# Patient Record
Sex: MALE | Race: WHITE | NOT HISPANIC OR LATINO | Employment: OTHER | URBAN - NONMETROPOLITAN AREA
[De-identification: names, ages, dates, MRNs, and addresses within clinical notes are randomized per-mention and may not be internally consistent; named-entity substitution may affect disease eponyms.]

---

## 2017-02-21 ENCOUNTER — TELEPHONE (OUTPATIENT)
Dept: UROLOGY | Facility: CLINIC | Age: 72
End: 2017-02-21

## 2017-02-21 DIAGNOSIS — N40.1 BPH (BENIGN PROSTATIC HYPERTROPHY) WITH URINARY OBSTRUCTION: Primary | ICD-10-CM

## 2017-02-21 DIAGNOSIS — N13.8 BPH (BENIGN PROSTATIC HYPERTROPHY) WITH URINARY OBSTRUCTION: Primary | ICD-10-CM

## 2017-02-21 NOTE — TELEPHONE ENCOUNTER
----- Message from Alexa Carbajal sent at 2/15/2017 12:46 PM CST -----  PSA, BUN AND CREATININE ORDER  RENAL US ORDER

## 2017-02-26 ENCOUNTER — RESULTS ENCOUNTER (OUTPATIENT)
Dept: UROLOGY | Facility: CLINIC | Age: 72
End: 2017-02-26

## 2017-02-26 DIAGNOSIS — N40.1 BPH (BENIGN PROSTATIC HYPERTROPHY) WITH URINARY OBSTRUCTION: ICD-10-CM

## 2017-02-26 DIAGNOSIS — N13.8 BPH (BENIGN PROSTATIC HYPERTROPHY) WITH URINARY OBSTRUCTION: ICD-10-CM

## 2017-03-09 ENCOUNTER — HOSPITAL ENCOUNTER (OUTPATIENT)
Dept: ULTRASOUND IMAGING | Facility: HOSPITAL | Age: 72
Discharge: HOME | End: 2017-03-09
Attending: UROLOGY | Admitting: UROLOGY

## 2017-03-09 DIAGNOSIS — N13.8 BPH (BENIGN PROSTATIC HYPERTROPHY) WITH URINARY OBSTRUCTION: ICD-10-CM

## 2017-03-09 DIAGNOSIS — N40.1 BPH (BENIGN PROSTATIC HYPERTROPHY) WITH URINARY OBSTRUCTION: ICD-10-CM

## 2017-03-09 PROCEDURE — 76775 US EXAM ABDO BACK WALL LIM: CPT

## 2017-03-13 LAB
BUN SERPL-MCNC: 23 MG/DL (ref 5–21)
CREAT SERPL-MCNC: 0.96 MG/DL (ref 0.5–1.4)
PSA SERPL-MCNC: 2.22 NG/ML (ref 0–4)

## 2017-03-20 ENCOUNTER — OFFICE VISIT (OUTPATIENT)
Dept: UROLOGY | Facility: CLINIC | Age: 72
End: 2017-03-20

## 2017-03-20 VITALS
SYSTOLIC BLOOD PRESSURE: 142 MMHG | BODY MASS INDEX: 33.18 KG/M2 | HEIGHT: 72 IN | TEMPERATURE: 97.5 F | DIASTOLIC BLOOD PRESSURE: 78 MMHG | WEIGHT: 245 LBS

## 2017-03-20 DIAGNOSIS — C64.1 CANCER OF KIDNEY, RIGHT (HCC): ICD-10-CM

## 2017-03-20 DIAGNOSIS — N40.1 BPH (BENIGN PROSTATIC HYPERTROPHY) WITH URINARY OBSTRUCTION: Primary | ICD-10-CM

## 2017-03-20 DIAGNOSIS — N13.8 BPH (BENIGN PROSTATIC HYPERTROPHY) WITH URINARY OBSTRUCTION: Primary | ICD-10-CM

## 2017-03-20 DIAGNOSIS — N52.9 IMPOTENCE DUE TO ERECTILE DYSFUNCTION: ICD-10-CM

## 2017-03-20 PROBLEM — C64.9 CANCER OF KIDNEY (HCC): Status: ACTIVE | Noted: 2017-03-20

## 2017-03-20 LAB
BILIRUB BLD-MCNC: NEGATIVE MG/DL
CLARITY, POC: CLEAR
COLOR UR: YELLOW
GLUCOSE UR STRIP-MCNC: NEGATIVE MG/DL
KETONES UR QL: NEGATIVE
LEUKOCYTE EST, POC: NEGATIVE
NITRITE UR-MCNC: NEGATIVE MG/ML
PH UR: 6.5 [PH] (ref 5–8)
PROT UR STRIP-MCNC: NEGATIVE MG/DL
RBC # UR STRIP: NEGATIVE /UL
SP GR UR: 1.02 (ref 1–1.03)
UROBILINOGEN UR QL: NORMAL

## 2017-03-20 PROCEDURE — 81003 URINALYSIS AUTO W/O SCOPE: CPT | Performed by: UROLOGY

## 2017-03-20 PROCEDURE — 99214 OFFICE O/P EST MOD 30 MIN: CPT | Performed by: UROLOGY

## 2017-03-20 NOTE — PROGRESS NOTES
Subjective    Mr. Maria is 71 y.o. male    CHIEF COMPLAINT: The patient up in today with a history of BPH clinically he is doing well his AUA score today is moderate his PSA is 2.2 he denies any gross hematuria no flank pain does have some frequency and nocturia with the symptoms are stable he does take finasteride and tamsulosin    He also has a history of renal cell carcinoma status post right nephrectomy many years ago he has had no evidence of recurrence ultrasound today is benign and his creatinine is normal    Renal ultrasound independent review    The renal ultrasound is available for me to review.  Treatment recommendations require an independent review.  This film has been reviewed by the radiologist to determine any non urologic abnormalities that are presents.  However, I very closely inspected the kidneys for size, symmetry, contour, parenchymal thickness, perinephric reaction, presence of calcifications, and intrarenal dilation of the collecting system.       The right kidney appears absent    The left kidney appears normal on this ultrasound.  The renal parenchymal is norml in thickness.  There are no solid masses or cysts.  There is no hydronephrosis.  There are no stones.      The bladder appears normal on thisultrsaound.  The bladder appears normal in thickness.  There no masses or stones seen on this exam    He also wants a question to me regarding impotence apparently his wife  almost 2 years ago he is not someone he really says he had not had any successful erections are quite some time on a scale 1-10 about a 1-2.    He has tried Viagra in the past that did not do anything he did try Cialis and all of the 10 mg dose and he got a partial response to that.  History of Present Illness      The following portions of the patient's history were reviewed and updated as appropriate: allergies, current medications, past family history, past medical history, past social history, past surgical  history and problem list.    Review of Systems   Constitutional: Negative.  Negative for fever.   Gastrointestinal: Negative for abdominal distention, abdominal pain, blood in stool, nausea and vomiting.   Genitourinary: Positive for frequency (2-3 times a night) and urgency. Negative for difficulty urinating, dysuria, flank pain and hematuria.   Psychiatric/Behavioral: Negative.  Negative for agitation and confusion.         Current Outpatient Prescriptions:   •  atorvastatin (LIPITOR) 20 MG tablet, Take 20 mg by mouth daily., Disp: , Rfl:   •  bumetanide (BUMEX) 1 MG tablet, Take 1 mg by mouth daily., Disp: , Rfl:   •  carvedilol (COREG) 25 MG tablet, Take 25 mg by mouth 2 (two) times a day., Disp: , Rfl:   •  clopidogrel (PLAVIX) 75 MG tablet, Take 75 mg by mouth daily., Disp: , Rfl:   •  diltiazem CD (DILTIAZEM CD) 120 MG 24 hr capsule, Take 120 mg by mouth daily., Disp: , Rfl:   •  escitalopram (LEXAPRO) 5 MG tablet, TK 1 T PO D, Disp: , Rfl: 5  •  finasteride (PROSCAR) 5 MG tablet, Take 5 mg by mouth daily., Disp: , Rfl:   •  gabapentin (NEURONTIN) 300 MG capsule, Take 400 mg by mouth 2 (Two) Times a Day., Disp: , Rfl:   •  losartan (COZAAR) 50 MG tablet, Take 50 mg by mouth daily., Disp: , Rfl:   •  omeprazole (PriLOSEC) 40 MG capsule, Take 40 mg by mouth daily., Disp: , Rfl:   •  potassium chloride (K-DUR,KLOR-CON) 20 MEQ CR tablet, Take 20 mEq by mouth 2 (Two) Times a Day., Disp: , Rfl:   •  tamsulosin (FLOMAX) 0.4 MG capsule 24 hr capsule, Take 1 capsule by mouth daily., Disp: , Rfl:   •  traMADol-acetaminophen (ULTRACET) 37.5-325 MG per tablet, TK 1 T PO BID, Disp: , Rfl: 1  •  warfarin (COUMADIN) 7.5 MG tablet, Take 7.5 mg by mouth daily., Disp: , Rfl:     Past Medical History   Diagnosis Date   • Abdominal pain, epigastric    • Abnormal abdominal exam      ABFND, RADIOLOGICAL, ABDOMINAL AREA    • Anticoagulated on Coumadin    • Atherosclerosis of coronary artery bypass graft      ATHEROSCLEROSIS,  CORONARY, ARTERY BYPASS GRFT   • Atrial fibrillation    • Cancer of right kidney      right sided kidney cancer   • Cardiomyopathy    • Cardiomyopathy, primary    • CHF (congestive heart failure)    • Colon polyps    • Congestive heart failure    • Coronary artery disease      History of CAD/A-Fib/Pacemaker/Defibrillator placement: pt takes Coumadin and plavix therapy as well as ASA daily   • Gastric polyp    • Gastroesophageal reflux disease without esophagitis    • History of colon polyps    • Hypercholesterolemia    • Hypertension, essential    • Melena    • Myocardial infarction    • NSAID long-term use    • Obesity    • Pacemaker      Pacemaker Dependant   • Platelet inhibition due to Plavix    • Presence of stent in coronary artery      Multiple coronary stenting most recently 4/2008   • Single kidney      Only 1 Kidney   • Status post surgery      s/p Polypectomy Bleed       Past Surgical History   Procedure Laterality Date   • Coronary artery bypass graft  02/2011     2nd time   • Appendectomy     • Other surgical history       Defibrillator/Pacer maker exchange   • Hernia repair       with mesh   • Cardiac pacemaker placement       Pacemaker Placement   • Cardiac ablation     • Cholecystectomy     • Other surgical history       Pacemaker/Defibillation exchange 2012   • Coronary artery bypass graft  1990   • Endoscopy  04/14/2016     charley duffy   • Endoscopy  11/10/2014     clips at polypectomy bx no residual recall 1 yr   • Endoscopy  06/19/2014     snare clip body recall 6 months   • Endoscopy  05/19/2014     polyp stomach bx and clip   • Colonoscopy  08/27/2013     snare hep, trans tics recall 3 yr   • Colonoscopy  10/02/2006     few scattered diverticula   • Colonoscopy  07/22/2009     tubular adenoma in the ascending colon   • Colonoscopy  07/25/2005     several polyps in the ascending colon, one in the transverse colon   • Cardiac catheterization     • Peripheral arterial stent graft         Social  "History     Social History   • Marital status:      Spouse name: N/A   • Number of children: N/A   • Years of education: N/A     Social History Main Topics   • Smoking status: Never Smoker   • Smokeless tobacco: None   • Alcohol use No   • Drug use: No   • Sexual activity: Not Asked     Other Topics Concern   • None     Social History Narrative       Family History   Problem Relation Age of Onset   • Lung cancer Mother    • Other Neg Hx      GI Malignancies       Objective    Visit Vitals   • /78   • Temp 97.5 °F (36.4 °C)   • Ht 72\" (182.9 cm)   • Wt 245 lb (111 kg)   • BMI 33.23 kg/m2       Physical Exam      Results Encounter on 02/26/2017   Component Date Value Ref Range Status   • PSA 03/13/2017 2.220  0.000 - 4.000 ng/mL Final   • BUN 03/13/2017 23* 5 - 21 mg/dL Final   • Creatinine 03/13/2017 0.96  0.50 - 1.40 mg/dL Final   • eGFR Non African Am 03/13/2017 78  >60 mL/min/1.73 Final   • eGFR African Am 03/13/2017 94  >60 mL/min/1.73 Final       Results for orders placed or performed in visit on 03/20/17   POC Urinalysis Dipstick, Automated   Result Value Ref Range    Color Yellow Yellow, Straw, Dark Yellow, Indigo    Clarity, UA Clear Clear    Glucose, UA Negative Negative, 1000 mg/dL (3+) mg/dL    Bilirubin Negative Negative    Ketones, UA Negative Negative    Specific Gravity  1.020 1.005 - 1.030    Blood, UA Negative Negative    pH, Urine 6.5 5.0 - 8.0    Protein, POC Negative Negative mg/dL    Urobilinogen, UA Normal Normal    Leukocytes Negative Negative    Nitrite, UA Negative Negative       Assessment and Plan    Diagnoses and all orders for this visit:    BPH (benign prostatic hypertrophy) with urinary obstruction  -     POC Urinalysis Dipstick, Automated  -     US Renal Bilateral; Future  -     Creatinine, Serum; Future  -     PSA; Future    Cancer of kidney, right  -     US Renal Bilateral; Future    Impotence due to erectile dysfunction     plan--I discussed BPH with him at length again " his PSA is normal symptomatically he is stable we will can continue with finasteride and tamsulosin    Also discusses ultrasound which appears be benign these many many years now in the ED    Also discussed ED at length he would like to go ahead and try a 20 mg dose of Cialis I gave him a prescription for him to take to pharmacy or possibly distended to [place] where it will be a lot cheaper for him he thinks.  How to take the medication risks and complications etc. were all discussed and I think all questions were answered    If this does not work he will call me back and we will I briefly discussed other alternatives with    EMR Dragon/Transcription disclaimer:  Much of this encounter note is an electronic transcription/translation of spoken language to printed text. The electronic translation of spoken language may permit erroneous, or at times, nonsensical words or phrases to be inadvertently transcribed; although I have reviewed the note for such errors, some may still exist.

## 2017-03-25 ENCOUNTER — RESULTS ENCOUNTER (OUTPATIENT)
Dept: UROLOGY | Facility: CLINIC | Age: 72
End: 2017-03-25

## 2017-03-25 DIAGNOSIS — N13.8 BPH (BENIGN PROSTATIC HYPERTROPHY) WITH URINARY OBSTRUCTION: ICD-10-CM

## 2017-03-25 DIAGNOSIS — N40.1 BPH (BENIGN PROSTATIC HYPERTROPHY) WITH URINARY OBSTRUCTION: ICD-10-CM

## 2017-05-22 LAB
ALBUMIN SERPL-MCNC: 4 G/DL (ref 3.5–5.2)
ALP BLD-CCNC: 65 U/L (ref 40–130)
ALT SERPL-CCNC: 19 U/L (ref 5–41)
ANION GAP SERPL CALCULATED.3IONS-SCNC: 9 MMOL/L (ref 7–19)
AST SERPL-CCNC: 19 U/L (ref 5–40)
BASOPHILS ABSOLUTE: 0 K/UL (ref 0–0.2)
BASOPHILS RELATIVE PERCENT: 0.7 % (ref 0–1)
BILIRUB SERPL-MCNC: 0.5 MG/DL (ref 0.2–1.2)
BUN BLDV-MCNC: 16 MG/DL (ref 8–23)
CALCIUM SERPL-MCNC: 9.1 MG/DL (ref 8.8–10.2)
CHLORIDE BLD-SCNC: 107 MMOL/L (ref 98–111)
CO2: 27 MMOL/L (ref 22–29)
CREAT SERPL-MCNC: 0.9 MG/DL (ref 0.5–1.2)
EOSINOPHILS ABSOLUTE: 0.2 K/UL (ref 0–0.6)
EOSINOPHILS RELATIVE PERCENT: 3.8 % (ref 0–5)
GFR NON-AFRICAN AMERICAN: >60
GLUCOSE BLD-MCNC: 132 MG/DL (ref 74–109)
HCT VFR BLD CALC: 40.4 % (ref 42–52)
HEMOGLOBIN: 13.6 G/DL (ref 14–18)
INR BLD: 3.28 (ref 0.88–1.18)
LYMPHOCYTES ABSOLUTE: 0.9 K/UL (ref 1.1–4.5)
LYMPHOCYTES RELATIVE PERCENT: 14.3 % (ref 20–40)
MCH RBC QN AUTO: 33.1 PG (ref 27–31)
MCHC RBC AUTO-ENTMCNC: 33.7 G/DL (ref 33–37)
MCV RBC AUTO: 98.3 FL (ref 80–94)
MONOCYTES ABSOLUTE: 0.5 K/UL (ref 0–0.9)
MONOCYTES RELATIVE PERCENT: 9 % (ref 0–10)
NEUTROPHILS ABSOLUTE: 4.3 K/UL (ref 1.5–7.5)
NEUTROPHILS RELATIVE PERCENT: 71.9 % (ref 50–65)
PDW BLD-RTO: 13.6 % (ref 11.5–14.5)
PLATELET # BLD: 199 K/UL (ref 130–400)
PMV BLD AUTO: 9.3 FL (ref 7.4–10.4)
POTASSIUM SERPL-SCNC: 4.6 MMOL/L (ref 3.5–5)
PROTHROMBIN TIME: 33.9 SEC (ref 12–14.6)
RBC # BLD: 4.11 M/UL (ref 4.7–6.1)
SODIUM BLD-SCNC: 143 MMOL/L (ref 136–145)
TOTAL PROTEIN: 6.4 G/DL (ref 6.6–8.7)
WBC # BLD: 6 K/UL (ref 4.8–10.8)

## 2017-06-09 ENCOUNTER — TELEPHONE (OUTPATIENT)
Dept: INTERNAL MEDICINE | Age: 72
End: 2017-06-09

## 2017-06-12 RX ORDER — NYSTATIN 100000 [USP'U]/G
POWDER TOPICAL
Qty: 30 G | Refills: 2 | Status: SHIPPED | OUTPATIENT
Start: 2017-06-12 | End: 2017-07-05 | Stop reason: SDUPTHER

## 2017-06-15 RX ORDER — CARVEDILOL 25 MG/1
25 TABLET ORAL DAILY
Qty: 30 TABLET | Refills: 5 | Status: SHIPPED | OUTPATIENT
Start: 2017-06-15 | End: 2017-09-05 | Stop reason: SDUPTHER

## 2017-06-15 RX ORDER — CLOPIDOGREL BISULFATE 75 MG/1
75 TABLET ORAL DAILY
Qty: 30 TABLET | Refills: 5 | Status: SHIPPED | OUTPATIENT
Start: 2017-06-15 | End: 2017-12-28 | Stop reason: SDUPTHER

## 2017-06-15 RX ORDER — CARVEDILOL 25 MG/1
25 TABLET ORAL DAILY
COMMUNITY
Start: 2017-03-19 | End: 2017-06-15 | Stop reason: SDUPTHER

## 2017-06-15 RX ORDER — CLOPIDOGREL BISULFATE 75 MG/1
75 TABLET ORAL DAILY
COMMUNITY
Start: 2017-03-19 | End: 2017-06-15 | Stop reason: SDUPTHER

## 2017-07-05 RX ORDER — NYSTATIN 100000 [USP'U]/G
POWDER TOPICAL
Qty: 1 BOTTLE | Refills: 2 | Status: SHIPPED | OUTPATIENT
Start: 2017-07-05

## 2017-07-31 RX ORDER — ZOLPIDEM TARTRATE 5 MG/1
5 TABLET ORAL NIGHTLY PRN
Qty: 30 TABLET | Refills: 0 | Status: SHIPPED | OUTPATIENT
Start: 2017-07-31 | End: 2017-08-14

## 2017-07-31 RX ORDER — ZOLPIDEM TARTRATE 5 MG/1
5 TABLET ORAL NIGHTLY PRN
Qty: 30 TABLET | Refills: 0 | OUTPATIENT
Start: 2017-07-31 | End: 2017-08-30

## 2017-08-14 RX ORDER — OMEPRAZOLE 40 MG/1
40 CAPSULE, DELAYED RELEASE ORAL DAILY
Qty: 90 CAPSULE | Refills: 3 | Status: SHIPPED | OUTPATIENT
Start: 2017-08-14 | End: 2018-01-08 | Stop reason: SDUPTHER

## 2017-08-16 RX ORDER — GABAPENTIN 400 MG/1
400 CAPSULE ORAL 4 TIMES DAILY
Qty: 360 CAPSULE | Refills: 3 | Status: SHIPPED | OUTPATIENT
Start: 2017-08-16

## 2017-08-22 RX ORDER — DILTIAZEM HYDROCHLORIDE 120 MG/1
120 CAPSULE, COATED, EXTENDED RELEASE ORAL DAILY
Qty: 90 CAPSULE | Refills: 3 | OUTPATIENT
Start: 2017-08-22 | End: 2017-12-04 | Stop reason: SDUPTHER

## 2017-08-28 ENCOUNTER — HOSPITAL ENCOUNTER (OUTPATIENT)
Dept: LAB | Age: 72
Discharge: HOME | End: 2017-08-28
Payer: MEDICARE

## 2017-08-28 LAB
ANION GAP SERPL CALCULATED.3IONS-SCNC: 19 MMOL/L (ref 7–19)
BUN BLDV-MCNC: 18 MG/DL (ref 8–23)
CALCIUM SERPL-MCNC: 9.2 MG/DL (ref 8.8–10.2)
CHLORIDE BLD-SCNC: 99 MMOL/L (ref 98–111)
CO2: 26 MMOL/L (ref 22–29)
CREAT SERPL-MCNC: 1.2 MG/DL (ref 0.5–1.2)
GFR NON-AFRICAN AMERICAN: 60
GLUCOSE BLD-MCNC: 179 MG/DL (ref 74–109)
POTASSIUM SERPL-SCNC: 3.6 MMOL/L (ref 3.5–5)
PRO-BNP: 504 PG/ML (ref 0–900)
SODIUM BLD-SCNC: 144 MMOL/L (ref 136–145)

## 2017-09-05 DIAGNOSIS — I10 ESSENTIAL HYPERTENSION: Primary | ICD-10-CM

## 2017-09-05 DIAGNOSIS — E78.5 HYPERLIPIDEMIA, UNSPECIFIED HYPERLIPIDEMIA TYPE: ICD-10-CM

## 2017-09-05 DIAGNOSIS — R73.01 IFG (IMPAIRED FASTING GLUCOSE): ICD-10-CM

## 2017-09-05 RX ORDER — CARVEDILOL 25 MG/1
25 TABLET ORAL 2 TIMES DAILY
Qty: 180 TABLET | Refills: 3 | Status: SHIPPED | OUTPATIENT
Start: 2017-09-05 | End: 2018-01-08 | Stop reason: SDUPTHER

## 2017-09-06 DIAGNOSIS — E78.5 HYPERLIPIDEMIA, UNSPECIFIED HYPERLIPIDEMIA TYPE: ICD-10-CM

## 2017-09-06 DIAGNOSIS — R73.01 IFG (IMPAIRED FASTING GLUCOSE): ICD-10-CM

## 2017-09-06 DIAGNOSIS — I10 ESSENTIAL HYPERTENSION: ICD-10-CM

## 2017-09-06 LAB
ALBUMIN SERPL-MCNC: 3.8 G/DL (ref 3.5–5.2)
ALP BLD-CCNC: 65 U/L (ref 40–130)
ALT SERPL-CCNC: 13 U/L (ref 5–41)
ANION GAP SERPL CALCULATED.3IONS-SCNC: 14 MMOL/L (ref 7–19)
AST SERPL-CCNC: 18 U/L (ref 5–40)
BILIRUB SERPL-MCNC: 0.8 MG/DL (ref 0.2–1.2)
BUN BLDV-MCNC: 17 MG/DL (ref 8–23)
CALCIUM SERPL-MCNC: 8.8 MG/DL (ref 8.8–10.2)
CHLORIDE BLD-SCNC: 103 MMOL/L (ref 98–111)
CO2: 26 MMOL/L (ref 22–29)
CREAT SERPL-MCNC: 0.9 MG/DL (ref 0.5–1.2)
GFR NON-AFRICAN AMERICAN: >60
GLUCOSE BLD-MCNC: 148 MG/DL (ref 74–109)
HBA1C MFR BLD: 7 %
HCT VFR BLD CALC: 39.1 % (ref 42–52)
HEMOGLOBIN: 13.1 G/DL (ref 14–18)
LDL CHOLESTEROL DIRECT: 83 MG/DL
MCH RBC QN AUTO: 32.6 PG (ref 27–31)
MCHC RBC AUTO-ENTMCNC: 33.5 G/DL (ref 33–37)
MCV RBC AUTO: 97.3 FL (ref 80–94)
PDW BLD-RTO: 13.2 % (ref 11.5–14.5)
PLATELET # BLD: 212 K/UL (ref 130–400)
PMV BLD AUTO: 9.7 FL (ref 9.4–12.4)
POTASSIUM SERPL-SCNC: 3.9 MMOL/L (ref 3.5–5)
RBC # BLD: 4.02 M/UL (ref 4.7–6.1)
SODIUM BLD-SCNC: 143 MMOL/L (ref 136–145)
TOTAL PROTEIN: 6.5 G/DL (ref 6.6–8.7)
WBC # BLD: 7 K/UL (ref 4.8–10.8)

## 2017-09-11 RX ORDER — WARFARIN SODIUM 7.5 MG/1
7.5 TABLET ORAL
COMMUNITY
End: 2017-12-28 | Stop reason: SDUPTHER

## 2017-09-11 RX ORDER — NITROGLYCERIN 0.4 MG/1
0.4 TABLET SUBLINGUAL EVERY 5 MIN PRN
COMMUNITY
End: 2018-01-08 | Stop reason: SDUPTHER

## 2017-09-11 RX ORDER — BUMETANIDE 1 MG/1
1 TABLET ORAL DAILY PRN
COMMUNITY
End: 2017-09-13 | Stop reason: SDUPTHER

## 2017-09-11 RX ORDER — TADALAFIL 10 MG/1
10 TABLET ORAL PRN
COMMUNITY
End: 2017-12-14

## 2017-09-11 RX ORDER — POTASSIUM CHLORIDE 20 MEQ/1
20 TABLET, EXTENDED RELEASE ORAL 2 TIMES DAILY
COMMUNITY
End: 2017-11-20 | Stop reason: SDUPTHER

## 2017-09-11 RX ORDER — METOLAZONE 2.5 MG/1
2.5 TABLET ORAL
COMMUNITY

## 2017-09-11 RX ORDER — ERGOCALCIFEROL 1.25 MG/1
50000 CAPSULE ORAL
COMMUNITY
End: 2017-09-13

## 2017-09-11 RX ORDER — LOSARTAN POTASSIUM 50 MG/1
50 TABLET ORAL DAILY
COMMUNITY
End: 2018-01-08 | Stop reason: SDUPTHER

## 2017-09-11 RX ORDER — ESCITALOPRAM OXALATE 5 MG/1
5 TABLET ORAL DAILY
COMMUNITY
End: 2017-09-13

## 2017-09-11 RX ORDER — ATORVASTATIN CALCIUM 20 MG/1
20 TABLET, FILM COATED ORAL DAILY
COMMUNITY
End: 2018-01-08 | Stop reason: SDUPTHER

## 2017-09-11 RX ORDER — TAMSULOSIN HYDROCHLORIDE 0.4 MG/1
0.4 CAPSULE ORAL DAILY
COMMUNITY
End: 2018-01-08 | Stop reason: SDUPTHER

## 2017-09-11 RX ORDER — FINASTERIDE 5 MG/1
5 TABLET, FILM COATED ORAL
COMMUNITY
End: 2018-01-08 | Stop reason: SDUPTHER

## 2017-09-11 RX ORDER — NYSTATIN AND TRIAMCINOLONE ACETONIDE 100000; 1 [USP'U]/G; MG/G
OINTMENT TOPICAL 2 TIMES DAILY
COMMUNITY
End: 2017-09-13

## 2017-09-13 ENCOUNTER — OFFICE VISIT (OUTPATIENT)
Dept: INTERNAL MEDICINE | Age: 72
End: 2017-09-13
Payer: MEDICARE

## 2017-09-13 VITALS
OXYGEN SATURATION: 97 % | BODY MASS INDEX: 33.86 KG/M2 | WEIGHT: 250 LBS | DIASTOLIC BLOOD PRESSURE: 80 MMHG | HEIGHT: 72 IN | SYSTOLIC BLOOD PRESSURE: 134 MMHG | HEART RATE: 80 BPM

## 2017-09-13 DIAGNOSIS — E55.9 VITAMIN D DEFICIENCY: ICD-10-CM

## 2017-09-13 DIAGNOSIS — F32.9 REACTIVE DEPRESSION: ICD-10-CM

## 2017-09-13 DIAGNOSIS — R73.03 PREDIABETES: ICD-10-CM

## 2017-09-13 DIAGNOSIS — I51.9 SEVERE LEFT VENTRICULAR SYSTOLIC DYSFUNCTION: ICD-10-CM

## 2017-09-13 DIAGNOSIS — Z79.01 ANTICOAGULATED: ICD-10-CM

## 2017-09-13 DIAGNOSIS — E78.2 MIXED HYPERLIPIDEMIA: ICD-10-CM

## 2017-09-13 PROCEDURE — 99214 OFFICE O/P EST MOD 30 MIN: CPT | Performed by: INTERNAL MEDICINE

## 2017-09-13 PROCEDURE — G8427 DOCREV CUR MEDS BY ELIG CLIN: HCPCS | Performed by: INTERNAL MEDICINE

## 2017-09-13 PROCEDURE — 1123F ACP DISCUSS/DSCN MKR DOCD: CPT | Performed by: INTERNAL MEDICINE

## 2017-09-13 PROCEDURE — 4040F PNEUMOC VAC/ADMIN/RCVD: CPT | Performed by: INTERNAL MEDICINE

## 2017-09-13 PROCEDURE — G8419 CALC BMI OUT NRM PARAM NOF/U: HCPCS | Performed by: INTERNAL MEDICINE

## 2017-09-13 PROCEDURE — 1036F TOBACCO NON-USER: CPT | Performed by: INTERNAL MEDICINE

## 2017-09-13 PROCEDURE — 3017F COLORECTAL CA SCREEN DOC REV: CPT | Performed by: INTERNAL MEDICINE

## 2017-09-13 RX ORDER — BUMETANIDE 1 MG/1
1 TABLET ORAL 2 TIMES DAILY PRN
Qty: 60 TABLET | Refills: 2 | Status: SHIPPED | OUTPATIENT
Start: 2017-09-13 | End: 2017-11-28 | Stop reason: SDUPTHER

## 2017-09-13 RX ORDER — TRAZODONE HYDROCHLORIDE 50 MG/1
50 TABLET ORAL NIGHTLY
Qty: 30 TABLET | Refills: 5 | Status: SHIPPED | OUTPATIENT
Start: 2017-09-13 | End: 2017-12-14

## 2017-09-13 ASSESSMENT — PATIENT HEALTH QUESTIONNAIRE - PHQ9
SUM OF ALL RESPONSES TO PHQ QUESTIONS 1-9: 1
2. FEELING DOWN, DEPRESSED OR HOPELESS: 1
SUM OF ALL RESPONSES TO PHQ9 QUESTIONS 1 & 2: 1
1. LITTLE INTEREST OR PLEASURE IN DOING THINGS: 0

## 2017-09-13 ASSESSMENT — ENCOUNTER SYMPTOMS
NAUSEA: 0
ABDOMINAL PAIN: 0
RHINORRHEA: 0
TROUBLE SWALLOWING: 0
SORE THROAT: 0
SHORTNESS OF BREATH: 0
COUGH: 0

## 2017-09-21 ENCOUNTER — TELEPHONE (OUTPATIENT)
Dept: INTERNAL MEDICINE | Age: 72
End: 2017-09-21

## 2017-11-02 ENCOUNTER — NURSE ONLY (OUTPATIENT)
Dept: INTERNAL MEDICINE | Age: 72
End: 2017-11-02
Payer: MEDICARE

## 2017-11-02 DIAGNOSIS — Z23 NEED FOR INFLUENZA VACCINATION: Primary | ICD-10-CM

## 2017-11-02 PROCEDURE — 90662 IIV NO PRSV INCREASED AG IM: CPT | Performed by: INTERNAL MEDICINE

## 2017-11-02 PROCEDURE — G0008 ADMIN INFLUENZA VIRUS VAC: HCPCS | Performed by: INTERNAL MEDICINE

## 2017-11-08 ENCOUNTER — OFFICE VISIT (OUTPATIENT)
Dept: CARDIOLOGY | Facility: CLINIC | Age: 72
End: 2017-11-08

## 2017-11-08 DIAGNOSIS — N40.1 BENIGN PROSTATIC HYPERPLASIA WITH URINARY OBSTRUCTION: ICD-10-CM

## 2017-11-08 DIAGNOSIS — I25.5 ISCHEMIC CARDIOMYOPATHY: ICD-10-CM

## 2017-11-08 DIAGNOSIS — Z95.810 ICD (IMPLANTABLE CARDIOVERTER-DEFIBRILLATOR) IN PLACE: ICD-10-CM

## 2017-11-08 DIAGNOSIS — N52.9 IMPOTENCE DUE TO ERECTILE DYSFUNCTION: ICD-10-CM

## 2017-11-08 DIAGNOSIS — I25.10 CORONARY ARTERY DISEASE INVOLVING NATIVE CORONARY ARTERY OF NATIVE HEART WITHOUT ANGINA PECTORIS: Primary | ICD-10-CM

## 2017-11-08 DIAGNOSIS — N13.8 BENIGN PROSTATIC HYPERPLASIA WITH URINARY OBSTRUCTION: ICD-10-CM

## 2017-11-08 DIAGNOSIS — I10 ESSENTIAL HYPERTENSION: ICD-10-CM

## 2017-11-08 DIAGNOSIS — C64.1 MALIGNANT NEOPLASM OF RIGHT KIDNEY (HCC): ICD-10-CM

## 2017-11-08 PROCEDURE — 99214 OFFICE O/P EST MOD 30 MIN: CPT | Performed by: INTERNAL MEDICINE

## 2017-11-08 PROCEDURE — 93000 ELECTROCARDIOGRAM COMPLETE: CPT | Performed by: INTERNAL MEDICINE

## 2017-11-08 NOTE — PROGRESS NOTES
Darren Maria  [id number]  1945  72 y.o.  male    Referring Provider: Emanuel Phillips MD    Reason for Follow-up Visit:  Routine follow up.  coronary artery disease Coronary artery bypass grafting 2008 , stented coronary artery    Subjective    Overall feeling well   Mild chest pain   Chest pain with exertion, mild substernal, pressure like. Lasts less than 5 minutes  No diaphoresis  No nausea  No radiation  Precipitated with early exertion and then improves  Often worse in cold  Mild associated dyspnea    Mild exertional shortness of breath on exertion relieved with rest  No significant cough or wheezing  Going on for several months  No palpitations  No significant pedal edema  No fever or chills  No significant expectoration  No hemoptysis  No presyncope or syncope   No palpitations  No significant pedal edema  Compliant with medications and dietary advice  Good effort tolerance  No presyncope or syncope  Compliant with medications      History of present illness:  Darren Maria is a 72 y.o. yo male with history of coronary artery disease Coronary artery bypass grafting , stented coronary artery  who presents today for   Chief Complaint   Patient presents with   • Coronary Artery Disease     6 MON FU    • Shortness of Breath     WITH EXERTION    .    History  Past Medical History:   Diagnosis Date   • Abdominal pain, epigastric    • Abnormal abdominal exam     ABFND, RADIOLOGICAL, ABDOMINAL AREA    • Anticoagulated on Coumadin    • Atherosclerosis of coronary artery bypass graft     ATHEROSCLEROSIS, CORONARY, ARTERY BYPASS GRFT   • Atrial fibrillation    • Cancer of right kidney     right sided kidney cancer   • Cardiomyopathy    • Cardiomyopathy, primary    • CHF (congestive heart failure)    • Colon polyps    • Congestive heart failure    • Coronary artery disease     History of CAD/A-Fib/Pacemaker/Defibrillator placement: pt takes Coumadin and plavix therapy as well as ASA daily   • Gastric  polyp    • Gastroesophageal reflux disease without esophagitis    • History of colon polyps    • Hypercholesterolemia    • Hypertension, essential    • Melena    • Myocardial infarction    • NSAID long-term use    • Obesity    • Pacemaker     Pacemaker Dependant   • Platelet inhibition due to Plavix    • Presence of stent in coronary artery     Multiple coronary stenting most recently 4/2008   • Single kidney     Only 1 Kidney   • Status post surgery     s/p Polypectomy Bleed   ,   Past Surgical History:   Procedure Laterality Date   • APPENDECTOMY     • CARDIAC ABLATION     • CARDIAC CATHETERIZATION     • CARDIAC PACEMAKER PLACEMENT      Pacemaker Placement   • CHOLECYSTECTOMY     • COLONOSCOPY  08/27/2013    snare hep, trans tics recall 3 yr   • COLONOSCOPY  10/02/2006    few scattered diverticula   • COLONOSCOPY  07/22/2009    tubular adenoma in the ascending colon   • COLONOSCOPY  07/25/2005    several polyps in the ascending colon, one in the transverse colon   • CORONARY ARTERY BYPASS GRAFT  02/2011    2nd time   • CORONARY ARTERY BYPASS GRAFT  1990   • ENDOSCOPY  04/14/2016    nl slant   • ENDOSCOPY  11/10/2014    clips at polypectomy bx no residual recall 1 yr   • ENDOSCOPY  06/19/2014    snare clip body recall 6 months   • ENDOSCOPY  05/19/2014    polyp stomach bx and clip   • HERNIA REPAIR      with mesh   • OTHER SURGICAL HISTORY      Defibrillator/Pacer maker exchange   • OTHER SURGICAL HISTORY      Pacemaker/Defibillation exchange 2012   • PERIPHERAL ARTERIAL STENT GRAFT     ,   Family History   Problem Relation Age of Onset   • Lung cancer Mother    • No Known Problems Father    • Other Neg Hx      GI Malignancies   ,   Social History   Substance Use Topics   • Smoking status: Never Smoker   • Smokeless tobacco: Never Used   • Alcohol use No   ,     Medications  Current Outpatient Prescriptions   Medication Sig Dispense Refill   • atorvastatin (LIPITOR) 20 MG tablet Take 20 mg by mouth daily.     •  bumetanide (BUMEX) 1 MG tablet Take 1 mg by mouth daily.     • carvedilol (COREG) 25 MG tablet Take 25 mg by mouth 2 (two) times a day.     • clopidogrel (PLAVIX) 75 MG tablet Take 75 mg by mouth daily.     • diltiazem CD (DILTIAZEM CD) 120 MG 24 hr capsule Take 120 mg by mouth daily.     • escitalopram (LEXAPRO) 5 MG tablet TK 1 T PO D  5   • finasteride (PROSCAR) 5 MG tablet Take 5 mg by mouth daily.     • gabapentin (NEURONTIN) 300 MG capsule Take 400 mg by mouth 2 (Two) Times a Day.     • losartan (COZAAR) 50 MG tablet Take 50 mg by mouth daily.     • omeprazole (PriLOSEC) 40 MG capsule Take 40 mg by mouth daily.     • potassium chloride (K-DUR,KLOR-CON) 20 MEQ CR tablet Take 20 mEq by mouth 2 (Two) Times a Day.     • tamsulosin (FLOMAX) 0.4 MG capsule 24 hr capsule Take 1 capsule by mouth daily.     • traMADol-acetaminophen (ULTRACET) 37.5-325 MG per tablet TK 1 T PO BID  1   • warfarin (COUMADIN) 7.5 MG tablet Take 7.5 mg by mouth daily.       No current facility-administered medications for this visit.        Allergies:  Morphine and related    Review of Systems  Review of Systems   Constitution: Negative.   HENT: Negative.    Eyes: Negative.    Cardiovascular: Positive for chest pain and dyspnea on exertion. Negative for claudication, cyanosis, irregular heartbeat, leg swelling, near-syncope, orthopnea, palpitations, paroxysmal nocturnal dyspnea and syncope.   Respiratory: Negative.    Endocrine: Negative.    Hematologic/Lymphatic: Negative.    Skin: Negative.    Musculoskeletal: Positive for arthritis.   Gastrointestinal: Negative for anorexia.   Genitourinary: Negative.    Neurological: Positive for numbness.   Psychiatric/Behavioral: Negative.        Objective     Physical Exam:  There were no vitals taken for this visit.  Physical Exam   Constitutional: He appears well-developed.   HENT:   Head: Normocephalic.   Neck: Normal carotid pulses and no JVD present. No tracheal tenderness present. Carotid bruit  is not present. No tracheal deviation and no edema present.   Cardiovascular: Regular rhythm and normal pulses.    Murmur heard.   Systolic murmur is present with a grade of 2/6   Pulmonary/Chest: Effort normal. No stridor.   Abdominal: Soft.   Neurological: He is alert. He has normal strength. No cranial nerve deficit or sensory deficit.   Skin: Skin is warm.   Psychiatric: He has a normal mood and affect. His speech is normal and behavior is normal.       Results Review:       ECG 12 Lead  Date/Time: 11/8/2017 11:07 AM  Performed by: JACI FUCHS  Authorized by: JACI FUCHS   Comparison: compared with previous ECG from 10/24/2016  Similar to previous ECG  Rhythm: paced  Rate: normal  Clinical impression: abnormal ECG        · 10/16 Lexiscan  · Left ventricular ejection fraction is severely reduced (Calculated EF = 30%).  · Myocardial perfusion imaging indicates a large-sized infarct located in the anterior wall with no significant ischemia noted.                Impressions are consistent with a study indicating uncertain risk.    Echo  10/16    · Left ventricular function is mildly decreased. Estimated EF = 45%.  · Left ventricular diastolic dysfunction (grade I) consistent with impaired relaxation.  · The following segments are akinetic: apical anterior, apical septal, apical inferior, apical lateral and apex.  · No pulmonary hypertension is present.    Assessment/Plan   Patient Active Problem List   Diagnosis   • Coronary artery disease involving native heart   • Essential hypertension   • ICD (implantable cardioverter-defibrillator) in place   • Ischemic cardiomyopathy   • Cancer of kidney   • BPH (benign prostatic hypertrophy) with urinary obstruction   • Impotence due to erectile dysfunction       Stable doing well. No exertional chest pain or excessive dyspnea. No palpitations. No significant pedal edema. Compliant with medications and diet. Latest labs and medications reviewed.  Recent echo EF 45%. Nuclear  stress test shows large infarction no ischemia. Occasional atypical chest pain. Still mowing yard with no problems.    Plan:    As symptoms improved since last visit will defer any further cardiac testing  Monitor for any signs of bleeding including red or dark stools. Fall precautions.   Patient is asked to monitor BP at home or work, several times per month and return with written values at next office visit.   Low salt cardiac diet.    Relevant printed educational materials given pertinent to above diagnoses    Keep follow up with me in 6 months  Close follow up with you as scheduled.  Intensive factor modifications.  See order list.   Monitor cardiac rhythm device function regularly per established schedule (Dr Mccarty) or PRN    Keep LDL below 70 mg/dl. Monitor liver and renal functions.   Monitor CBC, CMP and Lipid Panel   Target INR 2-3     Return in about 6 months (around 5/8/2018).

## 2017-11-20 RX ORDER — POTASSIUM CHLORIDE 20 MEQ/1
20 TABLET, EXTENDED RELEASE ORAL 2 TIMES DAILY
Qty: 60 TABLET | Refills: 5 | Status: SHIPPED | OUTPATIENT
Start: 2017-11-20 | End: 2018-01-08 | Stop reason: SDUPTHER

## 2017-11-28 ENCOUNTER — TELEPHONE (OUTPATIENT)
Dept: INTERNAL MEDICINE | Age: 72
End: 2017-11-28

## 2017-11-28 DIAGNOSIS — Z79.01 ANTICOAGULATED: ICD-10-CM

## 2017-11-28 DIAGNOSIS — I51.9 SEVERE LEFT VENTRICULAR SYSTOLIC DYSFUNCTION: ICD-10-CM

## 2017-11-28 DIAGNOSIS — F32.9 REACTIVE DEPRESSION: ICD-10-CM

## 2017-11-28 DIAGNOSIS — E55.9 VITAMIN D DEFICIENCY: ICD-10-CM

## 2017-11-28 DIAGNOSIS — E78.2 MIXED HYPERLIPIDEMIA: ICD-10-CM

## 2017-11-28 DIAGNOSIS — R73.03 PREDIABETES: ICD-10-CM

## 2017-11-28 RX ORDER — BUMETANIDE 1 MG/1
TABLET ORAL
Qty: 60 TABLET | Refills: 5 | Status: SHIPPED | OUTPATIENT
Start: 2017-11-28 | End: 2018-01-08 | Stop reason: SDUPTHER

## 2017-12-04 RX ORDER — DILTIAZEM HYDROCHLORIDE 180 MG/1
180 CAPSULE, EXTENDED RELEASE ORAL DAILY
COMMUNITY
Start: 2017-11-15 | End: 2018-01-08 | Stop reason: SDUPTHER

## 2017-12-14 ENCOUNTER — OFFICE VISIT (OUTPATIENT)
Dept: INTERNAL MEDICINE | Age: 72
End: 2017-12-14
Payer: MEDICARE

## 2017-12-14 ENCOUNTER — HOSPITAL ENCOUNTER (OUTPATIENT)
Dept: GENERAL RADIOLOGY | Age: 72
Discharge: HOME | End: 2017-12-14
Payer: MEDICARE

## 2017-12-14 VITALS
HEART RATE: 81 BPM | BODY MASS INDEX: 34.67 KG/M2 | HEIGHT: 72 IN | DIASTOLIC BLOOD PRESSURE: 72 MMHG | SYSTOLIC BLOOD PRESSURE: 132 MMHG | WEIGHT: 256 LBS | OXYGEN SATURATION: 96 %

## 2017-12-14 DIAGNOSIS — I51.9 SEVERE LEFT VENTRICULAR SYSTOLIC DYSFUNCTION: ICD-10-CM

## 2017-12-14 DIAGNOSIS — I49.5 SICK SINUS SYNDROME (HCC): ICD-10-CM

## 2017-12-14 DIAGNOSIS — Z79.01 ANTICOAGULATED: ICD-10-CM

## 2017-12-14 DIAGNOSIS — R73.03 PREDIABETES: ICD-10-CM

## 2017-12-14 DIAGNOSIS — R73.03 PREDIABETES: Primary | ICD-10-CM

## 2017-12-14 PROCEDURE — 99214 OFFICE O/P EST MOD 30 MIN: CPT | Performed by: INTERNAL MEDICINE

## 2017-12-14 PROCEDURE — G8427 DOCREV CUR MEDS BY ELIG CLIN: HCPCS | Performed by: INTERNAL MEDICINE

## 2017-12-14 PROCEDURE — 1123F ACP DISCUSS/DSCN MKR DOCD: CPT | Performed by: INTERNAL MEDICINE

## 2017-12-14 PROCEDURE — 1036F TOBACCO NON-USER: CPT | Performed by: INTERNAL MEDICINE

## 2017-12-14 PROCEDURE — G8417 CALC BMI ABV UP PARAM F/U: HCPCS | Performed by: INTERNAL MEDICINE

## 2017-12-14 PROCEDURE — 3017F COLORECTAL CA SCREEN DOC REV: CPT | Performed by: INTERNAL MEDICINE

## 2017-12-14 PROCEDURE — 4040F PNEUMOC VAC/ADMIN/RCVD: CPT | Performed by: INTERNAL MEDICINE

## 2017-12-14 PROCEDURE — 71020 XR CHEST STANDARD TWO VW: CPT

## 2017-12-14 PROCEDURE — G8484 FLU IMMUNIZE NO ADMIN: HCPCS | Performed by: INTERNAL MEDICINE

## 2017-12-14 RX ORDER — AMOXICILLIN AND CLAVULANATE POTASSIUM 875; 125 MG/1; MG/1
1 TABLET, FILM COATED ORAL 2 TIMES DAILY
Qty: 14 TABLET | Refills: 0 | Status: SHIPPED | OUTPATIENT
Start: 2017-12-14 | End: 2018-01-08 | Stop reason: ALTCHOICE

## 2017-12-14 ASSESSMENT — ENCOUNTER SYMPTOMS
ABDOMINAL DISTENTION: 1
SHORTNESS OF BREATH: 1
NAUSEA: 0
TROUBLE SWALLOWING: 0
COUGH: 1
RHINORRHEA: 0
ABDOMINAL PAIN: 0
SORE THROAT: 0

## 2017-12-14 NOTE — PROGRESS NOTES
[facility] Internal Medicine    Chief Complaint   Patient presents with    3 Month Follow-Up     Pt is here for follow up of left ventricular systolic dysfuntion and prediabetes. Pt states that he has been sick since Monday with cough, productive with white phlegm. States that he is having difficulty breathing, lots of nasal congestion as well, soreness to his chest and back. He had blood in his urine last pm and this morning as well. HPI:Huey returns reassessment, relating a one-week history of intermittent low-grade fever, cough, productive primarily clear sputum. He notes some abdominal bloating, shortness of breath, particularly with recumbent posture in the evening. He thinks he is swollen. He does see Dr. Collette Graff at [facility], cardiology recently who performed what sounds like an echocardiogram, said to be stable. He will see his EP specialist, Dr. Jens Corral, in February. He is taking Coumadin 7.5 mg daily, recent urine was red, and he will recheck the INR soon. He is now living in the [facility], near the [facility].     Past Medical History:   Diagnosis Date    3-vessel CAD     1990, CABG    Aneurysm of ascending aorta (HCC)     Anticoagulant long-term use     Anticoagulated     Anxiety     Aortic dilatation ([address]     2010, 5.3 cm ascending aorta USEC scan; stable for 9 years    Asymptomatic hyperuricemia     Atrial fibrillation (HCC)     Prior AV alfred ablation, left atrial ligation Dr Tracie De La Garza BPH (benign prostatic hyperplasia)     ransler    CAD (coronary atherosclerotic disease)     3 vessel    CHF (congestive heart failure) (HCC)     Chronic kidney disease, stage 2, mildly decreased GFR     Chronic pain syndrome     Colon polyps     2009, atypia, 2013, adenoma, Dr. Nelli Prettyman    Depression     Diverticulosis     Erectile dysfunction     GERD (gastroesophageal reflux disease)     GERD (gastroesophageal reflux disease)     History of renal cell cancer     2000, right nephrectomy    Hypertension, essential     Insomnia     Long term current use of anticoagulant therapy     Mixed hyperlipidemia     Neuropathy (HCC)     Obesity     Prediabetes     Recurrent major depressive disorder (HCC)     moderate    Severe left ventricular systolic dysfunction     79/30 ejection fraction 30%, Dr. Lisa Spear    Severe left ventricular systolic dysfunction      ef 30% skyler lexiscan    Sick sinus syndrome ([facility]     1999, pacemaker, 2005 AICD, intermittent atrial fibrillation    SSS [facility]) ([facility])     Type 2 diabetes mellitus without complication ([facility]     Vitamin D deficiency     Vitamin D deficiency       Family History   Problem Relation Age of Onset    Cancer Mother     Cancer Father     Coronary Art Dis Father     Heart Disease Father       Social History     Social History    Marital status:      Spouse name: N/A    Number of children: N/A    Years of education: N/A     Occupational History    Not on file.      Social History Main Topics    Smoking status: Former Smoker    Smokeless tobacco: Never Used    Alcohol use Not on file    Drug use: Unknown    Sexual activity: Not on file     Other Topics Concern    Not on file     Social History Narrative    No narrative on file      Allergies   Allergen Reactions    Azithromycin     Morphine       Current Outpatient Prescriptions   Medication Sig Dispense Refill    CARTIA  MG extended release capsule Take 180 mg by mouth daily       bumetanide (BUMEX) 1 MG tablet TAKE 1 TABLET BY MOUTH TWICE DAILY AS NEEDED FOR EDEMA 60 tablet 5    potassium chloride (KLOR-CON M) 20 MEQ extended release tablet Take 1 tablet by mouth 2 times daily 60 tablet 5    traMADol-acetaminophen (ULTRACET) 37.5-325 MG per tablet Take 1 tablet by mouth 2 times daily 60 tablet 1    atorvastatin (LIPITOR) 20 MG tablet Take 20 mg by mouth daily      finasteride (PROSCAR) 5 MG tablet Take 5 mg by mouth      losartan (COZAAR) 50 MG tablet Take 50 mg by mouth daily      metolazone (ZAROXOLYN) 2.5 MG tablet Take 2.5 mg by mouth every 48 hours as needed      nitroGLYCERIN (NITROSTAT) 0.4 MG SL tablet Place 0.4 mg under the tongue every 5 minutes as needed for Chest pain up to max of 3 total doses. If no relief after 1 dose, call 911.  tamsulosin (FLOMAX) 0.4 MG capsule Take 0.4 mg by mouth daily      warfarin (COUMADIN) 7.5 MG tablet Take 7.5 mg by mouth Take one tablet daily every day except Sunday. On Sunday take 0.5 tablet.  carvedilol (COREG) 25 MG tablet Take 1 tablet by mouth 2 times daily 180 tablet 3    gabapentin (NEURONTIN) 400 MG capsule Take 1 capsule by mouth 4 times daily 360 capsule 3    omeprazole (PRILOSEC) 40 MG delayed release capsule Take 1 capsule by mouth daily 90 capsule 3    nystatin (MYCOSTATIN) 924235 UNIT/GM powder Apply BID times daily. 1 Bottle 2    clopidogrel (PLAVIX) 75 MG tablet Take 1 tablet by mouth daily 30 tablet 5     No current facility-administered medications for this visit. Review of Systems   Constitutional: Negative for fatigue and fever. HENT: Negative for rhinorrhea, sore throat and trouble swallowing. Respiratory: Positive for cough and shortness of breath. Cardiovascular: Negative for chest pain and palpitations. Gastrointestinal: Positive for abdominal distention. Negative for abdominal pain and nausea. Endocrine: Negative for cold intolerance and heat intolerance. Genitourinary: Negative for dysuria and frequency. Red urine   Skin: Negative for rash and wound. Neurological: Negative for seizures and syncope. Psychiatric/Behavioral: Negative for behavioral problems and hallucinations. /72   Pulse 81   Ht 6' (1.829 m)   Wt 256 lb (116.1 kg)   SpO2 96%   BMI 34.72 kg/m²   Physical Exam   Constitutional: He appears well-developed and well-nourished. HENT:   Head: Normocephalic and atraumatic.    Eyes: Pupils 09/06/2017 65  40 - 130 U/L Final    ALT 09/06/2017 13  5 - 41 U/L Final    AST 09/06/2017 18  5 - 40 U/L Final    LDL Direct 09/06/2017 83* <100 mg/dL Final    Hemoglobin A1C 09/06/2017 7.0* See comment % Final       1. Prediabetes    2. Anticoagulated    3. Severe left ventricular systolic dysfunction    4. Sick sinus syndrome (HCC)         ASSESSMENT/PLAN  1. Prediabetes/diabetes: Glucose is stable, INR is 7.0, indicating conversion to true diabetes. We will continue to monitor  2. Anticoagulation: We are going to start an antibiotic, Augmentin 875/125 twice daily. He will need to watch his INR, may need to cut back on his Coumadin which is currently at 7.5 mg 6 days, half tablet on Sundays. 3. Severe LVH: He likely does have some fluid retention, noted on physical examination. He will continue same dose of bumetanide, but take the \" booster\" metolazone 3 days in a row. He will come in to see Samantha Garcia possibly 3 weeks for recheck, then he can be seen in the spring with a CBC, CMP, A1c; continue follow-up with Dr. Paulina Watson at [facility], Dr. Ashly Moctezuma, electrophysiologist    No orders of the defined types were placed in this encounter.

## 2017-12-14 NOTE — PATIENT INSTRUCTIONS
Begin antibiotic, Augmentin, 875/125 twice daily, 7 days  Try to keep INR between 2 and 3, may need to take half dosage while on antibiotics  Return visit 3 weeks to see Rosario Torres, for recheck of breathing, bronchial status, then can be seen in the springtime with a CBC, CMP, A1c.   We'll need to watch starches, carbohydrates because of finding of early diabetes

## 2017-12-28 RX ORDER — CLOPIDOGREL BISULFATE 75 MG/1
75 TABLET ORAL DAILY
Qty: 30 TABLET | Refills: 0 | Status: SHIPPED | OUTPATIENT
Start: 2017-12-28 | End: 2018-01-08 | Stop reason: SDUPTHER

## 2017-12-28 RX ORDER — WARFARIN SODIUM 7.5 MG/1
TABLET ORAL
Qty: 90 TABLET | Refills: 0 | Status: SHIPPED | OUTPATIENT
Start: 2017-12-28 | End: 2018-01-08 | Stop reason: SDUPTHER

## 2017-12-28 NOTE — TELEPHONE ENCOUNTER
Spoke with patient,  He needs a refill on his Plavix and Warfarin sent to [facility] in [place]. He states he is leaving town in the morning and needs them tonight.   12/28 3:28

## 2018-01-08 ENCOUNTER — OFFICE VISIT (OUTPATIENT)
Dept: INTERNAL MEDICINE | Age: 73
End: 2018-01-08
Payer: MEDICARE

## 2018-01-08 VITALS
RESPIRATION RATE: 16 BRPM | BODY MASS INDEX: 36.4 KG/M2 | DIASTOLIC BLOOD PRESSURE: 74 MMHG | OXYGEN SATURATION: 94 % | SYSTOLIC BLOOD PRESSURE: 118 MMHG | HEART RATE: 78 BPM | WEIGHT: 260 LBS | HEIGHT: 71 IN

## 2018-01-08 DIAGNOSIS — J01.90 ACUTE SINUSITIS, RECURRENCE NOT SPECIFIED, UNSPECIFIED LOCATION: ICD-10-CM

## 2018-01-08 DIAGNOSIS — R73.03 PREDIABETES: ICD-10-CM

## 2018-01-08 DIAGNOSIS — E55.9 VITAMIN D DEFICIENCY: ICD-10-CM

## 2018-01-08 DIAGNOSIS — I51.9 SEVERE LEFT VENTRICULAR SYSTOLIC DYSFUNCTION: ICD-10-CM

## 2018-01-08 DIAGNOSIS — E78.2 MIXED HYPERLIPIDEMIA: ICD-10-CM

## 2018-01-08 DIAGNOSIS — F32.9 REACTIVE DEPRESSION: ICD-10-CM

## 2018-01-08 DIAGNOSIS — Z79.01 ANTICOAGULATED: ICD-10-CM

## 2018-01-08 DIAGNOSIS — F51.01 PRIMARY INSOMNIA: Primary | ICD-10-CM

## 2018-01-08 PROCEDURE — 3017F COLORECTAL CA SCREEN DOC REV: CPT | Performed by: NURSE PRACTITIONER

## 2018-01-08 PROCEDURE — 99213 OFFICE O/P EST LOW 20 MIN: CPT | Performed by: NURSE PRACTITIONER

## 2018-01-08 PROCEDURE — 1123F ACP DISCUSS/DSCN MKR DOCD: CPT | Performed by: NURSE PRACTITIONER

## 2018-01-08 PROCEDURE — G8417 CALC BMI ABV UP PARAM F/U: HCPCS | Performed by: NURSE PRACTITIONER

## 2018-01-08 PROCEDURE — G8484 FLU IMMUNIZE NO ADMIN: HCPCS | Performed by: NURSE PRACTITIONER

## 2018-01-08 PROCEDURE — G8427 DOCREV CUR MEDS BY ELIG CLIN: HCPCS | Performed by: NURSE PRACTITIONER

## 2018-01-08 PROCEDURE — 4040F PNEUMOC VAC/ADMIN/RCVD: CPT | Performed by: NURSE PRACTITIONER

## 2018-01-08 PROCEDURE — 1036F TOBACCO NON-USER: CPT | Performed by: NURSE PRACTITIONER

## 2018-01-08 RX ORDER — POTASSIUM CHLORIDE 20 MEQ/1
20 TABLET, EXTENDED RELEASE ORAL 2 TIMES DAILY
Qty: 60 TABLET | Refills: 5 | Status: SHIPPED | OUTPATIENT
Start: 2018-01-08

## 2018-01-08 RX ORDER — CLOPIDOGREL BISULFATE 75 MG/1
75 TABLET ORAL DAILY
Qty: 90 TABLET | Refills: 1 | Status: SHIPPED | OUTPATIENT
Start: 2018-01-08

## 2018-01-08 RX ORDER — NITROGLYCERIN 0.4 MG/1
0.4 TABLET SUBLINGUAL EVERY 5 MIN PRN
Qty: 25 TABLET | Refills: 1 | Status: SHIPPED | OUTPATIENT
Start: 2018-01-08

## 2018-01-08 RX ORDER — OMEPRAZOLE 40 MG/1
40 CAPSULE, DELAYED RELEASE ORAL DAILY
Qty: 90 CAPSULE | Refills: 3 | Status: SHIPPED | OUTPATIENT
Start: 2018-01-08

## 2018-01-08 RX ORDER — FINASTERIDE 5 MG/1
5 TABLET, FILM COATED ORAL DAILY
Qty: 90 TABLET | Refills: 1 | Status: SHIPPED | OUTPATIENT
Start: 2018-01-08 | End: 2018-03-26 | Stop reason: SDUPTHER

## 2018-01-08 RX ORDER — BUMETANIDE 1 MG/1
TABLET ORAL
Qty: 180 TABLET | Refills: 1 | Status: SHIPPED | OUTPATIENT
Start: 2018-01-08

## 2018-01-08 RX ORDER — WARFARIN SODIUM 7.5 MG/1
TABLET ORAL
Qty: 90 TABLET | Refills: 1 | Status: SHIPPED | OUTPATIENT
Start: 2018-01-08

## 2018-01-08 RX ORDER — CARVEDILOL 25 MG/1
25 TABLET ORAL 2 TIMES DAILY
Qty: 180 TABLET | Refills: 3 | Status: SHIPPED | OUTPATIENT
Start: 2018-01-08

## 2018-01-08 RX ORDER — ATORVASTATIN CALCIUM 20 MG/1
20 TABLET, FILM COATED ORAL DAILY
Qty: 90 TABLET | Refills: 1 | Status: SHIPPED | OUTPATIENT
Start: 2018-01-08

## 2018-01-08 RX ORDER — TAMSULOSIN HYDROCHLORIDE 0.4 MG/1
0.4 CAPSULE ORAL DAILY
Qty: 90 CAPSULE | Refills: 1 | Status: SHIPPED | OUTPATIENT
Start: 2018-01-08

## 2018-01-08 RX ORDER — DILTIAZEM HYDROCHLORIDE 180 MG/1
180 CAPSULE, EXTENDED RELEASE ORAL DAILY
Qty: 90 CAPSULE | Refills: 1 | Status: SHIPPED | OUTPATIENT
Start: 2018-01-08

## 2018-01-08 RX ORDER — LOSARTAN POTASSIUM 50 MG/1
50 TABLET ORAL DAILY
Qty: 90 TABLET | Refills: 1 | Status: SHIPPED | OUTPATIENT
Start: 2018-01-08

## 2018-01-08 RX ORDER — ZOLPIDEM TARTRATE 5 MG/1
5 TABLET ORAL NIGHTLY PRN
Qty: 30 TABLET | Refills: 0 | Status: SHIPPED | OUTPATIENT
Start: 2018-01-08 | End: 2018-01-22

## 2018-01-08 ASSESSMENT — ENCOUNTER SYMPTOMS
COUGH: 0
EYE ITCHING: 0
VOMITING: 0
CHOKING: 0
CONSTIPATION: 0
WHEEZING: 0
COLOR CHANGE: 0
EYE DISCHARGE: 0
STRIDOR: 0
SORE THROAT: 0
SHORTNESS OF BREATH: 0
BLOOD IN STOOL: 0
TROUBLE SWALLOWING: 0
NAUSEA: 0
DIARRHEA: 0
ABDOMINAL PAIN: 0
ABDOMINAL DISTENTION: 0

## 2018-01-08 NOTE — PROGRESS NOTES
left ventricular systolic dysfunction     52/81 ejection fraction 30%, Dr. Percy Mcqueenmarcelle Hams    Severe left ventricular systolic dysfunction      ef 30% skyler lexiscan    Sick sinus syndrome ([facility]     1999, pacemaker, 2005 AICD, intermittent atrial fibrillation    SSS (sick sinus syndrome) (HCC)     Type 2 diabetes mellitus without complication ([facility]     Vitamin D deficiency     Vitamin D deficiency       Past Surgical History:   Procedure Laterality Date    CHOLECYSTECTOMY      COLONOSCOPY  08/2013    peter, tubular, modt atypia, adenomas    CORONARY ARTERY BYPASS GRAFT  1990    HERNIA REPAIR      KNEE ARTHROPLASTY      TOTAL KNEE ARTHROPLASTY      TOTAL NEPHRECTOMY Right        Family History   Problem Relation Age of Onset    Cancer Mother     Cancer Father     Coronary Art Dis Father     Heart Disease Father        Social History   Substance Use Topics    Smoking status: Former Smoker    Smokeless tobacco: Never Used    Alcohol use Not on file      Current Outpatient Prescriptions   Medication Sig Dispense Refill    traMADol-acetaminophen (ULTRACET) 37.5-325 MG per tablet Take 1 tablet by mouth 2 times daily for 30 days. 60 tablet 1    zolpidem (AMBIEN) 5 MG tablet Take 1 tablet by mouth nightly as needed for Sleep for up to 14 days. 30 tablet 0    warfarin (COUMADIN) 7.5 MG tablet TAKE 1 TABLET BY MOUTH FOR 5 DAYS PER WEEK, THEN ON THURSDAY AND SUNDAY TAKE 1/2 TABLET DAILY.  GOAL INR 1.7, 1.8, 1.9 90 tablet 0    clopidogrel (PLAVIX) 75 MG tablet TAKE 1 TABLET BY MOUTH DAILY 30 tablet 0    CARTIA  MG extended release capsule Take 180 mg by mouth daily       bumetanide (BUMEX) 1 MG tablet TAKE 1 TABLET BY MOUTH TWICE DAILY AS NEEDED FOR EDEMA 60 tablet 5    potassium chloride (KLOR-CON M) 20 MEQ extended release tablet Take 1 tablet by mouth 2 times daily 60 tablet 5    atorvastatin (LIPITOR) 20 MG tablet Take 20 mg by mouth daily      finasteride (PROSCAR) 5 MG tablet Take 5 mg by mouth      losartan (COZAAR) 50 MG tablet Take 50 mg by mouth daily      metolazone (ZAROXOLYN) 2.5 MG tablet Take 2.5 mg by mouth every 48 hours as needed      nitroGLYCERIN (NITROSTAT) 0.4 MG SL tablet Place 0.4 mg under the tongue every 5 minutes as needed for Chest pain up to max of 3 total doses. If no relief after 1 dose, call 911.  tamsulosin (FLOMAX) 0.4 MG capsule Take 0.4 mg by mouth daily      carvedilol (COREG) 25 MG tablet Take 1 tablet by mouth 2 times daily 180 tablet 3    gabapentin (NEURONTIN) 400 MG capsule Take 1 capsule by mouth 4 times daily 360 capsule 3    omeprazole (PRILOSEC) 40 MG delayed release capsule Take 1 capsule by mouth daily 90 capsule 3    nystatin (MYCOSTATIN) 127623 UNIT/GM powder Apply BID times daily. 1 Bottle 2     No current facility-administered medications for this visit. Allergies   Allergen Reactions    Azithromycin     Morphine        Health Maintenance   Topic Date Due    AAA screen  1945    Hepatitis C screen  1945    DTaP/Tdap/Td vaccine (1 - Tdap) 04/20/1966    Pneumococcal low/med risk (2 of 2 - PPSV23) 11/11/2016    A1C test (Diabetic or Prediabetic)  09/06/2018    Potassium monitoring  09/06/2018    Creatinine monitoring  09/06/2018    Diabetes screen  09/06/2020    Lipid screen  09/06/2022    Colon cancer screen colonoscopy  09/19/2026    Zostavax vaccine  Completed    Flu vaccine  Completed       Subjective:      Review of Systems   Constitutional: Negative for fatigue, fever and unexpected weight change. HENT: Negative for ear discharge, ear pain, mouth sores, sore throat and trouble swallowing. Eyes: Negative for discharge, itching and visual disturbance. Respiratory: Negative for cough, choking, shortness of breath, wheezing and stridor. Cardiovascular: Negative for chest pain, palpitations and leg swelling.    Gastrointestinal: Negative for abdominal distention, abdominal pain, blood in stool,

## 2018-01-08 NOTE — PATIENT INSTRUCTIONS
1.. Sinusitis; Resolved  2/  Insomnia;   We will try the De[facility] Phalen again     We will go ahead and set up appt with Dr Eneida Estrada for the spring but he is likely changing to DR Ameya Peace by then  Sign release papers on the way out

## 2018-01-24 ENCOUNTER — TELEPHONE (OUTPATIENT)
Dept: INTERNAL MEDICINE | Age: 73
End: 2018-01-24

## 2018-02-01 ENCOUNTER — TELEPHONE (OUTPATIENT)
Dept: INTERNAL MEDICINE | Age: 73
End: 2018-02-01

## 2018-02-09 ENCOUNTER — TELEPHONE (OUTPATIENT)
Dept: INTERNAL MEDICINE | Age: 73
End: 2018-02-09

## 2018-02-15 ENCOUNTER — TELEPHONE (OUTPATIENT)
Dept: INTERNAL MEDICINE | Age: 73
End: 2018-02-15

## 2018-02-15 DIAGNOSIS — G62.9 POLYNEUROPATHY: Primary | ICD-10-CM

## 2018-02-19 ENCOUNTER — TELEPHONE (OUTPATIENT)
Dept: INTERNAL MEDICINE | Age: 73
End: 2018-02-19

## 2018-02-19 NOTE — TELEPHONE ENCOUNTER
Spoke with patient,  He states we faxed a paper to [facility] but the wrong code was put on it.   2/19 1822

## 2018-03-13 ENCOUNTER — OFFICE VISIT (OUTPATIENT)
Dept: INTERNAL MEDICINE | Age: 73
End: 2018-03-13
Payer: MEDICARE

## 2018-03-13 VITALS
DIASTOLIC BLOOD PRESSURE: 76 MMHG | OXYGEN SATURATION: 97 % | HEIGHT: 71 IN | SYSTOLIC BLOOD PRESSURE: 110 MMHG | RESPIRATION RATE: 16 BRPM | BODY MASS INDEX: 35.14 KG/M2 | WEIGHT: 251 LBS | HEART RATE: 86 BPM

## 2018-03-13 DIAGNOSIS — E78.2 MIXED HYPERLIPIDEMIA: ICD-10-CM

## 2018-03-13 DIAGNOSIS — F32.9 REACTIVE DEPRESSION: ICD-10-CM

## 2018-03-13 DIAGNOSIS — G62.9 NEUROPATHY: Primary | ICD-10-CM

## 2018-03-13 DIAGNOSIS — M89.9 DISORDER OF BONE: ICD-10-CM

## 2018-03-13 DIAGNOSIS — R73.03 PREDIABETES: ICD-10-CM

## 2018-03-13 DIAGNOSIS — E55.9 VITAMIN D DEFICIENCY: ICD-10-CM

## 2018-03-13 DIAGNOSIS — Z00.00 HEALTH CARE MAINTENANCE: ICD-10-CM

## 2018-03-13 DIAGNOSIS — Z79.01 ANTICOAGULATED: ICD-10-CM

## 2018-03-13 DIAGNOSIS — I51.9 SEVERE LEFT VENTRICULAR SYSTOLIC DYSFUNCTION: ICD-10-CM

## 2018-03-13 LAB
ALBUMIN SERPL-MCNC: 4.4 G/DL (ref 3.5–5.2)
ALP BLD-CCNC: 70 U/L (ref 40–130)
ALT SERPL-CCNC: 20 U/L (ref 5–41)
ANION GAP SERPL CALCULATED.3IONS-SCNC: 17 MMOL/L (ref 7–19)
AST SERPL-CCNC: 26 U/L (ref 5–40)
BILIRUB SERPL-MCNC: 1.2 MG/DL (ref 0.2–1.2)
BUN BLDV-MCNC: 23 MG/DL (ref 8–23)
CALCIUM SERPL-MCNC: 9.3 MG/DL (ref 8.8–10.2)
CHLORIDE BLD-SCNC: 102 MMOL/L (ref 98–111)
CHOLESTEROL, TOTAL: 136 MG/DL (ref 160–199)
CO2: 23 MMOL/L (ref 22–29)
CORTISOL - AM: 10.6 UG/DL (ref 6.2–19.4)
CREAT SERPL-MCNC: 0.9 MG/DL (ref 0.5–1.2)
ESTRADIOL LEVEL: 33.7 PG/ML
GFR NON-AFRICAN AMERICAN: >60
GLUCOSE BLD-MCNC: 148 MG/DL (ref 74–109)
HBA1C MFR BLD: 7.5 %
HCT VFR BLD CALC: 43.8 % (ref 42–52)
HDLC SERPL-MCNC: 38 MG/DL (ref 55–121)
HEMOGLOBIN: 15.1 G/DL (ref 14–18)
INR BLD: 1.7 (ref 0.88–1.18)
LDL CHOLESTEROL CALCULATED: 73 MG/DL
LUTEINIZING HORMONE: 7.6 MIU/ML
MCH RBC QN AUTO: 32.1 PG (ref 27–31)
MCHC RBC AUTO-ENTMCNC: 34.5 G/DL (ref 33–37)
MCV RBC AUTO: 93 FL (ref 80–94)
PDW BLD-RTO: 13.3 % (ref 11.5–14.5)
PLATELET # BLD: 235 K/UL (ref 130–400)
PMV BLD AUTO: 9.6 FL (ref 9.4–12.4)
POTASSIUM SERPL-SCNC: 3.6 MMOL/L (ref 3.5–5)
PROSTATE SPECIFIC ANTIGEN: 2.7 NG/ML (ref 0–4)
PROTHROMBIN TIME: 20 SEC (ref 12–14.6)
RBC # BLD: 4.71 M/UL (ref 4.7–6.1)
SODIUM BLD-SCNC: 142 MMOL/L (ref 136–145)
T3 FREE: 3 PG/ML (ref 2–4.4)
T4 FREE: 1.4 NG/DL (ref 0.9–1.7)
TESTOSTERONE TOTAL: 534.1 NG/DL (ref 193–740)
TOTAL PROTEIN: 7.2 G/DL (ref 6.6–8.7)
TRIGL SERPL-MCNC: 127 MG/DL (ref 0–149)
TSH SERPL DL<=0.05 MIU/L-ACNC: 2.41 UIU/ML (ref 0.27–4.2)
VITAMIN B-12: 349 PG/ML (ref 211–946)
VITAMIN D 25-HYDROXY: 20.2 NG/ML
WBC # BLD: 8 K/UL (ref 4.8–10.8)

## 2018-03-13 PROCEDURE — 1036F TOBACCO NON-USER: CPT | Performed by: NURSE PRACTITIONER

## 2018-03-13 PROCEDURE — G8482 FLU IMMUNIZE ORDER/ADMIN: HCPCS | Performed by: NURSE PRACTITIONER

## 2018-03-13 PROCEDURE — 4040F PNEUMOC VAC/ADMIN/RCVD: CPT | Performed by: NURSE PRACTITIONER

## 2018-03-13 PROCEDURE — G8427 DOCREV CUR MEDS BY ELIG CLIN: HCPCS | Performed by: NURSE PRACTITIONER

## 2018-03-13 PROCEDURE — 99213 OFFICE O/P EST LOW 20 MIN: CPT | Performed by: NURSE PRACTITIONER

## 2018-03-13 PROCEDURE — 1123F ACP DISCUSS/DSCN MKR DOCD: CPT | Performed by: NURSE PRACTITIONER

## 2018-03-13 PROCEDURE — 3017F COLORECTAL CA SCREEN DOC REV: CPT | Performed by: NURSE PRACTITIONER

## 2018-03-13 PROCEDURE — G8417 CALC BMI ABV UP PARAM F/U: HCPCS | Performed by: NURSE PRACTITIONER

## 2018-03-13 RX ORDER — SILDENAFIL 100 MG/1
100 TABLET, FILM COATED ORAL PRN
Qty: 30 TABLET | Refills: 3 | Status: SHIPPED | OUTPATIENT
Start: 2018-03-13

## 2018-03-13 ASSESSMENT — ENCOUNTER SYMPTOMS
SHORTNESS OF BREATH: 0
DIARRHEA: 0
VOMITING: 0
BLOOD IN STOOL: 0
TROUBLE SWALLOWING: 0
SORE THROAT: 0
STRIDOR: 0
CHOKING: 0
ABDOMINAL PAIN: 0
EYE DISCHARGE: 0
NAUSEA: 0
COUGH: 0
ABDOMINAL DISTENTION: 0
COLOR CHANGE: 0
EYE ITCHING: 0
CONSTIPATION: 0
WHEEZING: 0

## 2018-03-13 NOTE — PROGRESS NOTES
[facility] INTERNAL MEDICINE  [address] [address]  Dept: [phone]  Dept Fax: [phone]  Loc: [phone]    Tania Chandra is a 81 y.o. male who presents today for his medical conditions/complaints as noted below. Tania Chandra is c/o of Leg Pain (Patient states increased pain with Neuropathy bilateral legs from feet to knee.)        HPI:     HPI   1. Leg and arm pain and tingling that seems to be worsening; He had previously worked [employer] for 33 years \  His original assessment was with Katia Sabillon   He got a letter for [facility] physical after seeing  DR Carisa De La Rosa for need for reassessment; he has appt 3/21/18  He is currently going to physical therapy with [facility] in [address] with [facility], [facility];   Currently he is taking gabapentin 400 QID   He is changing physicians end of this month going to Marcel Lincoln MD     Chief Complaint   Patient presents with    Leg Pain     Patient states increased pain with Neuropathy bilateral legs from feet to knee.        Past Medical History:   Diagnosis Date    3-vessel CAD     1990, CABG    Aneurysm of ascending aorta (HCC)     Anticoagulant long-term use     Anticoagulated     Anxiety     Aortic dilatation ([address]     2010, 5.3 cm ascending aorta USEC scan; stable for 9 years    Asymptomatic hyperuricemia     Atrial fibrillation (HCC)     Prior AV alfred ablation, left atrial ligation Dr Tegan Myers BPH (benign prostatic hyperplasia)     jaunsrobb    CAD (coronary atherosclerotic disease)     3 vessel    CHF (congestive heart failure) ([facility]     Chronic kidney disease, stage 2, mildly decreased GFR     Chronic pain syndrome     Colon polyps     2009, atypia, 2013, adenoma, Dr. Roman Payne    Depression     Diverticulosis     Erectile dysfunction     GERD (gastroesophageal reflux disease)     GERD (gastroesophageal reflux disease)     History of renal cell cancer     2000, right no distension and no mass. There is no tenderness. There is no rebound and no guarding. Musculoskeletal: Normal range of motion. He exhibits no edema or tenderness. Neurological: He is alert and oriented to person, place, and time. He has normal reflexes. No cranial nerve deficit. Coordination normal.   Skin: Skin is warm and dry. No rash noted. No erythema. Psychiatric: He has a normal mood and affect. His behavior is normal. Judgment and thought content normal. He does not exhibit a depressed mood. /76   Pulse 86   Resp 16   Ht 5' 11\" (1.803 m)   Wt 251 lb (113.9 kg)   SpO2 97%   BMI 35.01 kg/m²     Assessment:      1. Neuropathy ([address]     2. Health care maintenance  Estradiol    Testosterone    PSA    Luteinizing Hormone    TSH without Reflex    T3, Free    T3, Reverse    Thyroid Stimulating Immunoglobulin    Vitamin D 25 Hydroxy    Cortisol AM, Total    DHEA-Sulfate    Vitamin B12    Comprehensive Metabolic Panel    Insulin Antibody    Protime-INR    Hemoglobin A1C    T4, Free   3. Disorder of bone   Vitamin D 25 Hydroxy       Plan:        Patient given educational materials - see patient instructions. Discussed use, benefit, and side effects of prescribed medications. All patient questions answered. Pt voiced understanding. Reviewed health maintenance. Instructed to continue current medications, diet and exercise. Patient agreed with treatment plan. Follow up as directed.    MEDICATIONS:  Orders Placed This Encounter   Medications    sildenafil (VIAGRA) 100 MG tablet     Sig: Take 1 tablet by mouth as needed for Erectile Dysfunction     Dispense:  30 tablet     Refill:  3     OK to compound         ORDERS:  Orders Placed This Encounter   Procedures    Estradiol    Testosterone    PSA    Luteinizing Hormone    TSH without Reflex    T3, Free    T3, Reverse    Thyroid Stimulating Immunoglobulin    Vitamin D 25 Hydroxy    Cortisol AM, Total    DHEA-Sulfate    Vitamin B12   

## 2018-03-13 NOTE — PATIENT INSTRUCTIONS
1.  Neuropathy; Will have DOL physical and their preferred treatment   2. Health maintenance;   He has alist of labs requested from [facility] that he would like drawn  ;  Orders entered

## 2018-03-15 LAB — DHEAS (DHEA SULFATE): 16 UG/DL (ref 28–175)

## 2018-03-17 LAB
T3 REVERSE: 30.2 NG/DL (ref 9–27)
THYROID STIMULATING IMMUNOGLOBULIN: 102 %

## 2018-03-18 LAB — INSULIN A: <0.4 U/ML (ref 0–0.4)

## 2018-03-20 ENCOUNTER — HOSPITAL ENCOUNTER (OUTPATIENT)
Dept: ULTRASOUND IMAGING | Facility: HOSPITAL | Age: 73
Discharge: HOME | End: 2018-03-20
Attending: UROLOGY | Admitting: UROLOGY

## 2018-03-20 ENCOUNTER — OFFICE VISIT (OUTPATIENT)
Dept: UROLOGY | Facility: CLINIC | Age: 73
End: 2018-03-20

## 2018-03-20 VITALS — BODY MASS INDEX: 34.13 KG/M2 | HEIGHT: 72 IN | TEMPERATURE: 96.5 F | WEIGHT: 252 LBS

## 2018-03-20 DIAGNOSIS — C64.1 CANCER OF KIDNEY, RIGHT (HCC): ICD-10-CM

## 2018-03-20 DIAGNOSIS — C64.1 CANCER OF KIDNEY, RIGHT (HCC): Primary | ICD-10-CM

## 2018-03-20 DIAGNOSIS — N52.9 IMPOTENCE DUE TO ERECTILE DYSFUNCTION: ICD-10-CM

## 2018-03-20 DIAGNOSIS — N40.1 BENIGN PROSTATIC HYPERPLASIA WITH LOWER URINARY TRACT SYMPTOMS, SYMPTOM DETAILS UNSPECIFIED: ICD-10-CM

## 2018-03-20 DIAGNOSIS — N40.1 BENIGN PROSTATIC HYPERPLASIA WITH URINARY OBSTRUCTION: ICD-10-CM

## 2018-03-20 DIAGNOSIS — N13.8 BENIGN PROSTATIC HYPERPLASIA WITH URINARY OBSTRUCTION: ICD-10-CM

## 2018-03-20 LAB
BILIRUB BLD-MCNC: NEGATIVE MG/DL
CLARITY, POC: CLEAR
COLOR UR: YELLOW
GLUCOSE UR STRIP-MCNC: NEGATIVE MG/DL
KETONES UR QL: NEGATIVE
LEUKOCYTE EST, POC: NEGATIVE
NITRITE UR-MCNC: NEGATIVE MG/ML
PH UR: 5.5 [PH] (ref 5–8)
PROT UR STRIP-MCNC: ABNORMAL MG/DL
PSA SERPL-MCNC: 2.44 NG/ML (ref 0–4)
RBC # UR STRIP: NEGATIVE /UL
SP GR UR: 1.02 (ref 1–1.03)
UROBILINOGEN UR QL: NORMAL

## 2018-03-20 PROCEDURE — 81001 URINALYSIS AUTO W/SCOPE: CPT | Performed by: UROLOGY

## 2018-03-20 PROCEDURE — 99213 OFFICE O/P EST LOW 20 MIN: CPT | Performed by: UROLOGY

## 2018-03-20 PROCEDURE — 76775 US EXAM ABDO BACK WALL LIM: CPT

## 2018-03-20 NOTE — PROGRESS NOTES
Subjective    Mr. Maria is 72 y.o. male    CHIEF COMPLAINT: BPH    The patient comes in today for follow-up of BPH clinically he is doing well as a way score today is 12 he does take Flomax and Proscar he unfortunately did not get a PSA today but we will go ahead and get that.    He denies any gross hematuria no worsening of symptoms since her last seen no recurrent urinary tract infections etc.    He also has a history of a right radical nephrectomy for renal cell carcinoma he has done very well with that without evidence of any recurrence a repeat ultrasound today shows a left kidney with a little parenchymal defect but otherwise is totally benign.    Also reviewing lab work from Dr. Phillips's office his kidney function is normal his liver functions are normal    Renal ultrasound independent review    The renal ultrasound is available for me to review.  Treatment recommendations require an independent review.  This film has been reviewed by the radiologist to determine any non urologic abnormalities that are presents.  However, I very closely inspected the kidneys for size, symmetry, contour, parenchymal thickness, perinephric reaction, presence of calcifications, and intrarenal dilation of the collecting system.       The right kidney appears absent    The left kidney appears normal on this ultrasound.  The renal parenchymal is norml in thickness.  There are no solid masses or cysts.  There is no hydronephrosis.  There are no stones.      The bladder appears normal on thisultrsaound.  The bladder appears normal in thickness.  There no masses or stones seen on this exam.      History of Present Illness      The following portions of the patient's history were reviewed and updated as appropriate: allergies, current medications, past family history, past medical history, past social history, past surgical history and problem list.    Review of Systems   Constitutional: Negative.  Negative for chills and fever.    Gastrointestinal: Negative for abdominal distention, abdominal pain, anal bleeding, blood in stool and nausea.   Genitourinary: Negative for difficulty urinating, dysuria, flank pain, frequency, hematuria and urgency.   Psychiatric/Behavioral: Negative.  Negative for agitation and confusion.         Current Outpatient Prescriptions:   •  atorvastatin (LIPITOR) 20 MG tablet, Take 20 mg by mouth daily., Disp: , Rfl:   •  bumetanide (BUMEX) 1 MG tablet, Take 1 mg by mouth daily., Disp: , Rfl:   •  carvedilol (COREG) 25 MG tablet, Take 25 mg by mouth 2 (two) times a day., Disp: , Rfl:   •  clopidogrel (PLAVIX) 75 MG tablet, Take 75 mg by mouth daily., Disp: , Rfl:   •  diltiazem CD (DILTIAZEM CD) 120 MG 24 hr capsule, Take 120 mg by mouth daily., Disp: , Rfl:   •  escitalopram (LEXAPRO) 5 MG tablet, TK 1 T PO D, Disp: , Rfl: 5  •  finasteride (PROSCAR) 5 MG tablet, Take 5 mg by mouth daily., Disp: , Rfl:   •  gabapentin (NEURONTIN) 300 MG capsule, Take 400 mg by mouth 2 (Two) Times a Day., Disp: , Rfl:   •  losartan (COZAAR) 50 MG tablet, Take 50 mg by mouth daily., Disp: , Rfl:   •  omeprazole (PriLOSEC) 40 MG capsule, Take 40 mg by mouth daily., Disp: , Rfl:   •  potassium chloride (K-DUR,KLOR-CON) 20 MEQ CR tablet, Take 20 mEq by mouth 2 (Two) Times a Day., Disp: , Rfl:   •  tamsulosin (FLOMAX) 0.4 MG capsule 24 hr capsule, Take 1 capsule by mouth daily., Disp: , Rfl:   •  traMADol-acetaminophen (ULTRACET) 37.5-325 MG per tablet, TK 1 T PO BID, Disp: , Rfl: 1  •  warfarin (COUMADIN) 7.5 MG tablet, Take 7.5 mg by mouth daily., Disp: , Rfl:     Past Medical History:   Diagnosis Date   • Abdominal pain, epigastric    • Abnormal abdominal exam     ABFND, RADIOLOGICAL, ABDOMINAL AREA    • Anticoagulated on Coumadin    • Atherosclerosis of coronary artery bypass graft     ATHEROSCLEROSIS, CORONARY, ARTERY BYPASS GRFT   • Atrial fibrillation    • Cancer of right kidney     right sided kidney cancer   • Cardiomyopathy    •  Cardiomyopathy, primary    • CHF (congestive heart failure)    • Colon polyps    • Congestive heart failure    • Coronary artery disease     History of CAD/A-Fib/Pacemaker/Defibrillator placement: pt takes Coumadin and plavix therapy as well as ASA daily   • Gastric polyp    • Gastroesophageal reflux disease without esophagitis    • History of colon polyps    • Hypercholesterolemia    • Hypertension, essential    • Melena    • Myocardial infarction    • NSAID long-term use    • Obesity    • Pacemaker     Pacemaker Dependant   • Platelet inhibition due to Plavix    • Presence of stent in coronary artery     Multiple coronary stenting most recently 4/2008   • Single kidney     Only 1 Kidney   • Status post surgery     s/p Polypectomy Bleed       Past Surgical History:   Procedure Laterality Date   • APPENDECTOMY     • CARDIAC ABLATION     • CARDIAC CATHETERIZATION     • CARDIAC PACEMAKER PLACEMENT      Pacemaker Placement   • CHOLECYSTECTOMY     • COLONOSCOPY  08/27/2013    snare hep, trans tics recall 3 yr   • COLONOSCOPY  10/02/2006    few scattered diverticula   • COLONOSCOPY  07/22/2009    tubular adenoma in the ascending colon   • COLONOSCOPY  07/25/2005    several polyps in the ascending colon, one in the transverse colon   • CORONARY ARTERY BYPASS GRAFT  02/2011    2nd time   • CORONARY ARTERY BYPASS GRAFT  1990   • ENDOSCOPY  04/14/2016    charley duffy   • ENDOSCOPY  11/10/2014    clips at polypectomy bx no residual recall 1 yr   • ENDOSCOPY  06/19/2014    snare clip body recall 6 months   • ENDOSCOPY  05/19/2014    polyp stomach bx and clip   • HERNIA REPAIR      with mesh   • OTHER SURGICAL HISTORY      Defibrillator/Pacer maker exchange   • OTHER SURGICAL HISTORY      Pacemaker/Defibillation exchange 2012   • PERIPHERAL ARTERIAL STENT GRAFT         Social History     Social History   • Marital status:      Social History Main Topics   • Smoking status: Never Smoker   • Smokeless tobacco: Never Used   •  "Alcohol use No   • Drug use: No   • Sexual activity: Defer     Other Topics Concern   • Not on file       Family History   Problem Relation Age of Onset   • Lung cancer Mother    • No Known Problems Father    • Other Neg Hx      GI Malignancies       Objective    Temp 96.5 °F (35.8 °C)   Ht 182.9 cm (72\")   Wt 114 kg (252 lb)   BMI 34.18 kg/m²     Physical Exam      Office Visit on 03/20/2017   Component Date Value Ref Range Status   • Color 03/20/2017 Yellow  Yellow, Straw, Dark Yellow, Indigo Final   • Clarity, UA 03/20/2017 Clear  Clear Final   • Glucose, UA 03/20/2017 Negative  Negative, 1000 mg/dL (3+) mg/dL Final   • Bilirubin 03/20/2017 Negative  Negative Final   • Ketones, UA 03/20/2017 Negative  Negative Final   • Specific Gravity  03/20/2017 1.020  1.005 - 1.030 Final   • Blood, UA 03/20/2017 Negative  Negative Final   • pH, Urine 03/20/2017 6.5  5.0 - 8.0 Final   • Protein, POC 03/20/2017 Negative  Negative mg/dL Final   • Urobilinogen, UA 03/20/2017 Normal  Normal Final   • Leukocytes 03/20/2017 Negative  Negative Final   • Nitrite, UA 03/20/2017 Negative  Negative Final       Results for orders placed or performed in visit on 03/20/18   POC Urinalysis Dipstick, Automated   Result Value Ref Range    Color Yellow Yellow, Straw, Dark Yellow, Indigo    Clarity, UA Clear Clear    Glucose, UA Negative Negative, 1000 mg/dL (3+) mg/dL    Bilirubin Negative Negative    Ketones, UA Negative Negative    Specific Gravity  1.025 1.005 - 1.030    Blood, UA Negative Negative    pH, Urine 5.5 5.0 - 8.0    Protein, POC 30 mg/dL (A) Negative mg/dL    Urobilinogen, UA Normal Normal    Leukocytes Negative Negative    Nitrite, UA Negative Negative       Assessment and Plan    Diagnoses and all orders for this visit:    Cancer of kidney, right  -     POC Urinalysis Dipstick, Automated  -     US Renal Bilateral; Future    Impotence due to erectile dysfunction    Benign prostatic hyperplasia with lower urinary tract symptoms, " symptom details unspecified  -     PSA DIAGNOSTIC  -     PSA DIAGNOSTIC; Future    plan--the patient is doing well from a BPH point review we will go ahead and get a PSA today I will call running those results we will seen back again in a year and repeat PSA then if the symptoms should worsen he will call.    From a renal cell carcinoma point of view there is no evidence of any recurrence his liver functions were normal his ultrasound was benign he did have a CT angiogramof the chest 2 years ago that was benign as well

## 2018-03-25 ENCOUNTER — RESULTS ENCOUNTER (OUTPATIENT)
Dept: UROLOGY | Facility: CLINIC | Age: 73
End: 2018-03-25

## 2018-03-25 DIAGNOSIS — N40.1 BENIGN PROSTATIC HYPERPLASIA WITH LOWER URINARY TRACT SYMPTOMS, SYMPTOM DETAILS UNSPECIFIED: ICD-10-CM

## 2018-03-26 RX ORDER — FINASTERIDE 5 MG/1
5 TABLET, FILM COATED ORAL DAILY
Qty: 90 TABLET | Refills: 1 | Status: SHIPPED | OUTPATIENT
Start: 2018-03-26

## 2018-06-25 ENCOUNTER — TELEPHONE (OUTPATIENT)
Dept: UROLOGY | Facility: CLINIC | Age: 73
End: 2018-06-25

## 2018-06-25 NOTE — TELEPHONE ENCOUNTER
THIS IS A RANSLER PATIENT....    Patient is wanting to discuss Penile Implants and since Dr. Gupta is the only one that does the procedures, what are the steps patient needs to take? Please advise

## 2018-07-06 ENCOUNTER — OFFICE VISIT (OUTPATIENT)
Dept: UROLOGY | Facility: CLINIC | Age: 73
End: 2018-07-06

## 2018-07-06 VITALS — HEIGHT: 71 IN | WEIGHT: 240 LBS | BODY MASS INDEX: 33.6 KG/M2

## 2018-07-06 DIAGNOSIS — N40.1 BENIGN PROSTATIC HYPERPLASIA WITH LOWER URINARY TRACT SYMPTOMS, SYMPTOM DETAILS UNSPECIFIED: Primary | ICD-10-CM

## 2018-07-06 DIAGNOSIS — N52.9 ERECTILE DYSFUNCTION, UNSPECIFIED ERECTILE DYSFUNCTION TYPE: ICD-10-CM

## 2018-07-06 LAB
BILIRUB BLD-MCNC: NEGATIVE MG/DL
CLARITY, POC: CLEAR
COLOR UR: YELLOW
GLUCOSE UR STRIP-MCNC: NEGATIVE MG/DL
KETONES UR QL: NEGATIVE
LEUKOCYTE EST, POC: NEGATIVE
NITRITE UR-MCNC: NEGATIVE MG/ML
PH UR: 7 [PH] (ref 5–8)
PROT UR STRIP-MCNC: NEGATIVE MG/DL
RBC # UR STRIP: NEGATIVE /UL
SP GR UR: 1.01 (ref 1–1.03)
UROBILINOGEN UR QL: NORMAL

## 2018-07-06 PROCEDURE — 99213 OFFICE O/P EST LOW 20 MIN: CPT | Performed by: UROLOGY

## 2018-07-06 PROCEDURE — 81001 URINALYSIS AUTO W/SCOPE: CPT | Performed by: UROLOGY

## 2018-07-06 RX ORDER — ZOLPIDEM TARTRATE 5 MG/1
TABLET ORAL
Refills: 5 | COMMUNITY
Start: 2018-06-05

## 2018-07-06 NOTE — PROGRESS NOTES
Mr. Maria is 73 y.o. male    Chief Complaint   Patient presents with   • Erectile Dysfunction       Erectile Dysfunction   This is a chronic problem. The current episode started more than 1 year ago. The problem has been gradually worsening since onset. The nature of his difficulty is achieving erection and maintaining erection. Irritative symptoms do not include frequency or urgency. Pertinent negatives include no chills or hematuria. Nothing aggravates the symptoms. Past treatments include sildenafil and tadalafil. The treatment provided mild relief. He has been using treatment for 6 to 12 months. He has had no adverse reactions caused by medications. Risk factors include chronic cardiac disease.       The following portions of the patient's history were reviewed and updated as appropriate: allergies, current medications, past family history, past medical history, past social history, past surgical history and problem list.    Review of Systems   Constitutional: Negative for chills and fever.   Gastrointestinal: Negative for abdominal pain, anal bleeding and blood in stool.   Genitourinary: Negative for difficulty urinating, flank pain, frequency, hematuria and urgency.         Current Outpatient Prescriptions:   •  atorvastatin (LIPITOR) 20 MG tablet, Take 20 mg by mouth daily., Disp: , Rfl:   •  bumetanide (BUMEX) 1 MG tablet, Take 1 mg by mouth daily., Disp: , Rfl:   •  carvedilol (COREG) 25 MG tablet, Take 25 mg by mouth 2 (two) times a day., Disp: , Rfl:   •  clopidogrel (PLAVIX) 75 MG tablet, Take 75 mg by mouth daily., Disp: , Rfl:   •  diltiazem CD (DILTIAZEM CD) 120 MG 24 hr capsule, Take 120 mg by mouth daily., Disp: , Rfl:   •  escitalopram (LEXAPRO) 5 MG tablet, TK 1 T PO D, Disp: , Rfl: 5  •  finasteride (PROSCAR) 5 MG tablet, Take 5 mg by mouth daily., Disp: , Rfl:   •  gabapentin (NEURONTIN) 300 MG capsule, Take 400 mg by mouth 2 (Two) Times a Day., Disp: , Rfl:   •  losartan (COZAAR) 50 MG  tablet, Take 50 mg by mouth daily., Disp: , Rfl:   •  omeprazole (PriLOSEC) 40 MG capsule, Take 40 mg by mouth daily., Disp: , Rfl:   •  potassium chloride (K-DUR,KLOR-CON) 20 MEQ CR tablet, Take 20 mEq by mouth 2 (Two) Times a Day., Disp: , Rfl:   •  tamsulosin (FLOMAX) 0.4 MG capsule 24 hr capsule, Take 1 capsule by mouth daily., Disp: , Rfl:   •  traMADol-acetaminophen (ULTRACET) 37.5-325 MG per tablet, TK 1 T PO BID PRN, Disp: , Rfl: 1  •  warfarin (COUMADIN) 7.5 MG tablet, Take 7.5 mg by mouth daily., Disp: , Rfl:   •  zolpidem (AMBIEN) 5 MG tablet, TAKE 1 TABLET BY MOUTH EVERY DAY AT BEDTIME AS NEEDED, Disp: , Rfl: 5    Past Medical History:   Diagnosis Date   • Abdominal pain, epigastric    • Abnormal abdominal exam     ABFND, RADIOLOGICAL, ABDOMINAL AREA    • Anticoagulated on Coumadin    • Atherosclerosis of coronary artery bypass graft     ATHEROSCLEROSIS, CORONARY, ARTERY BYPASS GRFT   • Atrial fibrillation (CMS/HCC)    • Cancer of right kidney (CMS/HCC)     right sided kidney cancer   • Cardiomyopathy (CMS/HCC)    • Cardiomyopathy, primary (CMS/HCC)    • CHF (congestive heart failure) (CMS/HCC)    • Colon polyps    • Congestive heart failure (CMS/HCC)    • Coronary artery disease     History of CAD/A-Fib/Pacemaker/Defibrillator placement: pt takes Coumadin and plavix therapy as well as ASA daily   • Gastric polyp    • Gastroesophageal reflux disease without esophagitis    • History of colon polyps    • Hypercholesterolemia    • Hypertension, essential    • Melena    • Myocardial infarction    • NSAID long-term use    • Obesity    • Pacemaker     Pacemaker Dependant   • Platelet inhibition due to Plavix    • Presence of stent in coronary artery     Multiple coronary stenting most recently 4/2008   • Single kidney     Only 1 Kidney   • Status post surgery     s/p Polypectomy Bleed       Past Surgical History:   Procedure Laterality Date   • APPENDECTOMY     • CARDIAC ABLATION     • CARDIAC CATHETERIZATION   "   • CARDIAC PACEMAKER PLACEMENT      Pacemaker Placement   • CHOLECYSTECTOMY     • COLONOSCOPY  08/27/2013    snare hep, trans tics recall 3 yr   • COLONOSCOPY  10/02/2006    few scattered diverticula   • COLONOSCOPY  07/22/2009    tubular adenoma in the ascending colon   • COLONOSCOPY  07/25/2005    several polyps in the ascending colon, one in the transverse colon   • CORONARY ARTERY BYPASS GRAFT  02/2011    2nd time   • CORONARY ARTERY BYPASS GRAFT  1990   • ENDOSCOPY  04/14/2016    nl slant   • ENDOSCOPY  11/10/2014    clips at polypectomy bx no residual recall 1 yr   • ENDOSCOPY  06/19/2014    snare clip body recall 6 months   • ENDOSCOPY  05/19/2014    polyp stomach bx and clip   • HERNIA REPAIR      with mesh   • OTHER SURGICAL HISTORY      Defibrillator/Pacer maker exchange   • OTHER SURGICAL HISTORY      Pacemaker/Defibillation exchange 2012   • PERIPHERAL ARTERIAL STENT GRAFT         Social History     Social History   • Marital status:      Social History Main Topics   • Smoking status: Never Smoker   • Smokeless tobacco: Never Used   • Alcohol use No   • Drug use: No   • Sexual activity: Defer     Other Topics Concern   • Not on file       Family History   Problem Relation Age of Onset   • Lung cancer Mother    • No Known Problems Father    • Other Neg Hx         GI Malignancies       Objective    Ht 180.3 cm (71\")   Wt 109 kg (240 lb)   BMI 33.47 kg/m²     Physical Exam  Constitutional: Well nourished, Well developed; No apparent distress  Psychiatric: Appropriate affect; Alert and oriented  Eyes: Unremarkable  Musculoskeletal: Normal gait and station  GI: Abdomen is soft, non-tender  Respiratory: No distress; Unlabored movement; No accessory musculature needed with symmetric movements  Skin: No pallor or diaphoresis  ; Penis and testicles are normal;         Office Visit on 03/20/2018   Component Date Value Ref Range Status   • Color 03/20/2018 Yellow  Yellow, Straw, Dark Yellow, Indigo " Final   • Clarity, UA 03/20/2018 Clear  Clear Final   • Glucose, UA 03/20/2018 Negative  Negative, 1000 mg/dL (3+) mg/dL Final   • Bilirubin 03/20/2018 Negative  Negative Final   • Ketones, UA 03/20/2018 Negative  Negative Final   • Specific Gravity  03/20/2018 1.025  1.005 - 1.030 Final   • Blood, UA 03/20/2018 Negative  Negative Final   • pH, Urine 03/20/2018 5.5  5.0 - 8.0 Final   • Protein, POC 03/20/2018 30 mg/dL* Negative mg/dL Final   • Urobilinogen, UA 03/20/2018 Normal  Normal Final   • Leukocytes 03/20/2018 Negative  Negative Final   • Nitrite, UA 03/20/2018 Negative  Negative Final   • PSA 03/20/2018 2.440  0.000 - 4.000 ng/mL Final       Results for orders placed or performed in visit on 07/06/18   POC Urinalysis Dipstick, Multipro   Result Value Ref Range    Color Yellow Yellow, Straw, Dark Yellow, Indigo    Clarity, UA Clear Clear    Glucose, UA Negative Negative, 1000 mg/dL (3+) mg/dL    Bilirubin Negative Negative    Ketones, UA Negative Negative    Specific Gravity  1.015 1.005 - 1.030    Blood, UA Negative Negative    pH, Urine 7.0 5.0 - 8.0    Protein, POC Negative Negative mg/dL    Urobilinogen, UA Normal Normal    Nitrite, UA Negative Negative    Leukocytes Negative Negative     Assessment and Plan    Darren was seen today for erectile dysfunction.    Diagnoses and all orders for this visit:    Benign prostatic hyperplasia with lower urinary tract symptoms, symptom details unspecified  -     POC Urinalysis Dipstick, Multipro    Erectile dysfunction, unspecified erectile dysfunction type  -     Case Request; Standing  -     CBC (No diff); Future  -     Basic metabolic panel; Future  -     C-reactive protein; Future  -     gentamicin (GARAMYCIN) 80 mg in sodium chloride 0.9 % 100 mL IVPB; Infuse 80 mg into a venous catheter 1 (One) Time.  -     vancomycin (VANCOCIN) 1,750 mg in sodium chloride 0.9 % 250 mL IVPB; Infuse 1,750 mg into a venous catheter 1 (One) Time.  -     Case Request    Other  orders  -     Follow Anesthesia Guidelines / Standing Orders; Future  -     Provide instructions to patient on NPO status  -     Obtain informed consent  -     Follow Anesthesia Guidelines / Standing Orders; Standing  -     Verify NPO Status; Standing  -     WALTER hose- To be placed on patient in pre-op; Standing    Patient Dr. Kerns who is referred here for discussion of possible penile implant.  Patient has tried oral medications with no effect.  He is not interested in the vacuum erection device or intraurethral suppositories.  In my opinion, he is not a good candidate for the injections given his chronic anticoagulation.  Today we discussed the options for him and he is chosen penile prosthesis.  He understands risks and benefits as outlined below, with emphasis on his risk of bleeding.  He is on both Plavix and Coumadin and I will need to get clearance for him to come off of both of these.  He will likely need to be bridged on his Coumadin which will need to be done with his primary care physician.  He will therefore be a high risk surgical procedure.    We discussed options for treatment of erectile dysfunction including PDE 5 inhbitors. Injections, urethral suppositories, vacuum erection device without or without a tension ring, and penile prosthesis.  At this time he has chosen a penile prosthesis as his treatment options due to failure of previous interventions.  He understands the risks of penile prosthesis, including but not limited to bleeding, infection, damage to scrotum, testicles, urethra, bladder, abdominal contents.  He understands that infection rate is higher is those with uncontrolled diabetes or current smokers.  I discussed that infection of the implant would lead to surgical explant, and that I could not guarantee that another device could be put in in the future.  I discussed the risks of decreased penile length, change in sexual sensation including inability to achieve orgasm, penile pain  or curvature associated with the device leading to a desired removal.   He understands that if the device is removed, no other forms of medical ED therapy will be successful, including PDE 5 inhibitors, MUSE, injections or SELMA.  I discussed the types of prosthesis, including rigid, 2 and 3 piece inflatable.  He has chosen the 3 piece inflatable prosthesis.  I discussed specifically with this device the risks of herniation of the reservoir along with device failure.  I told him that the device typically lasts up to 10 years before mechanical failure, but there is no guarantee on the device if it fails before that time.   I discussed the usual and expected post operative course.  He was given an opportunity to ask questions, and all questions were answered to his satisfaction.

## 2018-07-11 ENCOUNTER — RESULTS ENCOUNTER (OUTPATIENT)
Dept: UROLOGY | Facility: CLINIC | Age: 73
End: 2018-07-11

## 2018-07-11 DIAGNOSIS — N52.9 ERECTILE DYSFUNCTION, UNSPECIFIED ERECTILE DYSFUNCTION TYPE: ICD-10-CM

## 2018-07-18 RX ORDER — POTASSIUM CHLORIDE 20 MEQ/1
20 TABLET, EXTENDED RELEASE ORAL 2 TIMES DAILY
Qty: 60 TABLET | Refills: 5 | OUTPATIENT
Start: 2018-07-18

## 2018-07-19 ENCOUNTER — APPOINTMENT (OUTPATIENT)
Dept: PREADMISSION TESTING | Facility: HOSPITAL | Age: 73
End: 2018-07-19

## 2018-07-19 VITALS
DIASTOLIC BLOOD PRESSURE: 72 MMHG | HEIGHT: 72 IN | RESPIRATION RATE: 20 BRPM | BODY MASS INDEX: 31.89 KG/M2 | OXYGEN SATURATION: 98 % | HEART RATE: 91 BPM | SYSTOLIC BLOOD PRESSURE: 119 MMHG | WEIGHT: 235.45 LBS

## 2018-07-19 LAB
ANION GAP SERPL CALCULATED.3IONS-SCNC: 11 MMOL/L (ref 4–13)
BUN BLD-MCNC: 19 MG/DL (ref 5–21)
BUN/CREAT SERPL: 21.3 (ref 7–25)
CALCIUM SPEC-SCNC: 9.3 MG/DL (ref 8.4–10.4)
CHLORIDE SERPL-SCNC: 105 MMOL/L (ref 98–110)
CO2 SERPL-SCNC: 27 MMOL/L (ref 24–31)
CREAT BLD-MCNC: 0.89 MG/DL (ref 0.5–1.4)
CRP SERPL-MCNC: <0.5 MG/DL (ref 0–0.99)
DEPRECATED RDW RBC AUTO: 45.3 FL (ref 40–54)
ERYTHROCYTE [DISTWIDTH] IN BLOOD BY AUTOMATED COUNT: 13.3 % (ref 12–15)
GFR SERPL CREATININE-BSD FRML MDRD: 84 ML/MIN/1.73
GLUCOSE BLD-MCNC: 137 MG/DL (ref 70–100)
HCT VFR BLD AUTO: 38.5 % (ref 40–52)
HGB BLD-MCNC: 13.1 G/DL (ref 14–18)
MCH RBC QN AUTO: 31.5 PG (ref 28–32)
MCHC RBC AUTO-ENTMCNC: 34 G/DL (ref 33–36)
MCV RBC AUTO: 92.5 FL (ref 82–95)
PLATELET # BLD AUTO: 233 10*3/MM3 (ref 130–400)
PMV BLD AUTO: 9.7 FL (ref 6–12)
POTASSIUM BLD-SCNC: 3.9 MMOL/L (ref 3.5–5.3)
RBC # BLD AUTO: 4.16 10*6/MM3 (ref 4.8–5.9)
SODIUM BLD-SCNC: 143 MMOL/L (ref 135–145)
WBC NRBC COR # BLD: 5.57 10*3/MM3 (ref 4.8–10.8)

## 2018-07-19 PROCEDURE — 93010 ELECTROCARDIOGRAM REPORT: CPT | Performed by: INTERNAL MEDICINE

## 2018-07-19 PROCEDURE — 85027 COMPLETE CBC AUTOMATED: CPT | Performed by: UROLOGY

## 2018-07-19 PROCEDURE — 80048 BASIC METABOLIC PNL TOTAL CA: CPT | Performed by: UROLOGY

## 2018-07-19 PROCEDURE — 36415 COLL VENOUS BLD VENIPUNCTURE: CPT | Performed by: UROLOGY

## 2018-07-19 PROCEDURE — 93005 ELECTROCARDIOGRAM TRACING: CPT

## 2018-07-19 PROCEDURE — 86140 C-REACTIVE PROTEIN: CPT | Performed by: UROLOGY

## 2018-07-20 ENCOUNTER — APPOINTMENT (OUTPATIENT)
Dept: PREADMISSION TESTING | Facility: HOSPITAL | Age: 73
End: 2018-07-20

## 2018-07-26 ENCOUNTER — OFFICE VISIT (OUTPATIENT)
Dept: CARDIOLOGY | Facility: CLINIC | Age: 73
End: 2018-07-26

## 2018-07-26 VITALS
SYSTOLIC BLOOD PRESSURE: 120 MMHG | HEART RATE: 51 BPM | DIASTOLIC BLOOD PRESSURE: 70 MMHG | OXYGEN SATURATION: 98 % | WEIGHT: 236 LBS | HEIGHT: 72 IN | BODY MASS INDEX: 31.97 KG/M2

## 2018-07-26 DIAGNOSIS — N40.1 BENIGN PROSTATIC HYPERPLASIA WITH LOWER URINARY TRACT SYMPTOMS, SYMPTOM DETAILS UNSPECIFIED: ICD-10-CM

## 2018-07-26 DIAGNOSIS — I10 ESSENTIAL HYPERTENSION: ICD-10-CM

## 2018-07-26 DIAGNOSIS — N40.1 BENIGN PROSTATIC HYPERPLASIA WITH URINARY OBSTRUCTION: ICD-10-CM

## 2018-07-26 DIAGNOSIS — N13.8 BENIGN PROSTATIC HYPERPLASIA WITH URINARY OBSTRUCTION: ICD-10-CM

## 2018-07-26 DIAGNOSIS — Z95.810 ICD (IMPLANTABLE CARDIOVERTER-DEFIBRILLATOR) IN PLACE: ICD-10-CM

## 2018-07-26 DIAGNOSIS — N52.01 ERECTILE DYSFUNCTION DUE TO ARTERIAL INSUFFICIENCY: ICD-10-CM

## 2018-07-26 DIAGNOSIS — I25.5 ISCHEMIC CARDIOMYOPATHY: ICD-10-CM

## 2018-07-26 DIAGNOSIS — C64.1 MALIGNANT NEOPLASM OF RIGHT KIDNEY (HCC): ICD-10-CM

## 2018-07-26 DIAGNOSIS — I25.10 CORONARY ARTERY DISEASE INVOLVING NATIVE CORONARY ARTERY OF NATIVE HEART WITHOUT ANGINA PECTORIS: Primary | ICD-10-CM

## 2018-07-26 PROCEDURE — 99214 OFFICE O/P EST MOD 30 MIN: CPT | Performed by: INTERNAL MEDICINE

## 2018-07-26 RX ORDER — NITROGLYCERIN 0.4 MG/1
TABLET SUBLINGUAL
Refills: 0 | COMMUNITY
Start: 2018-07-19 | End: 2021-10-18 | Stop reason: SDUPTHER

## 2018-07-26 NOTE — PROGRESS NOTES
Darren Maria  [id number]  1945  73 y.o.  male    Referring Provider: Rolan Salvador MD    Reason for Follow-up Visit:  Routine follow up.  coronary artery disease Coronary artery bypass grafting 2008 , stented coronary artery       Subjective    Mild chronic exertional shortness of breath on exertion relieved with rest  No significant cough or wheezing  Going on for several months  No palpitations  No associated chest pain  No significant pedal edema  No fever or chills  No significant expectoration  No hemoptysis  No presyncope or syncope   Lost 15 Lbs since last visit intentionally  Device checks by Dr Mccarty      History of present illness:  Darren Maria is a 73 y.o. yo male with history of coronary artery disease Coronary artery bypass grafting , stented coronary artery  who presents today for   Chief Complaint   Patient presents with   • Coronary Artery Disease     6 mo f/u   • Congestive Heart Failure   • Edema   .    History  Past Medical History:   Diagnosis Date   • Abdominal pain, epigastric    • Abnormal abdominal exam     ABFND, RADIOLOGICAL, ABDOMINAL AREA    • Anticoagulated on Coumadin    • Atherosclerosis of coronary artery bypass graft     ATHEROSCLEROSIS, CORONARY, ARTERY BYPASS GRFT   • Atrial fibrillation (CMS/HCC)    • Cancer of right kidney (CMS/HCC)     right sided kidney cancer   • Cardiomyopathy (CMS/HCC)    • Cardiomyopathy, primary (CMS/HCC)    • CHF (congestive heart failure) (CMS/HCC)    • Colon polyps    • Congestive heart failure (CMS/HCC)    • Coronary artery disease     History of CAD/A-Fib/Pacemaker/Defibrillator placement: pt takes Coumadin and plavix therapy as well as ASA daily   • Gastric polyp    • Gastroesophageal reflux disease without esophagitis    • History of colon polyps    • Hypercholesterolemia    • Hypertension, essential    • Melena    • Myocardial infarction    • NSAID long-term use    • Obesity    • Pacemaker     Pacemaker Dependant   •  Platelet inhibition due to Plavix    • Presence of stent in coronary artery     Multiple coronary stenting most recently 4/2008   • Single kidney     Only 1 Kidney   • Status post surgery     s/p Polypectomy Bleed   ,   Past Surgical History:   Procedure Laterality Date   • APPENDECTOMY     • CARDIAC ABLATION     • CARDIAC CATHETERIZATION     • CARDIAC DEFIBRILLATOR PLACEMENT     • CARDIAC PACEMAKER PLACEMENT      Pacemaker Placement   • CHOLECYSTECTOMY     • COLONOSCOPY  08/27/2013    snare hep, trans tics recall 3 yr   • COLONOSCOPY  10/02/2006    few scattered diverticula   • COLONOSCOPY  07/22/2009    tubular adenoma in the ascending colon   • COLONOSCOPY  07/25/2005    several polyps in the ascending colon, one in the transverse colon   • CORONARY ARTERY BYPASS GRAFT  02/2011    2nd time   • CORONARY ARTERY BYPASS GRAFT  1990   • CORONARY STENT PLACEMENT     • ENDOSCOPY  04/14/2016    nl slant   • ENDOSCOPY  11/10/2014    clips at polypectomy bx no residual recall 1 yr   • ENDOSCOPY  06/19/2014    snare clip body recall 6 months   • ENDOSCOPY  05/19/2014    polyp stomach bx and clip   • HERNIA REPAIR      with mesh   • OTHER SURGICAL HISTORY      Defibrillator/Pacer maker exchange   • OTHER SURGICAL HISTORY      Pacemaker/Defibillation exchange 2012   • PERIPHERAL ARTERIAL STENT GRAFT     ,   Family History   Problem Relation Age of Onset   • Lung cancer Mother    • No Known Problems Father    • Other Neg Hx         GI Malignancies   ,   Social History   Substance Use Topics   • Smoking status: Never Smoker   • Smokeless tobacco: Never Used   • Alcohol use No   ,     Medications  Current Outpatient Prescriptions   Medication Sig Dispense Refill   • atorvastatin (LIPITOR) 20 MG tablet Take 20 mg by mouth daily.     • bumetanide (BUMEX) 1 MG tablet Take 1 mg by mouth daily.     • carvedilol (COREG) 25 MG tablet Take 25 mg by mouth 2 (two) times a day.     • clopidogrel (PLAVIX) 75 MG tablet Take 75 mg by mouth  "daily.     • diltiazem CD (DILTIAZEM CD) 120 MG 24 hr capsule Take 120 mg by mouth daily.     • enoxaparin (LOVENOX) 100 MG/ML solution syringe 2 (Two) Times a Day. SURGERY 7/27/19  0   • escitalopram (LEXAPRO) 5 MG tablet TK 1 T PO D  5   • finasteride (PROSCAR) 5 MG tablet Take 5 mg by mouth daily.     • gabapentin (NEURONTIN) 400 MG capsule Take 400 mg by mouth Daily.     • losartan (COZAAR) 50 MG tablet Take 50 mg by mouth daily.     • nitroglycerin (NITROSTAT) 0.4 MG SL tablet DISSOLVE 1 UNDER TONGUE AS NEEDED FOR CHEST PAIN, MAY REPEAT EVERY 5 MINUTES UP TO 3 DOSES CALL 911  0   • omeprazole (PriLOSEC) 40 MG capsule Take 40 mg by mouth daily.     • potassium chloride (K-DUR,KLOR-CON) 20 MEQ CR tablet Take 20 mEq by mouth 2 (Two) Times a Day.     • tamsulosin (FLOMAX) 0.4 MG capsule 24 hr capsule Take 1 capsule by mouth daily.     • traMADol-acetaminophen (ULTRACET) 37.5-325 MG per tablet as needed  1   • warfarin (COUMADIN) 7.5 MG tablet Take 7.5 mg by mouth Daily. VEGA MONITORS COUMADIN     • zolpidem (AMBIEN) 5 MG tablet TAKE 1 TABLET BY MOUTH EVERY DAY AT BEDTIME AS NEEDED  5     No current facility-administered medications for this visit.        Allergies:  Azithromycin and Morphine and related    Review of Systems  Review of Systems   Constitution: Negative.   HENT: Negative.    Eyes: Negative.    Cardiovascular: Positive for chest pain and dyspnea on exertion. Negative for claudication, cyanosis, irregular heartbeat, leg swelling, near-syncope, orthopnea, palpitations, paroxysmal nocturnal dyspnea and syncope.   Respiratory: Negative.    Endocrine: Negative.    Hematologic/Lymphatic: Negative.    Skin: Negative.    Musculoskeletal: Positive for arthritis.   Gastrointestinal: Negative for anorexia.   Genitourinary: Negative.    Neurological: Positive for numbness.   Psychiatric/Behavioral: Negative.        Objective     Physical Exam:  /70   Pulse 51   Ht 182.9 cm (72\")   Wt 107 kg (236 lb)   " SpO2 98%   BMI 32.01 kg/m²   Physical Exam   Constitutional: He appears well-developed.   HENT:   Head: Normocephalic.   Neck: Normal carotid pulses and no JVD present. No tracheal tenderness present. Carotid bruit is not present. No tracheal deviation and no edema present.   Cardiovascular: Regular rhythm and normal pulses.    Murmur heard.   Systolic murmur is present with a grade of 2/6   Pulmonary/Chest: Effort normal. No stridor.   Abdominal: Soft.   Neurological: He is alert. He has normal strength. No cranial nerve deficit or sensory deficit.   Skin: Skin is warm.   Psychiatric: He has a normal mood and affect. His speech is normal and behavior is normal.       Results Review:     · [id number] Lexiscan  · Left ventricular ejection fraction is severely reduced (Calculated EF = 30%).  · Myocardial perfusion imaging indicates a large-sized infarct located in the anterior wall with no significant ischemia noted.                     Impressions are consistent with a study indicating uncertain risk.    Echo  10/16    · Left ventricular function is mildly decreased. Estimated EF = 45%.  · Left ventricular diastolic dysfunction (grade I) consistent with impaired relaxation.  · The following segments are akinetic: apical anterior, apical septal, apical inferior, apical lateral and apex.  · No pulmonary hypertension is present.    Assessment/Plan   Patient Active Problem List   Diagnosis   • Coronary artery disease involving native heart   • Essential hypertension   • ICD (implantable cardioverter-defibrillator) in place   • Ischemic cardiomyopathy   • Cancer of kidney (CMS/HCC)   • BPH (benign prostatic hypertrophy) with urinary obstruction   • Impotence due to erectile dysfunction   • Benign prostatic hyperplasia with lower urinary tract symptoms   • Cancer of kidney, right (CMS/HCC)   • Erectile dysfunction       Stable doing well. No exertional chest pain or excessive dyspnea. No palpitations. No significant  "pedal edema. Compliant with medications and diet. Latest labs and medications reviewed.  Recent echo EF 45%. Nuclear stress test shows large infarction no ischemia. Occasional atypical chest pain. Still mowing yard with no problems.    Plan:    Low salt/ HTN/ Heart healthy carbohydrate restricted cardiac diet as applicable to this patient's current diagnoses.   This handout has relevant information regarding shopping for food, preparing meals, what to eat at restaurants, tracking of food intake, information regarding sodium intake and salt content, how to read food labels, knowing what to eat, tips reagarding physical activity, calorie count and calorie expenditure. What foods to avoid. Information regarding alcoholic drinks along with \"good\" and \"bad\" fats.  Reiterated prior recommendations     The patient is advised to reduce or avoid caffeine or other cardiac stimulants.     The patient was advised that NSAID-type medications have three  very important potential side effects: cardiovascular complications, gastrointestinal irritation including hemorrhage and renal injuries.  Do not use anti-inflammatories such as Motrin/ibuprofen, Alleve/naprosyn, Mobic and like medications Use Tylenol instead.The patient expresses understanding of these issues and questions were answered.   Monitor for any signs of bleeding including red or dark stools. Fall precautions.       Monitor for any signs of bleeding including red or dark stools. Fall precautions.   Patient is asked to monitor BP at home or work, several times per month and return with written values at next office visit.     Advised staying uptodate with immunizations per established standard guidelines.    Reviewed available prior notes, consults, prior visits, laboratory findings, radiology And cardiology relevant reports. Updated chart as applicable. I have reviewed the patient's medical history in detail and updated the computerized patient record as relevant.  "   Updated patient regarding any new or relevant abnormalities on review of records or any new findings on physical exam. Mentioned to patient about purpose of visit and desirable health short and long term goals and objectives.    Offered to give patient  a copy of my notes and relevant tests/ prior ECG etc for the patient to review and follow specific advise and relevant findings if any, prognosis, along with my current and future plans.      Questions were encouraged, asked and answered to the patient's  understanding and satisfaction. Questions if any regarding current medications and side effects, need for refills and importance of compliance to medications stressed.    Reviewed available prior notes, consults, prior visits, laboratory findings, radiology and cardiology relevant reports. Updated chart as applicable. I have reviewed the patient's medical history in detail and updated the computerized patient record as relevant.    Updated patient regarding any new or relevant abnormalities on review of records or any new findings on physical exam. Mentioned to patient about purpose of visit and desirable health short and long term goals and objectives.    Monitor CBC, CMP, TSH (as indicated) and Lipid Panel by primary     Weigh yourself frequently, at least weekly, preferably daily, call me if more than 2 pounds a day or 5 pounds a week weight gain.  Flexible diuretic dosing    Monitor cardiac rhythm device function regularly per established schedule or PRN       Return in about 6 months (around 1/26/2019).

## 2018-07-27 ENCOUNTER — HOSPITAL ENCOUNTER (OUTPATIENT)
Facility: HOSPITAL | Age: 73
Setting detail: HOSPITAL OUTPATIENT SURGERY
Discharge: HOME | End: 2018-07-27
Attending: UROLOGY | Admitting: UROLOGY

## 2018-07-27 ENCOUNTER — ANESTHESIA (OUTPATIENT)
Dept: PERIOP | Facility: HOSPITAL | Age: 73
End: 2018-07-27

## 2018-07-27 ENCOUNTER — ANESTHESIA EVENT (OUTPATIENT)
Dept: PERIOP | Facility: HOSPITAL | Age: 73
End: 2018-07-27

## 2018-07-27 VITALS
DIASTOLIC BLOOD PRESSURE: 65 MMHG | OXYGEN SATURATION: 95 % | SYSTOLIC BLOOD PRESSURE: 108 MMHG | HEART RATE: 78 BPM | TEMPERATURE: 97.5 F | RESPIRATION RATE: 18 BRPM

## 2018-07-27 DIAGNOSIS — N52.9 ERECTILE DYSFUNCTION, UNSPECIFIED ERECTILE DYSFUNCTION TYPE: ICD-10-CM

## 2018-07-27 DIAGNOSIS — N52.01 ERECTILE DYSFUNCTION DUE TO ARTERIAL INSUFFICIENCY: Primary | ICD-10-CM

## 2018-07-27 LAB
INR PPP: 0.95 (ref 0.91–1.09)
PROTHROMBIN TIME: 13 SECONDS (ref 11.9–14.6)

## 2018-07-27 PROCEDURE — 25010000002 GENTAMICIN PER 80 MG: Performed by: UROLOGY

## 2018-07-27 PROCEDURE — 25010000002 FENTANYL CITRATE (PF) 100 MCG/2ML SOLUTION: Performed by: ANESTHESIOLOGY

## 2018-07-27 PROCEDURE — 25010000002 VANCOMYCIN PER 500 MG: Performed by: UROLOGY

## 2018-07-27 PROCEDURE — C1813 PROSTHESIS, PENILE, INFLATAB: HCPCS | Performed by: UROLOGY

## 2018-07-27 PROCEDURE — 85610 PROTHROMBIN TIME: CPT | Performed by: UROLOGY

## 2018-07-27 PROCEDURE — 54405 INSERT MULTI-COMP PENIS PROS: CPT | Performed by: UROLOGY

## 2018-07-27 PROCEDURE — 25010000002 ONDANSETRON PER 1 MG: Performed by: NURSE ANESTHETIST, CERTIFIED REGISTERED

## 2018-07-27 PROCEDURE — 25010000002 MIDAZOLAM PER 1 MG: Performed by: ANESTHESIOLOGY

## 2018-07-27 PROCEDURE — 25010000002 PROPOFOL 10 MG/ML EMULSION: Performed by: NURSE ANESTHETIST, CERTIFIED REGISTERED

## 2018-07-27 PROCEDURE — 25010000002 FENTANYL CITRATE (PF) 250 MCG/5ML SOLUTION: Performed by: NURSE ANESTHETIST, CERTIFIED REGISTERED

## 2018-07-27 DEVICE — RESVR PENILE CONCEAL 65TO100ML: Type: IMPLANTABLE DEVICE | Site: PENIS | Status: FUNCTIONAL

## 2018-07-27 DEVICE — IMPLANTABLE DEVICE: Type: IMPLANTABLE DEVICE | Site: PENIS | Status: FUNCTIONAL

## 2018-07-27 DEVICE — EXT REAR/TP PENILE SPECTRA 1CM: Type: IMPLANTABLE DEVICE | Site: PENIS | Status: FUNCTIONAL

## 2018-07-27 RX ORDER — ONDANSETRON 2 MG/ML
4 INJECTION INTRAMUSCULAR; INTRAVENOUS ONCE AS NEEDED
Status: DISCONTINUED | OUTPATIENT
Start: 2018-07-27 | End: 2018-07-27 | Stop reason: HOSPADM

## 2018-07-27 RX ORDER — SODIUM CHLORIDE, SODIUM LACTATE, POTASSIUM CHLORIDE, CALCIUM CHLORIDE 600; 310; 30; 20 MG/100ML; MG/100ML; MG/100ML; MG/100ML
9 INJECTION, SOLUTION INTRAVENOUS CONTINUOUS
Status: DISCONTINUED | OUTPATIENT
Start: 2018-07-27 | End: 2018-07-27 | Stop reason: HOSPADM

## 2018-07-27 RX ORDER — IPRATROPIUM BROMIDE AND ALBUTEROL SULFATE 2.5; .5 MG/3ML; MG/3ML
3 SOLUTION RESPIRATORY (INHALATION) ONCE AS NEEDED
Status: DISCONTINUED | OUTPATIENT
Start: 2018-07-27 | End: 2018-07-27 | Stop reason: HOSPADM

## 2018-07-27 RX ORDER — IBUPROFEN 600 MG/1
600 TABLET ORAL ONCE AS NEEDED
Status: DISCONTINUED | OUTPATIENT
Start: 2018-07-27 | End: 2018-07-27 | Stop reason: HOSPADM

## 2018-07-27 RX ORDER — FENTANYL CITRATE 50 UG/ML
25 INJECTION, SOLUTION INTRAMUSCULAR; INTRAVENOUS AS NEEDED
Status: DISCONTINUED | OUTPATIENT
Start: 2018-07-27 | End: 2018-07-27 | Stop reason: HOSPADM

## 2018-07-27 RX ORDER — SODIUM CHLORIDE 0.9 % (FLUSH) 0.9 %
3 SYRINGE (ML) INJECTION AS NEEDED
Status: DISCONTINUED | OUTPATIENT
Start: 2018-07-27 | End: 2018-07-27 | Stop reason: HOSPADM

## 2018-07-27 RX ORDER — LABETALOL HYDROCHLORIDE 5 MG/ML
5 INJECTION, SOLUTION INTRAVENOUS
Status: DISCONTINUED | OUTPATIENT
Start: 2018-07-27 | End: 2018-07-27 | Stop reason: HOSPADM

## 2018-07-27 RX ORDER — MEPERIDINE HYDROCHLORIDE 50 MG/ML
12.5 INJECTION INTRAMUSCULAR; INTRAVENOUS; SUBCUTANEOUS
Status: DISCONTINUED | OUTPATIENT
Start: 2018-07-27 | End: 2018-07-27 | Stop reason: HOSPADM

## 2018-07-27 RX ORDER — MIDAZOLAM HYDROCHLORIDE 1 MG/ML
1 INJECTION INTRAMUSCULAR; INTRAVENOUS
Status: DISCONTINUED | OUTPATIENT
Start: 2018-07-27 | End: 2018-07-27 | Stop reason: HOSPADM

## 2018-07-27 RX ORDER — OXYCODONE AND ACETAMINOPHEN 7.5; 325 MG/1; MG/1
2 TABLET ORAL ONCE AS NEEDED
Status: DISCONTINUED | OUTPATIENT
Start: 2018-07-27 | End: 2018-07-27 | Stop reason: HOSPADM

## 2018-07-27 RX ORDER — MIDAZOLAM HYDROCHLORIDE 1 MG/ML
2 INJECTION INTRAMUSCULAR; INTRAVENOUS
Status: DISCONTINUED | OUTPATIENT
Start: 2018-07-27 | End: 2018-07-27 | Stop reason: HOSPADM

## 2018-07-27 RX ORDER — OXYCODONE AND ACETAMINOPHEN 10; 325 MG/1; MG/1
1 TABLET ORAL ONCE AS NEEDED
Status: COMPLETED | OUTPATIENT
Start: 2018-07-27 | End: 2018-07-27

## 2018-07-27 RX ORDER — SULFAMETHOXAZOLE AND TRIMETHOPRIM 800; 160 MG/1; MG/1
1 TABLET ORAL 2 TIMES DAILY
Qty: 14 TABLET | Refills: 0 | Status: SHIPPED | OUTPATIENT
Start: 2018-07-27 | End: 2018-08-03

## 2018-07-27 RX ORDER — SODIUM CHLORIDE 0.9 % (FLUSH) 0.9 %
1-10 SYRINGE (ML) INJECTION AS NEEDED
Status: DISCONTINUED | OUTPATIENT
Start: 2018-07-27 | End: 2018-07-27 | Stop reason: HOSPADM

## 2018-07-27 RX ORDER — OXYCODONE HYDROCHLORIDE AND ACETAMINOPHEN 5; 325 MG/1; MG/1
1 TABLET ORAL ONCE AS NEEDED
Status: DISCONTINUED | OUTPATIENT
Start: 2018-07-27 | End: 2018-07-27 | Stop reason: HOSPADM

## 2018-07-27 RX ORDER — METOCLOPRAMIDE HYDROCHLORIDE 5 MG/ML
5 INJECTION INTRAMUSCULAR; INTRAVENOUS
Status: DISCONTINUED | OUTPATIENT
Start: 2018-07-27 | End: 2018-07-27 | Stop reason: HOSPADM

## 2018-07-27 RX ORDER — LIDOCAINE HYDROCHLORIDE 20 MG/ML
INJECTION, SOLUTION INFILTRATION; PERINEURAL AS NEEDED
Status: DISCONTINUED | OUTPATIENT
Start: 2018-07-27 | End: 2018-07-27 | Stop reason: SURG

## 2018-07-27 RX ORDER — PROPOFOL 10 MG/ML
VIAL (ML) INTRAVENOUS AS NEEDED
Status: DISCONTINUED | OUTPATIENT
Start: 2018-07-27 | End: 2018-07-27 | Stop reason: SURG

## 2018-07-27 RX ORDER — VASOPRESSIN 20 U/ML
INJECTION PARENTERAL AS NEEDED
Status: DISCONTINUED | OUTPATIENT
Start: 2018-07-27 | End: 2018-07-27 | Stop reason: SURG

## 2018-07-27 RX ORDER — DEXTROSE MONOHYDRATE 25 G/50ML
12.5 INJECTION, SOLUTION INTRAVENOUS AS NEEDED
Status: DISCONTINUED | OUTPATIENT
Start: 2018-07-27 | End: 2018-07-27 | Stop reason: HOSPADM

## 2018-07-27 RX ORDER — FENTANYL CITRATE 50 UG/ML
25 INJECTION, SOLUTION INTRAMUSCULAR; INTRAVENOUS
Status: DISCONTINUED | OUTPATIENT
Start: 2018-07-27 | End: 2018-07-27 | Stop reason: HOSPADM

## 2018-07-27 RX ORDER — ONDANSETRON 2 MG/ML
INJECTION INTRAMUSCULAR; INTRAVENOUS AS NEEDED
Status: DISCONTINUED | OUTPATIENT
Start: 2018-07-27 | End: 2018-07-27 | Stop reason: SURG

## 2018-07-27 RX ORDER — GENTAMICIN SULFATE 80 MG/100ML
80 INJECTION, SOLUTION INTRAVENOUS ONCE
Status: COMPLETED | OUTPATIENT
Start: 2018-07-27 | End: 2018-07-27

## 2018-07-27 RX ORDER — FENTANYL CITRATE 50 UG/ML
INJECTION, SOLUTION INTRAMUSCULAR; INTRAVENOUS AS NEEDED
Status: DISCONTINUED | OUTPATIENT
Start: 2018-07-27 | End: 2018-07-27 | Stop reason: SURG

## 2018-07-27 RX ORDER — NALOXONE HCL 0.4 MG/ML
0.4 VIAL (ML) INJECTION AS NEEDED
Status: DISCONTINUED | OUTPATIENT
Start: 2018-07-27 | End: 2018-07-27 | Stop reason: HOSPADM

## 2018-07-27 RX ORDER — SODIUM CHLORIDE, SODIUM LACTATE, POTASSIUM CHLORIDE, CALCIUM CHLORIDE 600; 310; 30; 20 MG/100ML; MG/100ML; MG/100ML; MG/100ML
1000 INJECTION, SOLUTION INTRAVENOUS CONTINUOUS
Status: DISCONTINUED | OUTPATIENT
Start: 2018-07-27 | End: 2018-07-27 | Stop reason: HOSPADM

## 2018-07-27 RX ORDER — SODIUM CHLORIDE 9 MG/ML
INJECTION, SOLUTION INTRAVENOUS AS NEEDED
Status: DISCONTINUED | OUTPATIENT
Start: 2018-07-27 | End: 2018-07-27 | Stop reason: HOSPADM

## 2018-07-27 RX ORDER — OXYCODONE HYDROCHLORIDE AND ACETAMINOPHEN 5; 325 MG/1; MG/1
1 TABLET ORAL EVERY 4 HOURS PRN
Qty: 30 TABLET | Refills: 0 | Status: SHIPPED | OUTPATIENT
Start: 2018-07-27 | End: 2018-08-13

## 2018-07-27 RX ADMIN — OXYCODONE HYDROCHLORIDE AND ACETAMINOPHEN 1 TABLET: 10; 325 TABLET ORAL at 09:30

## 2018-07-27 RX ADMIN — FENTANYL CITRATE 25 MCG: 50 INJECTION, SOLUTION INTRAMUSCULAR; INTRAVENOUS at 09:39

## 2018-07-27 RX ADMIN — VANCOMYCIN HYDROCHLORIDE 1750 MG: 1 INJECTION, POWDER, LYOPHILIZED, FOR SOLUTION INTRAVENOUS at 07:02

## 2018-07-27 RX ADMIN — VASOPRESSIN 0.5 UNITS: 20 INJECTION INTRAVENOUS at 08:07

## 2018-07-27 RX ADMIN — GENTAMICIN SULFATE 80 MG: 80 INJECTION, SOLUTION INTRAVENOUS at 06:55

## 2018-07-27 RX ADMIN — OXYCODONE HYDROCHLORIDE AND ACETAMINOPHEN 1 TABLET: 5; 325 TABLET ORAL at 10:41

## 2018-07-27 RX ADMIN — FENTANYL CITRATE 50 MCG: 50 INJECTION INTRAMUSCULAR; INTRAVENOUS at 08:50

## 2018-07-27 RX ADMIN — FENTANYL CITRATE 50 MCG: 50 INJECTION INTRAMUSCULAR; INTRAVENOUS at 07:41

## 2018-07-27 RX ADMIN — FENTANYL CITRATE 25 MCG: 50 INJECTION, SOLUTION INTRAMUSCULAR; INTRAVENOUS at 09:34

## 2018-07-27 RX ADMIN — LIDOCAINE HYDROCHLORIDE 0.5 ML: 10 INJECTION, SOLUTION EPIDURAL; INFILTRATION; INTRACAUDAL; PERINEURAL at 06:46

## 2018-07-27 RX ADMIN — SODIUM CHLORIDE, POTASSIUM CHLORIDE, SODIUM LACTATE AND CALCIUM CHLORIDE 1000 ML: 600; 310; 30; 20 INJECTION, SOLUTION INTRAVENOUS at 06:46

## 2018-07-27 RX ADMIN — FENTANYL CITRATE 25 MCG: 50 INJECTION, SOLUTION INTRAMUSCULAR; INTRAVENOUS at 09:44

## 2018-07-27 RX ADMIN — ONDANSETRON HYDROCHLORIDE 4 MG: 2 SOLUTION INTRAMUSCULAR; INTRAVENOUS at 07:56

## 2018-07-27 RX ADMIN — PROPOFOL 110 MG: 10 INJECTION, EMULSION INTRAVENOUS at 07:28

## 2018-07-27 RX ADMIN — LIDOCAINE HYDROCHLORIDE 100 MG: 20 INJECTION, SOLUTION INFILTRATION; PERINEURAL at 07:28

## 2018-07-27 RX ADMIN — MIDAZOLAM 2 MG: 1 INJECTION INTRAMUSCULAR; INTRAVENOUS at 07:07

## 2018-07-27 NOTE — ANESTHESIA PREPROCEDURE EVALUATION
Anesthesia Evaluation     Patient summary reviewed   no history of anesthetic complications:  NPO Solid Status: > 8 hours             Airway   Mallampati: II  TM distance: >3 FB  Neck ROM: full  Dental      Pulmonary    (-) asthma, sleep apnea, not a smoker  Cardiovascular   Exercise tolerance: good (4-7 METS)    ECG reviewed  Patient on routine beta blocker and Beta blocker given within 24 hours of surgery    (+) pacemaker ([employer]tr[employer] Viva) ICD interrogated <1 month ago, hypertension, CAD, CABG (1999, 2006), cardiac stents (multiple. last 2008) dysrhythmias Atrial Fib, CHF (ef 45% 10/2016), PVD, hyperlipidemia,       Neuro/Psych  (-) seizures, TIA, CVA  GI/Hepatic/Renal/Endo    (+) obesity,  GERD,    (-) liver disease, no renal disease, diabetes    Musculoskeletal     Abdominal    Substance History      OB/GYN          Other                        Anesthesia Plan    ASA 3     general     intravenous induction   Anesthetic plan and risks discussed with patient.

## 2018-07-27 NOTE — ANESTHESIA PROCEDURE NOTES
Airway  Urgency: elective    Airway not difficult    General Information and Staff    Patient location during procedure: OR  CRNA: TIFFANIE GRAHAM    Indications and Patient Condition  Indications for airway management: airway protection    Preoxygenated: yes  Mask difficulty assessment: 1 - vent by mask    Final Airway Details  Final airway type: supraglottic airway      Successful airway: I-gel  Size 4    Number of attempts at approach: 1

## 2018-07-27 NOTE — ANESTHESIA POSTPROCEDURE EVALUATION
Patient: Darren Maria    Procedure Summary     Date:  07/27/18 Room / Location:   [facility] 06 /  [facility]    Anesthesia Start:  0725 Anesthesia Stop:  0900    Procedure:  PENILE PROSTHESIS PLACEMENT (N/A Penis) Diagnosis:       Erectile dysfunction, unspecified erectile dysfunction type      (Erectile dysfunction, unspecified erectile dysfunction type [N52.9])    Surgeon:  Godwin Gupta MD Provider:  Mathew Rogers CRNA    Anesthesia Type:  general ASA Status:  3          Anesthesia Type: general  Last vitals  BP   120/68 (07/27/18 1020)   Temp   97.5 °F (36.4 °C) (07/27/18 1020)   Pulse   76 (07/27/18 1020)   Resp   18 (07/27/18 1020)     SpO2   95 % (07/27/18 1020)     Post Anesthesia Care and Evaluation    PONV Status: none  Comments: Patient d/c from PACU prior to anes eval based on Loren score.  Please see RN notes for details of d/c criteria.    Blood pressure 120/68, pulse 76, temperature 97.5 °F (36.4 °C), temperature source Temporal Artery , resp. rate 18, SpO2 95 %.

## 2018-07-30 ENCOUNTER — PROCEDURE VISIT (OUTPATIENT)
Dept: UROLOGY | Facility: CLINIC | Age: 73
End: 2018-07-30

## 2018-07-30 DIAGNOSIS — N52.9 ERECTILE DYSFUNCTION, UNSPECIFIED ERECTILE DYSFUNCTION TYPE: Primary | ICD-10-CM

## 2018-07-30 PROCEDURE — 99024 POSTOP FOLLOW-UP VISIT: CPT | Performed by: UROLOGY

## 2018-07-30 NOTE — PROGRESS NOTES
Darren Maria is a 73 y.o. male who is here today for catheter removal. Patient of Dr. Gupta. 10cc syringe used to deflate the balloon and the catheter was removed without difficulty.  The patient tolerated this well and will return in 2 weeks to see Dr. Gupta in the office for follow up. Dr. Gupta was in the office at the time of procedure.     Patient was advised to drink clear fluids for the next couple hours and urinate. The patient was also advised he may experience some blood in the urine and burning with urination for the next couple days. If the patient is unable to urinate or develops fever, chills, N&V or suprapubic pain he will call to return for an appt at clinic or seek medical treatment at [facility] ER, PCP or Urgent Care after hours. Patient verbalized understanding and all questions were answered.

## 2018-07-30 NOTE — PROGRESS NOTES
Reviewed.  On exam today, patient did have significant ecchymosis and mild to moderate swelling of the testicles and penis.  This is expected post procedure.  No fluid discharge, no significant erythema, no signs of infection.  He was advised to wear tight underwear and elevate the scrotum while sitting down and I will see him in 2 weeks.  He will come to the emergency room if thing change or swelling worsens.

## 2018-08-03 ENCOUNTER — TELEPHONE (OUTPATIENT)
Dept: UROLOGY | Facility: CLINIC | Age: 73
End: 2018-08-03

## 2018-08-11 ENCOUNTER — HOSPITAL ENCOUNTER (EMERGENCY)
Facility: HOSPITAL | Age: 73
Discharge: HOME | End: 2018-08-12
Attending: EMERGENCY MEDICINE | Admitting: EMERGENCY MEDICINE

## 2018-08-11 DIAGNOSIS — T81.31XA POSTOPERATIVE WOUND DEHISCENCE, INITIAL ENCOUNTER: Primary | ICD-10-CM

## 2018-08-11 LAB
ALBUMIN SERPL-MCNC: 3.6 G/DL (ref 3.5–5)
ALBUMIN/GLOB SERPL: 1.3 G/DL (ref 1.1–2.5)
ALP SERPL-CCNC: 68 U/L (ref 24–120)
ALT SERPL W P-5'-P-CCNC: 22 U/L (ref 0–54)
ANION GAP SERPL CALCULATED.3IONS-SCNC: 8 MMOL/L (ref 4–13)
APTT PPP: 43.9 SECONDS (ref 24.1–34.8)
AST SERPL-CCNC: 59 U/L (ref 7–45)
BASOPHILS # BLD AUTO: 0.04 10*3/MM3 (ref 0–0.2)
BASOPHILS NFR BLD AUTO: 0.5 % (ref 0–2)
BILIRUB SERPL-MCNC: 0.8 MG/DL (ref 0.1–1)
BUN BLD-MCNC: 24 MG/DL (ref 5–21)
BUN/CREAT SERPL: 19.8 (ref 7–25)
CALCIUM SPEC-SCNC: 8.7 MG/DL (ref 8.4–10.4)
CHLORIDE SERPL-SCNC: 109 MMOL/L (ref 98–110)
CO2 SERPL-SCNC: 28 MMOL/L (ref 24–31)
CREAT BLD-MCNC: 1.21 MG/DL (ref 0.5–1.4)
DEPRECATED RDW RBC AUTO: 50.1 FL (ref 40–54)
EOSINOPHIL # BLD AUTO: 0.18 10*3/MM3 (ref 0–0.7)
EOSINOPHIL NFR BLD AUTO: 2.4 % (ref 0–4)
ERYTHROCYTE [DISTWIDTH] IN BLOOD BY AUTOMATED COUNT: 14.5 % (ref 12–15)
GFR SERPL CREATININE-BSD FRML MDRD: 59 ML/MIN/1.73
GLOBULIN UR ELPH-MCNC: 2.8 GM/DL
GLUCOSE BLD-MCNC: 120 MG/DL (ref 70–100)
HCT VFR BLD AUTO: 30.5 % (ref 40–52)
HGB BLD-MCNC: 10.1 G/DL (ref 14–18)
IMM GRANULOCYTES # BLD: 0.05 10*3/MM3 (ref 0–0.03)
IMM GRANULOCYTES NFR BLD: 0.7 % (ref 0–5)
INR PPP: 1.74 (ref 0.91–1.09)
LYMPHOCYTES # BLD AUTO: 0.92 10*3/MM3 (ref 0.72–4.86)
LYMPHOCYTES NFR BLD AUTO: 12.2 % (ref 15–45)
MCH RBC QN AUTO: 31.8 PG (ref 28–32)
MCHC RBC AUTO-ENTMCNC: 33.1 G/DL (ref 33–36)
MCV RBC AUTO: 95.9 FL (ref 82–95)
MONOCYTES # BLD AUTO: 0.79 10*3/MM3 (ref 0.19–1.3)
MONOCYTES NFR BLD AUTO: 10.4 % (ref 4–12)
NEUTROPHILS # BLD AUTO: 5.59 10*3/MM3 (ref 1.87–8.4)
NEUTROPHILS NFR BLD AUTO: 73.8 % (ref 39–78)
NRBC BLD MANUAL-RTO: 0 /100 WBC (ref 0–0)
PLATELET # BLD AUTO: 450 10*3/MM3 (ref 130–400)
PMV BLD AUTO: 8.9 FL (ref 6–12)
POTASSIUM BLD-SCNC: 4 MMOL/L (ref 3.5–5.3)
PROT SERPL-MCNC: 6.4 G/DL (ref 6.3–8.7)
PROTHROMBIN TIME: 21 SECONDS (ref 11.9–14.6)
RBC # BLD AUTO: 3.18 10*6/MM3 (ref 4.8–5.9)
SODIUM BLD-SCNC: 145 MMOL/L (ref 135–145)
WBC NRBC COR # BLD: 7.57 10*3/MM3 (ref 4.8–10.8)

## 2018-08-11 PROCEDURE — 85730 THROMBOPLASTIN TIME PARTIAL: CPT | Performed by: EMERGENCY MEDICINE

## 2018-08-11 PROCEDURE — 85610 PROTHROMBIN TIME: CPT | Performed by: EMERGENCY MEDICINE

## 2018-08-11 PROCEDURE — 36415 COLL VENOUS BLD VENIPUNCTURE: CPT

## 2018-08-11 PROCEDURE — 86901 BLOOD TYPING SEROLOGIC RH(D): CPT | Performed by: EMERGENCY MEDICINE

## 2018-08-11 PROCEDURE — 80053 COMPREHEN METABOLIC PANEL: CPT | Performed by: EMERGENCY MEDICINE

## 2018-08-11 PROCEDURE — 99284 EMERGENCY DEPT VISIT MOD MDM: CPT

## 2018-08-11 PROCEDURE — 85025 COMPLETE CBC W/AUTO DIFF WBC: CPT | Performed by: EMERGENCY MEDICINE

## 2018-08-11 PROCEDURE — 86900 BLOOD TYPING SEROLOGIC ABO: CPT | Performed by: EMERGENCY MEDICINE

## 2018-08-11 PROCEDURE — 86850 RBC ANTIBODY SCREEN: CPT | Performed by: EMERGENCY MEDICINE

## 2018-08-11 RX ORDER — SODIUM CHLORIDE 0.9 % (FLUSH) 0.9 %
10 SYRINGE (ML) INJECTION AS NEEDED
Status: DISCONTINUED | OUTPATIENT
Start: 2018-08-11 | End: 2018-08-12 | Stop reason: HOSPADM

## 2018-08-12 VITALS
TEMPERATURE: 97.4 F | WEIGHT: 233 LBS | HEIGHT: 72 IN | BODY MASS INDEX: 31.56 KG/M2 | SYSTOLIC BLOOD PRESSURE: 110 MMHG | HEART RATE: 78 BPM | OXYGEN SATURATION: 97 % | RESPIRATION RATE: 17 BRPM | DIASTOLIC BLOOD PRESSURE: 56 MMHG

## 2018-08-12 LAB
ABO GROUP BLD: NORMAL
BLD GP AB SCN SERPL QL: NEGATIVE
HOLD SPECIMEN: NORMAL
RH BLD: POSITIVE
T&S EXPIRATION DATE: NORMAL

## 2018-08-12 NOTE — ED PROVIDER NOTES
Subjective   History of Present Illness     73-year-old male status post penile prosthesis implant 15 days ago done by Dr. Gupta of the urology service.    Patient states he has had an uneventful postoperative course until earlier today when he felt that his underwear was getting wet.  The patient checked and noticed that there was some blood.  Upon inspecting.  He noticed that there was some blood coming from the site of the sutures.    The patient denies any chest pain or feeling of lightheadedness or feeling of syncope or dizziness.    Review of Systems   Constitutional: Negative for appetite change, chills, fatigue and fever.   HENT: Negative for congestion and sore throat.    Respiratory: Negative for chest tightness and shortness of breath.    Cardiovascular: Negative for palpitations.   Gastrointestinal: Negative for abdominal distention, abdominal pain and vomiting.   Genitourinary: Negative for difficulty urinating, dysuria, penile pain, penile swelling, scrotal swelling and testicular pain.   Musculoskeletal: Negative for back pain, joint swelling, neck pain and neck stiffness.   Skin: Negative for rash.   Neurological: Negative for dizziness and headaches.   All other systems reviewed and are negative.      Past Medical History:   Diagnosis Date   • Abdominal pain, epigastric    • Abnormal abdominal exam     ABFND, RADIOLOGICAL, ABDOMINAL AREA    • Anticoagulated on Coumadin    • Atherosclerosis of coronary artery bypass graft     ATHEROSCLEROSIS, CORONARY, ARTERY BYPASS GRFT   • Atrial fibrillation (CMS/HCC)    • Cancer of right kidney (CMS/HCC)     right sided kidney cancer   • Cardiomyopathy (CMS/HCC)    • Cardiomyopathy, primary (CMS/HCC)    • CHF (congestive heart failure) (CMS/HCC)    • Colon polyps    • Congestive heart failure (CMS/HCC)    • Coronary artery disease     History of CAD/A-Fib/Pacemaker/Defibrillator placement: pt takes Coumadin and plavix therapy as well as ASA daily   • Gastric  "polyp    • Gastroesophageal reflux disease without esophagitis    • History of colon polyps    • Hypercholesterolemia    • Hypertension, essential    • Melena    • Myocardial infarction    • NSAID long-term use    • Obesity    • Pacemaker     Pacemaker Dependant   • Platelet inhibition due to Plavix    • Presence of stent in coronary artery     Multiple coronary stenting most recently 4/2008   • Single kidney     Only 1 Kidney   • Status post surgery     s/p Polypectomy Bleed       Allergies   Allergen Reactions   • Azithromycin Nausea And Vomiting   • Morphine And Related Other (See Comments)     SHAKES AND MAKES HIM \"WIRED\".   TAKES TRAMADOL WITHOUT PROBLEM       Past Surgical History:   Procedure Laterality Date   • APPENDECTOMY     • CARDIAC ABLATION     • CARDIAC CATHETERIZATION     • CARDIAC DEFIBRILLATOR PLACEMENT     • CARDIAC PACEMAKER PLACEMENT      Pacemaker Placement   • CHOLECYSTECTOMY     • COLONOSCOPY  08/27/2013    snare hep, trans tics recall 3 yr   • COLONOSCOPY  10/02/2006    few scattered diverticula   • COLONOSCOPY  07/22/2009    tubular adenoma in the ascending colon   • COLONOSCOPY  07/25/2005    several polyps in the ascending colon, one in the transverse colon   • CORONARY ARTERY BYPASS GRAFT  02/2011    2nd time   • CORONARY ARTERY BYPASS GRAFT  1990   • CORONARY STENT PLACEMENT     • ENDOSCOPY  04/14/2016    charley duffy   • ENDOSCOPY  11/10/2014    clips at polypectomy bx no residual recall 1 yr   • ENDOSCOPY  06/19/2014    snare clip body recall 6 months   • ENDOSCOPY  05/19/2014    polyp stomach bx and clip   • HERNIA REPAIR      with mesh   • OTHER SURGICAL HISTORY      Defibrillator/Pacer maker exchange   • OTHER SURGICAL HISTORY      Pacemaker/Defibillation exchange 2012   • PENILE PROSTHESIS IMPLANT N/A 7/27/2018    Procedure: PENILE PROSTHESIS PLACEMENT;  Surgeon: Godwin Gupta MD;  Location: [facility] OR;  Service: Urology   • PERIPHERAL ARTERIAL STENT GRAFT         Family History "   Problem Relation Age of Onset   • Lung cancer Mother    • No Known Problems Father    • Other Neg Hx         GI Malignancies       Social History     Social History   • Marital status:      Social History Main Topics   • Smoking status: Never Smoker   • Smokeless tobacco: Never Used   • Alcohol use No   • Drug use: No   • Sexual activity: Defer     Other Topics Concern   • Not on file           Objective   Physical Exam   Constitutional: He is oriented to person, place, and time. He appears well-developed and well-nourished.   HENT:   Head: Normocephalic and atraumatic.   Eyes: Pupils are equal, round, and reactive to light. Conjunctivae are normal.   Neck: Normal range of motion.   Cardiovascular: Normal rate, regular rhythm and normal heart sounds.    Pulmonary/Chest: Effort normal and breath sounds normal.   Abdominal: Soft. There is no tenderness.   Genitourinary:   Genitourinary Comments: There is a well-healed incision site at the base of the penis on the posterior side.  It appears that was sutures either loosened or came out.  There is a small amount of dried blood.  There is no active bleeding.   Musculoskeletal: Normal range of motion. He exhibits no edema or deformity.   Neurological: He is alert and oriented to person, place, and time. He has normal strength.   Skin: Skin is warm.   Psychiatric: He has a normal mood and affect. His behavior is normal.   Nursing note and vitals reviewed.      Procedures           ED Course      Labs Reviewed   COMPREHENSIVE METABOLIC PANEL - Abnormal; Notable for the following:        Result Value    Glucose 120 (*)     BUN 24 (*)     AST (SGOT) 59 (*)     eGFR Non  Amer 59 (*)     All other components within normal limits    Narrative:     The MDRD GFR formula is only valid for adults with stable renal function between ages 18 and 70.   PROTIME-INR - Abnormal; Notable for the following:     Protime 21.0 (*)     INR 1.74 (*)     All other components within  normal limits   APTT - Abnormal; Notable for the following:     PTT 43.9 (*)     All other components within normal limits   CBC WITH AUTO DIFFERENTIAL - Abnormal; Notable for the following:     RBC 3.18 (*)     Hemoglobin 10.1 (*)     Hematocrit 30.5 (*)     MCV 95.9 (*)     Platelets 450 (*)     Lymphocyte % 12.2 (*)     Immature Grans, Absolute 0.05 (*)     All other components within normal limits   RAINBOW DRAW    Narrative:     The following orders were created for panel order [place] Draw.  Procedure                               Abnormality         Status                     ---------                               -----------         ------                     Red Top[[id number]]                                          In process                   Please view results for these tests on the individual orders.   TYPE AND SCREEN   CBC AND DIFFERENTIAL    Narrative:     The following orders were created for panel order CBC & Differential.  Procedure                               Abnormality         Status                     ---------                               -----------         ------                     CBC Auto Differential[[id number]]        Abnormal            Final result                 Please view results for these tests on the individual orders.   RED TOP     The patient's INR is 1.74.    Pressure was applied to the patient's wound with complete resolution of bleeding.  The patient has a follow-up appointment with Dr. Gupta this coming Monday.  I told the patient to return the emergency room if he exhibits any more bleeding    Case discussed with Dr. Holley of the urology service.            MDM      Final diagnoses:   Postoperative wound dehiscence, initial encounter            Hudson Diaz MD  08/12/18 0016

## 2018-08-12 NOTE — ED NOTES
STERI STRIPS TO THE PATIENTS TESTICLES AND AT THE SHAFT OF THE PENIS.  THE SITE WAS COVERED WITH 4X4 AS MINIMAL OOZING FROM THE POST OP SITE WAS NOTED.  LARGE AMOUNTS OF BRUISING TO THE PERINEAL AND INGUINAL AREA STILL PRESENT.       Tyrone Worley RN  08/12/18 0049

## 2018-08-13 ENCOUNTER — TELEPHONE (OUTPATIENT)
Dept: UROLOGY | Facility: CLINIC | Age: 73
End: 2018-08-13

## 2018-08-13 ENCOUNTER — OFFICE VISIT (OUTPATIENT)
Dept: UROLOGY | Facility: CLINIC | Age: 73
End: 2018-08-13

## 2018-08-13 VITALS — WEIGHT: 231 LBS | BODY MASS INDEX: 31.29 KG/M2 | HEIGHT: 72 IN | TEMPERATURE: 97.9 F

## 2018-08-13 DIAGNOSIS — N52.9 IMPOTENCE DUE TO ERECTILE DYSFUNCTION: Primary | ICD-10-CM

## 2018-08-13 LAB
BILIRUB BLD-MCNC: NEGATIVE MG/DL
CLARITY, POC: CLEAR
COLOR UR: YELLOW
GLUCOSE UR STRIP-MCNC: NEGATIVE MG/DL
KETONES UR QL: NEGATIVE
LEUKOCYTE EST, POC: NEGATIVE
NITRITE UR-MCNC: NEGATIVE MG/ML
PH UR: 5.5 [PH] (ref 5–8)
PROT UR STRIP-MCNC: NEGATIVE MG/DL
RBC # UR STRIP: NEGATIVE /UL
SP GR UR: 1.01 (ref 1–1.03)
UROBILINOGEN UR QL: NORMAL

## 2018-08-13 PROCEDURE — 81003 URINALYSIS AUTO W/O SCOPE: CPT | Performed by: UROLOGY

## 2018-08-13 PROCEDURE — 99024 POSTOP FOLLOW-UP VISIT: CPT | Performed by: UROLOGY

## 2018-08-13 RX ORDER — SULFAMETHOXAZOLE AND TRIMETHOPRIM 800; 160 MG/1; MG/1
1 TABLET ORAL 2 TIMES DAILY
Qty: 28 TABLET | Refills: 0 | Status: SHIPPED | OUTPATIENT
Start: 2018-08-13 | End: 2018-08-27

## 2018-08-13 NOTE — TELEPHONE ENCOUNTER
----- Message from Godwin Gupta MD sent at 8/13/2018  2:51 PM CDT -----  Please let him know that his urine is clear

## 2018-08-13 NOTE — PROGRESS NOTES
Mr. Maria is 73 y.o. male    Chief Complaint   Patient presents with   • Erectile Dysfunction       History of Present Illness  Postop from inflatable penile prosthesis.  Over the weekend he did have drainage from the incision which described as old blood without pain fevers chills nausea or vomiting.      The following portions of the patient's history were reviewed and updated as appropriate: allergies, current medications, past family history, past medical history, past social history, past surgical history and problem list.    Review of Systems   Constitutional: Negative for chills and fever.   Gastrointestinal: Negative for abdominal pain, anal bleeding and blood in stool.   Genitourinary: Positive for difficulty urinating, dysuria, penile pain, penile swelling and scrotal swelling (some bleeding/ went to er 8/11). Negative for decreased urine volume, discharge, enuresis, flank pain, frequency, genital sores, hematuria, testicular pain and urgency.         Current Outpatient Prescriptions:   •  atorvastatin (LIPITOR) 20 MG tablet, Take 20 mg by mouth daily., Disp: , Rfl:   •  bumetanide (BUMEX) 1 MG tablet, Take 1 mg by mouth daily., Disp: , Rfl:   •  carvedilol (COREG) 25 MG tablet, Take 25 mg by mouth 2 (two) times a day., Disp: , Rfl:   •  diltiazem CD (DILTIAZEM CD) 120 MG 24 hr capsule, Take 120 mg by mouth daily., Disp: , Rfl:   •  escitalopram (LEXAPRO) 5 MG tablet, TK 1 T PO D, Disp: , Rfl: 5  •  finasteride (PROSCAR) 5 MG tablet, Take 5 mg by mouth daily., Disp: , Rfl:   •  gabapentin (NEURONTIN) 400 MG capsule, Take 400 mg by mouth Daily., Disp: , Rfl:   •  losartan (COZAAR) 50 MG tablet, Take 50 mg by mouth daily., Disp: , Rfl:   •  nitroglycerin (NITROSTAT) 0.4 MG SL tablet, DISSOLVE 1 UNDER TONGUE AS NEEDED FOR CHEST PAIN, MAY REPEAT EVERY 5 MINUTES UP TO 3 DOSES CALL 911, Disp: , Rfl: 0  •  omeprazole (PriLOSEC) 40 MG capsule, Take 40 mg by mouth daily., Disp: , Rfl:   •  potassium chloride  (K-DUR,KLOR-CON) 20 MEQ CR tablet, Take 20 mEq by mouth 2 (Two) Times a Day., Disp: , Rfl:   •  tamsulosin (FLOMAX) 0.4 MG capsule 24 hr capsule, Take 1 capsule by mouth daily., Disp: , Rfl:   •  traMADol-acetaminophen (ULTRACET) 37.5-325 MG per tablet, as needed, Disp: , Rfl: 1  •  zolpidem (AMBIEN) 5 MG tablet, TAKE 1 TABLET BY MOUTH EVERY DAY AT BEDTIME AS NEEDED, Disp: , Rfl: 5  •  sulfamethoxazole-trimethoprim (BACTRIM DS) 800-160 MG per tablet, Take 1 tablet by mouth 2 (Two) Times a Day for 14 days., Disp: 28 tablet, Rfl: 0    Past Medical History:   Diagnosis Date   • Abdominal pain, epigastric    • Abnormal abdominal exam     ABFND, RADIOLOGICAL, ABDOMINAL AREA    • Anticoagulated on Coumadin    • Atherosclerosis of coronary artery bypass graft     ATHEROSCLEROSIS, CORONARY, ARTERY BYPASS GRFT   • Atrial fibrillation (CMS/HCC)    • Cancer of right kidney (CMS/HCC)     right sided kidney cancer   • Cardiomyopathy (CMS/HCC)    • Cardiomyopathy, primary (CMS/HCC)    • CHF (congestive heart failure) (CMS/HCC)    • Colon polyps    • Congestive heart failure (CMS/HCC)    • Coronary artery disease     History of CAD/A-Fib/Pacemaker/Defibrillator placement: pt takes Coumadin and plavix therapy as well as ASA daily   • Gastric polyp    • Gastroesophageal reflux disease without esophagitis    • History of colon polyps    • Hypercholesterolemia    • Hypertension, essential    • Melena    • Myocardial infarction    • NSAID long-term use    • Obesity    • Pacemaker     Pacemaker Dependant   • Platelet inhibition due to Plavix    • Presence of stent in coronary artery     Multiple coronary stenting most recently 4/2008   • Single kidney     Only 1 Kidney   • Status post surgery     s/p Polypectomy Bleed       Past Surgical History:   Procedure Laterality Date   • APPENDECTOMY     • CARDIAC ABLATION     • CARDIAC CATHETERIZATION     • CARDIAC DEFIBRILLATOR PLACEMENT     • CARDIAC PACEMAKER PLACEMENT      Pacemaker  "Placement   • CHOLECYSTECTOMY     • COLONOSCOPY  08/27/2013    snare hep, trans tics recall 3 yr   • COLONOSCOPY  10/02/2006    few scattered diverticula   • COLONOSCOPY  07/22/2009    tubular adenoma in the ascending colon   • COLONOSCOPY  07/25/2005    several polyps in the ascending colon, one in the transverse colon   • CORONARY ARTERY BYPASS GRAFT  02/2011    2nd time   • CORONARY ARTERY BYPASS GRAFT  1990   • CORONARY STENT PLACEMENT     • ENDOSCOPY  04/14/2016    nl slant   • ENDOSCOPY  11/10/2014    clips at polypectomy bx no residual recall 1 yr   • ENDOSCOPY  06/19/2014    snare clip body recall 6 months   • ENDOSCOPY  05/19/2014    polyp stomach bx and clip   • HERNIA REPAIR      with mesh   • OTHER SURGICAL HISTORY      Defibrillator/Pacer maker exchange   • OTHER SURGICAL HISTORY      Pacemaker/Defibillation exchange 2012   • PENILE PROSTHESIS IMPLANT N/A 7/27/2018    Procedure: PENILE PROSTHESIS PLACEMENT;  Surgeon: Godwin Gupta MD;  Location:  [facility] OR;  Service: Urology   • PERIPHERAL ARTERIAL STENT GRAFT         Social History     Social History   • Marital status:      Social History Main Topics   • Smoking status: Never Smoker   • Smokeless tobacco: Never Used   • Alcohol use No   • Drug use: No   • Sexual activity: Defer     Other Topics Concern   • Not on file       Family History   Problem Relation Age of Onset   • Lung cancer Mother    • No Known Problems Father    • Other Neg Hx         GI Malignancies       Objective    Temp 97.9 °F (36.6 °C)   Ht 182.9 cm (72\")   Wt 105 kg (231 lb)   BMI 31.33 kg/m²     Physical Exam  Bruising around the penis significantly improved.  Testicular swelling also has significantly improved.  Incision appears to be clean and dry with some superficial dehiscence of the wound.  I do not appreciate any deep dehiscence of the wound.  No erythema or crepitus.  He does have a single nourished drainage which is mild and appears to be old " blood.      Admission on 08/11/2018, Discharged on 08/12/2018   Component Date Value Ref Range Status   • Glucose 08/11/2018 120* 70 - 100 mg/dL Final   • BUN 08/11/2018 24* 5 - 21 mg/dL Final   • Creatinine 08/11/2018 1.21  0.50 - 1.40 mg/dL Final   • Sodium 08/11/2018 145  135 - 145 mmol/L Final   • Potassium 08/11/2018 4.0  3.5 - 5.3 mmol/L Final   • Chloride 08/11/2018 109  98 - 110 mmol/L Final   • CO2 08/11/2018 28.0  24.0 - 31.0 mmol/L Final   • Calcium 08/11/2018 8.7  8.4 - 10.4 mg/dL Final   • Total Protein 08/11/2018 6.4  6.3 - 8.7 g/dL Final   • Albumin 08/11/2018 3.60  3.50 - 5.00 g/dL Final   • ALT (SGPT) 08/11/2018 22  0 - 54 U/L Final   • AST (SGOT) 08/11/2018 59* 7 - 45 U/L Final   • Alkaline Phosphatase 08/11/2018 68  24 - 120 U/L Final   • Total Bilirubin 08/11/2018 0.8  0.1 - 1.0 mg/dL Final   • eGFR Non African Amer 08/11/2018 59* >60 mL/min/1.73 Final   • Globulin 08/11/2018 2.8  gm/dL Final   • A/G Ratio 08/11/2018 1.3  1.1 - 2.5 g/dL Final   • BUN/Creatinine Ratio 08/11/2018 19.8  7.0 - 25.0 Final   • Anion Gap 08/11/2018 8.0  4.0 - 13.0 mmol/L Final   • Protime 08/11/2018 21.0* 11.9 - 14.6 Seconds Final   • INR 08/11/2018 1.74* 0.91 - 1.09 Final   • PTT 08/11/2018 43.9* 24.1 - 34.8 seconds Final   • ABO Type 08/11/2018 A   Final   • RH type 08/11/2018 Positive   Final   • Antibody Screen 08/11/2018 Negative   Final   • T&S Expiration Date 08/11/2018 8/14/2018 11:59:59 PM   Final   • WBC 08/11/2018 7.57  4.80 - 10.80 10*3/mm3 Final   • RBC 08/11/2018 3.18* 4.80 - 5.90 10*6/mm3 Final   • Hemoglobin 08/11/2018 10.1* 14.0 - 18.0 g/dL Final   • Hematocrit 08/11/2018 30.5* 40.0 - 52.0 % Final   • MCV 08/11/2018 95.9* 82.0 - 95.0 fL Final   • MCH 08/11/2018 31.8  28.0 - 32.0 pg Final   • MCHC 08/11/2018 33.1  33.0 - 36.0 g/dL Final   • RDW 08/11/2018 14.5  12.0 - 15.0 % Final   • RDW-SD 08/11/2018 50.1  40.0 - 54.0 fl Final   • MPV 08/11/2018 8.9  6.0 - 12.0 fL Final   • Platelets 08/11/2018 450*  130 - 400 10*3/mm3 Final   • Neutrophil % 08/11/2018 73.8  39.0 - 78.0 % Final   • Lymphocyte % 08/11/2018 12.2* 15.0 - 45.0 % Final   • Monocyte % 08/11/2018 10.4  4.0 - 12.0 % Final   • Eosinophil % 08/11/2018 2.4  0.0 - 4.0 % Final   • Basophil % 08/11/2018 0.5  0.0 - 2.0 % Final   • Immature Grans % 08/11/2018 0.7  0.0 - 5.0 % Final   • Neutrophils, Absolute 08/11/2018 5.59  1.87 - 8.40 10*3/mm3 Final   • Lymphocytes, Absolute 08/11/2018 0.92  0.72 - 4.86 10*3/mm3 Final   • Monocytes, Absolute 08/11/2018 0.79  0.19 - 1.30 10*3/mm3 Final   • Eosinophils, Absolute 08/11/2018 0.18  0.00 - 0.70 10*3/mm3 Final   • Basophils, Absolute 08/11/2018 0.04  0.00 - 0.20 10*3/mm3 Final   • Immature Grans, Absolute 08/11/2018 0.05* 0.00 - 0.03 10*3/mm3 Final   • nRBC 08/11/2018 0.0  0.0 - 0.0 /100 WBC Final   • Extra Tube 08/11/2018 Hold for add-ons.   Final    Auto resulted.       Results for orders placed or performed during the hospital encounter of 08/11/18   Comprehensive Metabolic Panel   Result Value Ref Range    Glucose 120 (H) 70 - 100 mg/dL    BUN 24 (H) 5 - 21 mg/dL    Creatinine 1.21 0.50 - 1.40 mg/dL    Sodium 145 135 - 145 mmol/L    Potassium 4.0 3.5 - 5.3 mmol/L    Chloride 109 98 - 110 mmol/L    CO2 28.0 24.0 - 31.0 mmol/L    Calcium 8.7 8.4 - 10.4 mg/dL    Total Protein 6.4 6.3 - 8.7 g/dL    Albumin 3.60 3.50 - 5.00 g/dL    ALT (SGPT) 22 0 - 54 U/L    AST (SGOT) 59 (H) 7 - 45 U/L    Alkaline Phosphatase 68 24 - 120 U/L    Total Bilirubin 0.8 0.1 - 1.0 mg/dL    eGFR Non African Amer 59 (L) >60 mL/min/1.73    Globulin 2.8 gm/dL    A/G Ratio 1.3 1.1 - 2.5 g/dL    BUN/Creatinine Ratio 19.8 7.0 - 25.0    Anion Gap 8.0 4.0 - 13.0 mmol/L   Protime-INR   Result Value Ref Range    Protime 21.0 (H) 11.9 - 14.6 Seconds    INR 1.74 (H) 0.91 - 1.09   aPTT   Result Value Ref Range    PTT 43.9 (H) 24.1 - 34.8 seconds   CBC Auto Differential   Result Value Ref Range    WBC 7.57 4.80 - 10.80 10*3/mm3    RBC 3.18 (L) 4.80 -  5.90 10*6/mm3    Hemoglobin 10.1 (L) 14.0 - 18.0 g/dL    Hematocrit 30.5 (L) 40.0 - 52.0 %    MCV 95.9 (H) 82.0 - 95.0 fL    MCH 31.8 28.0 - 32.0 pg    MCHC 33.1 33.0 - 36.0 g/dL    RDW 14.5 12.0 - 15.0 %    RDW-SD 50.1 40.0 - 54.0 fl    MPV 8.9 6.0 - 12.0 fL    Platelets 450 (H) 130 - 400 10*3/mm3    Neutrophil % 73.8 39.0 - 78.0 %    Lymphocyte % 12.2 (L) 15.0 - 45.0 %    Monocyte % 10.4 4.0 - 12.0 %    Eosinophil % 2.4 0.0 - 4.0 %    Basophil % 0.5 0.0 - 2.0 %    Immature Grans % 0.7 0.0 - 5.0 %    Neutrophils, Absolute 5.59 1.87 - 8.40 10*3/mm3    Lymphocytes, Absolute 0.92 0.72 - 4.86 10*3/mm3    Monocytes, Absolute 0.79 0.19 - 1.30 10*3/mm3    Eosinophils, Absolute 0.18 0.00 - 0.70 10*3/mm3    Basophils, Absolute 0.04 0.00 - 0.20 10*3/mm3    Immature Grans, Absolute 0.05 (H) 0.00 - 0.03 10*3/mm3    nRBC 0.0 0.0 - 0.0 /100 WBC   Type & Screen   Result Value Ref Range    ABO Type A     RH type Positive     Antibody Screen Negative     T&S Expiration Date 8/14/2018 11:59:59 PM    Red Top   Result Value Ref Range    Extra Tube Hold for add-ons.      Assessment and Plan    Darren was seen today for erectile dysfunction.    Diagnoses and all orders for this visit:    Impotence due to erectile dysfunction  -     sulfamethoxazole-trimethoprim (BACTRIM DS) 800-160 MG per tablet; Take 1 tablet by mouth 2 (Two) Times a Day for 14 days.    I do believe the episode of a superficial wound dehiscence was likely secondary to a large hematoma in the scrotum secondary to his significant anticoagulation.  This is led to significant reduction in the scrotal swelling.  He continues to have no erythema or crepitus or drainage.  Denies any fevers.  This does not appear to be infected but I have started him on Bactrim today as a precaution.  I would like to continue to monitor this closely and see him back next week.  His swelling continues to improve and I am optimistic that he will continue to improve moving forward.  He does  understand that if he develops fevers or worsening redness around the incision he does need to call me if this could be a sign of infection and the device may need to be removed.

## 2018-08-20 ENCOUNTER — OFFICE VISIT (OUTPATIENT)
Dept: UROLOGY | Facility: CLINIC | Age: 73
End: 2018-08-20

## 2018-08-20 VITALS — HEIGHT: 72 IN | WEIGHT: 231 LBS | TEMPERATURE: 98.7 F | BODY MASS INDEX: 31.29 KG/M2

## 2018-08-20 DIAGNOSIS — N52.9 IMPOTENCE DUE TO ERECTILE DYSFUNCTION: Primary | ICD-10-CM

## 2018-08-20 LAB
BILIRUB BLD-MCNC: NEGATIVE MG/DL
CLARITY, POC: CLEAR
COLOR UR: YELLOW
GLUCOSE UR STRIP-MCNC: NEGATIVE MG/DL
KETONES UR QL: NEGATIVE
LEUKOCYTE EST, POC: NEGATIVE
NITRITE UR-MCNC: NEGATIVE MG/ML
PH UR: 5.5 [PH] (ref 5–8)
PROT UR STRIP-MCNC: ABNORMAL MG/DL
RBC # UR STRIP: ABNORMAL /UL
SP GR UR: 1.03 (ref 1–1.03)
UROBILINOGEN UR QL: NORMAL

## 2018-08-20 PROCEDURE — 99024 POSTOP FOLLOW-UP VISIT: CPT | Performed by: UROLOGY

## 2018-08-20 PROCEDURE — 81001 URINALYSIS AUTO W/SCOPE: CPT | Performed by: UROLOGY

## 2018-08-20 NOTE — ED NOTES
"ED Call Back Questions    1. How are you doing since leaving the Emergency Department?    Doing better, already saw MD, good er visit  2. Do you have any questions about your discharge instructions? No     3. Have you filled your new prescriptions yet? N/A  a. Do you have any questions about those medications? N/A    4. Were you able to make a follow-up appointment with the physician? Yes     5. Do you have a primary care physician? Yes   a. If No, would you like for me to set you up with one? N/A  i. If Yes, “I will have our ED  give you a call right back at this number to work with you on the best time for an appointment.”    6. We are always looking to get better at what we do. Do you have any suggestions for what we can do to be even better? No   a. If Yes, \"Thank you for sharing your concerns. I apologize. I will follow up with our manager and patient . Would you like someone to call you back?\" N/A    7. Is there anything else I can do for you? No     "

## 2018-08-20 NOTE — PROGRESS NOTES
Mr. Maria is 73 y.o. male    Chief Complaint   Patient presents with   • Erectile Dysfunction       History of Present Illness  Decreased drainage from last visit.  No fevers chills nausea vomiting.  Pain improving.    The following portions of the patient's history were reviewed and updated as appropriate: allergies, current medications, past family history, past medical history, past social history, past surgical history and problem list.    Review of Systems   Constitutional: Negative for chills and fever.   Gastrointestinal: Negative for abdominal pain, anal bleeding and blood in stool.   Genitourinary: Positive for difficulty urinating, dysuria, scrotal swelling and testicular pain. Negative for decreased urine volume, discharge, enuresis, flank pain, frequency, genital sores, hematuria, penile pain, penile swelling and urgency.         Current Outpatient Prescriptions:   •  atorvastatin (LIPITOR) 20 MG tablet, Take 20 mg by mouth daily., Disp: , Rfl:   •  bumetanide (BUMEX) 1 MG tablet, Take 1 mg by mouth daily., Disp: , Rfl:   •  carvedilol (COREG) 25 MG tablet, Take 25 mg by mouth 2 (two) times a day., Disp: , Rfl:   •  diltiazem CD (DILTIAZEM CD) 120 MG 24 hr capsule, Take 120 mg by mouth daily., Disp: , Rfl:   •  escitalopram (LEXAPRO) 5 MG tablet, TK 1 T PO D, Disp: , Rfl: 5  •  finasteride (PROSCAR) 5 MG tablet, Take 5 mg by mouth daily., Disp: , Rfl:   •  gabapentin (NEURONTIN) 400 MG capsule, Take 400 mg by mouth Daily., Disp: , Rfl:   •  losartan (COZAAR) 50 MG tablet, Take 50 mg by mouth daily., Disp: , Rfl:   •  nitroglycerin (NITROSTAT) 0.4 MG SL tablet, DISSOLVE 1 UNDER TONGUE AS NEEDED FOR CHEST PAIN, MAY REPEAT EVERY 5 MINUTES UP TO 3 DOSES CALL 911, Disp: , Rfl: 0  •  omeprazole (PriLOSEC) 40 MG capsule, Take 40 mg by mouth daily., Disp: , Rfl:   •  potassium chloride (K-DUR,KLOR-CON) 20 MEQ CR tablet, Take 20 mEq by mouth 2 (Two) Times a Day., Disp: , Rfl:   •  sulfamethoxazole-trimethoprim  (BACTRIM DS) 800-160 MG per tablet, Take 1 tablet by mouth 2 (Two) Times a Day for 14 days., Disp: 28 tablet, Rfl: 0  •  tamsulosin (FLOMAX) 0.4 MG capsule 24 hr capsule, Take 1 capsule by mouth daily., Disp: , Rfl:   •  traMADol-acetaminophen (ULTRACET) 37.5-325 MG per tablet, as needed, Disp: , Rfl: 1  •  zolpidem (AMBIEN) 5 MG tablet, TAKE 1 TABLET BY MOUTH EVERY DAY AT BEDTIME AS NEEDED, Disp: , Rfl: 5    Past Medical History:   Diagnosis Date   • Abdominal pain, epigastric    • Abnormal abdominal exam     ABFND, RADIOLOGICAL, ABDOMINAL AREA    • Anticoagulated on Coumadin    • Atherosclerosis of coronary artery bypass graft     ATHEROSCLEROSIS, CORONARY, ARTERY BYPASS GRFT   • Atrial fibrillation (CMS/HCC)    • Cancer of right kidney (CMS/HCC)     right sided kidney cancer   • Cardiomyopathy (CMS/HCC)    • Cardiomyopathy, primary (CMS/HCC)    • CHF (congestive heart failure) (CMS/HCC)    • Colon polyps    • Congestive heart failure (CMS/HCC)    • Coronary artery disease     History of CAD/A-Fib/Pacemaker/Defibrillator placement: pt takes Coumadin and plavix therapy as well as ASA daily   • Gastric polyp    • Gastroesophageal reflux disease without esophagitis    • History of colon polyps    • Hypercholesterolemia    • Hypertension, essential    • Melena    • Myocardial infarction    • NSAID long-term use    • Obesity    • Pacemaker     Pacemaker Dependant   • Platelet inhibition due to Plavix    • Presence of stent in coronary artery     Multiple coronary stenting most recently 4/2008   • Single kidney     Only 1 Kidney   • Status post surgery     s/p Polypectomy Bleed       Past Surgical History:   Procedure Laterality Date   • APPENDECTOMY     • CARDIAC ABLATION     • CARDIAC CATHETERIZATION     • CARDIAC DEFIBRILLATOR PLACEMENT     • CARDIAC PACEMAKER PLACEMENT      Pacemaker Placement   • CHOLECYSTECTOMY     • COLONOSCOPY  08/27/2013    snare hep, trans tics recall 3 yr   • COLONOSCOPY  10/02/2006    few  "scattered diverticula   • COLONOSCOPY  07/22/2009    tubular adenoma in the ascending colon   • COLONOSCOPY  07/25/2005    several polyps in the ascending colon, one in the transverse colon   • CORONARY ARTERY BYPASS GRAFT  02/2011    2nd time   • CORONARY ARTERY BYPASS GRAFT  1990   • CORONARY STENT PLACEMENT     • ENDOSCOPY  04/14/2016    nl slant   • ENDOSCOPY  11/10/2014    clips at polypectomy bx no residual recall 1 yr   • ENDOSCOPY  06/19/2014    snare clip body recall 6 months   • ENDOSCOPY  05/19/2014    polyp stomach bx and clip   • HERNIA REPAIR      with mesh   • OTHER SURGICAL HISTORY      Defibrillator/Pacer maker exchange   • OTHER SURGICAL HISTORY      Pacemaker/Defibillation exchange 2012   • PENILE PROSTHESIS IMPLANT N/A 7/27/2018    Procedure: PENILE PROSTHESIS PLACEMENT;  Surgeon: Godwin Gupta MD;  Location: [facility] OR;  Service: Urology   • PERIPHERAL ARTERIAL STENT GRAFT         Social History     Social History   • Marital status:      Social History Main Topics   • Smoking status: Never Smoker   • Smokeless tobacco: Never Used   • Alcohol use No   • Drug use: No   • Sexual activity: Defer     Other Topics Concern   • Not on file       Family History   Problem Relation Age of Onset   • Lung cancer Mother    • No Known Problems Father    • Other Neg Hx         GI Malignancies       Objective    Temp 98.7 °F (37.1 °C)   Ht 182.9 cm (72\")   Wt 105 kg (231 lb)   BMI 31.33 kg/m²     Physical Exam  No erythema or discharge from the incision.  Incision is healing.  No crepitus.  Mild tenderness to palpation at the incision site.  There is significant swelling, but this is improved from the last visit.  Pump is palpable now in the scrotum.  Cylinders appear to be in appropriate position.  Office Visit on 08/13/2018   Component Date Value Ref Range Status   • Color 08/13/2018 Yellow  Yellow, Straw, Dark Yellow, Indigo Final   • Clarity, UA 08/13/2018 Clear  Clear Final   • Glucose, UA " 08/13/2018 Negative  Negative, 1000 mg/dL (3+) mg/dL Final   • Bilirubin 08/13/2018 Negative  Negative Final   • Ketones, UA 08/13/2018 Negative  Negative Final   • Specific Gravity  08/13/2018 1.015  1.005 - 1.030 Final   • Blood, UA 08/13/2018 Negative  Negative Final   • pH, Urine 08/13/2018 5.5  5.0 - 8.0 Final   • Protein, POC 08/13/2018 Negative  Negative mg/dL Final   • Urobilinogen, UA 08/13/2018 Normal  Normal Final   • Nitrite, UA 08/13/2018 Negative  Negative Final   • Leukocytes 08/13/2018 Negative  Negative Final       Results for orders placed or performed in visit on 08/20/18   POC Urinalysis Dipstick, Multipro   Result Value Ref Range    Color Yellow Yellow, Straw, Dark Yellow, Indigo    Clarity, UA Clear Clear    Glucose, UA Negative Negative, 1000 mg/dL (3+) mg/dL    Bilirubin Negative Negative    Ketones, UA Negative Negative    Specific Gravity  1.030 1.005 - 1.030    Blood, UA Moderate (A) Negative    pH, Urine 5.5 5.0 - 8.0    Protein, POC 30 mg/dL (A) Negative mg/dL    Urobilinogen, UA Normal Normal    Nitrite, UA Negative Negative    Leukocytes Negative Negative     Assessment and Plan    Darren was seen today for erectile dysfunction.    Diagnoses and all orders for this visit:    Impotence due to erectile dysfunction  -     POC Urinalysis Dipstick, Multipro    Patient doing well.  No fevers chills nausea vomiting.  Incision is healing.  He states there is no more bloody drainage.  Swelling has significantly improved.  I will see him back in 3 weeks.

## 2018-09-10 ENCOUNTER — OFFICE VISIT (OUTPATIENT)
Dept: UROLOGY | Facility: CLINIC | Age: 73
End: 2018-09-10

## 2018-09-10 VITALS — TEMPERATURE: 98.7 F | BODY MASS INDEX: 31.15 KG/M2 | WEIGHT: 230 LBS | HEIGHT: 72 IN

## 2018-09-10 DIAGNOSIS — N52.9 IMPOTENCE DUE TO ERECTILE DYSFUNCTION: Primary | ICD-10-CM

## 2018-09-10 LAB
BILIRUB BLD-MCNC: ABNORMAL MG/DL
CLARITY, POC: CLEAR
COLOR UR: YELLOW
GLUCOSE UR STRIP-MCNC: NEGATIVE MG/DL
KETONES UR QL: ABNORMAL
LEUKOCYTE EST, POC: NEGATIVE
NITRITE UR-MCNC: NEGATIVE MG/ML
PH UR: 5.5 [PH] (ref 5–8)
PROT UR STRIP-MCNC: ABNORMAL MG/DL
RBC # UR STRIP: NEGATIVE /UL
SP GR UR: 1.03 (ref 1–1.03)
UROBILINOGEN UR QL: ABNORMAL

## 2018-09-10 PROCEDURE — 99024 POSTOP FOLLOW-UP VISIT: CPT | Performed by: UROLOGY

## 2018-09-10 PROCEDURE — 81001 URINALYSIS AUTO W/SCOPE: CPT | Performed by: UROLOGY

## 2018-09-10 NOTE — PROGRESS NOTES
Mr. Maria is 73 y.o. male    Chief Complaint   Patient presents with   • Erectile Dysfunction       Erectile Dysfunction   This is a chronic problem. The current episode started more than 1 year ago. The nature of his difficulty is achieving erection. Irritative symptoms do not include frequency or urgency. Pertinent negatives include no chills, dysuria or hematuria. Nothing aggravates the symptoms. Treatments tried: Penile prosthesis.       The following portions of the patient's history were reviewed and updated as appropriate: allergies, current medications, past family history, past medical history, past social history, past surgical history and problem list.    Review of Systems   Constitutional: Negative for chills and fever.   Gastrointestinal: Negative for abdominal pain, anal bleeding and blood in stool.   Genitourinary: Negative for decreased urine volume, difficulty urinating, discharge, dysuria, enuresis, flank pain, frequency, genital sores, hematuria, penile pain, penile swelling, scrotal swelling, testicular pain and urgency.         Current Outpatient Prescriptions:   •  atorvastatin (LIPITOR) 20 MG tablet, Take 20 mg by mouth daily., Disp: , Rfl:   •  bumetanide (BUMEX) 1 MG tablet, Take 1 mg by mouth daily., Disp: , Rfl:   •  carvedilol (COREG) 25 MG tablet, Take 25 mg by mouth 2 (two) times a day., Disp: , Rfl:   •  diltiazem CD (DILTIAZEM CD) 120 MG 24 hr capsule, Take 120 mg by mouth daily., Disp: , Rfl:   •  escitalopram (LEXAPRO) 5 MG tablet, TK 1 T PO D, Disp: , Rfl: 5  •  finasteride (PROSCAR) 5 MG tablet, Take 5 mg by mouth daily., Disp: , Rfl:   •  gabapentin (NEURONTIN) 400 MG capsule, Take 400 mg by mouth Daily., Disp: , Rfl:   •  losartan (COZAAR) 50 MG tablet, Take 50 mg by mouth daily., Disp: , Rfl:   •  nitroglycerin (NITROSTAT) 0.4 MG SL tablet, DISSOLVE 1 UNDER TONGUE AS NEEDED FOR CHEST PAIN, MAY REPEAT EVERY 5 MINUTES UP TO 3 DOSES CALL 911, Disp: , Rfl: 0  •  omeprazole  (PriLOSEC) 40 MG capsule, Take 40 mg by mouth daily., Disp: , Rfl:   •  potassium chloride (K-DUR,KLOR-CON) 20 MEQ CR tablet, Take 20 mEq by mouth 2 (Two) Times a Day., Disp: , Rfl:   •  tamsulosin (FLOMAX) 0.4 MG capsule 24 hr capsule, Take 1 capsule by mouth daily., Disp: , Rfl:   •  traMADol-acetaminophen (ULTRACET) 37.5-325 MG per tablet, as needed, Disp: , Rfl: 1  •  zolpidem (AMBIEN) 5 MG tablet, TAKE 1 TABLET BY MOUTH EVERY DAY AT BEDTIME AS NEEDED, Disp: , Rfl: 5    Past Medical History:   Diagnosis Date   • Abdominal pain, epigastric    • Abnormal abdominal exam     ABFND, RADIOLOGICAL, ABDOMINAL AREA    • Anticoagulated on Coumadin    • Atherosclerosis of coronary artery bypass graft     ATHEROSCLEROSIS, CORONARY, ARTERY BYPASS GRFT   • Atrial fibrillation (CMS/HCC)    • Cancer of right kidney (CMS/HCC)     right sided kidney cancer   • Cardiomyopathy (CMS/HCC)    • Cardiomyopathy, primary (CMS/HCC)    • CHF (congestive heart failure) (CMS/HCC)    • Colon polyps    • Congestive heart failure (CMS/HCC)    • Coronary artery disease     History of CAD/A-Fib/Pacemaker/Defibrillator placement: pt takes Coumadin and plavix therapy as well as ASA daily   • Gastric polyp    • Gastroesophageal reflux disease without esophagitis    • History of colon polyps    • Hypercholesterolemia    • Hypertension, essential    • Melena    • Myocardial infarction    • NSAID long-term use    • Obesity    • Pacemaker     Pacemaker Dependant   • Platelet inhibition due to Plavix    • Presence of stent in coronary artery     Multiple coronary stenting most recently 4/2008   • Single kidney     Only 1 Kidney   • Status post surgery     s/p Polypectomy Bleed       Past Surgical History:   Procedure Laterality Date   • APPENDECTOMY     • CARDIAC ABLATION     • CARDIAC CATHETERIZATION     • CARDIAC DEFIBRILLATOR PLACEMENT     • CARDIAC PACEMAKER PLACEMENT      Pacemaker Placement   • CHOLECYSTECTOMY     • COLONOSCOPY  08/27/2013    snare  "hep, trans tics recall 3 yr   • COLONOSCOPY  10/02/2006    few scattered diverticula   • COLONOSCOPY  07/22/2009    tubular adenoma in the ascending colon   • COLONOSCOPY  07/25/2005    several polyps in the ascending colon, one in the transverse colon   • CORONARY ARTERY BYPASS GRAFT  02/2011    2nd time   • CORONARY ARTERY BYPASS GRAFT  1990   • CORONARY STENT PLACEMENT     • ENDOSCOPY  04/14/2016    nl slant   • ENDOSCOPY  11/10/2014    clips at polypectomy bx no residual recall 1 yr   • ENDOSCOPY  06/19/2014    snare clip body recall 6 months   • ENDOSCOPY  05/19/2014    polyp stomach bx and clip   • HERNIA REPAIR      with mesh   • OTHER SURGICAL HISTORY      Defibrillator/Pacer maker exchange   • OTHER SURGICAL HISTORY      Pacemaker/Defibillation exchange 2012   • PENILE PROSTHESIS IMPLANT N/A 7/27/2018    Procedure: PENILE PROSTHESIS PLACEMENT;  Surgeon: Godwin Gupta MD;  Location:  [facility];  Service: Urology   • PERIPHERAL ARTERIAL STENT GRAFT         Social History     Social History   • Marital status:      Social History Main Topics   • Smoking status: Never Smoker   • Smokeless tobacco: Never Used   • Alcohol use No   • Drug use: No   • Sexual activity: Defer     Other Topics Concern   • Not on file       Family History   Problem Relation Age of Onset   • Lung cancer Mother    • No Known Problems Father    • Other Neg Hx         GI Malignancies       Objective    Temp 98.7 °F (37.1 °C)   Ht 182.9 cm (72\")   Wt 104 kg (230 lb)   BMI 31.19 kg/m²     Physical Exam  Well-healed incision.  No signs of infection.  Pump is located in the mid scrotum.  Device cycles without difficulty with the tips of the cylinders at the mid glans.  Office Visit on 08/20/2018   Component Date Value Ref Range Status   • Color 08/20/2018 Yellow  Yellow, Straw, Dark Yellow, Indigo Final   • Clarity, UA 08/20/2018 Clear  Clear Final   • Glucose, UA 08/20/2018 Negative  Negative, 1000 mg/dL (3+) mg/dL Final   • " Bilirubin 08/20/2018 Negative  Negative Final   • Ketones, UA 08/20/2018 Negative  Negative Final   • Specific Gravity  08/20/2018 1.030  1.005 - 1.030 Final   • Blood, UA 08/20/2018 Moderate* Negative Final   • pH, Urine 08/20/2018 5.5  5.0 - 8.0 Final   • Protein, POC 08/20/2018 30 mg/dL* Negative mg/dL Final   • Urobilinogen, UA 08/20/2018 Normal  Normal Final   • Nitrite, UA 08/20/2018 Negative  Negative Final   • Leukocytes 08/20/2018 Negative  Negative Final       Results for orders placed or performed in visit on 09/10/18   POC Urinalysis Dipstick, Multipro   Result Value Ref Range    Color Yellow Yellow, Straw, Dark Yellow, Indigo    Clarity, UA Clear Clear    Glucose, UA Negative Negative, 1000 mg/dL (3+) mg/dL    Bilirubin Small (1+) (A) Negative    Ketones, UA 15 mg/dL (A) Negative    Specific Gravity  1.030 1.005 - 1.030    Blood, UA Negative Negative    pH, Urine 5.5 5.0 - 8.0    Protein, POC 30 mg/dL (A) Negative mg/dL    Urobilinogen, UA 1 E.U./dL  (A) Normal    Nitrite, UA Negative Negative    Leukocytes Negative Negative     Assessment and Plan    Darren was seen today for erectile dysfunction.    Diagnoses and all orders for this visit:    Impotence due to erectile dysfunction  -     POC Urinalysis Dipstick, Multipro    Patient is doing well.  Swelling continues to improve.  He does have some mild swelling around the right testicle which I think will continue to improve.  No signs of infection.  I did inflate the device today and it does cycle without difficulty.  The pump is somewhat stiff which is expected after surgery.  I have encouraged him to inflate the device at least once a day in order to help loosen this up.  I did demonstrate him today how to pump up and deflated the device and discussed the importance of making sure this device is deflated after each use.  I will see him back in a month.

## 2018-10-02 ENCOUNTER — OFFICE VISIT (OUTPATIENT)
Dept: UROLOGY | Facility: CLINIC | Age: 73
End: 2018-10-02

## 2018-10-02 VITALS — TEMPERATURE: 98 F | HEIGHT: 72 IN | WEIGHT: 230 LBS | BODY MASS INDEX: 31.15 KG/M2

## 2018-10-02 DIAGNOSIS — N52.9 IMPOTENCE DUE TO ERECTILE DYSFUNCTION: Primary | ICD-10-CM

## 2018-10-02 LAB
BILIRUB BLD-MCNC: NEGATIVE MG/DL
CLARITY, POC: CLEAR
COLOR UR: YELLOW
GLUCOSE UR STRIP-MCNC: NEGATIVE MG/DL
KETONES UR QL: NEGATIVE
LEUKOCYTE EST, POC: NEGATIVE
NITRITE UR-MCNC: NEGATIVE MG/ML
PH UR: 5.5 [PH] (ref 5–8)
PROT UR STRIP-MCNC: ABNORMAL MG/DL
RBC # UR STRIP: NEGATIVE /UL
SP GR UR: 1.03 (ref 1–1.03)
UROBILINOGEN UR QL: NORMAL

## 2018-10-02 PROCEDURE — 99024 POSTOP FOLLOW-UP VISIT: CPT | Performed by: UROLOGY

## 2018-10-02 PROCEDURE — 81001 URINALYSIS AUTO W/SCOPE: CPT | Performed by: UROLOGY

## 2018-10-02 NOTE — PROGRESS NOTES
Mr. Maria is 73 y.o. male    Chief Complaint   Patient presents with   • Erectile Dysfunction       History of Present Illness  Patient is status post penile prosthesis several months ago.  He is overall happy with his results.  Denies any pain fevers chills nausea or vomiting.  Incisions are well-healed.  Denies any erythema or discharge from the incisions previously or from the penis itself.  He has used the device multiple times and is satisfied with the results.    The following portions of the patient's history were reviewed and updated as appropriate: allergies, current medications, past family history, past medical history, past social history, past surgical history and problem list.    Review of Systems   Constitutional: Negative for chills and fever.   Gastrointestinal: Negative for abdominal pain, anal bleeding and blood in stool.   Genitourinary: Negative for decreased urine volume, difficulty urinating, discharge, dysuria, enuresis, flank pain, frequency, genital sores, hematuria, penile pain, penile swelling, scrotal swelling, testicular pain and urgency.         Current Outpatient Prescriptions:   •  atorvastatin (LIPITOR) 20 MG tablet, Take 20 mg by mouth daily., Disp: , Rfl:   •  bumetanide (BUMEX) 1 MG tablet, Take 1 mg by mouth daily., Disp: , Rfl:   •  carvedilol (COREG) 25 MG tablet, Take 25 mg by mouth 2 (two) times a day., Disp: , Rfl:   •  diltiazem CD (DILTIAZEM CD) 120 MG 24 hr capsule, Take 120 mg by mouth daily., Disp: , Rfl:   •  escitalopram (LEXAPRO) 5 MG tablet, TK 1 T PO D, Disp: , Rfl: 5  •  finasteride (PROSCAR) 5 MG tablet, Take 5 mg by mouth daily., Disp: , Rfl:   •  gabapentin (NEURONTIN) 400 MG capsule, Take 400 mg by mouth Daily., Disp: , Rfl:   •  losartan (COZAAR) 50 MG tablet, Take 50 mg by mouth daily., Disp: , Rfl:   •  nitroglycerin (NITROSTAT) 0.4 MG SL tablet, DISSOLVE 1 UNDER TONGUE AS NEEDED FOR CHEST PAIN, MAY REPEAT EVERY 5 MINUTES UP TO 3 DOSES CALL 911, Disp: ,  Rfl: 0  •  omeprazole (PriLOSEC) 40 MG capsule, Take 40 mg by mouth daily., Disp: , Rfl:   •  potassium chloride (K-DUR,KLOR-CON) 20 MEQ CR tablet, Take 20 mEq by mouth 2 (Two) Times a Day., Disp: , Rfl:   •  tamsulosin (FLOMAX) 0.4 MG capsule 24 hr capsule, Take 1 capsule by mouth daily., Disp: , Rfl:   •  traMADol-acetaminophen (ULTRACET) 37.5-325 MG per tablet, as needed, Disp: , Rfl: 1  •  zolpidem (AMBIEN) 5 MG tablet, TAKE 1 TABLET BY MOUTH EVERY DAY AT BEDTIME AS NEEDED, Disp: , Rfl: 5    Past Medical History:   Diagnosis Date   • Abdominal pain, epigastric    • Abnormal abdominal exam     ABFND, RADIOLOGICAL, ABDOMINAL AREA    • Anticoagulated on Coumadin    • Atherosclerosis of coronary artery bypass graft     ATHEROSCLEROSIS, CORONARY, ARTERY BYPASS GRFT   • Atrial fibrillation (CMS/HCC)    • Cancer of right kidney (CMS/HCC)     right sided kidney cancer   • Cardiomyopathy (CMS/HCC)    • Cardiomyopathy, primary (CMS/HCC)    • CHF (congestive heart failure) (CMS/HCC)    • Colon polyps    • Congestive heart failure (CMS/HCC)    • Coronary artery disease     History of CAD/A-Fib/Pacemaker/Defibrillator placement: pt takes Coumadin and plavix therapy as well as ASA daily   • Gastric polyp    • Gastroesophageal reflux disease without esophagitis    • History of colon polyps    • Hypercholesterolemia    • Hypertension, essential    • Melena    • Myocardial infarction (CMS/HCC)    • NSAID long-term use    • Obesity    • Pacemaker     Pacemaker Dependant   • Platelet inhibition due to Plavix    • Presence of stent in coronary artery     Multiple coronary stenting most recently 4/2008   • Single kidney     Only 1 Kidney   • Status post surgery     s/p Polypectomy Bleed       Past Surgical History:   Procedure Laterality Date   • APPENDECTOMY     • CARDIAC ABLATION     • CARDIAC CATHETERIZATION     • CARDIAC DEFIBRILLATOR PLACEMENT     • CARDIAC PACEMAKER PLACEMENT      Pacemaker Placement   • CHOLECYSTECTOMY     •  "COLONOSCOPY  08/27/2013    snare hep, trans tics recall 3 yr   • COLONOSCOPY  10/02/2006    few scattered diverticula   • COLONOSCOPY  07/22/2009    tubular adenoma in the ascending colon   • COLONOSCOPY  07/25/2005    several polyps in the ascending colon, one in the transverse colon   • CORONARY ARTERY BYPASS GRAFT  02/2011    2nd time   • CORONARY ARTERY BYPASS GRAFT  1990   • CORONARY STENT PLACEMENT     • ENDOSCOPY  04/14/2016    nl slant   • ENDOSCOPY  11/10/2014    clips at polypectomy bx no residual recall 1 yr   • ENDOSCOPY  06/19/2014    snare clip body recall 6 months   • ENDOSCOPY  05/19/2014    polyp stomach bx and clip   • HERNIA REPAIR      with mesh   • OTHER SURGICAL HISTORY      Defibrillator/Pacer maker exchange   • OTHER SURGICAL HISTORY      Pacemaker/Defibillation exchange 2012   • PENILE PROSTHESIS IMPLANT N/A 7/27/2018    Procedure: PENILE PROSTHESIS PLACEMENT;  Surgeon: Godwin Gupta MD;  Location:  [facility];  Service: Urology   • PERIPHERAL ARTERIAL STENT GRAFT         Social History     Social History   • Marital status:      Social History Main Topics   • Smoking status: Never Smoker   • Smokeless tobacco: Never Used   • Alcohol use No   • Drug use: No   • Sexual activity: Defer     Other Topics Concern   • Not on file       Family History   Problem Relation Age of Onset   • Lung cancer Mother    • No Known Problems Father    • Other Neg Hx         GI Malignancies       Objective    Temp 98 °F (36.7 °C)   Ht 182.9 cm (72\")   Wt 104 kg (230 lb)   BMI 31.19 kg/m²     Physical Exam  Device is in good position.  Cycles without difficulty.  Cylinders appear to be in the mid glans  Office Visit on 09/10/2018   Component Date Value Ref Range Status   • Color 09/10/2018 Yellow  Yellow, Straw, Dark Yellow, Indigo Final   • Clarity, UA 09/10/2018 Clear  Clear Final   • Glucose, UA 09/10/2018 Negative  Negative, 1000 mg/dL (3+) mg/dL Final   • Bilirubin 09/10/2018 Small (1+)* Negative " Final   • Ketones, UA 09/10/2018 15 mg/dL* Negative Final   • Specific Gravity  09/10/2018 1.030  1.005 - 1.030 Final   • Blood, UA 09/10/2018 Negative  Negative Final   • pH, Urine 09/10/2018 5.5  5.0 - 8.0 Final   • Protein, POC 09/10/2018 30 mg/dL* Negative mg/dL Final   • Urobilinogen, UA 09/10/2018 1 E.U./dL * Normal Final   • Nitrite, UA 09/10/2018 Negative  Negative Final   • Leukocytes 09/10/2018 Negative  Negative Final       Results for orders placed or performed in visit on 10/02/18   POC Urinalysis Dipstick, Multipro   Result Value Ref Range    Color Yellow Yellow, Straw, Dark Yellow, Indigo    Clarity, UA Clear Clear    Glucose, UA Negative Negative, 1000 mg/dL (3+) mg/dL    Bilirubin Negative Negative    Ketones, UA Negative Negative    Specific Gravity  1.030 1.005 - 1.030    Blood, UA Negative Negative    pH, Urine 5.5 5.0 - 8.0    Protein, POC Trace (A) Negative mg/dL    Urobilinogen, UA Normal Normal    Nitrite, UA Negative Negative    Leukocytes Negative Negative     Assessment and Plan    Darren was seen today for erectile dysfunction.    Diagnoses and all orders for this visit:    Impotence due to erectile dysfunction  -     POC Urinalysis Dipstick, Multipro    Pump cycles without difficulty.  Cylinders appear to be in good position.  He is use this multiple times and is very satisfied with the results.  Incisions are well healed.  I will like see him back in a year to assess the device.  He will continue to see Dr. Kerns for his other urologic issues.

## 2018-11-21 RX ORDER — WARFARIN SODIUM 7.5 MG/1
TABLET ORAL
Qty: 90 TABLET | Refills: 1 | OUTPATIENT
Start: 2018-11-21

## 2018-11-21 RX ORDER — FINASTERIDE 5 MG/1
5 TABLET, FILM COATED ORAL DAILY
Qty: 90 TABLET | Refills: 1 | OUTPATIENT
Start: 2018-11-21

## 2019-02-01 ENCOUNTER — OFFICE VISIT (OUTPATIENT)
Dept: CARDIOLOGY | Facility: CLINIC | Age: 74
End: 2019-02-01

## 2019-02-01 VITALS
DIASTOLIC BLOOD PRESSURE: 64 MMHG | SYSTOLIC BLOOD PRESSURE: 100 MMHG | WEIGHT: 235 LBS | HEIGHT: 72 IN | OXYGEN SATURATION: 98 % | HEART RATE: 79 BPM | BODY MASS INDEX: 31.83 KG/M2

## 2019-02-01 DIAGNOSIS — R06.09 DOE (DYSPNEA ON EXERTION): ICD-10-CM

## 2019-02-01 DIAGNOSIS — N13.8 BENIGN PROSTATIC HYPERPLASIA WITH URINARY OBSTRUCTION: ICD-10-CM

## 2019-02-01 DIAGNOSIS — Z95.810 ICD (IMPLANTABLE CARDIOVERTER-DEFIBRILLATOR) IN PLACE: ICD-10-CM

## 2019-02-01 DIAGNOSIS — I25.5 ISCHEMIC CARDIOMYOPATHY: ICD-10-CM

## 2019-02-01 DIAGNOSIS — I10 ESSENTIAL HYPERTENSION: ICD-10-CM

## 2019-02-01 DIAGNOSIS — C64.1 CANCER OF KIDNEY, RIGHT (HCC): ICD-10-CM

## 2019-02-01 DIAGNOSIS — N40.1 BENIGN PROSTATIC HYPERPLASIA WITH URINARY OBSTRUCTION: ICD-10-CM

## 2019-02-01 DIAGNOSIS — I25.10 CORONARY ARTERY DISEASE INVOLVING NATIVE CORONARY ARTERY OF NATIVE HEART WITHOUT ANGINA PECTORIS: Primary | ICD-10-CM

## 2019-02-01 PROBLEM — I48.0 PAF (PAROXYSMAL ATRIAL FIBRILLATION): Status: ACTIVE | Noted: 2019-02-01

## 2019-02-01 PROCEDURE — 93000 ELECTROCARDIOGRAM COMPLETE: CPT | Performed by: INTERNAL MEDICINE

## 2019-02-01 PROCEDURE — 99214 OFFICE O/P EST MOD 30 MIN: CPT | Performed by: INTERNAL MEDICINE

## 2019-02-01 RX ORDER — WARFARIN SODIUM 7.5 MG/1
7.5 TABLET ORAL
COMMUNITY
End: 2021-12-15 | Stop reason: HOSPADM

## 2019-02-01 NOTE — PROGRESS NOTES
Darren Maria  [id number]  1945  73 y.o.  male    Referring Provider: Rolan Salvador MD    Reason for Follow-up Visit:  Routine follow up.  coronary artery disease Coronary artery bypass grafting 2008 , stented coronary artery       Subjective    Mild chronic exertional shortness of breath on exertion relieved with rest  No significant cough or wheezing  Going on for several months or longer    No palpitations  No associated chest pain  No significant pedal edema    No fever or chills  No significant expectoration    No hemoptysis  No presyncope or syncope     Joint pain in small, medium and large joints  Chronic low back pain        Device checks by Dr Mccarty      History of present illness:  Darren Maria is a 73 y.o. yo male with history of coronary artery disease Coronary artery bypass grafting , stented coronary artery  who presents today for   Chief Complaint   Patient presents with   • Coronary Artery Disease     6 mo f/u   .    History  Past Medical History:   Diagnosis Date   • Abdominal pain, epigastric    • Abnormal abdominal exam     ABFND, RADIOLOGICAL, ABDOMINAL AREA    • Anticoagulated on Coumadin    • Atherosclerosis of coronary artery bypass graft     ATHEROSCLEROSIS, CORONARY, ARTERY BYPASS GRFT   • Atrial fibrillation (CMS/HCC)    • Cancer of right kidney (CMS/HCC)     right sided kidney cancer   • Cardiomyopathy (CMS/HCC)    • Cardiomyopathy, primary (CMS/HCC)    • CHF (congestive heart failure) (CMS/HCC)    • Colon polyps    • Congestive heart failure (CMS/HCC)    • Coronary artery disease     History of CAD/A-Fib/Pacemaker/Defibrillator placement: pt takes Coumadin and plavix therapy as well as ASA daily   • Gastric polyp    • Gastroesophageal reflux disease without esophagitis    • History of colon polyps    • Hypercholesterolemia    • Hypertension, essential    • Melena    • Myocardial infarction (CMS/HCC)    • NSAID long-term use    • Obesity    • Pacemaker     Pacemaker  Dependant   • Platelet inhibition due to Plavix    • Presence of stent in coronary artery     Multiple coronary stenting most recently 4/2008   • Single kidney     Only 1 Kidney   • Status post surgery     s/p Polypectomy Bleed   ,   Past Surgical History:   Procedure Laterality Date   • APPENDECTOMY     • CARDIAC ABLATION     • CARDIAC CATHETERIZATION     • CARDIAC DEFIBRILLATOR PLACEMENT     • CARDIAC PACEMAKER PLACEMENT      Pacemaker Placement   • CHOLECYSTECTOMY     • COLONOSCOPY  08/27/2013    snare hep, trans tics recall 3 yr   • COLONOSCOPY  10/02/2006    few scattered diverticula   • COLONOSCOPY  07/22/2009    tubular adenoma in the ascending colon   • COLONOSCOPY  07/25/2005    several polyps in the ascending colon, one in the transverse colon   • CORONARY ARTERY BYPASS GRAFT  02/2011    2nd time   • CORONARY ARTERY BYPASS GRAFT  1990   • CORONARY STENT PLACEMENT     • ENDOSCOPY  04/14/2016    nl slant   • ENDOSCOPY  11/10/2014    clips at polypectomy bx no residual recall 1 yr   • ENDOSCOPY  06/19/2014    snare clip body recall 6 months   • ENDOSCOPY  05/19/2014    polyp stomach bx and clip   • HERNIA REPAIR      with mesh   • OTHER SURGICAL HISTORY      Defibrillator/Pacer maker exchange   • OTHER SURGICAL HISTORY      Pacemaker/Defibillation exchange 2012   • PENILE PROSTHESIS IMPLANT N/A 7/27/2018    Procedure: PENILE PROSTHESIS PLACEMENT;  Surgeon: Godwin Gupta MD;  [facility];  Service: Urology   • PERIPHERAL ARTERIAL STENT GRAFT     ,   Family History   Problem Relation Age of Onset   • Lung cancer Mother    • No Known Problems Father    • Other Neg Hx         GI Malignancies   ,   Social History     Tobacco Use   • Smoking status: Never Smoker   • Smokeless tobacco: Never Used   Substance Use Topics   • Alcohol use: No   • Drug use: No   ,     Medications  Current Outpatient Medications   Medication Sig Dispense Refill   • atorvastatin (LIPITOR) 20 MG tablet Take 20 mg by mouth  daily.     • bumetanide (BUMEX) 1 MG tablet Take 1 mg by mouth daily.     • carvedilol (COREG) 25 MG tablet Take 25 mg by mouth 2 (two) times a day.     • diltiazem CD (DILTIAZEM CD) 120 MG 24 hr capsule Take 120 mg by mouth daily.     • escitalopram (LEXAPRO) 5 MG tablet TK 1 T PO D  5   • finasteride (PROSCAR) 5 MG tablet Take 5 mg by mouth daily.     • gabapentin (NEURONTIN) 400 MG capsule Take 400 mg by mouth Daily.     • losartan (COZAAR) 50 MG tablet Take 50 mg by mouth daily.     • nitroglycerin (NITROSTAT) 0.4 MG SL tablet DISSOLVE 1 UNDER TONGUE AS NEEDED FOR CHEST PAIN, MAY REPEAT EVERY 5 MINUTES UP TO 3 DOSES CALL 911  0   • omeprazole (PriLOSEC) 40 MG capsule Take 40 mg by mouth daily.     • potassium chloride (K-DUR,KLOR-CON) 20 MEQ CR tablet Take 20 mEq by mouth 2 (Two) Times a Day.     • tamsulosin (FLOMAX) 0.4 MG capsule 24 hr capsule Take 1 capsule by mouth daily.     • traMADol-acetaminophen (ULTRACET) 37.5-325 MG per tablet as needed  1   • warfarin (COUMADIN) 7.5 MG tablet Take 7.5 mg by mouth Daily.     • zolpidem (AMBIEN) 5 MG tablet TAKE 1 TABLET BY MOUTH EVERY DAY AT BEDTIME AS NEEDED  5     No current facility-administered medications for this visit.        Allergies:  Azithromycin and Morphine and related    Review of Systems  Review of Systems   Constitution: Negative.   HENT: Negative.    Eyes: Negative.    Cardiovascular: Positive for dyspnea on exertion. Negative for chest pain, claudication, cyanosis, irregular heartbeat, leg swelling, near-syncope, orthopnea, palpitations, paroxysmal nocturnal dyspnea and syncope.   Respiratory: Negative.    Endocrine: Negative.    Hematologic/Lymphatic: Negative.    Skin: Negative.    Musculoskeletal: Positive for arthritis and joint pain.   Gastrointestinal: Negative for anorexia.   Genitourinary: Negative.    Neurological: Positive for numbness.   Psychiatric/Behavioral: Negative.        Objective     Physical Exam:  /64 (BP Location: Left  "arm, Patient Position: Sitting, Cuff Size: Adult)   Pulse 79   Ht 182.9 cm (72\")   Wt 107 kg (235 lb)   SpO2 98%   BMI 31.87 kg/m²   Physical Exam   Constitutional: He appears well-developed.   HENT:   Head: Normocephalic.   Neck: Normal carotid pulses and no JVD present. No tracheal tenderness present. Carotid bruit is not present. No tracheal deviation and no edema present.   Cardiovascular: Regular rhythm and normal pulses.   Murmur heard.   Systolic murmur is present with a grade of 2/6.  Pulmonary/Chest: Effort normal. No stridor.   Abdominal: Soft.   Neurological: He is alert. He has normal strength. No cranial nerve deficit or sensory deficit.   Skin: Skin is warm.   Psychiatric: He has a normal mood and affect. His speech is normal and behavior is normal.       Results Review:       ECG 12 Lead  Date/Time: 2/1/2019 8:40 AM  Performed by: Sukhdeep Waterman MD  Authorized by: Sukhdeep Waterman MD   Comparison: compared with previous ECG   Similar to previous ECG  Rhythm: paced  Rate: normal        · 10/16 Lexiscan  · Left ventricular ejection fraction is severely reduced (Calculated EF = 30%).  · Myocardial perfusion imaging indicates a large-sized infarct located in the anterior wall with no significant ischemia noted.                     Impressions are consistent with a study indicating uncertain risk.    Echo  10/16    · Left ventricular function is mildly decreased. Estimated EF = 45%.  · Left ventricular diastolic dysfunction (grade I) consistent with impaired relaxation.  · The following segments are akinetic: apical anterior, apical septal, apical inferior, apical lateral and apex.  · No pulmonary hypertension is present.    Assessment/Plan   Patient Active Problem List   Diagnosis   • Coronary artery disease involving native heart   • Essential hypertension   • ICD (implantable cardioverter-defibrillator) in place   • Ischemic cardiomyopathy   • Cancer of kidney (CMS/HCC)   • BPH (benign prostatic " hypertrophy) with urinary obstruction   • Impotence due to erectile dysfunction   • Benign prostatic hyperplasia with lower urinary tract symptoms   • Cancer of kidney, right (CMS/HCC)   • Erectile dysfunction   • AF (paroxysmal atrial fibrillation) (CMS/[facility])       Plan      Orders Placed This Encounter   Procedures   • ECG 12 Lead     This order was created via procedure documentation   • Adult Transthoracic Echo Complete W/ Cont if Necessary Per Protocol     Standing Status:   Future     Standing Expiration Date:   2/1/2020     Order Specific Question:   Reason for exam?     Answer:   Dyspnea       Weigh yourself frequently, at least weekly, preferably daily, call me if more than 2 pounds a day or 5 pounds a week weight gain.  Flexible diuretic dosing    Monitor cardiac rhythm device function regularly per established schedule or PRN     ____________________________________________________________________________________________________________________________________________  Health maintenance and recommendations      Low salt/ HTN/ Heart healthy carbohydrate restricted cardiac diet   The patient is advised to reduce or avoid caffeine or other cardiac stimulants.     Low vitamin K diet.  knowing what to eat, tips what foods to avoid. Printed dietary instructions with food items and Vitamin K content with websites provided.  Target INR 2-3      The patient was advised that NSAID-type medications        Monitor for any signs of bleeding including red or dark stools. Fall precautions.        Advised staying uptodate with immunizations per established standard guidelines.      Offered to give patient  a copy      Questions were encouraged, asked and answered to the patient's  understanding and satisfaction. Questions if any regarding current medications and side effects, need for refills and importance of compliance to medications stressed.    Reviewed available prior notes, consults, prior visits, laboratory  findings, radiology and cardiology relevant reports. Updated chart as applicable. I have reviewed the patient's medical history in detail and updated the computerized patient record as relevant.      Updated patient regarding any new or relevant abnormalities on review of records or any new findings on physical exam. Mentioned to patient about purpose of visit and desirable health short and long term goals and objectives.    Primary to monitor CBC CMP Lipid panel and TSH as applicable    ___________________________________________________________________________________________________________________________________________           Return in about 6 months (around 8/1/2019).

## 2019-02-06 ENCOUNTER — TRANSCRIBE ORDERS (OUTPATIENT)
Dept: LAB | Facility: HOSPITAL | Age: 74
End: 2019-02-06

## 2019-02-06 ENCOUNTER — HOSPITAL ENCOUNTER (OUTPATIENT)
Dept: GENERAL RADIOLOGY | Facility: HOSPITAL | Age: 74
Discharge: HOME | End: 2019-02-06
Admitting: PHYSICIAN ASSISTANT

## 2019-02-06 DIAGNOSIS — R06.09 OTHER FORM OF DYSPNEA: Primary | ICD-10-CM

## 2019-02-06 PROCEDURE — 71046 X-RAY EXAM CHEST 2 VIEWS: CPT

## 2019-02-13 ENCOUNTER — HOSPITAL ENCOUNTER (OUTPATIENT)
Dept: CARDIOLOGY | Facility: HOSPITAL | Age: 74
Discharge: HOME | End: 2019-02-13
Attending: INTERNAL MEDICINE | Admitting: INTERNAL MEDICINE

## 2019-02-13 VITALS
DIASTOLIC BLOOD PRESSURE: 73 MMHG | WEIGHT: 235 LBS | BODY MASS INDEX: 31.83 KG/M2 | HEIGHT: 72 IN | SYSTOLIC BLOOD PRESSURE: 135 MMHG

## 2019-02-13 DIAGNOSIS — R06.09 DOE (DYSPNEA ON EXERTION): ICD-10-CM

## 2019-02-13 PROCEDURE — 25010000002 PERFLUTREN 6.52 MG/ML SUSPENSION: Performed by: INTERNAL MEDICINE

## 2019-02-13 PROCEDURE — 93306 TTE W/DOPPLER COMPLETE: CPT

## 2019-02-13 PROCEDURE — 93306 TTE W/DOPPLER COMPLETE: CPT | Performed by: INTERNAL MEDICINE

## 2019-02-13 RX ADMIN — PERFLUTREN 9.78 MG: 6.52 INJECTION, SUSPENSION INTRAVENOUS at 10:53

## 2019-02-14 LAB
BH CV ECHO MEAS - AO MAX PG (FULL): -0.59 MMHG
BH CV ECHO MEAS - AO MAX PG: 3.8 MMHG
BH CV ECHO MEAS - AO MEAN PG (FULL): 0 MMHG
BH CV ECHO MEAS - AO MEAN PG: 3 MMHG
BH CV ECHO MEAS - AO ROOT AREA (BSA CORRECTED): 1.7
BH CV ECHO MEAS - AO ROOT AREA: 11.3 CM^2
BH CV ECHO MEAS - AO ROOT DIAM: 3.8 CM
BH CV ECHO MEAS - AO V2 MAX: 97.7 CM/SEC
BH CV ECHO MEAS - AO V2 MEAN: 77.5 CM/SEC
BH CV ECHO MEAS - AO V2 VTI: 22.3 CM
BH CV ECHO MEAS - AVA(I,A): 3.6 CM^2
BH CV ECHO MEAS - AVA(I,D): 3.6 CM^2
BH CV ECHO MEAS - AVA(V,A): 4.1 CM^2
BH CV ECHO MEAS - AVA(V,D): 4.1 CM^2
BH CV ECHO MEAS - BSA(HAYCOCK): 2.4 M^2
BH CV ECHO MEAS - BSA: 2.3 M^2
BH CV ECHO MEAS - BZI_BMI: 31.9 KILOGRAMS/M^2
BH CV ECHO MEAS - BZI_METRIC_HEIGHT: 182.9 CM
BH CV ECHO MEAS - BZI_METRIC_WEIGHT: 106.6 KG
BH CV ECHO MEAS - EDV(CUBED): 234.9 ML
BH CV ECHO MEAS - EDV(MOD-SP4): 173 ML
BH CV ECHO MEAS - EDV(TEICH): 191.9 ML
BH CV ECHO MEAS - EF(CUBED): 56.9 %
BH CV ECHO MEAS - EF(MOD-SP4): 31.8 %
BH CV ECHO MEAS - EF(TEICH): 47.7 %
BH CV ECHO MEAS - ESV(CUBED): 101.2 ML
BH CV ECHO MEAS - ESV(MOD-SP4): 118 ML
BH CV ECHO MEAS - ESV(TEICH): 100.3 ML
BH CV ECHO MEAS - FS: 24.5 %
BH CV ECHO MEAS - IVS/LVPW: 1.1
BH CV ECHO MEAS - IVSD: 1.3 CM
BH CV ECHO MEAS - LA DIMENSION: 6 CM
BH CV ECHO MEAS - LA/AO: 1.6
BH CV ECHO MEAS - LAT PEAK E' VEL: 16.3 CM/SEC
BH CV ECHO MEAS - LV DIASTOLIC VOL/BSA (35-75): 75.8 ML/M^2
BH CV ECHO MEAS - LV MASS(C)D: 336.2 GRAMS
BH CV ECHO MEAS - LV MASS(C)DI: 147.4 GRAMS/M^2
BH CV ECHO MEAS - LV MAX PG: 4.4 MMHG
BH CV ECHO MEAS - LV MEAN PG: 3 MMHG
BH CV ECHO MEAS - LV SYSTOLIC VOL/BSA (12-30): 51.7 ML/M^2
BH CV ECHO MEAS - LV V1 MAX: 105 CM/SEC
BH CV ECHO MEAS - LV V1 MEAN: 77.7 CM/SEC
BH CV ECHO MEAS - LV V1 VTI: 21.3 CM
BH CV ECHO MEAS - LVIDD: 6.2 CM
BH CV ECHO MEAS - LVIDS: 4.7 CM
BH CV ECHO MEAS - LVLD AP4: 8.9 CM
BH CV ECHO MEAS - LVLS AP4: 8.4 CM
BH CV ECHO MEAS - LVOT AREA (M): 3.8 CM^2
BH CV ECHO MEAS - LVOT AREA: 3.8 CM^2
BH CV ECHO MEAS - LVOT DIAM: 2.2 CM
BH CV ECHO MEAS - LVPWD: 1.2 CM
BH CV ECHO MEAS - MED PEAK E' VEL: 4.57 CM/SEC
BH CV ECHO MEAS - MR ALIAS VEL: 30.8 CM/SEC
BH CV ECHO MEAS - MR ERO: 0.04 CM^2
BH CV ECHO MEAS - MR FLOW RATE: 17.4 CM^3/SEC
BH CV ECHO MEAS - MR MAX PG: 85.4 MMHG
BH CV ECHO MEAS - MR MAX VEL: 462 CM/SEC
BH CV ECHO MEAS - MR MEAN PG: 57 MMHG
BH CV ECHO MEAS - MR MEAN VEL: 352 CM/SEC
BH CV ECHO MEAS - MR PISA RADIUS: 0.3 CM
BH CV ECHO MEAS - MR PISA: 0.57 CM^2
BH CV ECHO MEAS - MR VOLUME: 6.3 ML
BH CV ECHO MEAS - MR VTI: 167 CM
BH CV ECHO MEAS - MV A MAX VEL: 24.4 CM/SEC
BH CV ECHO MEAS - MV DEC TIME: 0.16 SEC
BH CV ECHO MEAS - MV E MAX VEL: 122 CM/SEC
BH CV ECHO MEAS - MV E/A: 5
BH CV ECHO MEAS - PA MAX PG: 2.7 MMHG
BH CV ECHO MEAS - PA V2 MAX: 81.5 CM/SEC
BH CV ECHO MEAS - PI END-D VEL: 136 CM/SEC
BH CV ECHO MEAS - RAP SYSTOLE: 5 MMHG
BH CV ECHO MEAS - RVSP: 40 MMHG
BH CV ECHO MEAS - SI(AO): 110.9 ML/M^2
BH CV ECHO MEAS - SI(CUBED): 58.6 ML/M^2
BH CV ECHO MEAS - SI(LVOT): 35.5 ML/M^2
BH CV ECHO MEAS - SI(MOD-SP4): 24.1 ML/M^2
BH CV ECHO MEAS - SI(TEICH): 40.1 ML/M^2
BH CV ECHO MEAS - SV(AO): 252.9 ML
BH CV ECHO MEAS - SV(CUBED): 133.7 ML
BH CV ECHO MEAS - SV(LVOT): 81 ML
BH CV ECHO MEAS - SV(MOD-SP4): 55 ML
BH CV ECHO MEAS - SV(TEICH): 91.5 ML
BH CV ECHO MEAS - TR MAX VEL: 296 CM/SEC
BH CV ECHO MEASUREMENTS AVERAGE E/E' RATIO: 11.69
LEFT ATRIUM VOLUME INDEX: 78.1 ML/M2
LEFT ATRIUM VOLUME: 178 CM3
LV EF 2D ECHO EST: 30 %
MAXIMAL PREDICTED HEART RATE: 149 BPM
STRESS TARGET HR: 127 BPM

## 2019-02-14 RX ORDER — OMEPRAZOLE 40 MG/1
40 CAPSULE, DELAYED RELEASE ORAL DAILY
Qty: 90 CAPSULE | Refills: 3 | OUTPATIENT
Start: 2019-02-14

## 2019-10-03 DIAGNOSIS — N40.1 BENIGN PROSTATIC HYPERPLASIA WITH LOWER URINARY TRACT SYMPTOMS, SYMPTOM DETAILS UNSPECIFIED: Primary | ICD-10-CM

## 2019-10-08 NOTE — PROGRESS NOTES
Subjective    Mr. Maria is 74 y.o. male    Chief Complaint: BPH    History of Present Illness  74-year-old male established patient here to follow-up enlarged prostate.  Previously treated by Dr. Gupta who placed a penile prosthesis last year.  Stable non-bothersome LUTS on finasteride and tamsulosin.  PSA this year stable 3.09.  Dr. Kerns performed a right nephrectomy nearly 20 years ago.  ESTELA since    I spent time today reviewing and summarizing old records last urology clinic visit with Dr. Gupta 10/2/2018.         Lab Results   Component Value Date    PSA 3.090 10/09/2019    PSA 2.440 03/20/2018    PSA 2.220 03/13/2017       The following portions of the patient's history were reviewed and updated as appropriate: allergies, current medications, past family history, past medical history, past social history, past surgical history and problem list.    Review of Systems   Constitutional: Negative for chills and fever.   Gastrointestinal: Negative for abdominal pain, anal bleeding and blood in stool.   Genitourinary: Negative for dysuria, frequency, hematuria and urgency.   All other systems reviewed and are negative.        Current Outpatient Medications:   •  atorvastatin (LIPITOR) 20 MG tablet, Take 20 mg by mouth daily., Disp: , Rfl:   •  bumetanide (BUMEX) 1 MG tablet, Take 1 mg by mouth daily., Disp: , Rfl:   •  carvedilol (COREG) 25 MG tablet, Take 25 mg by mouth 2 (two) times a day., Disp: , Rfl:   •  diltiazem CD (DILTIAZEM CD) 120 MG 24 hr capsule, Take 120 mg by mouth daily., Disp: , Rfl:   •  escitalopram (LEXAPRO) 5 MG tablet, TK 1 T PO D, Disp: , Rfl: 5  •  finasteride (PROSCAR) 5 MG tablet, Take 5 mg by mouth daily., Disp: , Rfl:   •  gabapentin (NEURONTIN) 400 MG capsule, Take 400 mg by mouth Daily., Disp: , Rfl:   •  losartan (COZAAR) 50 MG tablet, Take 50 mg by mouth daily., Disp: , Rfl:   •  nitroglycerin (NITROSTAT) 0.4 MG SL tablet, DISSOLVE 1 UNDER TONGUE AS NEEDED FOR CHEST PAIN, MAY  REPEAT EVERY 5 MINUTES UP TO 3 DOSES CALL 911, Disp: , Rfl: 0  •  omeprazole (PriLOSEC) 40 MG capsule, Take 40 mg by mouth daily., Disp: , Rfl:   •  potassium chloride (K-DUR,KLOR-CON) 20 MEQ CR tablet, Take 20 mEq by mouth 2 (Two) Times a Day., Disp: , Rfl:   •  tamsulosin (FLOMAX) 0.4 MG capsule 24 hr capsule, Take 1 capsule by mouth daily., Disp: , Rfl:   •  traMADol-acetaminophen (ULTRACET) 37.5-325 MG per tablet, as needed, Disp: , Rfl: 1  •  warfarin (COUMADIN) 7.5 MG tablet, Take 7.5 mg by mouth Daily., Disp: , Rfl:   •  zolpidem (AMBIEN) 5 MG tablet, TAKE 1 TABLET BY MOUTH EVERY DAY AT BEDTIME AS NEEDED, Disp: , Rfl: 5    Past Medical History:   Diagnosis Date   • Abdominal pain, epigastric    • Abnormal abdominal exam     ABFND, RADIOLOGICAL, ABDOMINAL AREA    • Anticoagulated on Coumadin    • Atherosclerosis of coronary artery bypass graft     ATHEROSCLEROSIS, CORONARY, ARTERY BYPASS GRFT   • Atrial fibrillation (CMS/HCC)    • Cancer of right kidney (CMS/HCC)     right sided kidney cancer   • Cardiomyopathy (CMS/HCC)    • Cardiomyopathy, primary (CMS/HCC)    • CHF (congestive heart failure) (CMS/HCC)    • Colon polyps    • Congestive heart failure (CMS/HCC)    • Coronary artery disease     History of CAD/A-Fib/Pacemaker/Defibrillator placement: pt takes Coumadin and plavix therapy as well as ASA daily   • Gastric polyp    • Gastroesophageal reflux disease without esophagitis    • History of colon polyps    • Hypercholesterolemia    • Hypertension, essential    • Melena    • Myocardial infarction (CMS/HCC)    • NSAID long-term use    • Obesity    • Pacemaker     Pacemaker Dependant   • Platelet inhibition due to Plavix    • Presence of stent in coronary artery     Multiple coronary stenting most recently 4/2008   • Single kidney     Only 1 Kidney   • Status post surgery     s/p Polypectomy Bleed       Past Surgical History:   Procedure Laterality Date   • APPENDECTOMY     • CARDIAC ABLATION     • CARDIAC  CATHETERIZATION     • CARDIAC DEFIBRILLATOR PLACEMENT     • CARDIAC PACEMAKER PLACEMENT      Pacemaker Placement   • CHOLECYSTECTOMY     • COLONOSCOPY  08/27/2013    snare hep, trans tics recall 3 yr   • COLONOSCOPY  10/02/2006    few scattered diverticula   • COLONOSCOPY  07/22/2009    tubular adenoma in the ascending colon   • COLONOSCOPY  07/25/2005    several polyps in the ascending colon, one in the transverse colon   • CORONARY ARTERY BYPASS GRAFT  02/2011    2nd time   • CORONARY ARTERY BYPASS GRAFT  1990   • CORONARY STENT PLACEMENT     • ENDOSCOPY  04/14/2016    nl slant   • ENDOSCOPY  11/10/2014    clips at polypectomy bx no residual recall 1 yr   • ENDOSCOPY  06/19/2014    snare clip body recall 6 months   • ENDOSCOPY  05/19/2014    polyp stomach bx and clip   • HERNIA REPAIR      with mesh   • OTHER SURGICAL HISTORY      Defibrillator/Pacer maker exchange   • OTHER SURGICAL HISTORY      Pacemaker/Defibillation exchange 2012   • PENILE PROSTHESIS IMPLANT N/A 7/27/2018    Procedure: PENILE PROSTHESIS PLACEMENT;  Surgeon: Godwin Gupta MD;  Location: [facility] OR;  Service: Urology   • PERIPHERAL ARTERIAL STENT GRAFT         Social History     Socioeconomic History   • Marital status:      Spouse name: Not on file   • Number of children: Not on file   • Years of education: Not on file   • Highest education level: Not on file   Tobacco Use   • Smoking status: Never Smoker   • Smokeless tobacco: Never Used   Substance and Sexual Activity   • Alcohol use: No   • Drug use: No   • Sexual activity: Defer       Family History   Problem Relation Age of Onset   • Lung cancer Mother    • No Known Problems Father    • Other Neg Hx         GI Malignancies       Objective    There were no vitals taken for this visit.    Physical Exam  Constitutional: Well nourished, Well developed; No apparent distress.  His vital signs are reviewed  Psychiatric: Appropriate affect; Alert and oriented  Eyes:  Unremarkable  Musculoskeletal: Normal gait and station  GI: Abdomen is soft, non-tender  Respiratory: No distress; Unlabored movement; No accessory musculature needed with symmetric movements  Skin: No pallor or diaphoresis  ; Penis and testicles are normal; Prostate 40 mL without nodule      Results for orders placed or performed during the hospital encounter of 02/13/19   Adult Transthoracic Echo Complete W/ Cont if Necessary Per Protocol   Result Value Ref Range    BSA 2.3 m^2    IVSd 1.3 cm    LVIDd 6.2 cm    LVIDs 4.7 cm    LVPWd 1.2 cm    IVS/LVPW 1.1     FS 24.5 %    EDV(Teich) 191.9 ml    ESV(Teich) 100.3 ml    EF(Teich) 47.7 %    EDV(cubed) 234.9 ml    ESV(cubed) 101.2 ml    EF(cubed) 56.9 %    LV mass(C)d 336.2 grams    LV mass(C)dI 147.4 grams/m^2    SV(Teich) 91.5 ml    SI(Teich) 40.1 ml/m^2    SV(cubed) 133.7 ml    SI(cubed) 58.6 ml/m^2    Ao root diam 3.8 cm    Ao root area 11.3 cm^2    LA dimension 6.0 cm    LA/Ao 1.6     LVOT diam 2.2 cm    LVOT area 3.8 cm^2    LVOT area(traced) 3.8 cm^2    LVLd ap4 8.9 cm    EDV(MOD-sp4) 173.0 ml    LVLs ap4 8.4 cm    ESV(MOD-sp4) 118.0 ml    EF(MOD-sp4) 31.8 %    SV(MOD-sp4) 55.0 ml    SI(MOD-sp4) 24.1 ml/m^2    Ao root area (BSA corrected) 1.7     LV Vasquez Vol (BSA corrected) 75.8 ml/m^2    LV Sys Vol (BSA corrected) 51.7 ml/m^2    MV E max moe 122.0 cm/sec    MV A max moe 24.4 cm/sec    MV E/A 5.0     MV dec time 0.16 sec    Ao pk moe 97.7 cm/sec    Ao max PG 3.8 mmHg    Ao max PG (full) -0.59 mmHg    Ao V2 mean 77.5 cm/sec    Ao mean PG 3.0 mmHg    Ao mean PG (full) 0 mmHg    Ao V2 VTI 22.3 cm    JUANA(I,A) 3.6 cm^2    JUANA(I,D) 3.6 cm^2    JUANA(V,A) 4.1 cm^2    JUANA(V,D) 4.1 cm^2    LV V1 max PG 4.4 mmHg    LV V1 mean PG 3.0 mmHg    LV V1 max 105.0 cm/sec    LV V1 mean 77.7 cm/sec    LV V1 VTI 21.3 cm    MR max moe 462.0 cm/sec    MR max PG 85.4 mmHg    MR mean moe 352.0 cm/sec    MR mean PG 57.0 mmHg    MR .0 cm    MR PISA 0.57 cm^2    MR flow rate 17.4  cm^3/sec    MR ERO 0.04 cm^2    MR volume 6.3 ml    MR PISA radius 0.3 cm    MR alias bo 30.8 cm/sec    SV(Ao) 252.9 ml    SI(Ao) 110.9 ml/m^2    SV(LVOT) 81.0 ml    SI(LVOT) 35.5 ml/m^2    PA V2 max 81.5 cm/sec    PA max PG 2.7 mmHg    PI end-d bo 136.0 cm/sec    TR max bo 296.0 cm/sec    RVSP(TR) 40.0 mmHg    RAP systole 5.0 mmHg     CV ECHO HORACIO - BZI_BMI 31.9 kilograms/m^2     CV ECHO HORACIO - BSA(HAYCOCK) 2.4 m^2     CV ECHO HORACIO - BZI_METRIC_WEIGHT 106.6 kg     CV ECHO HORACIO - BZI_METRIC_HEIGHT 182.9 cm    Target HR (85%) 127 bpm    Max. Pred. HR (100%) 149 bpm    LA volume 178.0 cm3    LA Volume Index 78.1 mL/m2    Avg E/e' ratio 11.69     Lat Peak E' Bo 16.3 cm/sec    Med Peak E' Bo 4.57 cm/sec    Echo EF Estimated 30 %     Patient's There is no height or weight on file to calculate BMI. BMI is above normal parameters. Recommendations include: educational material.    International Prostate Symptom Score  The following is posted based on patient questionnaire answers:  0 - not at all    1-7 mild symptoms  1- Less than one time in five  8-19 moderate symptoms  2 -Less than half the time  20-35 severe symptoms  3 - About half the time  4 - More than half the time  5 - Almost always     For following sections:  Incomplete Emptying: - How often have you had the sensation  of not emptying your bladder completely after you finished urinating?  0  Frequency: -How often have you had to urinate again less than   two hours after you finished urinating?      0  Intermittency: -How often have you found you stopped and started again  Several times when you urinate?       0  Urgency: -How often do you find it difficult to postpone urination?             00  Weak stream: - How often have you had a weak urinary stream?             2  Straining: - How often have you had to push or strain to begin  Urination?          0  Sleeping: -How many times did you most typically get up to urinate   From the time you went to  bed at night until the time you got up in the   1  Morning          Total `  3    Quality of Life  How would you feel if you had to live with your urinary condition the way   1  It is now, no better, no worse for the rest of your life?    Where: 0=delighted; 1= pleased, 2= mostly satisfied, 3= mixed, 4 = mostly  Dissatisfied, 5= Unhappy, 6 = terrible    Assessment and Plan    Diagnoses and all orders for this visit:    Benign prostatic hyperplasia with lower urinary tract symptoms, symptom details unspecified  -     POC Urinalysis Dipstick, Multipro  -     PSA DIAGNOSTIC; Future    Essential hypertension    BPH (benign prostatic hypertrophy) with urinary obstruction    Coronary artery disease involving native coronary artery of native heart without angina pectoris    History of renal cell cancer      Stable LUTS on combination therapy.  Continue finasteride and tamsulosin.  Normal PSA for his age with normal exam.  PSA adjusted for finasteride effect to 6 so we will see back in 1 year with PSA or sooner as needed.  We will plan kidney ultrasound surveillance next year.

## 2019-10-09 DIAGNOSIS — N40.1 BENIGN PROSTATIC HYPERPLASIA WITH LOWER URINARY TRACT SYMPTOMS, SYMPTOM DETAILS UNSPECIFIED: ICD-10-CM

## 2019-10-09 LAB — PSA SERPL-MCNC: 3.09 NG/ML (ref 0–4)

## 2019-10-14 ENCOUNTER — OFFICE VISIT (OUTPATIENT)
Dept: UROLOGY | Facility: CLINIC | Age: 74
End: 2019-10-14

## 2019-10-14 VITALS — BODY MASS INDEX: 32.76 KG/M2 | WEIGHT: 234 LBS | TEMPERATURE: 98 F | HEIGHT: 71 IN

## 2019-10-14 DIAGNOSIS — Z85.528 HISTORY OF RENAL CELL CANCER: ICD-10-CM

## 2019-10-14 DIAGNOSIS — I10 ESSENTIAL HYPERTENSION: ICD-10-CM

## 2019-10-14 DIAGNOSIS — N13.8 BENIGN PROSTATIC HYPERPLASIA WITH URINARY OBSTRUCTION: ICD-10-CM

## 2019-10-14 DIAGNOSIS — N40.1 BENIGN PROSTATIC HYPERPLASIA WITH URINARY OBSTRUCTION: ICD-10-CM

## 2019-10-14 DIAGNOSIS — I25.10 CORONARY ARTERY DISEASE INVOLVING NATIVE CORONARY ARTERY OF NATIVE HEART WITHOUT ANGINA PECTORIS: ICD-10-CM

## 2019-10-14 DIAGNOSIS — N40.1 BENIGN PROSTATIC HYPERPLASIA WITH LOWER URINARY TRACT SYMPTOMS, SYMPTOM DETAILS UNSPECIFIED: Primary | ICD-10-CM

## 2019-10-14 LAB
BILIRUB BLD-MCNC: NEGATIVE MG/DL
CLARITY, POC: CLEAR
COLOR UR: YELLOW
GLUCOSE UR STRIP-MCNC: NEGATIVE MG/DL
KETONES UR QL: NEGATIVE
LEUKOCYTE EST, POC: NEGATIVE
NITRITE UR-MCNC: NEGATIVE MG/ML
PH UR: 6 [PH] (ref 5–8)
PROT UR STRIP-MCNC: NEGATIVE MG/DL
RBC # UR STRIP: NEGATIVE /UL
SP GR UR: 1.02 (ref 1–1.03)
UROBILINOGEN UR QL: NORMAL

## 2019-10-14 PROCEDURE — 81003 URINALYSIS AUTO W/O SCOPE: CPT | Performed by: UROLOGY

## 2019-10-14 PROCEDURE — 99214 OFFICE O/P EST MOD 30 MIN: CPT | Performed by: UROLOGY

## 2019-10-28 ENCOUNTER — TRANSCRIBE ORDERS (OUTPATIENT)
Dept: ADMINISTRATIVE | Facility: HOSPITAL | Age: 74
End: 2019-10-28

## 2019-10-28 DIAGNOSIS — I50.22 CHRONIC SYSTOLIC (CONGESTIVE) HEART FAILURE (HCC): Primary | ICD-10-CM

## 2019-11-13 ENCOUNTER — LAB (OUTPATIENT)
Dept: LAB | Facility: HOSPITAL | Age: 74
End: 2019-11-13

## 2019-11-13 ENCOUNTER — TRANSCRIBE ORDERS (OUTPATIENT)
Dept: ADMINISTRATIVE | Facility: HOSPITAL | Age: 74
End: 2019-11-13

## 2019-11-13 DIAGNOSIS — I48.19 PERSISTENT ATRIAL FIBRILLATION (HCC): ICD-10-CM

## 2019-11-13 DIAGNOSIS — I48.19 PERSISTENT ATRIAL FIBRILLATION (HCC): Primary | ICD-10-CM

## 2019-11-13 LAB
ANION GAP SERPL CALCULATED.3IONS-SCNC: 12.5 MMOL/L (ref 5–15)
BUN BLD-MCNC: 21 MG/DL (ref 8–23)
BUN/CREAT SERPL: 18.6 (ref 7–25)
CALCIUM SPEC-SCNC: 8.9 MG/DL (ref 8.6–10.5)
CHLORIDE SERPL-SCNC: 105 MMOL/L (ref 98–107)
CO2 SERPL-SCNC: 27.5 MMOL/L (ref 22–29)
CREAT BLD-MCNC: 1.13 MG/DL (ref 0.76–1.27)
DEPRECATED RDW RBC AUTO: 47.9 FL (ref 37–54)
ERYTHROCYTE [DISTWIDTH] IN BLOOD BY AUTOMATED COUNT: 14 % (ref 12.3–15.4)
GFR SERPL CREATININE-BSD FRML MDRD: 64 ML/MIN/1.73
GLUCOSE BLD-MCNC: 121 MG/DL (ref 65–99)
HCT VFR BLD AUTO: 38.1 % (ref 37.5–51)
HGB BLD-MCNC: 12.9 G/DL (ref 13–17.7)
INR PPP: 2.05 (ref 0.91–1.09)
MCH RBC QN AUTO: 31.2 PG (ref 26.6–33)
MCHC RBC AUTO-ENTMCNC: 33.9 G/DL (ref 31.5–35.7)
MCV RBC AUTO: 92.3 FL (ref 79–97)
PLATELET # BLD AUTO: 183 10*3/MM3 (ref 140–450)
PMV BLD AUTO: 10.4 FL (ref 6–12)
POTASSIUM BLD-SCNC: 4.5 MMOL/L (ref 3.5–5.2)
PROTHROMBIN TIME: 23.9 SECONDS (ref 11.9–14.6)
RBC # BLD AUTO: 4.13 10*6/MM3 (ref 4.14–5.8)
SODIUM BLD-SCNC: 145 MMOL/L (ref 136–145)
WBC NRBC COR # BLD: 6.69 10*3/MM3 (ref 3.4–10.8)

## 2019-11-13 PROCEDURE — 80048 BASIC METABOLIC PNL TOTAL CA: CPT | Performed by: INTERNAL MEDICINE

## 2019-11-13 PROCEDURE — 36415 COLL VENOUS BLD VENIPUNCTURE: CPT | Performed by: INTERNAL MEDICINE

## 2019-11-13 PROCEDURE — 85027 COMPLETE CBC AUTOMATED: CPT | Performed by: INTERNAL MEDICINE

## 2019-11-13 PROCEDURE — 85610 PROTHROMBIN TIME: CPT | Performed by: INTERNAL MEDICINE

## 2020-09-14 ENCOUNTER — RESULTS ENCOUNTER (OUTPATIENT)
Dept: UROLOGY | Facility: CLINIC | Age: 75
End: 2020-09-14

## 2020-09-14 DIAGNOSIS — N40.1 BENIGN PROSTATIC HYPERPLASIA WITH LOWER URINARY TRACT SYMPTOMS, SYMPTOM DETAILS UNSPECIFIED: ICD-10-CM

## 2020-10-08 LAB — PSA SERPL-MCNC: 3.3 NG/ML (ref 0–4)

## 2020-11-11 ENCOUNTER — APPOINTMENT (OUTPATIENT)
Dept: ULTRASOUND IMAGING | Facility: HOSPITAL | Age: 75
End: 2020-11-11

## 2020-11-19 ENCOUNTER — TELEPHONE (OUTPATIENT)
Dept: UROLOGY | Facility: CLINIC | Age: 75
End: 2020-11-19

## 2020-11-19 DIAGNOSIS — Z85.528 HISTORY OF RENAL CELL CANCER: Primary | ICD-10-CM

## 2020-11-19 NOTE — TELEPHONE ENCOUNTER
SANJUANA CALLED AND PT IS WANTING HIS US DONE THERE. COULD I PLEASE GET AN ORDER FOR OUTSIDE [facility] FOR THE ULTRASOUND AND ILL FAX IT. THANKS

## 2020-12-13 NOTE — PROGRESS NOTES
Subjective    Mr. Maria is 75 y.o. male    Chief Complaint: BPH    History of Present Illness    75-year-old male established patient here to follow-up enlarged prostate, history of nephrectomy, and ED.  He has new problem of malfunctioning penile implant placed 2018 by Dr. Gupta.  He has difficulty inflating the device and finding his pump. Stable non-bothersome LUTS on finasteride and tamsulosin.  PSA this year stable 3.3 from 3.09 last year.  Dr. Kerns performed a right nephrectomy nearly 20 years ago.  ESTELA since.  Renal ultrasound done recently in [facility] shows normal left kidney, no hydronephrosis or mass.    I independently visualized and reviewed the patient's prior imaging studies today in clinic and discussed the imaging findings with the patient.         Lab Results   Component Value Date    PSA 3.300 10/08/2020    PSA 3.090 10/09/2019    PSA 2.440 03/20/2018       The following portions of the patient's history were reviewed and updated as appropriate: allergies, current medications, past family history, past medical history, past social history, past surgical history and problem list.    Review of Systems   Constitutional: Negative for chills and fever.   Gastrointestinal: Negative for abdominal pain, anal bleeding and blood in stool.   Genitourinary: Negative for dysuria, frequency, hematuria and urgency.   All other systems reviewed and are negative.        Current Outpatient Medications:   •  atorvastatin (LIPITOR) 20 MG tablet, Take 20 mg by mouth daily., Disp: , Rfl:   •  bumetanide (BUMEX) 1 MG tablet, Take 1 mg by mouth daily., Disp: , Rfl:   •  carvedilol (COREG) 25 MG tablet, Take 25 mg by mouth 2 (two) times a day., Disp: , Rfl:   •  diltiazem CD (DILTIAZEM CD) 120 MG 24 hr capsule, Take 120 mg by mouth daily., Disp: , Rfl:   •  escitalopram (LEXAPRO) 5 MG tablet, TK 1 T PO D, Disp: , Rfl: 5  •  finasteride (PROSCAR) 5 MG tablet, Take 5 mg by mouth daily., Disp: , Rfl:   •  gabapentin  (NEURONTIN) 400 MG capsule, Take 400 mg by mouth Daily., Disp: , Rfl:   •  losartan (COZAAR) 50 MG tablet, Take 50 mg by mouth daily., Disp: , Rfl:   •  nitroglycerin (NITROSTAT) 0.4 MG SL tablet, DISSOLVE 1 UNDER TONGUE AS NEEDED FOR CHEST PAIN, MAY REPEAT EVERY 5 MINUTES UP TO 3 DOSES CALL 911, Disp: , Rfl: 0  •  omeprazole (PriLOSEC) 40 MG capsule, Take 40 mg by mouth daily., Disp: , Rfl:   •  potassium chloride (K-DUR,KLOR-CON) 20 MEQ CR tablet, Take 20 mEq by mouth 2 (Two) Times a Day., Disp: , Rfl:   •  tamsulosin (FLOMAX) 0.4 MG capsule 24 hr capsule, Take 1 capsule by mouth daily., Disp: , Rfl:   •  traMADol-acetaminophen (ULTRACET) 37.5-325 MG per tablet, as needed, Disp: , Rfl: 1  •  warfarin (COUMADIN) 7.5 MG tablet, Take 7.5 mg by mouth Daily., Disp: , Rfl:   •  zolpidem (AMBIEN) 5 MG tablet, TAKE 1 TABLET BY MOUTH EVERY DAY AT BEDTIME AS NEEDED, Disp: , Rfl: 5    Past Medical History:   Diagnosis Date   • Abdominal pain, epigastric    • Abnormal abdominal exam     ABFND, RADIOLOGICAL, ABDOMINAL AREA    • Anticoagulated on Coumadin    • Atherosclerosis of coronary artery bypass graft     ATHEROSCLEROSIS, CORONARY, ARTERY BYPASS GRFT   • Atrial fibrillation (CMS/HCC)    • Cancer of right kidney (CMS/HCC)     right sided kidney cancer   • Cardiomyopathy (CMS/HCC)    • Cardiomyopathy, primary (CMS/HCC)    • CHF (congestive heart failure) (CMS/HCC)    • Colon polyps    • Congestive heart failure (CMS/HCC)    • Coronary artery disease     History of CAD/A-Fib/Pacemaker/Defibrillator placement: pt takes Coumadin and plavix therapy as well as ASA daily   • Gastric polyp    • Gastroesophageal reflux disease without esophagitis    • History of colon polyps    • Hypercholesterolemia    • Hypertension, essential    • Melena    • Myocardial infarction (CMS/HCC)    • NSAID long-term use    • Obesity    • Pacemaker     Pacemaker Dependant   • Platelet inhibition due to Plavix    • Presence of stent in coronary artery      Multiple coronary stenting most recently 4/2008   • Single kidney     Only 1 Kidney   • Status post surgery     s/p Polypectomy Bleed       Past Surgical History:   Procedure Laterality Date   • APPENDECTOMY     • CARDIAC ABLATION     • CARDIAC CATHETERIZATION     • CARDIAC DEFIBRILLATOR PLACEMENT     • CARDIAC PACEMAKER PLACEMENT      Pacemaker Placement   • CHOLECYSTECTOMY     • COLONOSCOPY  08/27/2013    snare hep, trans tics recall 3 yr   • COLONOSCOPY  10/02/2006    few scattered diverticula   • COLONOSCOPY  07/22/2009    tubular adenoma in the ascending colon   • COLONOSCOPY  07/25/2005    several polyps in the ascending colon, one in the transverse colon   • CORONARY ARTERY BYPASS GRAFT  02/2011    2nd time   • CORONARY ARTERY BYPASS GRAFT  1990   • CORONARY STENT PLACEMENT     • ENDOSCOPY  04/14/2016    nl slant   • ENDOSCOPY  11/10/2014    clips at polypectomy bx no residual recall 1 yr   • ENDOSCOPY  06/19/2014    snare clip body recall 6 months   • ENDOSCOPY  05/19/2014    polyp stomach bx and clip   • HERNIA REPAIR      with mesh   • OTHER SURGICAL HISTORY      Defibrillator/Pacer maker exchange   • OTHER SURGICAL HISTORY      Pacemaker/Defibillation exchange 2012   • PENILE PROSTHESIS IMPLANT N/A 7/27/2018    Procedure: PENILE PROSTHESIS PLACEMENT;  Surgeon: Godwin Gupta MD;  Location: [facility] OR;  Service: Urology   • PERIPHERAL ARTERIAL STENT GRAFT         Social History     Socioeconomic History   • Marital status:      Spouse name: Not on file   • Number of children: Not on file   • Years of education: Not on file   • Highest education level: Not on file   Tobacco Use   • Smoking status: Never Smoker   • Smokeless tobacco: Never Used   Substance and Sexual Activity   • Alcohol use: No   • Drug use: No   • Sexual activity: Defer       Family History   Problem Relation Age of Onset   • Lung cancer Mother    • No Known Problems Father    • Other Neg Hx         GI Malignancies  "      Objective    Temp 97.5 °F (36.4 °C)   Ht 180.3 cm (71\")   Wt 109 kg (240 lb 6.4 oz)   BMI 33.53 kg/m²     Physical Exam  Constitutional: Well nourished, Well developed; No apparent distress.  His vital signs are reviewed  Psychiatric: Appropriate affect; Alert and oriented  Eyes: Unremarkable  Musculoskeletal: Normal gait and station  GI: Abdomen is soft, non-tender  Respiratory: No distress; Unlabored movement; No accessory musculature needed with symmetric movements  Skin: No pallor or diaphoresis  ; Penis and testicles are normal; Prostate 40mL without nodule.  His scrotal pump is located far posterior and appears to have somewhat of a rind around the pump preventing easy access and inflation and deflation.      Results for orders placed or performed in visit on 12/21/20   POC Urinalysis Dipstick, Multipro    Specimen: Urine   Result Value Ref Range    Color Yellow Yellow, Straw, Dark Yellow, Indigo    Clarity, UA Clear Clear    Glucose, UA Negative Negative, 1000 mg/dL (3+) mg/dL    Bilirubin Negative Negative    Ketones, UA Negative Negative    Specific Gravity  1.025 1.005 - 1.030    Blood, UA Negative Negative    pH, Urine 6.0 5.0 - 8.0    Protein, POC Trace (A) Negative mg/dL    Urobilinogen, UA Normal Normal    Nitrite, UA Negative Negative    Leukocytes Negative Negative     International Prostate Symptom Score  The following is posted based on patient questionnaire answers:  0 - not at all    1-7 mild symptoms  1- Less than one time in five  8-19 moderate symptoms  2 -Less than half the time  20-35 severe symptoms  3 - About half the time  4 - More than half the time  5 - Almost always     For following sections:  Incomplete Emptying: - How often have you had the sensation  of not emptying your bladder completely after you finished urinating?  1  Frequency: -How often have you had to urinate again less than   two hours after you finished urinating?      4  Intermittency: -How often have you " found you stopped and started again  Several times when you urinate?       1  Urgency: -How often do you find it difficult to postpone urination?             2  Weak stream: - How often have you had a weak urinary stream?             0  Straining: - How often have you had to push or strain to begin  Urination?          0  Sleeping: -How many times did you most typically get up to urinate   From the time you went to bed at night until the time you got up in the   2  Morning          Total `  9    Quality of Life  How would you feel if you had to live with your urinary condition the way   1  It is now, no better, no worse for the rest of your life?    Where: 0=delighted; 1= pleased, 2= mostly satisfied, 3= mixed, 4 = mostly  Dissatisfied, 5= Unhappy, 6 = terrible    Assessment and Plan    Diagnoses and all orders for this visit:    1. Benign prostatic hyperplasia with lower urinary tract symptoms, symptom details unspecified (Primary)  -     POC Urinalysis Dipstick, Multipro    2. History of renal cell cancer    3. Urine frequency  -     PSA DIAGNOSTIC; Future    4. Malfunction of penile prosthesis, initial encounter (CMS/[facility])        Stable LUTS on combination therapy- continue finasteride and tamsulosin.  Normal PSA for his age with normal exam.  PSA adjusted for finasteride effect to 6 so we will see back in 1 year with PSA or sooner as needed.  We discussed discontinuing renal surveillance given greater than 20 years since his  nephrectomy.  Patient is not able to use his current inflatable penile implant due to pump malfunction and difficulty accessing his pump.  We discussed options for removal of his indwelling inflatable penile implant and replacement with either a new inflatable penile implant or a malleable implant for simplicity.  He would like to think over this option may call back to schedule removal and replacement of his penile implant as desired.

## 2020-12-17 DIAGNOSIS — Z85.528 HISTORY OF RENAL CELL CANCER: ICD-10-CM

## 2020-12-21 ENCOUNTER — OFFICE VISIT (OUTPATIENT)
Dept: UROLOGY | Facility: CLINIC | Age: 75
End: 2020-12-21

## 2020-12-21 VITALS — BODY MASS INDEX: 33.65 KG/M2 | HEIGHT: 71 IN | WEIGHT: 240.4 LBS | TEMPERATURE: 97.5 F

## 2020-12-21 DIAGNOSIS — R35.0 URINE FREQUENCY: ICD-10-CM

## 2020-12-21 DIAGNOSIS — N40.1 BENIGN PROSTATIC HYPERPLASIA WITH LOWER URINARY TRACT SYMPTOMS, SYMPTOM DETAILS UNSPECIFIED: Primary | ICD-10-CM

## 2020-12-21 DIAGNOSIS — T83.490A MALFUNCTION OF PENILE PROSTHESIS, INITIAL ENCOUNTER (HCC): ICD-10-CM

## 2020-12-21 DIAGNOSIS — Z85.528 HISTORY OF RENAL CELL CANCER: ICD-10-CM

## 2020-12-21 LAB
BILIRUB BLD-MCNC: NEGATIVE MG/DL
CLARITY, POC: CLEAR
COLOR UR: YELLOW
GLUCOSE UR STRIP-MCNC: NEGATIVE MG/DL
KETONES UR QL: NEGATIVE
LEUKOCYTE EST, POC: NEGATIVE
NITRITE UR-MCNC: NEGATIVE MG/ML
PH UR: 6 [PH] (ref 5–8)
PROT UR STRIP-MCNC: ABNORMAL MG/DL
RBC # UR STRIP: NEGATIVE /UL
SP GR UR: 1.02 (ref 1–1.03)
UROBILINOGEN UR QL: NORMAL

## 2020-12-21 PROCEDURE — 81003 URINALYSIS AUTO W/O SCOPE: CPT | Performed by: UROLOGY

## 2020-12-21 PROCEDURE — 99214 OFFICE O/P EST MOD 30 MIN: CPT | Performed by: UROLOGY

## 2020-12-21 NOTE — PATIENT INSTRUCTIONS
"BMI for Adults  What is BMI?  Body mass index (BMI) is a number that is calculated from a person's weight and height. BMI can help estimate how much of a person's weight is composed of fat. BMI does not measure body fat directly. Rather, it is an alternative to procedures that directly measure body fat, which can be difficult and expensive.  BMI can help identify people who may be at higher risk for certain medical problems.  What are BMI measurements used for?  BMI is used as a screening tool to identify possible weight problems. It helps determine whether a person is obese, overweight, a healthy weight, or underweight.  BMI is useful for:  · Identifying a weight problem that may be related to a medical condition or may increase the risk for medical problems.  · Promoting changes, such as changes in diet and exercise, to help reach a healthy weight. BMI screening can be repeated to see if these changes are working.  How is BMI calculated?  BMI involves measuring your weight in relation to your height. Both height and weight are measured, and the BMI is calculated from those numbers. This can be done either in English ([place] or metric measurements. Note that charts and online BMI calculators are available to help you find your BMI quickly and easily without having to do these calculations yourself.  To calculate your BMI in English ([place] measurements:    1. Measure your weight in pounds (lb).  2. Multiply the number of pounds by 703.  ? For example, for a person who weighs 180 lb, multiply that number by 703, which equals 126,540.  3. Measure your height in inches. Then multiply that number by itself to get a measurement called \"inches squared.\"  ? For example, for a person who is 70 inches tall, the \"inches squared\" measurement is 70 inches x 70 inches, which equals 4,900 inches squared.  4. Divide the total from step 2 (number of lb x 703) by the total from step 3 (inches squared): 126,540 ÷ 4,900 = 25.8. This is " "your BMI.  To calculate your BMI in metric measurements:  1. Measure your weight in kilograms (kg).  2. Measure your height in meters (m). Then multiply that number by itself to get a measurement called \"meters squared.\"  ? For example, for a person who is 1.75 m tall, the \"meters squared\" measurement is 1.75 m x 1.75 m, which is equal to 3.1 meters squared.  3. Divide the number of kilograms (your weight) by the meters squared number. In this example: 70 ÷ 3.1 = 22.6. This is your BMI.  What do the results mean?  BMI charts are used to identify whether you are underweight, normal weight, overweight, or obese. The following guidelines will be used:  · Underweight: BMI less than 18.5.  · Normal weight: BMI between 18.5 and 24.9.  · Overweight: BMI between 25 and 29.9.  · Obese: BMI of 30 or above.  Keep these notes in mind:  · Weight includes both fat and muscle, so someone with a muscular build, such as an athlete, may have a BMI that is higher than 24.9. In cases like these, BMI is not an accurate measure of body fat.  · To determine if excess body fat is the cause of a BMI of 25 or higher, further assessments may need to be done by a health care provider.  · BMI is usually interpreted in the same way for men and women.  Where to find more information  For more information about BMI, including tools to quickly calculate your BMI, go to these websites:  · Centers for Disease Control and Prevention: www.cdc.gov  · American Heart Association: www.heart.org  · National Heart, Lung, and Blood [place]: www.nhlbi.nih.gov  Summary  · Body mass index (BMI) is a number that is calculated from a person's weight and height.  · BMI may help estimate how much of a person's weight is composed of fat. BMI can help identify those who may be at higher risk for certain medical problems.  · BMI can be measured using English measurements or metric measurements.  · BMI charts are used to identify whether you are underweight, normal " weight, overweight, or obese.  This information is not intended to replace advice given to you by your health care provider. Make sure you discuss any questions you have with your health care provider.  Document Revised: 09/09/2020 Document Reviewed: 07/17/2020  Elsevier Patient Education © 2020 [employer]

## 2021-01-21 ENCOUNTER — IMMUNIZATION (OUTPATIENT)
Age: 76
End: 2021-01-21
Payer: MEDICARE

## 2021-01-21 PROCEDURE — 0001A COVID-19, PFIZER VACCINE 30MCG/0.3ML DOSE: CPT | Performed by: FAMILY MEDICINE

## 2021-01-21 PROCEDURE — 91300 COVID-19, PFIZER VACCINE 30MCG/0.3ML DOSE: CPT | Performed by: FAMILY MEDICINE

## 2021-02-14 ENCOUNTER — IMMUNIZATION (OUTPATIENT)
Age: 76
End: 2021-02-14
Payer: MEDICARE

## 2021-02-14 PROCEDURE — 91300 COVID-19, PFIZER VACCINE 30MCG/0.3ML DOSE: CPT | Performed by: FAMILY MEDICINE

## 2021-02-14 PROCEDURE — 0002A PR IMM ADMN SARSCOV2 30MCG/0.3ML DIL RECON 2ND DOSE: CPT | Performed by: FAMILY MEDICINE

## 2021-04-10 NOTE — TELEPHONE ENCOUNTER
Called and informed patient that Dr. Mack wrote a pain script. I will put it in the front cabinet at the office for patient to . Pt confirmed understanding.   
Patient had surgery on 7/27. He is still having pain in his groin area and he said it was about a 5-6 on a scale of 1-10. He has 4 pain pills left but didn't think it would last him through the weekend.  
As certified below, I, or a nurse practitioner or physician assistant working with me, had a face-to-face encounter that meets the physician face-to-face encounter requirements.

## 2021-09-16 ENCOUNTER — TRANSCRIBE ORDERS (OUTPATIENT)
Dept: ADMINISTRATIVE | Facility: HOSPITAL | Age: 76
End: 2021-09-16

## 2021-09-16 DIAGNOSIS — L97.921 NON-PRESSURE CHRONIC ULCER OF UNSPECIFIED PART OF LEFT LOWER LEG LIMITED TO BREAKDOWN OF SKIN (HCC): ICD-10-CM

## 2021-09-16 DIAGNOSIS — M79.604 PAIN IN BOTH LOWER EXTREMITIES: ICD-10-CM

## 2021-09-16 DIAGNOSIS — M79.605 PAIN IN BOTH LOWER EXTREMITIES: ICD-10-CM

## 2021-09-16 DIAGNOSIS — I50.22 CHRONIC SYSTOLIC HEART FAILURE (HCC): Primary | ICD-10-CM

## 2021-09-16 DIAGNOSIS — M79.606 PAIN OF LOWER EXTREMITY, UNSPECIFIED LATERALITY: ICD-10-CM

## 2021-09-22 ENCOUNTER — HOSPITAL ENCOUNTER (OUTPATIENT)
Dept: ULTRASOUND IMAGING | Facility: HOSPITAL | Age: 76
Discharge: HOME | End: 2021-09-22
Admitting: PHYSICIAN ASSISTANT

## 2021-09-22 DIAGNOSIS — L97.921 NON-PRESSURE CHRONIC ULCER OF UNSPECIFIED PART OF LEFT LOWER LEG LIMITED TO BREAKDOWN OF SKIN (HCC): ICD-10-CM

## 2021-09-22 PROCEDURE — 93923 UPR/LXTR ART STDY 3+ LVLS: CPT

## 2021-10-18 ENCOUNTER — OFFICE VISIT (OUTPATIENT)
Dept: CARDIOLOGY | Facility: CLINIC | Age: 76
End: 2021-10-18

## 2021-10-18 ENCOUNTER — LAB (OUTPATIENT)
Dept: LAB | Facility: HOSPITAL | Age: 76
End: 2021-10-18

## 2021-10-18 VITALS
HEART RATE: 75 BPM | WEIGHT: 239 LBS | DIASTOLIC BLOOD PRESSURE: 70 MMHG | HEIGHT: 71 IN | SYSTOLIC BLOOD PRESSURE: 110 MMHG | BODY MASS INDEX: 33.46 KG/M2 | OXYGEN SATURATION: 98 %

## 2021-10-18 DIAGNOSIS — I25.5 ISCHEMIC CARDIOMYOPATHY: ICD-10-CM

## 2021-10-18 DIAGNOSIS — Z98.890 S/P AV NODAL ABLATION: ICD-10-CM

## 2021-10-18 DIAGNOSIS — I50.22 CHRONIC SYSTOLIC CONGESTIVE HEART FAILURE (HCC): ICD-10-CM

## 2021-10-18 DIAGNOSIS — I48.21 PERMANENT ATRIAL FIBRILLATION (HCC): ICD-10-CM

## 2021-10-18 DIAGNOSIS — Z95.810 PRESENCE OF BIVENTRICULAR AICD: ICD-10-CM

## 2021-10-18 DIAGNOSIS — I10 ESSENTIAL HYPERTENSION: ICD-10-CM

## 2021-10-18 DIAGNOSIS — I25.709 CORONARY ARTERY DISEASE INVOLVING CORONARY BYPASS GRAFT OF NATIVE HEART WITH ANGINA PECTORIS (HCC): Primary | ICD-10-CM

## 2021-10-18 LAB
ALBUMIN SERPL-MCNC: 4.3 G/DL (ref 3.5–5.2)
ALBUMIN/GLOB SERPL: 2 G/DL
ALP SERPL-CCNC: 74 U/L (ref 39–117)
ALT SERPL W P-5'-P-CCNC: 13 U/L (ref 1–41)
ANION GAP SERPL CALCULATED.3IONS-SCNC: 8 MMOL/L (ref 5–15)
AST SERPL-CCNC: 21 U/L (ref 1–40)
BILIRUB SERPL-MCNC: 1 MG/DL (ref 0–1.2)
BUN SERPL-MCNC: 16 MG/DL (ref 8–23)
BUN/CREAT SERPL: 16.3 (ref 7–25)
CALCIUM SPEC-SCNC: 9.1 MG/DL (ref 8.6–10.5)
CHLORIDE SERPL-SCNC: 109 MMOL/L (ref 98–107)
CO2 SERPL-SCNC: 28 MMOL/L (ref 22–29)
CREAT SERPL-MCNC: 0.98 MG/DL (ref 0.76–1.27)
GFR SERPL CREATININE-BSD FRML MDRD: 75 ML/MIN/1.73
GLOBULIN UR ELPH-MCNC: 2.1 GM/DL
GLUCOSE SERPL-MCNC: 137 MG/DL (ref 65–99)
NT-PROBNP SERPL-MCNC: 884.4 PG/ML (ref 0–900)
POTASSIUM SERPL-SCNC: 4.6 MMOL/L (ref 3.5–5.2)
PROT SERPL-MCNC: 6.4 G/DL (ref 6–8.5)
SODIUM SERPL-SCNC: 145 MMOL/L (ref 136–145)

## 2021-10-18 PROCEDURE — 83880 ASSAY OF NATRIURETIC PEPTIDE: CPT | Performed by: NURSE PRACTITIONER

## 2021-10-18 PROCEDURE — 99214 OFFICE O/P EST MOD 30 MIN: CPT | Performed by: NURSE PRACTITIONER

## 2021-10-18 PROCEDURE — 36415 COLL VENOUS BLD VENIPUNCTURE: CPT | Performed by: NURSE PRACTITIONER

## 2021-10-18 PROCEDURE — 80053 COMPREHEN METABOLIC PANEL: CPT | Performed by: NURSE PRACTITIONER

## 2021-10-18 RX ORDER — PREGABALIN 150 MG/1
150 CAPSULE ORAL 2 TIMES DAILY
COMMUNITY
Start: 2021-08-07

## 2021-10-18 RX ORDER — CLOPIDOGREL BISULFATE 75 MG/1
75 TABLET ORAL DAILY
COMMUNITY
Start: 2021-09-08

## 2021-10-18 RX ORDER — RANOLAZINE 500 MG/1
500 TABLET, EXTENDED RELEASE ORAL 2 TIMES DAILY
Qty: 60 TABLET | Refills: 11 | Status: ON HOLD | OUTPATIENT
Start: 2021-10-18 | End: 2021-12-15 | Stop reason: SDUPTHER

## 2021-10-18 RX ORDER — NITROGLYCERIN 0.4 MG/1
0.4 TABLET SUBLINGUAL
Qty: 25 TABLET | Refills: 2 | Status: SHIPPED | OUTPATIENT
Start: 2021-10-18

## 2021-10-18 RX ORDER — TORSEMIDE 20 MG/1
20 TABLET ORAL DAILY
COMMUNITY
Start: 2021-10-15 | End: 2021-12-29 | Stop reason: SDUPTHER

## 2021-10-23 PROBLEM — I48.21 PERMANENT ATRIAL FIBRILLATION: Status: ACTIVE | Noted: 2019-02-01

## 2021-10-23 PROBLEM — Z98.890 S/P AV NODAL ABLATION: Status: ACTIVE | Noted: 2021-10-23

## 2021-10-23 PROCEDURE — 93000 ELECTROCARDIOGRAM COMPLETE: CPT | Performed by: NURSE PRACTITIONER

## 2021-10-23 NOTE — PROGRESS NOTES
Subjective:     Encounter Date:10/18/2021      Patient ID: Darren Maria is a 76 y.o. male.    Chief Complaint:  Dyspnea ; pt requests transfer of local cardiology care from Dr. Waterman (has not seen since 2/2019) to Dr. Posadas    History of Present Illness     The patient presents to follow-up regarding his chronic cardiovascular disease, with worsening symptoms over the past 2 to 3 months.  He follows yearly with Dr. Bernal at [facility] for his cardiology care.  He also previously followed here with Dr. Waterman, but has not seen him since February 2019.  He has requested to transfer his local cardiology care to Dr. Posadas, and his initial visit was scheduled with me today.    The patient has a longstanding complicated cardiac history dating back to initial myocardial infarction in 1989.  I have copied an outline of his cardiac history documented per Dr. Bernal below-see for full details.  In short, the patient has a history of CAD ( status post three-vessel CABG in 1990, followed by PCI (multiple prior stents to LCX) and left thoracotomy CABG x1 around 2011/2012), chronic systolic CHF/ischemic cardiomyopathy now with biventricular AICD in place followed by Dr. Mccarty ,  atrial fibrillation anticoagulated with warfarin, and prior AV node ablation (pacemaker was placed piror to eventual upgrade BIV AICD).     Today the patient reports that he has noted worsening fatigue/decline in stamina and shortness of breath with exertion with associated chest pressure over the past 2 to 3 months.  He reports symptoms develop after walking approximately half a block or climbing stairs.  He is unclear exactly how far he was walking before the symptoms developed, but he reports this is a clear decline for him in recent months.  He reports his peripheral neuropathy also limits his walking.  He takes additional diuretics approximately twice per week for intermittent orthopnea and PND or increased swelling that might occur 2-3  "times per week.  He states these symptoms may also be happening with increasing frequency.  Aside from the orthopnea/PND, he denies any shortness of breath or chest discomfort at rest.  He states his current symptoms are the same as what he experienced prior to his most recent CABG.  He reports his symptoms are relieved within a few minutes of rest and he is not requiring nitroglycerin.  He states his dry weight at home is around 230 pounds, and occasionally when he is \"holding fluid\" his weight will get up to 235 - 236 pounds and he will take an extra diuretic dose.  He has reported his recent symptoms to his PCP, and an echo has been ordered and is actually scheduled to be completed in a few days on 10/28.    The following portions of the patient's history were reviewed and updated as appropriate: allergies, current medications, past family history, past medical history, past social history, past surgical history and problem list.    Review of Systems   Constitutional: Positive for malaise/fatigue.   Cardiovascular: Positive for chest pain (pressure ), dyspnea on exertion, leg swelling (stable x several years ), orthopnea and paroxysmal nocturnal dyspnea. Negative for claudication, near-syncope, palpitations and syncope.   Respiratory: Positive for shortness of breath. Negative for cough.    Hematologic/Lymphatic: Does not bruise/bleed easily.   Musculoskeletal: Negative for falls.   Gastrointestinal: Negative for bloating.   Neurological: Negative for dizziness, light-headedness and weakness.        \"neuropathy in legs limits walking\"       Allergies   Allergen Reactions   • Azithromycin Nausea And Vomiting   • Morphine And Related Other (See Comments)     SHAKES AND MAKES HIM \"WIRED\".   TAKES TRAMADOL WITHOUT PROBLEM       Current Outpatient Medications:   •  atorvastatin (LIPITOR) 20 MG tablet, Take 20 mg by mouth daily., Disp: , Rfl:   •  carvedilol (COREG) 25 MG tablet, Take 25 mg by mouth 2 (two) times a " "day., Disp: , Rfl:   •  clopidogrel (PLAVIX) 75 MG tablet, Take 75 mg by mouth Daily., Disp: , Rfl:   •  diltiazem CD (DILTIAZEM CD) 120 MG 24 hr capsule, Take 120 mg by mouth daily., Disp: , Rfl:   •  finasteride (PROSCAR) 5 MG tablet, Take 5 mg by mouth daily., Disp: , Rfl:   •  losartan (COZAAR) 50 MG tablet, Take 50 mg by mouth daily., Disp: , Rfl:   •  metFORMIN (GLUCOPHAGE) 500 MG tablet, , Disp: , Rfl:   •  omeprazole (PriLOSEC) 40 MG capsule, Take 40 mg by mouth daily., Disp: , Rfl:   •  potassium chloride (K-DUR,KLOR-CON) 20 MEQ CR tablet, Take 20 mEq by mouth 2 (Two) Times a Day., Disp: , Rfl:   •  pregabalin (LYRICA) 150 MG capsule, Take 150 mg by mouth 2 (Two) Times a Day., Disp: , Rfl:   •  tamsulosin (FLOMAX) 0.4 MG capsule 24 hr capsule, Take 1 capsule by mouth daily., Disp: , Rfl:   •  torsemide (DEMADEX) 20 MG tablet, Take 20 mg by mouth 2 (two) times a day., Disp: , Rfl:   •  warfarin (COUMADIN) 7.5 MG tablet, Take 7.5 mg by mouth Daily., Disp: , Rfl:   •  nitroglycerin (NITROSTAT) 0.4 MG SL tablet, Place 1 tablet under the tongue Every 5 (Five) Minutes As Needed for Chest Pain. Take no more than 3 doses in 15 minutes., Disp: 25 tablet, Rfl: 2  •  ranolazine (Ranexa) 500 MG 12 hr tablet, Take 1 tablet by mouth 2 (Two) Times a Day., Disp: 60 tablet, Rfl: 11  •  traMADol-acetaminophen (ULTRACET) 37.5-325 MG per tablet, as needed, Disp: , Rfl: 1  •  zolpidem (AMBIEN) 5 MG tablet, TAKE 1 TABLET BY MOUTH EVERY DAY AT BEDTIME AS NEEDED, Disp: , Rfl: 5         Objective:    /70 (BP Location: Left arm, Patient Position: Sitting, Cuff Size: Adult)   Pulse 75   Ht 180.3 cm (71\")   Wt 108 kg (239 lb)   SpO2 98%   BMI 33.33 kg/m²        Vitals and nursing note reviewed.   Constitutional:       General: Not in acute distress.     Appearance: Well-developed and not in distress. Not diaphoretic.   Neck:      Vascular: No JVD.   Pulmonary:      Effort: Pulmonary effort is normal. No respiratory " distress.      Breath sounds: Normal breath sounds.   Cardiovascular:      Normal rate. Regular rhythm.      Murmurs: There is no murmur.   Edema:     Peripheral edema absent.   Abdominal:      Tenderness: There is no abdominal tenderness.   Skin:     General: Skin is warm and dry.   Neurological:      Mental Status: Alert and oriented to person, place, and time.         Lab Review:    Lab Results   Component Value Date    GLUCOSE 137 (H) 10/18/2021    BUN 16 10/18/2021    CREATININE 0.98 10/18/2021    EGFRIFNONA 75 10/18/2021    EGFRIFAFRI 94 03/13/2017    BCR 16.3 10/18/2021    K 4.6 10/18/2021    CO2 28.0 10/18/2021    CALCIUM 9.1 10/18/2021    ALBUMIN 4.30 10/18/2021    AST 21 10/18/2021    ALT 13 10/18/2021     Lab Results   Component Value Date    CHLPL 136 (L) 03/13/2018    TRIG 127 03/13/2018    HDL 38 (L) 03/13/2018    LDL 73 03/13/2018     Lab Results   Component Value Date    HGBA1C 6.3 09/25/2018     10/18/2021 proBNP - 884- WNL           ECG 12 Lead    Date/Time: 10/23/2021 3:06 PM  Performed by: Yolanda Garnica APRN  Authorized by: Yolanda Garnica APRN   Comparison: compared with previous ECG from 2/1/2019  Similar to previous ECG  Comparison to previous ECG: Biventricular pacing - similar to previous   Rhythm: paced  BPM: 75          3/17/2021 Documentation per Dr. Bernal at [facility]:    Christopher Bernal III, MD - 03/17/2021 8:27 AM CDT  Formatting of this note might be different from the original.  General information:  PCP: Rolan Salvador - [facility]  Cardiologist: Jesus Bernal - [facility]  Ep/device: Oscar GREENWOOD     Problem List:  1. Coronary atherosclerotic heart disease.  ---a. Status post remote myocardial infarction 1989.  ---b. Status post CABG ([facility], [facility]) 1990 with LIMA to LAD, SVG to PD, SVG to OM.  ---c. Chronic atrial fibrillation with subsequent permanent pacemaker implantation 1999 and prior RFA AVN  ---d. Chronic left ventricular dysfunction  ---e. Status post  biventricular ICD 2005 ([facility])  ---f. Cardiac catheterization 01/06 with LVEF 30%, severe native vessel coronary disease, patent LIMA to LAD, patent SVG to PD of RCA,  of SVG to OM with subsequent PTCA/stenting of native OM with 2.5mm x 23mm LEILANI.  ---g. Repeat cardiac catheterization 12/06 ([facility]) with patent LIMA to LAD, patent SVG to right PDA, occluded SVG to OM with repeat PCI/stenting with 2.5 mm x 23 mm stent  ---h. Adenosine dual isotope study 04/07 with LVEF 53%, inferolateral scar with no ischemia.  ---i. BNP level < 100 pg/ml 04/07 ; 101 pg/ml 06/08  ---k. Repeat cardiac catheterization 04/08 with LVEF 30%, patent LIMA to LAD and SVG to RCA,  of SVG to LCX, severe focal denovo stenosis of previously twice-stented native LCX with repeat stenting of native LCS ([facility]) with third 2.5mm x 23mm Cypher LEILANI  ---l. [facility]ulse [facility] Medtronic Concerto [id number] (serial # [id number]); pulse generator replacement 11/19  ---m. Recurrent angina 04/08, repeat limited native LCA angiogram 04/08 with focal edge dissection and instent restenosis, status post additional stenting ([facility]) with 3 additional [place] stents  ---n. Recurrent angina 12/08 with repeat catheterization confirming LVEF 40%, patent LIMA to LAD, patent SVG to RCA, patent LCx stent site; medical therapy  ---o. TTE 03/10 with LVEF 30-35%, no significant valvular dysfunction, moderate LAE and LVE; repeat 12/10 uncjhanged except LVEWF improved to 35-40%  ---p. BNP 03/10 259 pg/ml > repeat 12/10 165 pg/ml > repeat 06/12 70 pg/ml > repeat 10/12 200 pg/ml>repeat 11/17 154 pg/ml  ---q. Adenosine radionuclide stress test 03/10 and 12/10 with fixed shannan-apical scar, no ischemia, LVEF 40% rising to 50% with stress; medical therapy  ---r. ACS 03/11 with cardiac catheterization revealing patent LIMA to LAD, patent SVG to PD and PL of RCA,  severe ISR of previously multiply (x 4 procedures/6 stents) stented distal LM/proximal LCX-OM  ---s. Status post limited left thoracotomy CABG x 1 with SVG from descending Ao to OM, CRISTOBAL ligation ([facility]) - ?[id number]  ---t. TTE 11/11 and 09/12 with LVEF 40-45%, no significant valvular dysfunction  ---u. Recurrent sx 06/12 with repeat cardiac catheterization with all grafts patent, no culprit lesion in need of PCI; medical therapy  ---v. Status post pulse generator replacement 07/12 and 01/16 ([facility])  ---w. Pharmacologic nuclear stress test 08/13 with LVEF approx 40%, predominant fixed shannan-apical defect, no interval change since prior studies; expectant medical mgmt; repeat 10/15 with no ischemia  ---x. Pharmacologic radionuclide stress test 10/16 with LVEF 30%, fixed anterior defect, no ischemia  ---y. TTE 10/16 with LVEF 45%, no significant valvular dysfunction  ---z. Pharmacologic radionuclide stress test 11/17 with LVEF 40-45%, fixed anteroapical scar, no significant reversible ischemia  ---aa. TTE 02/13/19 with LVEF est 30%, LAE 6cm, LVEDD 6.2 cm, segemental WMAs, no significant valvular dysfunction; est RVSP 40 mmHg.  ---bb. BNP level 2/20 274 pg/ml => 02/21 170 pg/ml  ---cc. TTE 03/21 with LVEF 25%, no significant valvular dysfunction  2. Hyperlipidemia, on statin therapy  3. Renal cell carcinoma with right nephrectomy 2000, no recurrence.  4. Status post remote cholecystectomy with subsequent ventral hernia and ventral herniorrhaphy.  5. Chronic coumadin anticoagulation (indication: atrial fibrillation, INR goal 2-3).  6. Erectile dysfunction  7. Mild chronic renal insufficiency, serum creatinine 1.6 mg% 04/08  8. DJD, status post left TKR 10/15    Electronically signed by Christopher Bernal III, MD at 03/17/2021 8:30 AM CDT          Results for orders placed during the hospital encounter of 02/13/19    Adult Transthoracic Echo Complete W/ Cont if Necessary Per Protocol    Interpretation Summary  ·  Estimated EF = 30%.  · Left atrial cavity size is severely dilated.  · The following left ventricular wall segments are akinetic: apical anterior, apical lateral, apical inferior, apical septal and apex.  · The left ventricular cavity is mildly dilated.  · Mild pulmonary hypertension is present.      Assessment:      Problem List Items Addressed This Visit        Cardiac and Vasculature    Coronary artery disease involving coronary bypass graft of native heart with angina pectoris (HCC) - Primary    Relevant Medications    clopidogrel (PLAVIX) 75 MG tablet    ranolazine (Ranexa) 500 MG 12 hr tablet    nitroglycerin (NITROSTAT) 0.4 MG SL tablet    Essential hypertension    Relevant Medications    torsemide (DEMADEX) 20 MG tablet    Presence of biventricular AICD    Ischemic cardiomyopathy    Relevant Medications    clopidogrel (PLAVIX) 75 MG tablet    ranolazine (Ranexa) 500 MG 12 hr tablet    nitroglycerin (NITROSTAT) 0.4 MG SL tablet    Permanent atrial fibrillation (HCC)    Relevant Medications    clopidogrel (PLAVIX) 75 MG tablet    ranolazine (Ranexa) 500 MG 12 hr tablet    nitroglycerin (NITROSTAT) 0.4 MG SL tablet    S/P AV sam ablation      Other Visit Diagnoses     Chronic systolic congestive heart failure (HCC)        Relevant Medications    clopidogrel (PLAVIX) 75 MG tablet    ranolazine (Ranexa) 500 MG 12 hr tablet    nitroglycerin (NITROSTAT) 0.4 MG SL tablet    Other Relevant Orders    Comprehensive Metabolic Panel (Completed)    proBNP (Completed)          Plan:     1.  Coronary artery disease: Established problem, possibly worsening based on symptoms described above.  He is not having angina at rest, but symptoms have been worsening over the past 2 to 3 months and he states they are similar to the symptoms he had prior to his most recent CABG.  It is possible that his symptoms may also be due to his chronic systolic heart failure/ischemic cardiomyopathy.  -Continue Plavix  -Continue Coreg  -As  "needed sublingual nitroglycerin-has not had to use  -Continue atorvastatin-currently on 20 mg-consider up titration to high intensity dose at follow-up; recent lipid panel not available to me-LDL was 73 in 2018.  -Add Ranexa 500 mg twice daily for antianginal benefit (the patient reports headaches with prior antianginal medication-suspect this was Imdur)    2.  Chronic systolic CHF/ischemic cardiomyopathy: Stage C, class III: Per documentation by Dr. Bernal, most recent LVEF was 25% in March 2021.  He is currently compensated on exam but he states he is having episodes of orthopnea/PND approximately 3 times per week with intermittent increases in lower extremity edema requiring extra diuretic doses approximately twice per week.  His symptoms may also be at least partially related to his CHF/cardiomyopathy in addition to his coronary disease.  -I did check BNP and CMP today-see results above.  Based on exam, normal BNP; will focus on adjusting antianginal medicine at this time, but would consider optimization of his medical therapy for his cardiomyopathy in the near future.  -Continue current doses of Coreg, losartan, torsemide (with additional doses as needed for S/S of volume overload); denies ever being on Entresto, SGLT2 inhibitor  -Repeat echo scheduled for 10/28/2021  -Biventricular AICD in place, followed by Dr. Mccarty.  The patient tells me \"all 3 leads work but one of them is not in the right spot and Dr. Mccarty monitors this.\"  We will request most recent notes from Dr. Mccarty.    3.  Atrial fibrillation: Biventricular paced and rate controlled today.  Previous notes indicate he has permanent atrial fibrillation.  Anticoagulated with warfarin with goal INR of 2-3.  (could consider transition to DOAC in future).  Continue current doses of Coreg, diltiazem for rate control.  Asymptomatic.    Follow-up in 2 to 4 weeks to assess response to the addition of Ranexa.  Of note, this visit needs to be with Dr. Posadas " specifically as he requests to establish care with him and has not yet met him.    I did discuss the case and plans with Dr. Posadas.

## 2021-10-26 ENCOUNTER — OFFICE VISIT (OUTPATIENT)
Dept: GASTROENTEROLOGY | Facility: CLINIC | Age: 76
End: 2021-10-26

## 2021-10-26 VITALS
HEART RATE: 88 BPM | BODY MASS INDEX: 32.76 KG/M2 | HEIGHT: 71 IN | SYSTOLIC BLOOD PRESSURE: 130 MMHG | DIASTOLIC BLOOD PRESSURE: 72 MMHG | WEIGHT: 234 LBS | TEMPERATURE: 96.9 F | OXYGEN SATURATION: 99 %

## 2021-10-26 DIAGNOSIS — I48.21 PERMANENT ATRIAL FIBRILLATION (HCC): ICD-10-CM

## 2021-10-26 DIAGNOSIS — E11.9 CONTROLLED TYPE 2 DIABETES MELLITUS WITHOUT COMPLICATION, WITHOUT LONG-TERM CURRENT USE OF INSULIN (HCC): ICD-10-CM

## 2021-10-26 DIAGNOSIS — I10 HTN (HYPERTENSION), BENIGN: ICD-10-CM

## 2021-10-26 DIAGNOSIS — Z79.02 PLATELET INHIBITION DUE TO PLAVIX: ICD-10-CM

## 2021-10-26 DIAGNOSIS — Z86.010 HX OF ADENOMATOUS COLONIC POLYPS: Primary | ICD-10-CM

## 2021-10-26 DIAGNOSIS — Z79.01 ANTICOAGULATED ON COUMADIN: ICD-10-CM

## 2021-10-26 PROCEDURE — 99214 OFFICE O/P EST MOD 30 MIN: CPT | Performed by: CLINICAL NURSE SPECIALIST

## 2021-10-26 NOTE — PROGRESS NOTES
Darren Maria  1945      10/26/2021  Chief Complaint   Patient presents with   • Colonoscopy     Subjective   HPI  Darren Maria is a 76 y.o. male who presents as a referral for preventative maintenance. He has no complaints of nausea or vomiting. No change in bowels. No wt loss. No BRBPR. No melena. There is NO family hx for colon cancer. No abdominal pain. NO CURRENT CHEST PAIN HOWEVER HE HAS APPOINTMENT WITH DR MAXWELL DUE TO SOME RECENT SYMPTOMS WORKUP IS PENDING.     Past Medical History:   Diagnosis Date   • Abdominal pain, epigastric    • Abnormal abdominal exam     ABFND, RADIOLOGICAL, ABDOMINAL AREA    • Anticoagulated on Coumadin    • Atherosclerosis of coronary artery bypass graft     ATHEROSCLEROSIS, CORONARY, ARTERY BYPASS GRFT   • Atrial fibrillation (HCC)    • Cancer of right kidney (HCC)     right sided kidney cancer   • Cardiomyopathy (HCC)    • Cardiomyopathy, primary (HCC)    • CHF (congestive heart failure) (HCC)    • Colon polyps    • Congestive heart failure (HCC)    • Coronary artery disease     History of CAD/A-Fib/Pacemaker/Defibrillator placement: pt takes Coumadin and plavix therapy as well as ASA daily   • Gastric polyp    • Gastroesophageal reflux disease without esophagitis    • History of colon polyps    • Hypercholesterolemia    • Hypertension, essential    • Melena    • Myocardial infarction (HCC)    • NSAID long-term use    • Obesity    • Pacemaker     Pacemaker Dependant   • Platelet inhibition due to Plavix    • Presence of stent in coronary artery     Multiple coronary stenting most recently 4/2008   • Single kidney     Only 1 Kidney   • Status post surgery     s/p Polypectomy Bleed     Past Surgical History:   Procedure Laterality Date   • APPENDECTOMY     • CARDIAC ABLATION     • CARDIAC CATHETERIZATION     • CARDIAC DEFIBRILLATOR PLACEMENT     • CARDIAC PACEMAKER PLACEMENT      Pacemaker Placement   • CATARACT EXTRACTION, BILATERAL     • CHOLECYSTECTOMY     •  COLONOSCOPY  08/27/2013    snare hep, trans tics recall 3 yr   • COLONOSCOPY  10/02/2006    few scattered diverticula   • COLONOSCOPY  07/22/2009    tubular adenoma in the ascending colon   • COLONOSCOPY  07/25/2005    several polyps in the ascending colon, one in the transverse colon   • COLONOSCOPY W/ POLYPECTOMY  09/19/2016    Tubular adenoma hepatic flexure, 2 Hyperplastic polyps rectum and at 50 cm, Benign polyp at 70 cm repeat exam in 3-5 years   • CORONARY ARTERY BYPASS GRAFT  02/2011    2nd time   • CORONARY ARTERY BYPASS GRAFT  1990   • CORONARY STENT PLACEMENT     • ENDOSCOPY  04/14/2016    nl slant   • ENDOSCOPY  11/10/2014    clips at polypectomy bx no residual recall 1 yr   • ENDOSCOPY  06/19/2014    snare clip body recall 6 months   • ENDOSCOPY  05/19/2014    polyp stomach bx and clip   • HERNIA REPAIR      with mesh   • OTHER SURGICAL HISTORY      Defibrillator/Pacer maker exchange   • OTHER SURGICAL HISTORY      Pacemaker/Defibillation exchange 2012   • PENILE PROSTHESIS IMPLANT N/A 7/27/2018    Procedure: PENILE PROSTHESIS PLACEMENT;  Surgeon: Godwin Gupta MD;  Location: [facility] OR;  Service: Urology   • PERIPHERAL ARTERIAL STENT GRAFT       Outpatient Medications Marked as Taking for the 10/26/21 encounter (Office Visit) with Lainey Torres APRN   Medication Sig Dispense Refill   • atorvastatin (LIPITOR) 20 MG tablet Take 20 mg by mouth daily.     • carvedilol (COREG) 25 MG tablet Take 25 mg by mouth 2 (two) times a day.     • clopidogrel (PLAVIX) 75 MG tablet Take 75 mg by mouth Daily.     • diltiazem CD (DILTIAZEM CD) 120 MG 24 hr capsule Take 120 mg by mouth daily.     • finasteride (PROSCAR) 5 MG tablet Take 5 mg by mouth daily.     • losartan (COZAAR) 50 MG tablet Take 50 mg by mouth daily.     • metFORMIN (GLUCOPHAGE) 500 MG tablet      • nitroglycerin (NITROSTAT) 0.4 MG SL tablet Place 1 tablet under the tongue Every 5 (Five) Minutes As Needed for Chest Pain. Take no more than 3  "doses in 15 minutes. 25 tablet 2   • omeprazole (PriLOSEC) 40 MG capsule Take 40 mg by mouth daily.     • potassium chloride (K-DUR,KLOR-CON) 20 MEQ CR tablet Take 20 mEq by mouth 2 (Two) Times a Day.     • pregabalin (LYRICA) 150 MG capsule Take 150 mg by mouth 2 (Two) Times a Day.     • ranolazine (Ranexa) 500 MG 12 hr tablet Take 1 tablet by mouth 2 (Two) Times a Day. 60 tablet 11   • tamsulosin (FLOMAX) 0.4 MG capsule 24 hr capsule Take 1 capsule by mouth daily.     • torsemide (DEMADEX) 20 MG tablet Take 20 mg by mouth 2 (two) times a day.     • traMADol-acetaminophen (ULTRACET) 37.5-325 MG per tablet as needed  1   • warfarin (COUMADIN) 7.5 MG tablet Take 7.5 mg by mouth Daily.     • zolpidem (AMBIEN) 5 MG tablet TAKE 1 TABLET BY MOUTH EVERY DAY AT BEDTIME AS NEEDED  5     Allergies   Allergen Reactions   • Azithromycin Nausea And Vomiting   • Morphine And Related Other (See Comments)     SHAKES AND MAKES HIM \"WIRED\".   TAKES TRAMADOL WITHOUT PROBLEM     Social History     Socioeconomic History   • Marital status:    Tobacco Use   • Smoking status: Never Smoker   • Smokeless tobacco: Never Used   Substance and Sexual Activity   • Alcohol use: No   • Drug use: No   • Sexual activity: Defer     Family History   Problem Relation Age of Onset   • Lung cancer Mother    • No Known Problems Father    • Other Neg Hx         GI Malignancies   • Colon cancer Neg Hx    • Colon polyps Neg Hx      Health Maintenance   Topic Date Due   • URINE MICROALBUMIN  Never done   • Pneumococcal Vaccine 65+ (1 of 2 - PPSV23) Never done   • ZOSTER VACCINE (2 of 3) 03/08/2012   • ANNUAL WELLNESS VISIT  Never done   • DIABETIC FOOT EXAM  Never done   • DIABETIC EYE EXAM  Never done   • LIPID PANEL  03/13/2019   • HEMOGLOBIN A1C  03/25/2019   • INFLUENZA VACCINE  08/01/2021   • COVID-19 Vaccine (3 - Pfizer booster) 08/14/2021   • COLORECTAL CANCER SCREENING  09/19/2026   • TDAP/TD VACCINES (2 - Td or Tdap) 08/14/2029   • HEPATITIS " "C SCREENING  Completed       REVIEW OF SYSTEMS  General: well appearing, no fever chills or sweats, no unexplained wt loss  HEENT: no acute visual or hearing disturbances  Cardiovascular: POSITIVE FOR INTERMITTENT chest pain or palpitations  Pulmonary: No shortness of breath, coughing, wheezing or hemoptysis  : No burning, urgency, hematuria, or dysuria  Musculoskeletal: No joint pain or stiffness  Peripheral: no edema  Skin: No lesions or rashes  Neuro: No dizziness, headaches, stroke, syncope  Endocrine: No hot or cold intolerances  Hematological: No blood dyscrasias    Objective   Vitals:    10/26/21 1012   BP: 130/72   Pulse: 88   Temp: 96.9 °F (36.1 °C)   SpO2: 99%   Weight: 106 kg (234 lb)   Height: 180.3 cm (71\")     Body mass index is 32.64 kg/m².  Patient's Body mass index is 32.64 kg/m². indicating that he is obese (BMI >30). Obesity-related health conditions include the following: hypertension. Obesity is unchanged. BMI is is above average; BMI management plan is completed. We discussed portion control and increasing exercise..      PHYSICAL EXAM  General: age appropriate well nourished well appearing, no acute distress  Head: normocephalic and atraumatic  Global assessment-supple  Neck-No JVD noted, no lymphadenopathy  Pulmonary-clear to auscultation bilaterally, normal respiratory effort  Cardiovascular-normal rate and rhythm, normal heart sounds, S1 and S2 noted  Abdomen-soft, non tender, non distended, normal bowel sounds all 4 quadrants, no hepatosplenomegaly noted  Extremities-No clubbing cyanosis or edema  Neuro-Non focal, converses appropriately, awake, alert, oriented    Assessment/Plan     Diagnoses and all orders for this visit:    1. Hx of adenomatous colonic polyps (Primary)    2. Permanent atrial fibrillation (HCC)    3. Anticoagulated on Coumadin  Comments:  He takes due to hx of Afib    4. HTN (hypertension), benign  Comments:  cont BP medication the day of procedure    5. Controlled " type 2 diabetes mellitus without complication, without long-term current use of insulin (HCC)  Comments:  Hold Medication the day of procedure    6. Platelet inhibition due to Plavix  Comments:  To hold per Dr Posadas    We will need to wait for cardiac clearance before we schedule colonoscopy this time due to chest pain he has been having. He sees Dr Posadas on 2021.      Body mass index is 32.64 kg/m².    Patient instructions on prep prior to procedure provided to the patient.    All risks, benefits, alternatives, and indications of colonoscopy procedure have been discussed with the patient. Risks to include perforation of the colon requiring possible surgery or colostomy, risk of bleeding from biopsies or removal of colon tissue, possibility of missing a colon polyp or cancer, or adverse drug reaction.  Benefits to include the diagnosis and management of disease of the colon and rectum. Alternatives to include barium enema, radiographic evaluation, lab testing or no intervention. Pt verbalizes understanding and agrees.     Lainey Torres, EVELIA  10/26/2021  10:38 CDT      IF YOU SMOKE OR USE TOBACCO PLEASE READ THE FOLLOWIN minutes reading provided    Why is smoking bad for me?  Smoking increases the risk of heart disease, lung disease, vascular disease, stroke, and cancer.     If you smoke, STOP!    If you would like more information on quitting smoking, please visit the [employer] website: [email or web address]   This link will provide additional resources including the QUIT line and the [employer] the [employer] support groups.     For more information:    Quit Now [place]  800-QUIT-NOW  https://[place].quitlogix.org/en-[place]/

## 2021-10-28 ENCOUNTER — HOSPITAL ENCOUNTER (OUTPATIENT)
Dept: CARDIOLOGY | Facility: HOSPITAL | Age: 76
Discharge: HOME | End: 2021-10-28
Admitting: PHYSICIAN ASSISTANT

## 2021-10-28 VITALS
HEIGHT: 71 IN | BODY MASS INDEX: 32.72 KG/M2 | SYSTOLIC BLOOD PRESSURE: 130 MMHG | DIASTOLIC BLOOD PRESSURE: 72 MMHG | WEIGHT: 233.69 LBS

## 2021-10-28 DIAGNOSIS — I50.22 CHRONIC SYSTOLIC HEART FAILURE (HCC): ICD-10-CM

## 2021-10-28 PROCEDURE — 93306 TTE W/DOPPLER COMPLETE: CPT | Performed by: INTERNAL MEDICINE

## 2021-10-28 PROCEDURE — 25010000002 PERFLUTREN 6.52 MG/ML SUSPENSION: Performed by: INTERNAL MEDICINE

## 2021-10-28 PROCEDURE — 93306 TTE W/DOPPLER COMPLETE: CPT

## 2021-10-28 RX ADMIN — PERFLUTREN 8.48 MG: 6.52 INJECTION, SUSPENSION INTRAVENOUS at 15:39

## 2021-10-30 LAB
BH CV ECHO MEAS - AO MAX PG (FULL): 3.5 MMHG
BH CV ECHO MEAS - AO MAX PG: 6.9 MMHG
BH CV ECHO MEAS - AO MEAN PG (FULL): 3 MMHG
BH CV ECHO MEAS - AO MEAN PG: 5 MMHG
BH CV ECHO MEAS - AO ROOT AREA (BSA CORRECTED): 1.9
BH CV ECHO MEAS - AO ROOT AREA: 14.5 CM^2
BH CV ECHO MEAS - AO ROOT DIAM: 4.3 CM
BH CV ECHO MEAS - AO V2 MAX: 131 CM/SEC
BH CV ECHO MEAS - AO V2 MEAN: 112 CM/SEC
BH CV ECHO MEAS - AO V2 VTI: 33.9 CM
BH CV ECHO MEAS - AVA(I,A): 3.2 CM^2
BH CV ECHO MEAS - AVA(I,D): 3.2 CM^2
BH CV ECHO MEAS - AVA(V,A): 3.4 CM^2
BH CV ECHO MEAS - AVA(V,D): 3.4 CM^2
BH CV ECHO MEAS - BSA(HAYCOCK): 2.3 M^2
BH CV ECHO MEAS - BSA: 2.3 M^2
BH CV ECHO MEAS - BZI_BMI: 32.6 KILOGRAMS/M^2
BH CV ECHO MEAS - BZI_METRIC_HEIGHT: 180.3 CM
BH CV ECHO MEAS - BZI_METRIC_WEIGHT: 106.1 KG
BH CV ECHO MEAS - EDV(CUBED): 263.4 ML
BH CV ECHO MEAS - EDV(MOD-SP4): 238 ML
BH CV ECHO MEAS - EDV(TEICH): 209.3 ML
BH CV ECHO MEAS - EF(CUBED): 47.8 %
BH CV ECHO MEAS - EF(MOD-SP4): 38.7 %
BH CV ECHO MEAS - EF(TEICH): 39.2 %
BH CV ECHO MEAS - ESV(CUBED): 137.4 ML
BH CV ECHO MEAS - ESV(MOD-SP4): 146 ML
BH CV ECHO MEAS - ESV(TEICH): 127.2 ML
BH CV ECHO MEAS - FS: 19.5 %
BH CV ECHO MEAS - IVS/LVPW: 0.88
BH CV ECHO MEAS - LA DIMENSION: 6 CM
BH CV ECHO MEAS - LA/AO: 1.4
BH CV ECHO MEAS - LAT PEAK E' VEL: 11.4 CM/SEC
BH CV ECHO MEAS - LV DIASTOLIC VOL/BSA (35-75): 105.6 ML/M^2
BH CV ECHO MEAS - LV MASS(C)D: 392.1 GRAMS
BH CV ECHO MEAS - LV MASS(C)DI: 173.9 GRAMS/M^2
BH CV ECHO MEAS - LV MAX PG: 3.3 MMHG
BH CV ECHO MEAS - LV MEAN PG: 2 MMHG
BH CV ECHO MEAS - LV SYSTOLIC VOL/BSA (12-30): 64.8 ML/M^2
BH CV ECHO MEAS - LV V1 MAX: 91.4 CM/SEC
BH CV ECHO MEAS - LV V1 MEAN: 70.8 CM/SEC
BH CV ECHO MEAS - LV V1 VTI: 22.1 CM
BH CV ECHO MEAS - LVIDD: 6.4 CM
BH CV ECHO MEAS - LVIDS: 5.2 CM
BH CV ECHO MEAS - LVLD AP4: 9.3 CM
BH CV ECHO MEAS - LVLS AP4: 8.6 CM
BH CV ECHO MEAS - LVOT AREA (M): 4.9 CM^2
BH CV ECHO MEAS - LVOT AREA: 4.9 CM^2
BH CV ECHO MEAS - LVOT DIAM: 2.5 CM
BH CV ECHO MEAS - LVPWD: 1.4 CM
BH CV ECHO MEAS - MED PEAK E' VEL: 6.2 CM/SEC
BH CV ECHO MEAS - MR MAX PG: 87.2 MMHG
BH CV ECHO MEAS - MR MAX VEL: 467 CM/SEC
BH CV ECHO MEAS - MR MEAN PG: 58 MMHG
BH CV ECHO MEAS - MR MEAN VEL: 363 CM/SEC
BH CV ECHO MEAS - MR VTI: 171 CM
BH CV ECHO MEAS - MV DEC SLOPE: 485 CM/SEC^2
BH CV ECHO MEAS - MV DEC TIME: 0.24 SEC
BH CV ECHO MEAS - MV E MAX VEL: 114 CM/SEC
BH CV ECHO MEAS - RAP SYSTOLE: 10 MMHG
BH CV ECHO MEAS - RVSP: 48.2 MMHG
BH CV ECHO MEAS - SI(AO): 218.4 ML/M^2
BH CV ECHO MEAS - SI(CUBED): 55.9 ML/M^2
BH CV ECHO MEAS - SI(LVOT): 48.1 ML/M^2
BH CV ECHO MEAS - SI(MOD-SP4): 40.8 ML/M^2
BH CV ECHO MEAS - SI(TEICH): 36.4 ML/M^2
BH CV ECHO MEAS - SV(AO): 492.3 ML
BH CV ECHO MEAS - SV(CUBED): 126 ML
BH CV ECHO MEAS - SV(LVOT): 108.5 ML
BH CV ECHO MEAS - SV(MOD-SP4): 92 ML
BH CV ECHO MEAS - SV(TEICH): 82.1 ML
BH CV ECHO MEAS - TR MAX VEL: 309 CM/SEC
BH CV ECHO MEASUREMENTS AVERAGE E/E' RATIO: 12.95
LEFT ATRIUM VOLUME INDEX: 55.6 ML/M2
LEFT ATRIUM VOLUME: 125 CM3
MAXIMAL PREDICTED HEART RATE: 146 BPM
MV VENA CONTRACTA: 0.42 CM
STRESS TARGET HR: 124 BPM

## 2021-11-02 ENCOUNTER — TELEPHONE (OUTPATIENT)
Dept: CARDIOLOGY | Facility: CLINIC | Age: 76
End: 2021-11-02

## 2021-11-02 NOTE — TELEPHONE ENCOUNTER
"Call placed to patient per the request of EVELIA North to check how the patient was doing since Ranexa 500 mg BID was initiated on 10/18/2021.  Patient reports he is doing a little better overall, but insists \"something is still wrong.\"  He states his chest pain has slightly improved.  He states his breathing has improved.  He also reports after he takes the medication he will develop a small headache, but it will go away.  If he eats before he takes the medication it is not as bad.      Patient's report relayed to EVELIA North.  Will forward to Dr. Posadas prior to patient's appt on 11/04/2021.     "

## 2021-11-04 ENCOUNTER — OFFICE VISIT (OUTPATIENT)
Dept: CARDIOLOGY | Facility: CLINIC | Age: 76
End: 2021-11-04

## 2021-11-04 VITALS
HEIGHT: 71 IN | BODY MASS INDEX: 34.16 KG/M2 | WEIGHT: 244 LBS | SYSTOLIC BLOOD PRESSURE: 119 MMHG | OXYGEN SATURATION: 99 % | DIASTOLIC BLOOD PRESSURE: 70 MMHG | HEART RATE: 75 BPM

## 2021-11-04 DIAGNOSIS — I50.23 ACUTE ON CHRONIC SYSTOLIC CHF (CONGESTIVE HEART FAILURE) (HCC): Primary | ICD-10-CM

## 2021-11-04 DIAGNOSIS — E78.2 MIXED HYPERLIPIDEMIA: ICD-10-CM

## 2021-11-04 DIAGNOSIS — I48.21 PERMANENT ATRIAL FIBRILLATION (HCC): ICD-10-CM

## 2021-11-04 DIAGNOSIS — I25.700 CORONARY ARTERY DISEASE INVOLVING CORONARY BYPASS GRAFT OF NATIVE HEART WITH UNSTABLE ANGINA PECTORIS (HCC): ICD-10-CM

## 2021-11-04 DIAGNOSIS — I25.5 ISCHEMIC CARDIOMYOPATHY: ICD-10-CM

## 2021-11-04 DIAGNOSIS — Z95.810 PRESENCE OF BIVENTRICULAR AICD: ICD-10-CM

## 2021-11-04 PROCEDURE — 99214 OFFICE O/P EST MOD 30 MIN: CPT | Performed by: INTERNAL MEDICINE

## 2021-11-04 RX ORDER — SACUBITRIL AND VALSARTAN 49; 51 MG/1; MG/1
1 TABLET, FILM COATED ORAL 2 TIMES DAILY
Qty: 30 TABLET | Refills: 11 | Status: SHIPPED | OUTPATIENT
Start: 2021-11-04 | End: 2021-12-15 | Stop reason: HOSPADM

## 2021-11-04 NOTE — PROGRESS NOTES
"    Subjective:     Encounter Date:11/4/21      Patient ID: Darren Maria is a 76 y.o. male.    Chief Complaint: soa/cp, f/u CHF/CAD/AF    History of Present Illness   76-year-old today returns for prompt follow-up, though he is a new patient to me today.  Extensive cardiovascular history, and was seen remotely in the past by different members of our group, though here since February 2019 until appointment just a few weeks ago with EVELIA North, on 10/18/21.  See below for full summary of his previous cardiac issues.  The time of his visit here with Yolanda, he was reporting worsening fatigue/decline in stamina along with exertional dyspnea and chest pressure over the last 2 to 3 months.  Symptoms would occur when walking half a block or climbing a flight of stairs, and he noticed that the amount of activity takes reduce the symptoms was declining over time.  He reported the symptoms were the same as those he felt prior to CABG.  At that time, symptoms were relieved within a few minutes of rest so he had not taken any nitroglycerin.  Yolanda added Ranexa to his medical regimen, telephone encounter from our clinic nurse, Nicole Rucker, to the patient earlier this week, he did note that his breathing was a little better though still was symptomatic.    Now he says his SOA is better for just the few hours after taking ranexa. Says he's previously had intolerable headache on imdur.  States his breathing is bad, but wife states he's knowingly eating high salt foods like hot dogs. He still is having chest pressure as noted by Yolanda at the first visit, but when specifically asked today whether his shortness of breath and fluid retention or his chest pressure is more problematic, he repeatedly goes back to discussing his fluid retention and trouble breathing. He admits that he knows \"I do not take care of myself like I " "should.\"      ===========================================================================================  From encounter with Yolanda Knees from 10/18/2021:  The patient presents to follow-up regarding his chronic cardiovascular disease, with worsening symptoms over the past 2 to 3 months.  He follows yearly with Dr. Bernal at [facility] for his cardiology care.  He also previously followed here with Dr. Waterman, but has not seen him since February 2019.  He has requested to transfer his local cardiology care to Dr. Posadas, and his initial visit was scheduled with me today.    The patient has a longstanding complicated cardiac history dating back to initial myocardial infarction in 1989.  I have copied an outline of his cardiac history documented per Dr. Bernal below-see for full details.  In short, the patient has a history of CAD ( status post three-vessel CABG in 1990, followed by PCI (multiple prior stents to LCX) and left thoracotomy CABG x1 around 2011/2012), chronic systolic CHF/ischemic cardiomyopathy now with biventricular AICD in place followed by Dr. Mccarty ,  atrial fibrillation anticoagulated with warfarin, and prior AV node ablation (pacemaker was placed piror to eventual upgrade BIV AICD).     Today the patient reports that he has noted worsening fatigue/decline in stamina and shortness of breath with exertion with associated chest pressure over the past 2 to 3 months.  He reports symptoms develop after walking approximately half a block or climbing stairs.  He is unclear exactly how far he was walking before the symptoms developed, but he reports this is a clear decline for him in recent months.  He reports his peripheral neuropathy also limits his walking.  He takes additional diuretics approximately twice per week for intermittent orthopnea and PND or increased swelling that might occur 2-3 times per week.  He states these symptoms may also be happening with increasing frequency.  Aside from the " "orthopnea/PND, he denies any shortness of breath or chest discomfort at rest.  He states his current symptoms are the same as what he experienced prior to his most recent CABG.  He reports his symptoms are relieved within a few minutes of rest and he is not requiring nitroglycerin.  He states his dry weight at home is around 230 pounds, and occasionally when he is \"holding fluid\" his weight will get up to 235 - 236 pounds and he will take an extra diuretic dose.  He has reported his recent symptoms to his PCP, and an echo has been ordered and is actually scheduled to be completed in a few days on 10/28.  ===========================================================================================  The following portions of the patient's history were reviewed and updated as appropriate: allergies, current medications, past family history, past medical history, past social history, past surgical history and problem list.    Review of Systems   Constitutional: Positive for malaise/fatigue.   Cardiovascular: Positive for chest pain (pressure ), dyspnea on exertion, leg swelling (stable x several years ), orthopnea and paroxysmal nocturnal dyspnea. Negative for claudication, near-syncope, palpitations and syncope.   Respiratory: Positive for shortness of breath. Negative for cough.    Hematologic/Lymphatic: Does not bruise/bleed easily.   Musculoskeletal: Negative for falls.   Gastrointestinal: Negative for bloating.   Neurological: Negative for dizziness, light-headedness and weakness.        \"neuropathy in legs limits walking\"       Allergies   Allergen Reactions   • Azithromycin Nausea And Vomiting   • Morphine And Related Other (See Comments)     SHAKES AND MAKES HIM \"WIRED\".   TAKES TRAMADOL WITHOUT PROBLEM       Current Outpatient Medications:   •  atorvastatin (LIPITOR) 20 MG tablet, Take 20 mg by mouth daily., Disp: , Rfl:   •  carvedilol (COREG) 25 MG tablet, Take 25 mg by mouth 2 (two) times a day., Disp: , Rfl: " "  •  clopidogrel (PLAVIX) 75 MG tablet, Take 75 mg by mouth Daily., Disp: , Rfl:   •  diltiazem CD (DILTIAZEM CD) 120 MG 24 hr capsule, Take 120 mg by mouth daily., Disp: , Rfl:   •  finasteride (PROSCAR) 5 MG tablet, Take 5 mg by mouth daily., Disp: , Rfl:   •  metFORMIN (GLUCOPHAGE) 500 MG tablet, , Disp: , Rfl:   •  nitroglycerin (NITROSTAT) 0.4 MG SL tablet, Place 1 tablet under the tongue Every 5 (Five) Minutes As Needed for Chest Pain. Take no more than 3 doses in 15 minutes., Disp: 25 tablet, Rfl: 2  •  omeprazole (PriLOSEC) 40 MG capsule, Take 40 mg by mouth daily., Disp: , Rfl:   •  potassium chloride (K-DUR,KLOR-CON) 20 MEQ CR tablet, Take 20 mEq by mouth 2 (Two) Times a Day., Disp: , Rfl:   •  pregabalin (LYRICA) 150 MG capsule, Take 150 mg by mouth 2 (Two) Times a Day., Disp: , Rfl:   •  ranolazine (Ranexa) 500 MG 12 hr tablet, Take 1 tablet by mouth 2 (Two) Times a Day., Disp: 60 tablet, Rfl: 11  •  tamsulosin (FLOMAX) 0.4 MG capsule 24 hr capsule, Take 1 capsule by mouth daily., Disp: , Rfl:   •  torsemide (DEMADEX) 20 MG tablet, Take 20 mg by mouth 2 (two) times a day., Disp: , Rfl:   •  traMADol-acetaminophen (ULTRACET) 37.5-325 MG per tablet, as needed, Disp: , Rfl: 1  •  warfarin (COUMADIN) 7.5 MG tablet, Take 7.5 mg by mouth Daily., Disp: , Rfl:   •  zolpidem (AMBIEN) 5 MG tablet, TAKE 1 TABLET BY MOUTH EVERY DAY AT BEDTIME AS NEEDED, Disp: , Rfl: 5  •  sacubitril-valsartan (Entresto) 49-51 MG tablet, Take 1 tablet by mouth 2 (Two) Times a Day., Disp: 30 tablet, Rfl: 11         Objective:    /70   Pulse 75   Ht 180.3 cm (71\")   Wt 111 kg (244 lb)   SpO2 99%   BMI 34.03 kg/m²        Vitals and nursing note reviewed.   Constitutional:       General: Not in acute distress.     Appearance: Well-developed and not in distress. Not diaphoretic.   Neck:      Vascular: No JVD.   Pulmonary:      Effort: Pulmonary effort is normal. No respiratory distress.      Breath sounds: Normal breath sounds. "   Cardiovascular:      Normal rate. Regular rhythm.      Murmurs: There is no murmur.   Edema:     Peripheral edema absent.   Abdominal:      Tenderness: There is no abdominal tenderness.   Skin:     General: Skin is warm and dry.   Neurological:      Mental Status: Alert and oriented to person, place, and time.         Lab Review:    Lab Results   Component Value Date    GLUCOSE 137 (H) 10/18/2021    BUN 16 10/18/2021    CREATININE 0.98 10/18/2021    EGFRIFNONA 75 10/18/2021    EGFRIFAFRI 94 03/13/2017    BCR 16.3 10/18/2021    K 4.6 10/18/2021    CO2 28.0 10/18/2021    CALCIUM 9.1 10/18/2021    ALBUMIN 4.30 10/18/2021    AST 21 10/18/2021    ALT 13 10/18/2021     Lab Results   Component Value Date    CHLPL 136 (L) 03/13/2018    TRIG 127 03/13/2018    HDL 38 (L) 03/13/2018    LDL 73 03/13/2018     Lab Results   Component Value Date    HGBA1C 6.3 09/25/2018     10/18/2021 proBNP - 884- WNL           ECG 12 Lead    Date/Time: 11/4/2021 9:03 AM  Performed by: Sarbjit Posadas MD  Authorized by: Sarbjit Posadas MD   Comparison: compared with previous ECG from 2/1/2019  Similar to previous ECG  Rhythm: paced  BPM: 75  Pacing: ventricular paced rhythm             3/17/2021 Documentation per [facility]al at [facility]:    Problem List:  1. Coronary atherosclerotic heart disease.  ---a. Status post remote myocardial infarction 1989.  ---b. Status post CABG ([facility]) 1990 with LIMA to LAD, SVG to PD, SVG to OM.  ---c. Chronic atrial fibrillation with subsequent permanent pacemaker implantation 1999 and prior RFA AVN  ---d. Chronic left ventricular dysfunction  ---e. Status post biventricular ICD 2005 ([facility])  ---f. Cardiac catheterization 01/06 with LVEF 30%, severe native vessel coronary disease, patent LIMA to LAD, patent SVG to PD of RCA,  of SVG to OM with subsequent PTCA/stenting of native OM with 2.5mm x 23mm LEILANI.  ---g. Repeat cardiac catheterization 12/06 ([facility]  [facility]) with patent LIMA to LAD, patent SVG to right PDA, occluded SVG to OM with repeat PCI/stenting with 2.5 mm x 23 mm stent  ---h. Adenosine dual isotope study 04/07 with LVEF 53%, inferolateral scar with no ischemia.  ---i. BNP level < 100 pg/ml 04/07 ; 101 pg/ml 06/08  ---k. Repeat cardiac catheterization 04/08 with LVEF 30%, patent LIMA to LAD and SVG to RCA,  of SVG to LCX, severe focal denovo stenosis of previously twice-stented native LCX with repeat stenting of native LCS ([facility]) with third 2.5mm x 23mm Cypher LEILANI  ---l. Pulse generator replacement 04/28/08 ([facility]) Medtronic Concerto [id number] (serial # [id number]); pulse generator replacement 11/19  ---m. Recurrent angina 04/08, repeat limited native LCA angiogram 04/08 with focal edge dissection and instent restenosis, status post additional stenting ([facility]) with 3 additional [place] stents  ---n. Recurrent angina 12/08 with repeat catheterization confirming LVEF 40%, patent LIMA to LAD, patent SVG to RCA, patent LCx stent site; medical therapy  ---o. TTE 03/10 with LVEF 30-35%, no significant valvular dysfunction, moderate LAE and LVE; repeat 12/10 uncjhanged except LVEWF improved to 35-40%  ---p. BNP 03/10 259 pg/ml > repeat 12/10 165 pg/ml > repeat 06/12 70 pg/ml > repeat 10/12 200 pg/ml>repeat 11/17 154 pg/ml  ---q. Adenosine radionuclide stress test 03/10 and 12/10 with fixed shannan-apical scar, no ischemia, LVEF 40% rising to 50% with stress; medical therapy  ---r. ACS 03/11 with cardiac catheterization revealing patent LIMA to LAD, patent SVG to PD and PL of RCA, severe ISR of previously multiply (x 4 procedures/6 stents) stented distal LM/proximal LCX-OM  ---s. Status post limited left thoracotomy CABG x 1 with SVG from descending Ao to OM, CRISTOBAL ligation ([facility]) - ?[id number]  ---t. TTE 11/11 and 09/12 with LVEF 40-45%, no significant valvular dysfunction  ---u. Recurrent sx 06/12 with repeat  cardiac catheterization with all grafts patent, no culprit lesion in need of PCI; medical therapy  ---v. Status post pulse generator replacement 07/12 and 01/16 ([facility])  ---w. Pharmacologic nuclear stress test 08/13 with LVEF approx 40%, predominant fixed shannan-apical defect, no interval change since prior studies; expectant medical mgmt; repeat 10/15 with no ischemia  ---x. Pharmacologic radionuclide stress test 10/16 with LVEF 30%, fixed anterior defect, no ischemia  ---y. TTE 10/16 with LVEF 45%, no significant valvular dysfunction  ---z. Pharmacologic radionuclide stress test 11/17 with LVEF 40-45%, fixed anteroapical scar, no significant reversible ischemia  ---aa. TTE 02/13/19 with LVEF est 30%, LAE 6cm, LVEDD 6.2 cm, segemental WMAs, no significant valvular dysfunction; est RVSP 40 mmHg.  ---bb. BNP level 2/20 274 pg/ml => 02/21 170 pg/ml  ---cc. TTE 03/21 with LVEF 25%, no significant valvular dysfunction  2. Hyperlipidemia, on statin therapy  3. Renal cell carcinoma with right nephrectomy 2000, no recurrence.  4. Status post remote cholecystectomy with subsequent ventral hernia and ventral herniorrhaphy.  5. Chronic coumadin anticoagulation (indication: atrial fibrillation, INR goal 2-3).  6. Erectile dysfunction  7. Mild chronic renal insufficiency, serum creatinine 1.6 mg% 04/08  8. DJD, status post left TKR 10/15    Electronically signed by Christopher Bernal III, MD at 03/17/2021 8:30 AM CDT          Results for orders placed during the hospital encounter of 10/28/21    Adult Transthoracic Echo Complete W/ Cont if Necessary Per Protocol    Interpretation Summary  · Left ventricular ejection fraction appears to be 26 - 30%. Left ventricular systolic function is moderately decreased.  · The left ventricular cavity is moderately dilated (LVIDd 6.4cm)  · The following left ventricular wall segments are akinetic: apical anterior, apical lateral, apical inferior, apical septal and apex.  · The following  left ventricular wall segments are hypokinetic: mid anterior, apical anterior, basal anterolateral, mid anterolateral, apical lateral, basal inferolateral, mid inferolateral, mid inferior, basal inferoseptal, mid inferoseptal, basal anterior, basal inferior and basal inferoseptal. The following left ventricular wall segments are akinetic: apical inferior, apical septal, apex and mid anteroseptal.  · Mild-moderate mitral regurgitation (functional).  · Left atrial volume is severely increased.  · Estimated right ventricular systolic pressure from tricuspid regurgitation is moderately elevated (45-55 mmHg).  · Compared to last exam from 2/13/2019, LV function appears slightly worse.      Assessment:      Problem List Items Addressed This Visit        Cardiac and Vasculature    Coronary artery disease involving coronary bypass graft of native heart with unstable angina pectoris (HCC)    Relevant Medications    sacubitril-valsartan (Entresto) 49-51 MG tablet    Presence of biventricular AICD    Ischemic cardiomyopathy    Relevant Medications    sacubitril-valsartan (Entresto) 49-51 MG tablet    Permanent atrial fibrillation (HCC)    Relevant Medications    sacubitril-valsartan (Entresto) 49-51 MG tablet      Other Visit Diagnoses     Acute on chronic systolic CHF (congestive heart failure) (HCC)    -  Primary    Relevant Medications    sacubitril-valsartan (Entresto) 49-51 MG tablet    Other Relevant Orders    BNP    Comprehensive Metabolic Panel    Mixed hyperlipidemia        Relevant Orders    Lipid Panel          Plan:     1. Chronic systolic CHF/ischemic cardiomyopathy: Stage C, class III: Patient does not appear volume overloaded on exam, but reports his most limiting symptoms are shortness of breath that he attributes to volume overload. His recent ejection fraction on echocardiogram here was similar to prior evaluations at [place], demonstrating moderate LV systolic dysfunction (EF 26-30%). Has BiV-ICD  (Followed by Dr Mccarty)  -His current medical regimen for chronic systolic heart failure certainly can be improved upon (he reports never having previously discussed Entresto or an SGLT2 inhibitor with his providers at [facility]), and I do think this can lead to significant improvement in his symptoms  -He already took losartan this morning, so I told him to stop taking that now and will transition him to intermediate dose of Entresto (49/51 mg p.o. twice daily).    -Repeat CMP and BNP in 1 week   -We outlined again plan of frequent visits for clinical assessment, lab checks (particularly in light of the fact that he has only one remaining kidney after history of renal cell carcinoma status post nephrectomy), ultimately to try to get him on all 4 classes of heart failure medications at maximally tolerated doses. Based upon labs, the next change would be either adding Farxiga 10 mg p.o. daily or spironolactone    2.  Coronary artery disease:  Unstable due to limiting symptoms (CCS II-III) but not progressing; in fact, slightly improved compared to prior visit  -Continue Plavix  -Continue Coreg  -As needed sublingual nitroglycerin-has not had to use  -Continue atorvastatin-currently on 20 mg but should ideally be on at least 40 mg nightly and maintain goal LDL <70   -will check lipids with labs next week  -continue Ranexa 500 mg twice daily for antianginal benefit; did provide a mild degree of relief but I still think it is unclear whether his symptoms are primarily a result of #1 above or from progression of coronary disease. He did complain of headache even on Ranexa but is willing to stay on this dose, is also had limiting headache in the past with Imdur so further antianginal medical attempts may be limited  -If he continues to have significant chest pressure and discomfort despite optimization of CHF treatment, then can consider cardiac catheterization to look for any other potential revascularization options  that might improve symptoms    3.  Permanent atrial fibrillation: Biventricular paced and rate controlled today.  Stable/asymptomatic  -Anticoagulated with warfarin with goal INR of 2-3.  (will consider transition to DOAC in future).    -Continue current doses of Coreg, diltiazem for rate control.  Asymptomatic.    F/u 2 weeks with EVELIA North, for further evaluation and medication adjustment for aggressive titration to reach maximally tolerated doses of all 4 classes of medications GDMT for systolic CHF    Sarbjit Posadas MD  06:06 CDT  11/05/21

## 2021-11-05 PROCEDURE — 93000 ELECTROCARDIOGRAM COMPLETE: CPT | Performed by: INTERNAL MEDICINE

## 2021-11-12 ENCOUNTER — LAB (OUTPATIENT)
Dept: LAB | Facility: HOSPITAL | Age: 76
End: 2021-11-12

## 2021-11-12 DIAGNOSIS — I50.23 ACUTE ON CHRONIC SYSTOLIC CHF (CONGESTIVE HEART FAILURE) (HCC): ICD-10-CM

## 2021-11-12 DIAGNOSIS — R35.0 URINE FREQUENCY: ICD-10-CM

## 2021-11-12 DIAGNOSIS — E78.2 MIXED HYPERLIPIDEMIA: ICD-10-CM

## 2021-11-12 LAB
ALBUMIN SERPL-MCNC: 4.2 G/DL (ref 3.5–5.2)
ALBUMIN/GLOB SERPL: 2.1 G/DL
ALP SERPL-CCNC: 69 U/L (ref 39–117)
ALT SERPL W P-5'-P-CCNC: 12 U/L (ref 1–41)
ANION GAP SERPL CALCULATED.3IONS-SCNC: 8.2 MMOL/L (ref 5–15)
AST SERPL-CCNC: 21 U/L (ref 1–40)
BILIRUB SERPL-MCNC: 0.6 MG/DL (ref 0–1.2)
BUN SERPL-MCNC: 20 MG/DL (ref 8–23)
BUN/CREAT SERPL: 20.4 (ref 7–25)
CALCIUM SPEC-SCNC: 9.5 MG/DL (ref 8.6–10.5)
CHLORIDE SERPL-SCNC: 109 MMOL/L (ref 98–107)
CHOLEST SERPL-MCNC: 122 MG/DL (ref 0–200)
CO2 SERPL-SCNC: 26.8 MMOL/L (ref 22–29)
CREAT SERPL-MCNC: 0.98 MG/DL (ref 0.76–1.27)
GFR SERPL CREATININE-BSD FRML MDRD: 75 ML/MIN/1.73
GLOBULIN UR ELPH-MCNC: 2 GM/DL
GLUCOSE SERPL-MCNC: 132 MG/DL (ref 65–99)
HDLC SERPL-MCNC: 36 MG/DL (ref 40–60)
LDLC SERPL CALC-MCNC: 66 MG/DL (ref 0–100)
LDLC/HDLC SERPL: 1.79 {RATIO}
NT-PROBNP SERPL-MCNC: 821.5 PG/ML (ref 0–900)
POTASSIUM SERPL-SCNC: 4.9 MMOL/L (ref 3.5–5.2)
PROT SERPL-MCNC: 6.2 G/DL (ref 6–8.5)
PSA SERPL-MCNC: 3.06 NG/ML (ref 0–4)
SODIUM SERPL-SCNC: 144 MMOL/L (ref 136–145)
TRIGL SERPL-MCNC: 108 MG/DL (ref 0–150)
VLDLC SERPL-MCNC: 20 MG/DL (ref 5–40)

## 2021-11-12 PROCEDURE — 80053 COMPREHEN METABOLIC PANEL: CPT

## 2021-11-12 PROCEDURE — 80061 LIPID PANEL: CPT

## 2021-11-12 PROCEDURE — 84153 ASSAY OF PSA TOTAL: CPT

## 2021-11-12 PROCEDURE — 83880 ASSAY OF NATRIURETIC PEPTIDE: CPT

## 2021-11-12 PROCEDURE — 36415 COLL VENOUS BLD VENIPUNCTURE: CPT

## 2021-11-18 ENCOUNTER — OFFICE VISIT (OUTPATIENT)
Dept: CARDIOLOGY | Facility: CLINIC | Age: 76
End: 2021-11-18

## 2021-11-18 VITALS
HEIGHT: 71 IN | SYSTOLIC BLOOD PRESSURE: 96 MMHG | OXYGEN SATURATION: 99 % | BODY MASS INDEX: 33.43 KG/M2 | WEIGHT: 238.8 LBS | DIASTOLIC BLOOD PRESSURE: 58 MMHG | HEART RATE: 75 BPM

## 2021-11-18 DIAGNOSIS — I50.42 CHRONIC COMBINED SYSTOLIC AND DIASTOLIC CONGESTIVE HEART FAILURE (HCC): ICD-10-CM

## 2021-11-18 DIAGNOSIS — I48.21 PERMANENT ATRIAL FIBRILLATION (HCC): ICD-10-CM

## 2021-11-18 DIAGNOSIS — I25.5 ISCHEMIC CARDIOMYOPATHY: ICD-10-CM

## 2021-11-18 DIAGNOSIS — I50.22 CHRONIC SYSTOLIC CHF (CONGESTIVE HEART FAILURE) (HCC): Primary | ICD-10-CM

## 2021-11-18 DIAGNOSIS — I10 ESSENTIAL HYPERTENSION: ICD-10-CM

## 2021-11-18 DIAGNOSIS — E78.2 MIXED HYPERLIPIDEMIA: ICD-10-CM

## 2021-11-18 DIAGNOSIS — I25.708 CORONARY ARTERY DISEASE OF BYPASS GRAFT OF NATIVE HEART WITH STABLE ANGINA PECTORIS (HCC): ICD-10-CM

## 2021-11-18 DIAGNOSIS — Z95.810 PRESENCE OF BIVENTRICULAR AICD: ICD-10-CM

## 2021-11-18 PROCEDURE — 99214 OFFICE O/P EST MOD 30 MIN: CPT | Performed by: NURSE PRACTITIONER

## 2021-11-18 PROCEDURE — 93000 ELECTROCARDIOGRAM COMPLETE: CPT | Performed by: NURSE PRACTITIONER

## 2021-11-18 RX ORDER — FLUTICASONE PROPIONATE 50 MCG
2 SPRAY, SUSPENSION (ML) NASAL DAILY PRN
COMMUNITY

## 2021-11-18 RX ORDER — CETIRIZINE HYDROCHLORIDE 10 MG/1
10 TABLET ORAL DAILY
COMMUNITY

## 2021-11-18 RX ORDER — ATORVASTATIN CALCIUM 40 MG/1
40 TABLET, FILM COATED ORAL NIGHTLY
Qty: 30 TABLET | Refills: 11 | Status: ON HOLD | OUTPATIENT
Start: 2021-11-18 | End: 2021-12-15

## 2021-11-18 RX ORDER — DAPAGLIFLOZIN 10 MG/1
10 TABLET, FILM COATED ORAL EVERY MORNING
Qty: 30 TABLET | Refills: 11 | Status: SHIPPED | OUTPATIENT
Start: 2021-11-18 | End: 2021-11-22

## 2021-11-18 NOTE — PROGRESS NOTES
Subjective:     Encounter Date:11/18/21      Patient ID: Darren Maria is a 76 y.o. male.    Chief Complaint: soa/cp, f/u CHF/CAD/AF    History of Present Illness    ===========================================================================================  From encounter with me on 10/18/2021:  The patient presents to follow-up regarding his chronic cardiovascular disease, with worsening symptoms over the past 2 to 3 months.  He follows yearly with Dr. Bernal at [place] for his cardiology care.  He also previously followed here with Dr. Waterman, but has not seen him since February 2019.  He has requested to transfer his local cardiology care to Dr. Posadas, and his initial visit was scheduled with me today.    The patient has a longstanding complicated cardiac history dating back to initial myocardial infarction in 1989.  I have copied an outline of his cardiac history documented per Dr. Bernal below-see for full details.  In short, the patient has a history of CAD ( status post three-vessel CABG in 1990, followed by PCI (multiple prior stents to LCX) and left thoracotomy CABG x1 around 2011/2012), chronic systolic CHF/ischemic cardiomyopathy now with biventricular AICD in place followed by Dr. Mccarty ,  atrial fibrillation anticoagulated with warfarin, and prior AV node ablation (pacemaker was placed piror to eventual upgrade BIV AICD).     Today the patient reports that he has noted worsening fatigue/decline in stamina and shortness of breath with exertion with associated chest pressure over the past 2 to 3 months.  He reports symptoms develop after walking approximately half a block or climbing stairs.  He is unclear exactly how far he was walking before the symptoms developed, but he reports this is a clear decline for him in recent months.  He reports his peripheral neuropathy also limits his walking.  He takes additional diuretics approximately twice per week for intermittent orthopnea and PND or  "increased swelling that might occur 2-3 times per week.  He states these symptoms may also be happening with increasing frequency.  Aside from the orthopnea/PND, he denies any shortness of breath or chest discomfort at rest.  He states his current symptoms are the same as what he experienced prior to his most recent CABG.  He reports his symptoms are relieved within a few minutes of rest and he is not requiring nitroglycerin.  He states his dry weight at home is around 230 pounds, and occasionally when he is \"holding fluid\" his weight will get up to 235 - 236 pounds and he will take an extra diuretic dose.  He has reported his recent symptoms to his PCP, and an echo has been ordered and is actually scheduled to be completed in a few days on 10/28.  ===========================================================================================    More recently, the patient saw Dr. Posadas on 11/4/2021 as a new patient to him.  It was noted that I did add Ranexa 500 mg twice daily at his visit on 10/18/2021.  The patient later contacted our clinic nurse, Nicole Sauer and reported his breathing was a little better though he was still symptomatic on the Ranexa.    At the visit with Dr. Posadas on 11/4 he reported his shortness of breath would be better for just a few hours after taking Ranexa.  Reported previous headaches with Imdur.  He was admittedly eating foods high in sodium.  He was still having chest pressure, but when asked specifically whether shortness of breath and fluid retention or chest pressure was more problematic, he would repeatedly go back to discussing fluid retention and trouble breathing.  At that visit, he was transitioned from losartan 50 mg daily to middle dose Entresto and follow-up labs 1 week thereafter were ordered, to include an lipid panel as well.  See results below.  ====================================================================================    He presents today to follow-up and reports " "he can tell significant improvement in his chest pressure with exertion-this is much less severe and occurs less frequently.  He also thought that initially his shortness of breath with exertion had improved for the first week after the change, but then it went back to what it had been prior to transition to Entresto.  He states his weight is staying between 232 and 234 pounds at home-he does not appreciate a change although he is down 6 pounds per our scales in the office.   He denies orthopnea, PND, palpitations, syncope, presyncope.  He continues to take Demadex 20 mg twice daily, but has not required any additional doses.  He has not been monitoring his blood pressure, but he denies any dizziness outside of when he stands up too quickly.    The following portions of the patient's history were reviewed and updated as appropriate: allergies, current medications, past family history, past medical history, past social history, past surgical history and problem list.    Review of Systems   Constitutional: Positive for malaise/fatigue.   Cardiovascular: Positive for chest pain (improved- less exertional pressure ), dyspnea on exertion and leg swelling. Negative for claudication, near-syncope, orthopnea, palpitations, paroxysmal nocturnal dyspnea and syncope.   Respiratory: Positive for shortness of breath. Negative for cough.    Hematologic/Lymphatic: Does not bruise/bleed easily.   Musculoskeletal: Negative for falls.   Gastrointestinal: Negative for bloating.   Neurological: Negative for dizziness, light-headedness and weakness.        \"neuropathy in legs limits walking\"       Allergies   Allergen Reactions   • Azithromycin Nausea And Vomiting   • Morphine And Related Other (See Comments)     SHAKES AND MAKES HIM \"WIRED\".   TAKES TRAMADOL WITHOUT PROBLEM       Current Outpatient Medications:   •  atorvastatin (LIPITOR) 20 MG tablet, Take 20 mg by mouth daily., Disp: , Rfl:   •  carvedilol (COREG) 25 MG tablet, Take " 25 mg by mouth 2 (two) times a day., Disp: , Rfl:   •  cetirizine (zyrTEC) 10 MG tablet, Take 10 mg by mouth Daily., Disp: , Rfl:   •  clopidogrel (PLAVIX) 75 MG tablet, Take 75 mg by mouth Daily., Disp: , Rfl:   •  diltiazem CD (DILTIAZEM CD) 120 MG 24 hr capsule, Take 120 mg by mouth daily., Disp: , Rfl:   •  finasteride (PROSCAR) 5 MG tablet, Take 5 mg by mouth daily., Disp: , Rfl:   •  fluticasone (FLONASE) 50 MCG/ACT nasal spray, 2 sprays into the nostril(s) as directed by provider Daily., Disp: , Rfl:   •  metFORMIN (GLUCOPHAGE) 500 MG tablet, , Disp: , Rfl:   •  nitroglycerin (NITROSTAT) 0.4 MG SL tablet, Place 1 tablet under the tongue Every 5 (Five) Minutes As Needed for Chest Pain. Take no more than 3 doses in 15 minutes., Disp: 25 tablet, Rfl: 2  •  omeprazole (PriLOSEC) 40 MG capsule, Take 40 mg by mouth daily., Disp: , Rfl:   •  potassium chloride (K-DUR,KLOR-CON) 20 MEQ CR tablet, Take 20 mEq by mouth Daily. With additional 20 meq if second dose of torsemide taken , Disp: , Rfl:   •  pregabalin (LYRICA) 150 MG capsule, Take 150 mg by mouth 2 (Two) Times a Day., Disp: , Rfl:   •  ranolazine (Ranexa) 500 MG 12 hr tablet, Take 1 tablet by mouth 2 (Two) Times a Day., Disp: 60 tablet, Rfl: 11  •  sacubitril-valsartan (Entresto) 49-51 MG tablet, Take 1 tablet by mouth 2 (Two) Times a Day., Disp: 30 tablet, Rfl: 11  •  tamsulosin (FLOMAX) 0.4 MG capsule 24 hr capsule, Take 1 capsule by mouth daily., Disp: , Rfl:   •  torsemide (DEMADEX) 20 MG tablet, Take 20 mg by mouth Daily. With additional 20 mg PRN , Disp: , Rfl:   •  traMADol-acetaminophen (ULTRACET) 37.5-325 MG per tablet, as needed, Disp: , Rfl: 1  •  warfarin (COUMADIN) 7.5 MG tablet, Take 7.5 mg by mouth Daily., Disp: , Rfl:   •  zolpidem (AMBIEN) 5 MG tablet, TAKE 1 TABLET BY MOUTH EVERY DAY AT BEDTIME AS NEEDED, Disp: , Rfl: 5  •  Dapagliflozin Propanediol (Farxiga) 10 MG tablet, Take 10 mg by mouth Every Morning., Disp: 30 tablet, Rfl: 11        "  Objective:    BP 96/58   Pulse 75   Ht 180.3 cm (71\")   Wt 108 kg (238 lb 12.8 oz)   SpO2 99%   BMI 33.31 kg/m²        Vitals and nursing note reviewed.   Constitutional:       General: Not in acute distress.     Appearance: Well-developed and not in distress. Not diaphoretic.   Neck:      Vascular: No JVD.   Pulmonary:      Effort: Pulmonary effort is normal. No respiratory distress.      Breath sounds: Normal breath sounds.   Cardiovascular:      Normal rate. Regular rhythm.      Murmurs: There is no murmur.   Edema:     Peripheral edema absent.   Abdominal:      Tenderness: There is no abdominal tenderness.   Skin:     General: Skin is warm and dry.   Neurological:      Mental Status: Alert and oriented to person, place, and time.         Lab Review:    Lab Results   Component Value Date    GLUCOSE 132 (H) 11/12/2021    BUN 20 11/12/2021    CREATININE 0.98 11/12/2021    EGFRIFNONA 75 11/12/2021    EGFRIFAFRI 94 03/13/2017    BCR 20.4 11/12/2021    K 4.9 11/12/2021    CO2 26.8 11/12/2021    CALCIUM 9.5 11/12/2021    ALBUMIN 4.20 11/12/2021    AST 21 11/12/2021    ALT 12 11/12/2021     Lab Results   Component Value Date    CHOL 122 11/12/2021    CHLPL 136 (L) 03/13/2018    TRIG 108 11/12/2021    HDL 36 (L) 11/12/2021    LDL 66 11/12/2021     Lab Results   Component Value Date    HGBA1C 6.3 09/25/2018     Lab Results   Component Value Date    CHOL 122 11/12/2021    CHLPL 136 (L) 03/13/2018    TRIG 108 11/12/2021    HDL 36 (L) 11/12/2021    LDL 66 11/12/2021               ECG 12 Lead    Date/Time: 11/18/2021 4:58 PM  Performed by: Yolanda Garnica APRN  Authorized by: Yolanda Garnica APRN   Comparison: compared with previous ECG from 10/18/2021  Similar to previous ECG  Comparison to previous ECG: V-paced  Rhythm: paced  BPM: 75                 3/17/2021 Documentation per Dr. Bernal at [facility]:    Problem List:  1. Coronary atherosclerotic heart disease.  ---a. Status post remote myocardial infarction 1989.  ---b. " Status post CABG ([facility]) 1990 with LIMA to LAD, SVG to PD, SVG to OM.  ---c. Chronic atrial fibrillation with subsequent permanent pacemaker implantation 1999 and prior RFA AVN  ---d. Chronic left ventricular dysfunction  ---e. Status post biventricular ICD 2005 ([facility])  ---f. Cardiac catheterization 01/06 with LVEF 30%, severe native vessel coronary disease, patent LIMA to LAD, patent SVG to PD of RCA,  of SVG to OM with subsequent PTCA/stenting of native OM with 2.5mm x 23mm LEILANI.  ---g. Repeat cardiac catheterization 12/06 ([facility]) with patent LIMA to LAD, patent SVG to right PDA, occluded SVG to OM with repeat PCI/stenting with 2.5 mm x 23 mm stent  ---h. Adenosine dual isotope study 04/07 with LVEF 53%, inferolateral scar with no ischemia.  ---i. BNP level < 100 pg/ml 04/07 ; 101 pg/ml 06/08  ---k. Repeat cardiac catheterization 04/08 with LVEF 30%, patent LIMA to LAD and SVG to RCA,  of SVG to LCX, severe focal denovo stenosis of previously twice-stented native LCX with repeat stenting of native LCS ([facility], [facility]) with third 2.5mm x 23mm Cypher LEILANI  ---l. Pulse generator replacement 04/28/08 ([facility]) Medtronic Concerto [id number] (serial # [id number]); pulse generator replacement 11/19  ---m. Recurrent angina 04/08, repeat limited native LCA angiogram 04/08 with focal edge dissection and instent restenosis, status post additional stenting ([facility]) with 3 additional [place] stents  ---n. Recurrent angina 12/08 with repeat catheterization confirming LVEF 40%, patent LIMA to LAD, patent SVG to RCA, patent LCx stent site; medical therapy  ---o. TTE 03/10 with LVEF 30-35%, no significant valvular dysfunction, moderate LAE and LVE; repeat 12/10 uncjhanged except LVEWF improved to 35-40%  ---p. BNP 03/10 259 pg/ml > repeat 12/10 165 pg/ml > repeat 06/12 70 pg/ml > repeat 10/12 200 pg/ml>repeat 11/17 154  pg/ml  ---q. Adenosine radionuclide stress test 03/10 and 12/10 with fixed shannan-apical scar, no ischemia, LVEF 40% rising to 50% with stress; medical therapy  ---r. ACS 03/11 with cardiac catheterization revealing patent LIMA to LAD, patent SVG to PD and PL of RCA, severe ISR of previously multiply (x 4 procedures/6 stents) stented distal LM/proximal LCX-OM  ---s. Status post limited left thoracotomy CABG x 1 with SVG from descending Ao to OM, CRISTOBAL ligation ([facility]) - ?[id number]  ---t. TTE 11/11 and 09/12 with LVEF 40-45%, no significant valvular dysfunction  ---u. Recurrent sx 06/12 with repeat cardiac catheterization with all grafts patent, no culprit lesion in need of PCI; medical therapy  ---v. Status post pulse generator replacement 07/12 and 01/16 ([facility])  ---w. Pharmacologic nuclear stress test 08/13 with LVEF approx 40%, predominant fixed shannan-apical defect, no interval change since prior studies; expectant medical mgmt; repeat 10/15 with no ischemia  ---x. Pharmacologic radionuclide stress test 10/16 with LVEF 30%, fixed anterior defect, no ischemia  ---y. TTE 10/16 with LVEF 45%, no significant valvular dysfunction  ---z. Pharmacologic radionuclide stress test 11/17 with LVEF 40-45%, fixed anteroapical scar, no significant reversible ischemia  ---aa. TTE 02/13/19 with LVEF est 30%, LAE 6cm, LVEDD 6.2 cm, segemental WMAs, no significant valvular dysfunction; est RVSP 40 mmHg.  ---bb. BNP level 2/20 274 pg/ml => 02/21 170 pg/ml  ---cc. TTE 03/21 with LVEF 25%, no significant valvular dysfunction  2. Hyperlipidemia, on statin therapy  3. Renal cell carcinoma with right nephrectomy 2000, no recurrence.  4. Status post remote cholecystectomy with subsequent ventral hernia and ventral herniorrhaphy.  5. Chronic coumadin anticoagulation (indication: atrial fibrillation, INR goal 2-3).  6. Erectile dysfunction  7. Mild chronic renal insufficiency, serum creatinine 1.6 mg% 04/08  8. DJD, status post  left TKR 10/15    Electronically signed by Christopher Brenal III, MD at 03/17/2021 8:30 AM CDT          Results for orders placed during the hospital encounter of 10/28/21    Adult Transthoracic Echo Complete W/ Cont if Necessary Per Protocol    Interpretation Summary  · Left ventricular ejection fraction appears to be 26 - 30%. Left ventricular systolic function is moderately decreased.  · The left ventricular cavity is moderately dilated (LVIDd 6.4cm)  · The following left ventricular wall segments are akinetic: apical anterior, apical lateral, apical inferior, apical septal and apex.  · The following left ventricular wall segments are hypokinetic: mid anterior, apical anterior, basal anterolateral, mid anterolateral, apical lateral, basal inferolateral, mid inferolateral, mid inferior, basal inferoseptal, mid inferoseptal, basal anterior, basal inferior and basal inferoseptal. The following left ventricular wall segments are akinetic: apical inferior, apical septal, apex and mid anteroseptal.  · Mild-moderate mitral regurgitation (functional).  · Left atrial volume is severely increased.  · Estimated right ventricular systolic pressure from tricuspid regurgitation is moderately elevated (45-55 mmHg).  · Compared to last exam from 2/13/2019, LV function appears slightly worse.      Assessment:      Problem List Items Addressed This Visit        Cardiac and Vasculature    Coronary artery disease of bypass graft of native heart with stable angina pectoris (HCC)    Essential hypertension    Presence of biventricular AICD    Ischemic cardiomyopathy    Permanent atrial fibrillation (HCC)    Chronic combined systolic and diastolic congestive heart failure (HCC)    Mixed hyperlipidemia      Other Visit Diagnoses     Chronic systolic CHF (congestive heart failure) (HCC)    -  Primary    Relevant Orders    Comprehensive Metabolic Panel    proBNP          Plan:     1. Chronic systolic CHF/ischemic cardiomyopathy: Stage C,  class III: Patient does not appear volume overloaded on exam, but reports his most limiting symptoms are shortness of breath that he attributed to volume overload initially, but he also reiterates he had similar symptoms prior to his second CABG.. His recent ejection fraction on echocardiogram here was similar to prior evaluations at [place], demonstrating moderate LV systolic dysfunction (EF 26-30%). Has BiV-ICD (Followed by Dr Mccarty)  -Continue Entresto 49/51 mg twice daily (initiated 11/4)  -Start Farxiga 10 mg daily  -Repeat CMP and BMP in 1 week  -Continue Coreg 25 mg twice daily  -Dr. Posadas previously outlined plan of frequent visits for clinical assessment, lab checks (particularly in light of the fact that he has 1 remaining kidney after history of renal cell carcinoma status post nephrectomy), ultimately to try to get him on all 4 classes of heart failure medications at maximally tolerated doses.  - will plan to add Aldactone at follow-up pending his repeat lab results and blood pressure.  -Try to reduce torsemide from 20 mg twice daily to 20 mg daily with additional 20 mg as needed.  He also has been instructed to only take his evening dose of potassium if the evening dose of torsemide is taken.  If he finds that he has worsening dyspnea, swelling, or weight gain when he does not take evening dose, he is to resume his usual regimen of 20 mg twice daily    2.  Coronary artery disease: Stable.  He reports significant improvement in chest pressure with the addition of Entresto, leading me to believe that his pressure may have been provoked by his heart failure and volume status.  He continues to have dyspnea on exertion, that improved only short-term after adding Entresto, but has not worsened since initial visit with me on 10/18.  -Continue Plavix  -Continue Coreg  -As needed sublingual nitroglycerin-has not had to use  -Increase atorvastatin from 20 mg to 40 mg nightly given his CAD; LDL 66.  We  discussed the goal of keeping the LDL less than 70.  -continue Ranexa 500 mg twice daily for antianginal benefit; did provide a mild degree of relief but still  unclear whether his symptoms are primarily a result of #1 above or from progression of coronary disease. He did complain of headache even on Ranexa but is willing to stay on this dose, is also had limiting headache in the past with Imdur so further antianginal medical attempts may be limited  -If he continues to have significant symptoms (chest pressure, dyspnea) and discomfort despite optimization of CHF treatment, then can consider cardiac catheterization to look for any other potential revascularization options that might improve symptoms, as previously outlined by Dr. Posadas.    3.  Permanent atrial fibrillation: Biventricular paced and rate controlled today.  Stable/asymptomatic  -Anticoagulated with warfarin with goal INR of 2-3.  (will consider transition to DOAC in future).    -Continue current doses of Coreg, diltiazem for rate control.  Asymptomatic.  May need to consider eliminating diltiazem to make room for guideline directed medical therapy for his heart failure (in terms of his BP), if this does not subsequently cause RVR.     F/u 2 weeks with Dr. Posadas or myself for further evaluation and medication adjustment for aggressive titration to reach maximally tolerated doses of all 4 classes of medications GDMT for systolic CHF    Yolanda Garnica, APRN  17:31 CST  11/18/21

## 2021-11-30 ENCOUNTER — TELEPHONE (OUTPATIENT)
Dept: CARDIOLOGY | Facility: CLINIC | Age: 76
End: 2021-11-30

## 2021-11-30 ENCOUNTER — LAB (OUTPATIENT)
Dept: LAB | Facility: HOSPITAL | Age: 76
End: 2021-11-30

## 2021-11-30 DIAGNOSIS — I50.22 CHRONIC SYSTOLIC CHF (CONGESTIVE HEART FAILURE) (HCC): ICD-10-CM

## 2021-11-30 LAB
ALBUMIN SERPL-MCNC: 4.6 G/DL (ref 3.5–5.2)
ALBUMIN/GLOB SERPL: 2.1 G/DL
ALP SERPL-CCNC: 66 U/L (ref 39–117)
ALT SERPL W P-5'-P-CCNC: 19 U/L (ref 1–41)
ANION GAP SERPL CALCULATED.3IONS-SCNC: 11 MMOL/L (ref 5–15)
AST SERPL-CCNC: 25 U/L (ref 1–40)
BILIRUB SERPL-MCNC: 0.8 MG/DL (ref 0–1.2)
BUN SERPL-MCNC: 28 MG/DL (ref 8–23)
BUN/CREAT SERPL: 23.7 (ref 7–25)
CALCIUM SPEC-SCNC: 9.5 MG/DL (ref 8.6–10.5)
CHLORIDE SERPL-SCNC: 105 MMOL/L (ref 98–107)
CO2 SERPL-SCNC: 26 MMOL/L (ref 22–29)
CREAT SERPL-MCNC: 1.18 MG/DL (ref 0.76–1.27)
GFR SERPL CREATININE-BSD FRML MDRD: 60 ML/MIN/1.73
GLOBULIN UR ELPH-MCNC: 2.2 GM/DL
GLUCOSE SERPL-MCNC: 142 MG/DL (ref 65–99)
NT-PROBNP SERPL-MCNC: 637.6 PG/ML (ref 0–900)
POTASSIUM SERPL-SCNC: 4.7 MMOL/L (ref 3.5–5.2)
PROT SERPL-MCNC: 6.8 G/DL (ref 6–8.5)
SODIUM SERPL-SCNC: 142 MMOL/L (ref 136–145)

## 2021-11-30 PROCEDURE — 36415 COLL VENOUS BLD VENIPUNCTURE: CPT

## 2021-11-30 PROCEDURE — 80053 COMPREHEN METABOLIC PANEL: CPT

## 2021-11-30 PROCEDURE — 83880 ASSAY OF NATRIURETIC PEPTIDE: CPT

## 2021-11-30 NOTE — TELEPHONE ENCOUNTER
The patients contacts us requesting an excuse for Jury Duty because he cannot sit for long periods of time without having to go to the bathroom.  I informed him he would need to contact his PCP for this letter.

## 2021-12-09 ENCOUNTER — PREP FOR SURGERY (OUTPATIENT)
Dept: OTHER | Facility: HOSPITAL | Age: 76
End: 2021-12-09

## 2021-12-09 ENCOUNTER — OFFICE VISIT (OUTPATIENT)
Dept: CARDIOLOGY | Facility: CLINIC | Age: 76
End: 2021-12-09

## 2021-12-09 VITALS
SYSTOLIC BLOOD PRESSURE: 110 MMHG | WEIGHT: 237 LBS | OXYGEN SATURATION: 96 % | BODY MASS INDEX: 33.18 KG/M2 | HEART RATE: 75 BPM | DIASTOLIC BLOOD PRESSURE: 60 MMHG | HEIGHT: 71 IN

## 2021-12-09 DIAGNOSIS — I25.708 CORONARY ARTERY DISEASE OF BYPASS GRAFT OF NATIVE HEART WITH STABLE ANGINA PECTORIS (HCC): ICD-10-CM

## 2021-12-09 DIAGNOSIS — E78.2 MIXED HYPERLIPIDEMIA: ICD-10-CM

## 2021-12-09 DIAGNOSIS — I48.21 PERMANENT ATRIAL FIBRILLATION (HCC): ICD-10-CM

## 2021-12-09 DIAGNOSIS — I10 ESSENTIAL HYPERTENSION: ICD-10-CM

## 2021-12-09 DIAGNOSIS — I25.708 CORONARY ARTERY DISEASE OF BYPASS GRAFT OF NATIVE HEART WITH STABLE ANGINA PECTORIS (HCC): Primary | ICD-10-CM

## 2021-12-09 DIAGNOSIS — I50.42 CHRONIC COMBINED SYSTOLIC AND DIASTOLIC CONGESTIVE HEART FAILURE (HCC): Primary | ICD-10-CM

## 2021-12-09 LAB — PSA SERPL-MCNC: 3.15 NG/ML (ref 0–4)

## 2021-12-09 PROCEDURE — 93000 ELECTROCARDIOGRAM COMPLETE: CPT | Performed by: INTERNAL MEDICINE

## 2021-12-09 PROCEDURE — 99214 OFFICE O/P EST MOD 30 MIN: CPT | Performed by: INTERNAL MEDICINE

## 2021-12-09 RX ORDER — ASPIRIN 81 MG/1
81 TABLET ORAL DAILY
Status: CANCELLED | OUTPATIENT
Start: 2021-12-10

## 2021-12-09 RX ORDER — SODIUM CHLORIDE 0.9 % (FLUSH) 0.9 %
10 SYRINGE (ML) INJECTION AS NEEDED
Status: CANCELLED | OUTPATIENT
Start: 2021-12-09

## 2021-12-09 RX ORDER — SODIUM CHLORIDE 0.9 % (FLUSH) 0.9 %
3 SYRINGE (ML) INJECTION EVERY 12 HOURS SCHEDULED
Status: CANCELLED | OUTPATIENT
Start: 2021-12-09

## 2021-12-09 RX ORDER — ASPIRIN 81 MG/1
324 TABLET, CHEWABLE ORAL ONCE
Status: CANCELLED | OUTPATIENT
Start: 2021-12-09 | End: 2021-12-09

## 2021-12-09 RX ORDER — SODIUM CHLORIDE 9 MG/ML
1-3 INJECTION, SOLUTION INTRAVENOUS CONTINUOUS
Status: CANCELLED | OUTPATIENT
Start: 2021-12-09

## 2021-12-09 NOTE — PROGRESS NOTES
"    Subjective:     Encounter Date: 12/09/21      Patient ID: Darren Maria is a 76 y.o. male.    Chief Complaint: soa/cp, f/u CHF/CAD/AF    History of Present Illness   76-year-old today returns for prompt follow-up.  I met him on 11/4/2021, and he has extensive cardiovascular history.  He did been seen remotely in the past by different members of our group, though had not been seen since February 2019 until appointment with EVELIA North, on 10/18/21.  See below for full summary of his previous cardiac issues.  At the time of his visit here with Yolanda, he was reporting worsening fatigue/decline in stamina along with exertional dyspnea and chest pressure over the last 2 to 3 months.  Symptoms would occur when walking half a block or climbing a flight of stairs, and he noticed that the amount of activity takes reduce the symptoms was declining over time.  He reported the symptoms were the same as those he felt prior to CABG.  At that time, symptoms were relieved within a few minutes of rest so he had not taken any nitroglycerin.  Yolanda added Ranexa to his medical regimen, telephone encounter from our clinic nurse, Nicole Sauer, to the patient earlier that [employer], he did note that his breathing was a little better though still was symptomatic.    At my visit with him on 11/4, I noted the following:  Now he says his SOA is better for just the few hours after taking ranexa. Says he's previously had intolerable headache on imdur.  States his breathing is bad, but wife states he's knowingly eating high salt foods like hot dogs. He still is having chest pressure as noted by Yolanda at the first visit, but when specifically asked today whether his shortness of breath and fluid retention or his chest pressure is more problematic, he repeatedly goes back to discussing his fluid retention and trouble breathing. He admits that he knows \"I do not take care of myself like I should.\"    I transitioned him to Entresto at that visit, he " "returned 2 weeks later for follow-up again with EVELIA North.  According to her note that day, he reported significant improvement in his exertional chest pressure, but thought that his breathing was about the same.  His weight was stable by his home scale, though our office scale did suggest a 6 pound decrease since the prior visit with me 2 weeks before that.  Therefore, Yolanda did start Farxiga therapy that day, and he now returns for follow-up.    He reports his breathing is much better - can walk further.  Still having chest pains, but less severe. Frequency unchanged.  Had a prolonged episode of chest pain yesterday.  Still \"feel like something is wrong. I still get pressure, even at rest.\"          ===========================================================================================  RELEVANT CARDIAC HX:    The patient has a longstanding complicated cardiac history dating back to initial myocardial infarction in 1989.  I have copied an outline of his cardiac history documented per Dr. Bernal below-see for full details.  In short, the patient has a history of CAD ( status post three-vessel CABG in 1990, followed by PCI (multiple prior stents to LCX) and left thoracotomy CABG x1 around 2011/2012), chronic systolic CHF/ischemic cardiomyopathy now with biventricular AICD in place followed by Dr. Mccarty ,  atrial fibrillation anticoagulated with warfarin, and prior AV node ablation (pacemaker was placed piror to eventual upgrade BIV AICD).     Today the patient reports that he has noted worsening fatigue/decline in stamina and shortness of breath with exertion with associated chest pressure over the past 2 to 3 months.  He reports symptoms develop after walking approximately half a block or climbing stairs.  He is unclear exactly how far he was walking before the symptoms developed, but he reports this is a clear decline for him in recent months.  He reports his peripheral neuropathy also limits his " "walking.  He takes additional diuretics approximately twice per week for intermittent orthopnea and PND or increased swelling that might occur 2-3 times per week.  He states these symptoms may also be happening with increasing frequency.  Aside from the orthopnea/PND, he denies any shortness of breath or chest discomfort at rest.  He states his current symptoms are the same as what he experienced prior to his most recent CABG.  He reports his symptoms are relieved within a few minutes of rest and he is not requiring nitroglycerin.  He states his dry weight at home is around 230 pounds, and occasionally when he is \"holding fluid\" his weight will get up to 235 - 236 pounds and he will take an extra diuretic dose.  He has reported his recent symptoms to his PCP, and an echo has been ordered and is actually scheduled to be completed in a few days on 10/28.      3/17/2021 Documentation per Dr. Bernal at [facility]:    Problem List:  1. Coronary atherosclerotic heart disease.  ---a. Status post remote myocardial infarction 1989.  ---b. Status post CABG ([facility]) 1990 with LIMA to LAD, SVG to PD, SVG to OM.  ---c. Chronic atrial fibrillation with subsequent permanent pacemaker implantation 1999 and prior RFA AVN  ---d. Chronic left ventricular dysfunction  ---e. Status post biventricular ICD 2005 ([facility])  ---f. Cardiac catheterization 01/06 with LVEF 30%, severe native vessel coronary disease, patent LIMA to LAD, patent SVG to PD of RCA,  of SVG to OM with subsequent PTCA/stenting of native OM with 2.5mm x 23mm LEILANI.  ---g. [facility] with patent LIMA to LAD, patent SVG to right PDA, occluded SVG to OM with repeat PCI/stenting with 2.5 mm x 23 mm stent  ---h. Adenosine dual isotope study 04/07 with LVEF 53%, inferolateral scar with no ischemia.  ---i. BNP level < 100 pg/ml 04/07 ; 101 pg/ml 06/08  ---k. Repeat cardiac " catheterization 04/08 with LVEF 30%, patent LIMA to LAD and SVG to RCA,  of SVG to LCX, severe focal denovo stenosis of previously twice-stented native LCX with [facility] with third 2.5mm x 23mm Cypher LEILANI  ---l. Pulse generator replacement 04/28/08 ([place]) Medtronic Concerto [id number] (serial # [id number]); pulse generator replacement 11/19  ---m. Recurrent angina 04/08, repeat limited native LCA angiogram 04/08 with focal edge dissection and instent restenosis, status post additional stenting ([facility]) with 3 additional [place] stents  ---n. Recurrent angina 12/08 with repeat catheterization confirming LVEF 40%, patent LIMA to LAD, patent SVG to RCA, patent LCx stent site; medical therapy  ---o. TTE 03/10 with LVEF 30-35%, no significant valvular dysfunction, moderate LAE and LVE; repeat 12/10 uncjhanged except LVEWF improved to 35-40%  ---p. BNP 03/10 259 pg/ml > repeat 12/10 165 pg/ml > repeat 06/12 70 pg/ml > repeat 10/12 200 pg/ml>repeat 11/17 154 pg/ml  ---q. Adenosine radionuclide stress test 03/10 and 12/10 with fixed shannan-apical scar, no ischemia, LVEF 40% rising to 50% with stress; medical therapy  ---r. ACS 03/11 with cardiac catheterization revealing patent LIMA to LAD, patent SVG to PD and PL of RCA, severe ISR of previously multiply (x 4 procedures/6 stents) stented distal LM/proximal LCX-OM  ---s. Status post limited left thoracotomy CABG x 1 with SVG from descending Ao to OM, CRISTOBAL ligation ([facility]) - ?[id number]  ---t. TTE 11/11 and 09/12 with LVEF 40-45%, no significant valvular dysfunction  ---u. Recurrent sx 06/12 with repeat cardiac catheterization with all grafts patent, no culprit lesion in need of PCI; medical therapy  ---v. Status post pulse generator replacement 07/12 and 01/16 ([facility] Mccarty)  ---w. Pharmacologic nuclear stress test 08/13 with LVEF approx 40%, predominant fixed shannan-apical defect, no interval  change since prior studies; expectant medical mgmt; repeat 10/15 with no ischemia  ---x. Pharmacologic radionuclide stress test 10/16 with LVEF 30%, fixed anterior defect, no ischemia  ---y. TTE 10/16 with LVEF 45%, no significant valvular dysfunction  ---z. Pharmacologic radionuclide stress test 11/17 with LVEF 40-45%, fixed anteroapical scar, no significant reversible ischemia  ---aa. TTE 02/13/19 with LVEF est 30%, LAE 6cm, LVEDD 6.2 cm, segemental WMAs, no significant valvular dysfunction; est RVSP 40 mmHg.  ---bb. BNP level 2/20 274 pg/ml => 02/21 170 pg/ml  ---cc. TTE 03/21 with LVEF 25%, no significant valvular dysfunction  2. Hyperlipidemia, on statin therapy  3. Renal cell carcinoma with right nephrectomy 2000, no recurrence.  4. Status post remote cholecystectomy with subsequent ventral hernia and ventral herniorrhaphy.  5. Chronic coumadin anticoagulation (indication: atrial fibrillation, INR goal 2-3).  6. Erectile dysfunction  7. Mild chronic renal insufficiency, serum creatinine 1.6 mg% 04/08  8. DJD, status post left TKR 10/15    Electronically signed by Christopher Bernal III, MD at 03/17/2021 8:30 AM CDT   ===========================================================================================  The following portions of the patient's history were reviewed and updated as appropriate: allergies, current medications, past family history, past medical history, past social history, past surgical history and problem list.    Review of Systems   Constitutional: Negative for malaise/fatigue.   Cardiovascular: Negative for chest pain, claudication, dyspnea on exertion, leg swelling, near-syncope, orthopnea, palpitations, paroxysmal nocturnal dyspnea and syncope.   Respiratory: Negative for shortness of breath.    Hematologic/Lymphatic: Does not bruise/bleed easily.       Allergies   Allergen Reactions   • Azithromycin Nausea And Vomiting   • Morphine And Related Other (See Comments)     SHAKES AND MAKES HIM  "\"WIRED\".   TAKES TRAMADOL WITHOUT PROBLEM       Current Outpatient Medications:   •  atorvastatin (LIPITOR) 40 MG tablet, Take 1 tablet by mouth Every Night., Disp: 30 tablet, Rfl: 11  •  carvedilol (COREG) 25 MG tablet, Take 25 mg by mouth 2 (two) times a day., Disp: , Rfl:   •  cetirizine (zyrTEC) 10 MG tablet, Take 10 mg by mouth Daily., Disp: , Rfl:   •  clopidogrel (PLAVIX) 75 MG tablet, Take 75 mg by mouth Daily., Disp: , Rfl:   •  diltiazem CD (DILTIAZEM CD) 120 MG 24 hr capsule, Take 120 mg by mouth daily., Disp: , Rfl:   •  empagliflozin (JARDIANCE) 10 MG tablet tablet, Take 1 tablet by mouth Every Morning., Disp: 30 tablet, Rfl: 11  •  finasteride (PROSCAR) 5 MG tablet, Take 5 mg by mouth daily., Disp: , Rfl:   •  fluticasone (FLONASE) 50 MCG/ACT nasal spray, 2 sprays into the nostril(s) as directed by provider Daily., Disp: , Rfl:   •  metFORMIN (GLUCOPHAGE) 500 MG tablet, 500 mg Daily With Breakfast. Two tabs daily, Disp: , Rfl:   •  nitroglycerin (NITROSTAT) 0.4 MG SL tablet, Place 1 tablet under the tongue Every 5 (Five) Minutes As Needed for Chest Pain. Take no more than 3 doses in 15 minutes., Disp: 25 tablet, Rfl: 2  •  omeprazole (PriLOSEC) 40 MG capsule, Take 40 mg by mouth daily., Disp: , Rfl:   •  potassium chloride (K-DUR,KLOR-CON) 20 MEQ CR tablet, Take 20 mEq by mouth Daily. With additional 20 meq if second dose of torsemide taken , Disp: , Rfl:   •  pregabalin (LYRICA) 150 MG capsule, Take 150 mg by mouth 2 (Two) Times a Day., Disp: , Rfl:   •  ranolazine (Ranexa) 500 MG 12 hr tablet, Take 1 tablet by mouth 2 (Two) Times a Day., Disp: 60 tablet, Rfl: 11  •  sacubitril-valsartan (Entresto) 49-51 MG tablet, Take 1 tablet by mouth 2 (Two) Times a Day., Disp: 30 tablet, Rfl: 11  •  tamsulosin (FLOMAX) 0.4 MG capsule 24 hr capsule, Take 1 capsule by mouth daily., Disp: , Rfl:   •  torsemide (DEMADEX) 20 MG tablet, Take 20 mg by mouth Daily. With additional 20 mg PRN , Disp: , Rfl:   •  " "traMADol-acetaminophen (ULTRACET) 37.5-325 MG per tablet, as needed, Disp: , Rfl: 1  •  warfarin (COUMADIN) 7.5 MG tablet, Take 7.5 mg by mouth Daily., Disp: , Rfl:   •  zolpidem (AMBIEN) 5 MG tablet, TAKE 1 TABLET BY MOUTH EVERY DAY AT BEDTIME AS NEEDED, Disp: , Rfl: 5  •  apixaban (ELIQUIS) 5 MG tablet tablet, Take 1 tablet by mouth 2 (Two) Times a Day., Disp: 60 tablet, Rfl: 11         Objective:    /60   Pulse 75   Ht 180.3 cm (71\")   Wt 108 kg (237 lb)   SpO2 96%   BMI 33.05 kg/m²        Vitals and nursing note reviewed.   Constitutional:       General: Not in acute distress.     Appearance: Not in distress.   Neck:      Vascular: No JVD or JVR. JVD normal.   Pulmonary:      Effort: Pulmonary effort is normal.      Breath sounds: Normal breath sounds.   Cardiovascular:      Normal rate. Regular rhythm.      Murmurs: There is no murmur.      No gallop. No rub.   Pulses:     Intact distal pulses.   Skin:     General: Skin is warm and dry.   Neurological:      Mental Status: Alert, oriented to person, place, and time and oriented to person, place and time.         Lab Review:    Lab Results   Component Value Date    GLUCOSE 142 (H) 11/30/2021    BUN 28 (H) 11/30/2021    CREATININE 1.18 11/30/2021    EGFRIFNONA 60 (L) 11/30/2021    EGFRIFAFRI 94 03/13/2017    BCR 23.7 11/30/2021    K 4.7 11/30/2021    CO2 26.0 11/30/2021    CALCIUM 9.5 11/30/2021    ALBUMIN 4.60 11/30/2021    AST 25 11/30/2021    ALT 19 11/30/2021     Lab Results   Component Value Date    CHOL 122 11/12/2021    CHLPL 136 (L) 03/13/2018    TRIG 108 11/12/2021    HDL 36 (L) 11/12/2021    LDL 66 11/12/2021     Lab Results   Component Value Date    HGBA1C 6.3 09/25/2018         Lab Results   Component Value Date    CHOL 122 11/12/2021     Lab Results   Component Value Date    TRIG 108 11/12/2021    TRIG 127 03/13/2018     Lab Results   Component Value Date    HDL 36 (L) 11/12/2021    HDL 38 (L) 03/13/2018     Lab Results   Component Value " Date    LDL 66 11/12/2021    LDL 73 03/13/2018     Lab Results   Component Value Date    VLDL 20 11/12/2021     Lab Results   Component Value Date    LDLHDL 1.79 11/12/2021               ECG 12 Lead    Date/Time: 12/9/2021 9:01 AM  Performed by: Sarbjit Posadas MD  Authorized by: Sarbjit Posadas MD   Comparison: compared with previous ECG from 11/4/2021  Rhythm: paced  BPM: 75  Pacing: ventricular paced rhythm                   Results for orders placed during the hospital encounter of 10/28/21    Adult Transthoracic Echo Complete W/ Cont if Necessary Per Protocol    Interpretation Summary  · Left ventricular ejection fraction appears to be 26 - 30%. Left ventricular systolic function is moderately decreased.  · The left ventricular cavity is moderately dilated (LVIDd 6.4cm)  · The following left ventricular wall segments are akinetic: apical anterior, apical lateral, apical inferior, apical septal and apex.  · The following left ventricular wall segments are hypokinetic: mid anterior, apical anterior, basal anterolateral, mid anterolateral, apical lateral, basal inferolateral, mid inferolateral, mid inferior, basal inferoseptal, mid inferoseptal, basal anterior, basal inferior and basal inferoseptal. The following left ventricular wall segments are akinetic: apical inferior, apical septal, apex and mid anteroseptal.  · Mild-moderate mitral regurgitation (functional).  · Left atrial volume is severely increased.  · Estimated right ventricular systolic pressure from tricuspid regurgitation is moderately elevated (45-55 mmHg).  · Compared to last exam from 2/13/2019, LV function appears slightly worse.    LAST HOME INR WAS 12/3/21 AND WAS 2.3  Assessment:      Problem List Items Addressed This Visit        Cardiac and Vasculature    Coronary artery disease of bypass graft of native heart with stable angina pectoris (HCC)    Relevant Orders    ECG 12 Lead    Essential hypertension    Permanent atrial fibrillation  (HCC)    Chronic combined systolic and diastolic congestive heart failure (HCC) - Primary    Relevant Orders    ECG 12 Lead    Mixed hyperlipidemia          Plan:     1. Chronic systolic CHF/ischemic cardiomyopathy: Stage C, class III: Patient does not appear volume overloaded on exam, but reports his most limiting symptoms are shortness of breath that he attributes to volume overload. His recent ejection fraction on echocardiogram here was similar to prior evaluations at [facility], demonstrating moderate LV systolic dysfunction (EF 26-30%). Has BiV-ICD (Followed by Dr Mccarty)  Now on moderate dose Entresto, beta-blocker, and jardiance   failure medications at maximally tolerated doses. Based upon labs, the next change would be either adding Farxiga 10 mg p.o. daily or spironolactone    2.  Coronary artery disease:  Unstable due to limiting symptoms (CCS II-III), which are persistent despite 3 antianginal medications (ranexa, beta-blocker, and calcium channel blocker)  -Given the above, and his significant, extensive coronary disease as outlined above, will now proceed with diagnostic coronary and bypass graft angiography with intention to intervene for symptom relief if any new severe stenoses are observed  -Continue Plavix  -Continue Coreg  -As needed sublingual nitroglycerin-has not had to use  -Continue atorvastatin 40 mg nightly and maintain goal LDL <70    -continue Ranexa 500 mg twice daily for antianginal benefit    3.  Permanent atrial fibrillation: Biventricular paced and rate controlled today.  Stable/asymptomatic  -Anticoagulated with warfarin with goal INR of 2-3.   -Continue current doses of Coreg, diltiazem for rate control.  Asymptomatic.  -Patient is interested in switching to a DOAC; advised him to check his INR at home and start holding Coumadin in advance of upcoming cardiac catheterization to targeted goal INR of 1.5 or less on the day of the procedure, then will transition afterwards to Eliquis  if his co-pay is affordable.  Prescription sent to his pharmacy.    4.  Essential hypertension: Well-controlled.  Continue current meds.    5.  Mixed hyperlipidemia: as per above.  Goal LDL less than 70.  Continue high intensity statin.    Follow-up to be determined after catheterization    Sarbjit Posadas MD  09:19 CST  12/09/21

## 2021-12-09 NOTE — H&P (VIEW-ONLY)
"    Subjective:     Encounter Date: 12/09/21      Patient ID: Darren Maria is a 76 y.o. male.    Chief Complaint: soa/cp, f/u CHF/CAD/AF    History of Present Illness   76-year-old today returns for prompt follow-up.  I met him on 11/4/2021, and he has extensive cardiovascular history.  He did been seen remotely in the past by different members of our group, though had not been seen since February 2019 until appointment with EVELIA North, on 10/18/21.  See below for full summary of his previous cardiac issues.  At the time of his visit here with Yolanda, he was reporting worsening fatigue/decline in stamina along with exertional dyspnea and chest pressure over the last 2 to 3 months.  Symptoms would occur when walking half a block or climbing a flight of stairs, and he noticed that the amount of activity takes reduce the symptoms was declining over time.  He reported the symptoms were the same as those he felt prior to CABG.  At that time, symptoms were relieved within a few minutes of rest so he had not taken any nitroglycerin.  Yolanda added Ranexa to his medical regimen, telephone encounter from our clinic nurse, Nicole Sauer, to the patient earlier that [place], he did note that his breathing was a little better though still was symptomatic.    At my visit with him on 11/4, I noted the following:  Now he says his SOA is better for just the few hours after taking ranexa. Says he's previously had intolerable headache on imdur.  States his breathing is bad, but wife states he's knowingly eating high salt foods like hot dogs. He still is having chest pressure as noted by Yolanda at the first visit, but when specifically asked today whether his shortness of breath and fluid retention or his chest pressure is more problematic, he repeatedly goes back to discussing his fluid retention and trouble breathing. He admits that he knows \"I do not take care of myself like I should.\"    I transitioned him to Entresto at that visit, he " "returned 2 weeks later for follow-up again with EVELIA North.  According to her note that day, he reported significant improvement in his exertional chest pressure, but thought that his breathing was about the same.  His weight was stable by his home scale, though our office scale did suggest a 6 pound decrease since the prior visit with me 2 weeks before that.  Therefore, Yolanda did start Farxiga therapy that day, and he now returns for follow-up.    He reports his breathing is much better - can walk further.  Still having chest pains, but less severe. Frequency unchanged.  Had a prolonged episode of chest pain yesterday.  Still \"feel like something is wrong. I still get pressure, even at rest.\"          ===========================================================================================  RELEVANT CARDIAC HX:    The patient has a longstanding complicated cardiac history dating back to initial myocardial infarction in 1989.  I have copied an outline of his cardiac history documented per Dr. Bernal below-see for full details.  In short, the patient has a history of CAD ( status post three-vessel CABG in 1990, followed by PCI (multiple prior stents to LCX) and left thoracotomy CABG x1 around 2011/2012), chronic systolic CHF/ischemic cardiomyopathy now with biventricular AICD in place followed by Dr. Mccarty ,  atrial fibrillation anticoagulated with warfarin, and prior AV node ablation (pacemaker was placed piror to eventual upgrade BIV AICD).     Today the patient reports that he has noted worsening fatigue/decline in stamina and shortness of breath with exertion with associated chest pressure over the past 2 to 3 months.  He reports symptoms develop after walking approximately half a block or climbing stairs.  He is unclear exactly how far he was walking before the symptoms developed, but he reports this is a clear decline for him in recent months.  He reports his peripheral neuropathy also limits his " "walking.  He takes additional diuretics approximately twice per week for intermittent orthopnea and PND or increased swelling that might occur 2-3 times per week.  He states these symptoms may also be happening with increasing frequency.  Aside from the orthopnea/PND, he denies any shortness of breath or chest discomfort at rest.  He states his current symptoms are the same as what he experienced prior to his most recent CABG.  He reports his symptoms are relieved within a few minutes of rest and he is not requiring nitroglycerin.  He states his dry weight at home is around 230 pounds, and occasionally when he is \"holding fluid\" his weight will get up to 235 - 236 pounds and he will take an extra diuretic dose.  He has reported his recent symptoms to his PCP, and an echo has been ordered and is actually scheduled to be completed in a few days on 10/28.      3/17/2021 Documentation per Dr. Bernal at [facility]:    Problem List:  1. Coronary atherosclerotic heart disease.  ---a. Status post remote myocardial infarction 1989.  ---b. Status post CABG ([facility]) 1990 with LIMA to LAD, SVG to PD, SVG to OM.  ---c. Chronic atrial fibrillation with subsequent permanent pacemaker implantation 1999 and prior RFA AVN  ---d. Chronic left ventricular dysfunction  ---e. Status post biventricular ICD 2005 ([facility])  ---f. Cardiac catheterization 01/06 with LVEF 30%, severe native vessel coronary disease, patent LIMA to LAD, patent SVG to PD of RCA,  of SVG to OM with subsequent PTCA/stenting of native OM with 2.5mm x 23mm LEILANI.  ---g. [facility] with patent LIMA to LAD, patent SVG to right PDA, occluded SVG to OM with repeat PCI/stenting with 2.5 mm x 23 mm stent  ---h. Adenosine dual isotope study 04/07 with LVEF 53%, inferolateral scar with no ischemia.  ---i. BNP level < 100 pg/ml 04/07 ; 101 pg/ml 06/08  ---k. Repeat cardiac " catheterization 04/08 with LVEF 30%, patent LIMA to LAD and SVG to RCA,  of SVG to LCX, severe focal denovo stenosis of previously twice-stented native LCX with [facility] with third 2.5mm x 23mm Cypher LEILANI  ---l. Pulse generator replacement 04/28/08 ([facility]) Medtronic Concerto [id number] (serial # [id number]); pulse generator replacement 11/19  ---m. Recurrent angina 04/08, repeat limited native LCA angiogram 04/08 with focal edge dissection and instent restenosis, status post additional stenting ([facility]) with 3 additional [place] stents  ---n. Recurrent angina 12/08 with repeat catheterization confirming LVEF 40%, patent LIMA to LAD, patent SVG to RCA, patent LCx stent site; medical therapy  ---o. TTE 03/10 with LVEF 30-35%, no significant valvular dysfunction, moderate LAE and LVE; repeat 12/10 uncjhanged except LVEWF improved to 35-40%  ---p. BNP 03/10 259 pg/ml > repeat 12/10 165 pg/ml > repeat 06/12 70 pg/ml > repeat 10/12 200 pg/ml>repeat 11/17 154 pg/ml  ---q. Adenosine radionuclide stress test 03/10 and 12/10 with fixed shannan-apical scar, no ischemia, LVEF 40% rising to 50% with stress; medical therapy  ---r. ACS 03/11 with cardiac catheterization revealing patent LIMA to LAD, patent SVG to PD and PL of RCA, severe ISR of previously multiply (x 4 procedures/6 stents) stented distal LM/proximal LCX-OM  ---s. Status post limited left thoracotomy CABG x 1 with SVG from descending Ao to OM, CRISTOBAL ligation ([facility]) - ?[phone]  ---t. TTE 11/11 and 09/12 with LVEF 40-45%, no significant valvular dysfunction  ---u. Recurrent sx 06/12 with repeat cardiac catheterization with all grafts patent, no culprit lesion in need of PCI; medical therapy  ---v. Status post pulse generator replacement 07/12 and 01/16 ([facility])  ---w. Pharmacologic nuclear stress test 08/13 with LVEF approx 40%, predominant fixed shannan-apical defect, no interval  change since prior studies; expectant medical mgmt; repeat 10/15 with no ischemia  ---x. Pharmacologic radionuclide stress test 10/16 with LVEF 30%, fixed anterior defect, no ischemia  ---y. TTE 10/16 with LVEF 45%, no significant valvular dysfunction  ---z. Pharmacologic radionuclide stress test 11/17 with LVEF 40-45%, fixed anteroapical scar, no significant reversible ischemia  ---aa. TTE 02/13/19 with LVEF est 30%, LAE 6cm, LVEDD 6.2 cm, segemental WMAs, no significant valvular dysfunction; est RVSP 40 mmHg.  ---bb. BNP level 2/20 274 pg/ml => 02/21 170 pg/ml  ---cc. TTE 03/21 with LVEF 25%, no significant valvular dysfunction  2. Hyperlipidemia, on statin therapy  3. Renal cell carcinoma with right nephrectomy 2000, no recurrence.  4. Status post remote cholecystectomy with subsequent ventral hernia and ventral herniorrhaphy.  5. Chronic coumadin anticoagulation (indication: atrial fibrillation, INR goal 2-3).  6. Erectile dysfunction  7. Mild chronic renal insufficiency, serum creatinine 1.6 mg% 04/08  8. DJD, status post left TKR 10/15    Electronically signed by Christopher Bernal III, MD at 03/17/2021 8:30 AM CDT   ===========================================================================================  The following portions of the patient's history were reviewed and updated as appropriate: allergies, current medications, past family history, past medical history, past social history, past surgical history and problem list.    Review of Systems   Constitutional: Negative for malaise/fatigue.   Cardiovascular: Negative for chest pain, claudication, dyspnea on exertion, leg swelling, near-syncope, orthopnea, palpitations, paroxysmal nocturnal dyspnea and syncope.   Respiratory: Negative for shortness of breath.    Hematologic/Lymphatic: Does not bruise/bleed easily.       Allergies   Allergen Reactions   • Azithromycin Nausea And Vomiting   • Morphine And Related Other (See Comments)     SHAKES AND MAKES HIM  "\"WIRED\".   TAKES TRAMADOL WITHOUT PROBLEM       Current Outpatient Medications:   •  atorvastatin (LIPITOR) 40 MG tablet, Take 1 tablet by mouth Every Night., Disp: 30 tablet, Rfl: 11  •  carvedilol (COREG) 25 MG tablet, Take 25 mg by mouth 2 (two) times a day., Disp: , Rfl:   •  cetirizine (zyrTEC) 10 MG tablet, Take 10 mg by mouth Daily., Disp: , Rfl:   •  clopidogrel (PLAVIX) 75 MG tablet, Take 75 mg by mouth Daily., Disp: , Rfl:   •  diltiazem CD (DILTIAZEM CD) 120 MG 24 hr capsule, Take 120 mg by mouth daily., Disp: , Rfl:   •  empagliflozin (JARDIANCE) 10 MG tablet tablet, Take 1 tablet by mouth Every Morning., Disp: 30 tablet, Rfl: 11  •  finasteride (PROSCAR) 5 MG tablet, Take 5 mg by mouth daily., Disp: , Rfl:   •  fluticasone (FLONASE) 50 MCG/ACT nasal spray, 2 sprays into the nostril(s) as directed by provider Daily., Disp: , Rfl:   •  metFORMIN (GLUCOPHAGE) 500 MG tablet, 500 mg Daily With Breakfast. Two tabs daily, Disp: , Rfl:   •  nitroglycerin (NITROSTAT) 0.4 MG SL tablet, Place 1 tablet under the tongue Every 5 (Five) Minutes As Needed for Chest Pain. Take no more than 3 doses in 15 minutes., Disp: 25 tablet, Rfl: 2  •  omeprazole (PriLOSEC) 40 MG capsule, Take 40 mg by mouth daily., Disp: , Rfl:   •  potassium chloride (K-DUR,KLOR-CON) 20 MEQ CR tablet, Take 20 mEq by mouth Daily. With additional 20 meq if second dose of torsemide taken , Disp: , Rfl:   •  pregabalin (LYRICA) 150 MG capsule, Take 150 mg by mouth 2 (Two) Times a Day., Disp: , Rfl:   •  ranolazine (Ranexa) 500 MG 12 hr tablet, Take 1 tablet by mouth 2 (Two) Times a Day., Disp: 60 tablet, Rfl: 11  •  sacubitril-valsartan (Entresto) 49-51 MG tablet, Take 1 tablet by mouth 2 (Two) Times a Day., Disp: 30 tablet, Rfl: 11  •  tamsulosin (FLOMAX) 0.4 MG capsule 24 hr capsule, Take 1 capsule by mouth daily., Disp: , Rfl:   •  torsemide (DEMADEX) 20 MG tablet, Take 20 mg by mouth Daily. With additional 20 mg PRN , Disp: , Rfl:   •  " "traMADol-acetaminophen (ULTRACET) 37.5-325 MG per tablet, as needed, Disp: , Rfl: 1  •  warfarin (COUMADIN) 7.5 MG tablet, Take 7.5 mg by mouth Daily., Disp: , Rfl:   •  zolpidem (AMBIEN) 5 MG tablet, TAKE 1 TABLET BY MOUTH EVERY DAY AT BEDTIME AS NEEDED, Disp: , Rfl: 5  •  apixaban (ELIQUIS) 5 MG tablet tablet, Take 1 tablet by mouth 2 (Two) Times a Day., Disp: 60 tablet, Rfl: 11         Objective:    /60   Pulse 75   Ht 180.3 cm (71\")   Wt 108 kg (237 lb)   SpO2 96%   BMI 33.05 kg/m²        Vitals and nursing note reviewed.   Constitutional:       General: Not in acute distress.     Appearance: Not in distress.   Neck:      Vascular: No JVD or JVR. JVD normal.   Pulmonary:      Effort: Pulmonary effort is normal.      Breath sounds: Normal breath sounds.   Cardiovascular:      Normal rate. Regular rhythm.      Murmurs: There is no murmur.      No gallop. No rub.   Pulses:     Intact distal pulses.   Skin:     General: Skin is warm and dry.   Neurological:      Mental Status: Alert, oriented to person, place, and time and oriented to person, place and time.         Lab Review:    Lab Results   Component Value Date    GLUCOSE 142 (H) 11/30/2021    BUN 28 (H) 11/30/2021    CREATININE 1.18 11/30/2021    EGFRIFNONA 60 (L) 11/30/2021    EGFRIFAFRI 94 03/13/2017    BCR 23.7 11/30/2021    K 4.7 11/30/2021    CO2 26.0 11/30/2021    CALCIUM 9.5 11/30/2021    ALBUMIN 4.60 11/30/2021    AST 25 11/30/2021    ALT 19 11/30/2021     Lab Results   Component Value Date    CHOL 122 11/12/2021    CHLPL 136 (L) 03/13/2018    TRIG 108 11/12/2021    HDL 36 (L) 11/12/2021    LDL 66 11/12/2021     Lab Results   Component Value Date    HGBA1C 6.3 09/25/2018         Lab Results   Component Value Date    CHOL 122 11/12/2021     Lab Results   Component Value Date    TRIG 108 11/12/2021    TRIG 127 03/13/2018     Lab Results   Component Value Date    HDL 36 (L) 11/12/2021    HDL 38 (L) 03/13/2018     Lab Results   Component Value " Date    LDL 66 11/12/2021    LDL 73 03/13/2018     Lab Results   Component Value Date    VLDL 20 11/12/2021     Lab Results   Component Value Date    LDLHDL 1.79 11/12/2021               ECG 12 Lead    Date/Time: 12/9/2021 9:01 AM  Performed by: Sarbjit Posadas MD  Authorized by: Sarbjit Posadas MD   Comparison: compared with previous ECG from 11/4/2021  Rhythm: paced  BPM: 75  Pacing: ventricular paced rhythm                   Results for orders placed during the hospital encounter of 10/28/21    Adult Transthoracic Echo Complete W/ Cont if Necessary Per Protocol    Interpretation Summary  · Left ventricular ejection fraction appears to be 26 - 30%. Left ventricular systolic function is moderately decreased.  · The left ventricular cavity is moderately dilated (LVIDd 6.4cm)  · The following left ventricular wall segments are akinetic: apical anterior, apical lateral, apical inferior, apical septal and apex.  · The following left ventricular wall segments are hypokinetic: mid anterior, apical anterior, basal anterolateral, mid anterolateral, apical lateral, basal inferolateral, mid inferolateral, mid inferior, basal inferoseptal, mid inferoseptal, basal anterior, basal inferior and basal inferoseptal. The following left ventricular wall segments are akinetic: apical inferior, apical septal, apex and mid anteroseptal.  · Mild-moderate mitral regurgitation (functional).  · Left atrial volume is severely increased.  · Estimated right ventricular systolic pressure from tricuspid regurgitation is moderately elevated (45-55 mmHg).  · Compared to last exam from 2/13/2019, LV function appears slightly worse.    LAST HOME INR WAS 12/3/21 AND WAS 2.3  Assessment:      Problem List Items Addressed This Visit        Cardiac and Vasculature    Coronary artery disease of bypass graft of native heart with stable angina pectoris (HCC)    Relevant Orders    ECG 12 Lead    Essential hypertension    Permanent atrial fibrillation  (HCC)    Chronic combined systolic and diastolic congestive heart failure (HCC) - Primary    Relevant Orders    ECG 12 Lead    Mixed hyperlipidemia          Plan:     1. Chronic systolic CHF/ischemic cardiomyopathy: Stage C, class III: Patient does not appear volume overloaded on exam, but reports his most limiting symptoms are shortness of breath that he attributes to volume overload. His recent ejection fraction on echocardiogram here was similar to prior evaluations at [place], demonstrating moderate LV systolic dysfunction (EF 26-30%). Has BiV-ICD (Followed by Dr Mccarty)  Now on moderate dose Entresto, beta-blocker, and jardiance   failure medications at maximally tolerated doses. Based upon labs, the next change would be either adding Farxiga 10 mg p.o. daily or spironolactone    2.  Coronary artery disease:  Unstable due to limiting symptoms (CCS II-III), which are persistent despite 3 antianginal medications (ranexa, beta-blocker, and calcium channel blocker)  -Given the above, and his significant, extensive coronary disease as outlined above, will now proceed with diagnostic coronary and bypass graft angiography with intention to intervene for symptom relief if any new severe stenoses are observed  -Continue Plavix  -Continue Coreg  -As needed sublingual nitroglycerin-has not had to use  -Continue atorvastatin 40 mg nightly and maintain goal LDL <70    -continue Ranexa 500 mg twice daily for antianginal benefit    3.  Permanent atrial fibrillation: Biventricular paced and rate controlled today.  Stable/asymptomatic  -Anticoagulated with warfarin with goal INR of 2-3.   -Continue current doses of Coreg, diltiazem for rate control.  Asymptomatic.  -Patient is interested in switching to a DOAC; advised him to check his INR at home and start holding Coumadin in advance of upcoming cardiac catheterization to targeted goal INR of 1.5 or less on the day of the procedure, then will transition afterwards to Eliquis  if his co-pay is affordable.  Prescription sent to his pharmacy.    4.  Essential hypertension: Well-controlled.  Continue current meds.    5.  Mixed hyperlipidemia: as per above.  Goal LDL less than 70.  Continue high intensity statin.    Follow-up to be determined after catheterization    Sarbjit Posadsa MD  09:19 CST  12/09/21

## 2021-12-15 ENCOUNTER — HOSPITAL ENCOUNTER (OUTPATIENT)
Facility: HOSPITAL | Age: 76
Discharge: HOME | End: 2021-12-15
Attending: INTERNAL MEDICINE | Admitting: INTERNAL MEDICINE

## 2021-12-15 VITALS
RESPIRATION RATE: 16 BRPM | HEART RATE: 75 BPM | BODY MASS INDEX: 33.26 KG/M2 | OXYGEN SATURATION: 100 % | HEIGHT: 71 IN | TEMPERATURE: 96.6 F | DIASTOLIC BLOOD PRESSURE: 72 MMHG | WEIGHT: 237.6 LBS | SYSTOLIC BLOOD PRESSURE: 102 MMHG

## 2021-12-15 DIAGNOSIS — I25.708 CORONARY ARTERY DISEASE OF BYPASS GRAFT OF NATIVE HEART WITH STABLE ANGINA PECTORIS (HCC): ICD-10-CM

## 2021-12-15 LAB
ANION GAP SERPL CALCULATED.3IONS-SCNC: 10 MMOL/L (ref 5–15)
BUN SERPL-MCNC: 24 MG/DL (ref 8–23)
BUN/CREAT SERPL: 19.2 (ref 7–25)
CALCIUM SPEC-SCNC: 9.1 MG/DL (ref 8.6–10.5)
CHLORIDE SERPL-SCNC: 107 MMOL/L (ref 98–107)
CO2 SERPL-SCNC: 25 MMOL/L (ref 22–29)
CREAT SERPL-MCNC: 1.25 MG/DL (ref 0.76–1.27)
DEPRECATED RDW RBC AUTO: 55.5 FL (ref 37–54)
ERYTHROCYTE [DISTWIDTH] IN BLOOD BY AUTOMATED COUNT: 15.8 % (ref 12.3–15.4)
GFR SERPL CREATININE-BSD FRML MDRD: 56 ML/MIN/1.73
GLUCOSE SERPL-MCNC: 153 MG/DL (ref 65–99)
HCT VFR BLD AUTO: 40.6 % (ref 37.5–51)
HGB BLD-MCNC: 13.2 G/DL (ref 13–17.7)
MCH RBC QN AUTO: 31 PG (ref 26.6–33)
MCHC RBC AUTO-ENTMCNC: 32.5 G/DL (ref 31.5–35.7)
MCV RBC AUTO: 95.3 FL (ref 79–97)
PLATELET # BLD AUTO: 193 10*3/MM3 (ref 140–450)
PMV BLD AUTO: 9.6 FL (ref 6–12)
POTASSIUM SERPL-SCNC: 4.8 MMOL/L (ref 3.5–5.2)
RBC # BLD AUTO: 4.26 10*6/MM3 (ref 4.14–5.8)
SODIUM SERPL-SCNC: 142 MMOL/L (ref 136–145)
WBC NRBC COR # BLD: 6.37 10*3/MM3 (ref 3.4–10.8)

## 2021-12-15 PROCEDURE — 80048 BASIC METABOLIC PNL TOTAL CA: CPT | Performed by: INTERNAL MEDICINE

## 2021-12-15 PROCEDURE — C1760 CLOSURE DEV, VASC: HCPCS | Performed by: INTERNAL MEDICINE

## 2021-12-15 PROCEDURE — 99152 MOD SED SAME PHYS/QHP 5/>YRS: CPT | Performed by: INTERNAL MEDICINE

## 2021-12-15 PROCEDURE — 25010000002 MIDAZOLAM PER 1 MG: Performed by: INTERNAL MEDICINE

## 2021-12-15 PROCEDURE — 25010000002 HEPARIN (PORCINE) 1000-0.9 UT/500ML-% SOLUTION: Performed by: INTERNAL MEDICINE

## 2021-12-15 PROCEDURE — 25010000002 FENTANYL CITRATE (PF) 50 MCG/ML SOLUTION: Performed by: INTERNAL MEDICINE

## 2021-12-15 PROCEDURE — 25010000002 HEPARIN (PORCINE) 2000-0.9 UNIT/L-% SOLUTION: Performed by: INTERNAL MEDICINE

## 2021-12-15 PROCEDURE — 25010000002 DIPHENHYDRAMINE PER 50 MG: Performed by: INTERNAL MEDICINE

## 2021-12-15 PROCEDURE — 99153 MOD SED SAME PHYS/QHP EA: CPT | Performed by: INTERNAL MEDICINE

## 2021-12-15 PROCEDURE — C1894 INTRO/SHEATH, NON-LASER: HCPCS | Performed by: INTERNAL MEDICINE

## 2021-12-15 PROCEDURE — A9270 NON-COVERED ITEM OR SERVICE: HCPCS | Performed by: INTERNAL MEDICINE

## 2021-12-15 PROCEDURE — 93459 L HRT ART/GRFT ANGIO: CPT | Performed by: INTERNAL MEDICINE

## 2021-12-15 PROCEDURE — 63710000001 ASPIRIN 81 MG CHEWABLE TABLET: Performed by: INTERNAL MEDICINE

## 2021-12-15 PROCEDURE — 85027 COMPLETE CBC AUTOMATED: CPT | Performed by: INTERNAL MEDICINE

## 2021-12-15 PROCEDURE — 0 IOPAMIDOL PER 1 ML: Performed by: INTERNAL MEDICINE

## 2021-12-15 PROCEDURE — 93458 L HRT ARTERY/VENTRICLE ANGIO: CPT | Performed by: INTERNAL MEDICINE

## 2021-12-15 PROCEDURE — C1769 GUIDE WIRE: HCPCS | Performed by: INTERNAL MEDICINE

## 2021-12-15 RX ORDER — SODIUM CHLORIDE 0.9 % (FLUSH) 0.9 %
10 SYRINGE (ML) INJECTION AS NEEDED
Status: DISCONTINUED | OUTPATIENT
Start: 2021-12-15 | End: 2021-12-15 | Stop reason: HOSPADM

## 2021-12-15 RX ORDER — ACETAMINOPHEN 325 MG/1
650 TABLET ORAL EVERY 4 HOURS PRN
Status: DISCONTINUED | OUTPATIENT
Start: 2021-12-15 | End: 2021-12-15 | Stop reason: HOSPADM

## 2021-12-15 RX ORDER — HEPARIN SODIUM 200 [USP'U]/100ML
INJECTION, SOLUTION INTRAVENOUS AS NEEDED
Status: DISCONTINUED | OUTPATIENT
Start: 2021-12-15 | End: 2021-12-15 | Stop reason: HOSPADM

## 2021-12-15 RX ORDER — SODIUM CHLORIDE 9 MG/ML
1-3 INJECTION, SOLUTION INTRAVENOUS CONTINUOUS
Status: DISCONTINUED | OUTPATIENT
Start: 2021-12-15 | End: 2021-12-15 | Stop reason: HOSPADM

## 2021-12-15 RX ORDER — ASPIRIN 81 MG/1
324 TABLET, CHEWABLE ORAL ONCE
Status: COMPLETED | OUTPATIENT
Start: 2021-12-15 | End: 2021-12-15

## 2021-12-15 RX ORDER — LIDOCAINE HYDROCHLORIDE 20 MG/ML
INJECTION, SOLUTION INFILTRATION; PERINEURAL AS NEEDED
Status: DISCONTINUED | OUTPATIENT
Start: 2021-12-15 | End: 2021-12-15 | Stop reason: HOSPADM

## 2021-12-15 RX ORDER — SODIUM CHLORIDE 9 MG/ML
100 INJECTION, SOLUTION INTRAVENOUS CONTINUOUS
Status: DISPENSED | OUTPATIENT
Start: 2021-12-15 | End: 2021-12-15

## 2021-12-15 RX ORDER — ASPIRIN 81 MG/1
TABLET, CHEWABLE ORAL
Status: DISCONTINUED
Start: 2021-12-15 | End: 2021-12-15 | Stop reason: HOSPADM

## 2021-12-15 RX ORDER — FENTANYL CITRATE 50 UG/ML
INJECTION, SOLUTION INTRAMUSCULAR; INTRAVENOUS AS NEEDED
Status: DISCONTINUED | OUTPATIENT
Start: 2021-12-15 | End: 2021-12-15 | Stop reason: HOSPADM

## 2021-12-15 RX ORDER — MIDAZOLAM HYDROCHLORIDE 1 MG/ML
INJECTION INTRAMUSCULAR; INTRAVENOUS AS NEEDED
Status: DISCONTINUED | OUTPATIENT
Start: 2021-12-15 | End: 2021-12-15 | Stop reason: HOSPADM

## 2021-12-15 RX ORDER — RANOLAZINE 1000 MG/1
1000 TABLET, EXTENDED RELEASE ORAL 2 TIMES DAILY
Qty: 60 TABLET | Refills: 11 | Status: SHIPPED | OUTPATIENT
Start: 2021-12-15 | End: 2022-10-17

## 2021-12-15 RX ORDER — SODIUM CHLORIDE 0.9 % (FLUSH) 0.9 %
3 SYRINGE (ML) INJECTION EVERY 12 HOURS SCHEDULED
Status: DISCONTINUED | OUTPATIENT
Start: 2021-12-15 | End: 2021-12-15 | Stop reason: HOSPADM

## 2021-12-15 RX ORDER — ASPIRIN 81 MG/1
81 TABLET ORAL DAILY
Status: DISCONTINUED | OUTPATIENT
Start: 2021-12-16 | End: 2021-12-15 | Stop reason: HOSPADM

## 2021-12-15 RX ORDER — DIPHENHYDRAMINE HYDROCHLORIDE 50 MG/ML
INJECTION INTRAMUSCULAR; INTRAVENOUS AS NEEDED
Status: DISCONTINUED | OUTPATIENT
Start: 2021-12-15 | End: 2021-12-15 | Stop reason: HOSPADM

## 2021-12-15 RX ORDER — SACUBITRIL AND VALSARTAN 97; 103 MG/1; MG/1
1 TABLET, FILM COATED ORAL 2 TIMES DAILY
Qty: 60 TABLET | Refills: 11 | Status: SHIPPED | OUTPATIENT
Start: 2021-12-15 | End: 2022-11-22

## 2021-12-15 RX ADMIN — SODIUM CHLORIDE 1 ML/KG/HR: 9 INJECTION, SOLUTION INTRAVENOUS at 08:25

## 2021-12-15 RX ADMIN — ASPIRIN 324 MG: 81 TABLET, CHEWABLE ORAL at 08:28

## 2021-12-15 RX ADMIN — SODIUM CHLORIDE, PRESERVATIVE FREE 3 ML: 5 INJECTION INTRAVENOUS at 08:25

## 2021-12-15 NOTE — INTERVAL H&P NOTE
H&P reviewed. The patient was examined and there are no changes to the H&P.      I discussed cardiac catheterization, the procedure, risks (including bleeding, infection, vascular damage [including minor oozing, bruising, bleeding, and up to and including the need for vascular surgery], contrast reaction, renal failure, respiratory failure, heart attack, stroke, arrhythmia and even death), benefits, and alternatives and the patient has voiced understanding and is willing to proceed.

## 2021-12-28 ENCOUNTER — OFFICE VISIT (OUTPATIENT)
Dept: CARDIOLOGY | Facility: CLINIC | Age: 76
End: 2021-12-28

## 2021-12-28 ENCOUNTER — LAB (OUTPATIENT)
Dept: LAB | Facility: HOSPITAL | Age: 76
End: 2021-12-28

## 2021-12-28 VITALS
HEIGHT: 71 IN | HEART RATE: 75 BPM | DIASTOLIC BLOOD PRESSURE: 60 MMHG | WEIGHT: 236 LBS | SYSTOLIC BLOOD PRESSURE: 89 MMHG | OXYGEN SATURATION: 98 % | BODY MASS INDEX: 33.04 KG/M2

## 2021-12-28 DIAGNOSIS — I25.708 CORONARY ARTERY DISEASE OF BYPASS GRAFT OF NATIVE HEART WITH STABLE ANGINA PECTORIS (HCC): ICD-10-CM

## 2021-12-28 DIAGNOSIS — I95.9 HYPOTENSION, UNSPECIFIED HYPOTENSION TYPE: ICD-10-CM

## 2021-12-28 DIAGNOSIS — I50.42 CHRONIC COMBINED SYSTOLIC AND DIASTOLIC CONGESTIVE HEART FAILURE (HCC): Primary | ICD-10-CM

## 2021-12-28 DIAGNOSIS — R42 ORTHOSTATIC DIZZINESS: ICD-10-CM

## 2021-12-28 DIAGNOSIS — I50.42 CHRONIC COMBINED SYSTOLIC AND DIASTOLIC CONGESTIVE HEART FAILURE: ICD-10-CM

## 2021-12-28 DIAGNOSIS — Z95.810 PRESENCE OF BIVENTRICULAR AICD: ICD-10-CM

## 2021-12-28 DIAGNOSIS — I48.21 PERMANENT ATRIAL FIBRILLATION (HCC): ICD-10-CM

## 2021-12-28 DIAGNOSIS — E78.2 MIXED HYPERLIPIDEMIA: ICD-10-CM

## 2021-12-28 DIAGNOSIS — I25.5 ISCHEMIC CARDIOMYOPATHY: ICD-10-CM

## 2021-12-28 LAB
ANION GAP SERPL CALCULATED.3IONS-SCNC: 10 MMOL/L (ref 5–15)
BUN SERPL-MCNC: 21 MG/DL (ref 8–23)
BUN/CREAT SERPL: 15.3 (ref 7–25)
CALCIUM SPEC-SCNC: 9.2 MG/DL (ref 8.6–10.5)
CHLORIDE SERPL-SCNC: 105 MMOL/L (ref 98–107)
CO2 SERPL-SCNC: 28 MMOL/L (ref 22–29)
CREAT SERPL-MCNC: 1.37 MG/DL (ref 0.76–1.27)
GFR SERPL CREATININE-BSD FRML MDRD: 51 ML/MIN/1.73
GLUCOSE SERPL-MCNC: 102 MG/DL (ref 65–99)
NT-PROBNP SERPL-MCNC: 735.4 PG/ML (ref 0–900)
POTASSIUM SERPL-SCNC: 4.6 MMOL/L (ref 3.5–5.2)
SODIUM SERPL-SCNC: 143 MMOL/L (ref 136–145)

## 2021-12-28 PROCEDURE — 80048 BASIC METABOLIC PNL TOTAL CA: CPT

## 2021-12-28 PROCEDURE — 36415 COLL VENOUS BLD VENIPUNCTURE: CPT

## 2021-12-28 PROCEDURE — 83880 ASSAY OF NATRIURETIC PEPTIDE: CPT

## 2021-12-28 PROCEDURE — 99214 OFFICE O/P EST MOD 30 MIN: CPT | Performed by: INTERNAL MEDICINE

## 2021-12-28 PROCEDURE — 93000 ELECTROCARDIOGRAM COMPLETE: CPT | Performed by: INTERNAL MEDICINE

## 2021-12-28 RX ORDER — ATORVASTATIN CALCIUM 40 MG/1
40 TABLET, FILM COATED ORAL DAILY
COMMUNITY
End: 2022-11-22 | Stop reason: SDUPTHER

## 2021-12-28 RX ORDER — TAMSULOSIN HYDROCHLORIDE 0.4 MG/1
1 CAPSULE ORAL DAILY
COMMUNITY

## 2021-12-28 NOTE — PROGRESS NOTES
"    Subjective:     Encounter Date: 12/29/21      Patient ID: Darren Maria is a 76 y.o. male.    Chief Complaint: f/u CHF/CAD/AF    History of Present Illness   76-year-old today returns for prompt follow-up.  I met him on 11/4/2021, and he has extensive cardiovascular history.  He did been seen remotely in the past by different members of our group, though had not been seen since February 2019 until appointment with EVELIA North, on 10/18/21.  See below for full summary of his previous cardiac issues.  At the time of his visit here with Yolanda, he was reporting worsening fatigue/decline in stamina along with exertional dyspnea and chest pressure over the last 2 to 3 months.  Symptoms would occur when walking half a block or climbing a flight of stairs, and he noticed that the amount of activity takes reduce the symptoms was declining over time.  He reported the symptoms were the same as those he felt prior to CABG.  At that time, symptoms were relieved within a few minutes of rest so he had not taken any nitroglycerin.  Yolanda added Ranexa to his medical regimen, telephone encounter from our clinic nurse, Nicole Sauer, to the patient earlier that [place], he did note that his breathing was a little better though still was symptomatic.    At my visit with him on 11/4, I noted the following:  Now he says his SOA is better for just the few hours after taking ranexa. Says he's previously had intolerable headache on imdur.  States his breathing is bad, but wife states he's knowingly eating high salt foods like hot dogs. He still is having chest pressure as noted by Yolanda at the first visit, but when specifically asked today whether his shortness of breath and fluid retention or his chest pressure is more problematic, he repeatedly goes back to discussing his fluid retention and trouble breathing. He admits that he knows \"I do not take care of myself like I should.\"    I transitioned him to Entresto at that visit, he returned " "2 weeks later for follow-up again with EVELIA North.  According to her note that day, he reported significant improvement in his exertional chest pressure, but thought that his breathing was about the same.  His weight was stable by his home scale, though our office scale did suggest a 6 pound decrease since the prior visit with me 2 weeks before that.  Therefore, Yolanda did start Farxiga therapy that day, and he now returns for follow-up.    At his last visit here, he reported that he was doing much better, particularly in terms of how far he can walk before having any symptoms.  He was having chest pains, but less severe. Frequency unchanged.  Had a prolonged episode of chest pain the day prior.  Still \"feel like something is wrong. I still get pressure, even at rest.\"  For these reasons, we did then proceed with coronary bypass graft angiography on 12/15/2021; see full results and procedural note.  In short, there did not appear to be any culprit lesions in need of revascularization, though I cannot locate the SVG performed to an OM branch at his last operation at [place] in 2011 or 2012.  Otherwise, his anatomy was consistent with previous reports -patent PEREZ to LAD, patent SVG to RCA, and a known occlusion of an SVG arising from the ascending aorta to an OM branch.  Left ventriculogram showed EF 25 to 30%, but normal LVEDP.  Therefore, he was discharged home that day with instructions to increase his Entresto up to max dose, and also to increase Ranexa for further symptom benefit.  He returns today for follow-up.    In response to those medication changes noted above, he now says that his chest tightness is significantly improved - has only had one episode of chest pain.   Self-resolved such that NTG was not needed.  Says his breathing while walking is still improving but is limited - gets SOA \"going around the block.\" Can make it up 1-2 flights of stairs before limiting dyspnea.  Has not been checking BP " at home - notes orthostatic dizziness today and says this has occurred intermittently since increasing Entresto dose.  He is still taking torsemide 20mg daily - has not needed any extra doses. No leg swelling or weight gains.  Dry weight has been running 229-231      ===========================================================================================  RELEVANT CARDIAC HX:    The patient has a longstanding complicated cardiac history dating back to initial myocardial infarction in 1989.  I have copied an outline of his cardiac history documented per Dr. Bernal below-see for full details.  In short, the patient has a history of CAD ( status post three-vessel CABG in 1990, followed by PCI (multiple prior stents to LCX) and left thoracotomy CABG x1 around 2011/2012), chronic systolic CHF/ischemic cardiomyopathy now with biventricular AICD in place followed by Dr. Mccarty ,  atrial fibrillation anticoagulated with warfarin, and prior AV node ablation (pacemaker was placed piror to eventual upgrade BIV AICD).       3/17/2021 Documentation per Dr. Bernal at [facility]:    Problem List:  1. Coronary atherosclerotic heart disease.  ---a. Status post remote myocardial infarction 1989.  ---b. Status post CABG ([facility], [facility]) 1990 with LIMA to LAD, SVG to PD, SVG to OM.  ---c. Chronic atrial fibrillation with subsequent permanent pacemaker implantation 1999 and prior RFA AVN  ---d. Chronic left ventricular dysfunction  ---e. Status post biventricular ICD 2005 ([place])  ---f. Cardiac catheterization 01/06 with LVEF 30%, severe native vessel coronary disease, patent LIMA to LAD, patent SVG to PD of RCA,  of SVG to OM with subsequent PTCA/stenting of native OM with 2.5mm x 23mm LEILANI.  ---g. [facility] with patent LIMA to LAD, patent SVG to right PDA, occluded SVG to OM with repeat PCI/stenting with 2.5 mm x 23 mm stent  ---h.  Adenosine dual isotope study 04/07 with LVEF 53%, inferolateral scar with no ischemia.  ---i. BNP level < 100 pg/ml 04/07 ; 101 pg/ml 06/08  ---k. Repeat cardiac catheterization 04/08 with LVEF 30%, patent LIMA to LAD and SVG to RCA,  of SVG to LCX, severe focal denovo stenosis of previously twice-stented native LCX with [facility] with third 2.5mm x 23mm Cypher LEILANI  ---l. Pulse generator replacement 04/28/08 ([place] [facility]) Medtronic Concerto [id number] (serial # [id number]); pulse generator replacement 11/19  ---m. Recurrent angina 04/08, repeat limited native LCA angiogram 04/08 with focal edge dissection and instent restenosis, status post additional stenting ([facility]) with 3 additional [place] stents  ---n. Recurrent angina 12/08 with repeat catheterization confirming LVEF 40%, patent LIMA to LAD, patent SVG to RCA, patent LCx stent site; medical therapy  ---o. TTE 03/10 with LVEF 30-35%, no significant valvular dysfunction, moderate LAE and LVE; repeat 12/10 uncjhanged except LVEWF improved to 35-40%  ---p. BNP 03/10 259 pg/ml > repeat 12/10 165 pg/ml > repeat 06/12 70 pg/ml > repeat 10/12 200 pg/ml>repeat 11/17 154 pg/ml  ---q. Adenosine radionuclide stress test 03/10 and 12/10 with fixed shannan-apical scar, no ischemia, LVEF 40% rising to 50% with stress; medical therapy  ---r. ACS 03/11 with cardiac catheterization revealing patent LIMA to LAD, patent SVG to PD and PL of RCA, severe ISR of previously multiply (x 4 procedures/6 stents) stented distal LM/proximal LCX-OM  ---s. Status post limited left thoracotomy CABG x 1 with SVG from descending Ao to OM, CRISTOBAL ligation ([facility]) - ?[id number]  ---t. TTE 11/11 and 09/12 with LVEF 40-45%, no significant valvular dysfunction  ---u. Recurrent sx 06/12 with repeat cardiac catheterization with all grafts patent, no culprit lesion in need of PCI; medical therapy  ---v. Status post pulse generator  replacement 07/12 and 01/16 ([place] [facility])  ---w. Pharmacologic nuclear stress test 08/13 with LVEF approx 40%, predominant fixed shannan-apical defect, no interval change since prior studies; expectant medical mgmt; repeat 10/15 with no ischemia  ---x. Pharmacologic radionuclide stress test 10/16 with LVEF 30%, fixed anterior defect, no ischemia  ---y. TTE 10/16 with LVEF 45%, no significant valvular dysfunction  ---z. Pharmacologic radionuclide stress test 11/17 with LVEF 40-45%, fixed anteroapical scar, no significant reversible ischemia  ---aa. TTE 02/13/19 with LVEF est 30%, LAE 6cm, LVEDD 6.2 cm, segemental WMAs, no significant valvular dysfunction; est RVSP 40 mmHg.  ---bb. BNP level 2/20 274 pg/ml => 02/21 170 pg/ml  ---cc. TTE 03/21 with LVEF 25%, no significant valvular dysfunction  2. Hyperlipidemia, on statin therapy  3. Renal cell carcinoma with right nephrectomy 2000, no recurrence.  4. Status post remote cholecystectomy with subsequent ventral hernia and ventral herniorrhaphy.  5. Chronic coumadin anticoagulation (indication: atrial fibrillation, INR goal 2-3).  6. Erectile dysfunction  7. Mild chronic renal insufficiency, serum creatinine 1.6 mg% 04/08  8. DJD, status post left TKR 10/15    Electronically signed by Christopher Bernal III, MD at 03/17/2021 8:30 AM CDT   ===========================================================================================  The following portions of the patient's history were reviewed and updated as appropriate: allergies, current medications, past family history, past medical history, past social history, past surgical history and problem list.    Review of Systems   Constitutional: Negative for malaise/fatigue.   Cardiovascular: Negative for chest pain, claudication, dyspnea on exertion, leg swelling, near-syncope, orthopnea, palpitations, paroxysmal nocturnal dyspnea and syncope.   Respiratory: Negative for shortness of breath.    Hematologic/Lymphatic: Does not  "bruise/bleed easily.       Allergies   Allergen Reactions   • Azithromycin Nausea And Vomiting   • Morphine And Related Other (See Comments)     SHAKES AND MAKES HIM \"WIRED\".   TAKES TRAMADOL WITHOUT PROBLEM       Current Outpatient Medications:   •  apixaban (ELIQUIS) 5 MG tablet tablet, Take 1 tablet by mouth 2 (Two) Times a Day. **CAN RESUME 12/16 AM, Disp: 60 tablet, Rfl: 11  •  atorvastatin (LIPITOR) 40 MG tablet, Take 40 mg by mouth Daily., Disp: , Rfl:   •  carvedilol (COREG) 25 MG tablet, Take 25 mg by mouth 2 (two) times a day., Disp: , Rfl:   •  cetirizine (zyrTEC) 10 MG tablet, Take 10 mg by mouth Daily., Disp: , Rfl:   •  clopidogrel (PLAVIX) 75 MG tablet, Take 75 mg by mouth Daily., Disp: , Rfl:   •  diltiazem CD (DILTIAZEM CD) 120 MG 24 hr capsule, Take 120 mg by mouth daily., Disp: , Rfl:   •  empagliflozin (JARDIANCE) 10 MG tablet tablet, Take 1 tablet by mouth Every Morning., Disp: 30 tablet, Rfl: 11  •  finasteride (PROSCAR) 5 MG tablet, Take 5 mg by mouth daily., Disp: , Rfl:   •  fluticasone (FLONASE) 50 MCG/ACT nasal spray, 2 sprays into the nostril(s) as directed by provider Daily., Disp: , Rfl:   •  metFORMIN (GLUCOPHAGE) 500 MG tablet, 500 mg Daily With Breakfast. Two tabs daily, Disp: , Rfl:   •  nitroglycerin (NITROSTAT) 0.4 MG SL tablet, Place 1 tablet under the tongue Every 5 (Five) Minutes As Needed for Chest Pain. Take no more than 3 doses in 15 minutes., Disp: 25 tablet, Rfl: 2  •  omeprazole (PriLOSEC) 40 MG capsule, Take 40 mg by mouth daily., Disp: , Rfl:   •  potassium chloride (K-DUR,KLOR-CON) 20 MEQ CR tablet, Take 20 mEq by mouth Daily. With additional 20 meq if second dose of torsemide taken , Disp: , Rfl:   •  pregabalin (LYRICA) 150 MG capsule, Take 150 mg by mouth 2 (Two) Times a Day., Disp: , Rfl:   •  ranolazine (Ranexa) 1000 MG 12 hr tablet, Take 1 tablet by mouth 2 (Two) Times a Day., Disp: 60 tablet, Rfl: 11  •  sacubitril-valsartan (Entresto)  MG tablet, Take 1 " "tablet by mouth 2 (Two) Times a Day., Disp: 60 tablet, Rfl: 11  •  tamsulosin (FLOMAX) 0.4 MG capsule 24 hr capsule, Take 1 capsule by mouth Daily., Disp: , Rfl:   •  torsemide (DEMADEX) 20 MG tablet, Take 20 mg by mouth Daily. With additional 20 mg PRN , Disp: , Rfl:   •  traMADol-acetaminophen (ULTRACET) 37.5-325 MG per tablet, as needed, Disp: , Rfl: 1  •  zolpidem (AMBIEN) 5 MG tablet, TAKE 1 TABLET BY MOUTH EVERY DAY AT BEDTIME AS NEEDED, Disp: , Rfl: 5         Objective:    BP (!) 89/60   Pulse 75   Ht 180.3 cm (71\")   Wt 107 kg (236 lb)   SpO2 98%   BMI 32.92 kg/m²        Vitals and nursing note reviewed.   Constitutional:       General: Not in acute distress.     Appearance: Not in distress.   Neck:      Vascular: No JVD or JVR. JVD normal.   Pulmonary:      Effort: Pulmonary effort is normal.      Breath sounds: Normal breath sounds.   Cardiovascular:      Normal rate. Regular rhythm.      Murmurs: There is no murmur.      No gallop. No rub.   Pulses:     Intact distal pulses.   Skin:     General: Skin is warm and dry.   Neurological:      Mental Status: Alert, oriented to person, place, and time and oriented to person, place and time.         Lab Review:    Lab Results   Component Value Date    GLUCOSE 102 (H) 12/28/2021    BUN 21 12/28/2021    CREATININE 1.37 (H) 12/28/2021    EGFRIFNONA 51 (L) 12/28/2021    EGFRIFAFRI 94 03/13/2017    BCR 15.3 12/28/2021    K 4.6 12/28/2021    CO2 28.0 12/28/2021    CALCIUM 9.2 12/28/2021    ALBUMIN 4.60 11/30/2021    AST 25 11/30/2021    ALT 19 11/30/2021     Lab Results   Component Value Date    CHOL 122 11/12/2021    CHLPL 136 (L) 03/13/2018    TRIG 108 11/12/2021    HDL 36 (L) 11/12/2021    LDL 66 11/12/2021     Lab Results   Component Value Date    HGBA1C 6.3 09/25/2018         Lab Results   Component Value Date    CHOL 122 11/12/2021     Lab Results   Component Value Date    TRIG 108 11/12/2021    TRIG 127 03/13/2018     Lab Results   Component Value Date    " HDL 36 (L) 11/12/2021    HDL 38 (L) 03/13/2018     Lab Results   Component Value Date    LDL 66 11/12/2021    LDL 73 03/13/2018     Lab Results   Component Value Date    VLDL 20 11/12/2021     Lab Results   Component Value Date    LDLHDL 1.79 11/12/2021               ECG 12 Lead    Date/Time: 12/28/2021 3:09 PM  Performed by: Sarbjit Posadas MD  Authorized by: Sarbjit Posadas MD   Comparison: compared with previous ECG from 12/9/2021  Similar to previous ECG  Rhythm: atrial fibrillation and paced  BPM: 75  Pacing: ventricular paced rhythm  Clinical impression: abnormal EKG                     Results for orders placed during the hospital encounter of 10/28/21    Adult Transthoracic Echo Complete W/ Cont if Necessary Per Protocol    Interpretation Summary  · Left ventricular ejection fraction appears to be 26 - 30%. Left ventricular systolic function is moderately decreased.  · The left ventricular cavity is moderately dilated (LVIDd 6.4cm)  · The following left ventricular wall segments are akinetic: apical anterior, apical lateral, apical inferior, apical septal and apex.  · The following left ventricular wall segments are hypokinetic: mid anterior, apical anterior, basal anterolateral, mid anterolateral, apical lateral, basal inferolateral, mid inferolateral, mid inferior, basal inferoseptal, mid inferoseptal, basal anterior, basal inferior and basal inferoseptal. The following left ventricular wall segments are akinetic: apical inferior, apical septal, apex and mid anteroseptal.  · Mild-moderate mitral regurgitation (functional).  · Left atrial volume is severely increased.  · Estimated right ventricular systolic pressure from tricuspid regurgitation is moderately elevated (45-55 mmHg).  · Compared to last exam from 2/13/2019, LV function appears slightly worse.    LAST HOME INR WAS 12/3/21 AND WAS 2.3  Assessment:      Problem List Items Addressed This Visit        Cardiac and Vasculature    Coronary artery  disease of bypass graft of native heart with stable angina pectoris (HCC)    Presence of biventricular AICD    Relevant Orders    ECG 12 Lead    Ischemic cardiomyopathy    Permanent atrial fibrillation (HCC)    Relevant Orders    ECG 12 Lead    Chronic combined systolic and diastolic congestive heart failure (HCC) - Primary    Relevant Orders    Basic Metabolic Panel (Completed)    BNP (Completed)    Mixed hyperlipidemia    Relevant Medications    atorvastatin (LIPITOR) 40 MG tablet      Other Visit Diagnoses     Hypotension, unspecified hypotension type        Orthostatic dizziness              Plan:     1. Chronic systolic CHF/ischemic cardiomyopathy: Stage C, NYHA class now improved to II: Patient does not appear volume overloaded on exam. His recent ejection fraction on echocardiogram here was similar to prior evaluations at [place], demonstrating moderate LV systolic dysfunction (EF 26-30%). Has BiV-ICD (Followed by Dr Mccarty)  Now on moderate dose Entresto, beta-blocker, and jardiance  -CHECK BMP AND BNP TODAY -> I suspect that, now on an excellent medical regimen for chronic systolic failure, he may actually be intravascularly dry as he is slightly hypotensive and complaining of some dizziness with position change today.  If labs confirm my suspicion, we will then advise him to stop taking torsemide every day but instead monitor his weight daily and take dose as needed for weight gain    2.  Coronary artery disease:  Now improved, with much less angina on ranexa, BB, and CCB (has been intolerant of Imdur) .  No culprit lesions noted on recent catheterization that would be targets for intervention; however, the vein graft placed in his redo CABG at [place] in 2011 or 2012 was not located.  Some records suggest that it was anastomosed from the descending aorta to the lateral wall circulation; I am still awaiting the operative report for further details on this.  Regardless, there would be no reason to  repeat catheterization to find this graft at present, since his anginal symptoms are essentially resolved.  -Continue Plavix  -Continue Coreg  -As needed sublingual nitroglycerin-has not had to use  -Continue atorvastatin 40 mg nightly and maintain goal LDL <70    -continue Ranexa 1000 mg twice daily for antianginal benefit    3.  Permanent atrial fibrillation: Biventricular paced and rate controlled today.  Stable/asymptomatic  -Has done well with transition from warfarin to apixaban; continue Eliquis 5 mg p.o. twice daily for CVA prophylaxis  -Continue current doses of Coreg, diltiazem for rate control.  Asymptomatic.    4.Orthostatic dizziness/hypotension: Actually appears to be symptomatic from hypotension at present; as noted in #1 above, I suspect that daily torsemide on top of his guideline directed medical therapeutic regimen for systolic heart failure has caused this.  After looking at labs, we will then provide advice but I anticipate having him stop indiscriminate use of daily loop diuretic and instead use it only as needed.  I also suspect this will help his blood pressure improved slightly, just enough to resolve his current symptoms that are attributable to hypotension.  Continue current meds.    5.  Mixed hyperlipidemia: as per above.  Goal LDL less than 70.  Continue high intensity statin.      6.  Risk assessment for endoscopy: This is considered a low risk procedure so risk assessment is not even necessary.  He would need to hold Eliquis x48 hours which would be okay.  However, if scope cannot be performed while he is still taking Plavix, it would need to be discontinued for 7 days and he should then take aspirin 81 mg every day in its place, until after the scope is done he can get back on both Plavix and Eliquis.    Follow-up in 3 months      Sarbjit Posadas MD  21:31 CST  12/29/21

## 2021-12-29 RX ORDER — TORSEMIDE 20 MG/1
20 TABLET ORAL DAILY PRN
Start: 2021-12-29

## 2021-12-31 ENCOUNTER — DOCUMENTATION (OUTPATIENT)
Dept: CARDIOLOGY | Facility: CLINIC | Age: 76
End: 2021-12-31

## 2021-12-31 NOTE — PROGRESS NOTES
We recieved the operative note by Dr. Moshe Ty at [place] from March 18, 2011, in which he performed a redo operation with left thoracotomy and placement of a saphenous vein graft from the descending thoracic aorta to the obtuse marginal branch without cardiopulmonary bypass.  He also ligated the left atrial appendage.  There was no specific description regarding where along the desc aorta the SVG was anastomosed, but he may need muscle sparing lateral thoracotomy incision and dissected down through the posterior aspect of the latissimus dorsi, with mobilization and anterior retraction of the serratus interior muscle.  He does state that the fifth intercostal space was identified and opened, allowing visualization of the descending thoracic aorta.

## 2022-01-03 NOTE — DISCHARGE INSTRUCTIONS
Wound Dehiscence  Wound dehiscence is when a surgical incision breaks open and does not heal properly after surgery. It usually happens 7-10 days after surgery. This can be a serious condition. It is important to identify and treat this condition early.  What are the causes?  Common causes of this condition include:  · Stretching of the wound area. This may be caused by lifting, vomiting, violent coughing, or straining during bowel movements.  · Wound infection.  · Early removal or rupture of stitches (sutures).    What increases the risk?  The following factors may make you more likely to develop this condition:  · Obesity.  · Lung disease.  · Smoking.  · Poor nutrition.  · Contamination during surgery.  · Medical problems that make it harder to heal, such as diabetes or autoimmune diseases.    What are the signs or symptoms?  Symptoms of this condition include:  · Bleeding or drainage from the wound.  · Pain.  · Fever.  · The wound starting to break open.    How is this diagnosed?  Your health care provider may diagnose wound dehiscence by monitoring the incision and noting any changes in the wound. These changes can include a gap in the incision and an increase in drainage or pain. The health care provider may ask you if you have noticed any stretching or tearing of the wound.  You may also have tests, including:  · Wound culture tests to determine if there is an infection.  · Imaging studies, such as an MRI scan or CT scan, to determine if there is a collection of pus or fluid in the wound area.  · Blood tests to check for infection and inflammation.    How is this treated?  Treatment for this condition may include:  · Wound care to keep the incision clean and help it heal from the inside out.  · Surgical repair.  · Antibiotic medicine to treat or prevent infection.  · Medicines to reduce pain and swelling.    Follow these instructions at home:  Medicines  · Take over-the-counter and prescription medicines only  as told by your health care provider. Taking pain medicine 30 minutes before changing a bandage (dressing) can help relieve pain.  · If you were prescribed an antibiotic, take it as told by your health care provider. Do not stop taking the antibiotic even if you start to feel better.  · Use anti-itch medicine as told by your health care provider. The wound may feel itchy when it is healing.  Wound care  · Follow instructions from your health care provider about how to take care of your wound. Make sure you:  ? Wash your hands with soap and water before you change your bandage (dressing) or wash the wound area. If soap and water are not available, use hand .  ? Gently wash the wound area with mild soap and water 2 times a day, or as directed. Rinse off the soap. Pat the area dry with a clean towel. Do not rub the wound.  ? Change the dressing and the packing inside as told by your health care provider.  · Do not scratch or pick at the wound.  · Check your wound area every day for signs of infection. Check for:  ? More redness, swelling, or pain.  ? More fluid or blood.  ? Warmth.  ? Pus or a bad smell.  Activity  · Avoid exercises that make you sweat heavily or could cause stretching of your wound.  · Do not lift anything that is heavier than 10 lb (4.5 kg) until the wound is healed or until your health care provider says that it is safe.  General instructions  · Do not take baths, swim, or use a hot tub until your health care provider approves. You may take showers.  · Keep all follow-up visits as told by your health care provider. This is important.  Contact a health care provider if:  · Your wound does not seem to be healing properly.  · You have a fever.  Get help right away if:  · You have more redness, swelling, or pain around your wound.  · You have more fluid coming from your wound.  · Your wound feels warm to the touch.  · You have pus or a bad smell coming from your wound.  · Your wound breaks open  farther.  · You have redness streaking or spreading from your wound.  · You have excessive bleeding from your wound.  Summary  · Wound dehiscence is when a surgical incision breaks open and does not heal properly after surgery.  · Your health care provider may diagnose wound dehiscence by monitoring the incision and noting any changes in the wound.  · Follow instructions from your health care provider about how to take care of your wound.  · Check your wound area every day for signs of infection, such as redness, swelling, pain, fluid, blood, warmth, pus, or a bad smell.  · If you were prescribed antibiotic medicine, take it as told by your health care provider. Do not stop taking the antibiotic even if you start to feel better.  This information is not intended to replace advice given to you by your health care provider. Make sure you discuss any questions you have with your health care provider.  Document Released: 03/09/2005 Document Revised: 11/22/2017 Document Reviewed: 11/22/2017  [employer] Interactive Patient Education © 2017 [employer]     -Pt p/w worsening abdominal distention, constipation and inability to pass gas   -CT showing Mildly distended right colon with stool and mild gaseous distention of the transverse colon and splenic flexure. Caliber change in the proximal descending colon without discrete obstructing mass identified. No signs of mechanical obstruction. Likely with ileus 2/2 opioid use  -NPO, bowel rest  -IVF  -no nausea or vomiting therefore will hold on NGT for now  -start on movantik, c/w bowel regimen   -serial abdominal exams  -stat abd xray if worsening pain

## 2022-01-07 NOTE — PROGRESS NOTES
Subjective    Mr. Maria is 76 y.o. male    Chief Complaint: BPH    History of Present Illness    76-year-old male established patient here to follow-up enlarged prostate, history of nephrectomy, and ED.  He has new problem of malfunctioning penile implant placed 2018 by Dr. Gupta.  He has difficulty inflating the device and finding his pump. Stable non-bothersome LUTS on finasteride and tamsulosin.  PSA on 12/28/21 was stable at 3.15 from 3.3 and 3.09.  Dr. Kerns performed a right nephrectomy nearly 20 years ago.  ESTELA since.  Renal ultrasound done November 2020 at [facility] shows normal left kidney, no hydronephrosis or mass.       Lab Results   Component Value Date    PSA 3.150 12/08/2021    PSA 3.060 11/12/2021    PSA 3.300 10/08/2020       The following portions of the patient's history were reviewed and updated as appropriate: allergies, current medications, past family history, past medical history, past social history, past surgical history and problem list.    Review of Systems      Current Outpatient Medications:   •  apixaban (ELIQUIS) 5 MG tablet tablet, Take 1 tablet by mouth 2 (Two) Times a Day. **CAN RESUME 12/16 AM, Disp: 60 tablet, Rfl: 11  •  atorvastatin (LIPITOR) 40 MG tablet, Take 40 mg by mouth Daily., Disp: , Rfl:   •  carvedilol (COREG) 25 MG tablet, Take 25 mg by mouth 2 (two) times a day., Disp: , Rfl:   •  cetirizine (zyrTEC) 10 MG tablet, Take 10 mg by mouth Daily., Disp: , Rfl:   •  clopidogrel (PLAVIX) 75 MG tablet, Take 75 mg by mouth Daily., Disp: , Rfl:   •  diltiazem CD (DILTIAZEM CD) 120 MG 24 hr capsule, Take 120 mg by mouth daily., Disp: , Rfl:   •  empagliflozin (JARDIANCE) 10 MG tablet tablet, Take 1 tablet by mouth Every Morning., Disp: 30 tablet, Rfl: 11  •  finasteride (PROSCAR) 5 MG tablet, Take 5 mg by mouth daily., Disp: , Rfl:   •  fluticasone (FLONASE) 50 MCG/ACT nasal spray, 2 sprays into the nostril(s) as directed by provider Daily., Disp: , Rfl:   •  metFORMIN  (GLUCOPHAGE) 500 MG tablet, 500 mg Daily With Breakfast. Two tabs daily, Disp: , Rfl:   •  nitroglycerin (NITROSTAT) 0.4 MG SL tablet, Place 1 tablet under the tongue Every 5 (Five) Minutes As Needed for Chest Pain. Take no more than 3 doses in 15 minutes., Disp: 25 tablet, Rfl: 2  •  omeprazole (PriLOSEC) 40 MG capsule, Take 40 mg by mouth daily., Disp: , Rfl:   •  potassium chloride (K-DUR,KLOR-CON) 20 MEQ CR tablet, Take 20 mEq by mouth Daily. With additional 20 meq if second dose of torsemide taken , Disp: , Rfl:   •  pregabalin (LYRICA) 150 MG capsule, Take 150 mg by mouth 2 (Two) Times a Day., Disp: , Rfl:   •  ranolazine (Ranexa) 1000 MG 12 hr tablet, Take 1 tablet by mouth 2 (Two) Times a Day., Disp: 60 tablet, Rfl: 11  •  sacubitril-valsartan (Entresto)  MG tablet, Take 1 tablet by mouth 2 (Two) Times a Day., Disp: 60 tablet, Rfl: 11  •  tamsulosin (FLOMAX) 0.4 MG capsule 24 hr capsule, Take 1 capsule by mouth Daily., Disp: , Rfl:   •  torsemide (DEMADEX) 20 MG tablet, Take 1 tablet by mouth Daily As Needed (weight gain >3 lbs in one day or >5 lbs in 2 days). With additional 20 mg PRN, Disp: , Rfl:   •  traMADol-acetaminophen (ULTRACET) 37.5-325 MG per tablet, as needed, Disp: , Rfl: 1  •  zolpidem (AMBIEN) 5 MG tablet, TAKE 1 TABLET BY MOUTH EVERY DAY AT BEDTIME AS NEEDED, Disp: , Rfl: 5    Past Medical History:   Diagnosis Date   • Abdominal pain, epigastric    • Abnormal abdominal exam     ABFND, RADIOLOGICAL, ABDOMINAL AREA    • Anticoagulated on Coumadin    • Atherosclerosis of coronary artery bypass graft     ATHEROSCLEROSIS, CORONARY, ARTERY BYPASS GRFT   • Atrial fibrillation (HCC)    • Cancer of right kidney (HCC)     right sided kidney cancer   • Cardiomyopathy (HCC)    • Cardiomyopathy, primary (HCC)    • CHF (congestive heart failure) (HCC)    • Colon polyps    • Congestive heart failure (HCC)    • Coronary artery disease     History of CAD/A-Fib/Pacemaker/Defibrillator placement: pt takes  Coumadin and plavix therapy as well as ASA daily   • Gastric polyp    • Gastroesophageal reflux disease without esophagitis    • History of colon polyps    • Hypercholesterolemia    • Hypertension, essential    • Melena    • Myocardial infarction (HCC)    • NSAID long-term use    • Obesity    • Pacemaker     Pacemaker Dependant   • Platelet inhibition due to Plavix    • Presence of stent in coronary artery     Multiple coronary stenting most recently 4/2008   • Single kidney     Only 1 Kidney   • Status post surgery     s/p Polypectomy Bleed       Past Surgical History:   Procedure Laterality Date   • APPENDECTOMY     • CARDIAC ABLATION     • CARDIAC CATHETERIZATION     • CARDIAC CATHETERIZATION Left 12/15/2021    Procedure: Coronary angiography;  Surgeon: Sarbjit Posadas MD;  Location:  [facility]ASIVE [facility];  Service: Cardiology;  Laterality: Left;   • CARDIAC CATHETERIZATION Left 12/15/2021    Procedure: Percutaneous Coronary Intervention;  Surgeon: Sarbjit Posadas MD;  Location:  [facility]VAS[facility];  Service: Cardiology;  Laterality: Left;   • CARDIAC DEFIBRILLATOR PLACEMENT     • CARDIAC PACEMAKER PLACEMENT      Pacemaker Placement   • CATARACT EXTRACTION, BILATERAL     • CHOLECYSTECTOMY     • COLONOSCOPY  08/27/2013    snare hep, trans tics recall 3 yr   • COLONOSCOPY  10/02/2006    few scattered diverticula   • COLONOSCOPY  07/22/2009    tubular adenoma in the ascending colon   • COLONOSCOPY  07/25/2005    several polyps in the ascending colon, one in the transverse colon   • COLONOSCOPY W/ POLYPECTOMY  09/19/2016    Tubular adenoma hepatic flexure, 2 Hyperplastic polyps rectum and at 50 cm, Benign polyp at 70 cm repeat exam in 3-5 years   • CORONARY ARTERY BYPASS GRAFT  02/2011    2nd time   • CORONARY ARTERY BYPASS GRAFT  1990   • CORONARY STENT PLACEMENT     • ENDOSCOPY  04/14/2016    charley duffy   • ENDOSCOPY  11/10/2014    clips at polypectomy bx no residual recall 1 yr   • ENDOSCOPY   "06/19/2014    snare clip body recall 6 months   • ENDOSCOPY  05/19/2014    polyp stomach bx and clip   • HERNIA REPAIR      with mesh   • OTHER SURGICAL HISTORY      Defibrillator/Pacer maker exchange   • OTHER SURGICAL HISTORY      Pacemaker/Defibillation exchange 2012   • PENILE PROSTHESIS IMPLANT N/A 7/27/2018    Procedure: PENILE PROSTHESIS PLACEMENT;  Surgeon: Godwin Gupta MD;  Location: [facility] OR;  Service: Urology   • PERIPHERAL ARTERIAL STENT GRAFT         Social History     Socioeconomic History   • Marital status:    Tobacco Use   • Smoking status: Never Smoker   • Smokeless tobacco: Never Used   Vaping Use   • Vaping Use: Never used   Substance and Sexual Activity   • Alcohol use: Yes     Comment: occ   • Drug use: No   • Sexual activity: Defer       Family History   Problem Relation Age of Onset   • Lung cancer Mother    • No Known Problems Father    • Other Neg Hx         GI Malignancies   • Colon cancer Neg Hx    • Colon polyps Neg Hx        Objective    Temp 97.6 °F (36.4 °C)   Ht 180.3 cm (71\")   Wt 106 kg (234 lb)   BMI 32.64 kg/m²     Physical Exam        Results for orders placed or performed in visit on 01/14/22   POC Urinalysis Dipstick, Multipro    Specimen: Urine   Result Value Ref Range    Color Yellow Yellow, Straw, Dark Yellow, Indigo    Clarity, UA Clear Clear    Glucose, UA 1000 mg/dL (A) Negative, 1000 mg/dL (3+) mg/dL    Bilirubin Small (1+) (A) Negative    Ketones, UA Negative Negative    Specific Gravity  1.030 1.005 - 1.030    Blood, UA Negative Negative    pH, Urine 5.0 5.0 - 8.0    Protein, POC Trace (A) Negative mg/dL    Urobilinogen, UA Normal Normal    Nitrite, UA Negative Negative    Leukocytes Negative Negative     International Prostate Symptom Score  The following is posted based on patient questionnaire answers:  0 - not at all    1-7 mild symptoms  1- Less than one time in five  8-19 moderate symptoms  2 -Less than half the time  20-35 severe symptoms  3 - " About half the time  4 - More than half the time  5 - Almost always     For following sections:  Incomplete Emptying: - How often have you had the sensation  of not emptying your bladder completely after you finished urinating?  2  Frequency: -How often have you had to urinate again less than   two hours after you finished urinating?      2  Intermittency: -How often have you found you stopped and started again  Several times when you urinate?       2  Urgency: -How often do you find it difficult to postpone urination?             2  Weak stream: - How often have you had a weak urinary stream?             0  Straining: - How often have you had to push or strain to begin  Urination?          0  Sleeping: -How many times did you most typically get up to urinate   From the time you went to bed at night until the time you got up in the   1  Morning          Total `  9    Quality of Life  How would you feel if you had to live with your urinary condition the way   2  It is now, no better, no worse for the rest of your life?    Where: 0=delighted; 1= pleased, 2= mostly satisfied, 3= mixed, 4 = mostly  Dissatisfied, 5= Unhappy, 6 = terrible    Assessment and Plan    Diagnoses and all orders for this visit:    1. Benign prostatic hyperplasia with lower urinary tract symptoms, symptom details unspecified (Primary)  -     POC Urinalysis Dipstick, Multipro      Stable LUTS on combination therapy- continue finasteride and tamsulosin.   He will followup in 1 year or sooner as needed.       This document has been signed by JACKIE Ochoa MD on January 16, 2022 18:16 CST

## 2022-01-10 ENCOUNTER — TELEPHONE (OUTPATIENT)
Dept: CARDIOLOGY | Facility: CLINIC | Age: 77
End: 2022-01-10

## 2022-01-10 NOTE — TELEPHONE ENCOUNTER
Patient is complaining of fluctuating blood pressure (108/60 - 82/54), as well as being lightheaded and tired all the time. He confirms he's only been taking torsemide as needed, but wonders if these symptoms are related to some of the other medications you've recently prescribed. Saige Rolon MA

## 2022-01-10 NOTE — TELEPHONE ENCOUNTER
Patient called and left a voicemail requesting to speak with you regarding his medications.  He can be reached at [phone].  Thank you!

## 2022-01-14 ENCOUNTER — OFFICE VISIT (OUTPATIENT)
Dept: UROLOGY | Facility: CLINIC | Age: 77
End: 2022-01-14

## 2022-01-14 VITALS — TEMPERATURE: 97.6 F | WEIGHT: 234 LBS | HEIGHT: 71 IN | BODY MASS INDEX: 32.76 KG/M2

## 2022-01-14 DIAGNOSIS — N40.1 BENIGN PROSTATIC HYPERPLASIA WITH LOWER URINARY TRACT SYMPTOMS, SYMPTOM DETAILS UNSPECIFIED: Primary | ICD-10-CM

## 2022-01-14 LAB
BILIRUB BLD-MCNC: ABNORMAL MG/DL
CLARITY, POC: CLEAR
COLOR UR: YELLOW
GLUCOSE UR STRIP-MCNC: ABNORMAL MG/DL
KETONES UR QL: NEGATIVE
LEUKOCYTE EST, POC: NEGATIVE
NITRITE UR-MCNC: NEGATIVE MG/ML
PH UR: 5 [PH] (ref 5–8)
PROT UR STRIP-MCNC: ABNORMAL MG/DL
RBC # UR STRIP: NEGATIVE /UL
SP GR UR: 1.03 (ref 1–1.03)
UROBILINOGEN UR QL: NORMAL

## 2022-01-14 PROCEDURE — 99214 OFFICE O/P EST MOD 30 MIN: CPT | Performed by: UROLOGY

## 2022-01-14 PROCEDURE — 81001 URINALYSIS AUTO W/SCOPE: CPT | Performed by: UROLOGY

## 2022-01-14 NOTE — PATIENT INSTRUCTIONS
"BMI for Adults  What is BMI?  Body mass index (BMI) is a number that is calculated from a person's weight and height. BMI can help estimate how much of a person's weight is composed of fat. BMI does not measure body fat directly. Rather, it is an alternative to procedures that directly measure body fat, which can be difficult and expensive.  BMI can help identify people who may be at higher risk for certain medical problems.  What are BMI measurements used for?  BMI is used as a screening tool to identify possible weight problems. It helps determine whether a person is obese, overweight, a healthy weight, or underweight.  BMI is useful for:  · Identifying a weight problem that may be related to a medical condition or may increase the risk for medical problems.  · Promoting changes, such as changes in diet and exercise, to help reach a healthy weight. BMI screening can be repeated to see if these changes are working.  How is BMI calculated?  BMI involves measuring your weight in relation to your height. Both height and weight are measured, and the BMI is calculated from those numbers. This can be done either in English (U[place] or metric measurements. Note that charts and online BMI calculators are available to help you find your BMI quickly and easily without having to do these calculations yourself.  To calculate your BMI in English ([place] measurements:    1. Measure your weight in pounds (lb).  2. Multiply the number of pounds by 703.  ? For example, for a person who weighs 180 lb, multiply that number by 703, which equals 126,540.  3. Measure your height in inches. Then multiply that number by itself to get a measurement called \"inches squared.\"  ? For example, for a person who is 70 inches tall, the \"inches squared\" measurement is 70 inches x 70 inches, which equals 4,900 inches squared.  4. Divide the total from step 2 (number of lb x 703) by the total from step 3 (inches squared): 126,540 ÷ 4,900 = 25.8. This is " "your BMI.    To calculate your BMI in metric measurements:  1. Measure your weight in kilograms (kg).  2. Measure your height in meters (m). Then multiply that number by itself to get a measurement called \"meters squared.\"  ? For example, for a person who is 1.75 m tall, the \"meters squared\" measurement is 1.75 m x 1.75 m, which is equal to 3.1 meters squared.  3. Divide the number of kilograms (your weight) by the meters squared number. In this example: 70 ÷ 3.1 = 22.6. This is your BMI.  What do the results mean?  BMI charts are used to identify whether you are underweight, normal weight, overweight, or obese. The following guidelines will be used:  · Underweight: BMI less than 18.5.  · Normal weight: BMI between 18.5 and 24.9.  · Overweight: BMI between 25 and 29.9.  · Obese: BMI of 30 or above.  Keep these notes in mind:  · Weight includes both fat and muscle, so someone with a muscular build, such as an athlete, may have a BMI that is higher than 24.9. In cases like these, BMI is not an accurate measure of body fat.  · To determine if excess body fat is the cause of a BMI of 25 or higher, further assessments may need to be done by a health care provider.  · BMI is usually interpreted in the same way for men and women.  Where to find more information  For more information about BMI, including tools to quickly calculate your BMI, go to these websites:  · Centers for Disease Control and Prevention: www.cdc.gov  · American Heart Association: www.heart.org  · National Heart, Lung, and Blood [place]: www.nhlbi.nih.gov  Summary  · Body mass index (BMI) is a number that is calculated from a person's weight and height.  · BMI may help estimate how much of a person's weight is composed of fat. BMI can help identify those who may be at higher risk for certain medical problems.  · BMI can be measured using English measurements or metric measurements.  · BMI charts are used to identify whether you are underweight, normal " weight, overweight, or obese.  This information is not intended to replace advice given to you by your health care provider. Make sure you discuss any questions you have with your health care provider.  Document Revised: 09/09/2020 Document Reviewed: 07/17/2020  Elsevier Patient Education © 2021 [employer].

## 2022-01-18 NOTE — TELEPHONE ENCOUNTER
Notified patient of Dr Posadas' instructions. He voiced understanding. LUCIE Jha Martin G, MD  You Yesterday (9:10 AM)         MR    Ok - before we adjust the drugs we know can improve his quality of life/breathing with low EF (like entresto,etc), please advise him first to stop taking diltiazem.  He doesn't need to wean - he's on lowest dose.  It will take 3-5 days to 'get out of his system,' but once it does, hopefull his BP will come up just enough to let him feel better.  If that doesn't do the trick, have him call us back in another week and we'll consider further changes.    Message text      You  Sarbjit Posadas MD 4 days ago     TH    Since he was here on 12/28 he's taken torsemide 2-3 times. He only takes it when his weight gain exceeds 3lbs in 2 days. His blood pressure recently has been 96/54 down to 82/51. He states he stays hydrated, drinking 5-6 glasses of water a day. I'll call him back with your instructions.    Message text      Sarbjit Posadas MD  You 5 days ago     MR    Can you give you a sense of how often he is taking the torsemide, if at all?  The symptoms and lower pressures of cells, coupled with the most recent labs we had on him from 12/28, still her most suspicious for dehydration.  If these signs and symptoms are present despite never taking torsemide, then we may need to reduce his Entresto dosage.    Message text

## 2022-01-28 ENCOUNTER — PREP FOR SURGERY (OUTPATIENT)
Dept: GASTROENTEROLOGY | Facility: CLINIC | Age: 77
End: 2022-01-28

## 2022-01-28 DIAGNOSIS — Z86.010 HX OF ADENOMATOUS COLONIC POLYPS: Primary | ICD-10-CM

## 2022-02-02 PROBLEM — Z86.010 HX OF ADENOMATOUS COLONIC POLYPS: Status: ACTIVE | Noted: 2022-02-02

## 2022-02-02 PROBLEM — Z86.0101 HX OF ADENOMATOUS COLONIC POLYPS: Status: ACTIVE | Noted: 2022-02-02

## 2022-03-10 ENCOUNTER — HOSPITAL ENCOUNTER (OUTPATIENT)
Dept: GENERAL RADIOLOGY | Facility: HOSPITAL | Age: 77
Discharge: HOME | End: 2022-03-10
Admitting: PHYSICIAN ASSISTANT

## 2022-03-10 ENCOUNTER — TRANSCRIBE ORDERS (OUTPATIENT)
Dept: ADMINISTRATIVE | Facility: HOSPITAL | Age: 77
End: 2022-03-10

## 2022-03-10 DIAGNOSIS — M54.50 LOW BACK PAIN, UNSPECIFIED BACK PAIN LATERALITY, UNSPECIFIED CHRONICITY, UNSPECIFIED WHETHER SCIATICA PRESENT: ICD-10-CM

## 2022-03-10 DIAGNOSIS — M54.50 LOW BACK PAIN, UNSPECIFIED BACK PAIN LATERALITY, UNSPECIFIED CHRONICITY, UNSPECIFIED WHETHER SCIATICA PRESENT: Primary | ICD-10-CM

## 2022-03-10 PROCEDURE — 72100 X-RAY EXAM L-S SPINE 2/3 VWS: CPT

## 2022-03-21 ENCOUNTER — HOSPITAL ENCOUNTER (OUTPATIENT)
Facility: HOSPITAL | Age: 77
Setting detail: HOSPITAL OUTPATIENT SURGERY
Discharge: HOME | End: 2022-03-21
Attending: INTERNAL MEDICINE | Admitting: INTERNAL MEDICINE

## 2022-03-21 ENCOUNTER — ANESTHESIA (OUTPATIENT)
Dept: GASTROENTEROLOGY | Facility: HOSPITAL | Age: 77
End: 2022-03-21

## 2022-03-21 ENCOUNTER — ANESTHESIA EVENT (OUTPATIENT)
Dept: GASTROENTEROLOGY | Facility: HOSPITAL | Age: 77
End: 2022-03-21

## 2022-03-21 VITALS
HEART RATE: 75 BPM | WEIGHT: 234 LBS | TEMPERATURE: 97.1 F | BODY MASS INDEX: 32.76 KG/M2 | HEIGHT: 71 IN | SYSTOLIC BLOOD PRESSURE: 96 MMHG | OXYGEN SATURATION: 97 % | RESPIRATION RATE: 17 BRPM | DIASTOLIC BLOOD PRESSURE: 59 MMHG

## 2022-03-21 DIAGNOSIS — Z86.010 HX OF ADENOMATOUS COLONIC POLYPS: ICD-10-CM

## 2022-03-21 LAB
GLUCOSE BLDC GLUCOMTR-MCNC: 74 MG/DL (ref 70–130)
GLUCOSE BLDC GLUCOMTR-MCNC: 77 MG/DL (ref 70–130)
GLUCOSE BLDC GLUCOMTR-MCNC: 83 MG/DL (ref 70–130)

## 2022-03-21 PROCEDURE — 82962 GLUCOSE BLOOD TEST: CPT

## 2022-03-21 PROCEDURE — 25010000002 PROPOFOL 10 MG/ML EMULSION: Performed by: NURSE ANESTHETIST, CERTIFIED REGISTERED

## 2022-03-21 PROCEDURE — 88305 TISSUE EXAM BY PATHOLOGIST: CPT | Performed by: INTERNAL MEDICINE

## 2022-03-21 PROCEDURE — 45385 COLONOSCOPY W/LESION REMOVAL: CPT | Performed by: INTERNAL MEDICINE

## 2022-03-21 RX ORDER — SODIUM CHLORIDE 0.9 % (FLUSH) 0.9 %
10 SYRINGE (ML) INJECTION AS NEEDED
Status: DISCONTINUED | OUTPATIENT
Start: 2022-03-21 | End: 2022-03-21 | Stop reason: HOSPADM

## 2022-03-21 RX ORDER — LIDOCAINE HYDROCHLORIDE 20 MG/ML
INJECTION, SOLUTION EPIDURAL; INFILTRATION; INTRACAUDAL; PERINEURAL AS NEEDED
Status: DISCONTINUED | OUTPATIENT
Start: 2022-03-21 | End: 2022-03-21 | Stop reason: SURG

## 2022-03-21 RX ORDER — SODIUM CHLORIDE 9 MG/ML
500 INJECTION, SOLUTION INTRAVENOUS CONTINUOUS PRN
Status: DISCONTINUED | OUTPATIENT
Start: 2022-03-21 | End: 2022-03-21 | Stop reason: HOSPADM

## 2022-03-21 RX ORDER — LIDOCAINE HYDROCHLORIDE 10 MG/ML
0.5 INJECTION, SOLUTION EPIDURAL; INFILTRATION; INTRACAUDAL; PERINEURAL ONCE AS NEEDED
Status: DISCONTINUED | OUTPATIENT
Start: 2022-03-21 | End: 2022-03-21 | Stop reason: HOSPADM

## 2022-03-21 RX ORDER — PROPOFOL 10 MG/ML
VIAL (ML) INTRAVENOUS AS NEEDED
Status: DISCONTINUED | OUTPATIENT
Start: 2022-03-21 | End: 2022-03-21 | Stop reason: SURG

## 2022-03-21 RX ADMIN — PROPOFOL 50 MG: 10 INJECTION, EMULSION INTRAVENOUS at 10:19

## 2022-03-21 RX ADMIN — PROPOFOL 50 MG: 10 INJECTION, EMULSION INTRAVENOUS at 10:06

## 2022-03-21 RX ADMIN — PROPOFOL 50 MG: 10 INJECTION, EMULSION INTRAVENOUS at 10:04

## 2022-03-21 RX ADMIN — PROPOFOL 50 MG: 10 INJECTION, EMULSION INTRAVENOUS at 10:01

## 2022-03-21 RX ADMIN — LIDOCAINE HYDROCHLORIDE 50 MG: 20 INJECTION, SOLUTION EPIDURAL; INFILTRATION; INTRACAUDAL; PERINEURAL at 09:58

## 2022-03-21 RX ADMIN — SODIUM CHLORIDE 500 ML: 9 INJECTION, SOLUTION INTRAVENOUS at 08:26

## 2022-03-21 RX ADMIN — PROPOFOL 50 MG: 10 INJECTION, EMULSION INTRAVENOUS at 09:59

## 2022-03-21 RX ADMIN — PROPOFOL 50 MG: 10 INJECTION, EMULSION INTRAVENOUS at 10:13

## 2022-03-21 RX ADMIN — PROPOFOL 50 MG: 10 INJECTION, EMULSION INTRAVENOUS at 10:09

## 2022-03-21 NOTE — H&P
[facility] Gastroenterology  Pre Procedure History & Physical    Chief Complaint:   Polyps    Subjective     HPI:   Here for colonoscopy.  History of polyps    Past Medical History:   Past Medical History:   Diagnosis Date   • Abdominal pain, epigastric    • Abnormal abdominal exam     ABFND, RADIOLOGICAL, ABDOMINAL AREA    • Anticoagulated on Coumadin    • Atherosclerosis of coronary artery bypass graft     ATHEROSCLEROSIS, CORONARY, ARTERY BYPASS GRFT   • Atrial fibrillation (HCC)    • Cancer of right kidney (HCC)     right sided kidney cancer   • Cardiomyopathy (HCC)    • Cardiomyopathy, primary (HCC)    • CHF (congestive heart failure) (HCC)    • Colon polyps    • Congestive heart failure (HCC)    • Coronary artery disease     History of CAD/A-Fib/Pacemaker/Defibrillator placement: pt takes Coumadin and plavix therapy as well as ASA daily   • Gastric polyp    • Gastroesophageal reflux disease without esophagitis    • History of colon polyps    • Hypercholesterolemia    • Hypertension, essential    • Melena    • Myocardial infarction (HCC)    • NSAID long-term use    • Obesity    • Pacemaker     Pacemaker Dependant   • Platelet inhibition due to Plavix    • Presence of stent in coronary artery     Multiple coronary stenting most recently 4/2008   • Single kidney     Only 1 Kidney   • Status post surgery     s/p Polypectomy Bleed       Past Surgical History:  Past Surgical History:   Procedure Laterality Date   • APPENDECTOMY     • CARDIAC ABLATION     • CARDIAC CATHETERIZATION     • CARDIAC CATHETERIZATION Left 12/15/2021    Procedure: Coronary angiography;  Surgeon: Sarbjit Posadas MD;  Location:  [facility] CATH INVASIVE [facility];  Service: Cardiology;  Laterality: Left;   • CARDIAC CATHETERIZATION Left 12/15/2021    Procedure: Percutaneous Coronary Intervention;  Surgeon: Sarbjit Posadas MD;  Location:  [facility] CATH INVASIVE LOCATION;  Service: Cardiology;  Laterality: Left;   • CARDIAC DEFIBRILLATOR PLACEMENT      • CARDIAC PACEMAKER PLACEMENT      Pacemaker Placement   • CATARACT EXTRACTION, BILATERAL     • CHOLECYSTECTOMY     • COLONOSCOPY  08/27/2013    snare hep, trans tics recall 3 yr   • COLONOSCOPY  10/02/2006    few scattered diverticula   • COLONOSCOPY  07/22/2009    tubular adenoma in the ascending colon   • COLONOSCOPY  07/25/2005    several polyps in the ascending colon, one in the transverse colon   • COLONOSCOPY W/ POLYPECTOMY  09/19/2016    Tubular adenoma hepatic flexure, 2 Hyperplastic polyps rectum and at 50 cm, Benign polyp at 70 cm repeat exam in 3-5 years   • CORONARY ARTERY BYPASS GRAFT  02/2011    2nd time   • CORONARY ARTERY BYPASS GRAFT  1990   • CORONARY STENT PLACEMENT     • ENDOSCOPY  04/14/2016    nl slant   • ENDOSCOPY  11/10/2014    clips at polypectomy bx no residual recall 1 yr   • ENDOSCOPY  06/19/2014    snare clip body recall 6 months   • ENDOSCOPY  05/19/2014    polyp stomach bx and clip   • HERNIA REPAIR      with mesh   • JOINT REPLACEMENT     • OTHER SURGICAL HISTORY      Defibrillator/Pacer maker exchange   • OTHER SURGICAL HISTORY      Pacemaker/Defibillation exchange 2012   • PENILE PROSTHESIS IMPLANT N/A 07/27/2018    Procedure: PENILE PROSTHESIS PLACEMENT;  Surgeon: Godwin Gupta MD;  Location: [facility] OR;  Service: Urology   • PERIPHERAL ARTERIAL STENT GRAFT         Family History:  Family History   Problem Relation Age of Onset   • Lung cancer Mother    • No Known Problems Father    • Other Neg Hx         GI Malignancies   • Colon cancer Neg Hx    • Colon polyps Neg Hx        Social History:   reports that he has never smoked. He has never used smokeless tobacco. He reports current alcohol use. He reports that he does not use drugs.    Medications:   Prior to Admission medications    Medication Sig Start Date End Date Taking? Authorizing Provider   atorvastatin (LIPITOR) 40 MG tablet Take 40 mg by mouth Daily.   Yes Provider, MD Rony   carvedilol (COREG) 25 MG  tablet Take 25 mg by mouth 2 (two) times a day.   Yes Rony Vail MD   empagliflozin (JARDIANCE) 10 MG tablet tablet Take 1 tablet by mouth Every Morning. 11/22/21  Yes Yolanda Garnica APRN   finasteride (PROSCAR) 5 MG tablet Take 5 mg by mouth daily.   Yes Rony Vail MD   metFORMIN (GLUCOPHAGE) 500 MG tablet 500 mg Daily With Breakfast. Two tabs daily 9/21/21  Yes Rony Vail MD   omeprazole (PriLOSEC) 40 MG capsule Take 40 mg by mouth daily.   Yes Rony Vail MD   potassium chloride (K-DUR,KLOR-CON) 20 MEQ CR tablet Take 20 mEq by mouth Daily. With additional 20 meq if second dose of torsemide taken    Yes Rony Vail MD   pregabalin (LYRICA) 150 MG capsule Take 150 mg by mouth 2 (Two) Times a Day. 8/7/21  Yes Rony Vail MD   ranolazine (Ranexa) 1000 MG 12 hr tablet Take 1 tablet by mouth 2 (Two) Times a Day. 12/15/21  Yes Sarbjit Posadas MD   sacubitril-valsartan (Entresto)  MG tablet Take 1 tablet by mouth 2 (Two) Times a Day. 12/15/21  Yes Sarbjit Posadas MD   tamsulosin (FLOMAX) 0.4 MG capsule 24 hr capsule Take 1 capsule by mouth Daily.   Yes Rony Vail MD   torsemide (DEMADEX) 20 MG tablet Take 1 tablet by mouth Daily As Needed (weight gain >3 lbs in one day or >5 lbs in 2 days). With additional 20 mg PRN 12/29/21  Yes Sarbjit Posadas MD   apixaban (ELIQUIS) 5 MG tablet tablet Take 1 tablet by mouth 2 (Two) Times a Day. **CAN RESUME 12/16 AM 12/15/21   Sarbjit Posadas MD   cetirizine (zyrTEC) 10 MG tablet Take 10 mg by mouth Daily.    Rony Vali MD   clopidogrel (PLAVIX) 75 MG tablet Take 75 mg by mouth Daily. 9/8/21   Rony Vail MD   fluticasone (FLONASE) 50 MCG/ACT nasal spray 2 sprays into the nostril(s) as directed by provider Daily.    Rony Vail MD   nitroglycerin (NITROSTAT) 0.4 MG SL tablet Place 1 tablet under the tongue Every 5 (Five) Minutes As Needed for Chest Pain. Take no more than  "3 doses in 15 minutes. 10/18/21   Yolanda Garnica APRN   traMADol-acetaminophen (ULTRACET) 37.5-325 MG per tablet as needed 11/3/16   Rony Vail MD   zolpidem (AMBIEN) 5 MG tablet TAKE 1 TABLET BY MOUTH EVERY DAY AT BEDTIME AS NEEDED 6/5/18   Rony Vail MD   dilTIAZem CD (CARDIZEM CD) 120 MG 24 hr capsule Take 120 mg by mouth daily.  3/21/22  Rony Vail MD   polyethylene glycol (GoLYTELY) 236 g solution As Directed by office. May add flavor packet 1/31/22 3/21/22  Lainey Torres APRN       Allergies:  Azithromycin and Morphine and related    Objective     Blood pressure 122/65, pulse 74, temperature 97.1 °F (36.2 °C), temperature source Temporal, resp. rate 20, height 180.3 cm (71\"), weight 106 kg (234 lb), SpO2 98 %.    Physical Exam   Constitutional: Pt is oriented to person, place, and in no distress.   HENT: Mouth/Throat: Oropharynx is clear.   Cardiovascular: Normal rate, regular rhythm.    Pulmonary/Chest: Effort normal. No respiratory distress. No  wheezes.   Abdominal: Soft. Non-distended.  Skin: Skin is warm and dry.   Psychiatric: Mood, memory, affect and judgment appear normal.     Assessment/Plan     Diagnosis:  History of polyps    Anticipated Surgical Procedure:    Proceed with colonoscopy as scheduled    The following major R/B/A were discussed with the patient, however the list is not all inclusive . Risk:  Bleeding (immediate and delayed), perforation (rupture or tear), reaction to medication, missed lesion/cancer, pain during the procedure, infection, need for surgery, need for ostomy, need for mechanical ventilation (breathing machine), death.  Benefits: removal of polyp/tissue, burn/clip/or inject to stop bleeding, removal of foreign body, dilate any stricture.  Alternatives: Xray or CT, surgery, do nothing with associated risk   The patient was given time to ask question and received explanation, and agrees to proceed as per History and Physical.   No " guarantee given or expressed.    EMR Dragon/transcription disclaimer: Much of this encounter note is an electronic transcription/translation of spoken language to printed text.  The electronic translation of spoken language may permit erroneous, or at times, nonsensical words or phrases to be inadvertently transcribed.  Although I have reviewed the note for such errors, some may still exist.    Phil Goodwin MD  09:58 CDT  3/21/2022     Render Risk Assessment In Note?: no Comment: Recent fall without loss of consciousness; appears to have been several weeks ago Detail Level: Simple

## 2022-03-21 NOTE — ANESTHESIA PREPROCEDURE EVALUATION
Anesthesia Evaluation     no history of anesthetic complications:  NPO Solid Status: > 8 hours  NPO Liquid Status: > 2 hours           Airway   Mallampati: II  TM distance: >3 FB  Neck ROM: full  No difficulty expected  Dental      Pulmonary    (-) sleep apnea, not a smoker  Cardiovascular   Exercise tolerance: poor (<4 METS)    (+) pacemaker pacemaker, ICD, hypertension, past MI , CAD, CABG (2000, 2008) >6 Months, dysrhythmias Atrial Fib, hyperlipidemia,     ROS comment: Echo 10/2021 EF 25-30%      Impression:  1.  Severe native 3-vessel coronary artery disease (no change compared to old films)  2.  Patent LIMA to LAD and SVG to RCA  3.  Occluded vein graft to circumflex distribution (from original operation) - unable to SVG placed to this distribution at a later date (in '11 or '12 at [facility])  4.  LV systolic dysfunction with EF 25-30%, with normal LVEDP       V paced 95%, last report reviewed was [employer] from 2018. Pt reports it was changed for [employer].     Neuro/Psych  (-) seizures, TIA, CVA  GI/Hepatic/Renal/Endo    (+) obesity,  GERD,  renal disease (kidneye cancer s/p nephrectomy), diabetes mellitus (pre diabetes) well controlled,     Musculoskeletal     Abdominal    Substance History      OB/GYN          Other      history of cancer                    Anesthesia Plan    ASA 3     MAC     intravenous induction     Anesthetic plan, all risks, benefits, and alternatives have been provided, discussed and informed consent has been obtained with: patient.        CODE STATUS:

## 2022-03-21 NOTE — ANESTHESIA POSTPROCEDURE EVALUATION
"Patient: Darren Maria    Procedure Summary     Date: 03/21/22 Room / Location: [facility] ENDOSCOPY 2 / [facility] ENDOSCOPY    Anesthesia Start: 0954 Anesthesia Stop: 1022    Procedure: COLONOSCOPY WITH ANESTHESIA (N/A ) Diagnosis:       Hx of adenomatous colonic polyps      (Hx of adenomatous colonic polyps [Z86.010])    Surgeons: Phil Goodwin MD Provider: Neftaly Maldonado CRNA    Anesthesia Type: MAC ASA Status: 3          Anesthesia Type: MAC    Vitals  Vitals Value Taken Time   BP 91/52 03/21/22 1026   Temp     Pulse 75 03/21/22 1031   Resp 14 03/21/22 1025   SpO2 96 % 03/21/22 1031   Vitals shown include unvalidated device data.        Post Anesthesia Care and Evaluation    Patient location during evaluation: PHASE II  Patient participation: complete - patient participated  Level of consciousness: awake and alert  Pain management: adequate  Airway patency: patent  Anesthetic complications: No anesthetic complications  PONV Status: none  Cardiovascular status: acceptable  Respiratory status: acceptable  Hydration status: acceptable    Comments: BP 91/52 (BP Location: Left arm, Patient Position: Lying)   Pulse 75   Temp 97.1 °F (36.2 °C) (Temporal)   Resp 14   Ht 180.3 cm (71\")   Wt 106 kg (234 lb)   SpO2 95%   BMI 32.64 kg/m²         "

## 2022-03-22 LAB
CYTO UR: NORMAL
LAB AP CASE REPORT: NORMAL
PATH REPORT.FINAL DX SPEC: NORMAL
PATH REPORT.GROSS SPEC: NORMAL

## 2022-03-30 ENCOUNTER — OFFICE VISIT (OUTPATIENT)
Dept: CARDIOLOGY | Facility: CLINIC | Age: 77
End: 2022-03-30

## 2022-03-30 VITALS
SYSTOLIC BLOOD PRESSURE: 108 MMHG | DIASTOLIC BLOOD PRESSURE: 58 MMHG | WEIGHT: 232 LBS | OXYGEN SATURATION: 93 % | BODY MASS INDEX: 32.48 KG/M2 | HEIGHT: 71 IN | HEART RATE: 63 BPM

## 2022-03-30 DIAGNOSIS — I48.21 PERMANENT ATRIAL FIBRILLATION: ICD-10-CM

## 2022-03-30 DIAGNOSIS — I50.42 CHRONIC COMBINED SYSTOLIC AND DIASTOLIC CONGESTIVE HEART FAILURE: ICD-10-CM

## 2022-03-30 DIAGNOSIS — I25.5 ISCHEMIC CARDIOMYOPATHY: ICD-10-CM

## 2022-03-30 DIAGNOSIS — E78.2 MIXED HYPERLIPIDEMIA: ICD-10-CM

## 2022-03-30 DIAGNOSIS — Z95.810 PRESENCE OF BIVENTRICULAR AICD: ICD-10-CM

## 2022-03-30 DIAGNOSIS — I25.708 CORONARY ARTERY DISEASE OF BYPASS GRAFT OF NATIVE HEART WITH STABLE ANGINA PECTORIS: Primary | ICD-10-CM

## 2022-03-30 PROCEDURE — 99214 OFFICE O/P EST MOD 30 MIN: CPT | Performed by: NURSE PRACTITIONER

## 2022-04-04 NOTE — PROGRESS NOTES
"    Subjective:     Encounter date: 3/30/2022      Patient ID: Darren Maria is a 76 y.o. male.    Chief Complaint: f/u CHF/CAD/AF    History of Present Illness     76-year-old today returns for prompt follow-up.  Dr. Posadas met him on 11/4/2021, and he has an extensive cardiovascular history.  He did been seen remotely in the past by different members of our group, though had not been seen since February 2019 until appointment with me on 10/18/21.  See below for full summary of his previous cardiac issues.  At the time of his visit here with me, he was reporting worsening fatigue/decline in stamina along with exertional dyspnea and chest pressure over the last 2 to 3 months.  Symptoms would occur when walking half a block or climbing a flight of stairs, and he noticed that the amount of activity takes reduce the symptoms was declining over time.  He reported the symptoms were the same as those he felt prior to CABG.  At that time, symptoms were relieved within a few minutes of rest so he had not taken any nitroglycerin.  I  added Ranexa to his medical regimen. Later a telephone encounter revealed this improved his breathing somewhat, but he was still symptomatic.     At Dr. Posadas' visit with him on 11/4, he noted the following:  Now he says his SOA is better for just the few hours after taking ranexa. Says he's previously had intolerable headache on imdur.  States his breathing is bad, but wife states he's knowingly eating high salt foods like hot dogs. He still is having chest pressure as noted by Yolanda at the first visit, but when specifically asked today whether his shortness of breath and fluid retention or his chest pressure is more problematic, he repeatedly goes back to discussing his fluid retention and trouble breathing. He admits that he knows \"I do not take care of myself like I should.\"    Dr. Posadas transitioned him to Entresto at that visit, he returned 2 weeks later for follow-up again with me.  He " "then reported significant improvement in his exertional chest pressure, but thought that his breathing was about the same.  His weight was stable by his home scale, though our office scale did suggest a 6 pound decrease since the prior visit with me 2 weeks before that.  Therefore, I did start Farxiga therapy that day.     At his next visit, he reported that he was doing much better, particularly in terms of how far he can walk before having any symptoms.  He was having chest pains, but less severe. Frequency unchanged.  Had a prolonged episode of chest pain the day prior.  Still \"feel like something is wrong. I still get pressure, even at rest.\"  For these reasons, Dr. Posadas did then proceed with coronary bypass graft angiography on 12/15/2021; see full results and procedural note.  In short, there did not appear to be any culprit lesions in need of revascularization, though he could not locate the SVG performed to an OM branch at his last operation at [facility] in 2011 or 2012 (Dr. Posadas has since obtained op note- see below).  Otherwise, his anatomy was consistent with previous reports -patent PEREZ to LAD, patent SVG to RCA, and a known occlusion of an SVG arising from the ascending aorta to an OM branch.  Left ventriculogram showed EF 25 to 30%, but normal LVEDP.  Therefore, he was discharged home that day with instructions to increase his Entresto up to max dose, and also to increase Ranexa for further symptom benefit.     The pt saw Dr. Posadas 12/28/2021 and reported his chest tightness had significantly improved - he had only had 1 episode of CP that self resolved without NTG. ELLISON was improving as well, but still had dyspnea walking around the block and beyond 1-2 flights of stairs. He reported a dry weight of around 230. He was having orthostatic dizziness on max Entresto plus daily torsemide. That day Dr. Posadas checked labs and this revealed a mildly elevated creatinine suggesting dehydration. He was " instructed to use torsemide on an as needed basis only at that point.    Later the pt called in with symptomatic hypotension (LH, tired) with SBP 80s-100s and diltiazem was stopped 1/18/2022.    Today the patient reports he feels much better and is now only slightly dizzy if he stands up too fast, after stopping diltiazem. Overall, he reports he is very pleased with his cardiac care and states following recent med changes since he transitioned his care to Dr. Posadas his breathing and chest pressure have improved from a 2 to a 5-6 (10 being best). He states he is taking his torsemide about once per week for increases in weight or swelling (usually provoked by diet choices), that will subsequently resolve. ELLISON is stable/improved- typically only short of breath if going upstairs or up an incline. He reports overall he might have exertional dyspnea with chest pressure 1 x per week. Again, he states this is a significant improvement after the past few months and is relieved with rest. Weight is stable. He denies orthopnea, PND, palpitations.     He states Dr. Longo repeated labs recently and his renal function looks good per his report.     ===========================================================================================  RELEVANT CARDIAC HX:    The patient has a longstanding complicated cardiac history dating back to initial myocardial infarction in 1989.  I have copied an outline of his cardiac history documented per Dr. Bernal below-see for full details.  In short, the patient has a history of CAD ( status post three-vessel CABG in 1990, followed by PCI (multiple prior stents to LCX) and left thoracotomy CABG x1 around 2011/2012), chronic systolic CHF/ischemic cardiomyopathy now with biventricular AICD in place followed by Dr. Mccarty ,  atrial fibrillation anticoagulated with warfarin, and prior AV node ablation (pacemaker was placed piror to eventual upgrade BIV AICD).       3/17/2021 Documentation per   [facility]:    Problem List:  1. Coronary atherosclerotic heart disease.  ---a. Status post remote myocardial infarction 1989.  ---b. Status post CABG ([facility]) 1990 with LIMA to LAD, SVG to PD, SVG to OM.  ---c. Chronic atrial fibrillation with subsequent permanent pacemaker implantation 1999 and prior RFA AVN  ---d. Chronic left ventricular dysfunction  ---e. [facility]  ---f. Cardiac catheterization 01/06 with LVEF 30%, severe native vessel coronary disease, patent LIMA to LAD, patent SVG to PD of RCA,  of SVG to OM with subsequent PTCA/stenting of native OM with 2.5mm x 23mm LEILANI.  ---g. Repeat cardiac catheterization 12/06 ([facility]) with patent LIMA to LAD, patent SVG to right PDA, occluded SVG to OM with repeat PCI/stenting with 2.5 mm x 23 mm stent  ---h. Adenosine dual isotope study 04/07 with LVEF 53%, inferolateral scar with no ischemia.  ---i. BNP level < 100 pg/ml 04/07 ; 101 pg/ml 06/08  ---k. Repeat cardiac catheterization 04/08 with LVEF 30%, patent LIMA to LAD and SVG to RCA,  of SVG to LCX, severe focal denovo stenosis of previously twice-stented native LCX with repeat stenting of native LCS ([facility]) with third 2.5mm x 23mm Cypher LEILANI  ---l. Pulse generator replacement 04/28/08 ([place] [facility]) Medtronic Concerto [id number] (serial # [id number]); pulse generator replacement 11/19  ---m. Recurrent angina 04/08, repeat limited native LCA angiogram 04/08 with focal edge dissection and instent restenosis, status post additional stenting ([facility]) with 3 additional Elbert stents  ---n. Recurrent angina 12/08 with repeat catheterization confirming LVEF 40%, patent LIMA to LAD, patent SVG to RCA, patent LCx stent site; medical therapy  ---o. TTE 03/10 with LVEF 30-35%, no significant valvular dysfunction, moderate LAE and LVE; repeat 12/10 uncjhanged except LVEWF  improved to 35-40%  ---p. BNP 03/10 259 pg/ml > repeat 12/10 165 pg/ml > repeat 06/12 70 pg/ml > repeat 10/12 200 pg/ml>repeat 11/17 154 pg/ml  ---q. Adenosine radionuclide stress test 03/10 and 12/10 with fixed shannan-apical scar, no ischemia, LVEF 40% rising to 50% with stress; medical therapy  ---r. ACS 03/11 with cardiac catheterization revealing patent LIMA to LAD, patent SVG to PD and PL of RCA, severe ISR of previously multiply (x 4 procedures/6 stents) stented distal LM/proximal LCX-OM  ---s. Status post limited left thoracotomy CABG x 1 with SVG from descending Ao to OM, CRISTOBAL ligation ([facility]) - ?[id number]  ---t. TTE 11/11 and 09/12 with LVEF 40-45%, no significant valvular dysfunction  ---u. Recurrent sx 06/12 with repeat cardiac catheterization with all grafts patent, no culprit lesion in need of PCI; medical therapy  ---v. Status post pulse generator replacement 07/12 and 01/16 ([facility] Mccarty)  ---w. Pharmacologic nuclear stress test 08/13 with LVEF approx 40%, predominant fixed shannan-apical defect, no interval change since prior studies; expectant medical mgmt; repeat 10/15 with no ischemia  ---x. Pharmacologic radionuclide stress test 10/16 with LVEF 30%, fixed anterior defect, no ischemia  ---y. TTE 10/16 with LVEF 45%, no significant valvular dysfunction  ---z. Pharmacologic radionuclide stress test 11/17 with LVEF 40-45%, fixed anteroapical scar, no significant reversible ischemia  ---aa. TTE 02/13/19 with LVEF est 30%, LAE 6cm, LVEDD 6.2 cm, segemental WMAs, no significant valvular dysfunction; est RVSP 40 mmHg.  ---bb. BNP level 2/20 274 pg/ml => 02/21 170 pg/ml  ---cc. TTE 03/21 with LVEF 25%, no significant valvular dysfunction  2. Hyperlipidemia, on statin therapy  3. Renal cell carcinoma with right nephrectomy 2000, no recurrence.  4. Status post remote cholecystectomy with subsequent ventral hernia and ventral herniorrhaphy.  5. Chronic coumadin anticoagulation (indication: atrial  "fibrillation, INR goal 2-3).  6. Erectile dysfunction  7. Mild chronic renal insufficiency, serum creatinine 1.6 mg% 04/08  8. DJD, status post left TKR 10/15    Electronically signed by Christopher Bernal III, MD at 03/17/2021 8:30 AM CDT   ===========================================================================================  The following portions of the patient's history were reviewed and updated as appropriate: allergies, current medications, past family history, past medical history, past social history, past surgical history and problem list.    Review of Systems   Constitutional: Negative for malaise/fatigue.   Cardiovascular: Positive for chest pain (chronic stable angina), dyspnea on exertion (stable ) and leg swelling. Negative for claudication, near-syncope, orthopnea, palpitations, paroxysmal nocturnal dyspnea and syncope.   Respiratory: Negative for cough.    Hematologic/Lymphatic: Does not bruise/bleed easily.   Musculoskeletal: Negative for falls.   Gastrointestinal: Negative for bloating.   Neurological: Positive for light-headedness (mild, improved ). Negative for dizziness and weakness.       Allergies   Allergen Reactions   • Azithromycin Nausea And Vomiting   • Morphine And Related Other (See Comments)     SHAKES AND MAKES HIM \"WIRED\".   TAKES TRAMADOL WITHOUT PROBLEM       Current Outpatient Medications:   •  apixaban (ELIQUIS) 5 MG tablet tablet, Take 1 tablet by mouth 2 (Two) Times a Day. **CAN RESUME 12/16 AM, Disp: 60 tablet, Rfl: 11  •  atorvastatin (LIPITOR) 40 MG tablet, Take 40 mg by mouth Daily., Disp: , Rfl:   •  carvedilol (COREG) 25 MG tablet, Take 25 mg by mouth 2 (two) times a day., Disp: , Rfl:   •  cetirizine (zyrTEC) 10 MG tablet, Take 10 mg by mouth Daily., Disp: , Rfl:   •  clopidogrel (PLAVIX) 75 MG tablet, Take 75 mg by mouth Daily., Disp: , Rfl:   •  empagliflozin (JARDIANCE) 10 MG tablet tablet, Take 1 tablet by mouth Every Morning., Disp: 30 tablet, Rfl: 11  •  " "finasteride (PROSCAR) 5 MG tablet, Take 5 mg by mouth daily., Disp: , Rfl:   •  fluticasone (FLONASE) 50 MCG/ACT nasal spray, 2 sprays into the nostril(s) as directed by provider Daily., Disp: , Rfl:   •  metFORMIN (GLUCOPHAGE) 500 MG tablet, 500 mg Daily With Breakfast. Two tabs daily, Disp: , Rfl:   •  nitroglycerin (NITROSTAT) 0.4 MG SL tablet, Place 1 tablet under the tongue Every 5 (Five) Minutes As Needed for Chest Pain. Take no more than 3 doses in 15 minutes., Disp: 25 tablet, Rfl: 2  •  omeprazole (PriLOSEC) 40 MG capsule, Take 40 mg by mouth daily., Disp: , Rfl:   •  potassium chloride (K-DUR,KLOR-CON) 20 MEQ CR tablet, Take 20 mEq by mouth Daily. With additional 20 meq if second dose of torsemide taken , Disp: , Rfl:   •  pregabalin (LYRICA) 150 MG capsule, Take 150 mg by mouth 2 (Two) Times a Day., Disp: , Rfl:   •  ranolazine (Ranexa) 1000 MG 12 hr tablet, Take 1 tablet by mouth 2 (Two) Times a Day., Disp: 60 tablet, Rfl: 11  •  sacubitril-valsartan (Entresto)  MG tablet, Take 1 tablet by mouth 2 (Two) Times a Day., Disp: 60 tablet, Rfl: 11  •  tamsulosin (FLOMAX) 0.4 MG capsule 24 hr capsule, Take 1 capsule by mouth Daily., Disp: , Rfl:   •  torsemide (DEMADEX) 20 MG tablet, Take 1 tablet by mouth Daily As Needed (weight gain >3 lbs in one day or >5 lbs in 2 days). With additional 20 mg PRN, Disp: , Rfl:   •  traMADol-acetaminophen (ULTRACET) 37.5-325 MG per tablet, as needed, Disp: , Rfl: 1  •  zolpidem (AMBIEN) 5 MG tablet, TAKE 1 TABLET BY MOUTH EVERY DAY AT BEDTIME AS NEEDED, Disp: , Rfl: 5         Objective:    /58   Pulse 63   Ht 180.3 cm (71\")   Wt 105 kg (232 lb)   SpO2 93%   BMI 32.36 kg/m²        Vitals and nursing note reviewed.   Constitutional:       General: Not in acute distress.     Appearance: Not in distress.   Neck:      Vascular: No JVD or JVR. JVD normal.   Pulmonary:      Effort: Pulmonary effort is normal.      Breath sounds: Normal breath sounds. "   Cardiovascular:      Normal rate. Regular rhythm.      Murmurs: There is no murmur.      No gallop. No rub.   Pulses:     Intact distal pulses.   Skin:     General: Skin is warm and dry.   Neurological:      Mental Status: Alert, oriented to person, place, and time and oriented to person, place and time.         Lab Review:    Lab Results   Component Value Date    GLUCOSE 102 (H) 12/28/2021    BUN 21 12/28/2021    CREATININE 1.37 (H) 12/28/2021    EGFRIFNONA 51 (L) 12/28/2021    EGFRIFAFRI 94 03/13/2017    BCR 15.3 12/28/2021    K 4.6 12/28/2021    CO2 28.0 12/28/2021    CALCIUM 9.2 12/28/2021    ALBUMIN 4.60 11/30/2021    AST 25 11/30/2021    ALT 19 11/30/2021     Lab Results   Component Value Date    CHOL 122 11/12/2021    CHLPL 136 (L) 03/13/2018    TRIG 108 11/12/2021    HDL 36 (L) 11/12/2021    LDL 66 11/12/2021     Lab Results   Component Value Date    HGBA1C 6.3 09/25/2018                 Procedures             Results for orders placed during the hospital encounter of 10/28/21    Adult Transthoracic Echo Complete W/ Cont if Necessary Per Protocol    Interpretation Summary  · Left ventricular ejection fraction appears to be 26 - 30%. Left ventricular systolic function is moderately decreased.  · The left ventricular cavity is moderately dilated (LVIDd 6.4cm)  · The following left ventricular wall segments are akinetic: apical anterior, apical lateral, apical inferior, apical septal and apex.  · The following left ventricular wall segments are hypokinetic: mid anterior, apical anterior, basal anterolateral, mid anterolateral, apical lateral, basal inferolateral, mid inferolateral, mid inferior, basal inferoseptal, mid inferoseptal, basal anterior, basal inferior and basal inferoseptal. The following left ventricular wall segments are akinetic: apical inferior, apical septal, apex and mid anteroseptal.  · Mild-moderate mitral regurgitation (functional).  · Left atrial volume is severely increased.  ·  Estimated right ventricular systolic pressure from tricuspid regurgitation is moderately elevated (45-55 mmHg).  · Compared to last exam from 2/13/2019, LV function appears slightly worse.    LAST HOME INR WAS 12/3/21 AND WAS 2.3  Assessment:      Problem List Items Addressed This Visit        Cardiac and Vasculature    Coronary artery disease of bypass graft of native heart with stable angina pectoris (HCC) - Primary    Overview     3v CABG ([facility]) in 1990: LIMA to LAD, SVG to PD, SVG to OM  2006: other grafts patent but found to have  of SVG to OM; underwent PTCA/stenting of native OM with 2.5mm x 23mm LEILANI.  4/2008: only change was severe focal denovo stenosis of previously twice-stented native LCX with [facility] with third 2.5mm x 23mm Cypher LEILANI  ---l. Pulse generator replacement 04/28/08 ([place] [facility]) Medtronic Concerto [id number] (serial # [id number]); pulse generator replacement 11/19  ---m. Recurrent angina 04/08, repeat limited native LCA angiogram 04/08 with focal edge dissection and instent restenosis, status post additional stenting ([facility]) with 3 additional [place] [facility] stents  ---n. Recurrent angina 12/08 with repeat catheterization confirming LVEF 40%, patent LIMA to LAD, patent SVG to RCA, patent LCx stent site; medical therapy  ---o. TTE 03/10 with LVEF 30-35%, no significant valvular dysfunction, moderate LAE and LVE; repeat 12/10 uncjhanged except LVEWF improved to 35-40%  ---p. BNP 03/10 259 pg/ml > repeat 12/10 165 pg/ml > repeat 06/12 70 pg/ml > repeat 10/12 200 pg/ml>repeat 11/17 154 pg/ml  ---q. Adenosine radionuclide stress test 03/10 and 12/10 with fixed shannan-apical scar, no ischemia, LVEF 40% rising to 50% with stress; medical therapy  ---r. ACS 03/11 with cardiac catheterization revealing patent LIMA to LAD, patent SVG to PD and PL of RCA, severe ISR of previously multiply (x 4 procedures/6 stents) stented distal  LM/proximal LCX-OM    Redo CABG via lateral thoracotomy at [facility] ([place], 3[place]18/[place]) with SVG from descending Ao (entered through 5th intercostal space) to OM.  Op note scanned in under 'media' under date of procedure           Presence of biventricular AICD    Ischemic cardiomyopathy    Permanent atrial fibrillation (HCC)    Chronic combined systolic and diastolic congestive heart failure (HCC)    Mixed hyperlipidemia          Plan:     1. Chronic systolic CHF/ischemic cardiomyopathy: Stage C, NYHA II: Stable, compensated/euvolemic.  ejection fraction on echocardiogram here 10/2021 was similar to prior evaluations at [place], demonstrating moderate LV systolic dysfunction (EF 26-30%). Has BiV-ICD (Followed by Dr Mccarty- pt states he sees him yearly )  -Continue max dose Entresto, coreg 25 mg BID and jardiance   -May consider addition of aldactone in future but will hold off for now given his recent orthostasis and dehydration (although he was taking torsemide and dilt daily at that time)  -Continue torsemide 20 mg on as needed basis only for weight gain of greater than 2 lbs overnight or 5 lbs in 2 days, increased dyspnea/orthopnea/PND, or significant edema.     2.  Coronary artery disease: With improved chronic stable angina on max dose ranexa and BB. Previously intolerant to imdur.   No culprit lesions noted on recent catheterization that would be targets for intervention.   -Continue Plavix (take ASA in it's place if ever held for surgeries or procedures)  -Continue Coreg  -As needed sublingual nitroglycerin-has not had to use  -Continue atorvastatin 40 mg nightly and maintain goal LDL <70; LDL 66  -continue Ranexa 1000 mg twice daily for antianginal benefit    3.  Permanent atrial fibrillation: Biventricular paced and rate controlled on most recent EKG 12/2021.  Stable/asymptomatic  -Continue Eliquis 5 mg p.o. twice daily for CVA prophylaxis  -Continue current dose of coreg for rate control     4.Orthostatic  dizziness/hypotension: Improved significantly after reducing torsemide from daily use to PRN use (takes approximately 1 x per week) and later discontinuation of diltiazem for continued lightheadedness and fatigue.     5.  Mixed hyperlipidemia: as per above.  Goal LDL less than 70.  Continue high intensity statin.    Follow-up in 3 months with Dr. Posadas, sooner with new or worsening symptoms     Yolanda E. Knees, APRN

## 2022-05-25 ENCOUNTER — TELEPHONE (OUTPATIENT)
Dept: CARDIOLOGY | Facility: CLINIC | Age: 77
End: 2022-05-25

## 2022-05-25 NOTE — TELEPHONE ENCOUNTER
Ok to hold plavix for 7 days and eliquis for 48 hr prior to procedure and resume both as soon as it is felt safe to do so after the procedure. However, he must take aspirin 81 mg once daily on the days that he is without his plavix. Once he gets back on plavix, can stop aspirin. Thanks.

## 2022-05-25 NOTE — TELEPHONE ENCOUNTER
The patient is scheduled for an invasive procedure with [facility].  They are requesting he hold his Plavix for 7 days and Eliquis for 48 hours prior.  Please advise.  Thank you.

## 2022-06-08 NOTE — DISCHARGE INSTRUCTIONS
DAY OF SURGERY INSTRUCTIONS        YOUR SURGEON:   DR. KLAUDIA CHAMBERLAIN    PROCEDURE:   PENILE PROSTHESIS PLACEMENT    DATE OF SURGERY:     July 27, 2018    ARRIVAL TIME: AS DIRECTED BY OFFICE    DAY OF SURGERY TAKE ONLY THESE MEDICATIONS UNLESS OTHERWISE INSTRUCTED BY YOUR PHYSICIAN:      CARVEDILOL,  DILTIAZEM,   GABAPENTIN,  TRAMADOL    DO NOT TAKE LOSARTAN THE DAY OF SURGERY        MANAGING PAIN AFTER SURGERY    We know you are probably wondering what your pain will be like after surgery.  Following surgery it is unrealistic to expect you will not have pain.   Pain is how our bodies let us know that something is wrong or cautions us to be careful.  That said, our goal is to make your pain tolerable.    Methods we may use to treat your pain include (oral or IV medications, PCAs, epidurals, nerve blocks, etc.)   While some procedures require IV pain medications for a short time after surgery, transitioning to pain medications by mouth allows for better management of pain.   Your nurse will encourage you to take oral pain medications whenever possible.  IV medications work almost immediately, but only last a short while.  Taking medications by mouth allows for a more constant level of medication in your blood stream for a longer period of time.      Once your pain is out of control it is harder to get back under control.  It is important you are aware when your next dose of pain medication is due.  If you are admitted, your nurse may write the time of your next dose on the white board in your room to help you remember.      We are interested in your pain and encourage you to inform us about aggravating factors during your visit.   Many times a simple repositioning every few hours can make a big difference.    If your physician says it is okay, do not let your pain prevent you from getting out of bed. Be sure to call your nurse for assistance prior to getting up so you do not fall.      Before surgery, please decide  Orthopedic Surgery  Khloe Sparks  06/08/2022     Admit Date:  6/4/2022  POD: 3 Days Post-Op   Procedure(s):  LEFT Hip Hemiarthroplasty    Dementia at baseline.  Pleasant.    Patient resting comfortably in bed.    Pain controlled.  Tolerating oral intake.      Temp:  [97.6  F (36.4  C)-98.4  F (36.9  C)] 98.4  F (36.9  C)  Pulse:  [66-83] 83  Resp:  [16-18] 16  BP: (116-142)/(55-74) 139/74  SpO2:  [96 %-98 %] 96 %    Dressing is clean, dry, and intact.   Minimal erythema of the surrounding skin.   Saab in place, small abrasion left knee.   Bilateral calves are soft, non-tender.  Left lower extremity is NVI.  Sensation intact bilateral lower extremities  Patient able to resist dorsi and plantar flexion bilaterally  +Dp pulse    Labs:  Recent Labs   Lab Test 06/08/22  0648 06/07/22  0739 06/06/22  0722 06/04/22  1548 04/15/22  1417 11/26/19  1447   WBC  --   --   --  9.0 6.2 4.8   HGB  --  12.2 12.5 13.2 14.3 14.7     --   --  205 231 251     No lab results found.  No lab results found.      1. PLAN:   Continue Lovenox in hospital and then ASA at discharge for DVT prophylaxis.     Mobilize with PT/OT, abductor precautions.    WBAT left LE with walker.     Continue current pain regiment.   Dressings: Keep intact.  Change if >60% saturated or peeling off.     2. Disposition   Anticipate d/c to TCU when medically cleared and progressing in PT.  Ortho stable.     Bethanie Gibson PA-C     your tolerable pain goal.  These faces help describe the pain ratings we use on a 0-10 scale.   Be prepared to tell us your goal and whether or not you take pain or anxiety medications at home.            BEFORE YOU COME TO THE HOSPITAL  (Pre-op instructions)  • Do not eat, drink, smoke or chew gum after midnight the night before surgery.  This also includes no mints.  • Morning of surgery take only the medicines you have been instructed with a sip of water unless otherwise instructed  by your physician.  • Do not shave, wear makeup or dark nail polish.  • Remove all jewelry including rings.  • Leave anything you consider valuable at home.  • Leave your suitcase in the car until after your surgery.  • Bring the following with you if applicable:  o Picture ID and insurance, Medicare or Medicaid cards  o Co-pay/deductible required by insurance (cash, check, credit card)  o Copy of advance directive, living will or power-of- documents if not brought to PAT  o CPAP or BIPAP mask and tubing  o Relaxation aids (MP3 player, book, magazine)  • On the day of surgery check in at registration located at the main entrance of the hospital.       Outpatient Surgery Guidelines, Adult  Outpatient procedures are those for which the person having the procedure is allowed to go home the same day as the procedure. Various procedures are done on an outpatient basis. You should follow some general guidelines if you will be having an outpatient procedure.  LET YOUR HEALTH CARE PROVIDER KNOW ABOUT:  · Any allergies you have.  · All medicines you are taking, including vitamins, herbs, eye drops, creams, and over-the-counter medicines.  · Previous problems you or members of your family have had with the use of anesthetics.  · Any blood disorders you have.  · Previous surgeries you have had.  · Medical conditions you have.  RISKS AND COMPLICATIONS  Your health care provider will discuss possible risks and complications with you before  surgery. Common risks and complications include:    · Problems due to the use of anesthetics.  · Blood loss and replacement (does not apply to minor surgical procedures).  · Temporary increase in pain due to surgery.  · Uncorrected pain or problems that the surgery was meant to correct.  · Infection.  · New damage.  BEFORE THE PROCEDURE  · Ask your health care provider about changing or stopping your regular medicines. You may need to stop taking certain medicines in the days or weeks before the procedure.  · Stop smoking at least 2 weeks before surgery. This lowers your risk for complications during and after surgery. Ask your health care provider for help with this if needed.  · Eat your usual meals and a light supper the day before surgery. Continue fluid intake. Do not drink alcohol.  · Do not eat or drink after midnight the night before your surgery.   · Arrange for someone to take you home and to stay with you for 24 hours after the procedure. Medicine given for your procedure may affect your ability to drive or to care for yourself.  · Call your health care provider's office if you develop an illness or problem that may prevent you from safely having your procedure.  AFTER THE PROCEDURE  After surgery, you will be taken to a recovery area, where your progress will be monitored. If there are no complications, you will be allowed to go home when you are awake, stable, and taking fluids well. You may have numbness around the surgical site. Healing will take some time. You will have tenderness at the surgical site and may have some swelling and bruising. You may also have some nausea.  HOME CARE INSTRUCTIONS  · Do not drive for 24 hours, or as directed by your health care provider. Do not drive while taking prescription pain medicines.  · Do not drink alcohol for 24 hours.  · Do not make important decisions or sign legal documents for 24 hours.  · You may resume a normal diet and activities as directed.  · Do not  lift anything heavier than 10 pounds (4.5 kg) or play contact sports until your health care provider says it is okay.  · Change your bandages (dressings) as directed.  · Only take over-the-counter or prescription medicines as directed by your health care provider.  · Follow up with your health care provider as directed.  SEEK MEDICAL CARE IF:  · You have increased bleeding (more than a small spot) from the surgical site.  · You have redness, swelling, or increasing pain in the wound.  · You see pus coming from the wound.  · You have a fever.  · You notice a bad smell coming from the wound or dressing.  · You feel lightheaded or faint.  · You develop a rash.  · You have trouble breathing.  · You develop allergies.  MAKE SURE YOU:  · Understand these instructions.  · Will watch your condition.  · Will get help right away if you are not doing well or get worse.     This information is not intended to replace advice given to you by your health care provider. Make sure you discuss any questions you have with your health care provider.     Document Released: 09/12/2002 Document Revised: 05/03/2016 Document Reviewed: 05/22/2014  [employer] Interactive Patient Education ©2016 [employer]       Fall Prevention in Hospitals, Adult  As a hospital patient, your condition and the treatments you receive can increase your risk for falls. Some additional risk factors for falls in a hospital include:  · Being in an unfamiliar environment.  · Being on bed rest.  · Your surgery.  · Taking certain medicines.  · Your tubing requirements, such as intravenous (IV) therapy or catheters.  It is important that you learn how to decrease fall risks while at the hospital. Below are important tips that can help prevent falls.  SAFETY TIPS FOR PREVENTING FALLS  Talk about your risk of falling.  · Ask your health care provider why you are at risk for falling. Is it your medicine, illness, tubing placement, or something else?  · Make a plan with  your health care provider to keep you safe from falls.  · Ask your health care provider or pharmacist about side effects of your medicines. Some medicines can make you dizzy or affect your coordination.  Ask for help.  · Ask for help before getting out of bed. You may need to press your call button.  · Ask for assistance in getting safely to the toilet.  · Ask for a walker or cane to be put at your bedside. Ask that most of the side rails on your bed be placed up before your health care provider leaves the room.  · Ask family or friends to sit with you.  · Ask for things that are out of your reach, such as your glasses, hearing aids, telephone, bedside table, or call button.  Follow these tips to avoid falling:  · Stay lying or seated, rather than standing, while waiting for help.  · Wear rubber-soled slippers or shoes whenever you walk in the hospital.  · Avoid quick, sudden movements.  ¨ Change positions slowly.  ¨ Sit on the side of your bed before standing.  ¨ Stand up slowly and wait before you start to walk.  · Let your health care provider know if there is a spill on the floor.  · Pay careful attention to the medical equipment, electrical cords, and tubes around you.  · When you need help, use your call button by your bed or in the bathroom. Wait for one of your health care providers to help you.  · If you feel dizzy or unsure of your footing, return to bed and wait for assistance.  · Avoid being distracted by the TV, telephone, or another person in your room.  · Do not lean or support yourself on rolling objects, such as IV poles or bedside tables.     This information is not intended to replace advice given to you by your health care provider. Make sure you discuss any questions you have with your health care provider.     Document Released: 12/15/2001 Document Revised: 01/08/2016 Document Reviewed: 08/25/2013  [employer] Interactive Patient Education ©2016 [employer]       Surgical Site Infections  FAQs  What is a Surgical Site Infection (SSI)?  A surgical site infection is an infection that occurs after surgery in the part of the body where the surgery took place. Most patients who have surgery do not develop an infection. However, infections develop in about 1 to 3 out of every 100 patients who have surgery.  Some of the common symptoms of a surgical site infection are:  · Redness and pain around the area where you had surgery  · Drainage of cloudy fluid from your surgical wound  · Fever  Can SSIs be treated?  Yes. Most surgical site infections can be treated with antibiotics. The antibiotic given to you depends on the bacteria (germs) causing the infection. Sometimes patients with SSIs also need another surgery to treat the infection.  What are some of the things that hospitals are doing to prevent SSIs?  To prevent SSIs, doctors, nurses, and other healthcare providers:  · Clean their hands and arms up to their elbows with an antiseptic agent just before the surgery.  · Clean their hands with soap and water or an alcohol-based hand rub before and after caring for each patient.  · May remove some of your hair immediately before your surgery using electric clippers if the hair is in the same area where the procedure will occur. They should not shave you with a razor.  · Wear special hair covers, masks, gowns, and gloves during surgery to keep the surgery area clean.  · Give you antibiotics before your surgery starts. In most cases, you should get antibiotics within 60 minutes before the surgery starts and the antibiotics should be stopped within 24 hours after surgery.  · Clean the skin at the site of your surgery with a special soap that kills germs.  What can I do to help prevent SSIs?  Before your surgery:  · Tell your doctor about other medical problems you may have. Health problems such as allergies, diabetes, and obesity could affect your surgery and your treatment.  · Quit smoking. Patients who smoke  get more infections. Talk to your doctor about how you can quit before your surgery.  · Do not shave near where you will have surgery. Shaving with a razor can irritate your skin and make it easier to develop an infection.  At the time of your surgery:  · Speak up if someone tries to shave you with a razor before surgery. Ask why you need to be shaved and talk with your surgeon if you have any concerns.  · Ask if you will get antibiotics before surgery.  After your surgery:  · Make sure that your healthcare providers clean their hands before examining you, either with soap and water or an alcohol-based hand rub.  · If you do not see your providers clean their hands, please ask them to do so.  · Family and friends who visit you should not touch the surgical wound or dressings.  · Family and friends should clean their hands with soap and water or an alcohol-based hand rub before and after visiting you. If you do not see them clean their hands, ask them to clean their hands.  What do I need to do when I go home from the hospital?  · Before you go home, your doctor or nurse should explain everything you need to know about taking care of your wound. Make sure you understand how to care for your wound before you leave the hospital.  · Always clean your hands before and after caring for your wound.  · Before you go home, make sure you know who to contact if you have questions or problems after you get home.  · If you have any symptoms of an infection, such as redness and pain at the surgery site, drainage, or fever, call your doctor immediately.  If you have additional questions, please ask your doctor or nurse.  Developed and co-sponsored by The Society for Healthcare Epidemiology of [place] (SHEA); Infectious Diseases Society of [place] (IDSA); American Hospital Association; Association for Professionals in Infection Control and Epidemiology (APIC); Centers for Disease Control and Prevention (CDC); and The Joint  Commission.     This information is not intended to replace advice given to you by your health care provider. Make sure you discuss any questions you have with your health care provider.     Document Released: 12/23/2014 Document Revised: 01/08/2016 Document Reviewed: 03/02/2016  [employer] Interactive Patient Education ©2016 [employer]     PATIENT/FAMILY/RESPONSIBLE PARTY VERBALIZES UNDERSTANDING OF ABOVE EDUCATION.  COPY OF PAIN SCALE GIVEN AND REVIEWED WITH VERBALIZED UNDERSTANDING.

## 2022-07-06 ENCOUNTER — TELEPHONE (OUTPATIENT)
Dept: CARDIOLOGY | Facility: CLINIC | Age: 77
End: 2022-07-06

## 2022-07-06 NOTE — TELEPHONE ENCOUNTER
I agree with your advice to for start with an evaluation by his PCP.  If after work-up by PCP there is no evidence that there are other factors contributing to symptomatic hypotension/dizziness aside from some of the heart failure medication we have him on which does lower his blood pressure, we can talk about making an adjustment when he comes to see me in a few days.  Thanks.

## 2022-07-06 NOTE — TELEPHONE ENCOUNTER
Pt called stating that his BP has been going up and down from 111/72-92/64 and he is getting dizzy (like an inner ear problem).  Today 74/56 and 88/60.  I told him to call his pcp since he is not on any BP medication from us.  He only takes the Torsemide when needed and he has not taken any.  Please advise.  René Padgett, CMA

## 2022-07-07 NOTE — TELEPHONE ENCOUNTER
Pt informed.  He took my advise and called his pcp.  He will discuss further at his next appt next week.  René Padgett, CMA

## 2022-07-12 ENCOUNTER — OFFICE VISIT (OUTPATIENT)
Dept: CARDIOLOGY | Facility: CLINIC | Age: 77
End: 2022-07-12

## 2022-07-12 ENCOUNTER — LAB (OUTPATIENT)
Dept: LAB | Facility: HOSPITAL | Age: 77
End: 2022-07-12

## 2022-07-12 VITALS
BODY MASS INDEX: 32.06 KG/M2 | WEIGHT: 229 LBS | SYSTOLIC BLOOD PRESSURE: 118 MMHG | DIASTOLIC BLOOD PRESSURE: 66 MMHG | HEART RATE: 75 BPM | OXYGEN SATURATION: 100 % | HEIGHT: 71 IN

## 2022-07-12 DIAGNOSIS — E78.2 MIXED HYPERLIPIDEMIA: ICD-10-CM

## 2022-07-12 DIAGNOSIS — I50.42 CHRONIC COMBINED SYSTOLIC AND DIASTOLIC CONGESTIVE HEART FAILURE: ICD-10-CM

## 2022-07-12 DIAGNOSIS — I48.21 PERMANENT ATRIAL FIBRILLATION: ICD-10-CM

## 2022-07-12 DIAGNOSIS — I25.5 ISCHEMIC CARDIOMYOPATHY: ICD-10-CM

## 2022-07-12 DIAGNOSIS — R42 LIGHTHEADEDNESS: Primary | ICD-10-CM

## 2022-07-12 DIAGNOSIS — I50.22 CHRONIC SYSTOLIC CHF (CONGESTIVE HEART FAILURE): ICD-10-CM

## 2022-07-12 DIAGNOSIS — I25.708 CORONARY ARTERY DISEASE OF BYPASS GRAFT OF NATIVE HEART WITH STABLE ANGINA PECTORIS: ICD-10-CM

## 2022-07-12 DIAGNOSIS — Z95.810 PRESENCE OF BIVENTRICULAR AICD: ICD-10-CM

## 2022-07-12 LAB
ALBUMIN SERPL-MCNC: 4 G/DL (ref 3.5–5.2)
ALBUMIN/GLOB SERPL: 1.7 G/DL
ALP SERPL-CCNC: 60 U/L (ref 39–117)
ALT SERPL W P-5'-P-CCNC: 24 U/L (ref 1–41)
ANION GAP SERPL CALCULATED.3IONS-SCNC: 7 MMOL/L (ref 5–15)
AST SERPL-CCNC: 24 U/L (ref 1–40)
BASOPHILS # BLD AUTO: 0.04 10*3/MM3 (ref 0–0.2)
BASOPHILS NFR BLD AUTO: 0.7 % (ref 0–1.5)
BILIRUB SERPL-MCNC: 0.7 MG/DL (ref 0–1.2)
BUN SERPL-MCNC: 20 MG/DL (ref 8–23)
BUN/CREAT SERPL: 19.4 (ref 7–25)
CALCIUM SPEC-SCNC: 9.3 MG/DL (ref 8.6–10.5)
CHLORIDE SERPL-SCNC: 108 MMOL/L (ref 98–107)
CO2 SERPL-SCNC: 27 MMOL/L (ref 22–29)
CREAT SERPL-MCNC: 1.03 MG/DL (ref 0.76–1.27)
DEPRECATED RDW RBC AUTO: 53.4 FL (ref 37–54)
EGFRCR SERPLBLD CKD-EPI 2021: 75.8 ML/MIN/1.73
EOSINOPHIL # BLD AUTO: 0.13 10*3/MM3 (ref 0–0.4)
EOSINOPHIL NFR BLD AUTO: 2.4 % (ref 0.3–6.2)
ERYTHROCYTE [DISTWIDTH] IN BLOOD BY AUTOMATED COUNT: 14 % (ref 12.3–15.4)
GLOBULIN UR ELPH-MCNC: 2.3 GM/DL
GLUCOSE SERPL-MCNC: 138 MG/DL (ref 65–99)
HCT VFR BLD AUTO: 43.4 % (ref 37.5–51)
HGB BLD-MCNC: 14.3 G/DL (ref 13–17.7)
IMM GRANULOCYTES # BLD AUTO: 0.02 10*3/MM3 (ref 0–0.05)
IMM GRANULOCYTES NFR BLD AUTO: 0.4 % (ref 0–0.5)
LYMPHOCYTES # BLD AUTO: 0.87 10*3/MM3 (ref 0.7–3.1)
LYMPHOCYTES NFR BLD AUTO: 16.2 % (ref 19.6–45.3)
MCH RBC QN AUTO: 34.2 PG (ref 26.6–33)
MCHC RBC AUTO-ENTMCNC: 32.9 G/DL (ref 31.5–35.7)
MCV RBC AUTO: 103.8 FL (ref 79–97)
MONOCYTES # BLD AUTO: 0.47 10*3/MM3 (ref 0.1–0.9)
MONOCYTES NFR BLD AUTO: 8.8 % (ref 5–12)
NEUTROPHILS NFR BLD AUTO: 3.84 10*3/MM3 (ref 1.7–7)
NEUTROPHILS NFR BLD AUTO: 71.5 % (ref 42.7–76)
NRBC BLD AUTO-RTO: 0 /100 WBC (ref 0–0.2)
NT-PROBNP SERPL-MCNC: 685.1 PG/ML (ref 0–1800)
PLATELET # BLD AUTO: 154 10*3/MM3 (ref 140–450)
PMV BLD AUTO: 9.3 FL (ref 6–12)
POTASSIUM SERPL-SCNC: 5.2 MMOL/L (ref 3.5–5.2)
PROT SERPL-MCNC: 6.3 G/DL (ref 6–8.5)
RBC # BLD AUTO: 4.18 10*6/MM3 (ref 4.14–5.8)
SODIUM SERPL-SCNC: 142 MMOL/L (ref 136–145)
WBC NRBC COR # BLD: 5.37 10*3/MM3 (ref 3.4–10.8)

## 2022-07-12 PROCEDURE — 85025 COMPLETE CBC W/AUTO DIFF WBC: CPT | Performed by: NURSE PRACTITIONER

## 2022-07-12 PROCEDURE — 36415 COLL VENOUS BLD VENIPUNCTURE: CPT | Performed by: NURSE PRACTITIONER

## 2022-07-12 PROCEDURE — 99214 OFFICE O/P EST MOD 30 MIN: CPT | Performed by: NURSE PRACTITIONER

## 2022-07-12 PROCEDURE — 83880 ASSAY OF NATRIURETIC PEPTIDE: CPT | Performed by: NURSE PRACTITIONER

## 2022-07-12 PROCEDURE — 80053 COMPREHEN METABOLIC PANEL: CPT | Performed by: NURSE PRACTITIONER

## 2022-07-13 PROCEDURE — 93000 ELECTROCARDIOGRAM COMPLETE: CPT | Performed by: NURSE PRACTITIONER

## 2022-07-13 NOTE — PROGRESS NOTES
"    Subjective:     Encounter date: 7/12/2022      Patient ID: Darren Maria is a 77 y.o. male.    Chief Complaint: lightheadedness,  f/u CHF/CAD/AF    History of Present Illness     76-year-old today returns for prompt follow-up.  Dr. Posadas met him on 11/4/2021, and he has an extensive cardiovascular history.  He did been seen remotely in the past by different members of our group, though had not been seen since February 2019 until appointment with me on 10/18/21.  See below for full summary of his previous cardiac issues.  At the time of his visit here with me, he was reporting worsening fatigue/decline in stamina along with exertional dyspnea and chest pressure over the last 2 to 3 months.  Symptoms would occur when walking half a block or climbing a flight of stairs, and he noticed that the amount of activity takes reduce the symptoms was declining over time.  He reported the symptoms were the same as those he felt prior to CABG.  At that time, symptoms were relieved within a few minutes of rest so he had not taken any nitroglycerin.  I  added Ranexa to his medical regimen. Later a telephone encounter revealed this improved his breathing somewhat, but he was still symptomatic.     At Dr. Posadas' visit with him on 11/4, he noted the following:  Now he says his SOA is better for just the few hours after taking ranexa. Says he's previously had intolerable headache on imdur.  States his breathing is bad, but wife states he's knowingly eating high salt foods like hot dogs. He still is having chest pressure as noted by Yolanda at the first visit, but when specifically asked today whether his shortness of breath and fluid retention or his chest pressure is more problematic, he repeatedly goes back to discussing his fluid retention and trouble breathing. He admits that he knows \"I do not take care of myself like I should.\"    Dr. Posadas transitioned him to Entreo at that visit, he returned 2 weeks later for follow-up " "again with me.  He then reported significant improvement in his exertional chest pressure, but thought that his breathing was about the same.  His weight was stable by his home scale, though our office scale did suggest a 6 pound decrease since the prior visit with me 2 weeks before that.  Therefore, I did start Farxiga therapy that day.     At his next visit, he reported that he was doing much better, particularly in terms of how far he can walk before having any symptoms.  He was having chest pains, but less severe. Frequency unchanged.  Had a prolonged episode of chest pain the day prior.  Still \"feel like something is wrong. I still get pressure, even at rest.\"  For these reasons, Dr. Posadas did then proceed with coronary bypass graft angiography on 12/15/2021; see full results and procedural note.  In short, there did not appear to be any culprit lesions in need of revascularization, though he could not locate the SVG performed to an OM branch at his last operation at [facility] in 2011 or 2012 (Dr. Posadas has since obtained op note- see below).  Otherwise, his anatomy was consistent with previous reports -patent PEREZ to LAD, patent SVG to RCA, and a known occlusion of an SVG arising from the ascending aorta to an OM branch.  Left ventriculogram showed EF 25 to 30%, but normal LVEDP.  Therefore, he was discharged home that day with instructions to increase his Entresto up to max dose, and also to increase Ranexa for further symptom benefit.     The pt saw Dr. Posadas 12/28/2021 and reported his chest tightness had significantly improved - he had only had 1 episode of CP that self resolved without NTG. ELLISON was improving as well, but still had dyspnea walking around the block and beyond 1-2 flights of stairs. He reported a dry weight of around 230. He was having orthostatic dizziness on max Entresto plus daily torsemide. That day Dr. Posadas checked labs and this revealed a mildly elevated creatinine suggesting " dehydration. He was instructed to use torsemide on an as needed basis only at that point.    Later the pt called in with symptomatic hypotension (LH, tired) with SBP 80s-100s and diltiazem was stopped 1/18/2022.    I saw the patient on 3/30/2022 and he was feeling better and was only slightly dizzy if he would stand up too fast, after stopping diltiazem.  Overall he was very pleased with his cardiac care and reported following medicine changes after transitioning care to Dr. Posadas his breathing and chest pressure had improved significantly.  He was taking torsemide approximately once per week for increases in weight or swelling, usually provoked by diet choices, which would subsequently resolve.  Dyspnea on exertion was stable/improved-was typically only short of breath with going up stairs or walking up an incline.  He reported he might have dyspnea on exertion and chest pressure 1 time per week.  He stated that was a significant improvement over the prior few months.  Symptoms were relieved with rest.  Weight was stable.  He denied orthopnea, PND or palpitations.     He called the office on 7/6/2022 with complaints of worsening orthostasis, dizziness/lightheadedness with systolic blood pressures as low as 74 up to 110s at max.  He was instructed to be evaluated by his PCP first, to look for sources of his symptoms prior to reducing his heart failure medications.  He presents today and states he saw Dr. Salvador and he reduced his Coreg to 12.5 mg twice daily.  The patient tells me he was having blood pressures in the 70s to 90s systolic.  Since the dose reduction his blood pressure has come up to 90s to 110s, but he has not appreciated an improvement in the lightheadedness shortly after standing up yet.  He does not believe he is dehydrated.  He has not taken any torsemide in 3 weeks.  He states he has no energy.  He denies any change in his dyspnea on exertion and chest discomfort as described at the 3/30 visit.   Weight is stable.    ===========================================================================================  RELEVANT CARDIAC HX:    The patient has a longstanding complicated cardiac history dating back to initial myocardial infarction in 1989.  I have copied an outline of his cardiac history documented per Dr. Bernal below-see for full details.  In short, the patient has a history of CAD ( status post three-vessel CABG in 1990, followed by PCI (multiple prior stents to LCX) and left thoracotomy CABG x1 around 2011/2012), chronic systolic CHF/ischemic cardiomyopathy now with biventricular AICD in place followed by Dr. Mccarty ,  atrial fibrillation anticoagulated with warfarin, and prior AV node ablation (pacemaker was placed piror to eventual upgrade BIV AICD).       3/17/2021 Documentation per Dr. Bernal at [facility]:    Problem List:  1. Coronary atherosclerotic heart disease.  ---a. Status post remote myocardial infarction 1989.  ---b. Status post CABG ([facility]) 1990 with LIMA to LAD, SVG to PD, SVG to OM.  ---c. Chronic atrial fibrillation with subsequent permanent pacemaker implantation 1999 and prior RFA AVN  ---d. Chronic left ventricular dysfunction  ---e. Status post biventricular ICD 2005 ([facility])  ---f. Cardiac catheterization 01/06 with LVEF 30%, severe native vessel coronary disease, patent LIMA to LAD, patent SVG to PD of RCA,  of SVG to OM with subsequent PTCA/stenting of native OM with 2.5mm x 23mm LEILANI.  ---g. [facility] with patent LIMA to LAD, patent SVG to right PDA, occluded SVG to OM with repeat PCI/stenting with 2.5 mm x 23 mm stent  ---h. Adenosine dual isotope study 04/07 with LVEF 53%, inferolateral scar with no ischemia.  ---i. BNP level < 100 pg/ml 04/07 ; 101 pg/ml 06/08  ---k. Repeat cardiac catheterization 04/08 with LVEF 30%, patent LIMA to LAD and SVG to RCA,  of SVG to LCX,  severe focal denovo stenosis of previously twice-stented native LCX with repeat stenting of native LCS ([facility]) with third 2.5mm x 23mm Cypher LEILANI  ---l. Pulse generator replacement 04/28/08 ([facility]) Medtronic Concerto [id number] (serial # [id number]); pulse generator replacement 11/19  ---m. Recurrent angina 04/08, repeat limited native LCA angiogram 04/08 with focal edge dissection and instent restenosis, status post additional stenting ([facility]) with 3 additional [place] stents  ---n. Recurrent angina 12/08 with repeat catheterization confirming LVEF 40%, patent LIMA to LAD, patent SVG to RCA, patent LCx stent site; medical therapy  ---o. TTE 03/10 with LVEF 30-35%, no significant valvular dysfunction, moderate LAE and LVE; repeat 12/10 uncjhanged except LVEWF improved to 35-40%  ---p. BNP 03/10 259 pg/ml > repeat 12/10 165 pg/ml > repeat 06/12 70 pg/ml > repeat 10/12 200 pg/ml>repeat 11/17 154 pg/ml  ---q. Adenosine radionuclide stress test 03/10 and 12/10 with fixed shannan-apical scar, no ischemia, LVEF 40% rising to 50% with stress; medical therapy  ---r. ACS 03/11 with cardiac catheterization revealing patent LIMA to LAD, patent SVG to PD and PL of RCA, severe ISR of previously multiply (x 4 procedures/6 stents) stented distal LM/proximal LCX-OM  ---s. Status post limited left thoracotomy CABG x 1 with SVG from descending Ao to OM, CRISTOBAL ligation ([facility]) - ?[id number]  ---t. TTE 11/11 and 09/12 with LVEF 40-45%, no significant valvular dysfunction  ---u. Recurrent sx 06/12 with repeat cardiac catheterization with all grafts patent, no culprit lesion in need of PCI; medical therapy  ---v. Status post pulse generator replacement 07/12 and 01/16 ([facility])  ---w. Pharmacologic nuclear stress test 08/13 with LVEF approx 40%, predominant fixed shannan-apical defect, no interval change since prior studies; expectant medical mgmt; repeat 10/15 with no ischemia  ---x.  Pharmacologic radionuclide stress test 10/16 with LVEF 30%, fixed anterior defect, no ischemia  ---y. TTE 10/16 with LVEF 45%, no significant valvular dysfunction  ---z. Pharmacologic radionuclide stress test 11/17 with LVEF 40-45%, fixed anteroapical scar, no significant reversible ischemia  ---aa. TTE 02/13/19 with LVEF est 30%, LAE 6cm, LVEDD 6.2 cm, segemental WMAs, no significant valvular dysfunction; est RVSP 40 mmHg.  ---bb. BNP level 2/20 274 pg/ml => 02/21 170 pg/ml  ---cc. TTE 03/21 with LVEF 25%, no significant valvular dysfunction  2. Hyperlipidemia, on statin therapy  3. Renal cell carcinoma with right nephrectomy 2000, no recurrence.  4. Status post remote cholecystectomy with subsequent ventral hernia and ventral herniorrhaphy.  5. Chronic coumadin anticoagulation (indication: atrial fibrillation, INR goal 2-3).  6. Erectile dysfunction  7. Mild chronic renal insufficiency, serum creatinine 1.6 mg% 04/08  8. DJD, status post left TKR 10/15    Electronically signed by Christopher Bernal III, MD at 03/17/2021 8:30 AM CDT   ===========================================================================================  The following portions of the patient's history were reviewed and updated as appropriate: allergies, current medications, past family history, past medical history, past social history, past surgical history and problem list.    Review of Systems   Constitutional: Positive for malaise/fatigue.   Cardiovascular: Positive for dyspnea on exertion (stable ). Negative for chest pain, claudication, leg swelling, near-syncope, orthopnea, palpitations, paroxysmal nocturnal dyspnea and syncope.   Respiratory: Negative for cough.    Hematologic/Lymphatic: Does not bruise/bleed easily.   Musculoskeletal: Negative for falls.   Gastrointestinal: Negative for bloating.   Neurological: Positive for dizziness, light-headedness and weakness.       Allergies   Allergen Reactions   • Azithromycin Nausea And  "Vomiting   • Morphine And Related Other (See Comments)     SHAKES AND MAKES HIM \"WIRED\".   TAKES TRAMADOL WITHOUT PROBLEM       Current Outpatient Medications:   •  apixaban (ELIQUIS) 5 MG tablet tablet, Take 1 tablet by mouth 2 (Two) Times a Day. **CAN RESUME 12/16 AM, Disp: 60 tablet, Rfl: 11  •  atorvastatin (LIPITOR) 40 MG tablet, Take 40 mg by mouth Daily., Disp: , Rfl:   •  carvedilol (COREG) 25 MG tablet, Take 12.5 mg by mouth 2 (Two) Times a Day., Disp: , Rfl:   •  cetirizine (zyrTEC) 10 MG tablet, Take 10 mg by mouth Daily., Disp: , Rfl:   •  clopidogrel (PLAVIX) 75 MG tablet, Take 75 mg by mouth Daily., Disp: , Rfl:   •  empagliflozin (JARDIANCE) 10 MG tablet tablet, Take 1 tablet by mouth Every Morning., Disp: 90 tablet, Rfl: 3  •  finasteride (PROSCAR) 5 MG tablet, Take 5 mg by mouth daily., Disp: , Rfl:   •  fluticasone (FLONASE) 50 MCG/ACT nasal spray, 2 sprays into the nostril(s) as directed by provider Daily., Disp: , Rfl:   •  metFORMIN (GLUCOPHAGE) 500 MG tablet, 500 mg Daily With Breakfast. Two tabs daily, Disp: , Rfl:   •  nitroglycerin (NITROSTAT) 0.4 MG SL tablet, Place 1 tablet under the tongue Every 5 (Five) Minutes As Needed for Chest Pain. Take no more than 3 doses in 15 minutes., Disp: 25 tablet, Rfl: 2  •  omeprazole (PriLOSEC) 40 MG capsule, Take 40 mg by mouth daily., Disp: , Rfl:   •  potassium chloride (K-DUR,KLOR-CON) 20 MEQ CR tablet, Take 20 mEq by mouth Daily. With additional 20 meq if second dose of torsemide taken , Disp: , Rfl:   •  pregabalin (LYRICA) 150 MG capsule, Take 150 mg by mouth 2 (Two) Times a Day., Disp: , Rfl:   •  ranolazine (Ranexa) 1000 MG 12 hr tablet, Take 1 tablet by mouth 2 (Two) Times a Day., Disp: 60 tablet, Rfl: 11  •  sacubitril-valsartan (Entresto)  MG tablet, Take 1 tablet by mouth 2 (Two) Times a Day., Disp: 60 tablet, Rfl: 11  •  tamsulosin (FLOMAX) 0.4 MG capsule 24 hr capsule, Take 1 capsule by mouth Daily., Disp: , Rfl:   •  torsemide " "(DEMADEX) 20 MG tablet, Take 1 tablet by mouth Daily As Needed (weight gain >3 lbs in one day or >5 lbs in 2 days). With additional 20 mg PRN, Disp: , Rfl:   •  traMADol-acetaminophen (ULTRACET) 37.5-325 MG per tablet, as needed, Disp: , Rfl: 1  •  zolpidem (AMBIEN) 5 MG tablet, TAKE 1 TABLET BY MOUTH EVERY DAY AT BEDTIME AS NEEDED, Disp: , Rfl: 5         Objective:    /66   Pulse 75   Ht 180.3 cm (71\")   Wt 104 kg (229 lb)   SpO2 100%   BMI 31.94 kg/m²        Vitals and nursing note reviewed.   Constitutional:       General: Not in acute distress.     Appearance: Not in distress.   Neck:      Vascular: No JVD or JVR. JVD normal.   Pulmonary:      Effort: Pulmonary effort is normal.      Breath sounds: Normal breath sounds.   Cardiovascular:      Normal rate. Regular rhythm.      Murmurs: There is no murmur.   Edema:     Peripheral edema (1+ bilateral ankle edema ) present.  Skin:     General: Skin is warm and dry.   Neurological:      Mental Status: Alert, oriented to person, place, and time and oriented to person, place and time.         Lab Review:    Lab Results   Component Value Date    GLUCOSE 138 (H) 07/12/2022    BUN 20 07/12/2022    CREATININE 1.03 07/12/2022    EGFRIFNONA 51 (L) 12/28/2021    EGFRIFAFRI 94 03/13/2017    BCR 19.4 07/12/2022    K 5.2 07/12/2022    CO2 27.0 07/12/2022    CALCIUM 9.3 07/12/2022    ALBUMIN 4.00 07/12/2022    AST 24 07/12/2022    ALT 24 07/12/2022     Lab Results   Component Value Date    CHOL 122 11/12/2021    CHLPL 136 (L) 03/13/2018    TRIG 108 11/12/2021    HDL 36 (L) 11/12/2021    LDL 66 11/12/2021     Lab Results   Component Value Date    HGBA1C 6.3 09/25/2018     Lab Results   Component Value Date    WBC 5.37 07/12/2022    HGB 14.3 07/12/2022    HCT 43.4 07/12/2022    .8 (H) 07/12/2022     07/12/2022                     ECG 12 Lead    Date/Time: 7/13/2022 8:18 AM  Performed by: Yolanda Garnica APRN  Authorized by: Yolanda Garnica APRN   Comparison: " compared with previous ECG from 12/28/2021  Similar to previous ECG  Comparison to previous ECG: BiV paced   Rhythm: atrial fibrillation and paced  BPM: 75                       Results for orders placed during the hospital encounter of 10/28/21    Adult Transthoracic Echo Complete W/ Cont if Necessary Per Protocol    Interpretation Summary  · Left ventricular ejection fraction appears to be 26 - 30%. Left ventricular systolic function is moderately decreased.  · The left ventricular cavity is moderately dilated (LVIDd 6.4cm)  · The following left ventricular wall segments are akinetic: apical anterior, apical lateral, apical inferior, apical septal and apex.  · The following left ventricular wall segments are hypokinetic: mid anterior, apical anterior, basal anterolateral, mid anterolateral, apical lateral, basal inferolateral, mid inferolateral, mid inferior, basal inferoseptal, mid inferoseptal, basal anterior, basal inferior and basal inferoseptal. The following left ventricular wall segments are akinetic: apical inferior, apical septal, apex and mid anteroseptal.  · Mild-moderate mitral regurgitation (functional).  · Left atrial volume is severely increased.  · Estimated right ventricular systolic pressure from tricuspid regurgitation is moderately elevated (45-55 mmHg).  · Compared to last exam from 2/13/2019, LV function appears slightly worse.      Assessment:      Problem List Items Addressed This Visit        Cardiac and Vasculature    Coronary artery disease of bypass graft of native heart with stable angina pectoris (HCC)    Overview     3v CABG ([facility]) in 1990: LIMA to LAD, SVG to PD, SVG to OM  2006: other grafts patent but found to have  of SVG to OM; underwent PTCA/stenting of native OM with 2.5mm x 23mm LEILANI.  4/2008: only change was severe focal denovo stenosis of previously twice-stented native LCX with repeat stenting of native LCS ([facility] with third 2.5mm x  23mm Cypher LEILANI  ---l. Pulse generator replacement 04/28/08 ([facility]) Medtronic Concerto [id number] (serial # [id number]); pulse generator replacement 11/19  ---m. Recurrent angina 04/08, repeat limited native LCA angiogram 04/08 with focal edge dissection and instent restenosis, status post additional stenting ([facility]) with 3 additional [place] stents  ---n. Recurrent angina 12/08 with repeat catheterization confirming LVEF 40%, patent LIMA to LAD, patent SVG to RCA, patent LCx stent site; medical therapy  ---o. TTE 03/10 with LVEF 30-35%, no significant valvular dysfunction, moderate LAE and LVE; repeat 12/10 uncjhanged except LVEWF improved to 35-40%  ---p. BNP 03/10 259 pg/ml > repeat 12/10 165 pg/ml > repeat 06/12 70 pg/ml > repeat 10/12 200 pg/ml>repeat 11/17 154 pg/ml  ---q. Adenosine radionuclide stress test 03/10 and 12/10 with fixed shannan-apical scar, no ischemia, LVEF 40% rising to 50% with stress; medical therapy  ---r. ACS 03/11 with cardiac catheterization revealing patent LIMA to LAD, patent SVG to PD and PL of RCA, severe ISR of previously multiply (x 4 procedures/6 stents) stented distal LM/proximal LCX-OM    Redo CABG via [facility] thoracotomy at [facility]) with SVG from descending Ao (entered through 5th intercostal space) to OM.  Op note scanned in under 'media' under date of procedure           Presence of biventricular AICD    Ischemic cardiomyopathy    Permanent atrial fibrillation (HCC)    Chronic combined systolic and diastolic congestive heart failure (HCC)    Mixed hyperlipidemia      Other Visit Diagnoses     Lightheadedness    -  Primary    Relevant Orders    CBC & Differential (Completed)    Comprehensive Metabolic Panel (Completed)    proBNP (Completed)    Chronic systolic CHF (congestive heart failure) (HCC)        Relevant Orders    CBC & Differential (Completed)    Comprehensive Metabolic Panel (Completed)    proBNP (Completed)          Plan:     1. Chronic  systolic CHF/ischemic cardiomyopathy: Stage C, NYHA II: Stable, compensated/euvolemic.  ejection fraction on echocardiogram here 10/2021 was similar to prior evaluations at [place], demonstrating moderate LV systolic dysfunction (EF 26-30%). Has BiV-ICD (Followed by Dr Mccarty- pt states he sees him yearly )  -Continue max dose Entresto and jardiance   -Coreg dose recently reduced from 25 mg twice daily to 12.5 mg twice daily per PCP approximately 5 days ago due to lightheadedness/orthostasis with reports of systolic blood pressures dipping into the 70s and 80s.  So far, the patient does not appreciate improvement in the way he feels with this.  -May consider addition of aldactone in future but will hold off for now given his reports of orthostasis and hypotension  -Continue torsemide 20 mg on as needed basis only for weight gain of greater than 2 lbs overnight or 5 lbs in 2 days, increased dyspnea/orthopnea/PND, or significant edema.  Has not required in 3 weeks.    2.  Coronary artery disease: With improved chronic stable angina on max dose ranexa, and Coreg (now on 12.5 mg twice daily). Previously intolerant to imdur.   No culprit lesions noted on recent catheterization that would be targets for intervention.   -Continue Plavix (take ASA in it's place if ever held for surgeries or procedures)  -Continue Coreg  -As needed sublingual nitroglycerin-has not had to use  -Continue atorvastatin 40 mg nightly and maintain goal LDL <70; LDL 66  -continue Ranexa 1000 mg twice daily for antianginal benefit    3.  Permanent atrial fibrillation: Biventricular paced and rate controlled. Stable/asymptomatic  -Continue Eliquis 5 mg p.o. twice daily for CVA prophylaxis  -Continue Coreg for rate control.    4.Orthostatic dizziness/hypotension: Initially improved significantly after reducing torsemide from daily use to PRN use (has not taken any in 3 weeks) and later discontinuation of diltiazem for continued lightheadedness and  fatigue.  This has now returned as of earlier this month and his PCP has reduced his Coreg from 25 mg twice daily to 12.5 mg twice daily with some improvement in systolic blood pressures from 70s to 80s to 90s to 110s, but so far he does not appreciate improvement in his lightheadedness and lack of energy.  -I did check labs today-CBC, CMP and BNP to look for other sources of his symptoms and results were unremarkable-see above.  No further changes in heart failure medications at this time.  -We discussed possible ENT evaluation and the possibility of vertigo, but he denies any issues with a room spinning sensation.  -As his symptoms did not improve despite some improvement in his blood pressure, I suspect his symptoms may be unrelated to hypotension and would avoid reducing his heart failure medications further at this time  -Continue to follow closely with PCP    5.  Mixed hyperlipidemia: as per above.  Goal LDL less than 70.  Continue high intensity statin.    Follow-up in 6 weeks with Dr. Posadas, sooner with new or worsening symptoms     Yolanda Garnica, APRN

## 2022-08-01 ENCOUNTER — OFFICE VISIT (OUTPATIENT)
Dept: UROLOGY | Facility: CLINIC | Age: 77
End: 2022-08-01

## 2022-08-01 VITALS — BODY MASS INDEX: 32.2 KG/M2 | HEIGHT: 71 IN | TEMPERATURE: 97.5 F | WEIGHT: 230 LBS

## 2022-08-01 DIAGNOSIS — N39.44 URINARY INCONTINENCE, NOCTURNAL ENURESIS: ICD-10-CM

## 2022-08-01 DIAGNOSIS — R35.0 URINE FREQUENCY: ICD-10-CM

## 2022-08-01 DIAGNOSIS — T83.490D MALFUNCTION OF PENILE PROSTHESIS, SUBSEQUENT ENCOUNTER: ICD-10-CM

## 2022-08-01 DIAGNOSIS — N40.1 BENIGN PROSTATIC HYPERPLASIA WITH LOWER URINARY TRACT SYMPTOMS, SYMPTOM DETAILS UNSPECIFIED: Primary | ICD-10-CM

## 2022-08-01 LAB
BILIRUB BLD-MCNC: NEGATIVE MG/DL
CLARITY, POC: CLEAR
COLOR UR: YELLOW
GLUCOSE UR STRIP-MCNC: NEGATIVE MG/DL
KETONES UR QL: NEGATIVE
LEUKOCYTE EST, POC: NEGATIVE
NITRITE UR-MCNC: NEGATIVE MG/ML
PH UR: 5 [PH] (ref 5–8)
PROT UR STRIP-MCNC: NEGATIVE MG/DL
RBC # UR STRIP: NEGATIVE /UL
SP GR UR: 1.03 (ref 1–1.03)
UROBILINOGEN UR QL: NORMAL

## 2022-08-01 PROCEDURE — 99214 OFFICE O/P EST MOD 30 MIN: CPT | Performed by: UROLOGY

## 2022-08-01 PROCEDURE — 81001 URINALYSIS AUTO W/SCOPE: CPT | Performed by: UROLOGY

## 2022-08-18 RX ORDER — RANOLAZINE 500 MG/1
TABLET, EXTENDED RELEASE ORAL
Qty: 180 TABLET | Refills: 3 | OUTPATIENT
Start: 2022-08-18

## 2022-08-23 ENCOUNTER — OFFICE VISIT (OUTPATIENT)
Dept: CARDIOLOGY | Facility: CLINIC | Age: 77
End: 2022-08-23

## 2022-08-23 VITALS
WEIGHT: 227 LBS | HEIGHT: 71 IN | HEART RATE: 62 BPM | BODY MASS INDEX: 31.78 KG/M2 | DIASTOLIC BLOOD PRESSURE: 56 MMHG | SYSTOLIC BLOOD PRESSURE: 96 MMHG | OXYGEN SATURATION: 99 %

## 2022-08-23 DIAGNOSIS — Z95.810 PRESENCE OF BIVENTRICULAR AICD: ICD-10-CM

## 2022-08-23 DIAGNOSIS — I50.42 CHRONIC COMBINED SYSTOLIC AND DIASTOLIC CONGESTIVE HEART FAILURE: ICD-10-CM

## 2022-08-23 DIAGNOSIS — I25.708 CORONARY ARTERY DISEASE OF BYPASS GRAFT OF NATIVE HEART WITH STABLE ANGINA PECTORIS: Primary | ICD-10-CM

## 2022-08-23 DIAGNOSIS — I25.5 ISCHEMIC CARDIOMYOPATHY: ICD-10-CM

## 2022-08-23 DIAGNOSIS — I48.21 PERMANENT ATRIAL FIBRILLATION: ICD-10-CM

## 2022-08-23 PROCEDURE — 99214 OFFICE O/P EST MOD 30 MIN: CPT | Performed by: NURSE PRACTITIONER

## 2022-08-23 PROCEDURE — 93000 ELECTROCARDIOGRAM COMPLETE: CPT | Performed by: NURSE PRACTITIONER

## 2022-08-25 NOTE — PROGRESS NOTES
Subjective    Mr. Maria is 77 y.o. male    Chief Complaint: BPH    History of Present Illness  Patient presents for a 1 month follow-up patient has been bothered by nocturnal incontinence.  He is on maximal medical therapy for a large prostate.  He was given 4 weeks of Myrbetriq 25 mg samples.  He states he has had an excellent effect and he is happy with the response he has had just 1 episode otherwise he has had excellent control since starting the Myrbetriq 25 mg.  Also he has a malfunctioning penile prosthesis that has been discussed with him by Dr. Ochoa and when he is at the point that he wants this removed he will make an appointment with Dr. Ochoa to discuss further.     The following portions of the patient's history were reviewed and updated as appropriate: allergies, current medications, past family history, past medical history, past social history, past surgical history and problem list.    Review of Systems   Respiratory: Negative.    Gastrointestinal: Negative.    Genitourinary: Positive for frequency.   Musculoskeletal: Negative.    All other systems reviewed and are negative.        Current Outpatient Medications:   •  apixaban (ELIQUIS) 5 MG tablet tablet, Take 1 tablet by mouth 2 (Two) Times a Day. **CAN RESUME 12/16 AM, Disp: 60 tablet, Rfl: 11  •  atorvastatin (LIPITOR) 40 MG tablet, Take 40 mg by mouth Daily., Disp: , Rfl:   •  carvedilol (COREG) 25 MG tablet, Take 12.5 mg by mouth 2 (Two) Times a Day., Disp: , Rfl:   •  cetirizine (zyrTEC) 10 MG tablet, Take 10 mg by mouth Daily., Disp: , Rfl:   •  clopidogrel (PLAVIX) 75 MG tablet, Take 75 mg by mouth Daily., Disp: , Rfl:   •  empagliflozin (JARDIANCE) 10 MG tablet tablet, Take 1 tablet by mouth Every Morning., Disp: 90 tablet, Rfl: 3  •  finasteride (PROSCAR) 5 MG tablet, Take 5 mg by mouth daily., Disp: , Rfl:   •  fluticasone (FLONASE) 50 MCG/ACT nasal spray, 2 sprays into the nostril(s) as directed by provider Daily., Disp: , Rfl:   •   metFORMIN (GLUCOPHAGE) 500 MG tablet, 500 mg Daily With Breakfast. Two tabs daily, Disp: , Rfl:   •  Mirabegron ER (MYRBETRIQ) 25 MG tablet sustained-release 24 hour 24 hr tablet, Take 25 mg by mouth Daily., Disp: , Rfl:   •  nitroglycerin (NITROSTAT) 0.4 MG SL tablet, Place 1 tablet under the tongue Every 5 (Five) Minutes As Needed for Chest Pain. Take no more than 3 doses in 15 minutes., Disp: 25 tablet, Rfl: 2  •  omeprazole (PriLOSEC) 40 MG capsule, Take 40 mg by mouth daily., Disp: , Rfl:   •  potassium chloride (K-DUR,KLOR-CON) 20 MEQ CR tablet, Take 20 mEq by mouth Daily. With additional 20 meq if second dose of torsemide taken , Disp: , Rfl:   •  pregabalin (LYRICA) 150 MG capsule, Take 150 mg by mouth 2 (Two) Times a Day., Disp: , Rfl:   •  ranolazine (Ranexa) 1000 MG 12 hr tablet, Take 1 tablet by mouth 2 (Two) Times a Day., Disp: 60 tablet, Rfl: 11  •  sacubitril-valsartan (Entresto)  MG tablet, Take 1 tablet by mouth 2 (Two) Times a Day., Disp: 60 tablet, Rfl: 11  •  tamsulosin (FLOMAX) 0.4 MG capsule 24 hr capsule, Take 1 capsule by mouth Daily., Disp: , Rfl:   •  torsemide (DEMADEX) 20 MG tablet, Take 1 tablet by mouth Daily As Needed (weight gain >3 lbs in one day or >5 lbs in 2 days). With additional 20 mg PRN, Disp: , Rfl:   •  traMADol-acetaminophen (ULTRACET) 37.5-325 MG per tablet, as needed, Disp: , Rfl: 1  •  zolpidem (AMBIEN) 5 MG tablet, TAKE 1 TABLET BY MOUTH EVERY DAY AT BEDTIME AS NEEDED, Disp: , Rfl: 5  •  Mirabegron ER (Myrbetriq) 25 MG tablet sustained-release 24 hour 24 hr tablet, Take 1 tablet by mouth Daily for 360 days., Disp: 90 tablet, Rfl: 3    Past Medical History:   Diagnosis Date   • Abdominal pain, epigastric    • Abnormal abdominal exam     ABFND, RADIOLOGICAL, ABDOMINAL AREA    • Anticoagulated on Coumadin    • Atherosclerosis of coronary artery bypass graft     ATHEROSCLEROSIS, CORONARY, ARTERY BYPASS GRFT   • Atrial fibrillation (HCC)    • Cancer of right kidney  (HCC)     right sided kidney cancer   • Cardiomyopathy (HCC)    • Cardiomyopathy, primary (HCC)    • CHF (congestive heart failure) (HCC)    • Colon polyps    • Congestive heart failure (HCC)    • Coronary artery disease     History of CAD/A-Fib/Pacemaker/Defibrillator placement: pt takes Coumadin and plavix therapy as well as ASA daily   • Gastric polyp    • Gastroesophageal reflux disease without esophagitis    • History of colon polyps    • Hypercholesterolemia    • Hypertension, essential    • Melena    • Myocardial infarction (HCC)    • NSAID long-term use    • Obesity    • Pacemaker     Pacemaker Dependant   • Platelet inhibition due to Plavix    • Presence of stent in coronary artery     Multiple coronary stenting most recently 4/2008   • Single kidney     Only 1 Kidney   • Status post surgery     s/p Polypectomy Bleed       Past Surgical History:   Procedure Laterality Date   • APPENDECTOMY     • CARDIAC ABLATION     • CARDIAC CATHETERIZATION     • CARDIAC CATHETERIZATION Left 12/15/2021    Procedure: Coronary angiography;  Surgeon: Sarbjit Posadas MD;  Location:  [facility];  Service: Cardiology;  Laterality: Left;   • CARDIAC CATHETERIZATION Left 12/15/2021    Procedure: Percutaneous Coronary Intervention;  Surgeon: Sarbjit Posadas MD;  Location:  [facility];  Service: Cardiology;  Laterality: Left;   • CARDIAC DEFIBRILLATOR PLACEMENT     • CARDIAC PACEMAKER PLACEMENT      Pacemaker Placement   • CATARACT EXTRACTION, BILATERAL     • CHOLECYSTECTOMY     • COLONOSCOPY  08/27/2013    snare hep, trans tics recall 3 yr   • COLONOSCOPY  10/02/2006    few scattered diverticula   • COLONOSCOPY  07/22/2009    tubular adenoma in the ascending colon   • COLONOSCOPY  07/25/2005    several polyps in the ascending colon, one in the transverse colon   • COLONOSCOPY N/A 3/21/2022    Procedure: COLONOSCOPY WITH ANESTHESIA;  Surgeon: Phil Goodwin MD;  Location:  [facility];   "Service: Gastroenterology;  Laterality: N/A;  pre: hx polyps  post: polyps  Rolan Salvador MD   • COLONOSCOPY W/ POLYPECTOMY  09/19/2016    Tubular adenoma hepatic flexure, 2 Hyperplastic polyps rectum and at 50 cm, Benign polyp at 70 cm repeat exam in 3-5 years   • CORONARY ARTERY BYPASS GRAFT  02/2011    2nd time   • CORONARY ARTERY BYPASS GRAFT  1990   • CORONARY STENT PLACEMENT     • ENDOSCOPY  04/14/2016    nl slant   • ENDOSCOPY  11/10/2014    clips at polypectomy bx no residual recall 1 yr   • ENDOSCOPY  06/19/2014    snare clip body recall 6 months   • ENDOSCOPY  05/19/2014    polyp stomach bx and clip   • HERNIA REPAIR      with mesh   • JOINT REPLACEMENT     • OTHER SURGICAL HISTORY      Defibrillator/Pacer maker exchange   • OTHER SURGICAL HISTORY      Pacemaker/Defibillation exchange 2012   • PENILE PROSTHESIS IMPLANT N/A 07/27/2018    Procedure: PENILE PROSTHESIS PLACEMENT;  Surgeon: Godwin Gupta MD;  Location: [facility] OR;  Service: Urology   • PERIPHERAL ARTERIAL STENT GRAFT         Social History     Socioeconomic History   • Marital status:    Tobacco Use   • Smoking status: Never Smoker   • Smokeless tobacco: Never Used   Vaping Use   • Vaping Use: Never used   Substance and Sexual Activity   • Alcohol use: Yes     Comment: occ   • Drug use: No   • Sexual activity: Defer       Family History   Problem Relation Age of Onset   • Lung cancer Mother    • No Known Problems Father    • Other Neg Hx         GI Malignancies   • Colon cancer Neg Hx    • Colon polyps Neg Hx        Objective    Temp 97.2 °F (36.2 °C)   Ht 180.3 cm (71\")   Wt 104 kg (230 lb 3.2 oz)   BMI 32.11 kg/m²     Physical Exam  Vitals reviewed.   Constitutional:       Appearance: Normal appearance.   HENT:      Head: Normocephalic and atraumatic.   Pulmonary:      Effort: Pulmonary effort is normal.   Skin:     Coloration: Skin is not pale.   Neurological:      Mental Status: He is alert and oriented to person, " place, and time.   Psychiatric:         Mood and Affect: Mood normal.         Behavior: Behavior normal.             Results for orders placed or performed in visit on 08/29/22   POC Urinalysis Dipstick, Multipro    Specimen: Urine   Result Value Ref Range    Color Yellow Yellow, Straw, Dark Yellow, Indigo    Clarity, UA Clear Clear    Glucose, UA 1000 mg/dL (A) Negative mg/dL    Bilirubin Negative Negative    Ketones, UA Negative Negative    Specific Gravity  1.020 1.005 - 1.030    Blood, UA Negative Negative    pH, Urine 6.0 5.0 - 8.0    Protein, POC 2+ (A) Negative mg/dL    Urobilinogen, UA Normal Normal, 0.2 E.U./dL    Nitrite, UA Negative Negative    Leukocytes Negative Negative   IPSS Questionnaire (AUA-7):  Incomplete emptying  Over the past month, how often have you had a sensation of not emptying your bladder completely after you finish?: About half the time (08/29/22 1024)  Frequency  Over the past month, how often have you had to urinate again less than two hours after you finishing urinating ?: Less than half the time (08/29/22 1024)  Intermittency  Over the past month, how often have you found you stopped and started again several time when you urinated ?: Almost always (08/29/22 1024)  Urgency  Over the last month, how difficult  have you found it to postpone urination ?: About half the time (08/29/22 1024)  Weak Stream  Over the past month, how often have you had a weak urinary stream ?: Less than half the time (08/29/22 1024)  Straining  Over the past month, how often have you had to push or strain to begin urination ?: Less than 1 time 5 (08/29/22 1024)  Nocturia  Over the past month, how many times did you most typically get up to urinate from the time you went to bed until the time you got up in the morning ?: Less than half the time (08/29/22 1024)  Quality of life due to urinary symptoms  If you were to spend the rest of your life with your urinary condition the way it is now, how would feel  about that?: Pleased (08/29/22 1024)    Scores  Total IPSS Score: 17 (08/29/22 1024)  Total Score = Symtomatic Level: moderately symptomatic: 8-19 (08/29/22 1024)       Assessment and Plan    Diagnoses and all orders for this visit:    1. Benign prostatic hyperplasia with lower urinary tract symptoms, symptom details unspecified (Primary)  -     POC Urinalysis Dipstick, Multipro  -     Mirabegron ER (Myrbetriq) 25 MG tablet sustained-release 24 hour 24 hr tablet; Take 1 tablet by mouth Daily for 360 days.  Dispense: 90 tablet; Refill: 3    2. Nocturnal enuresis    After trying the 25 mg Myrbetriq samples patient has had excellent response and much better control he has had just 1 accident since.  After discussion he would like to continue on the Myrbetriq I went ahead and sent prescription to his pharmacy so this can be more expensive but I do not want him to take an anticholinergic due to the cognitive side effects of anticholinergics.    Follow-up in 6 months.  Should he want to discuss or hit the base a decision to have the penile prosthesis removed he will need to make appoint with Dr. Ochoa to discuss further.

## 2022-08-29 ENCOUNTER — OFFICE VISIT (OUTPATIENT)
Dept: UROLOGY | Facility: CLINIC | Age: 77
End: 2022-08-29

## 2022-08-29 VITALS — TEMPERATURE: 97.2 F | BODY MASS INDEX: 32.23 KG/M2 | HEIGHT: 71 IN | WEIGHT: 230.2 LBS

## 2022-08-29 DIAGNOSIS — N40.1 BENIGN PROSTATIC HYPERPLASIA WITH LOWER URINARY TRACT SYMPTOMS, SYMPTOM DETAILS UNSPECIFIED: Primary | ICD-10-CM

## 2022-08-29 DIAGNOSIS — N39.44 NOCTURNAL ENURESIS: ICD-10-CM

## 2022-08-29 LAB
BILIRUB BLD-MCNC: NEGATIVE MG/DL
CLARITY, POC: CLEAR
COLOR UR: YELLOW
GLUCOSE UR STRIP-MCNC: ABNORMAL MG/DL
KETONES UR QL: NEGATIVE
LEUKOCYTE EST, POC: NEGATIVE
NITRITE UR-MCNC: NEGATIVE MG/ML
PH UR: 6 [PH] (ref 5–8)
PROT UR STRIP-MCNC: ABNORMAL MG/DL
RBC # UR STRIP: NEGATIVE /UL
SP GR UR: 1.02 (ref 1–1.03)
UROBILINOGEN UR QL: NORMAL

## 2022-08-29 PROCEDURE — 81001 URINALYSIS AUTO W/SCOPE: CPT | Performed by: PHYSICIAN ASSISTANT

## 2022-08-29 PROCEDURE — 99213 OFFICE O/P EST LOW 20 MIN: CPT | Performed by: PHYSICIAN ASSISTANT

## 2022-10-12 ENCOUNTER — TELEPHONE (OUTPATIENT)
Dept: CARDIOLOGY | Facility: CLINIC | Age: 77
End: 2022-10-12

## 2022-10-12 NOTE — TELEPHONE ENCOUNTER
PT CALLED WANTS TO KNOW IF HE CAN CHANGE TO XARELTO INSTEAD OF HIS ELIQUIS.  IT WILL ONLY BE $ 44.00 A MONTH FOR HIM NOT $144.  ALSO IF THIS IS OK HE WOULD LIKE A 90 DAY SUPPLY     [facility]    PLEASE ADVISE

## 2022-10-17 RX ORDER — RANOLAZINE 1000 MG/1
TABLET, EXTENDED RELEASE ORAL
Qty: 180 TABLET | Refills: 3 | Status: SHIPPED | OUTPATIENT
Start: 2022-10-17 | End: 2022-12-01 | Stop reason: SDUPTHER

## 2022-10-25 RX ORDER — RANOLAZINE 500 MG/1
TABLET, EXTENDED RELEASE ORAL
Qty: 180 TABLET | Refills: 3 | OUTPATIENT
Start: 2022-10-25

## 2022-11-22 RX ORDER — RANOLAZINE 500 MG/1
TABLET, EXTENDED RELEASE ORAL
Qty: 180 TABLET | Refills: 3 | OUTPATIENT
Start: 2022-11-22

## 2022-11-22 RX ORDER — SACUBITRIL AND VALSARTAN 97; 103 MG/1; MG/1
TABLET, FILM COATED ORAL
Qty: 60 TABLET | Refills: 11 | Status: SHIPPED | OUTPATIENT
Start: 2022-11-22 | End: 2023-02-16 | Stop reason: DRUGHIGH

## 2022-11-22 RX ORDER — ATORVASTATIN CALCIUM 40 MG/1
40 TABLET, FILM COATED ORAL NIGHTLY
Qty: 90 TABLET | Refills: 3 | Status: SHIPPED | OUTPATIENT
Start: 2022-11-22

## 2022-11-22 RX ORDER — ATORVASTATIN CALCIUM 40 MG/1
TABLET, FILM COATED ORAL
Qty: 90 TABLET | Refills: 3 | OUTPATIENT
Start: 2022-11-22

## 2022-12-01 RX ORDER — RANOLAZINE 500 MG/1
TABLET, EXTENDED RELEASE ORAL
Qty: 180 TABLET | Refills: 3 | OUTPATIENT
Start: 2022-12-01

## 2022-12-01 RX ORDER — RANOLAZINE 1000 MG/1
1 TABLET, EXTENDED RELEASE ORAL 2 TIMES DAILY
Qty: 180 TABLET | Refills: 3 | Status: SHIPPED | OUTPATIENT
Start: 2022-12-01

## 2022-12-05 RX ORDER — RANOLAZINE 500 MG/1
TABLET, EXTENDED RELEASE ORAL
Qty: 180 TABLET | Refills: 3 | OUTPATIENT
Start: 2022-12-05

## 2022-12-15 ENCOUNTER — OFFICE VISIT (OUTPATIENT)
Dept: CARDIOLOGY | Facility: CLINIC | Age: 77
End: 2022-12-15

## 2022-12-15 VITALS
HEIGHT: 71 IN | WEIGHT: 222 LBS | HEART RATE: 60 BPM | DIASTOLIC BLOOD PRESSURE: 64 MMHG | OXYGEN SATURATION: 99 % | BODY MASS INDEX: 31.08 KG/M2 | SYSTOLIC BLOOD PRESSURE: 102 MMHG

## 2022-12-15 DIAGNOSIS — I25.708 CORONARY ARTERY DISEASE OF BYPASS GRAFT OF NATIVE HEART WITH STABLE ANGINA PECTORIS: Primary | ICD-10-CM

## 2022-12-15 DIAGNOSIS — I25.5 ISCHEMIC CARDIOMYOPATHY: ICD-10-CM

## 2022-12-15 DIAGNOSIS — Z95.810 PRESENCE OF BIVENTRICULAR AICD: ICD-10-CM

## 2022-12-15 DIAGNOSIS — I50.42 CHRONIC COMBINED SYSTOLIC AND DIASTOLIC CONGESTIVE HEART FAILURE: ICD-10-CM

## 2022-12-15 DIAGNOSIS — I48.21 PERMANENT ATRIAL FIBRILLATION: ICD-10-CM

## 2022-12-15 PROCEDURE — 99214 OFFICE O/P EST MOD 30 MIN: CPT | Performed by: NURSE PRACTITIONER

## 2022-12-16 PROCEDURE — 93000 ELECTROCARDIOGRAM COMPLETE: CPT | Performed by: NURSE PRACTITIONER

## 2022-12-16 NOTE — PROGRESS NOTES
"    Subjective:     Encounter date: 12/15/2022       Patient ID: Darren Maria is a 77 y.o. male.    Chief Complaint: lightheadedness,  f/u CHF/CAD/AF    History of Present Illness     77-year-old today returns for follow-up.  Dr. Posadas met him on 11/4/2021, and he has an extensive cardiovascular history.  He did been seen remotely in the past by different members of our group, though had not been seen since February 2019 until appointment with me on 10/18/21.  See below for full summary of his previous cardiac issues.  At the time of his visit here with me, he was reporting worsening fatigue/decline in stamina along with exertional dyspnea and chest pressure over the last 2 to 3 months.  Symptoms would occur when walking half a block or climbing a flight of stairs, and he noticed that the amount of activity takes reduce the symptoms was declining over time.  He reported the symptoms were the same as those he felt prior to CABG.  At that time, symptoms were relieved within a few minutes of rest so he had not taken any nitroglycerin.  I  added Ranexa to his medical regimen. Later a telephone encounter revealed this improved his breathing somewhat, but he was still symptomatic.     At Dr. Posadas' visit with him on 11/4, he noted the following:  Now he says his SOA is better for just the few hours after taking ranexa. Says he's previously had intolerable headache on imdur.  States his breathing is bad, but wife states he's knowingly eating high salt foods like hot dogs. He still is having chest pressure as noted by Yolanda at the first visit, but when specifically asked today whether his shortness of breath and fluid retention or his chest pressure is more problematic, he repeatedly goes back to discussing his fluid retention and trouble breathing. He admits that he knows \"I do not take care of myself like I should.\"    Dr. Posadas transitioned him to Entresto at that visit, he returned 2 weeks later for follow-up again " "with me.  He then reported significant improvement in his exertional chest pressure, but thought that his breathing was about the same.  His weight was stable by his home scale, though our office scale did suggest a 6 pound decrease since the prior visit with me 2 weeks before that.  Therefore, I did start Farxiga therapy that day.     At his next visit, he reported that he was doing much better, particularly in terms of how far he can walk before having any symptoms.  He was having chest pains, but less severe. Frequency unchanged.  Had a prolonged episode of chest pain the day prior.  Still \"feel like something is wrong. I still get pressure, even at rest.\"  For these reasons, Dr. Posadas did then proceed with coronary bypass graft angiography on 12/15/2021; see full results and procedural note.  In short, there did not appear to be any culprit lesions in need of revascularization, though he could not locate the SVG performed to an OM branch at his last operation at [place] in 2011 or 2012 (Dr. Posadas has since obtained op note- see below).  Otherwise, his anatomy was consistent with previous reports -patent PEREZ to LAD, patent SVG to RCA, and a known occlusion of an SVG arising from the ascending aorta to an OM branch.  Left ventriculogram showed EF 25 to 30%, but normal LVEDP.  Therefore, he was discharged home that day with instructions to increase his Entresto up to max dose, and also to increase Ranexa for further symptom benefit.     The pt saw Dr. Posadas 12/28/2021 and reported his chest tightness had significantly improved - he had only had 1 episode of CP that self resolved without NTG. ELLISON was improving as well, but still had dyspnea walking around the block and beyond 1-2 flights of stairs. He reported a dry weight of around 230. He was having orthostatic dizziness on max Entresto plus daily torsemide. That day Dr. Posadas checked labs and this revealed a mildly elevated creatinine suggesting dehydration. " He was instructed to use torsemide on an as needed basis only at that point.    Later the pt called in with symptomatic hypotension (LH, tired) with SBP 80s-100s and diltiazem was stopped 1/18/2022.    I saw the patient on 3/30/2022 and he was feeling better and was only slightly dizzy if he would stand up too fast, after stopping diltiazem.  Overall he was very pleased with his cardiac care and reported following medicine changes after transitioning care to Dr. Posadas his breathing and chest pressure had improved significantly.  He was taking torsemide approximately once per week for increases in weight or swelling, usually provoked by diet choices, which would subsequently resolve.  Dyspnea on exertion was stable/improved-was typically only short of breath with going up stairs or walking up an incline.  He reported he might have dyspnea on exertion and chest pressure 1 time per week.  He stated that was a significant improvement over the prior few months.  Symptoms were relieved with rest.  Weight was stable.  He denied orthopnea, PND or palpitations.     He called the office on 7/6/2022 with complaints of worsening orthostasis, dizziness/lightheadedness with systolic blood pressures as low as 74 up to 110s at max.  He was instructed to be evaluated by his PCP first, to look for sources of his symptoms prior to reducing his heart failure medications.      I saw the patient on 7/12/2022 and he reported he saw his PCP and he reduced his Coreg to 12.5 mg twice daily 5 days prior to that visit with me because he was having hypotension with systolic blood pressures in the 70s to 90s.  Subsequently his systolic pressures came up a little bit to 90s to 110s but he was not really feeling any different (complained of lightheadedness shortly after standing up).  He did not feel he was dehydrated.  He had not had any torsemide in 3 weeks.  He reported he had no energy.  Exertional dyspnea and chest discomfort were mild/stable  compared to his visit in March.  Weight was stable.  At that visit I did check labs to include CMP, CBC and BNP to look for other sources of his symptoms and findings were unremarkable.  Given some improvement in his blood pressure but lack of improvement in symptoms I did not feel strongly that his heart failure medical therapy should be reduced further and I did not make other changes at that time.    I saw the patient in August 2022 and symptoms were about the same overall.  He tried an antihistamine and nose spray that did not help.  Systolic blood pressures were in the 90s to low 100s.  He admitted not making the best diet choices.  He was taking torsemide approximately once per week for swelling and abdominal distention and mild dyspnea.  Weight was stable.  He reported his PCP was planning to try and maneuver involving his inner ears to help with his balance issues.  He reported he was staying well-hydrated.  Stamina was stable and he was continuing to golf every Friday without major limiting issues.  No changes were made at that visit.    He returns today for routine 3-month follow-up and states his lightheadedness has improved without clear alleviating factor.  Systolic blood pressures are mainly in the low 100s.  He reports he had a viral illness about a week after Thanksgiving and lost several pounds due to decreased intake, he has gained some weight back but is still a little bit below his baseline.  He has not required torsemide.  He might occasionally have worsening shortness of breath based on dietary choices/sodium intake.  He is without complaint otherwise.  ===========================================================================================  RELEVANT CARDIAC HX:    The patient has a longstanding complicated cardiac history dating back to initial myocardial infarction in 1989.  I have copied an outline of his cardiac history documented per Dr. Bernal below-see for full details.  In short,  the patient has a history of CAD ( status post three-vessel CABG in 1990, followed by PCI (multiple prior stents to LCX) and left thoracotomy CABG x1 around 2011/2012), chronic systolic CHF/ischemic cardiomyopathy now with biventricular AICD in place followed by Dr. Mccarty ,  atrial fibrillation anticoagulated with warfarin, and prior AV node ablation (pacemaker was placed piror to eventual upgrade BIV AICD).       3/17/2021 Documentation per Dr. Bernal at [facility]:    Problem List:  1. Coronary atherosclerotic heart disease.  ---a. Status post remote myocardial infarction 1989.  ---b. Status post CABG ([facility]) 1990 with LIMA to LAD, SVG to PD, SVG to OM.  ---c. Chronic atrial fibrillation with subsequent permanent pacemaker implantation 1999 and prior RFA AVN  ---d. Chronic left ventricular dysfunction  ---e. [facility]  ---f. Cardiac catheterization 01/06 with LVEF 30%, severe native vessel coronary disease, patent LIMA to LAD, patent SVG to PD of RCA,  of SVG to OM with subsequent PTCA/stenting of native OM with 2.5mm x 23mm LEILANI.  ---g. Repeat cardiac catheterization 12/06 ([facility]) with patent LIMA to LAD, patent SVG to right PDA, occluded SVG to OM with repeat PCI/stenting with 2.5 mm x 23 mm stent  ---h. Adenosine dual isotope study 04/07 with LVEF 53%, inferolateral scar with no ischemia.  ---i. BNP level < 100 pg/ml 04/07 ; 101 pg/ml 06/08  ---k. Repeat cardiac catheterization 04/08 with LVEF 30%, patent LIMA to LAD and SVG to RCA,  of SVG to LCX, severe focal denovo stenosis of previously twice-stented native LCX with repeat stenting of native LCS ([facility]) with third 2.5mm x 23mm Cypher LEILANI  ---l. Pulse generator replacement 04/28/08 [facility]) [employer] Concerto [id number] (serial # [id number]); pulse generator replacement 11/19  ---m. Recurrent angina 04/08, repeat limited  native LCA angiogram 04/08 with focal edge dissection and instent restenosis, status post additional stenting ([facility]) with 3 additional [place] stents  ---n. Recurrent angina 12/08 with repeat catheterization confirming LVEF 40%, patent LIMA to LAD, patent SVG to RCA, patent LCx stent site; medical therapy  ---o. TTE 03/10 with LVEF 30-35%, no significant valvular dysfunction, moderate LAE and LVE; repeat 12/10 uncjhanged except LVEWF improved to 35-40%  ---p. BNP 03/10 259 pg/ml > repeat 12/10 165 pg/ml > repeat 06/12 70 pg/ml > repeat 10/12 200 pg/ml>repeat 11/17 154 pg/ml  ---q. Adenosine radionuclide stress test 03/10 and 12/10 with fixed shannan-apical scar, no ischemia, LVEF 40% rising to 50% with stress; medical therapy  ---r. ACS 03/11 with cardiac catheterization revealing patent LIMA to LAD, patent SVG to PD and PL of RCA, severe ISR of previously multiply (x 4 procedures/6 stents) stented distal LM/proximal LCX-OM  ---s. Status post limited left thoracotomy CABG x 1 with SVG from descending Ao to OM, CRISTOBAL ligation ([facility]) - ?[id number]  ---t. TTE 11/11 and 09/12 with LVEF 40-45%, no significant valvular dysfunction  ---u. Recurrent sx 06/12 with repeat cardiac catheterization with all grafts patent, no culprit lesion in need of PCI; medical therapy  ---v. Status post pulse generator replacement 07/12 and 01/16 ([place] [facility])  ---w. Pharmacologic nuclear stress test 08/13 with LVEF approx 40%, predominant fixed shannan-apical defect, no interval change since prior studies; expectant medical mgmt; repeat 10/15 with no ischemia  ---x. Pharmacologic radionuclide stress test 10/16 with LVEF 30%, fixed anterior defect, no ischemia  ---y. TTE 10/16 with LVEF 45%, no significant valvular dysfunction  ---z. Pharmacologic radionuclide stress test 11/17 with LVEF 40-45%, fixed anteroapical scar, no significant reversible ischemia  ---aa. TTE 02/13/19 with LVEF est 30%, LAE 6cm, LVEDD 6.2 cm, segemental  "WMAs, no significant valvular dysfunction; est RVSP 40 mmHg.  ---bb. BNP level 2/20 274 pg/ml => 02/21 170 pg/ml  ---cc. TTE 03/21 with LVEF 25%, no significant valvular dysfunction  2. Hyperlipidemia, on statin therapy  3. Renal cell carcinoma with right nephrectomy 2000, no recurrence.  4. Status post remote cholecystectomy with subsequent ventral hernia and ventral herniorrhaphy.  5. Chronic coumadin anticoagulation (indication: atrial fibrillation, INR goal 2-3).  6. Erectile dysfunction  7. Mild chronic renal insufficiency, serum creatinine 1.6 mg% 04/08  8. DJD, status post left TKR 10/15    Electronically signed by Christopher Bernal III, MD at 03/17/2021 8:30 AM CDT   ===========================================================================================  The following portions of the patient's history were reviewed and updated as appropriate: allergies, current medications, past family history, past medical history, past social history, past surgical history and problem list.    Review of Systems   Constitutional: Positive for malaise/fatigue.   Cardiovascular: Positive for dyspnea on exertion (stable ). Negative for chest pain, claudication, leg swelling, near-syncope, orthopnea, palpitations, paroxysmal nocturnal dyspnea and syncope.   Respiratory: Negative for cough.    Hematologic/Lymphatic: Does not bruise/bleed easily.   Musculoskeletal: Negative for falls.   Gastrointestinal: Negative for bloating.   Neurological: Positive for light-headedness (improved ). Negative for dizziness, loss of balance and weakness.       Allergies   Allergen Reactions   • Azithromycin Nausea And Vomiting   • Morphine And Related Other (See Comments)     SHAKES AND MAKES HIM \"WIRED\".   TAKES TRAMADOL WITHOUT PROBLEM       Current Outpatient Medications:   •  atorvastatin (LIPITOR) 40 MG tablet, Take 1 tablet by mouth Every Night., Disp: 90 tablet, Rfl: 3  •  carvedilol (COREG) 25 MG tablet, Take 12.5 mg by mouth 2 (Two) " Times a Day., Disp: , Rfl:   •  cetirizine (zyrTEC) 10 MG tablet, Take 10 mg by mouth Daily., Disp: , Rfl:   •  clopidogrel (PLAVIX) 75 MG tablet, Take 75 mg by mouth Daily., Disp: , Rfl:   •  empagliflozin (JARDIANCE) 10 MG tablet tablet, Take 1 tablet by mouth Every Morning., Disp: 90 tablet, Rfl: 3  •  Entresto  MG tablet, TAKE 1 TABLET BY MOUTH TWICE A DAY, Disp: 60 tablet, Rfl: 11  •  finasteride (PROSCAR) 5 MG tablet, Take 5 mg by mouth daily., Disp: , Rfl:   •  fluticasone (FLONASE) 50 MCG/ACT nasal spray, 2 sprays into the nostril(s) as directed by provider Daily., Disp: , Rfl:   •  metFORMIN (GLUCOPHAGE) 500 MG tablet, 500 mg Daily With Breakfast. Two tabs daily, Disp: , Rfl:   •  Mirabegron ER (Myrbetriq) 25 MG tablet sustained-release 24 hour 24 hr tablet, Take 1 tablet by mouth Daily for 360 days., Disp: 90 tablet, Rfl: 3  •  nitroglycerin (NITROSTAT) 0.4 MG SL tablet, Place 1 tablet under the tongue Every 5 (Five) Minutes As Needed for Chest Pain. Take no more than 3 doses in 15 minutes., Disp: 25 tablet, Rfl: 2  •  omeprazole (PriLOSEC) 40 MG capsule, Take 40 mg by mouth daily., Disp: , Rfl:   •  potassium chloride (K-DUR,KLOR-CON) 20 MEQ CR tablet, Take 20 mEq by mouth Daily. With additional 20 meq if second dose of torsemide taken , Disp: , Rfl:   •  pregabalin (LYRICA) 150 MG capsule, Take 150 mg by mouth 2 (Two) Times a Day., Disp: , Rfl:   •  ranolazine (RANEXA) 1000 MG 12 hr tablet, Take 1 tablet by mouth 2 (Two) Times a Day., Disp: 180 tablet, Rfl: 3  •  rivaroxaban (Xarelto) 20 MG tablet, Take 1 tablet by mouth Daily. Take 1 tablet by mouth daily with food., Disp: 90 tablet, Rfl: 3  •  tamsulosin (FLOMAX) 0.4 MG capsule 24 hr capsule, Take 1 capsule by mouth Daily., Disp: , Rfl:   •  torsemide (DEMADEX) 20 MG tablet, Take 1 tablet by mouth Daily As Needed (weight gain >3 lbs in one day or >5 lbs in 2 days). With additional 20 mg PRN, Disp: , Rfl:   •  traMADol-acetaminophen (ULTRACET)  "37.5-325 MG per tablet, as needed, Disp: , Rfl: 1  •  zolpidem (AMBIEN) 5 MG tablet, TAKE 1 TABLET BY MOUTH EVERY DAY AT BEDTIME AS NEEDED, Disp: , Rfl: 5         Objective:    /64   Pulse 60   Ht 180.3 cm (71\")   Wt 101 kg (222 lb)   SpO2 99%   BMI 30.96 kg/m²        Vitals and nursing note reviewed.   Constitutional:       General: Not in acute distress.     Appearance: Not in distress.   Neck:      Vascular: No JVD or JVR. JVD normal.   Pulmonary:      Effort: Pulmonary effort is normal.      Breath sounds: Normal breath sounds.   Cardiovascular:      Normal rate. Regular rhythm.      Murmurs: There is no murmur.   Edema:     Peripheral edema absent.   Skin:     General: Skin is warm and dry.   Neurological:      Mental Status: Alert, oriented to person, place, and time and oriented to person, place and time.         Lab Review:    Lab Results   Component Value Date    GLUCOSE 138 (H) 07/12/2022    BUN 20 07/12/2022    CREATININE 1.03 07/12/2022    EGFRIFNONA 51 (L) 12/28/2021    EGFRIFAFRI 94 03/13/2017    BCR 19.4 07/12/2022    K 5.2 07/12/2022    CO2 27.0 07/12/2022    CALCIUM 9.3 07/12/2022    ALBUMIN 4.00 07/12/2022    AST 24 07/12/2022    ALT 24 07/12/2022     Lab Results   Component Value Date    CHOL 122 11/12/2021    CHLPL 136 (L) 03/13/2018    TRIG 108 11/12/2021    HDL 36 (L) 11/12/2021    LDL 66 11/12/2021     Lab Results   Component Value Date    HGBA1C 6.3 09/25/2018     Lab Results   Component Value Date    WBC 5.37 07/12/2022    HGB 14.3 07/12/2022    HCT 43.4 07/12/2022    .8 (H) 07/12/2022     07/12/2022                     ECG 12 Lead    Date/Time: 12/16/2022 3:29 PM  Performed by: Yolanda Garnica APRN  Authorized by: Yolanda Garnica APRN   Comparison: compared with previous ECG from 8/23/2022  Similar to previous ECG  Comparison to previous ECG: BiV paced   Rhythm: atrial fibrillation and paced  BPM: 75                       Results for orders placed during the hospital " encounter of 10/28/21    Adult Transthoracic Echo Complete W/ Cont if Necessary Per Protocol    Interpretation Summary  · Left ventricular ejection fraction appears to be 26 - 30%. Left ventricular systolic function is moderately decreased.  · The left ventricular cavity is moderately dilated (LVIDd 6.4cm)  · The following left ventricular wall segments are akinetic: apical anterior, apical lateral, apical inferior, apical septal and apex.  · The following left ventricular wall segments are hypokinetic: mid anterior, apical anterior, basal anterolateral, mid anterolateral, apical lateral, basal inferolateral, mid inferolateral, mid inferior, basal inferoseptal, mid inferoseptal, basal anterior, basal inferior and basal inferoseptal. The following left ventricular wall segments are akinetic: apical inferior, apical septal, apex and mid anteroseptal.  · Mild-moderate mitral regurgitation (functional).  · Left atrial volume is severely increased.  · Estimated right ventricular systolic pressure from tricuspid regurgitation is moderately elevated (45-55 mmHg).  · Compared to last exam from 2/13/2019, LV function appears slightly worse.      Assessment:      Problem List Items Addressed This Visit        Cardiac and Vasculature    Coronary artery disease of bypass graft of native heart with stable angina pectoris (HCC) - Primary    Overview     3v CABG ([facility]) in 1990: LIMA to LAD, SVG to PD, SVG to OM  2006: other grafts patent but found to have  of SVG to OM; underwent PTCA/stenting of native OM with 2.5mm x 23mm LEILANI.  4/2008: only change was severe focal denovo stenosis of previously twice-stented native LCX with repeat stenting of native LCS ([facility]) with third 2.5mm x 23mm Cypher LEILANI  ---l. Pulse generator replacement 04/28/08 ([facility] Mccarty) [employer] Concerto [id number] (serial # [id number]); pulse generator replacement 11/19  ---m. Recurrent angina 04/08, repeat limited native LCA  angiogram 04/08 with focal edge dissection and instent restenosis, status post additional stenting ([facility]) with 3 additional [place] stents  ---n. Recurrent angina 12/08 with repeat catheterization confirming LVEF 40%, patent LIMA to LAD, patent SVG to RCA, patent LCx stent site; medical therapy  ---o. TTE 03/10 with LVEF 30-35%, no significant valvular dysfunction, moderate LAE and LVE; repeat 12/10 uncjhanged except LVEWF improved to 35-40%  ---p. BNP 03/10 259 pg/ml > repeat 12/10 165 pg/ml > repeat 06/12 70 pg/ml > repeat 10/12 200 pg/ml>repeat 11/17 154 pg/ml  ---q. Adenosine radionuclide stress test 03/10 and 12/10 with fixed shannan-apical scar, no ischemia, LVEF 40% rising to 50% with stress; medical therapy  ---r. ACS 03/11 with cardiac catheterization revealing patent LIMA to LAD, patent SVG to PD and PL of RCA, severe ISR of previously multiply (x 4 procedures/6 stents) stented distal LM/proximal LCX-OM    Redo CABG via lateral thoracotomy at [facility] ([place] 3[place]18/11) with SVG from descending Ao (entered through 5th intercostal space) to OM.  Op note scanned in under 'media' under date of procedure         Presence of biventricular AICD    Ischemic cardiomyopathy    Permanent atrial fibrillation (HCC)    Chronic combined systolic and diastolic congestive heart failure (HCC)       Plan:     1. Chronic systolic CHF/ischemic cardiomyopathy: Stage C, NYHA II: Stable, compensated/euvolemic.  ejection fraction on echocardiogram here 10/2021 was similar to prior evaluations at [place], demonstrating moderate LV systolic dysfunction (EF 26-30%). Has BiV-ICD     -Continue max dose Entresto and jardiance   -Continue Coreg 12.5 mg twice daily-dose reduced in July due to hypotension-systolic blood pressures in the 70s to 80s at that time.  -Hold off on adding Aldactone/optimizing medical therapy further given evidence of some continued orthostasis  -Continue torsemide 20 mg on as needed basis only for weight  gain of greater than 2 lbs overnight or 5 lbs in 2 days, increased dyspnea/orthopnea/PND, or significant edema.  He states he has not had to take this since his last visit.    -Transition device interrogations to our office-per patient request as he states he will not be traveling to [place] to continue to follow with Dr. Mccarty.  We will refer him to Dr. Oliver if needed in the future for EP needs.  Device interrogation today reveals 2 years on battery life, LV lead has a high threshold chronically, rate controlled atrial fibrillation    2.  Coronary artery disease: With improved chronic stable angina on max dose ranexa, and Coreg (now on 12.5 mg twice daily). Previously intolerant to imdur.   No culprit lesions noted on 12/2021 catheterization that would be targets for intervention.   -Continue Plavix (take ASA in it's place if ever held for surgeries or procedures)  -Continue Coreg  -As needed sublingual nitroglycerin-has not had to use  -Continue atorvastatin 40 mg nightly and maintain goal LDL <70; LDL 66  -continue Ranexa 1000 mg twice daily for antianginal benefit    3.  Permanent atrial fibrillation: Biventricular paced and rate controlled. Stable/asymptomatic  -Continue Xarelto 20 mg daily with food for CVA prophylaxis (he transitioned from Eliquis to Xarelto due to insurance coverage reasons)  -Continue Coreg for rate control.    4.Orthostatic dizziness/hypotension: Initially improved significantly after reducing torsemide from daily use to PRN use and later discontinuation of diltiazem for continued lightheadedness and fatigue.  This has now returned as of early July 2022 and his PCP has reduced his Coreg from 25 mg twice daily to 12.5 mg twice daily around that time with some improvement in systolic blood pressures from 70s to 80s to 90s to 110s, but no real improvement in symptoms.    -Symptoms are stable and actually somewhat improved compared to his last visit without specific alleviating factor, so  I would not recommend further reductions in doses of his medical therapy for his cardiomyopathy.  However, if lightheadedness related to hypotension becomes a limiting issue again in the future we may have to consider reducing his Entresto to middle dose.    5.  Mixed hyperlipidemia: as per above.  Goal LDL less than 70.  Continue high intensity statin.    Follow-up in 3-6 months with Dr. Posadas, sooner with new or worsening symptoms    Yolanda Garnica, APRN

## 2022-12-20 RX ORDER — RANOLAZINE 500 MG/1
TABLET, EXTENDED RELEASE ORAL
Qty: 180 TABLET | Refills: 3 | OUTPATIENT
Start: 2022-12-20

## 2022-12-22 ENCOUNTER — HOSPITAL ENCOUNTER (EMERGENCY)
Facility: HOSPITAL | Age: 77
Discharge: HOME | End: 2022-12-22
Admitting: STUDENT IN AN ORGANIZED HEALTH CARE EDUCATION/TRAINING PROGRAM

## 2022-12-22 ENCOUNTER — APPOINTMENT (OUTPATIENT)
Dept: CT IMAGING | Facility: HOSPITAL | Age: 77
End: 2022-12-22

## 2022-12-22 VITALS
OXYGEN SATURATION: 97 % | DIASTOLIC BLOOD PRESSURE: 79 MMHG | TEMPERATURE: 98 F | WEIGHT: 230 LBS | SYSTOLIC BLOOD PRESSURE: 125 MMHG | HEIGHT: 71 IN | BODY MASS INDEX: 32.2 KG/M2 | RESPIRATION RATE: 16 BRPM | HEART RATE: 79 BPM

## 2022-12-22 DIAGNOSIS — R31.9 URINARY TRACT INFECTION WITH HEMATURIA, SITE UNSPECIFIED: Primary | ICD-10-CM

## 2022-12-22 DIAGNOSIS — N39.0 URINARY TRACT INFECTION WITH HEMATURIA, SITE UNSPECIFIED: Primary | ICD-10-CM

## 2022-12-22 DIAGNOSIS — Z79.01 ANTICOAGULANT LONG-TERM USE: ICD-10-CM

## 2022-12-22 DIAGNOSIS — Z90.5 HISTORY OF NEPHRECTOMY: ICD-10-CM

## 2022-12-22 LAB
ALBUMIN SERPL-MCNC: 3.7 G/DL (ref 3.5–5.2)
ALBUMIN/GLOB SERPL: 1.6 G/DL
ALP SERPL-CCNC: 73 U/L (ref 39–117)
ALT SERPL W P-5'-P-CCNC: 11 U/L (ref 1–41)
ANION GAP SERPL CALCULATED.3IONS-SCNC: 10 MMOL/L (ref 5–15)
APTT PPP: 39.9 SECONDS (ref 24.1–35)
AST SERPL-CCNC: 15 U/L (ref 1–40)
BACTERIA UR QL AUTO: ABNORMAL /HPF
BASOPHILS # BLD AUTO: 0.02 10*3/MM3 (ref 0–0.2)
BASOPHILS NFR BLD AUTO: 0.4 % (ref 0–1.5)
BILIRUB SERPL-MCNC: 0.7 MG/DL (ref 0–1.2)
BILIRUB UR QL STRIP: ABNORMAL
BUN SERPL-MCNC: 13 MG/DL (ref 8–23)
BUN/CREAT SERPL: 15.3 (ref 7–25)
CALCIUM SPEC-SCNC: 9 MG/DL (ref 8.6–10.5)
CHLORIDE SERPL-SCNC: 108 MMOL/L (ref 98–107)
CLARITY UR: ABNORMAL
CO2 SERPL-SCNC: 25 MMOL/L (ref 22–29)
COLOR UR: ABNORMAL
CREAT SERPL-MCNC: 0.85 MG/DL (ref 0.76–1.27)
DEPRECATED RDW RBC AUTO: 55.1 FL (ref 37–54)
EGFRCR SERPLBLD CKD-EPI 2021: 90.6 ML/MIN/1.73
EOSINOPHIL # BLD AUTO: 0.06 10*3/MM3 (ref 0–0.4)
EOSINOPHIL NFR BLD AUTO: 1.1 % (ref 0.3–6.2)
ERYTHROCYTE [DISTWIDTH] IN BLOOD BY AUTOMATED COUNT: 14.8 % (ref 12.3–15.4)
GLOBULIN UR ELPH-MCNC: 2.3 GM/DL
GLUCOSE SERPL-MCNC: 132 MG/DL (ref 65–99)
GLUCOSE UR STRIP-MCNC: ABNORMAL MG/DL
HCT VFR BLD AUTO: 37.1 % (ref 37.5–51)
HGB BLD-MCNC: 12 G/DL (ref 13–17.7)
HGB UR QL STRIP.AUTO: ABNORMAL
HYALINE CASTS UR QL AUTO: ABNORMAL /LPF
IMM GRANULOCYTES # BLD AUTO: 0.04 10*3/MM3 (ref 0–0.05)
IMM GRANULOCYTES NFR BLD AUTO: 0.7 % (ref 0–0.5)
INR PPP: 1.85 (ref 0.91–1.09)
KETONES UR QL STRIP: NEGATIVE
LEUKOCYTE ESTERASE UR QL STRIP.AUTO: ABNORMAL
LIPASE SERPL-CCNC: 13 U/L (ref 13–60)
LYMPHOCYTES # BLD AUTO: 0.67 10*3/MM3 (ref 0.7–3.1)
LYMPHOCYTES NFR BLD AUTO: 11.7 % (ref 19.6–45.3)
MCH RBC QN AUTO: 33 PG (ref 26.6–33)
MCHC RBC AUTO-ENTMCNC: 32.3 G/DL (ref 31.5–35.7)
MCV RBC AUTO: 101.9 FL (ref 79–97)
MONOCYTES # BLD AUTO: 0.54 10*3/MM3 (ref 0.1–0.9)
MONOCYTES NFR BLD AUTO: 9.5 % (ref 5–12)
NEUTROPHILS NFR BLD AUTO: 4.38 10*3/MM3 (ref 1.7–7)
NEUTROPHILS NFR BLD AUTO: 76.6 % (ref 42.7–76)
NITRITE UR QL STRIP: NEGATIVE
NRBC BLD AUTO-RTO: 0 /100 WBC (ref 0–0.2)
PH UR STRIP.AUTO: 5.5 [PH] (ref 5–8)
PLATELET # BLD AUTO: 187 10*3/MM3 (ref 140–450)
PMV BLD AUTO: 9.4 FL (ref 6–12)
POTASSIUM SERPL-SCNC: 3.8 MMOL/L (ref 3.5–5.2)
PROT SERPL-MCNC: 6 G/DL (ref 6–8.5)
PROT UR QL STRIP: ABNORMAL
PROTHROMBIN TIME: 21.6 SECONDS (ref 11.8–14.8)
RBC # BLD AUTO: 3.64 10*6/MM3 (ref 4.14–5.8)
RBC # UR STRIP: ABNORMAL /HPF
REF LAB TEST METHOD: ABNORMAL
SODIUM SERPL-SCNC: 143 MMOL/L (ref 136–145)
SP GR UR STRIP: >1.03 (ref 1–1.03)
SQUAMOUS #/AREA URNS HPF: ABNORMAL /HPF
UROBILINOGEN UR QL STRIP: ABNORMAL
WBC # UR STRIP: ABNORMAL /HPF
WBC NRBC COR # BLD: 5.71 10*3/MM3 (ref 3.4–10.8)

## 2022-12-22 PROCEDURE — 80053 COMPREHEN METABOLIC PANEL: CPT | Performed by: NURSE PRACTITIONER

## 2022-12-22 PROCEDURE — 85730 THROMBOPLASTIN TIME PARTIAL: CPT | Performed by: NURSE PRACTITIONER

## 2022-12-22 PROCEDURE — 25010000002 CEFTRIAXONE PER 250 MG: Performed by: NURSE PRACTITIONER

## 2022-12-22 PROCEDURE — 81001 URINALYSIS AUTO W/SCOPE: CPT | Performed by: NURSE PRACTITIONER

## 2022-12-22 PROCEDURE — 83690 ASSAY OF LIPASE: CPT | Performed by: NURSE PRACTITIONER

## 2022-12-22 PROCEDURE — 85025 COMPLETE CBC W/AUTO DIFF WBC: CPT | Performed by: NURSE PRACTITIONER

## 2022-12-22 PROCEDURE — 85610 PROTHROMBIN TIME: CPT | Performed by: NURSE PRACTITIONER

## 2022-12-22 PROCEDURE — 96365 THER/PROPH/DIAG IV INF INIT: CPT

## 2022-12-22 PROCEDURE — 74176 CT ABD & PELVIS W/O CONTRAST: CPT

## 2022-12-22 PROCEDURE — 99284 EMERGENCY DEPT VISIT MOD MDM: CPT

## 2022-12-22 RX ORDER — CEFDINIR 300 MG/1
300 CAPSULE ORAL 2 TIMES DAILY
Qty: 10 CAPSULE | Refills: 0 | Status: SHIPPED | OUTPATIENT
Start: 2022-12-22 | End: 2022-12-27

## 2022-12-22 RX ORDER — SODIUM CHLORIDE 0.9 % (FLUSH) 0.9 %
10 SYRINGE (ML) INJECTION AS NEEDED
Status: DISCONTINUED | OUTPATIENT
Start: 2022-12-22 | End: 2022-12-22 | Stop reason: HOSPADM

## 2022-12-22 RX ADMIN — CEFTRIAXONE 1 G: 1 INJECTION, POWDER, FOR SOLUTION INTRAMUSCULAR; INTRAVENOUS at 16:40

## 2022-12-22 NOTE — DISCHARGE INSTRUCTIONS
Begin antibiotics tomorrow  F/u with pcp for re-evaluation of labs and ct findings  Return with worsening sxs

## 2022-12-23 NOTE — ED PROVIDER NOTES
Subjective   History of Present Illness  Patient is a 77-year-old male who presents to the ER with complaints of hematuria and left flank pain discomfort.  Patient reports history of nephrectomy in 2002 due to cancer.  He is on Xarelto and Plavix for history of cardiac stents and CABG.  Patient states he began feeling somewhat tired a few days ago and then yesterday began having mild left flank discomfort.  Today patient noted dark urination that he believes was blood as well as dysuria.  He has had no nausea or vomiting.  He denies any abdominal pain.  He has had no vomiting.  Patient states he has only had 1 kidney stone in the past however it was greater than 20 years ago.  Due to symptoms described he came to the ER for evaluation and treatment.  Past medical history significant for chronic anticoagulation, cancer of the right kidney, atrial fibrillation, cardiomyopathy, CHF, coronary artery disease, colon polyps, gastroesophageal reflux disease, hypertension, hyperlipidemia, melena, pacemaker dependent.         Review of Systems   Constitutional: Positive for fatigue. Negative for fever.   HENT: Negative.  Negative for congestion.    Respiratory: Negative.  Negative for cough and shortness of breath.    Cardiovascular: Negative.  Negative for chest pain.   Gastrointestinal: Negative for abdominal pain, diarrhea, nausea and vomiting.   Genitourinary: Positive for dysuria, flank pain and hematuria.   Musculoskeletal: Positive for back pain.   Skin: Negative.    All other systems reviewed and are negative.      Past Medical History:   Diagnosis Date   • Abdominal pain, epigastric    • Abnormal abdominal exam     ABFND, RADIOLOGICAL, ABDOMINAL AREA    • Anticoagulated on Coumadin    • Atherosclerosis of coronary artery bypass graft     ATHEROSCLEROSIS, CORONARY, ARTERY BYPASS GRFT   • Atrial fibrillation (HCC)    • Cancer of right kidney (HCC)     right sided kidney cancer   • Cardiomyopathy (HCC)    •  "Cardiomyopathy, primary (HCC)    • CHF (congestive heart failure) (HCC)    • Colon polyps    • Congestive heart failure (HCC)    • Coronary artery disease     History of CAD/A-Fib/Pacemaker/Defibrillator placement: pt takes Coumadin and plavix therapy as well as ASA daily   • Gastric polyp    • Gastroesophageal reflux disease without esophagitis    • History of colon polyps    • Hypercholesterolemia    • Hypertension, essential    • Melena    • Myocardial infarction (HCC)    • NSAID long-term use    • Obesity    • Pacemaker     Pacemaker Dependant   • Platelet inhibition due to Plavix    • Presence of stent in coronary artery     Multiple coronary stenting most recently 4/2008   • Single kidney     Only 1 Kidney   • Status post surgery     s/p Polypectomy Bleed       Allergies   Allergen Reactions   • Azithromycin Nausea And Vomiting   • Morphine And Related Other (See Comments)     SHAKES AND MAKES HIM \"WIRED\".   TAKES TRAMADOL WITHOUT PROBLEM       Past Surgical History:   Procedure Laterality Date   • APPENDECTOMY     • CARDIAC ABLATION     • CARDIAC CATHETERIZATION     • CARDIAC CATHETERIZATION Left 12/15/2021    Procedure: Coronary angiography;  Surgeon: Sarbjit Posadas MD;  Location:  [facility] CATH INVASIVE LOCATION;  Service: Cardiology;  Laterality: Left;   • CARDIAC CATHETERIZATION Left 12/15/2021    Procedure: Percutaneous Coronary Intervention;  Surgeon: Sarbjit Posadas MD;  Location:  [facility] CATH INVASIVE LOCATION;  Service: Cardiology;  Laterality: Left;   • CARDIAC DEFIBRILLATOR PLACEMENT     • CARDIAC PACEMAKER PLACEMENT      Pacemaker Placement   • CATARACT EXTRACTION, BILATERAL     • CHOLECYSTECTOMY     • COLONOSCOPY  08/27/2013    snare hep, trans tics recall 3 yr   • COLONOSCOPY  10/02/2006    few scattered diverticula   • COLONOSCOPY  07/22/2009    tubular adenoma in the ascending colon   • COLONOSCOPY  07/25/2005    several polyps in the ascending colon, one in the transverse colon   • COLONOSCOPY " N/A 3/21/2022    Procedure: COLONOSCOPY WITH ANESTHESIA;  Surgeon: Phil Goodwin MD;  Location: [facility] ENDOSCOPY;  Service: Gastroenterology;  Laterality: N/A;  pre: hx polyps  post: polyps  Rolan Salvador MD   • COLONOSCOPY W/ POLYPECTOMY  09/19/2016    Tubular adenoma hepatic flexure, 2 Hyperplastic polyps rectum and at 50 cm, Benign polyp at 70 cm repeat exam in 3-5 years   • CORONARY ARTERY BYPASS GRAFT  02/2011    2nd time   • CORONARY ARTERY BYPASS GRAFT  1990   • CORONARY STENT PLACEMENT     • ENDOSCOPY  04/14/2016    nl slant   • ENDOSCOPY  11/10/2014    clips at polypectomy bx no residual recall 1 yr   • ENDOSCOPY  06/19/2014    snare clip body recall 6 months   • ENDOSCOPY  05/19/2014    polyp stomach bx and clip   • HERNIA REPAIR      with mesh   • JOINT REPLACEMENT     • OTHER SURGICAL HISTORY      Defibrillator/Pacer maker exchange   • OTHER SURGICAL HISTORY      Pacemaker/Defibillation exchange 2012   • PENILE PROSTHESIS IMPLANT N/A 07/27/2018    Procedure: PENILE PROSTHESIS PLACEMENT;  Surgeon: Godwin Gupta MD;  Location: [facility] OR;  Service: Urology   • PERIPHERAL ARTERIAL STENT GRAFT         Family History   Problem Relation Age of Onset   • Lung cancer Mother    • No Known Problems Father    • Other Neg Hx         GI Malignancies   • Colon cancer Neg Hx    • Colon polyps Neg Hx        Social History     Socioeconomic History   • Marital status:    Tobacco Use   • Smoking status: Never   • Smokeless tobacco: Never   Vaping Use   • Vaping Use: Never used   Substance and Sexual Activity   • Alcohol use: Yes     Comment: occ   • Drug use: No   • Sexual activity: Defer           Objective   Physical Exam  Vitals and nursing note reviewed.   Constitutional:       General: He is not in acute distress.     Appearance: He is well-developed. He is not diaphoretic.   HENT:      Head: Atraumatic.      Right Ear: External ear normal.      Left Ear: External ear normal.      Nose: Nose  normal.      Mouth/Throat:      Pharynx: Oropharynx is clear.   Eyes:      General: No scleral icterus.     Extraocular Movements: Extraocular movements intact.      Conjunctiva/sclera: Conjunctivae normal.   Neck:      Thyroid: No thyromegaly.      Vascular: No JVD.   Cardiovascular:      Rate and Rhythm: Normal rate and regular rhythm.      Heart sounds: Normal heart sounds. No murmur heard.  Pulmonary:      Effort: Pulmonary effort is normal. No respiratory distress.      Breath sounds: Normal breath sounds. No wheezing or rales.   Chest:      Chest wall: No tenderness.   Abdominal:      General: Bowel sounds are normal. There is no distension.      Palpations: Abdomen is soft. There is no mass.      Tenderness: There is no abdominal tenderness. There is left CVA tenderness. There is no guarding or rebound.      Comments: Abdomen is soft and nontender, bowel sounds are noted all 4 quadrants, no abdominal distention or guarding identified   Musculoskeletal:         General: Normal range of motion.      Cervical back: Normal range of motion and neck supple.   Lymphadenopathy:      Cervical: No cervical adenopathy.   Skin:     General: Skin is warm and dry.      Capillary Refill: Capillary refill takes less than 2 seconds.      Coloration: Skin is not pale.      Findings: No erythema or rash.   Neurological:      Mental Status: He is alert and oriented to person, place, and time.      Cranial Nerves: No cranial nerve deficit.      Coordination: Coordination normal.      Deep Tendon Reflexes: Reflexes are normal and symmetric.   Psychiatric:         Mood and Affect: Mood normal.         Behavior: Behavior normal.         Thought Content: Thought content normal.         Judgment: Judgment normal.         Procedures           ED Course patient received IV antibiotics in the emergency department.  He is in no distress and has no pain on reexam.  He will need close follow-up with his PCP for reevaluation and to review  patient CT scan and medications.  Patient is on chronic anticoagulants and has hematuria.  He also has history of renal cancer therefore hematuria will need to be reevaluated.  We will treat for UTI and again will need close follow-up.  Anxious for discharge due to inclement weather.   CT Abdomen Pelvis Without Contrast   Final Result       1.  Status post RIGHT nephrectomy and probable removal of the adrenal   gland.       2.  No evidence of hydronephrosis or nephrolithiasis on the LEFT. Mild   nonspecific perinephric fat stranding on the LEFT.       3.  Cardiomegaly.       4.  Evidence of previous hernia repair with what may represent fluid   density material along the deep surface of the mesh. Volume of the low   density material the body of the mass is slightly increased from the   previous examination from 2016 but there was similar material present in   2016.           This report was finalized on 12/22/2022 14:52 by Dr. Tu Kuhn MD.        Labs Reviewed   COMPREHENSIVE METABOLIC PANEL - Abnormal; Notable for the following components:       Result Value    Glucose 132 (*)     Chloride 108 (*)     All other components within normal limits    Narrative:     GFR Normal >60  Chronic Kidney Disease <60  Kidney Failure <15    The GFR formula is only valid for adults with stable renal function between ages 18 and 70.   APTT - Abnormal; Notable for the following components:    PTT 39.9 (*)     All other components within normal limits   PROTIME-INR - Abnormal; Notable for the following components:    Protime 21.6 (*)     INR 1.85 (*)     All other components within normal limits   URINALYSIS W/ CULTURE IF INDICATED - Abnormal; Notable for the following components:    Color, UA Orange (*)     Appearance, UA Cloudy (*)     Specific Gravity, UA >1.030 (*)     Glucose, UA >=1000 mg/dL (3+) (*)     Bilirubin, UA Small (1+) (*)     Blood, UA Large (3+) (*)     Protein,  mg/dL (2+) (*)     Leuk Esterase, UA  Small (1+) (*)     All other components within normal limits    Narrative:     Dipstick results may be inaccurate due to color interference.   CBC WITH AUTO DIFFERENTIAL - Abnormal; Notable for the following components:    RBC 3.64 (*)     Hemoglobin 12.0 (*)     Hematocrit 37.1 (*)     .9 (*)     RDW-SD 55.1 (*)     Neutrophil % 76.6 (*)     Lymphocyte % 11.7 (*)     Immature Grans % 0.7 (*)     Lymphocytes, Absolute 0.67 (*)     All other components within normal limits   URINALYSIS, MICROSCOPIC ONLY - Abnormal; Notable for the following components:    RBC, UA Too Numerous to Count (*)     WBC, UA 3-5 (*)     Bacteria, UA Trace (*)     All other components within normal limits   LIPASE - Normal   CBC AND DIFFERENTIAL    Narrative:     The following orders were created for panel order CBC & Differential.  Procedure                               Abnormality         Status                     ---------                               -----------         ------                     CBC Auto Differential[[id number]]        Abnormal            Final result                 Please view results for these tests on the individual orders.                                              MDM  Number of Diagnoses or Management Options  Anticoagulant long-term use: new and requires workup  History of nephrectomy: new and requires workup  Urinary tract infection with hematuria, site unspecified: new and requires workup     Amount and/or Complexity of Data Reviewed  Clinical lab tests: ordered and reviewed  Tests in the radiology section of CPT®: ordered and reviewed  Discuss the patient with other providers: yes    Risk of Complications, Morbidity, and/or Mortality  Presenting problems: moderate  Diagnostic procedures: moderate  Management options: moderate    Patient Progress  Patient progress: improved      Final diagnoses:   Urinary tract infection with hematuria, site unspecified   History of nephrectomy   Anticoagulant  long-term use       ED Disposition  ED Disposition     ED Disposition   Discharge    Condition   Good    Comment   --             No follow-up provider specified.       Medication List      New Prescriptions    cefdinir 300 MG capsule  Commonly known as: OMNICEF  Take 1 capsule by mouth 2 (Two) Times a Day for 5 days.           Where to Get Your Medications      These medications were sent to [facility]pharmacy #[id number] - [place], [place] - [address] - [phone] [facility] [phone]   [address] [address]    Phone: [phone]   · cefdinir 300 MG capsule          Carri Zelaya, APRN  12/22/22 2010

## 2023-01-06 RX ORDER — RANOLAZINE 500 MG/1
TABLET, EXTENDED RELEASE ORAL
Qty: 180 TABLET | Refills: 3 | OUTPATIENT
Start: 2023-01-06

## 2023-01-09 NOTE — PROGRESS NOTES
Subjective    Mr. Maria is 77 y.o. male    Chief Complaint: Follow up elevated PSA/ blood in urine    History of Present Illness  Patient who we have seen in the past for history of elevated PSA and BPH is here after going to the emergency department 12/22/2022 with complaints of hematuria and left flank pain discomfort.  He has a history of prior right nephrectomy in 2002.  CT scan showed no obstruction to the left kidney no ureteral obstruction no ureteral stone.  Patient was having grossly bloody urine for several days he states he gets blood in the urine of this morning void and then it clears up it has been what he described as almost black in color.  Urine now is narendra-colored it is positive for large red blood cells microscopically but no bacteria or leukocytes noted.    The following portions of the patient's history were reviewed and updated as appropriate: allergies, current medications, past family history, past medical history, past social history, past surgical history and problem list.    Review of Systems   Gastrointestinal: Negative.    Genitourinary: Positive for dysuria and hematuria.   All other systems reviewed and are negative.        Current Outpatient Medications:   •  atorvastatin (LIPITOR) 40 MG tablet, Take 1 tablet by mouth Every Night., Disp: 90 tablet, Rfl: 3  •  carvedilol (COREG) 12.5 MG tablet, Take 12.5 mg by mouth 2 (Two) Times a Day., Disp: , Rfl:   •  cetirizine (zyrTEC) 10 MG tablet, Take 10 mg by mouth Daily., Disp: , Rfl:   •  clopidogrel (PLAVIX) 75 MG tablet, Take 75 mg by mouth Daily., Disp: , Rfl:   •  empagliflozin (JARDIANCE) 10 MG tablet tablet, Take 1 tablet by mouth Every Morning., Disp: 90 tablet, Rfl: 3  •  Entresto  MG tablet, TAKE 1 TABLET BY MOUTH TWICE A DAY, Disp: 60 tablet, Rfl: 11  •  finasteride (PROSCAR) 5 MG tablet, Take 5 mg by mouth daily., Disp: , Rfl:   •  fluticasone (FLONASE) 50 MCG/ACT nasal spray, 2 sprays into the nostril(s) as directed by  provider Daily As Needed., Disp: , Rfl:   •  metFORMIN (GLUCOPHAGE) 500 MG tablet, Take 500 mg by mouth 2 (Two) Times a Day With Meals. Two tabs daily, Disp: , Rfl:   •  Mirabegron ER (Myrbetriq) 25 MG tablet sustained-release 24 hour 24 hr tablet, Take 1 tablet by mouth Daily for 360 days., Disp: 90 tablet, Rfl: 3  •  nitroglycerin (NITROSTAT) 0.4 MG SL tablet, Place 1 tablet under the tongue Every 5 (Five) Minutes As Needed for Chest Pain. Take no more than 3 doses in 15 minutes., Disp: 25 tablet, Rfl: 2  •  omeprazole (PriLOSEC) 40 MG capsule, Take 40 mg by mouth daily., Disp: , Rfl:   •  potassium chloride (K-DUR,KLOR-CON) 20 MEQ CR tablet, Take 20 mEq by mouth Daily. With additional 20 meq if second dose of torsemide taken , Disp: , Rfl:   •  pregabalin (LYRICA) 150 MG capsule, Take 150 mg by mouth 2 (Two) Times a Day., Disp: , Rfl:   •  ranolazine (RANEXA) 1000 MG 12 hr tablet, Take 1 tablet by mouth 2 (Two) Times a Day., Disp: 180 tablet, Rfl: 3  •  rivaroxaban (Xarelto) 20 MG tablet, Take 1 tablet by mouth Daily. Take 1 tablet by mouth daily with food., Disp: 90 tablet, Rfl: 3  •  tamsulosin (FLOMAX) 0.4 MG capsule 24 hr capsule, Take 1 capsule by mouth Daily., Disp: , Rfl:   •  torsemide (DEMADEX) 20 MG tablet, Take 1 tablet by mouth Daily As Needed (weight gain >3 lbs in one day or >5 lbs in 2 days). With additional 20 mg PRN, Disp: , Rfl:   •  traMADol-acetaminophen (ULTRACET) 37.5-325 MG per tablet, as needed, Disp: , Rfl: 1  •  zolpidem (AMBIEN) 5 MG tablet, TAKE 1 TABLET BY MOUTH EVERY DAY AT BEDTIME AS NEEDED, Disp: , Rfl: 5    Past Medical History:   Diagnosis Date   • Abdominal pain, epigastric    • Abnormal abdominal exam     ABFND, RADIOLOGICAL, ABDOMINAL AREA    • Anticoagulated on Coumadin    • Atherosclerosis of coronary artery bypass graft     ATHEROSCLEROSIS, CORONARY, ARTERY BYPASS GRFT   • Atrial fibrillation (HCC)    • Cancer of right kidney (HCC)     right sided kidney cancer   •  Cardiomyopathy (HCC)    • Cardiomyopathy, primary (HCC)    • CHF (congestive heart failure) (HCC)    • Colon polyps    • Congestive heart failure (HCC)    • Coronary artery disease     History of CAD/A-Fib/Pacemaker/Defibrillator placement: pt takes Coumadin and plavix therapy as well as ASA daily   • Gastric polyp    • Gastroesophageal reflux disease without esophagitis    • History of colon polyps    • Hypercholesterolemia    • Hypertension, essential    • Melena    • Myocardial infarction (HCC)    • NSAID long-term use    • Obesity    • Pacemaker     Pacemaker Dependant   • Platelet inhibition due to Plavix    • Presence of stent in coronary artery     Multiple coronary stenting most recently 4/2008   • Single kidney     Only 1 Kidney   • Status post surgery     s/p Polypectomy Bleed       Past Surgical History:   Procedure Laterality Date   • APPENDECTOMY     • CARDIAC ABLATION     • CARDIAC CATHETERIZATION     • CARDIAC CATHETERIZATION Left 12/15/2021    Procedure: Coronary angiography;  Surgeon: Sarbjit Posadas MD;  Location: [facility] CATH INVASIVE LOCATION;  Service: Cardiology;  Laterality: Left;   • CARDIAC CATHETERIZATION Left 12/15/2021    Procedure: Percutaneous Coronary Intervention;  Surgeon: Sarbjit Posadas MD;  Location: [facility] CATH INVASIVE LOCATION;  Service: Cardiology;  Laterality: Left;   • CARDIAC DEFIBRILLATOR PLACEMENT     • CARDIAC PACEMAKER PLACEMENT      Pacemaker Placement   • CATARACT EXTRACTION, BILATERAL     • CHOLECYSTECTOMY     • COLONOSCOPY  08/27/2013    snare hep, trans tics recall 3 yr   • COLONOSCOPY  10/02/2006    few scattered diverticula   • COLONOSCOPY  07/22/2009    tubular adenoma in the ascending colon   • COLONOSCOPY  07/25/2005    several polyps in the ascending colon, one in the transverse colon   • COLONOSCOPY N/A 3/21/2022    Procedure: COLONOSCOPY WITH ANESTHESIA;  Surgeon: Phil Goodwin MD;  Location:  [facility] ENDOSCOPY;  Service: Gastroenterology;  Laterality:  N/A;  pre: hx polyps  post: polyps  Rolan Salvador MD   • COLONOSCOPY W/ POLYPECTOMY  09/19/2016    Tubular adenoma hepatic flexure, 2 Hyperplastic polyps rectum and at 50 cm, Benign polyp at 70 cm repeat exam in 3-5 years   • CORONARY ARTERY BYPASS GRAFT  02/2011    2nd time   • CORONARY ARTERY BYPASS GRAFT  1990   • CORONARY STENT PLACEMENT     • ENDOSCOPY  04/14/2016    nl slant   • ENDOSCOPY  11/10/2014    clips at polypectomy bx no residual recall 1 yr   • ENDOSCOPY  06/19/2014    snare clip body recall 6 months   • ENDOSCOPY  05/19/2014    polyp stomach bx and clip   • HERNIA REPAIR      with mesh   • JOINT REPLACEMENT     • OTHER SURGICAL HISTORY      Defibrillator/Pacer maker exchange   • OTHER SURGICAL HISTORY      Pacemaker/Defibillation exchange 2012   • PENILE PROSTHESIS IMPLANT N/A 07/27/2018    Procedure: PENILE PROSTHESIS PLACEMENT;  Surgeon: Godwin Gupta MD;  Location: [facility] OR;  Service: Urology   • PERIPHERAL ARTERIAL STENT GRAFT         Social History     Socioeconomic History   • Marital status:    Tobacco Use   • Smoking status: Never   • Smokeless tobacco: Never   Vaping Use   • Vaping Use: Never used   Substance and Sexual Activity   • Alcohol use: Yes     Comment: occ   • Drug use: No   • Sexual activity: Defer       Family History   Problem Relation Age of Onset   • Lung cancer Mother    • No Known Problems Father    • Other Neg Hx         GI Malignancies   • Colon cancer Neg Hx    • Colon polyps Neg Hx        Objective    Temp 98.1 °F (36.7 °C)   Ht 180.3 cm (71\")   Wt 101 kg (221 lb 9.6 oz)   BMI 30.91 kg/m²     Physical Exam  Vitals reviewed.   Constitutional:       General: He is not in acute distress.     Appearance: Normal appearance. He is not toxic-appearing.   HENT:      Head: Normocephalic and atraumatic.   Pulmonary:      Effort: Pulmonary effort is normal.   Skin:     Coloration: Skin is not pale.   Neurological:      Mental Status: He is alert and  oriented to person, place, and time.   Psychiatric:         Mood and Affect: Mood normal.         Behavior: Behavior normal.             Results for orders placed or performed in visit on 01/10/23   POC Urinalysis Dipstick, Multipro    Specimen: Urine   Result Value Ref Range    Color Dark Yellow Yellow, Straw, Dark Yellow, Indigo    Clarity, UA Cloudy (A) Clear    Glucose,  mg/dL (A) Negative mg/dL    Bilirubin Small (1+) (A) Negative    Ketones, UA Negative Negative    Specific Gravity  1.030 1.005 - 1.030    Blood, UA Large (A) Negative    pH, Urine 5.5 5.0 - 8.0    Protein, POC 2+ (A) Negative mg/dL    Urobilinogen, UA 0.2 E.U./dL Normal, 0.2 E.U./dL    Nitrite, UA Negative Negative    Leukocytes Negative Negative   IPSS Questionnaire (AUA-7):  Incomplete emptying  Over the past month, how often have you had a sensation of not emptying your bladder completely after you finish?: Less than half the time (01/10/23 1031)  Frequency  Over the past month, how often have you had to urinate again less than two hours after you finishing urinating ?: About half the time (01/10/23 1031)  Intermittency  Over the past month, how often have you found you stopped and started again several time when you urinated ?: Almost always (01/10/23 1031)  Urgency  Over the last month, how difficult  have you found it to postpone urination ?: more than half the time (01/10/23 1031)  Weak Stream  Over the past month, how often have you had a weak urinary stream ?: Less than half the time (01/10/23 1031)  Straining  Over the past month, how often have you had to push or strain to begin urination ?: Less than 1 time 5 (01/10/23 1031)  Nocturia  Over the past month, how many times did you most typically get up to urinate from the time you went to bed until the time you got up in the morning ?: Less than half the time (01/10/23 1031)  Quality of life due to urinary symptoms  If you were to spend the rest of your life with your urinary  condition the way it is now, how would feel about that?: Mostly Satisfied (01/10/23 1031)    Scores  Total IPSS Score: 18 (01/10/23 1031)  Total Score = Symtomatic Level: moderately symptomatic: 8-19 (01/10/23 1031)       Assessment and Plan    Diagnoses and all orders for this visit:    1. Gross hematuria (Primary)  -     POC Urinalysis Dipstick, Multipro  -     Non-gynecologic Cytology; Future  -     Non-gynecologic Cytology  -     CT Abdomen Pelvis With & Without Contrast; Future    Patient has had gross hematuria it came on suddenly 12/22/2022 he went to the emergency department CT scan showed no obvious source of hematuria no obstruction no evidence of malignancy.  He does have history of right nephrectomy.  CT done in the emergency department was without contrast patient states that in the morning his urine looks bloody and then clears up during the day previously it was grossly bloody for a few days in a row.  I told him to increase water intake we will schedule him for CT urogram and cystoscopy to better evaluate.  I also sent urine for cytology.

## 2023-01-10 ENCOUNTER — OFFICE VISIT (OUTPATIENT)
Dept: UROLOGY | Facility: CLINIC | Age: 78
End: 2023-01-10
Payer: MEDICARE

## 2023-01-10 VITALS — BODY MASS INDEX: 31.02 KG/M2 | HEIGHT: 71 IN | TEMPERATURE: 98.1 F | WEIGHT: 221.6 LBS

## 2023-01-10 DIAGNOSIS — R31.0 GROSS HEMATURIA: Primary | ICD-10-CM

## 2023-01-10 LAB
BILIRUB BLD-MCNC: ABNORMAL MG/DL
CLARITY, POC: ABNORMAL
COLOR UR: ABNORMAL
GLUCOSE UR STRIP-MCNC: ABNORMAL MG/DL
KETONES UR QL: NEGATIVE
LEUKOCYTE EST, POC: NEGATIVE
NITRITE UR-MCNC: NEGATIVE MG/ML
PH UR: 5.5 [PH] (ref 5–8)
PROT UR STRIP-MCNC: ABNORMAL MG/DL
RBC # UR STRIP: ABNORMAL /UL
SP GR UR: 1.03 (ref 1–1.03)
UROBILINOGEN UR QL: ABNORMAL

## 2023-01-10 PROCEDURE — 81001 URINALYSIS AUTO W/SCOPE: CPT | Performed by: PHYSICIAN ASSISTANT

## 2023-01-10 PROCEDURE — 99214 OFFICE O/P EST MOD 30 MIN: CPT | Performed by: PHYSICIAN ASSISTANT

## 2023-01-10 PROCEDURE — 88112 CYTOPATH CELL ENHANCE TECH: CPT | Performed by: PHYSICIAN ASSISTANT

## 2023-01-12 ENCOUNTER — TELEPHONE (OUTPATIENT)
Dept: UROLOGY | Facility: CLINIC | Age: 78
End: 2023-01-12
Payer: MEDICARE

## 2023-01-12 LAB
CYTO UR: NORMAL
LAB AP CASE REPORT: NORMAL
Lab: NORMAL
PATH REPORT.FINAL DX SPEC: NORMAL
PATH REPORT.GROSS SPEC: NORMAL

## 2023-01-12 NOTE — TELEPHONE ENCOUNTER
----- Message from RICCARDO Seo sent at 1/12/2023 11:19 AM CST -----  Regarding: cytology  Negative cytology  ----- Message -----  From: Azeb Farrell MA  Sent: 1/10/2023  10:41 AM CST  To: RICCARDO Seo

## 2023-01-20 ENCOUNTER — HOSPITAL ENCOUNTER (OUTPATIENT)
Dept: CT IMAGING | Facility: HOSPITAL | Age: 78
Discharge: HOME | End: 2023-01-20
Admitting: PHYSICIAN ASSISTANT
Payer: MEDICARE

## 2023-01-20 DIAGNOSIS — R31.0 GROSS HEMATURIA: ICD-10-CM

## 2023-01-20 LAB — CREAT BLDA-MCNC: 1 MG/DL (ref 0.6–1.3)

## 2023-01-20 PROCEDURE — 74178 CT ABD&PLV WO CNTR FLWD CNTR: CPT

## 2023-01-20 PROCEDURE — 25010000002 IOPAMIDOL 61 % SOLUTION: Performed by: PHYSICIAN ASSISTANT

## 2023-01-20 PROCEDURE — 82565 ASSAY OF CREATININE: CPT

## 2023-01-20 RX ADMIN — IOPAMIDOL 100 ML: 612 INJECTION, SOLUTION INTRAVENOUS at 09:28

## 2023-01-25 ENCOUNTER — PROCEDURE VISIT (OUTPATIENT)
Dept: UROLOGY | Facility: CLINIC | Age: 78
End: 2023-01-25
Payer: MEDICARE

## 2023-01-25 DIAGNOSIS — R31.0 GROSS HEMATURIA: Primary | ICD-10-CM

## 2023-01-25 LAB
BILIRUB BLD-MCNC: ABNORMAL MG/DL
CLARITY, POC: CLEAR
COLOR UR: YELLOW
GLUCOSE UR STRIP-MCNC: ABNORMAL MG/DL
KETONES UR QL: NEGATIVE
LEUKOCYTE EST, POC: NEGATIVE
NITRITE UR-MCNC: NEGATIVE MG/ML
PH UR: 5.5 [PH] (ref 5–8)
PROT UR STRIP-MCNC: ABNORMAL MG/DL
RBC # UR STRIP: NEGATIVE /UL
SP GR UR: 1.03 (ref 1–1.03)
UROBILINOGEN UR QL: ABNORMAL

## 2023-01-25 PROCEDURE — 52000 CYSTOURETHROSCOPY: CPT | Performed by: UROLOGY

## 2023-01-25 PROCEDURE — 81001 URINALYSIS AUTO W/SCOPE: CPT | Performed by: UROLOGY

## 2023-01-26 NOTE — PROGRESS NOTES
Pre- operative diagnosis:  Hematuria.  CT urogram on 1/20/2023 showed normal upper tracts.    Post operative diagnosis:  BPH    Procedure:  The patient was prepped and draped in a normal sterile fashion.  The urethra was anesthetized with 2% lidocaine jelly.  A flexible cystoscope was introduced per urethra.      Urethra:  Normal    Bladder:  Normal mucosa, No bladder tumor and mild trabeculation.  Prominent prostatic vessels at bladder neck.    Ureteral orifices:  Normal position bilaterally and Clear efflux bilaterally    Prostate:  lateral lobe hypertrophy    Patient tolerated the procedure well    Complications: none    Blood loss: minimal    Follow up:    We discussed likely prostatic source of previous hematuria, now cleared.  Continue finasteride and tamsulosin. Routine follow up-as scheduled next month with Tyrone Broussard.  He does have indwelling nonfunctional 3-piece inflatable penile implant.  We previously discussed option for removal and replacement with a different penile implant.  He states he would prefer to have a malleable penile implant placed and would like to discuss further with his spouse.  He may call back if he is wishing to have his malfunctioning inflatable penile implant removed with replacement of new [employer] Marcia malleable penile implant.  He understands he would need to hold his Eliquis and Plavix 5 days prior.      This document has been signed by JACKIE Ochoa MD on January 25, 2023 19:11 CST

## 2023-02-06 ENCOUNTER — TELEPHONE (OUTPATIENT)
Dept: CARDIOLOGY | Facility: CLINIC | Age: 78
End: 2023-02-06
Payer: MEDICARE

## 2023-02-06 RX ORDER — RANOLAZINE 500 MG/1
TABLET, EXTENDED RELEASE ORAL
Qty: 180 TABLET | Refills: 3 | OUTPATIENT
Start: 2023-02-06

## 2023-02-06 NOTE — TELEPHONE ENCOUNTER
Caller: Darren Maria    Relationship: Self    Best call back number: [phone]    What is the best time to reach you: ANYTIME    What was the call regarding: PATIENT WOULD LIKE TO GET ACUPUNCTURE DONE AND WOULD LIKE TO KNOW IF THIS IS SAFE AND OKAY TO DO. PATIENT WOULD LIKE TESFAYE'S OPINION. HE USED IT YEARS AGO AND IT HELPED HIS SINUS PROBLEMS AND HE WOULD LIKE TO GET IT DONE AGAIN.     Do you require a callback: YES

## 2023-02-16 ENCOUNTER — TELEPHONE (OUTPATIENT)
Dept: CARDIOLOGY | Facility: CLINIC | Age: 78
End: 2023-02-16
Payer: MEDICARE

## 2023-02-16 RX ORDER — SACUBITRIL AND VALSARTAN 49; 51 MG/1; MG/1
1 TABLET, FILM COATED ORAL 2 TIMES DAILY
Qty: 60 TABLET | Refills: 1 | Status: SHIPPED | OUTPATIENT
Start: 2023-02-16 | End: 2023-03-31

## 2023-02-20 NOTE — PROGRESS NOTES
Subjective    Mr. Maria is 77 y.o. male    Chief Complaint: 6 months Follow Up Gross Hematuria    History of Present Illness  Patient is a 77-year-old gentleman with history of gross hematuria he although he has had no hematuria since last seen.  He also has a history of BPH with lower urinary tract symptoms he is on tamsulosin and finasteride which she will continue.  Stated the 25 mg Myrbetriq helped but lately its been not as effective and would like to know if he did increase the dose.  Also he had discussion with Dr. Ochoa regarding removal of his existing penile prosthesis and replacing it with a malleable penile implant.  He is interested but has not made final decision.  When he does he will just contact the office to get a message to Dr. Ochoa to get this scheduled.     The following portions of the patient's history were reviewed and updated as appropriate: allergies, current medications, past family history, past medical history, past social history, past surgical history and problem list.    Review of Systems   Gastrointestinal: Negative.    Genitourinary: Positive for frequency and urgency. Negative for hematuria.   All other systems reviewed and are negative.        Current Outpatient Medications:   •  atorvastatin (LIPITOR) 40 MG tablet, Take 1 tablet by mouth Every Night., Disp: 90 tablet, Rfl: 3  •  carvedilol (COREG) 12.5 MG tablet, Take 1 tablet by mouth 2 (Two) Times a Day., Disp: , Rfl:   •  cetirizine (zyrTEC) 10 MG tablet, Take 1 tablet by mouth Daily., Disp: , Rfl:   •  clopidogrel (PLAVIX) 75 MG tablet, Take 1 tablet by mouth Daily., Disp: , Rfl:   •  cyclobenzaprine (FLEXERIL) 5 MG tablet, Take 1 tablet by mouth 3 (Three) Times a Day., Disp: , Rfl:   •  empagliflozin (JARDIANCE) 10 MG tablet tablet, Take 1 tablet by mouth Every Morning., Disp: 90 tablet, Rfl: 3  •  finasteride (PROSCAR) 5 MG tablet, Take 1 tablet by mouth Daily., Disp: , Rfl:   •  fluticasone (FLONASE) 50 MCG/ACT nasal  spray, 2 sprays into the nostril(s) as directed by provider Daily As Needed., Disp: , Rfl:   •  metFORMIN (GLUCOPHAGE) 500 MG tablet, Take 1 tablet by mouth 2 (Two) Times a Day With Meals. Two tabs daily, Disp: , Rfl:   •  Mirabegron ER (Myrbetriq) 25 MG tablet sustained-release 24 hour 24 hr tablet, Take 1 tablet by mouth Daily for 360 days., Disp: 90 tablet, Rfl: 3  •  nitroglycerin (NITROSTAT) 0.4 MG SL tablet, Place 1 tablet under the tongue Every 5 (Five) Minutes As Needed for Chest Pain. Take no more than 3 doses in 15 minutes., Disp: 25 tablet, Rfl: 2  •  omeprazole (PriLOSEC) 40 MG capsule, Take 1 capsule by mouth Daily., Disp: , Rfl:   •  potassium chloride (K-DUR,KLOR-CON) 20 MEQ CR tablet, Take 1 tablet by mouth Daily. With additional 20 meq if second dose of torsemide taken, Disp: , Rfl:   •  pregabalin (LYRICA) 150 MG capsule, Take 1 capsule by mouth 2 (Two) Times a Day., Disp: , Rfl:   •  ranolazine (RANEXA) 1000 MG 12 hr tablet, Take 1 tablet by mouth 2 (Two) Times a Day., Disp: 180 tablet, Rfl: 3  •  rivaroxaban (Xarelto) 20 MG tablet, Take 1 tablet by mouth Daily. Take 1 tablet by mouth daily with food., Disp: 90 tablet, Rfl: 3  •  sacubitril-valsartan (Entresto) 49-51 MG tablet, Take 1 tablet by mouth 2 (Two) Times a Day., Disp: 60 tablet, Rfl: 1  •  tamsulosin (FLOMAX) 0.4 MG capsule 24 hr capsule, Take 1 capsule by mouth Daily., Disp: , Rfl:   •  torsemide (DEMADEX) 20 MG tablet, Take 1 tablet by mouth Daily As Needed (weight gain >3 lbs in one day or >5 lbs in 2 days). With additional 20 mg PRN, Disp: , Rfl:   •  traMADol-acetaminophen (ULTRACET) 37.5-325 MG per tablet, as needed, Disp: , Rfl: 1  •  zolpidem (AMBIEN) 5 MG tablet, TAKE 1 TABLET BY MOUTH EVERY DAY AT BEDTIME AS NEEDED, Disp: , Rfl: 5  •  Mirabegron ER (Myrbetriq) 50 MG tablet sustained-release 24 hour 24 hr tablet, Take 50 mg by mouth Daily., Disp: 30 tablet, Rfl: 11    Past Medical History:   Diagnosis Date   • Abdominal pain,  epigastric    • Abnormal abdominal exam     ABFND, RADIOLOGICAL, ABDOMINAL AREA    • Anticoagulated on Coumadin    • Atherosclerosis of coronary artery bypass graft     ATHEROSCLEROSIS, CORONARY, ARTERY BYPASS GRFT   • Atrial fibrillation (HCC)    • Cancer of right kidney (HCC)     right sided kidney cancer   • Cardiomyopathy (HCC)    • Cardiomyopathy, primary (HCC)    • CHF (congestive heart failure) (HCC)    • Colon polyps    • Congestive heart failure (HCC)    • Coronary artery disease     History of CAD/A-Fib/Pacemaker/Defibrillator placement: pt takes Coumadin and plavix therapy as well as ASA daily   • Gastric polyp    • Gastroesophageal reflux disease without esophagitis    • History of colon polyps    • Hypercholesterolemia    • Hypertension, essential    • Melena    • Myocardial infarction (HCC)    • NSAID long-term use    • Obesity    • Pacemaker     Pacemaker Dependant   • Platelet inhibition due to Plavix    • Presence of stent in coronary artery     Multiple coronary stenting most recently 4/2008   • Single kidney     Only 1 Kidney   • Status post surgery     s/p Polypectomy Bleed       Past Surgical History:   Procedure Laterality Date   • APPENDECTOMY     • CARDIAC ABLATION     • CARDIAC CATHETERIZATION     • CARDIAC CATHETERIZATION Left 12/15/2021    Procedure: Coronary angiography;  Surgeon: Sarbjit Posadas MD;  Location:  [facility];  Service: Cardiology;  Laterality: Left;   • CARDIAC CATHETERIZATION Left 12/15/2021    Procedure: Percutaneous Coronary Intervention;  Surgeon: Sarbjit Posadas MD;  Location:  [facility];  Service: Cardiology;  Laterality: Left;   • CARDIAC DEFIBRILLATOR PLACEMENT     • CARDIAC PACEMAKER PLACEMENT      Pacemaker Placement   • CATARACT EXTRACTION, BILATERAL     • CHOLECYSTECTOMY     • COLONOSCOPY  08/27/2013    snare hep, trans tics recall 3 yr   • COLONOSCOPY  10/02/2006    few scattered diverticula   • COLONOSCOPY  07/22/2009     "tubular adenoma in the ascending colon   • COLONOSCOPY  07/25/2005    several polyps in the ascending colon, one in the transverse colon   • COLONOSCOPY N/A 3/21/2022    Procedure: COLONOSCOPY WITH ANESTHESIA;  Surgeon: Phil Goodwin MD;  Location:  [facility];  Service: Gastroenterology;  Laterality: N/A;  pre: hx polyps  post: polyps  Rolan Salvador MD   • COLONOSCOPY W/ POLYPECTOMY  09/19/2016    Tubular adenoma hepatic flexure, 2 Hyperplastic polyps rectum and at 50 cm, Benign polyp at 70 cm repeat exam in 3-5 years   • CORONARY ARTERY BYPASS GRAFT  02/2011    2nd time   • CORONARY ARTERY BYPASS GRAFT  1990   • CORONARY STENT PLACEMENT     • ENDOSCOPY  04/14/2016    nl slant   • ENDOSCOPY  11/10/2014    clips at polypectomy bx no residual recall 1 yr   • ENDOSCOPY  06/19/2014    snare clip body recall 6 months   • ENDOSCOPY  05/19/2014    polyp stomach bx and clip   • HERNIA REPAIR      with mesh   • JOINT REPLACEMENT     • OTHER SURGICAL HISTORY      Defibrillator/Pacer maker exchange   • OTHER SURGICAL HISTORY      Pacemaker/Defibillation exchange 2012   • PENILE PROSTHESIS IMPLANT N/A 07/27/2018    Procedure: PENILE PROSTHESIS PLACEMENT;  Surgeon: Godwin Gupta MD;  Location[facility] OR;  Service: Urology   • PERIPHERAL ARTERIAL STENT GRAFT         Social History     Socioeconomic History   • Marital status:    Tobacco Use   • Smoking status: Never   • Smokeless tobacco: Never   Vaping Use   • Vaping Use: Never used   Substance and Sexual Activity   • Alcohol use: Yes     Comment: occ   • Drug use: No   • Sexual activity: Defer       Family History   Problem Relation Age of Onset   • Lung cancer Mother    • No Known Problems Father    • Other Neg Hx         GI Malignancies   • Colon cancer Neg Hx    • Colon polyps Neg Hx        Objective    Temp 97.6 °F (36.4 °C)   Ht 180.3 cm (71\")   Wt 99.8 kg (220 lb)   BMI 30.68 kg/m²     Physical Exam  Vitals reviewed.   Constitutional:      "  Appearance: Normal appearance.   HENT:      Head: Normocephalic and atraumatic.   Pulmonary:      Effort: Pulmonary effort is normal.   Skin:     Coloration: Skin is not pale.   Neurological:      Mental Status: He is alert and oriented to person, place, and time.   Psychiatric:         Mood and Affect: Mood normal.         Behavior: Behavior normal.             Results for orders placed or performed in visit on 02/28/23   POC Urinalysis Dipstick, Multipro    Specimen: Urine   Result Value Ref Range    Color Yellow Yellow, Straw, Dark Yellow, Indigo    Clarity, UA Clear Clear    Glucose, UA 1000 mg/dL (A) Negative mg/dL    Bilirubin Negative Negative    Ketones, UA Negative Negative    Specific Gravity  1.030 1.005 - 1.030    Blood, UA Negative Negative    pH, Urine 5.0 5.0 - 8.0    Protein, POC Negative Negative mg/dL    Urobilinogen, UA Normal Normal, 0.2 E.U./dL    Nitrite, UA Negative Negative    Leukocytes Negative Negative     IPSS Questionnaire (AUA-7):  Incomplete emptying  Over the past month, how often have you had a sensation of not emptying your bladder completely after you finish?: About half the time (02/28/23 1007)  Frequency  Over the past month, how often have you had to urinate again less than two hours after you finishing urinating ?: About half the time (02/28/23 1007)  Intermittency  Over the past month, how often have you found you stopped and started again several time when you urinated ?: Almost always (02/28/23 1007)  Urgency  Over the last month, how difficult  have you found it to postpone urination ?: Less than half the time (02/28/23 1007)  Weak Stream  Over the past month, how often have you had a weak urinary stream ?: Less than half the time (02/28/23 1007)  Straining  Over the past month, how often have you had to push or strain to begin urination ?: Less than 1 time 5 (02/28/23 1007)  Nocturia  Over the past month, how many times did you most typically get up to urinate from the  time you went to bed until the time you got up in the morning ?: Less than half the time (02/28/23 1007)  Quality of life due to urinary symptoms  If you were to spend the rest of your life with your urinary condition the way it is now, how would feel about that?: Mostly Satisfied (02/28/23 1007)    Scores  Total IPSS Score: 17 (02/28/23 1007)  Total Score = Symtomatic Level: moderately symptomatic: 8-19 (02/28/23 1007)       Assessment and Plan    Diagnoses and all orders for this visit:    1. Gross hematuria (Primary)  -     POC Urinalysis Dipstick, Multipro    2. Urinary incontinence, nocturnal enuresis  -     Mirabegron ER (Myrbetriq) 50 MG tablet sustained-release 24 hour 24 hr tablet; Take 50 mg by mouth Daily.  Dispense: 30 tablet; Refill: 11    Patient was having good response to 25 mg Myrbetriq however he has increasing worsening voiding symptoms and incontinence I went ahead and prescribed him the 50 mg Myrbetriq which should be effective for him.    No further episodes of gross hematuria he had a cystoscopy done by Dr. Ochoa that showed no obvious source of the hematuria most likely prostate in origin.

## 2023-02-28 ENCOUNTER — OFFICE VISIT (OUTPATIENT)
Dept: UROLOGY | Facility: CLINIC | Age: 78
End: 2023-02-28
Payer: MEDICARE

## 2023-02-28 VITALS — TEMPERATURE: 97.6 F | WEIGHT: 220 LBS | BODY MASS INDEX: 30.8 KG/M2 | HEIGHT: 71 IN

## 2023-02-28 DIAGNOSIS — R31.0 GROSS HEMATURIA: Primary | ICD-10-CM

## 2023-02-28 DIAGNOSIS — N39.44 URINARY INCONTINENCE, NOCTURNAL ENURESIS: ICD-10-CM

## 2023-02-28 LAB
BILIRUB BLD-MCNC: NEGATIVE MG/DL
CLARITY, POC: CLEAR
COLOR UR: YELLOW
GLUCOSE UR STRIP-MCNC: ABNORMAL MG/DL
KETONES UR QL: NEGATIVE
LEUKOCYTE EST, POC: NEGATIVE
NITRITE UR-MCNC: NEGATIVE MG/ML
PH UR: 5 [PH] (ref 5–8)
PROT UR STRIP-MCNC: NEGATIVE MG/DL
RBC # UR STRIP: NEGATIVE /UL
SP GR UR: 1.03 (ref 1–1.03)
UROBILINOGEN UR QL: NORMAL

## 2023-02-28 PROCEDURE — 99213 OFFICE O/P EST LOW 20 MIN: CPT | Performed by: PHYSICIAN ASSISTANT

## 2023-02-28 PROCEDURE — 81001 URINALYSIS AUTO W/SCOPE: CPT | Performed by: PHYSICIAN ASSISTANT

## 2023-02-28 RX ORDER — CYCLOBENZAPRINE HCL 5 MG
1 TABLET ORAL 3 TIMES DAILY
COMMUNITY
Start: 2023-02-06

## 2023-03-09 ENCOUNTER — TRANSCRIBE ORDERS (OUTPATIENT)
Dept: ADMINISTRATIVE | Facility: HOSPITAL | Age: 78
End: 2023-03-09
Payer: MEDICARE

## 2023-03-09 ENCOUNTER — HOSPITAL ENCOUNTER (OUTPATIENT)
Dept: GENERAL RADIOLOGY | Facility: HOSPITAL | Age: 78
Discharge: HOME | End: 2023-03-09
Admitting: PAIN MEDICINE
Payer: MEDICARE

## 2023-03-09 DIAGNOSIS — M54.16 LUMBAR RADICULOPATHY: ICD-10-CM

## 2023-03-09 DIAGNOSIS — G89.29 OTHER CHRONIC PAIN: ICD-10-CM

## 2023-03-09 DIAGNOSIS — M54.2 CERVICALGIA: ICD-10-CM

## 2023-03-09 DIAGNOSIS — M54.16 LUMBAR RADICULOPATHY: Primary | ICD-10-CM

## 2023-03-09 PROCEDURE — 72050 X-RAY EXAM NECK SPINE 4/5VWS: CPT

## 2023-03-29 ENCOUNTER — TRANSCRIBE ORDERS (OUTPATIENT)
Dept: ADMINISTRATIVE | Facility: HOSPITAL | Age: 78
End: 2023-03-29
Payer: MEDICARE

## 2023-03-29 DIAGNOSIS — M47.812 CERVICAL SPONDYLOSIS WITHOUT MYELOPATHY: Primary | ICD-10-CM

## 2023-03-30 ENCOUNTER — OFFICE VISIT (OUTPATIENT)
Dept: CARDIOLOGY | Facility: CLINIC | Age: 78
End: 2023-03-30
Payer: MEDICARE

## 2023-03-30 VITALS
HEIGHT: 71 IN | HEART RATE: 75 BPM | SYSTOLIC BLOOD PRESSURE: 126 MMHG | OXYGEN SATURATION: 97 % | WEIGHT: 224 LBS | DIASTOLIC BLOOD PRESSURE: 68 MMHG | BODY MASS INDEX: 31.36 KG/M2

## 2023-03-30 DIAGNOSIS — I10 ESSENTIAL HYPERTENSION: ICD-10-CM

## 2023-03-30 DIAGNOSIS — I48.21 PERMANENT ATRIAL FIBRILLATION: ICD-10-CM

## 2023-03-30 DIAGNOSIS — Z95.810 PRESENCE OF BIVENTRICULAR AICD: ICD-10-CM

## 2023-03-30 DIAGNOSIS — I50.42 CHRONIC COMBINED SYSTOLIC AND DIASTOLIC CONGESTIVE HEART FAILURE: Primary | ICD-10-CM

## 2023-03-30 DIAGNOSIS — I25.708 CORONARY ARTERY DISEASE OF BYPASS GRAFT OF NATIVE HEART WITH STABLE ANGINA PECTORIS: ICD-10-CM

## 2023-03-30 RX ORDER — DEXTROMETHORPHAN HYDROBROMIDE AND PROMETHAZINE HYDROCHLORIDE 15; 6.25 MG/5ML; MG/5ML
SYRUP ORAL AS NEEDED
COMMUNITY
Start: 2023-03-24

## 2023-03-30 NOTE — PROGRESS NOTES
Subjective:     Encounter date: 03/30/23      Patient ID: Darren Maria is a 77 y.o. male.    Chief Complaint: lightheadedness,  f/u CHF/CAD/AF    History of Present Illness     77-year-old today returns for follow-up.  He has coronary disease including prior CABG and subsequent PCI, chronic systolic heart failure, paroxysmal atrial fibrillation, status post AV node ablation with biventricular pacemaker placed, subsequently upgraded to BiV ICD.  We have attempted to ameliorate his symptoms with antianginal therapies and improving heart failure therapies since we met initially in 11/4/2021    Due to worsening symptoms despite medical therapy, I did proceed with cardiac catheterization 12/15/2021; see full results and procedural note.  In short, there did not appear to be any culprit lesions in need of revascularization, though I could not locate the SVG->OM branch at his last operation at [facility] in 2011 or 2012. Otherwise, his anatomy was consistent with previous reports -patent PEREZ to LAD, patent SVG to RCA, and a known occlusion of an SVG arising from the ascending aorta to an OM branch.  Left ventriculogram showed EF 25 to 30%, but normal LVEDP.  Ranexa was increased for symptom benefit, and follow-up, he did report his chest tightness had improved significantly.    Subsequent visits have primarily revolved around orthostatic dizziness and try to reduce medications to minimize these.    When last seen here December 2022, systolic pressure was in the low 100s and his lightheadedness was improved.  No changes were made in his medication regimen that day.  We later received phone calls from the patient stating that his blood pressures been running low again and he was lightheaded again, so it was advised to reduce his Entresto from maximum dose to the moderate dose.    Today, he reports the dizziness better and bp are better.  sbp ranges 93-120s.  dizzness not occur if sbp 100 or more  Recent averages  upper 90s/low 100s  Has not needed torsemide in three weeks - going by weights.  When bloated/swollen his BP runs lower, then he takes torsemide and bp seems to impove.       Today's weight at home was 218; a few days ago was 214        =======================================================================  RELEVANT CARDIAC HX:    The patient has a longstanding complicated cardiac history dating back to initial myocardial infarction in 1989.  I have copied an outline of his cardiac history documented per Dr. Bernal below-see for full details.  In short, the patient has a history of CAD ( status post three-vessel CABG in 1990, followed by PCI (multiple prior stents to LCX) and left thoracotomy CABG x1 around 2011/2012), chronic systolic CHF/ischemic cardiomyopathy now with biventricular AICD in place followed by Dr. Mccarty ,  atrial fibrillation anticoagulated with warfarin, and prior AV node ablation (pacemaker was placed piror to eventual upgrade BIV AICD).       3/17/2021 Documentation per Dr. Bernal at [facility]:    Problem List:  1. Coronary atherosclerotic heart disease.  ---a. Status post remote myocardial infarction 1989.  ---b. Status post CABG ([facility]) 1990 with LIMA to LAD, SVG to PD, SVG to OM.  ---c. Chronic atrial fibrillation with subsequent permanent pacemaker implantation 1999 and prior RFA AVN  ---d. Chronic left ventricular dysfunction  ---e. Status post biventricular ICD 2005 ([facility])  ---f. Cardiac catheterization 01/06 with LVEF 30%, severe native vessel coronary disease, patent LIMA to LAD, patent SVG to PD of RCA,  of SVG to OM with subsequent PTCA/stenting of native OM with 2.5mm x 23mm LEILANI.  ---g. [facility] with patent LIMA to LAD, patent SVG to right PDA, occluded SVG to OM with repeat PCI/stenting with 2.5 mm x 23 mm stent  ---h. Adenosine dual isotope study 04/07 with LVEF 53%,  inferolateral scar with no ischemia.  ---i. BNP level < 100 pg/ml 04/07 ; 101 pg/ml 06/08  ---k. Repeat cardiac catheterization 04/08 with LVEF 30%, patent LIMA to LAD and SVG to RCA,  of SVG to LCX, severe focal denovo stenosis of previously twice-stented native LCX with [facility] with third 2.5mm x 23mm Cypher LEILANI  ---l. Pulse generator replacement 04/28/08 ([facility]) Medtronic Concerto [id number] (serial # [id number]); pulse generator replacement 11/19  ---m. Recurrent angina 04/08, repeat limited native LCA angiogram 04/08 with focal edge dissection and instent restenosis, status post additional stenting ([facility]) with 3 additional [place] stents  ---n. Recurrent angina 12/08 with repeat catheterization confirming LVEF 40%, patent LIMA to LAD, patent SVG to RCA, patent LCx stent site; medical therapy  ---o. TTE 03/10 with LVEF 30-35%, no significant valvular dysfunction, moderate LAE and LVE; repeat 12/10 uncjhanged except LVEWF improved to 35-40%  ---p. BNP 03/10 259 pg/ml > repeat 12/10 165 pg/ml > repeat 06/12 70 pg/ml > repeat 10/12 200 pg/ml>repeat 11/17 154 pg/ml  ---q. Adenosine radionuclide stress test 03/10 and 12/10 with fixed shannan-apical scar, no ischemia, LVEF 40% rising to 50% with stress; medical therapy  ---r. ACS 03/11 with cardiac catheterization revealing patent LIMA to LAD, patent SVG to PD and PL of RCA, severe ISR of previously multiply (x 4 procedures/6 stents) stented distal LM/proximal LCX-OM  ---s. Status post limited left thoracotomy CABG x 1 with SVG from descending Ao to OM, CRISTOBAL ligation ([facility]) - ?[id number]  ---t. TTE 11/11 and 09/12 with LVEF 40-45%, no significant valvular dysfunction  ---u. Recurrent sx 06/12 with repeat cardiac catheterization with all grafts patent, no culprit lesion in need of PCI; medical therapy  ---v. Status post pulse generator replacement 07/12 and 01/16 ([facility] Mccarty)  ---w.  Pharmacologic nuclear stress test 08/13 with LVEF approx 40%, predominant fixed shannan-apical defect, no interval change since prior studies; expectant medical mgmt; repeat 10/15 with no ischemia  ---x. Pharmacologic radionuclide stress test 10/16 with LVEF 30%, fixed anterior defect, no ischemia  ---y. TTE 10/16 with LVEF 45%, no significant valvular dysfunction  ---z. Pharmacologic radionuclide stress test 11/17 with LVEF 40-45%, fixed anteroapical scar, no significant reversible ischemia  ---aa. TTE 02/13/19 with LVEF est 30%, LAE 6cm, LVEDD 6.2 cm, segemental WMAs, no significant valvular dysfunction; est RVSP 40 mmHg.  ---bb. BNP level 2/20 274 pg/ml => 02/21 170 pg/ml  ---cc. TTE 03/21 with LVEF 25%, no significant valvular dysfunction  2. Hyperlipidemia, on statin therapy  3. Renal cell carcinoma with right nephrectomy 2000, no recurrence.  4. Status post remote cholecystectomy with subsequent ventral hernia and ventral herniorrhaphy.  5. Chronic coumadin anticoagulation (indication: atrial fibrillation, INR goal 2-3).  6. Erectile dysfunction  7. Mild chronic renal insufficiency, serum creatinine 1.6 mg% 04/08  8. DJD, status post left TKR 10/15    Electronically signed by Christopher Bernal III, MD at 03/17/2021 8:30 AM CDT   ===========================================================================================  The following portions of the patient's history were reviewed and updated as appropriate: allergies, current medications, past family history, past medical history, past social history, past surgical history and problem list.    Review of Systems   Constitutional: Positive for malaise/fatigue.   Cardiovascular: Positive for dyspnea on exertion (stable ). Negative for chest pain, claudication, leg swelling, near-syncope, orthopnea, palpitations, paroxysmal nocturnal dyspnea and syncope.   Respiratory: Negative for cough.    Hematologic/Lymphatic: Does not bruise/bleed easily.   Musculoskeletal:  "Negative for falls.   Gastrointestinal: Negative for bloating.   Neurological: Positive for light-headedness (improved ). Negative for dizziness, loss of balance and weakness.       Allergies   Allergen Reactions   • Azithromycin Nausea And Vomiting   • Morphine And Related Other (See Comments)     SHAKES AND MAKES HIM \"WIRED\".   TAKES TRAMADOL WITHOUT PROBLEM       Current Outpatient Medications:   •  atorvastatin (LIPITOR) 40 MG tablet, Take 1 tablet by mouth Every Night., Disp: 90 tablet, Rfl: 3  •  carvedilol (COREG) 12.5 MG tablet, Take 1 tablet by mouth 2 (Two) Times a Day., Disp: , Rfl:   •  cetirizine (zyrTEC) 10 MG tablet, Take 1 tablet by mouth Daily., Disp: , Rfl:   •  clopidogrel (PLAVIX) 75 MG tablet, Take 1 tablet by mouth Daily., Disp: , Rfl:   •  cyclobenzaprine (FLEXERIL) 5 MG tablet, Take 1 tablet by mouth 3 (Three) Times a Day., Disp: , Rfl:   •  empagliflozin (JARDIANCE) 10 MG tablet tablet, Take 1 tablet by mouth Every Morning., Disp: 90 tablet, Rfl: 3  •  finasteride (PROSCAR) 5 MG tablet, Take 1 tablet by mouth Daily., Disp: , Rfl:   •  fluticasone (FLONASE) 50 MCG/ACT nasal spray, 2 sprays into the nostril(s) as directed by provider Daily As Needed., Disp: , Rfl:   •  metFORMIN (GLUCOPHAGE) 500 MG tablet, Take 1 tablet by mouth 2 (Two) Times a Day With Meals. Two tabs daily, Disp: , Rfl:   •  Mirabegron ER (Myrbetriq) 50 MG tablet sustained-release 24 hour 24 hr tablet, Take 50 mg by mouth Daily., Disp: 30 tablet, Rfl: 11  •  nitroglycerin (NITROSTAT) 0.4 MG SL tablet, Place 1 tablet under the tongue Every 5 (Five) Minutes As Needed for Chest Pain. Take no more than 3 doses in 15 minutes., Disp: 25 tablet, Rfl: 2  •  omeprazole (PriLOSEC) 40 MG capsule, Take 1 capsule by mouth Daily., Disp: , Rfl:   •  potassium chloride (K-DUR,KLOR-CON) 20 MEQ CR tablet, Take 1 tablet by mouth Daily. With additional 20 meq if second dose of torsemide taken, Disp: , Rfl:   •  pregabalin (LYRICA) 150 MG " "capsule, Take 1 capsule by mouth 2 (Two) Times a Day., Disp: , Rfl:   •  promethazine-dextromethorphan (PROMETHAZINE-DM) 6.25-15 MG/5ML syrup, As Needed., Disp: , Rfl:   •  ranolazine (RANEXA) 1000 MG 12 hr tablet, Take 1 tablet by mouth 2 (Two) Times a Day., Disp: 180 tablet, Rfl: 3  •  rivaroxaban (Xarelto) 20 MG tablet, Take 1 tablet by mouth Daily. Take 1 tablet by mouth daily with food., Disp: 90 tablet, Rfl: 3  •  sacubitril-valsartan (Entresto) 49-51 MG tablet, Take 1 tablet by mouth 2 (Two) Times a Day., Disp: 60 tablet, Rfl: 1  •  tamsulosin (FLOMAX) 0.4 MG capsule 24 hr capsule, Take 1 capsule by mouth Daily., Disp: , Rfl:   •  torsemide (DEMADEX) 20 MG tablet, Take 1 tablet by mouth Daily As Needed (weight gain >3 lbs in one day or >5 lbs in 2 days). With additional 20 mg PRN, Disp: , Rfl:   •  traMADol-acetaminophen (ULTRACET) 37.5-325 MG per tablet, as needed, Disp: , Rfl: 1  •  zolpidem (AMBIEN) 5 MG tablet, TAKE 1 TABLET BY MOUTH EVERY DAY AT BEDTIME AS NEEDED, Disp: , Rfl: 5         Objective:    /68   Pulse 75   Ht 180.3 cm (71\")   Wt 102 kg (224 lb)   SpO2 97%   BMI 31.24 kg/m²        Vitals and nursing note reviewed.   Constitutional:       General: Not in acute distress.     Appearance: Not in distress.   Neck:      Vascular: No JVD or JVR. JVD normal.   Pulmonary:      Effort: Pulmonary effort is normal.      Breath sounds: Normal breath sounds.   Cardiovascular:      Normal rate. Regular rhythm.      Murmurs: There is no murmur.   Edema:     Peripheral edema absent.   Skin:     General: Skin is warm and dry.   Neurological:      Mental Status: Alert, oriented to person, place, and time and oriented to person, place and time.         Lab Review:    Lab Results   Component Value Date    GLUCOSE 132 (H) 12/22/2022    BUN 13 12/22/2022    CREATININE 1.00 01/20/2023    EGFRIFNONA 51 (L) 12/28/2021    EGFRIFAFRI 94 03/13/2017    BCR 15.3 12/22/2022    K 3.8 12/22/2022    CO2 25.0 " 12/22/2022    CALCIUM 9.0 12/22/2022    ALBUMIN 3.70 12/22/2022    AST 15 12/22/2022    ALT 11 12/22/2022     Lab Results   Component Value Date    CHOL 122 11/12/2021    CHLPL 136 (L) 03/13/2018    TRIG 108 11/12/2021    HDL 36 (L) 11/12/2021    LDL 66 11/12/2021     Lab Results   Component Value Date    HGBA1C 6.3 09/25/2018     Lab Results   Component Value Date    WBC 5.71 12/22/2022    HGB 12.0 (L) 12/22/2022    HCT 37.1 (L) 12/22/2022    .9 (H) 12/22/2022     12/22/2022                     ECG 12 Lead    Date/Time: 3/30/2023 5:16 PM  Performed by: Sarbjit Posadas MD  Authorized by: Sarbjit Posadas MD   Comparison: compared with previous ECG from 12/15/2022  Similar to previous ECG  Rhythm: paced  BPM: 75  Pacing: ventricular paced rhythm  Clinical impression: abnormal EKG                     Results for orders placed during the hospital encounter of 10/28/21    Adult Transthoracic Echo Complete W/ Cont if Necessary Per Protocol    Interpretation Summary  · Left ventricular ejection fraction appears to be 26 - 30%. Left ventricular systolic function is moderately decreased.  · The left ventricular cavity is moderately dilated (LVIDd 6.4cm)  · The following left ventricular wall segments are akinetic: apical anterior, apical lateral, apical inferior, apical septal and apex.  · The following left ventricular wall segments are hypokinetic: mid anterior, apical anterior, basal anterolateral, mid anterolateral, apical lateral, basal inferolateral, mid inferolateral, mid inferior, basal inferoseptal, mid inferoseptal, basal anterior, basal inferior and basal inferoseptal. The following left ventricular wall segments are akinetic: apical inferior, apical septal, apex and mid anteroseptal.  · Mild-moderate mitral regurgitation (functional).  · Left atrial volume is severely increased.  · Estimated right ventricular systolic pressure from tricuspid regurgitation is moderately elevated (45-55 mmHg).  ·  Compared to last exam from 2/13/2019, LV function appears slightly worse.      Assessment:      Problem List Items Addressed This Visit        Cardiac and Vasculature    Coronary artery disease of bypass graft of native heart with stable angina pectoris (HCC)    Overview     3v CABG ([facility]) in 1990: LIMA to LAD, SVG to PD, SVG to OM  2006: other grafts patent but found to have  of SVG to OM; underwent PTCA/stenting of native OM with 2.5mm x 23mm LEILANI.  4/2008: only change was severe focal denovo stenosis of previously twice-stented native LCX with [facility] with third 2.5mm x 23mm Cypher LEILANI  ---l. Pulse generator replacement 04/28/08 ([facility]) Medtronic Concerto [id number] (serial # [id number]); pulse generator replacement 11/19  ---m. Recurrent angina 04/08, repeat limited native LCA angiogram 04/08 with focal edge dissection and instent restenosis, status post additional stenting ([facility]) with 3 additional [place] stents  ---n. Recurrent angina 12/08 with repeat catheterization confirming LVEF 40%, patent LIMA to LAD, patent SVG to RCA, patent LCx stent site; medical therapy  ---o. TTE 03/10 with LVEF 30-35%, no significant valvular dysfunction, moderate LAE and LVE; repeat 12/10 uncjhanged except LVEWF improved to 35-40%  ---p. BNP 03/10 259 pg/ml > repeat 12/10 165 pg/ml > repeat 06/12 70 pg/ml > repeat 10/12 200 pg/ml>repeat 11/17 154 pg/ml  ---q. Adenosine radionuclide stress test 03/10 and 12/10 with fixed shannan-apical scar, no ischemia, LVEF 40% rising to 50% with stress; medical therapy  ---r. ACS 03/11 with cardiac catheterization revealing patent LIMA to LAD, patent SVG to PD and PL of RCA, severe ISR of previously multiply (x 4 procedures/6 stents) stented distal LM/proximal LCX-OM    Redo CABG via lateral thoracotomy at [facility] ([facility], 3/18/11) with SVG from descending Ao (entered through 5th intercostal space) to OM.  Op note  scanned in under 'media' under date of procedure         Essential hypertension    Presence of biventricular AICD    Permanent atrial fibrillation (HCC)    Chronic combined systolic and diastolic congestive heart failure (HCC) - Primary       Plan:     1. Chronic systolic CHF/ischemic cardiomyopathy: Stage C, NYHA II: Stable, compensated/euvolemic.  Has BiV-ICD, on appropriate therapy  -Continue Entresto 49/51 bid (had to be reduced from max dose d/t orthostatic dizziness)  -continue jardiance   -Continue Coreg 12.5 mg twice daily (previously reduced from 25 twice daily to help orthostatic hypotension)  -Hold off on adding Aldactone due to occasional (albeit improved) orthostasis)  -Continue torsemide 20 mg on as needed basis only for weight gain of greater than 2 lbs overnight or 5 lbs in 2 days, increased dyspnea/orthopnea/PND, or significant edema.        2.  Coronary artery disease: With improved chronic stable angina on max dose ranexa, and Coreg. Previously intolerant to imdur.   No culprit lesions noted on 12/2021 catheterization that would be targets for intervention.   -Continue Plavix (take ASA in it's place if ever held for surgeries or procedures)  -Continue Coreg  -As needed sublingual nitroglycerin-has not had to use  -Continue atorvastatin 40 mg nightly and maintain goal LDL <70; LDL 66  -continue Ranexa 1000 mg twice daily for antianginal benefit    3.  Permanent atrial fibrillation: Biventricular paced and rate controlled. Stable/asymptomatic  -Continue Xarelto 20 mg daily with food for CVA prophylaxis (he transitioned from Eliquis to Xarelto due to insurance coverage reasons) -> okay to hold if needed for medically necessary procedures, but is noted #2 above, will need to take aspirin 81 mg daily if that occurs  -Continue Coreg for rate control.    4.Orthostatic dizziness/hypotension: still present, though to a lesser degree.  No changes in therapy recommended today.    5.  Mixed hyperlipidemia: as  per above.  Goal LDL less than 70.  Continue high intensity statin.    Follow-up in 6 months, sooner with new or worsening symptoms    Electronically signed by Sarbjit Posadas MD, 03/30/23, 11:07 PM CDT.

## 2023-03-31 RX ORDER — SACUBITRIL AND VALSARTAN 49; 51 MG/1; MG/1
TABLET, FILM COATED ORAL
Qty: 60 TABLET | Refills: 11 | Status: SHIPPED | OUTPATIENT
Start: 2023-03-31

## 2023-04-07 RX ORDER — EMPAGLIFLOZIN 10 MG/1
TABLET, FILM COATED ORAL
Qty: 90 TABLET | Refills: 3 | Status: SHIPPED | OUTPATIENT
Start: 2023-04-07

## 2023-04-17 ENCOUNTER — HOSPITAL ENCOUNTER (OUTPATIENT)
Dept: CT IMAGING | Facility: HOSPITAL | Age: 78
Discharge: HOME | End: 2023-04-17
Admitting: NURSE PRACTITIONER
Payer: MEDICARE

## 2023-04-17 DIAGNOSIS — M47.812 CERVICAL SPONDYLOSIS WITHOUT MYELOPATHY: ICD-10-CM

## 2023-04-17 PROCEDURE — 72125 CT NECK SPINE W/O DYE: CPT

## 2023-05-27 ENCOUNTER — APPOINTMENT (OUTPATIENT)
Dept: GENERAL RADIOLOGY | Facility: HOSPITAL | Age: 78
DRG: 309 | End: 2023-05-27
Payer: MEDICARE

## 2023-05-27 ENCOUNTER — APPOINTMENT (OUTPATIENT)
Dept: CARDIOLOGY | Facility: HOSPITAL | Age: 78
DRG: 309 | End: 2023-05-27
Payer: MEDICARE

## 2023-05-27 ENCOUNTER — HOSPITAL ENCOUNTER (INPATIENT)
Facility: HOSPITAL | Age: 78
LOS: 1 days | Discharge: HOME | DRG: 309 | End: 2023-05-28
Attending: STUDENT IN AN ORGANIZED HEALTH CARE EDUCATION/TRAINING PROGRAM | Admitting: INTERNAL MEDICINE
Payer: MEDICARE

## 2023-05-27 DIAGNOSIS — N17.9 ACUTE KIDNEY INJURY: ICD-10-CM

## 2023-05-27 DIAGNOSIS — I49.9 SHOCKABLE CARDIAC RHYTHM DETECTED BY AUTOMATED EXTERNAL DEFIBRILLATOR: Primary | ICD-10-CM

## 2023-05-27 DIAGNOSIS — I95.9 HYPOTENSION, UNSPECIFIED HYPOTENSION TYPE: ICD-10-CM

## 2023-05-27 DIAGNOSIS — R77.8 ELEVATED TROPONIN LEVEL: ICD-10-CM

## 2023-05-27 DIAGNOSIS — R60.9 PITTING EDEMA: ICD-10-CM

## 2023-05-27 DIAGNOSIS — I47.20 VENTRICULAR TACHYCARDIA: ICD-10-CM

## 2023-05-27 DIAGNOSIS — R07.9 ACUTE CHEST PAIN: ICD-10-CM

## 2023-05-27 DIAGNOSIS — R06.02 SHORTNESS OF BREATH: ICD-10-CM

## 2023-05-27 LAB
ALBUMIN SERPL-MCNC: 3.8 G/DL (ref 3.5–5.2)
ALBUMIN/GLOB SERPL: 1.9 G/DL
ALP SERPL-CCNC: 57 U/L (ref 39–117)
ALT SERPL W P-5'-P-CCNC: 16 U/L (ref 1–41)
ANION GAP SERPL CALCULATED.3IONS-SCNC: 11 MMOL/L (ref 5–15)
AST SERPL-CCNC: 25 U/L (ref 1–40)
BASOPHILS # BLD AUTO: 0.03 10*3/MM3 (ref 0–0.2)
BASOPHILS NFR BLD AUTO: 0.5 % (ref 0–1.5)
BH CV ECHO MEAS - AO MAX PG: 5.1 MMHG
BH CV ECHO MEAS - AO MEAN PG: 2.7 MMHG
BH CV ECHO MEAS - AO ROOT DIAM: 3.7 CM
BH CV ECHO MEAS - AO V2 MAX: 113 CM/SEC
BH CV ECHO MEAS - AO V2 VTI: 19.8 CM
BH CV ECHO MEAS - AVA(I,D): 2.9 CM2
BH CV ECHO MEAS - EDV(CUBED): 208.5 ML
BH CV ECHO MEAS - EDV(MOD-SP2): 250 ML
BH CV ECHO MEAS - EDV(MOD-SP4): 249 ML
BH CV ECHO MEAS - EF(MOD-BP): 36.4 %
BH CV ECHO MEAS - EF(MOD-SP2): 34 %
BH CV ECHO MEAS - EF(MOD-SP4): 41 %
BH CV ECHO MEAS - ESV(CUBED): 109.2 ML
BH CV ECHO MEAS - ESV(MOD-SP2): 165 ML
BH CV ECHO MEAS - ESV(MOD-SP4): 147 ML
BH CV ECHO MEAS - FS: 19.4 %
BH CV ECHO MEAS - IVS/LVPW: 0.88 CM
BH CV ECHO MEAS - IVSD: 1.22 CM
BH CV ECHO MEAS - LA DIMENSION: 5.9 CM
BH CV ECHO MEAS - LAT PEAK E' VEL: 11.9 CM/SEC
BH CV ECHO MEAS - LV DIASTOLIC VOL/BSA (35-75): 113.4 CM2
BH CV ECHO MEAS - LV MASS(C)D: 345.3 GRAMS
BH CV ECHO MEAS - LV MAX PG: 4.1 MMHG
BH CV ECHO MEAS - LV MEAN PG: 2 MMHG
BH CV ECHO MEAS - LV SYSTOLIC VOL/BSA (12-30): 66.9 CM2
BH CV ECHO MEAS - LV V1 MAX: 100.9 CM/SEC
BH CV ECHO MEAS - LV V1 VTI: 20.5 CM
BH CV ECHO MEAS - LVIDD: 5.9 CM
BH CV ECHO MEAS - LVIDS: 4.8 CM
BH CV ECHO MEAS - LVOT AREA: 2.8 CM2
BH CV ECHO MEAS - LVOT DIAM: 1.9 CM
BH CV ECHO MEAS - LVPWD: 1.39 CM
BH CV ECHO MEAS - MED PEAK E' VEL: 4.1 CM/SEC
BH CV ECHO MEAS - MR MAX PG: 65.6 MMHG
BH CV ECHO MEAS - MR MAX VEL: 405 CM/SEC
BH CV ECHO MEAS - MR MEAN PG: 45 MMHG
BH CV ECHO MEAS - MR MEAN VEL: 323 CM/SEC
BH CV ECHO MEAS - MR VTI: 144 CM
BH CV ECHO MEAS - MV E MAX VEL: 97.3 CM/SEC
BH CV ECHO MEAS - MV MAX PG: 4.2 MMHG
BH CV ECHO MEAS - MV MEAN PG: 2 MMHG
BH CV ECHO MEAS - MV V2 VTI: 25.7 CM
BH CV ECHO MEAS - MVA(VTI): 2.26 CM2
BH CV ECHO MEAS - PA V2 MAX: 103.9 CM/SEC
BH CV ECHO MEAS - PI END-D VEL: 144 CM/SEC
BH CV ECHO MEAS - RAP SYSTOLE: 5 MMHG
BH CV ECHO MEAS - RVSP: 35.5 MMHG
BH CV ECHO MEAS - SI(MOD-SP2): 38.7 ML/M2
BH CV ECHO MEAS - SI(MOD-SP4): 46.4 ML/M2
BH CV ECHO MEAS - SV(LVOT): 58.1 ML
BH CV ECHO MEAS - SV(MOD-SP2): 85 ML
BH CV ECHO MEAS - SV(MOD-SP4): 102 ML
BH CV ECHO MEAS - TAPSE (>1.6): 1.19 CM
BH CV ECHO MEAS - TR MAX PG: 30.5 MMHG
BH CV ECHO MEAS - TR MAX VEL: 276 CM/SEC
BH CV ECHO MEASUREMENTS AVERAGE E/E' RATIO: 12.16
BH CV VAS BP LEFT ARM: NORMAL MMHG
BH CV XLRA - TDI S': 7.9 CM/SEC
BILIRUB SERPL-MCNC: 0.4 MG/DL (ref 0–1.2)
BUN SERPL-MCNC: 19 MG/DL (ref 8–23)
BUN/CREAT SERPL: 13.8 (ref 7–25)
CALCIUM SPEC-SCNC: 8.9 MG/DL (ref 8.6–10.5)
CHLORIDE SERPL-SCNC: 107 MMOL/L (ref 98–107)
CO2 SERPL-SCNC: 22 MMOL/L (ref 22–29)
CREAT SERPL-MCNC: 1.38 MG/DL (ref 0.76–1.27)
D DIMER PPP FEU-MCNC: 0.49 MCGFEU/ML (ref 0–0.76)
DEPRECATED RDW RBC AUTO: 62.3 FL (ref 37–54)
EGFRCR SERPLBLD CKD-EPI 2021: 53 ML/MIN/1.73
EOSINOPHIL # BLD AUTO: 0.21 10*3/MM3 (ref 0–0.4)
EOSINOPHIL NFR BLD AUTO: 3.3 % (ref 0.3–6.2)
ERYTHROCYTE [DISTWIDTH] IN BLOOD BY AUTOMATED COUNT: 17.1 % (ref 12.3–15.4)
GEN 5 2HR TROPONIN T REFLEX: 105 NG/L
GLOBULIN UR ELPH-MCNC: 2 GM/DL
GLUCOSE SERPL-MCNC: 210 MG/DL (ref 65–99)
HCT VFR BLD AUTO: 35.5 % (ref 37.5–51)
HGB BLD-MCNC: 11 G/DL (ref 13–17.7)
HOLD SPECIMEN: NORMAL
HOLD SPECIMEN: NORMAL
IMM GRANULOCYTES # BLD AUTO: 0.03 10*3/MM3 (ref 0–0.05)
IMM GRANULOCYTES NFR BLD AUTO: 0.5 % (ref 0–0.5)
LEFT ATRIUM VOLUME INDEX: 74.5 ML/M2
LYMPHOCYTES # BLD AUTO: 0.85 10*3/MM3 (ref 0.7–3.1)
LYMPHOCYTES NFR BLD AUTO: 13.2 % (ref 19.6–45.3)
MCH RBC QN AUTO: 31 PG (ref 26.6–33)
MCHC RBC AUTO-ENTMCNC: 31 G/DL (ref 31.5–35.7)
MCV RBC AUTO: 100 FL (ref 79–97)
MONOCYTES # BLD AUTO: 0.65 10*3/MM3 (ref 0.1–0.9)
MONOCYTES NFR BLD AUTO: 10.1 % (ref 5–12)
NEUTROPHILS NFR BLD AUTO: 4.65 10*3/MM3 (ref 1.7–7)
NEUTROPHILS NFR BLD AUTO: 72.4 % (ref 42.7–76)
NRBC BLD AUTO-RTO: 0 /100 WBC (ref 0–0.2)
NT-PROBNP SERPL-MCNC: 933.8 PG/ML (ref 0–1800)
PLATELET # BLD AUTO: 160 10*3/MM3 (ref 140–450)
PMV BLD AUTO: 9.7 FL (ref 6–12)
POTASSIUM SERPL-SCNC: 4.4 MMOL/L (ref 3.5–5.2)
PROT SERPL-MCNC: 5.8 G/DL (ref 6–8.5)
QT INTERVAL: 424 MS
QTC INTERVAL: 483 MS
RBC # BLD AUTO: 3.55 10*6/MM3 (ref 4.14–5.8)
SODIUM SERPL-SCNC: 140 MMOL/L (ref 136–145)
TROPONIN T DELTA: 14 NG/L
TROPONIN T SERPL HS-MCNC: 91 NG/L
WBC NRBC COR # BLD: 6.42 10*3/MM3 (ref 3.4–10.8)
WHOLE BLOOD HOLD COAG: NORMAL
WHOLE BLOOD HOLD SPECIMEN: NORMAL

## 2023-05-27 PROCEDURE — 71045 X-RAY EXAM CHEST 1 VIEW: CPT

## 2023-05-27 PROCEDURE — 93306 TTE W/DOPPLER COMPLETE: CPT

## 2023-05-27 PROCEDURE — 99285 EMERGENCY DEPT VISIT HI MDM: CPT

## 2023-05-27 PROCEDURE — 25510000001 PERFLUTREN 6.52 MG/ML SUSPENSION: Performed by: INTERNAL MEDICINE

## 2023-05-27 PROCEDURE — 80053 COMPREHEN METABOLIC PANEL: CPT | Performed by: STUDENT IN AN ORGANIZED HEALTH CARE EDUCATION/TRAINING PROGRAM

## 2023-05-27 PROCEDURE — 84484 ASSAY OF TROPONIN QUANT: CPT | Performed by: STUDENT IN AN ORGANIZED HEALTH CARE EDUCATION/TRAINING PROGRAM

## 2023-05-27 PROCEDURE — 36415 COLL VENOUS BLD VENIPUNCTURE: CPT

## 2023-05-27 PROCEDURE — 93010 ELECTROCARDIOGRAM REPORT: CPT | Performed by: INTERNAL MEDICINE

## 2023-05-27 PROCEDURE — 99222 1ST HOSP IP/OBS MODERATE 55: CPT | Performed by: INTERNAL MEDICINE

## 2023-05-27 PROCEDURE — 85025 COMPLETE CBC W/AUTO DIFF WBC: CPT | Performed by: STUDENT IN AN ORGANIZED HEALTH CARE EDUCATION/TRAINING PROGRAM

## 2023-05-27 PROCEDURE — 85379 FIBRIN DEGRADATION QUANT: CPT | Performed by: STUDENT IN AN ORGANIZED HEALTH CARE EDUCATION/TRAINING PROGRAM

## 2023-05-27 PROCEDURE — 93306 TTE W/DOPPLER COMPLETE: CPT | Performed by: INTERNAL MEDICINE

## 2023-05-27 PROCEDURE — 83880 ASSAY OF NATRIURETIC PEPTIDE: CPT | Performed by: STUDENT IN AN ORGANIZED HEALTH CARE EDUCATION/TRAINING PROGRAM

## 2023-05-27 PROCEDURE — 93005 ELECTROCARDIOGRAM TRACING: CPT | Performed by: STUDENT IN AN ORGANIZED HEALTH CARE EDUCATION/TRAINING PROGRAM

## 2023-05-27 RX ORDER — CLOPIDOGREL BISULFATE 75 MG/1
75 TABLET ORAL DAILY
Status: DISCONTINUED | OUTPATIENT
Start: 2023-05-27 | End: 2023-05-28 | Stop reason: HOSPADM

## 2023-05-27 RX ORDER — ACETAMINOPHEN 500 MG
1000 TABLET ORAL EVERY 8 HOURS PRN
Status: DISCONTINUED | OUTPATIENT
Start: 2023-05-27 | End: 2023-05-28 | Stop reason: HOSPADM

## 2023-05-27 RX ORDER — CYCLOBENZAPRINE HCL 5 MG
5 TABLET ORAL 3 TIMES DAILY
Status: DISCONTINUED | OUTPATIENT
Start: 2023-05-27 | End: 2023-05-27

## 2023-05-27 RX ORDER — ZOLPIDEM TARTRATE 5 MG/1
5 TABLET ORAL NIGHTLY PRN
Status: DISCONTINUED | OUTPATIENT
Start: 2023-05-27 | End: 2023-05-28 | Stop reason: HOSPADM

## 2023-05-27 RX ORDER — PANTOPRAZOLE SODIUM 40 MG/1
40 TABLET, DELAYED RELEASE ORAL
Status: DISCONTINUED | OUTPATIENT
Start: 2023-05-27 | End: 2023-05-28 | Stop reason: HOSPADM

## 2023-05-27 RX ORDER — CARVEDILOL 6.25 MG/1
6.25 TABLET ORAL 2 TIMES DAILY WITH MEALS
Status: DISCONTINUED | OUTPATIENT
Start: 2023-05-27 | End: 2023-05-28 | Stop reason: HOSPADM

## 2023-05-27 RX ORDER — PREGABALIN 75 MG/1
150 CAPSULE ORAL 2 TIMES DAILY
Status: DISCONTINUED | OUTPATIENT
Start: 2023-05-27 | End: 2023-05-28 | Stop reason: HOSPADM

## 2023-05-27 RX ORDER — ASPIRIN 81 MG/1
324 TABLET, CHEWABLE ORAL ONCE
Status: COMPLETED | OUTPATIENT
Start: 2023-05-27 | End: 2023-05-27

## 2023-05-27 RX ORDER — RANOLAZINE 500 MG/1
1000 TABLET, EXTENDED RELEASE ORAL 2 TIMES DAILY
Status: DISCONTINUED | OUTPATIENT
Start: 2023-05-27 | End: 2023-05-28 | Stop reason: HOSPADM

## 2023-05-27 RX ORDER — NITROGLYCERIN 0.4 MG/1
0.4 TABLET SUBLINGUAL
Status: DISCONTINUED | OUTPATIENT
Start: 2023-05-27 | End: 2023-05-28 | Stop reason: HOSPADM

## 2023-05-27 RX ORDER — ATORVASTATIN CALCIUM 40 MG/1
40 TABLET, FILM COATED ORAL NIGHTLY
Status: DISCONTINUED | OUTPATIENT
Start: 2023-05-27 | End: 2023-05-28 | Stop reason: HOSPADM

## 2023-05-27 RX ORDER — TAMSULOSIN HYDROCHLORIDE 0.4 MG/1
0.4 CAPSULE ORAL DAILY
Status: DISCONTINUED | OUTPATIENT
Start: 2023-05-27 | End: 2023-05-28 | Stop reason: HOSPADM

## 2023-05-27 RX ORDER — CYCLOBENZAPRINE HCL 10 MG
5 TABLET ORAL 3 TIMES DAILY PRN
Status: DISCONTINUED | OUTPATIENT
Start: 2023-05-27 | End: 2023-05-28 | Stop reason: HOSPADM

## 2023-05-27 RX ORDER — FINASTERIDE 5 MG/1
5 TABLET, FILM COATED ORAL DAILY
Status: DISCONTINUED | OUTPATIENT
Start: 2023-05-27 | End: 2023-05-28 | Stop reason: HOSPADM

## 2023-05-27 RX ORDER — CARVEDILOL 6.25 MG/1
6.25 TABLET ORAL 2 TIMES DAILY WITH MEALS
Status: DISCONTINUED | OUTPATIENT
Start: 2023-05-27 | End: 2023-05-27

## 2023-05-27 RX ORDER — SODIUM CHLORIDE 0.9 % (FLUSH) 0.9 %
10 SYRINGE (ML) INJECTION AS NEEDED
Status: DISCONTINUED | OUTPATIENT
Start: 2023-05-27 | End: 2023-05-28 | Stop reason: HOSPADM

## 2023-05-27 RX ORDER — CARVEDILOL 6.25 MG/1
12.5 TABLET ORAL 2 TIMES DAILY WITH MEALS
Status: DISCONTINUED | OUTPATIENT
Start: 2023-05-27 | End: 2023-05-27

## 2023-05-27 RX ADMIN — PERFLUTREN 13.04 MG: 6.52 INJECTION, SUSPENSION INTRAVENOUS at 11:55

## 2023-05-27 RX ADMIN — RANOLAZINE 1000 MG: 500 TABLET, FILM COATED, EXTENDED RELEASE ORAL at 08:27

## 2023-05-27 RX ADMIN — PREGABALIN 150 MG: 75 CAPSULE ORAL at 08:27

## 2023-05-27 RX ADMIN — ATORVASTATIN CALCIUM 40 MG: 40 TABLET, FILM COATED ORAL at 20:58

## 2023-05-27 RX ADMIN — ACETAMINOPHEN 1000 MG: 500 TABLET, FILM COATED ORAL at 08:03

## 2023-05-27 RX ADMIN — EMPAGLIFLOZIN 10 MG: 10 TABLET, FILM COATED ORAL at 08:27

## 2023-05-27 RX ADMIN — CLOPIDOGREL BISULFATE 75 MG: 75 TABLET, FILM COATED ORAL at 08:27

## 2023-05-27 RX ADMIN — TAMSULOSIN HYDROCHLORIDE 0.4 MG: 0.4 CAPSULE ORAL at 08:27

## 2023-05-27 RX ADMIN — SODIUM CHLORIDE, POTASSIUM CHLORIDE, SODIUM LACTATE AND CALCIUM CHLORIDE 500 ML: 600; 310; 30; 20 INJECTION, SOLUTION INTRAVENOUS at 02:32

## 2023-05-27 RX ADMIN — SACUBITRIL AND VALSARTAN 0.5 TABLET: 49; 51 TABLET, FILM COATED ORAL at 20:58

## 2023-05-27 RX ADMIN — RANOLAZINE 1000 MG: 500 TABLET, FILM COATED, EXTENDED RELEASE ORAL at 20:58

## 2023-05-27 RX ADMIN — CARVEDILOL 6.25 MG: 6.25 TABLET, FILM COATED ORAL at 17:15

## 2023-05-27 RX ADMIN — PANTOPRAZOLE SODIUM 40 MG: 40 TABLET, DELAYED RELEASE ORAL at 08:26

## 2023-05-27 RX ADMIN — PREGABALIN 150 MG: 75 CAPSULE ORAL at 20:58

## 2023-05-27 RX ADMIN — CARVEDILOL 6.25 MG: 6.25 TABLET, FILM COATED ORAL at 08:28

## 2023-05-27 RX ADMIN — CYCLOBENZAPRINE HYDROCHLORIDE 5 MG: 5 TABLET, FILM COATED ORAL at 08:27

## 2023-05-27 RX ADMIN — ASPIRIN 243 MG: 81 TABLET, CHEWABLE ORAL at 01:42

## 2023-05-27 RX ADMIN — SACUBITRIL AND VALSARTAN 0.5 TABLET: 49; 51 TABLET, FILM COATED ORAL at 08:27

## 2023-05-27 RX ADMIN — FINASTERIDE 5 MG: 5 TABLET, FILM COATED ORAL at 08:27

## 2023-05-27 NOTE — PLAN OF CARE
Goal Outcome Evaluation:  Plan of Care Reviewed With: patient        Progress: no change  Outcome Evaluation: Patient went to [facility] overnight and received shock from AICD device.  Came to ED.  BP soft in ED.  Troponin positive.  Cariology consult today.  ASA given in ED.  Up with asst x 1.  Call light in reach.  No falls noted.  C/o headache pain and chestpain 3/10.  Tylenol 1000 mg given for pain.

## 2023-05-27 NOTE — CONSULTS
LOS: 0 days   Patient Care Team:  Rolan Salvador MD as PCP - General (Internal Medicine)  Sarbjit Posadas MD as Cardiologist (Cardiology)  Yolanda Garnica APRN as Nurse Practitioner (Family Medicine)  Durga Ochoa MD as Consulting Physician (Urology)  Tyrone Broussard PA as Physician Assistant (Urology)    Chief Complaint: Chest pain and ICD discharge     Subjective    Darren Maria is a 78 y.o. male who is being seen in consultation.  Patient has known coronary artery disease  History of ischemic cardiomyopathy  Prior aortocoronary bypass surgery  Has atrial fibrillation on anticoagulation with Xarelto  He started feeling unwell yesterday evening  Had some dull chest discomfort  Subsequently felt warm and flushed  Had mild nausea  No significant diaphoresis  Overnight feels much better  Feels some pressure in his chest now  Subsequently has ICD discharge  He came to the emergency room  Sometimes feels his heart rate is faster  No presyncope or syncope  No bleeding issues  No falls  Serial cardiac biomarkers were drawn with trends as below  Latest EKG as below  ICD was interrogated  Results of this or print out not available  [employer] rep Mayi Perry is working on this        Telemetry: no malignant arrhythmia. No significant pauses.  Since admission no malignant arrhythmia  Telemetry shows likely atrial fibrillation with ventricular pacing    Review of Systems   Constitutional: No chills   Has fatigue   No fever.   HENT: Negative.    Eyes: Negative.    Respiratory: Negative for cough,   No chest wall soreness,   Shortness of breath,   no wheezing, no stridor.    Cardiovascular: As above  Gastrointestinal: Negative for abdominal distention,  No abdominal pain,   No blood in stool,   No constipation,   No diarrhea,   No nausea   No vomiting.   Endocrine: Negative.    Genitourinary: Negative for difficulty urinating, dysuria, flank pain and hematuria.   Musculoskeletal: Negative.     Skin: Negative for rash and wound.   Allergic/Immunologic: Negative.    Neurological: Negative for dizziness, syncope, weakness,   No light-headedness  No  headaches.   Hematological: Does not bruise/bleed easily.   Psychiatric/Behavioral: Negative for agitation or behavioral problems,   No confusion,   the patient is  nervous/anxious.       History:   Past Medical History:   Diagnosis Date    Abdominal pain, epigastric     Abnormal abdominal exam     ABFND, RADIOLOGICAL, ABDOMINAL AREA     Anticoagulated on Coumadin     Atherosclerosis of coronary artery bypass graft     ATHEROSCLEROSIS, CORONARY, ARTERY BYPASS GRFT    Atrial fibrillation     Cancer of right kidney     right sided kidney cancer    Cardiomyopathy     Cardiomyopathy, primary     CHF (congestive heart failure)     Colon polyps     Congestive heart failure     Coronary artery disease     History of CAD/A-Fib/Pacemaker/Defibrillator placement: pt takes Coumadin and plavix therapy as well as ASA daily    Gastric polyp     Gastroesophageal reflux disease without esophagitis     History of colon polyps     Hypercholesterolemia     Hypertension, essential     Melena     Myocardial infarction     NSAID long-term use     Obesity     Pacemaker     Pacemaker Dependant    Platelet inhibition due to Plavix     Presence of stent in coronary artery     Multiple coronary stenting most recently 4/2008    Single kidney     Only 1 Kidney    Status post surgery     s/p Polypectomy Bleed     Past Surgical History:   Procedure Laterality Date    APPENDECTOMY      CARDIAC ABLATION      CARDIAC CATHETERIZATION      CARDIAC CATHETERIZATION Left 12/15/2021    Procedure: Coronary angiography;  Surgeon: Sarbjit Posadas MD;  Location:  [facility];  Service: Cardiology;  Laterality: Left;    CARDIAC CATHETERIZATION Left 12/15/2021    Procedure: Percutaneous Coronary Intervention;  Surgeon: Sarbjit Posadas MD;  Location:  [facility];   Service: Cardiology;  Laterality: Left;    CARDIAC DEFIBRILLATOR PLACEMENT      CARDIAC PACEMAKER PLACEMENT      Pacemaker Placement    CATARACT EXTRACTION, BILATERAL      CHOLECYSTECTOMY      COLONOSCOPY  08/27/2013    snare hep, trans tics recall 3 yr    COLONOSCOPY  10/02/2006    few scattered diverticula    COLONOSCOPY  07/22/2009    tubular adenoma in the ascending colon    COLONOSCOPY  07/25/2005    several polyps in the ascending colon, one in the transverse colon    COLONOSCOPY N/A 3/21/2022    Procedure: COLONOSCOPY WITH ANESTHESIA;  Surgeon: Phil Goodwin MD;  Location:  [facility];  Service: Gastroenterology;  Laterality: N/A;  pre: hx polyps  post: polyps  Rolan Salvador MD    COLONOSCOPY W/ POLYPECTOMY  09/19/2016    Tubular adenoma hepatic flexure, 2 Hyperplastic polyps rectum and at 50 cm, Benign polyp at 70 cm repeat exam in 3-5 years    CORONARY ARTERY BYPASS GRAFT  02/2011    2nd time    CORONARY ARTERY BYPASS GRAFT  1990    CORONARY STENT PLACEMENT      ENDOSCOPY  04/14/2016    nl slant    ENDOSCOPY  11/10/2014    clips at polypectomy bx no residual recall 1 yr    ENDOSCOPY  06/19/2014    snare clip body recall 6 months    ENDOSCOPY  05/19/2014    polyp stomach bx and clip    HERNIA REPAIR      with mesh    JOINT REPLACEMENT      OTHER SURGICAL HISTORY      Defibrillator/Pacer maker exchange    OTHER SURGICAL HISTORY      Pacemaker/Defibillation exchange 2012    PENILE PROSTHESIS IMPLANT N/A 07/27/2018    Procedure: PENILE PROSTHESIS PLACEMENT;  Surgeon: Godwin Gupta MD;  Location: [facility] OR;  Service: Urology    PERIPHERAL ARTERIAL STENT GRAFT       Social History     Socioeconomic History    Marital status:    Tobacco Use    Smoking status: Never    Smokeless tobacco: Never   Vaping Use    Vaping Use: Never used   Substance and Sexual Activity    Alcohol use: Yes     Comment: occ    Drug use: No    Sexual activity: Defer     Family History   Problem Relation Age of  Onset    Lung cancer Mother     No Known Problems Father     Other Neg Hx         GI Malignancies    Colon cancer Neg Hx     Colon polyps Neg Hx        Labs:  WBC WBC   Date Value Ref Range Status   05/27/2023 6.42 3.40 - 10.80 10*3/mm3 Final      HGB Hemoglobin   Date Value Ref Range Status   05/27/2023 11.0 (L) 13.0 - 17.7 g/dL Final      HCT Hematocrit   Date Value Ref Range Status   05/27/2023 35.5 (L) 37.5 - 51.0 % Final      Platelets Platelets   Date Value Ref Range Status   05/27/2023 160 140 - 450 10*3/mm3 Final      MCV MCV   Date Value Ref Range Status   05/27/2023 100.0 (H) 79.0 - 97.0 fL Final        Results from last 7 days   Lab Units 05/27/23  0127   SODIUM mmol/L 140   POTASSIUM mmol/L 4.4   CHLORIDE mmol/L 107   CO2 mmol/L 22.0   BUN mg/dL 19   CREATININE mg/dL 1.38*   CALCIUM mg/dL 8.9   BILIRUBIN mg/dL 0.4   ALK PHOS U/L 57   ALT (SGPT) U/L 16   AST (SGOT) U/L 25   GLUCOSE mg/dL 210*     Lab Results   Component Value Date    TROPONINI <0.012 10/26/2016    TROPONINT 105 (C) 05/27/2023     PT/INR:  No results found for: PROTIME/No results found for: INR    Imaging Results (Last 72 Hours)       Procedure Component Value Units Date/Time    XR Chest 1 View [[id number]] Collected: 05/27/23 0409     Updated: 05/27/23 0413    Narrative:      EXAM/TECHNIQUE: XR CHEST 1 VW-     INDICATION: Chest Pain Triage Protocol; I49.9-Cardiac arrhythmia,  unspecified; R07.9-Chest pain, unspecified; R06.02-Shortness of breath;  R60.9-Edema, unspecified     COMPARISON: 02/06/2019     FINDINGS:     Cardiac pacing/ICD device is stable position. Prior median sternotomy  and CABG. Lung volumes are low. No pleural effusion, pneumothorax, or  focal consolidation. No acute osseous finding.       Impression:         No acute findings.  This report was finalized on 05/27/2023 04:10 by Dr. Alonso Gomez MD.            Objective     Allergies   Allergen Reactions    Azithromycin Nausea And Vomiting    Morphine And Related Other  "(See Comments)     SHAKES AND MAKES HIM \"WIRED\".   TAKES TRAMADOL WITHOUT PROBLEM       Medication Review: Performed  Current Facility-Administered Medications   Medication Dose Route Frequency Provider Last Rate Last Admin    acetaminophen (TYLENOL) tablet 1,000 mg  1,000 mg Oral Q8H PRN Rolan Salvador MD   1,000 mg at 05/27/23 0803    atorvastatin (LIPITOR) tablet 40 mg  40 mg Oral Nightly Vern Chacon APRN        carvedilol (COREG) tablet 6.25 mg  6.25 mg Oral BID With Meals Sukhdeep Waterman MD   6.25 mg at 05/27/23 0828    clopidogrel (PLAVIX) tablet 75 mg  75 mg Oral Daily Vern Chacon APRN   75 mg at 05/27/23 0827    cyclobenzaprine (FLEXERIL) tablet 5 mg  5 mg Oral TID Vern Chacon APRN   5 mg at 05/27/23 0827    empagliflozin (JARDIANCE) tablet 10 mg  10 mg Oral QAM Vern Chacon APRN   10 mg at 05/27/23 0827    finasteride (PROSCAR) tablet 5 mg  5 mg Oral Daily Vern Chacon APRN   5 mg at 05/27/23 0827    nitroglycerin (NITROSTAT) SL tablet 0.4 mg  0.4 mg Sublingual Q5 Min PRN Vern Chacon APRN        pantoprazole (PROTONIX) EC tablet 40 mg  40 mg Oral Q AM Vern Chacon APRN   40 mg at 05/27/23 0826    pregabalin (LYRICA) capsule 150 mg  150 mg Oral BID Vern Chacon APRN   150 mg at 05/27/23 0827    ranolazine (RANEXA) 12 hr tablet 1,000 mg  1,000 mg Oral BID Vern Chacon APRN   1,000 mg at 05/27/23 0827    sacubitril-valsartan (ENTRESTO) 49-51 MG tablet 0.5 tablet  0.5 tablet Oral BID Sukhdeep Waterman MD   0.5 tablet at 05/27/23 0827    sodium chloride 0.9 % flush 10 mL  10 mL Intravenous PRN Godwin Yarbrough MD        tamsulosin (FLOMAX) 24 hr capsule 0.4 mg  0.4 mg Oral Daily Vern Chacon APRN   0.4 mg at 05/27/23 0827    traMADol-acetaminophen (ULTRACET) 37.5-325 MG per tablet 1 tablet  1 tablet Oral Q6H PRN Vern Chacon APRN        zolpidem (AMBIEN) tablet 5 mg  5 mg Oral Nightly PRN Vern Chacon APRN           Vital Sign Min/Max " "for last 24 hours  Temp  Min: 97.6 °F (36.4 °C)  Max: 97.9 °F (36.6 °C)   BP  Min: 79/52  Max: 100/72   Pulse  Min: 75  Max: 82   Resp  Min: 16  Max: 20   SpO2  Min: 93 %  Max: 98 %   No data recorded   Weight  Min: 95.3 kg (210 lb)  Max: 100 kg (220 lb 6.4 oz)     Flowsheet Rows      Flowsheet Row First Filed Value   Admission Height 180.3 cm (71\") Documented at 05/27/2023 0102   Admission Weight 95.3 kg (210 lb) Documented at 05/27/2023 0102            Results for orders placed during the hospital encounter of 10/28/21    Adult Transthoracic Echo Complete W/ Cont if Necessary Per Protocol    Interpretation Summary  · Left ventricular ejection fraction appears to be 26 - 30%. Left ventricular systolic function is moderately decreased.  · The left ventricular cavity is moderately dilated (LVIDd 6.4cm)  · The following left ventricular wall segments are akinetic: apical anterior, apical lateral, apical inferior, apical septal and apex.  · The following left ventricular wall segments are hypokinetic: mid anterior, apical anterior, basal anterolateral, mid anterolateral, apical lateral, basal inferolateral, mid inferolateral, mid inferior, basal inferoseptal, mid inferoseptal, basal anterior, basal inferior and basal inferoseptal. The following left ventricular wall segments are akinetic: apical inferior, apical septal, apex and mid anteroseptal.  · Mild-moderate mitral regurgitation (functional).  · Left atrial volume is severely increased.  · Estimated right ventricular systolic pressure from tricuspid regurgitation is moderately elevated (45-55 mmHg).  · Compared to last exam from 2/13/2019, LV function appears slightly worse.      Physical Exam:    General Appearance: Awake, alert, in no acute distress  Eyes: Pupils equal and reactive    Ears: Appear intact with no abnormalities noted  Nose: Nares normal, no drainage  Neck: supple, trachea midline, no carotid bruit and no JVD  Back: no kyphosis present,    Lungs: " respirations regular, respirations even and respirations unlabored  Heart: normal S1, S2, no significant murmurs   No gallops or rubs  no rub and no click  Abdomen: normal bowel sounds, no tenderness   Skin: no bleeding, bruising or rash  Extremities: no cyanosis  Psychiatric/Behavioral: Negative for agitation, behavioral problems, confusion, the patient does  appear to be nervous/anxious.       Results Review:   I reviewed the patient's new clinical results.  I reviewed the patient's new imaging results and agree with the interpretation.  I reviewed the patient's other test results and agree with the interpretation  I personally viewed and interpreted the patient's EKG/Telemetry data    Discussed with patient  Updated patient regarding any new or relevant abnormalities on review of records or any new findings on physical exam.   Mentioned to patient about purpose of visit and desirable health short and long term goals and objectives.     Reviewed available prior notes, consults, prior visits, laboratory findings, radiology and cardiology relevant reports.   Updated chart as applicable.   I have reviewed the patient's medical history in detail and updated the computerized patient record as relevant.          Assessment & Plan       Shockable cardiac rhythm detected by automated external defibrillator    Coronary artery disease of bypass graft of native heart with stable angina pectoris    Essential hypertension    Mixed hyperlipidemia      Plan    Continue on current medical management  Patient is anticoagulated  Continue on Plavix as well as on Xarelto  We will follow-up serial troponin  Overall he feels better now    Apparently his device was checked though I do not see the print outs  We will contact [employer] for this  Discussed with patient as well as with nursing  Okay to eat this morning    Results of last coronary angiogram reviewed and discussed with patient  Long-term follow-up with Dr. Schaffer  [place] or Yolanda knees APRN  We will check complete transthoracic echocardiogram with myocardial strain  His blood pressure is running slightly low will decrease carvedilol to 6.25 mg p.o. twice daily as well as decrease Entresto to lowest dose twice daily  Telemetry  Deep vein thrombosis prophylaxis/precautions [on anticoagulation]    Appropriate diet, fluid, sodium, caffeine, stimulants intake   Questions were encouraged, asked and answered to the patient's  understanding and satisfaction.  Compliance to diet and medications       Sukhdeep Waterman MD  05/27/23  08:58 CDT    EMR Dragon/Transcription was used to dictate part of this note

## 2023-05-27 NOTE — PLAN OF CARE
Goal Outcome Evaluation:  Plan of Care Reviewed With: patient        Progress: no change  Outcome Evaluation: VSS. Patient c/o headache this AM, medicated with PRN Tylenol with relief. Decreased BP meds per cardiology. Echo complete today. Sats WNL on room air.  on tele, no issues with AICD today. Cont to monitor

## 2023-05-27 NOTE — ED PROVIDER NOTES
"EMERGENCY DEPARTMENT ATTENDING NOTE    Patient Name: Darren Maria    Chief Complaint   Patient presents with    Chest Pain    Arm Pain    AICD Problem       PATIENT PRESENTATION:  Darren Maria is a very pleasant 78 y.o. male with history of defibrillator in the setting of heart failure as well as prior coronary artery bypass grafting presented to the emergency department due to being shocked by his defibrillator after an episode of chest pain.    Patient states he has been feeling unwell over the past week.  He has had multiple episodes where he has significant dyspnea on exertion feels dizzy and lightheaded when he begins walking.  He has noticed some mild leg swelling.  Earlier tonight he had an episode of left arm pain with some sharp chest pain and then shortly thereafter was shocked by his AICD.  States his symptoms resolved following the shock.  He did not lose consciousness he member the whole event.  Still endorses some mild shortness of breath.      PHYSICAL EXAM:   VS: BP 98/59   Pulse 78   Temp 97.9 °F (36.6 °C) (Temporal)   Resp 18   Ht 180.3 cm (71\")   Wt 95.3 kg (210 lb)   SpO2 95%   BMI 29.29 kg/m²   GENERAL: Chronically ill-appearing elderly gentleman sitting up in stretcher no acute distress; well-nourished, well-developed, awake, alert, no acute distress, nontoxic appearing, comfortable  EYES: PERRL, sclerae anicteric, extraocular movements grossly intact, symmetric lids  EARS, NOSE, MOUTH, THROAT: atraumatic external nose and ears, moist mucous membranes  NECK: symmetric, trachea midline  RESPIRATORY: unlabored respiratory effort, clear to auscultation bilaterally, good air movement  CARDIOVASCULAR: no murmurs, peripheral pulses 2+ and equal in all extremities  GI: soft, nontender, nondistended  MUSCULOSKELETAL/EXTREMITIES: 1+ pitting edema to the knees bilaterally left slightly greater than right; otherwise extremities without obvious deformity  SKIN: warm and dry with no " obvious rashes  NEUROLOGIC: moving all 4 extremities symmetrically, CN II-XII grossly intact  PSYCHIATRIC: alert, pleasant and cooperative. Appropriate mood and affect.      MEDICAL DECISION MAKING:    Darren Maria is a 78 y.o. male who presented to the ED after an episode of chest pain followed by a shock from his AICD.    Differential Diagnosis Considered: Ventricular tachycardia, ventricular fibrillation, acute coronary syndrome including STEMI, NSTEMI and unstable angina    Labs Ordered:  Labs Reviewed   COMPREHENSIVE METABOLIC PANEL - Abnormal; Notable for the following components:       Result Value    Glucose 210 (*)     Creatinine 1.38 (*)     Total Protein 5.8 (*)     eGFR 53.0 (*)     All other components within normal limits    Narrative:     GFR Normal >60  Chronic Kidney Disease <60  Kidney Failure <15    The GFR formula is only valid for adults with stable renal function between ages 18 and 70.   CBC WITH AUTO DIFFERENTIAL - Abnormal; Notable for the following components:    RBC 3.55 (*)     Hemoglobin 11.0 (*)     Hematocrit 35.5 (*)     .0 (*)     MCHC 31.0 (*)     RDW 17.1 (*)     RDW-SD 62.3 (*)     Lymphocyte % 13.2 (*)     All other components within normal limits   TROPONIN - Abnormal; Notable for the following components:    HS Troponin T 91 (*)     All other components within normal limits    Narrative:     High Sensitive Troponin T Reference Range:  <10.0 ng/L- Negative Female for AMI  <15.0 ng/L- Negative Male for AMI  >=10 - Abnormal Female indicating possible myocardial injury.  >=15 - Abnormal Male indicating possible myocardial injury.   Clinicians would have to utilize clinical acumen, EKG, Troponin, and serial changes to determine if it is an Acute Myocardial Infarction or myocardial injury due to an underlying chronic condition.        HIGH SENSITIVITIY TROPONIN T 2HR - Abnormal; Notable for the following components:    HS Troponin T 105 (*)     Troponin T Delta 14 (*)   "   All other components within normal limits    Narrative:     High Sensitive Troponin T Reference Range:  <10.0 ng/L- Negative Female for AMI  <15.0 ng/L- Negative Male for AMI  >=10 - Abnormal Female indicating possible myocardial injury.  >=15 - Abnormal Male indicating possible myocardial injury.   Clinicians would have to utilize clinical acumen, EKG, Troponin, and serial changes to determine if it is an Acute Myocardial Infarction or myocardial injury due to an underlying chronic condition.        BNP (IN-HOUSE) - Normal    Narrative:     Among patients with dyspnea, NT-proBNP is highly sensitive for the detection of acute congestive heart failure. In addition NT-proBNP of <300 pg/ml effectively rules out acute congestive heart failure with 99% negative predictive value.    Results may be falsely decreased if patient taking Biotin.     D-DIMER, QUANTITATIVE - Normal    Narrative:     According to the assay 's published package insert, a normal (<0.50 MCGFEU/mL) D-dimer result in conjunction with a non-high clinical probability assessment, excludes deep vein thrombosis (DVT) and pulmonary embolism (PE) with high sensitivity.    D-dimer values increase with age and this can make VTE exclusion of an older population difficult. To address this, the [employer], based on best available evidence and recent guidelines, recommends that clinicians use age-adjusted D-dimer thresholds in patients greater than 50 years of age with: a) a low probability of PE who do not meet all Pulmonary Embolism Rule Out Criteria, or b) in those with intermediate probability of PE.   The formula for an age-adjusted D-dimer cut-off is \"age/100\".  For example, a 60 year old patient would have an age-adjusted cut-off of 0.60 MCGFEU/mL and an 80 year old 0.80 MCGFEU/mL.   RAINBOW DRAW    Narrative:     The following orders were created for panel order [place] Draw.  Procedure                               " Abnormality         Status                     ---------                               -----------         ------                     Green Top (Gel)[[id number]]                                  Final result               Lavender Top[[id number]]                                     Final result               Red Top[[id number]]                                          Final result               Light Blue Top[[id number]]                                   Final result                 Please view results for these tests on the individual orders.   CBC AND DIFFERENTIAL    Narrative:     The following orders were created for panel order CBC & Differential.  Procedure                               Abnormality         Status                     ---------                               -----------         ------                     CBC Auto Differential[[id number]]        Abnormal            Final result                 Please view results for these tests on the individual orders.   GREEN TOP   LAVENDER TOP   RED TOP   LIGHT BLUE TOP        Imaging Ordered:   XR Chest 1 View   ED Interpretation   AICD in place. No acute findings.      Final Result       No acute findings.   This report was finalized on 05/27/2023 04:10 by Dr. Alonso Gomez MD.          Internal chart review:   Past Medical History:   Diagnosis Date    Abdominal pain, epigastric     Abnormal abdominal exam     ABFND, RADIOLOGICAL, ABDOMINAL AREA     Anticoagulated on Coumadin     Atherosclerosis of coronary artery bypass graft     ATHEROSCLEROSIS, CORONARY, ARTERY BYPASS GRFT    Atrial fibrillation     Cancer of right kidney     right sided kidney cancer    Cardiomyopathy     Cardiomyopathy, primary     CHF (congestive heart failure)     Colon polyps     Congestive heart failure     Coronary artery disease     History of CAD/A-Fib/Pacemaker/Defibrillator placement: pt takes Coumadin and plavix therapy as well as ASA daily    Gastric polyp     Gastroesophageal  reflux disease without esophagitis     History of colon polyps     Hypercholesterolemia     Hypertension, essential     Melena     Myocardial infarction     NSAID long-term use     Obesity     Pacemaker     Pacemaker Dependant    Platelet inhibition due to Plavix     Presence of stent in coronary artery     Multiple coronary stenting most recently 4/2008    Single kidney     Only 1 Kidney    Status post surgery     s/p Polypectomy Bleed       Past Surgical History:   Procedure Laterality Date    APPENDECTOMY      CARDIAC ABLATION      CARDIAC CATHETERIZATION      CARDIAC CATHETERIZATION Left 12/15/2021    Procedure: Coronary angiography;  Surgeon: Sarbjit Posadas MD;  Location: [facility] CATH INVASIVE LOCATION;  Service: Cardiology;  Laterality: Left;    CARDIAC CATHETERIZATION Left 12/15/2021    Procedure: Percutaneous Coronary Intervention;  Surgeon: Sarbjit Posadas MD;  Location: [facility] CATH INVASIVE LOCATION;  Service: Cardiology;  Laterality: Left;    CARDIAC DEFIBRILLATOR PLACEMENT      CARDIAC PACEMAKER PLACEMENT      Pacemaker Placement    CATARACT EXTRACTION, BILATERAL      CHOLECYSTECTOMY      COLONOSCOPY  08/27/2013    snare hep, trans tics recall 3 yr    COLONOSCOPY  10/02/2006    few scattered diverticula    COLONOSCOPY  07/22/2009    tubular adenoma in the ascending colon    COLONOSCOPY  07/25/2005    several polyps in the ascending colon, one in the transverse colon    COLONOSCOPY N/A 3/21/2022    Procedure: COLONOSCOPY WITH ANESTHESIA;  Surgeon: Phil Goodwin MD;  Location: [facility] ENDOSCOPY;  Service: Gastroenterology;  Laterality: N/A;  pre: hx polyps  post: polyps  Rolan Salvador MD    COLONOSCOPY W/ POLYPECTOMY  09/19/2016    Tubular adenoma hepatic flexure, 2 Hyperplastic polyps rectum and at 50 cm, Benign polyp at 70 cm repeat exam in 3-5 years    CORONARY ARTERY BYPASS GRAFT  02/2011    2nd time    CORONARY ARTERY BYPASS GRAFT  1990    CORONARY STENT PLACEMENT      ENDOSCOPY   "04/14/2016    nl slant    ENDOSCOPY  11/10/2014    clips at polypectomy bx no residual recall 1 yr    ENDOSCOPY  06/19/2014    snare clip body recall 6 months    ENDOSCOPY  05/19/2014    polyp stomach bx and clip    HERNIA REPAIR      with mesh    JOINT REPLACEMENT      OTHER SURGICAL HISTORY      Defibrillator/Pacer maker exchange    OTHER SURGICAL HISTORY      Pacemaker/Defibillation exchange 2012    PENILE PROSTHESIS IMPLANT N/A 07/27/2018    Procedure: PENILE PROSTHESIS PLACEMENT;  Surgeon: Godwin Gupta MD;  Location:  [facility] OR;  Service: Urology    PERIPHERAL ARTERIAL STENT GRAFT         Allergies   Allergen Reactions    Azithromycin Nausea And Vomiting    Morphine And Related Other (See Comments)     SHAKES AND MAKES HIM \"WIRED\".   TAKES TRAMADOL WITHOUT PROBLEM         Current Facility-Administered Medications:     sodium chloride 0.9 % flush 10 mL, 10 mL, Intravenous, PRN, Godwin Yarbrough MD    Current Outpatient Medications:     atorvastatin (LIPITOR) 40 MG tablet, Take 1 tablet by mouth Every Night., Disp: 90 tablet, Rfl: 3    carvedilol (COREG) 12.5 MG tablet, Take 1 tablet by mouth 2 (Two) Times a Day., Disp: , Rfl:     cetirizine (zyrTEC) 10 MG tablet, Take 1 tablet by mouth Daily., Disp: , Rfl:     clopidogrel (PLAVIX) 75 MG tablet, Take 1 tablet by mouth Daily., Disp: , Rfl:     cyclobenzaprine (FLEXERIL) 5 MG tablet, Take 1 tablet by mouth 3 (Three) Times a Day., Disp: , Rfl:     Entresto 49-51 MG tablet, TAKE 1 TABLET BY MOUTH TWICE A DAY, Disp: 60 tablet, Rfl: 11    finasteride (PROSCAR) 5 MG tablet, Take 1 tablet by mouth Daily., Disp: , Rfl:     fluticasone (FLONASE) 50 MCG/ACT nasal spray, 2 sprays into the nostril(s) as directed by provider Daily As Needed., Disp: , Rfl:     Jardiance 10 MG tablet tablet, TAKE 1 TABLET BY MOUTH EVERY DAY IN THE MORNING, Disp: 90 tablet, Rfl: 3    metFORMIN (GLUCOPHAGE) 500 MG tablet, Take 1 tablet by mouth 2 (Two) Times a Day With Meals. Two tabs " daily, Disp: , Rfl:     Mirabegron ER (Myrbetriq) 50 MG tablet sustained-release 24 hour 24 hr tablet, Take 50 mg by mouth Daily., Disp: 30 tablet, Rfl: 11    nitroglycerin (NITROSTAT) 0.4 MG SL tablet, Place 1 tablet under the tongue Every 5 (Five) Minutes As Needed for Chest Pain. Take no more than 3 doses in 15 minutes., Disp: 25 tablet, Rfl: 2    omeprazole (PriLOSEC) 40 MG capsule, Take 1 capsule by mouth Daily., Disp: , Rfl:     potassium chloride (K-DUR,KLOR-CON) 20 MEQ CR tablet, Take 1 tablet by mouth Daily. With additional 20 meq if second dose of torsemide taken, Disp: , Rfl:     pregabalin (LYRICA) 150 MG capsule, Take 1 capsule by mouth 2 (Two) Times a Day., Disp: , Rfl:     promethazine-dextromethorphan (PROMETHAZINE-DM) 6.25-15 MG/5ML syrup, As Needed., Disp: , Rfl:     ranolazine (RANEXA) 1000 MG 12 hr tablet, Take 1 tablet by mouth 2 (Two) Times a Day., Disp: 180 tablet, Rfl: 3    rivaroxaban (Xarelto) 20 MG tablet, Take 1 tablet by mouth Daily. Take 1 tablet by mouth daily with food., Disp: 90 tablet, Rfl: 3    tamsulosin (FLOMAX) 0.4 MG capsule 24 hr capsule, Take 1 capsule by mouth Daily., Disp: , Rfl:     torsemide (DEMADEX) 20 MG tablet, Take 1 tablet by mouth Daily As Needed (weight gain >3 lbs in one day or >5 lbs in 2 days). With additional 20 mg PRN, Disp: , Rfl:     traMADol-acetaminophen (ULTRACET) 37.5-325 MG per tablet, as needed, Disp: , Rfl: 1    zolpidem (AMBIEN) 5 MG tablet, TAKE 1 TABLET BY MOUTH EVERY DAY AT BEDTIME AS NEEDED, Disp: , Rfl: 5    My EKG interpretation: Paced rhythm with occasional PVCs.    My lab interpretation: Initial high sensitive part of 91 with repeat 104.  Normal white blood count.  Slight decrease in 111.  Creatinine elevated to 1.38 from patient's baseline of 1.    My imaging interpretation: Chest x-ray with no acute findings.    Decision rules/scores evaluated: HEART score 6.     Discussed with: The on-call cardiology team, initially nurse practitioner  Reema, then Dr. Waterman.  They plan to see the patient within a few hours and recommended no additional medications at this time.  Case discussed with the patient's primary physician, Dr. Franks (covering for Dr. Salvador) who admitted the patient to his service for further management.    ED Course and Re-evaluation: 77yo M presents emergency department for being shocked by his AICD following an episode of chest pain.  Patient well-appearing arrival chest pain free essentially asymptomatic.  Pacemaker was interrogated and demonstrated shocks for ventricular tachycardia.  Elevated troponin most likely secondary to shock but consider acute coronary syndrome given precipitating chest pain.  Patient was dosed with full dose aspirin.  D-dimer negativefor pulmonary embolism.  Patient with some low blood pressure during his stay in the emergency department so was given some fluids with improvement.  Possibly secondary to dehydration given his slight acute kidney injury.  Case was discussed with cardiology who plan to see patient shortly during inpatient admission and patient was admitted to his primary care provider service for further management.      ED Diagnosis:  Acute chest pain; Shockable cardiac rhythm detected by automated external defibrillator; Shortness of breath; Pitting edema; Ventricular tachycardia; Acute kidney injury; Elevated troponin level; Hypotension, unspecified hypotension type    Disposition: admit to patient's primary care provider, Dr. Salvador.         Signed:  Godwin Yarbrough MD  Emergency Medicine Physician    Please note that portions of this note were completed with a voice recognition program.      Godwin Yarbrough MD  05/27/23 [id number]

## 2023-05-27 NOTE — H&P
History and Physical      CHIEF COMPLAINT:  Chest pain, AICD fire    Reason for Admission:  Chest pain, AICD fire    History Obtained From: patient, records    HISTORY OF PRESENT ILLNESS:      The patient is a 78 y.o. male with significant past medical history of ischemic cardiomyopathy with AICD, hypertension, coronary artery disease status post CABG, atrial fibrillaiton, history of renal cell carcinoma who presents with chest pain then AICD fire.  Patient stated on admission that he had not been feeling well for the past week.  He states that he has been having dizziness, lightheaded, dyspnea on exertion and mild leg swelling.  Patient stated last night he had some left arm pain, followed by some chest pain and then he was shocked by his AICD.  The AICD was interrogated and found ventricular tachycardia.  Dr. Waterman was contacted through the ER last night.  They are to see his this morning.  Patient found to have elevated troponin in the ER with some mild hypotension as well.  Could be secondary to his shock but with his history concerning for possible ACS since he did have chest pain prior to his shock.     Past Medical History:    Past Medical History:   Diagnosis Date   • Abdominal pain, epigastric    • Abnormal abdominal exam     ABFND, RADIOLOGICAL, ABDOMINAL AREA    • Anticoagulated on Coumadin    • Atherosclerosis of coronary artery bypass graft     ATHEROSCLEROSIS, CORONARY, ARTERY BYPASS GRFT   • Atrial fibrillation    • Cancer of right kidney     right sided kidney cancer   • Cardiomyopathy    • Cardiomyopathy, primary    • CHF (congestive heart failure)    • Colon polyps    • Congestive heart failure    • Coronary artery disease     History of CAD/A-Fib/Pacemaker/Defibrillator placement: pt takes Coumadin and plavix therapy as well as ASA daily   • Gastric polyp    • Gastroesophageal reflux disease without esophagitis    • History of colon polyps    • Hypercholesterolemia    • Hypertension,  essential    • Melena    • Myocardial infarction    • NSAID long-term use    • Obesity    • Pacemaker     Pacemaker Dependant   • Platelet inhibition due to Plavix    • Presence of stent in coronary artery     Multiple coronary stenting most recently 4/2008   • Single kidney     Only 1 Kidney   • Status post surgery     s/p Polypectomy Bleed       Past Surgical History:    Past Surgical History:   Procedure Laterality Date   • APPENDECTOMY     • CARDIAC ABLATION     • CARDIAC CATHETERIZATION     • CARDIAC CATHETERIZATION Left 12/15/2021    Procedure: Coronary angiography;  Surgeon: Sarbjit Posadas MD;  Location: [facility] CATH INVASIVE LOCATION;  Service: Cardiology;  Laterality: Left;   • CARDIAC CATHETERIZATION Left 12/15/2021    Procedure: Percutaneous Coronary Intervention;  Surgeon: Sarbjit Posadas MD;  Location: [facility] CATH INVASIVE LOCATION;  Service: Cardiology;  Laterality: Left;   • CARDIAC DEFIBRILLATOR PLACEMENT     • CARDIAC PACEMAKER PLACEMENT      Pacemaker Placement   • CATARACT EXTRACTION, BILATERAL     • CHOLECYSTECTOMY     • COLONOSCOPY  08/27/2013    snare hep, trans tics recall 3 yr   • COLONOSCOPY  10/02/2006    few scattered diverticula   • COLONOSCOPY  07/22/2009    tubular adenoma in the ascending colon   • COLONOSCOPY  07/25/2005    several polyps in the ascending colon, one in the transverse colon   • COLONOSCOPY N/A 3/21/2022    Procedure: COLONOSCOPY WITH ANESTHESIA;  Surgeon: Phil Goodwin MD;  Location: [facility] ENDOSCOPY;  Service: Gastroenterology;  Laterality: N/A;  pre: hx polyps  post: polyps  Rolan Salvador MD   • COLONOSCOPY W/ POLYPECTOMY  09/19/2016    Tubular adenoma hepatic flexure, 2 Hyperplastic polyps rectum and at 50 cm, Benign polyp at 70 cm repeat exam in 3-5 years   • CORONARY ARTERY BYPASS GRAFT  02/2011    2nd time   • CORONARY ARTERY BYPASS GRAFT  1990   • CORONARY STENT PLACEMENT     • ENDOSCOPY  04/14/2016    charley duffy   • ENDOSCOPY  11/10/2014    clips at  polypectomy bx no residual recall 1 yr   • ENDOSCOPY  06/19/2014    snare clip body recall 6 months   • ENDOSCOPY  05/19/2014    polyp stomach bx and clip   • HERNIA REPAIR      with mesh   • JOINT REPLACEMENT     • OTHER SURGICAL HISTORY      Defibrillator/Pacer maker exchange   • OTHER SURGICAL HISTORY      Pacemaker/Defibillation exchange 2012   • PENILE PROSTHESIS IMPLANT N/A 07/27/2018    Procedure: PENILE PROSTHESIS PLACEMENT;  Surgeon: Godwin Gupta MD;  Location: [facility] OR;  Service: Urology   • PERIPHERAL ARTERIAL STENT GRAFT         Medications Prior to Admission:    Medications Prior to Admission   Medication Sig Dispense Refill Last Dose   • atorvastatin (LIPITOR) 40 MG tablet Take 1 tablet by mouth Every Night. 90 tablet 3 5/26/2023   • carvedilol (COREG) 12.5 MG tablet Take 1 tablet by mouth 2 (Two) Times a Day.   5/26/2023   • cetirizine (zyrTEC) 10 MG tablet Take 1 tablet by mouth Daily.   5/26/2023   • clopidogrel (PLAVIX) 75 MG tablet Take 1 tablet by mouth Daily.   5/26/2023   • cyclobenzaprine (FLEXERIL) 5 MG tablet Take 1 tablet by mouth 3 (Three) Times a Day.   Past Month   • Entresto 49-51 MG tablet TAKE 1 TABLET BY MOUTH TWICE A DAY 60 tablet 11 5/26/2023   • finasteride (PROSCAR) 5 MG tablet Take 1 tablet by mouth Daily.   5/26/2023   • fluticasone (FLONASE) 50 MCG/ACT nasal spray 2 sprays into the nostril(s) as directed by provider Daily As Needed.   Past Month   • metFORMIN (GLUCOPHAGE) 500 MG tablet Take 1 tablet by mouth 2 (Two) Times a Day With Meals. Two tabs daily   5/26/2023   • Mirabegron ER (Myrbetriq) 50 MG tablet sustained-release 24 hour 24 hr tablet Take 50 mg by mouth Daily. 30 tablet 11 5/26/2023   • nitroglycerin (NITROSTAT) 0.4 MG SL tablet Place 1 tablet under the tongue Every 5 (Five) Minutes As Needed for Chest Pain. Take no more than 3 doses in 15 minutes. 25 tablet 2 Past Month   • omeprazole (PriLOSEC) 40 MG capsule Take 1 capsule by mouth Daily.   5/26/2023   •  pregabalin (LYRICA) 150 MG capsule Take 1 capsule by mouth 2 (Two) Times a Day.   5/26/2023   • ranolazine (RANEXA) 1000 MG 12 hr tablet Take 1 tablet by mouth 2 (Two) Times a Day. 180 tablet 3 5/26/2023   • rivaroxaban (Xarelto) 20 MG tablet Take 1 tablet by mouth Daily. Take 1 tablet by mouth daily with food. 90 tablet 3 5/26/2023   • tamsulosin (FLOMAX) 0.4 MG capsule 24 hr capsule Take 1 capsule by mouth Daily.   5/26/2023   • torsemide (DEMADEX) 20 MG tablet Take 1 tablet by mouth Daily As Needed (weight gain >3 lbs in one day or >5 lbs in 2 days). With additional 20 mg PRN   Past Month   • traMADol-acetaminophen (ULTRACET) 37.5-325 MG per tablet as needed  1 5/26/2023   • zolpidem (AMBIEN) 5 MG tablet TAKE 1 TABLET BY MOUTH EVERY DAY AT BEDTIME AS NEEDED  5 Past Month   • Jardiance 10 MG tablet tablet TAKE 1 TABLET BY MOUTH EVERY DAY IN THE MORNING 90 tablet 3    • potassium chloride (K-DUR,KLOR-CON) 20 MEQ CR tablet Take 1 tablet by mouth Daily. With additional 20 meq if second dose of torsemide taken   More than a month   • promethazine-dextromethorphan (PROMETHAZINE-DM) 6.25-15 MG/5ML syrup As Needed.   More than a month       Allergies:  Azithromycin and Morphine and related    Social History:   Social History     Socioeconomic History   • Marital status:    Tobacco Use   • Smoking status: Never   • Smokeless tobacco: Never   Vaping Use   • Vaping Use: Never used   Substance and Sexual Activity   • Alcohol use: Yes     Comment: occ   • Drug use: No   • Sexual activity: Defer       Family History:   Family History   Problem Relation Age of Onset   • Lung cancer Mother    • No Known Problems Father    • Other Neg Hx         GI Malignancies   • Colon cancer Neg Hx    • Colon polyps Neg Hx        REVIEW OF SYSTEMS:    CONSTITUTIONAL:  Negative for anorexia, chills, fevers, night sweats and weight loss, patient has been relatively medically stable until illness outlined in HPI  EYES:  negative for eye  dryness, icterus and redness, no significant changes in vision  HEENT:   negative for dental problems, epistaxis or sinus congestion , no history of facial trauma or difficulty swallowing  RESPIRATORY:  negative for chest tightness, cough, dyspnea on exertion, pneumonia or worse sputum production  CARDIOVASCULAR: No history of chest pain, dyspnea, exertional chest pressure/discomfort, irregular heart beat, or PND  GASTROINTESTINAL:  negative for abdominal pain, nausea or vomiting, no history of hematemesis, jaundice, melena or rectal bleeding  MUSCULOSKELETAL:  negative for muscle weakness, myalgias and neck pain, no significant arthralgias  NEUROLOGICAL:   negative for dizziness, headaches, seizures, speech problems, tremors and vertigo, mentation has been at baseline  INTEGUMENT: negative for pruritus, rash, skin color change and skin lesion(s)       Vital Signs   Temp:  [97.6 °F (36.4 °C)-97.9 °F (36.6 °C)] 97.6 °F (36.4 °C)  Heart Rate:  [75-82] 75  Resp:  [16-20] 18  BP: ()/(46-72) 91/46          Physical Exam:  Constitutional: The patient is oriented to person, place, and time. They appear well-developed.  Able to answer questions appropriately  HEENT: Grossly normal sitting up in bed  Head: Normocephalic and atraumatic.   Eyes: Pupils are equal, round, and reactive to light.  Extraocular muscles appear to be intact  Neck: Neck supple without masses or carotid bruit.  Cardiovascular: Regular rhythm and normal heart sounds.  No extra or lift rub or murmur appreciated  Pulmonary/Chest: Effort normal and breath sounds normal. CTAB, no appreciable rales or wheezes  Abdominal: Soft. Bowel sounds are normal. There is  no distension or tenderness appreciated. There is no rebound and no guarding.   Musculoskeletal: Normal range of motion. There is  no edema or tenderness.  No significant joint swelling  Neurological: Pt is alert and oriented to person, place, and time. They have normal reflexes. CN 2-12 appear  grossly intact  Skin: Skin is warm and dry without significant rashes     Results Review:   I reviewed the patient's new imaging results and agree with the interpretation.    DATA:  Lab Results (last 24 hours)     Procedure Component Value Units Date/Time    High Sensitivity Troponin T 2Hr [[id number]]  (Abnormal) Collected: 05/27/23 0315    Specimen: Blood Updated: 05/27/23 0351     HS Troponin T 105 ng/L      Troponin T Delta 14 ng/L     Narrative:      High Sensitive Troponin T Reference Range:  <10.0 ng/L- Negative Female for AMI  <15.0 ng/L- Negative Male for AMI  >=10 - Abnormal Female indicating possible myocardial injury.  >=15 - Abnormal Male indicating possible myocardial injury.   Clinicians would have to utilize clinical acumen, EKG, Troponin, and serial changes to determine if it is an Acute Myocardial Infarction or myocardial injury due to an underlying chronic condition.         [place] Draw [[id number]] Collected: 05/27/23 0127    Specimen: Blood Updated: 05/27/23 0231    Narrative:      The following orders were created for panel order [place] [facility].  Procedure                               Abnormality         Status                     ---------                               -----------         ------                     Green Top (Gel)[[id number]]                                  Final result               Lavender Top[[id number]]                                     Final result               Red Top[[id number]]                                          Final result               Light Blue Top[[id number]]                                   Final result                 Please view results for these tests on the individual orders.    Green Top (Gel) [[id number]] Collected: 05/27/23 0127    Specimen: Blood Updated: 05/27/23 0231     Extra Tube Hold for add-ons.     Comment: Auto resulted.       Red Top [[id number]] Collected: 05/27/23 0127    Specimen: Blood Updated: 05/27/23 0231     Extra Tube Hold for  add-ons.     Comment: Auto resulted.       Light Blue Top [[id number]] Collected: 05/27/23 0127    Specimen: Blood Updated: 05/27/23 0231     Extra Tube Hold for add-ons.     Comment: Auto resulted       Lavender Top [[id number]] Collected: 05/27/23 0127    Specimen: Blood Updated: 05/27/23 0231     Extra Tube hold for add-on     Comment: Auto resulted       Comprehensive Metabolic Panel [[id number]]  (Abnormal) Collected: 05/27/23 0127    Specimen: Blood Updated: 05/27/23 0203     Glucose 210 mg/dL      BUN 19 mg/dL      Creatinine 1.38 mg/dL      Sodium 140 mmol/L      Potassium 4.4 mmol/L      Comment: Slight hemolysis detected by analyzer. Results may be affected.        Chloride 107 mmol/L      CO2 22.0 mmol/L      Calcium 8.9 mg/dL      Total Protein 5.8 g/dL      Albumin 3.8 g/dL      ALT (SGPT) 16 U/L      AST (SGOT) 25 U/L      Comment: Slight hemolysis detected by analyzer. Results may be affected.        Alkaline Phosphatase 57 U/L      Total Bilirubin 0.4 mg/dL      Globulin 2.0 gm/dL      A/G Ratio 1.9 g/dL      BUN/Creatinine Ratio 13.8     Anion Gap 11.0 mmol/L      eGFR 53.0 mL/min/1.73     Narrative:      GFR Normal >60  Chronic Kidney Disease <60  Kidney Failure <15    The GFR formula is only valid for adults with stable renal function between ages 18 and 70.    High Sensitivity Troponin T [[id number]]  (Abnormal) Collected: 05/27/23 0127    Specimen: Blood Updated: 05/27/23 0203     HS Troponin T 91 ng/L     Narrative:      High Sensitive Troponin T Reference Range:  <10.0 ng/L- Negative Female for AMI  <15.0 ng/L- Negative Male for AMI  >=10 - Abnormal Female indicating possible myocardial injury.  >=15 - Abnormal Male indicating possible myocardial injury.   Clinicians would have to utilize clinical acumen, EKG, Troponin, and serial changes to determine if it is an Acute Myocardial Infarction or myocardial injury due to an underlying chronic condition.         BNP [[id number]]  (Normal) Collected:  "05/27/23 0127    Specimen: Blood Updated: 05/27/23 0200     proBNP 933.8 pg/mL     Narrative:      Among patients with dyspnea, NT-proBNP is highly sensitive for the detection of acute congestive heart failure. In addition NT-proBNP of <300 pg/ml effectively rules out acute congestive heart failure with 99% negative predictive value.    Results may be falsely decreased if patient taking Biotin.      D-dimer, Quantitative [[id number]]  (Normal) Collected: 05/27/23 0127    Specimen: Blood Updated: 05/27/23 0146     D-Dimer, Quantitative 0.49 MCGFEU/mL     Narrative:      According to the assay 's published package insert, a normal (<0.50 MCGFEU/mL) D-dimer result in conjunction with a non-high clinical probability assessment, excludes deep vein thrombosis (DVT) and pulmonary embolism (PE) with high sensitivity.    D-dimer values increase with age and this can make VTE exclusion of an older population difficult. To address this, the [employer], based on best available evidence and recent guidelines, recommends that clinicians use age-adjusted D-dimer thresholds in patients greater than 50 years of age with: a) a low probability of PE who do not meet all Pulmonary Embolism Rule Out Criteria, or b) in those with intermediate probability of PE.   The formula for an age-adjusted D-dimer cut-off is \"age/100\".  For example, a 60 year old patient would have an age-adjusted cut-off of 0.60 MCGFEU/mL and an 80 year old 0.80 MCGFEU/mL.    CBC & Differential [[id number]]  (Abnormal) Collected: 05/27/23 0127    Specimen: Blood Updated: 05/27/23 0135    Narrative:      The following orders were created for panel order CBC & Differential.  Procedure                               Abnormality         Status                     ---------                               -----------         ------                     CBC Auto Differential[[id number]]        Abnormal            Final result             "     Please view results for these tests on the individual orders.    CBC Auto Differential [[id number]]  (Abnormal) Collected: 05/27/23 0127    Specimen: Blood Updated: 05/27/23 0135     WBC 6.42 10*3/mm3      RBC 3.55 10*6/mm3      Hemoglobin 11.0 g/dL      Hematocrit 35.5 %      .0 fL      MCH 31.0 pg      MCHC 31.0 g/dL      RDW 17.1 %      RDW-SD 62.3 fl      MPV 9.7 fL      Platelets 160 10*3/mm3      Neutrophil % 72.4 %      Lymphocyte % 13.2 %      Monocyte % 10.1 %      Eosinophil % 3.3 %      Basophil % 0.5 %      Immature Grans % 0.5 %      Neutrophils, Absolute 4.65 10*3/mm3      Lymphocytes, Absolute 0.85 10*3/mm3      Monocytes, Absolute 0.65 10*3/mm3      Eosinophils, Absolute 0.21 10*3/mm3      Basophils, Absolute 0.03 10*3/mm3      Immature Grans, Absolute 0.03 10*3/mm3      nRBC 0.0 /100 WBC         Imaging Results (Last 24 Hours)     Procedure Component Value Units Date/Time    XR Chest 1 View [[id number]] Collected: 05/27/23 0409     Updated: 05/27/23 0413    Narrative:      EXAM/TECHNIQUE: XR CHEST 1 VW-     INDICATION: Chest Pain Triage Protocol; I49.9-Cardiac arrhythmia,  unspecified; R07.9-Chest pain, unspecified; R06.02-Shortness of breath;  R60.9-Edema, unspecified     COMPARISON: 02/06/2019     FINDINGS:     Cardiac pacing/ICD device is stable position. Prior median sternotomy  and CABG. Lung volumes are low. No pleural effusion, pneumothorax, or  focal consolidation. No acute osseous finding.       Impression:         No acute findings.  This report was finalized on 05/27/2023 04:10 by Dr. Alonso Gomez MD.            ASSESSMENT AND PLAN:      1.     Shockable cardiac rhythm detected by automated external defibrillator   - Cardiology to see today   - Vtach on interrogation    - Coreg, entresto home medications hold for now blood pressure low     2.   Coronary artery disease native graft s/p CABG with stable angina   - cardiology consulted   - ASA given   - Telemetry monitoring    -  Troponin elevated   - BNP normal    - Resume statin    3.   Hypertension    - Holding meds in the setting of hypotension and cardiology evaluation     4.  Mixed hyperlipidemia    - Continue statin     Vern Chacon, APRN  06:33 CDT 5/27/2023

## 2023-05-28 ENCOUNTER — READMISSION MANAGEMENT (OUTPATIENT)
Dept: CALL CENTER | Facility: HOSPITAL | Age: 78
End: 2023-05-28
Payer: MEDICARE

## 2023-05-28 VITALS
WEIGHT: 219.6 LBS | DIASTOLIC BLOOD PRESSURE: 43 MMHG | BODY MASS INDEX: 30.74 KG/M2 | RESPIRATION RATE: 18 BRPM | OXYGEN SATURATION: 98 % | HEIGHT: 71 IN | SYSTOLIC BLOOD PRESSURE: 100 MMHG | TEMPERATURE: 98 F | HEART RATE: 75 BPM

## 2023-05-28 PROCEDURE — 99232 SBSQ HOSP IP/OBS MODERATE 35: CPT | Performed by: INTERNAL MEDICINE

## 2023-05-28 RX ORDER — SACUBITRIL AND VALSARTAN 49; 51 MG/1; MG/1
0.5 TABLET, FILM COATED ORAL 2 TIMES DAILY
Qty: 60 TABLET | Refills: 11 | Status: SHIPPED | OUTPATIENT
Start: 2023-05-28

## 2023-05-28 RX ORDER — CARVEDILOL 6.25 MG/1
6.25 TABLET ORAL 2 TIMES DAILY WITH MEALS
Qty: 30 TABLET | Refills: 0 | Status: SHIPPED | OUTPATIENT
Start: 2023-05-28

## 2023-05-28 RX ADMIN — PREGABALIN 150 MG: 75 CAPSULE ORAL at 08:14

## 2023-05-28 RX ADMIN — FINASTERIDE 5 MG: 5 TABLET, FILM COATED ORAL at 08:13

## 2023-05-28 RX ADMIN — RANOLAZINE 1000 MG: 500 TABLET, FILM COATED, EXTENDED RELEASE ORAL at 08:13

## 2023-05-28 RX ADMIN — EMPAGLIFLOZIN 10 MG: 10 TABLET, FILM COATED ORAL at 08:13

## 2023-05-28 RX ADMIN — SACUBITRIL AND VALSARTAN 0.5 TABLET: 49; 51 TABLET, FILM COATED ORAL at 08:13

## 2023-05-28 RX ADMIN — CLOPIDOGREL BISULFATE 75 MG: 75 TABLET, FILM COATED ORAL at 08:13

## 2023-05-28 RX ADMIN — TAMSULOSIN HYDROCHLORIDE 0.4 MG: 0.4 CAPSULE ORAL at 08:13

## 2023-05-28 RX ADMIN — PANTOPRAZOLE SODIUM 40 MG: 40 TABLET, DELAYED RELEASE ORAL at 05:51

## 2023-05-28 RX ADMIN — CARVEDILOL 6.25 MG: 6.25 TABLET, FILM COATED ORAL at 08:13

## 2023-05-28 NOTE — PLAN OF CARE
Goal Outcome Evaluation:  Plan of Care Reviewed With: patient        Progress: improving  Outcome Evaluation: VSS, tele shows  75-78.  Patient had no new c/o of pain or shortness of air. BP stable overnight in 100s.

## 2023-05-28 NOTE — DISCHARGE SUMMARY
Hospital Discharge Summary    Darren Maria  :  1945  MRN:  [id number]    Admit date:  2023  Discharge date:  2023    Admitting Physician:    Rolan Salvador MD    Discharge Diagnoses:      Shockable cardiac rhythm detected by automated external defibrillator    Coronary artery disease of bypass graft of native heart with stable angina pectoris    Essential hypertension    Mixed hyperlipidemia      Hospital Course:   The patient was admitted for the above surgical/medical indication.  Please see admission H&P for further details concerning the admission.  The patient was seen daily and progress noted via daily updates in the progress notes.  The patient improved throughout her stay.  They reached maximum medical improvement and were considered stable for discharge home.  They understand the importance of follow-up concerning any abnormal lab values/x-rays.  All questions were answered to the best of my ability prior to their discharge home.    The patient is a 78 y.o. male with significant past medical history of ischemic cardiomyopathy with AICD, hypertension, coronary artery disease status post CABG, atrial fibrillaiton, history of renal cell carcinoma who presents with chest pain then AICD fire.  Patient stated on admission that he had not been feeling well for the past week.  He states that he has been having dizziness, lightheaded, dyspnea on exertion and mild leg swelling.  Patient stated last night he had some left arm pain, followed by some chest pain and then he was shocked by his AICD.  The AICD was interrogated and found ventricular tachycardia.  Dr. Waterman was contacted through the ER last night.  They are to see his this morning.  Patient found to have elevated troponin in the ER with some mild hypotension as well.  Could be secondary to his shock but with his history concerning for possible ACS since he did have chest pain prior to his shock.     Patient was evaluated by  cardiology and felt that he was stable for discharge home.  No new findings on his echocardiogram, slight improvement.  Chronically followed by Dr. Posadas for his cardiac issues.  Patient denies any chest pain, blood pressure medications adjusted and blood pressure improved.      Follow up with Dr. Salvador this coming week.       Discharge Medications:         Discharge Medications      Changes to Medications      Instructions Start Date   carvedilol 6.25 MG tablet  Commonly known as: COREG  What changed:   · medication strength  · how much to take  · when to take this   6.25 mg, Oral, 2 Times Daily With Meals      Entresto 49-51 MG tablet  Generic drug: sacubitril-valsartan  What changed: how much to take   0.5 tablets, Oral, 2 Times Daily         Continue These Medications      Instructions Start Date   atorvastatin 40 MG tablet  Commonly known as: LIPITOR   40 mg, Oral, Nightly      cetirizine 10 MG tablet  Commonly known as: zyrTEC   10 mg, Oral, Daily      clopidogrel 75 MG tablet  Commonly known as: PLAVIX   75 mg, Oral, Daily      cyclobenzaprine 5 MG tablet  Commonly known as: FLEXERIL   1 tablet, Oral, 3 Times Daily      finasteride 5 MG tablet  Commonly known as: PROSCAR   5 mg, Oral, Daily      fluticasone 50 MCG/ACT nasal spray  Commonly known as: FLONASE   2 sprays, Nasal, Daily PRN      Jardiance 10 MG tablet tablet  Generic drug: empagliflozin   TAKE 1 TABLET BY MOUTH EVERY DAY IN THE MORNING      metFORMIN 500 MG tablet  Commonly known as: GLUCOPHAGE   500 mg, Oral, 2 Times Daily With Meals, Two tabs daily      Mirabegron ER 50 MG tablet sustained-release 24 hour 24 hr tablet  Commonly known as: Myrbetriq   50 mg, Oral, Daily      nitroglycerin 0.4 MG SL tablet  Commonly known as: NITROSTAT   0.4 mg, Sublingual, Every 5 Minutes PRN, Take no more than 3 doses in 15 minutes.      omeprazole 40 MG capsule  Commonly known as: priLOSEC   40 mg, Oral, Daily      potassium chloride 20 MEQ CR tablet  Commonly  known as: K-DUR,KLOR-CON   20 mEq, Oral, Daily, With additional 20 meq if second dose of torsemide taken       pregabalin 150 MG capsule  Commonly known as: LYRICA   150 mg, Oral, 2 Times Daily      promethazine-dextromethorphan 6.25-15 MG/5ML syrup  Commonly known as: PROMETHAZINE-DM   As Needed.      ranolazine 1000 MG 12 hr tablet  Commonly known as: RANEXA   1,000 mg, Oral, 2 Times Daily      rivaroxaban 20 MG tablet  Commonly known as: Xarelto   20 mg, Oral, Daily, Take 1 tablet by mouth daily with food.      tamsulosin 0.4 MG capsule 24 hr capsule  Commonly known as: FLOMAX   1 capsule, Oral, Daily      torsemide 20 MG tablet  Commonly known as: DEMADEX   20 mg, Oral, Daily PRN, With additional 20 mg PRN       traMADol-acetaminophen 37.5-325 MG per tablet  Commonly known as: ULTRACET   as needed      zolpidem 5 MG tablet  Commonly known as: AMBIEN   TAKE 1 TABLET BY MOUTH EVERY DAY AT BEDTIME AS NEEDED             Consults: Cardiology     Significant Diagnostic Studies:      Lab Results (last 48 hours)     Procedure Component Value Units Date/Time    High Sensitivity Troponin T 2Hr [[id number]]  (Abnormal) Collected: 05/27/23 0315    Specimen: Blood Updated: 05/27/23 0351     HS Troponin T 105 ng/L      Troponin T Delta 14 ng/L     Narrative:      High Sensitive Troponin T Reference Range:  <10.0 ng/L- Negative Female for AMI  <15.0 ng/L- Negative Male for AMI  >=10 - Abnormal Female indicating possible myocardial injury.  >=15 - Abnormal Male indicating possible myocardial injury.   Clinicians would have to utilize clinical acumen, EKG, Troponin, and serial changes to determine if it is an Acute Myocardial Infarction or myocardial injury due to an underlying chronic condition.         [place] Draw [[id number]] Collected: 05/27/23 0127    Specimen: Blood Updated: 05/27/23 0231    Narrative:      The following orders were created for panel order [place] Draw.  Procedure                               Abnormality          Status                     ---------                               -----------         ------                     Green Top (Gel)[[id number]]                                  Final result               Lavender Top[[id number]]                                     Final result               Red Top[[id number]]                                          Final result               Light Blue Top[[id number]]                                   Final result                 Please view results for these tests on the individual orders.    Green Top (Gel) [[id number]] Collected: 05/27/23 0127    Specimen: Blood Updated: 05/27/23 0231     Extra Tube Hold for add-ons.     Comment: Auto resulted.       Red Top [[id number]] Collected: 05/27/23 0127    Specimen: Blood Updated: 05/27/23 0231     Extra Tube Hold for add-ons.     Comment: Auto resulted.       Light Blue Top [[id number]] Collected: 05/27/23 0127    Specimen: Blood Updated: 05/27/23 0231     Extra Tube Hold for add-ons.     Comment: Auto resulted       Lavender Top [[id number]] Collected: 05/27/23 0127    Specimen: Blood Updated: 05/27/23 0231     Extra Tube hold for add-on     Comment: Auto resulted       Comprehensive Metabolic Panel [[id number]]  (Abnormal) Collected: 05/27/23 0127    Specimen: Blood Updated: 05/27/23 0203     Glucose 210 mg/dL      BUN 19 mg/dL      Creatinine 1.38 mg/dL      Sodium 140 mmol/L      Potassium 4.4 mmol/L      Comment: Slight hemolysis detected by analyzer. Results may be affected.        Chloride 107 mmol/L      CO2 22.0 mmol/L      Calcium 8.9 mg/dL      Total Protein 5.8 g/dL      Albumin 3.8 g/dL      ALT (SGPT) 16 U/L      AST (SGOT) 25 U/L      Comment: Slight hemolysis detected by analyzer. Results may be affected.        Alkaline Phosphatase 57 U/L      Total Bilirubin 0.4 mg/dL      Globulin 2.0 gm/dL      A/G Ratio 1.9 g/dL      BUN/Creatinine Ratio 13.8     Anion Gap 11.0 mmol/L      eGFR 53.0 mL/min/1.73     Narrative:      GFR  Normal >60  Chronic Kidney Disease <60  Kidney Failure <15    The GFR formula is only valid for adults with stable renal function between ages 18 and 70.    High Sensitivity Troponin T [[id number]]  (Abnormal) Collected: 05/27/23 0127    Specimen: Blood Updated: 05/27/23 0203     HS Troponin T 91 ng/L     Narrative:      High Sensitive Troponin T Reference Range:  <10.0 ng/L- Negative Female for AMI  <15.0 ng/L- Negative Male for AMI  >=10 - Abnormal Female indicating possible myocardial injury.  >=15 - Abnormal Male indicating possible myocardial injury.   Clinicians would have to utilize clinical acumen, EKG, Troponin, and serial changes to determine if it is an Acute Myocardial Infarction or myocardial injury due to an underlying chronic condition.         BNP [[id number]]  (Normal) Collected: 05/27/23 0127    Specimen: Blood Updated: 05/27/23 0200     proBNP 933.8 pg/mL     Narrative:      Among patients with dyspnea, NT-proBNP is highly sensitive for the detection of acute congestive heart failure. In addition NT-proBNP of <300 pg/ml effectively rules out acute congestive heart failure with 99% negative predictive value.    Results may be falsely decreased if patient taking Biotin.      D-dimer, Quantitative [[id number]]  (Normal) Collected: 05/27/23 0127    Specimen: Blood Updated: 05/27/23 0146     D-Dimer, Quantitative 0.49 MCGFEU/mL     Narrative:      According to the assay 's published package insert, a normal (<0.50 MCGFEU/mL) D-dimer result in conjunction with a non-high clinical probability assessment, excludes deep vein thrombosis (DVT) and pulmonary embolism (PE) with high sensitivity.    D-dimer values increase with age and this can make VTE exclusion of an older population difficult. To address this, the [employer], based on best available evidence and recent guidelines, recommends that clinicians use age-adjusted D-dimer thresholds in patients greater than 50 years of  "age with: a) a low probability of PE who do not meet all Pulmonary Embolism Rule Out Criteria, or b) in those with intermediate probability of PE.   The formula for an age-adjusted D-dimer cut-off is \"age/100\".  For example, a 60 year old patient would have an age-adjusted cut-off of 0.60 MCGFEU/mL and an 80 year old 0.80 MCGFEU/mL.    CBC & Differential [[id number]]  (Abnormal) Collected: 05/27/23 0127    Specimen: Blood Updated: 05/27/23 0135    Narrative:      The following orders were created for panel order CBC & Differential.  Procedure                               Abnormality         Status                     ---------                               -----------         ------                     CBC Auto Differential[[id number]]        Abnormal            Final result                 Please view results for these tests on the individual orders.    CBC Auto Differential [[id number]]  (Abnormal) Collected: 05/27/23 0127    Specimen: Blood Updated: 05/27/23 0135     WBC 6.42 10*3/mm3      RBC 3.55 10*6/mm3      Hemoglobin 11.0 g/dL      Hematocrit 35.5 %      .0 fL      MCH 31.0 pg      MCHC 31.0 g/dL      RDW 17.1 %      RDW-SD 62.3 fl      MPV 9.7 fL      Platelets 160 10*3/mm3      Neutrophil % 72.4 %      Lymphocyte % 13.2 %      Monocyte % 10.1 %      Eosinophil % 3.3 %      Basophil % 0.5 %      Immature Grans % 0.5 %      Neutrophils, Absolute 4.65 10*3/mm3      Lymphocytes, Absolute 0.85 10*3/mm3      Monocytes, Absolute 0.65 10*3/mm3      Eosinophils, Absolute 0.21 10*3/mm3      Basophils, Absolute 0.03 10*3/mm3      Immature Grans, Absolute 0.03 10*3/mm3      nRBC 0.0 /100 WBC           Disposition:   stable  Follow up with Rolan Salvador MD in 1 weeks.    Signed:  Vern Chacon, APRN   5/28/2023, 06:47 CDT    "

## 2023-05-28 NOTE — PROGRESS NOTES
LOS: 1 day   Patient Care Team:  Rolan Salvador MD as PCP - General (Internal Medicine)  Sarbjit Posadas MD as Cardiologist (Cardiology)  Yolanda Garnica APRN as Nurse Practitioner (Family Medicine)  Durga Ochoa MD as Consulting Physician (Urology)  Tyrone Broussard PA as Physician Assistant (Urology)    Chief Complaint:  ICD discharge     Subjective    Darren Maria is a 78 y.o. male who is being seen in follow-up.  Overnight feels much better  No chest pain  No palpitation  No presyncope  No syncope  No orthopnea  No paroxysmal nocturnal dyspnea  Hemodynamically stable  Tolerating current medications well  Wants to be discharged home  Tolerating carvedilol 6.25 mg p.o. twice daily well  Tolerating Entresto well  Is also on Jardiance  Has not had any bleeding issues with combination of Xarelto and Plavix  Labs as referenced below    Telemetry: no malignant arrhythmia. No significant pauses.    Review of Systems   Constitutional: No chills   Has fatigue   No fever.   HENT: Negative.    Eyes: Negative.    Respiratory: Negative for cough,   No chest wall soreness,   Shortness of breath,   no wheezing, no stridor.    Cardiovascular: As above  Gastrointestinal: Negative for abdominal distention,  No abdominal pain,   No blood in stool,   No constipation,   No diarrhea,   No nausea   No vomiting.   Endocrine: Negative.    Genitourinary: Negative for difficulty urinating, dysuria, flank pain and hematuria.   Musculoskeletal: Negative.    Skin: Negative for rash and wound.   Allergic/Immunologic: Negative.    Neurological: Negative for dizziness, syncope, weakness,   No light-headedness  No  headaches.   Hematological: Does not bruise/bleed easily.   Psychiatric/Behavioral: Negative for agitation or behavioral problems,   No confusion,   the patient is  nervous/anxious.       History:   Past Medical History:   Diagnosis Date    Abdominal pain, epigastric     Abnormal abdominal exam     ABFND,  RADIOLOGICAL, ABDOMINAL AREA     Anticoagulated on Coumadin     Atherosclerosis of coronary artery bypass graft     ATHEROSCLEROSIS, CORONARY, ARTERY BYPASS GRFT    Atrial fibrillation     Cancer of right kidney     right sided kidney cancer    Cardiomyopathy     Cardiomyopathy, primary     CHF (congestive heart failure)     Colon polyps     Congestive heart failure     Coronary artery disease     History of CAD/A-Fib/Pacemaker/Defibrillator placement: pt takes Coumadin and plavix therapy as well as ASA daily    Gastric polyp     Gastroesophageal reflux disease without esophagitis     History of colon polyps     Hypercholesterolemia     Hypertension, essential     Melena     Myocardial infarction     NSAID long-term use     Obesity     Pacemaker     Pacemaker Dependant    Platelet inhibition due to Plavix     Presence of stent in coronary artery     Multiple coronary stenting most recently 4/2008    Single kidney     Only 1 Kidney    Status post surgery     s/p Polypectomy Bleed     Past Surgical History:   Procedure Laterality Date    APPENDECTOMY      CARDIAC ABLATION      CARDIAC CATHETERIZATION      CARDIAC CATHETERIZATION Left 12/15/2021    Procedure: Coronary angiography;  Surgeon: Sarbjit Posadas MD;  Location:  [facility];  Service: Cardiology;  Laterality: Left;    CARDIAC CATHETERIZATION Left 12/15/2021    Procedure: Percutaneous Coronary Intervention;  Surgeon: Sarbjit Posadas MD;  Location:  [facility];  Service: Cardiology;  Laterality: Left;    CARDIAC DEFIBRILLATOR PLACEMENT      CARDIAC PACEMAKER PLACEMENT      Pacemaker Placement    CATARACT EXTRACTION, BILATERAL      CHOLECYSTECTOMY      COLONOSCOPY  08/27/2013    snare hep, trans tics recall 3 yr    COLONOSCOPY  10/02/2006    few scattered diverticula    COLONOSCOPY  07/22/2009    tubular adenoma in the ascending colon    COLONOSCOPY  07/25/2005    several polyps in the ascending colon, one in the transverse  colon    COLONOSCOPY N/A 3/21/2022    Procedure: COLONOSCOPY WITH ANESTHESIA;  Surgeon: Phil Goodwin MD;  Location:  [facility];  Service: Gastroenterology;  Laterality: N/A;  pre: hx polyps  post: polyps  Rolan Salvador MD    COLONOSCOPY W/ POLYPECTOMY  09/19/2016    Tubular adenoma hepatic flexure, 2 Hyperplastic polyps rectum and at 50 cm, Benign polyp at 70 cm repeat exam in 3-5 years    CORONARY ARTERY BYPASS GRAFT  02/2011    2nd time    CORONARY ARTERY BYPASS GRAFT  1990    CORONARY STENT PLACEMENT      ENDOSCOPY  04/14/2016    nl slant    ENDOSCOPY  11/10/2014    clips at polypectomy bx no residual recall 1 yr    ENDOSCOPY  06/19/2014    snare clip body recall 6 months    ENDOSCOPY  05/19/2014    polyp stomach bx and clip    HERNIA REPAIR      with mesh    JOINT REPLACEMENT      OTHER SURGICAL HISTORY      Defibrillator/Pacer maker exchange    OTHER SURGICAL HISTORY      Pacemaker/Defibillation exchange 2012    PENILE PROSTHESIS IMPLANT N/A 07/27/2018    Procedure: PENILE PROSTHESIS PLACEMENT;  Surgeon: Godwin Gupta MD;  Location:  [facility];  Service: Urology    PERIPHERAL ARTERIAL STENT GRAFT       Social History     Socioeconomic History    Marital status:    Tobacco Use    Smoking status: Never    Smokeless tobacco: Never   Vaping Use    Vaping Use: Never used   Substance and Sexual Activity    Alcohol use: Yes     Comment: occ    Drug use: No    Sexual activity: Defer     Family History   Problem Relation Age of Onset    Lung cancer Mother     No Known Problems Father     Other Neg Hx         GI Malignancies    Colon cancer Neg Hx     Colon polyps Neg Hx        Labs:  WBC WBC   Date Value Ref Range Status   05/27/2023 6.42 3.40 - 10.80 10*3/mm3 Final      HGB Hemoglobin   Date Value Ref Range Status   05/27/2023 11.0 (L) 13.0 - 17.7 g/dL Final      HCT Hematocrit   Date Value Ref Range Status   05/27/2023 35.5 (L) 37.5 - 51.0 % Final      Platelets Platelets   Date Value Ref  "Range Status   05/27/2023 160 140 - 450 10*3/mm3 Final      MCV MCV   Date Value Ref Range Status   05/27/2023 100.0 (H) 79.0 - 97.0 fL Final        Results from last 7 days   Lab Units 05/27/23  0127   SODIUM mmol/L 140   POTASSIUM mmol/L 4.4   CHLORIDE mmol/L 107   CO2 mmol/L 22.0   BUN mg/dL 19   CREATININE mg/dL 1.38*   CALCIUM mg/dL 8.9   BILIRUBIN mg/dL 0.4   ALK PHOS U/L 57   ALT (SGPT) U/L 16   AST (SGOT) U/L 25   GLUCOSE mg/dL 210*     Lab Results   Component Value Date    TROPONINI <0.012 10/26/2016    TROPONINT 105 (C) 05/27/2023     PT/INR:  No results found for: PROTIME/No results found for: INR    Imaging Results (Last 72 Hours)       Procedure Component Value Units Date/Time    XR Chest 1 View [[id number]] Collected: 05/27/23 0409     Updated: 05/27/23 0413    Narrative:      EXAM/TECHNIQUE: XR CHEST 1 VW-     INDICATION: Chest Pain Triage Protocol; I49.9-Cardiac arrhythmia,  unspecified; R07.9-Chest pain, unspecified; R06.02-Shortness of breath;  R60.9-Edema, unspecified     COMPARISON: 02/06/2019     FINDINGS:     Cardiac pacing/ICD device is stable position. Prior median sternotomy  and CABG. Lung volumes are low. No pleural effusion, pneumothorax, or  focal consolidation. No acute osseous finding.       Impression:         No acute findings.  This report was finalized on 05/27/2023 04:10 by Dr. Alonso Gomez MD.            Objective     Allergies   Allergen Reactions    Azithromycin Nausea And Vomiting    Morphine And Related Other (See Comments)     SHAKES AND MAKES HIM \"WIRED\".   TAKES TRAMADOL WITHOUT PROBLEM       Medication Review: Performed  Current Facility-Administered Medications   Medication Dose Route Frequency Provider Last Rate Last Admin    acetaminophen (TYLENOL) tablet 1,000 mg  1,000 mg Oral Q8H PRN Rolan Salvador MD   1,000 mg at 05/27/23 0803    atorvastatin (LIPITOR) tablet 40 mg  40 mg Oral Nightly Vern Chacon APRN   40 mg at 05/27/23 2058    carvedilol (COREG) " "tablet 6.25 mg  6.25 mg Oral BID With Meals Sukhdeep Waterman MD   6.25 mg at 05/28/23 0813    clopidogrel (PLAVIX) tablet 75 mg  75 mg Oral Daily Vern Chacon APRN   75 mg at 05/28/23 0813    cyclobenzaprine (FLEXERIL) tablet 5 mg  5 mg Oral TID PRN Rolan Salvador MD        empagliflozin (JARDIANCE) tablet 10 mg  10 mg Oral QAM Vern Chacon APRN   10 mg at 05/28/23 0813    finasteride (PROSCAR) tablet 5 mg  5 mg Oral Daily Vern Chacon APRN   5 mg at 05/28/23 0813    nitroglycerin (NITROSTAT) SL tablet 0.4 mg  0.4 mg Sublingual Q5 Min PRN Vern Chacon APRN        pantoprazole (PROTONIX) EC tablet 40 mg  40 mg Oral Q AM Vern Chacon APRN   40 mg at 05/28/23 0551    pregabalin (LYRICA) capsule 150 mg  150 mg Oral BID Vern Chacon APRN   150 mg at 05/28/23 0814    ranolazine (RANEXA) 12 hr tablet 1,000 mg  1,000 mg Oral BID Vern Chacon APRN   1,000 mg at 05/28/23 0813    sacubitril-valsartan (ENTRESTO) 49-51 MG tablet 0.5 tablet  0.5 tablet Oral BID Sukhdeep Waterman MD   0.5 tablet at 05/28/23 0813    sodium chloride 0.9 % flush 10 mL  10 mL Intravenous PRN Godwin Yarbrough MD        tamsulosin (FLOMAX) 24 hr capsule 0.4 mg  0.4 mg Oral Daily Vern Chacon APRN   0.4 mg at 05/28/23 0813    traMADol-acetaminophen (ULTRACET) 37.5-325 MG per tablet 1 tablet  1 tablet Oral Q6H PRN Vern Chacon APRN        zolpidem (AMBIEN) tablet 5 mg  5 mg Oral Nightly PRN Vern Chacon APRN           Vital Sign Min/Max for last 24 hours  Temp  Min: 97.4 °F (36.3 °C)  Max: 98 °F (36.7 °C)   BP  Min: 96/66  Max: 120/66   Pulse  Min: 73  Max: 76   Resp  Min: 18  Max: 18   SpO2  Min: 96 %  Max: 98 %   No data recorded   Weight  Min: 99.6 kg (219 lb 9.6 oz)  Max: 99.6 kg (219 lb 9.6 oz)     Flowsheet Rows      Flowsheet Row First Filed Value   Admission Height 180.3 cm (71\") Documented at 05/27/2023 0102   Admission Weight 95.3 kg (210 lb) Documented at 05/27/2023 0102      "       Results for orders placed during the hospital encounter of 05/27/23    Adult Transthoracic Echo Complete W/ Cont if Necessary Per Protocol    Interpretation Summary    Left ventricular systolic function is moderately decreased. Calculated left ventricular EF = 36.4% Left ventricular ejection fraction appears to be 36 - 40%.    The left ventricular cavity is mildly dilated.    The following left ventricular wall segments are akinetic: mid anterior, apical anterior, apical lateral, apical inferior, apical septal, apex and mid anteroseptal.    Left ventricular diastolic dysfunction is noted.    Left atrial volume is severely increased.    Mild pulmonary hypertension is present.      Physical Exam:    General Appearance: Awake, alert, in no acute distress  Eyes: Pupils equal and reactive    Ears: Appear intact with no abnormalities noted  Nose: Nares normal, no drainage  Neck: supple, trachea midline, no carotid bruit and no JVD  Back: no kyphosis present,    Lungs: respirations regular, respirations even and respirations unlabored  Heart: normal S1, S2, no significant murmurs   No gallops or rubs  no rub and no click  Abdomen: normal bowel sounds, no tenderness   Skin: no bleeding, bruising or rash  Extremities: no cyanosis  Psychiatric/Behavioral: Negative for agitation, behavioral problems, confusion, the patient does  appear to be nervous/anxious.       Results Review:   I reviewed the patient's new clinical results.  I reviewed the patient's new imaging results and agree with the interpretation.  I reviewed the patient's other test results and agree with the interpretation  I personally viewed and interpreted the patient's EKG/Telemetry data    Discussed with patient  Updated patient regarding any new or relevant abnormalities on review of records or any new findings on physical exam.   Mentioned to patient about purpose of visit and desirable health short and long term goals and objectives.     Reviewed  available prior notes, consults, prior visits, laboratory findings, radiology and cardiology relevant reports.   Updated chart as applicable.   I have reviewed the patient's medical history in detail and updated the computerized patient record as relevant.          Assessment & Plan       Shockable cardiac rhythm detected by automated external defibrillator    Coronary artery disease of bypass graft of native heart with stable angina pectoris    Essential hypertension    Mixed hyperlipidemia  Left ventricular systolic dysfunction moderate with EF 36-40%  Elevated troponin likely due to rapid atrial fibrillation, ventricular tachycardia and ICD discharge    Plan    Continue on current medication  Patient overall feels markedly improved  Echocardiogram as referenced above shows improvement in left ventricular ejection fraction  He wants to be discharged home and does not want any additional cardiac testing  May consider outpatient nuclear perfusion scan however likely has troponin elevation is secondary to arrhythmia and ICD discharge  Results of    Cardiac catheterization results from 12/15/2021 reviewed with findings of severe native three-vessel disease with patent LIMA to LAD and SVG to RCA with occluded vein graft to left circumflex coronary artery]  Recommend follow-up with his primary cardiologist or collaborating midlevel practitioner Yolanda ALTAMIRANO in 2 to 4 weeks  If has another episode of ICD discharge would recommend presentation to the emergency room  Discussed ICD interrogation which shows combination of ventricular tachycardia as well as rapid atrial fibrillation and this dual rhythm caused the ICD to discharge  Recommend outpatient electrophysiology follow-up  Telemetry  Deep vein thrombosis prophylaxis/precautions  Appropriate diet, fluid, sodium, caffeine, stimulants intake   Questions were encouraged, asked and answered to the patient's  understanding and satisfaction.  Compliance to diet and  medications       Sukhdeep Waterman MD  05/28/23  09:49 CDT    EMR Dragon/Transcription was used to dictate part of this note

## 2023-05-28 NOTE — PROGRESS NOTES
Darren Maria is a 78 y.o. male patient.  The patient is sitting up in bed with no new complaints.  Blood pressures improved.  No chest pain.     Current Facility-Administered Medications   Medication Dose Route Frequency Provider Last Rate Last Admin   • acetaminophen (TYLENOL) tablet 1,000 mg  1,000 mg Oral Q8H PRN Rolan Salvador MD   1,000 mg at 05/27/23 0803   • atorvastatin (LIPITOR) tablet 40 mg  40 mg Oral Nightly Vern Chacon APRN   40 mg at 05/27/23 2058   • carvedilol (COREG) tablet 6.25 mg  6.25 mg Oral BID With Meals Sukhdeep Waterman MD   6.25 mg at 05/27/23 1715   • clopidogrel (PLAVIX) tablet 75 mg  75 mg Oral Daily Vern Chacon APRN   75 mg at 05/27/23 0827   • cyclobenzaprine (FLEXERIL) tablet 5 mg  5 mg Oral TID PRN Rolan Salvador MD       • empagliflozin (JARDIANCE) tablet 10 mg  10 mg Oral QAM Vern Chacon APRN   10 mg at 05/27/23 0827   • finasteride (PROSCAR) tablet 5 mg  5 mg Oral Daily Vern Chacon APRN   5 mg at 05/27/23 0827   • nitroglycerin (NITROSTAT) SL tablet 0.4 mg  0.4 mg Sublingual Q5 Min PRN Vern Chacon APRN       • pantoprazole (PROTONIX) EC tablet 40 mg  40 mg Oral Q AM Vern Chacon APRN   40 mg at 05/28/23 0551   • pregabalin (LYRICA) capsule 150 mg  150 mg Oral BID Vern Chacon APRN   150 mg at 05/27/23 2058   • ranolazine (RANEXA) 12 hr tablet 1,000 mg  1,000 mg Oral BID Vern Chacon APRN   1,000 mg at 05/27/23 2058   • sacubitril-valsartan (ENTRESTO) 49-51 MG tablet 0.5 tablet  0.5 tablet Oral BID Sukhdeep Waterman MD   0.5 tablet at 05/27/23 2058   • sodium chloride 0.9 % flush 10 mL  10 mL Intravenous PRN Godwin Yarbrough MD       • tamsulosin (FLOMAX) 24 hr capsule 0.4 mg  0.4 mg Oral Daily Vern Chacon APRN   0.4 mg at 05/27/23 0827   • traMADol-acetaminophen (ULTRACET) 37.5-325 MG per tablet 1 tablet  1 tablet Oral Q6H PRN Vern Chacon APRN       • zolpidem (AMBIEN) tablet 5 mg  5 mg Oral Nightly  "FOSTER Chacon, Vern Sawyer, EVELIA         ALLERGIES:    Allergies   Allergen Reactions   • Azithromycin Nausea And Vomiting   • Morphine And Related Other (See Comments)     SHAKES AND MAKES HIM \"WIRED\".   TAKES TRAMADOL WITHOUT PROBLEM       Shockable cardiac rhythm detected by automated external defibrillator    Coronary artery disease of bypass graft of native heart with stable angina pectoris    Essential hypertension    Mixed hyperlipidemia    Blood pressure 120/66, pulse 73, temperature 97.6 °F (36.4 °C), temperature source Oral, resp. rate 18, height 180.3 cm (71\"), weight 99.6 kg (219 lb 9.6 oz), SpO2 98 %.      Subjective:  Symptoms:  Stable.  He reports weakness.  No shortness of breath, cough, chest pain, chest pressure or diarrhea.    Diet:  Adequate intake.  No nausea or vomiting.    Activity level: Normal.    Pain:  He reports no pain.      Review of Systems   Constitutional: Negative for activity change, appetite change, chills and fever.   HENT: Negative for hearing loss, nosebleeds, tinnitus and trouble swallowing.    Eyes: Negative for visual disturbance.   Respiratory: Negative for cough, chest tightness, shortness of breath and wheezing.    Cardiovascular: Negative for chest pain, palpitations and leg swelling.   Gastrointestinal: Negative for abdominal distention, abdominal pain, blood in stool, constipation, diarrhea, nausea and vomiting.   Endocrine: Negative for cold intolerance, heat intolerance, polydipsia, polyphagia and polyuria.   Genitourinary: Negative for decreased urine volume, difficulty urinating, dysuria, flank pain, frequency and hematuria.   Musculoskeletal: Negative for arthralgias, joint swelling and myalgias.   Skin: Negative for rash.   Allergic/Immunologic: Negative for immunocompromised state.   Neurological: Positive for weakness. Negative for dizziness, syncope, light-headedness and headaches.   Hematological: Negative for adenopathy. Does not bruise/bleed easily. " "  Psychiatric/Behavioral: Negative for confusion and sleep disturbance. The patient is not nervous/anxious.      Objective:  General Appearance:  Comfortable.    Vital signs: (most recent): Blood pressure 120/66, pulse 73, temperature 97.6 °F (36.4 °C), temperature source Oral, resp. rate 18, height 180.3 cm (71\"), weight 99.6 kg (219 lb 9.6 oz), SpO2 98 %.  Vital signs are normal.  No fever.    Output: Producing urine.    HEENT: Normal HEENT exam.    Lungs:  Normal effort and normal respiratory rate.  Breath sounds clear to auscultation.    Heart: Normal rate.  Regular rhythm.  S1 normal and S2 normal.    Abdomen: Abdomen is soft.  Bowel sounds are normal.   There is no abdominal tenderness.     Extremities: Normal range of motion.    Pulses: Distal pulses are intact.    Neurological: Patient is alert and oriented to person, place and time.    Pupils:  Pupils are equal, round, and reactive to light.    Skin:  Warm and dry.              Labs:  Lab Results (last 72 hours)     Procedure Component Value Units Date/Time    High Sensitivity Troponin T 2Hr [[id number]]  (Abnormal) Collected: 05/27/23 0315    Specimen: Blood Updated: 05/27/23 0351     HS Troponin T 105 ng/L      Troponin T Delta 14 ng/L     Narrative:      High Sensitive Troponin T Reference Range:  <10.0 ng/L- Negative Female for AMI  <15.0 ng/L- Negative Male for AMI  >=10 - Abnormal Female indicating possible myocardial injury.  >=15 - Abnormal Male indicating possible myocardial injury.   Clinicians would have to utilize clinical acumen, EKG, Troponin, and serial changes to determine if it is an Acute Myocardial Infarction or myocardial injury due to an underlying chronic condition.         [place] Draw [[id number]] Collected: 05/27/23 0127    Specimen: Blood Updated: 05/27/23 0231    Narrative:      The following orders were created for panel order [place] Draw.  Procedure                               Abnormality         Status                   "   ---------                               -----------         ------                     Green Top (Gel)[[id number]]                                  Final result               Lavender Top[[id number]]                                     Final result               Red Top[[id number]]                                          Final result               Light Blue Top[[id number]]                                   Final result                 Please view results for these tests on the individual orders.    Green Top (Gel) [[id number]] Collected: 05/27/23 0127    Specimen: Blood Updated: 05/27/23 0231     Extra Tube Hold for add-ons.     Comment: Auto resulted.       Red Top [[id number]] Collected: 05/27/23 0127    Specimen: Blood Updated: 05/27/23 0231     Extra Tube Hold for add-ons.     Comment: Auto resulted.       Light Blue Top [[id number]] Collected: 05/27/23 0127    Specimen: Blood Updated: 05/27/23 0231     Extra Tube Hold for add-ons.     Comment: Auto resulted       Lavender Top [[id number]] Collected: 05/27/23 0127    Specimen: Blood Updated: 05/27/23 0231     Extra Tube hold for add-on     Comment: Auto resulted       Comprehensive Metabolic Panel [[id number]]  (Abnormal) Collected: 05/27/23 0127    Specimen: Blood Updated: 05/27/23 0203     Glucose 210 mg/dL      BUN 19 mg/dL      Creatinine 1.38 mg/dL      Sodium 140 mmol/L      Potassium 4.4 mmol/L      Comment: Slight hemolysis detected by analyzer. Results may be affected.        Chloride 107 mmol/L      CO2 22.0 mmol/L      Calcium 8.9 mg/dL      Total Protein 5.8 g/dL      Albumin 3.8 g/dL      ALT (SGPT) 16 U/L      AST (SGOT) 25 U/L      Comment: Slight hemolysis detected by analyzer. Results may be affected.        Alkaline Phosphatase 57 U/L      Total Bilirubin 0.4 mg/dL      Globulin 2.0 gm/dL      A/G Ratio 1.9 g/dL      BUN/Creatinine Ratio 13.8     Anion Gap 11.0 mmol/L      eGFR 53.0 mL/min/1.73     Narrative:      GFR Normal >60  Chronic Kidney  Disease <60  Kidney Failure <15    The GFR formula is only valid for adults with stable renal function between ages 18 and 70.    High Sensitivity Troponin T [[id number]]  (Abnormal) Collected: 05/27/23 0127    Specimen: Blood Updated: 05/27/23 0203     HS Troponin T 91 ng/L     Narrative:      High Sensitive Troponin T Reference Range:  <10.0 ng/L- Negative Female for AMI  <15.0 ng/L- Negative Male for AMI  >=10 - Abnormal Female indicating possible myocardial injury.  >=15 - Abnormal Male indicating possible myocardial injury.   Clinicians would have to utilize clinical acumen, EKG, Troponin, and serial changes to determine if it is an Acute Myocardial Infarction or myocardial injury due to an underlying chronic condition.         BNP [[id number]]  (Normal) Collected: 05/27/23 0127    Specimen: Blood Updated: 05/27/23 0200     proBNP 933.8 pg/mL     Narrative:      Among patients with dyspnea, NT-proBNP is highly sensitive for the detection of acute congestive heart failure. In addition NT-proBNP of <300 pg/ml effectively rules out acute congestive heart failure with 99% negative predictive value.    Results may be falsely decreased if patient taking Biotin.      D-dimer, Quantitative [[id number]]  (Normal) Collected: 05/27/23 0127    Specimen: Blood Updated: 05/27/23 0146     D-Dimer, Quantitative 0.49 MCGFEU/mL     Narrative:      According to the assay 's published package insert, a normal (<0.50 MCGFEU/mL) D-dimer result in conjunction with a non-high clinical probability assessment, excludes deep vein thrombosis (DVT) and pulmonary embolism (PE) with high sensitivity.    D-dimer values increase with age and this can make VTE exclusion of an older population difficult. To address this, the [employer] College [employer], based on best available evidence and recent guidelines, recommends that clinicians use age-adjusted D-dimer thresholds in patients greater than 50 years of age with: a) a low  "probability of PE who do not meet all Pulmonary Embolism Rule Out Criteria, or b) in those with intermediate probability of PE.   The formula for an age-adjusted D-dimer cut-off is \"age/100\".  For example, a 60 year old patient would have an age-adjusted cut-off of 0.60 MCGFEU/mL and an 80 year old 0.80 MCGFEU/mL.    CBC & Differential [[id number]]  (Abnormal) Collected: 05/27/23 0127    Specimen: Blood Updated: 05/27/23 0135    Narrative:      The following orders were created for panel order CBC & Differential.  Procedure                               Abnormality         Status                     ---------                               -----------         ------                     CBC Auto Differential[[id number]]        Abnormal            Final result                 Please view results for these tests on the individual orders.    CBC Auto Differential [[id number]]  (Abnormal) Collected: 05/27/23 0127    Specimen: Blood Updated: 05/27/23 0135     WBC 6.42 10*3/mm3      RBC 3.55 10*6/mm3      Hemoglobin 11.0 g/dL      Hematocrit 35.5 %      .0 fL      MCH 31.0 pg      MCHC 31.0 g/dL      RDW 17.1 %      RDW-SD 62.3 fl      MPV 9.7 fL      Platelets 160 10*3/mm3      Neutrophil % 72.4 %      Lymphocyte % 13.2 %      Monocyte % 10.1 %      Eosinophil % 3.3 %      Basophil % 0.5 %      Immature Grans % 0.5 %      Neutrophils, Absolute 4.65 10*3/mm3      Lymphocytes, Absolute 0.85 10*3/mm3      Monocytes, Absolute 0.65 10*3/mm3      Eosinophils, Absolute 0.21 10*3/mm3      Basophils, Absolute 0.03 10*3/mm3      Immature Grans, Absolute 0.03 10*3/mm3      nRBC 0.0 /100 WBC           Imaging Results (Last 72 Hours)     Procedure Component Value Units Date/Time    XR Chest 1 View [[id number]] Collected: 05/27/23 0409     Updated: 05/27/23 0413    Narrative:      EXAM/TECHNIQUE: XR CHEST 1 VW-     INDICATION: Chest Pain Triage Protocol; I49.9-Cardiac arrhythmia,  unspecified; R07.9-Chest pain, unspecified; " R06.02-Shortness of breath;  R60.9-Edema, unspecified     COMPARISON: 02/06/2019     FINDINGS:     Cardiac pacing/ICD device is stable position. Prior median sternotomy  and CABG. Lung volumes are low. No pleural effusion, pneumothorax, or  focal consolidation. No acute osseous finding.       Impression:         No acute findings.  This report was finalized on 05/27/2023 04:10 by Dr. Alonso Gomez MD.                Assessment:    Condition: In stable condition.  Improving.       Problem List:     Shockable cardiac rhythm detected by automated external defibrillator    Coronary artery disease of bypass graft of native heart with stable angina pectoris    Essential hypertension    Mixed hyperlipidemia    1.     Shockable cardiac rhythm detected by automated external defibrillator              - Cardiology following              - Vtach on interrogation               - Coreg, entresto resumed at lower doses   - Echo shows slight improvement in EF since last completed.             2.   Coronary artery disease native graft s/p CABG with stable angina              - cardiology following               - ASA given              - Telemetry monitoring               - Troponin elevated              - BNP normal               - Resume statin     3.   Hypertension               - Coreg and Entresto decreased      4.  Mixed hyperlipidemia               - Continue statin       Vern Chacon, APRN  5/28/2023

## 2023-05-29 NOTE — OUTREACH NOTE
Prep Survey      Flowsheet Row Responses   [facility] facility patient discharged from? [facility]   Is LACE score < 7 ? No   Eligibility Readm Mgmt   Discharge diagnosis Shockable cardiac rhythm detected by automated external defibrillator   Does the patient have one of the following disease processes/diagnoses(primary or secondary)? Other   Does the patient have Home health ordered? No   Is there a DME ordered? No   Prep survey completed? Yes            Shellie VILLAREAL - Registered Nurse

## 2023-05-30 ENCOUNTER — TELEPHONE (OUTPATIENT)
Dept: CARDIOLOGY | Facility: CLINIC | Age: 78
End: 2023-05-30

## 2023-05-30 NOTE — PROGRESS NOTES
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     Patient: Darren Maria        : 1945  Account: [id number]           Admit Date: 2023        How to Respond to this query:       a. Click New Note     b. Answer query within the yellow box.                c. Update the Problem List, if applicable.      If you have any questions about this query contact me at: [email or web address]     EVELIA JANI Chacon,     77 y/o noted with a shockable cardiac rhythm detected by AICD, Coronary Artery Disease, Hypertension, Hyperlipidemia and a history of Congestive Heart Failure. Echo (23) with EF 36%, Left ventricular diastolic dysfunction is noted, and mild pulmonary hypertension is present. Patient treated with Coreg, and Entresto.     After study, can the Congestive Heart Failure be specified as:     >>Chronic Heart Failure with reduced EF  >>Systolic CHF   >>Other (please specify):_____________  >>Unable to determine       By submitting this query, we are merely seeking further clarification of documentation to accurately reflect all conditions that you are monitoring, evaluating, treating or that extend the hospitalization or utilize additional resources of care. Please utilize your independent clinical judgment when addressing the question(s) above.     This query and your response, once completed, will be entered into the legal medical record.    Sincerely,  Shannan Ramachandran RN CCDS   Clinical Documentation Integrity Program

## 2023-05-30 NOTE — TELEPHONE ENCOUNTER
Pt called stating that his defibrillator went off Saturday and he went to the ER.  They cut his Entresto in half and changed his Coreg to 6.25 mg bid but only gave him 15 days.  He is leaving Thursday for a 10 day trip to [place] and needs to know if he can still go.  If not will need a letter stating shy he was advised not to go.  He wants you to look at the ER record and check his medications and give him advise about if he should go on his trip or not.  Please advise today because he has to let them know today.  René Padgett, [facility] (covering for [facility])

## 2023-06-08 ENCOUNTER — READMISSION MANAGEMENT (OUTPATIENT)
Dept: CALL CENTER | Facility: HOSPITAL | Age: 78
End: 2023-06-08
Payer: MEDICARE

## 2023-06-08 NOTE — OUTREACH NOTE
Medical Week 2 Survey      Flowsheet Row Responses   [facility] facility patient discharged from? [place]   Does the patient have one of the following disease processes/diagnoses(primary or secondary)? Other   Week 2 attempt successful? No   Unsuccessful attempts Attempt 1            Dior KHALIL - Registered Nurse

## 2023-06-13 ENCOUNTER — READMISSION MANAGEMENT (OUTPATIENT)
Dept: CALL CENTER | Facility: HOSPITAL | Age: 78
End: 2023-06-13
Payer: MEDICARE

## 2023-06-13 NOTE — OUTREACH NOTE
Medical Week 2 Survey      Flowsheet Row Responses   [facility] [place] by [facility] patient discharged from? [place]   Does the patient have one of the following disease processes/diagnoses(primary or secondary)? Other   Week 2 attempt successful? Yes   Call start time 1443   Discharge diagnosis Shockable cardiac rhythm detected by automated external defibrillator   Call end time 1448   Medication alerts for this patient [facility]   Meds reviewed with patient/caregiver? Yes   Is the patient having any side effects they believe may be caused by any medication additions or changes? No   Does the patient have all medications ordered at discharge? Yes   Is the patient taking all medications as directed (includes completed medication regime)? Yes   Medication comments Has diuretics for prn use--reviewed parameters on AVS   Comments regarding appointments Pt needs to scheduled with Dr. Posadas   Does the patient have a primary care provider?  Yes   Does the patient have an appointment with their PCP within 7 days of discharge? Yes   Comments regarding PCP Pt has f/u with PCP   Has the patient kept scheduled appointments due by today? Yes   Has home health visited the patient within 72 hours of discharge? N/A   Psychosocial issues? No   Did the patient receive a copy of their discharge instructions? Yes   Nursing interventions Reviewed instructions with patient   What is the patient's perception of their health status since discharge? Same  [Reports he had one episode about 6 days ago with weakness but improved with rest.  He is aware to return for cardiac s/s and AICD firing.  He monitors for edema and weighs QOD--reports some edema today.]   Is the patient/caregiver able to teach back signs and symptoms related to disease process for when to call PCP? Yes   Is the patient/caregiver able to teach back signs and symptoms related to disease process for when to call 911? Yes   Week 2 Call Completed? Yes            NAMAN MAHONEY - Registered Nurse

## 2023-08-24 NOTE — PROGRESS NOTES
Subjective    Mr. Maria is 78 y.o. male    Chief Complaint: Urinary Incontinence     History of Present Illness  Patient presents for 6-month follow-up primarily has some urinary incontinence and nocturnal enuresis with urgency he has had a good response to the 50 mg Myrbetriq.  Overall symptoms are well controlled he does stated times there are breakthrough symptoms.  Overall though it has been effective.  He has not had any further episodes of gross hematuria.  He did have a cystoscopy done by Dr. Ochoa that showed no obvious source of the hematuria.    The following portions of the patient's history were reviewed and updated as appropriate: allergies, current medications, past family history, past medical history, past social history, past surgical history and problem list.    Review of Systems   Constitutional:  Negative for chills and fever.   Gastrointestinal:  Negative for abdominal pain, anal bleeding and blood in stool.   Genitourinary:  Positive for frequency and urgency. Negative for dysuria and hematuria.       Current Outpatient Medications:     atorvastatin (LIPITOR) 40 MG tablet, Take 1 tablet by mouth Every Night., Disp: 90 tablet, Rfl: 3    carvedilol (COREG) 6.25 MG tablet, Take 1 tablet by mouth 2 (Two) Times a Day With Meals. (Patient taking differently: Take 2 tablets by mouth 2 (Two) Times a Day With Meals.), Disp: 30 tablet, Rfl: 0    cetirizine (zyrTEC) 10 MG tablet, Take 1 tablet by mouth Daily., Disp: , Rfl:     clopidogrel (PLAVIX) 75 MG tablet, Take 1 tablet by mouth Daily., Disp: , Rfl:     cyclobenzaprine (FLEXERIL) 5 MG tablet, Take 1 tablet by mouth 3 (Three) Times a Day., Disp: , Rfl:     finasteride (PROSCAR) 5 MG tablet, Take 1 tablet by mouth Daily., Disp: , Rfl:     fluticasone (FLONASE) 50 MCG/ACT nasal spray, 2 sprays into the nostril(s) as directed by provider Daily As Needed., Disp: , Rfl:     Jardiance 10 MG tablet tablet, TAKE 1 TABLET BY MOUTH EVERY DAY IN THE MORNING,  Disp: 90 tablet, Rfl: 3    metFORMIN (GLUCOPHAGE) 500 MG tablet, Take 1 tablet by mouth 2 (Two) Times a Day With Meals. Two tabs daily, Disp: , Rfl:     Mirabegron ER (Myrbetriq) 50 MG tablet sustained-release 24 hour 24 hr tablet, Take 50 mg by mouth Daily., Disp: 30 tablet, Rfl: 11    nitroglycerin (NITROSTAT) 0.4 MG SL tablet, Place 1 tablet under the tongue Every 5 (Five) Minutes As Needed for Chest Pain. Take no more than 3 doses in 15 minutes., Disp: 25 tablet, Rfl: 2    omeprazole (PriLOSEC) 40 MG capsule, Take 1 capsule by mouth Daily., Disp: , Rfl:     pregabalin (LYRICA) 150 MG capsule, Take 1 capsule by mouth 2 (Two) Times a Day., Disp: , Rfl:     promethazine-dextromethorphan (PROMETHAZINE-DM) 6.25-15 MG/5ML syrup, As Needed., Disp: , Rfl:     ranolazine (RANEXA) 1000 MG 12 hr tablet, Take 1 tablet by mouth 2 (Two) Times a Day., Disp: 180 tablet, Rfl: 3    rivaroxaban (Xarelto) 20 MG tablet, Take 1 tablet by mouth Daily. Take 1 tablet by mouth daily with food., Disp: 90 tablet, Rfl: 3    sacubitril-valsartan (Entresto) 49-51 MG tablet, Take 0.5 tablets by mouth 2 (Two) Times a Day., Disp: 60 tablet, Rfl: 11    Sotalol HCl AF 80 MG tablet, Take 1 tablet by mouth 2 (Two) Times a Day., Disp: 180 tablet, Rfl: 3    tamsulosin (FLOMAX) 0.4 MG capsule 24 hr capsule, Take 1 capsule by mouth Daily., Disp: , Rfl:     torsemide (DEMADEX) 20 MG tablet, Take 1 tablet by mouth Daily As Needed (weight gain >3 lbs in one day or >5 lbs in 2 days). With additional 20 mg PRN, Disp: , Rfl:     traMADol-acetaminophen (ULTRACET) 37.5-325 MG per tablet, as needed, Disp: , Rfl: 1    zolpidem (AMBIEN) 5 MG tablet, TAKE 1 TABLET BY MOUTH EVERY DAY AT BEDTIME AS NEEDED, Disp: , Rfl: 5    Past Medical History:   Diagnosis Date    Abdominal pain, epigastric     Abnormal abdominal exam     ABFND, RADIOLOGICAL, ABDOMINAL AREA     Abnormal ECG     Anticoagulated on Coumadin     Atherosclerosis of coronary artery bypass graft      ATHEROSCLEROSIS, CORONARY, ARTERY BYPASS GRFT    Atrial fibrillation     Cancer of right kidney     right sided kidney cancer    Cardiomyopathy     Cardiomyopathy, primary     CHF (congestive heart failure)     Clotting disorder     Colon polyps     Congenital heart disease     Congestive heart failure     Coronary artery disease     History of CAD/A-Fib/Pacemaker/Defibrillator placement: pt takes Coumadin and plavix therapy as well as ASA daily    Diabetes mellitus     Gastric polyp     Gastroesophageal reflux disease without esophagitis     History of colon polyps     Hypercholesterolemia     Hypertension, essential     Melena     Myocardial infarction     NSAID long-term use     Obesity     Pacemaker     Pacemaker Dependant    Platelet inhibition due to Plavix     Presence of stent in coronary artery     Multiple coronary stenting most recently 4/2008    Single kidney     Only 1 Kidney    Status post surgery     s/p Polypectomy Bleed       Past Surgical History:   Procedure Laterality Date    ABLATION OF DYSRHYTHMIC FOCUS      APPENDECTOMY      CARDIAC ABLATION      CARDIAC CATHETERIZATION      CARDIAC CATHETERIZATION Left 12/15/2021    Procedure: Coronary angiography;  Surgeon: Sarbjit Posadas MD;  Location:  [facility];  Service: Cardiology;  Laterality: Left;    CARDIAC CATHETERIZATION Left 12/15/2021    Procedure: Percutaneous Coronary Intervention;  Surgeon: Sarbjit Posadas MD;  Location:  [facility];  Service: Cardiology;  Laterality: Left;    CARDIAC DEFIBRILLATOR PLACEMENT      CARDIAC PACEMAKER PLACEMENT      Pacemaker Placement    CATARACT EXTRACTION, BILATERAL      CHOLECYSTECTOMY      COLONOSCOPY  08/27/2013    snare hep, trans tics recall 3 yr    COLONOSCOPY  10/02/2006    few scattered diverticula    COLONOSCOPY  07/22/2009    tubular adenoma in the ascending colon    COLONOSCOPY  07/25/2005    several polyps in the ascending colon, one in the transverse colon  "   COLONOSCOPY N/A 03/21/2022    Procedure: COLONOSCOPY WITH ANESTHESIA;  Surgeon: Phil Goodwin MD;  Location:  [facility];  Service: Gastroenterology;  Laterality: N/A;  pre: hx polyps  post: polyps  Rolan Salvador MD    COLONOSCOPY W/ POLYPECTOMY  09/19/2016    Tubular adenoma hepatic flexure, 2 Hyperplastic polyps rectum and at 50 cm, Benign polyp at 70 cm repeat exam in 3-5 years    CORONARY ARTERY BYPASS GRAFT  02/2011    2nd time    CORONARY ARTERY BYPASS GRAFT  1990    CORONARY STENT PLACEMENT      ENDOSCOPY  04/14/2016    nl slant    ENDOSCOPY  11/10/2014    clips at polypectomy bx no residual recall 1 yr    ENDOSCOPY  06/19/2014    snare clip body recall 6 months    ENDOSCOPY  05/19/2014    polyp stomach bx and clip    HERNIA REPAIR      with mesh    JOINT REPLACEMENT      OTHER SURGICAL HISTORY      Defibrillator/Pacer maker exchange    OTHER SURGICAL HISTORY      Pacemaker/Defibillation exchange 2012    PENILE PROSTHESIS IMPLANT N/A 07/27/2018    Procedure: PENILE PROSTHESIS PLACEMENT;  Surgeon: Godwin Gupta MD;  Location:  [facility] OR;  Service: Urology    PERIPHERAL ARTERIAL STENT GRAFT         Social History     Socioeconomic History    Marital status:    Tobacco Use    Smoking status: Never    Smokeless tobacco: Never   Vaping Use    Vaping Use: Never used   Substance and Sexual Activity    Alcohol use: Yes     Comment: occ    Drug use: Never    Sexual activity: Not Currently     Partners: Female       Family History   Problem Relation Age of Onset    Lung cancer Mother     No Known Problems Father     Other Neg Hx         GI Malignancies    Colon cancer Neg Hx     Colon polyps Neg Hx        Objective    Temp 97 øF (36.1 øC)   Ht 180.3 cm (71\")   Wt 103 kg (227 lb 12.8 oz)   BMI 31.77 kg/mý     Physical Exam  Vitals reviewed.   Constitutional:       Appearance: Normal appearance.   HENT:      Head: Normocephalic and atraumatic.   Pulmonary:      Effort: Pulmonary effort " is normal.   Skin:     Coloration: Skin is not pale.   Neurological:      Mental Status: He is alert and oriented to person, place, and time.   Psychiatric:         Mood and Affect: Mood normal.         Behavior: Behavior normal.           Results for orders placed or performed in visit on 08/29/23   POC Urinalysis Dipstick, Multipro    Specimen: Urine   Result Value Ref Range    Color Yellow Yellow, Straw, Dark Yellow, Indigo    Clarity, UA Clear Clear    Glucose, UA >=1000 mg/dL (3+) (A) Negative mg/dL    Bilirubin Small (1+) (A) Negative    Ketones, UA Negative Negative    Specific Gravity  1.030 1.005 - 1.030    Blood, UA Negative Negative    pH, Urine 5.5 5.0 - 8.0    Protein, POC 30 mg/dL (A) Negative mg/dL    Urobilinogen, UA 1 E.U./dL (A) Normal, 0.2 E.U./dL    Nitrite, UA Negative Negative    Leukocytes Negative Negative     IPSS Questionnaire (AUA-7):  Incomplete emptying  Over the past month, how often have you had a sensation of not emptying your bladder completely after you finish?: More than half the time (08/29/23 1333)  Frequency  Over the past month, how often have you had to urinate again less than two hours after you finishing urinating ?: Less than half the time (08/29/23 1333)  Intermittency  Over the past month, how often have you found you stopped and started again several time when you urinated ?: More than half the time (08/29/23 1333)  Urgency  Over the last month, how difficult  have you found it to postpone urination ?: Less than half the time (08/29/23 1333)  Weak Stream  Over the past month, how often have you had a weak urinary stream ?: Less than half the time (08/29/23 1333)  Straining  Over the past month, how often have you had to push or strain to begin urination ?: Less than 1 time 5 (08/29/23 1333)  Nocturia  Over the past month, how many times did you most typically get up to urinate from the time you went to bed until the time you got up in the morning ?: Less than half the  time (08/29/23 1333)  Quality of life due to urinary symptoms  If you were to spend the rest of your life with your urinary condition the way it is now, how would feel about that?: Mostly Satisfied (08/29/23 1333)    Scores  Total IPSS Score: 18 (08/29/23 1333)  Total Score = Symtomatic Level: moderately symptomatic: 8-19 (08/29/23 1333)     Estimation of residual urine via abdominal ultrasound  Residual Urine: 49 ml  Indication: Incontinence   Position: Supine  Examination: Incremental scanning of the suprapubic area using 3 MHz transducer using copious amounts of acoustic gel.   Findings: An anechoic area was demonstrated which represented the bladder, with measurement of residual urine as noted. I inspected this myself. In that the residual urine was stable or insignificant, no treatment will be necessary at this time.       Assessment and Plan    Diagnoses and all orders for this visit:    1. Urinary incontinence, nocturnal enuresis (Primary)  -     POC Urinalysis Dipstick, Multipro    Overall patient has had good response to the 50 mg Myrbetriq which she will continue he does not need refills today.  He is also on finasteride and tamsulosin which he will continue also.  No further episodes of gross hematuria and his urine is clear today.  Follow-up in 6 months.

## 2023-08-29 ENCOUNTER — OFFICE VISIT (OUTPATIENT)
Dept: UROLOGY | Facility: CLINIC | Age: 78
End: 2023-08-29
Payer: MEDICARE

## 2023-08-29 ENCOUNTER — OFFICE VISIT (OUTPATIENT)
Dept: CARDIOLOGY | Facility: CLINIC | Age: 78
End: 2023-08-29
Payer: MEDICARE

## 2023-08-29 VITALS — BODY MASS INDEX: 31.89 KG/M2 | HEIGHT: 71 IN | TEMPERATURE: 97 F | WEIGHT: 227.8 LBS

## 2023-08-29 VITALS
SYSTOLIC BLOOD PRESSURE: 102 MMHG | OXYGEN SATURATION: 99 % | HEART RATE: 77 BPM | DIASTOLIC BLOOD PRESSURE: 62 MMHG | BODY MASS INDEX: 31.64 KG/M2 | HEIGHT: 71 IN | RESPIRATION RATE: 18 BRPM | WEIGHT: 226 LBS

## 2023-08-29 DIAGNOSIS — I44.2 CHB (COMPLETE HEART BLOCK): ICD-10-CM

## 2023-08-29 DIAGNOSIS — I48.21 PERMANENT ATRIAL FIBRILLATION: ICD-10-CM

## 2023-08-29 DIAGNOSIS — I47.20 VENTRICULAR TACHYCARDIA: ICD-10-CM

## 2023-08-29 DIAGNOSIS — Z95.810 PRESENCE OF BIVENTRICULAR AICD: Primary | ICD-10-CM

## 2023-08-29 DIAGNOSIS — N39.44 URINARY INCONTINENCE, NOCTURNAL ENURESIS: Primary | ICD-10-CM

## 2023-08-29 PROBLEM — I47.29 VENTRICULAR TACHYCARDIA (PAROXYSMAL): Status: ACTIVE | Noted: 2023-08-29

## 2023-08-29 NOTE — PATIENT INSTRUCTIONS
Start sotalol 80mg twice daily  Repeat an ECG in 5 days  We will get additional records to give you better advice regarding your device options

## 2023-08-29 NOTE — PROGRESS NOTES
"EP NEW PATIENT VISIT    Chief Complaint  Ventricular Tachycardia (NP)    Subjective        History of Present Illness    EP Problems:  1.  Permanent atrial fibrillation  2.  Complete heart block status post AV node ablation  3.  Ventricular tachycardia  4.  Presence of a BiV ICD  -Elevated LV threshold causing rapid battery depletion with LV lead in the AIV    Cardiology Problems:  1.  Chronic systolic heart failure  -5/27/2023: EF 36 to 40%  2.  CAD status post CABG  3.  Ischemic cardiomyopathy  4.  Hypertension  5.  Hyperlipidemia    Medical Problems:  1.  BPH      Darren Maria is a 78 y.o. male with problem list as above who presents to the clinic for evaluation of permanent atrial fibrillation, complete heart block, presence of a BiV ICD with LV lead dysfunction, chronic systolic heart failure, ventricular tachycardia.  He has a complex history with AV node ablation dating back to 2006.  He has had multiple device changes since that time including a CRT upgrade with a bipolar LV lead placed into the AIV (exact timing of the lead implanted is unclear).  He has previously followed with Dr. Mccarty for his device as well as [facility] in the remote past.  He has symptoms of chronic fatigue and shortness of breath with exertion.  In May, he presented to the ER after having an episode of sustained ventricular tachycardia failing to terminate with 2 rounds of ATP and subsequent ICD shock.  He denies any further ICD shocks since that time.    Objective   Vital Signs:  /62 (BP Location: Right arm, Patient Position: Sitting)   Pulse 77   Resp 18   Ht 180.3 cm (71\")   Wt 103 kg (226 lb)   SpO2 99%   BMI 31.52 kg/m²   Estimated body mass index is 31.52 kg/m² as calculated from the following:    Height as of this encounter: 180.3 cm (71\").    Weight as of this encounter: 103 kg (226 lb).      Physical Exam  Vitals reviewed.   Constitutional:       Appearance: Normal appearance.   HENT:      Head: " Normocephalic and atraumatic.   Eyes:      Extraocular Movements: Extraocular movements intact.      Conjunctiva/sclera: Conjunctivae normal.   Cardiovascular:      Rate and Rhythm: Normal rate and regular rhythm.      Pulses: Normal pulses.      Heart sounds: Normal heart sounds.      Comments: Pulse generator site is clean, dry, intact  Pulmonary:      Effort: Pulmonary effort is normal.      Breath sounds: Normal breath sounds.   Musculoskeletal:         General: No swelling.   Neurological:      General: No focal deficit present.      Mental Status: He is alert and oriented to person, place, and time.   Psychiatric:         Mood and Affect: Mood normal.         Judgment: Judgment normal.      Result Review :  The following data was reviewed by: Devika Oliver MD on 08/29/2023:  CMP          12/22/2022    14:40 1/20/2023    08:44 5/27/2023    01:27   CMP   Glucose 132   210    BUN 13   19    Creatinine 0.85  1.00  1.38    EGFR 90.6   53.0    Sodium 143   140    Potassium 3.8   4.4    Chloride 108   107    Calcium 9.0   8.9    Total Protein 6.0   5.8    Albumin 3.70   3.8    Globulin 2.3   2.0    Total Bilirubin 0.7   0.4    Alkaline Phosphatase 73   57    AST (SGOT) 15   25    ALT (SGPT) 11   16    Albumin/Globulin Ratio 1.6   1.9    BUN/Creatinine Ratio 15.3   13.8    Anion Gap 10.0   11.0      CBC          12/22/2022    14:40 5/27/2023    01:27   CBC   WBC 5.71  6.42    RBC 3.64  3.55    Hemoglobin 12.0  11.0    Hematocrit 37.1  35.5    .9  100.0    MCH 33.0  31.0    MCHC 32.3  31.0    RDW 14.8  17.1    Platelets 187  160            [id number] SCORE   [id number] Score: 6 (8/29/2023 12:25 PM)                 Assessment and Plan     Diagnoses and all orders for this visit:    1. Presence of biventricular AICD (Primary)  -     ECG 12 Lead; Future    2. Permanent atrial fibrillation    3. CHB (complete heart block)    4. Ventricular tachycardia    Other orders  -     Sotalol HCl AF 80 MG tablet; Take 1  tablet by mouth 2 (Two) Times a Day.  Dispense: 180 tablet; Refill: 3        Darren Maria is a 78 y.o. male with problem list as above who presents to the clinic for evaluation of permanent atrial fibrillation, complete heart block, ventricular tachycardia.  He has a complicated history with multiple issues going on.  In regards to his ventricular tachycardia, this does appear to be an appropriate ICD shock for sustained VT.  He has not been treated with antiarrhythmics and at this time I think that his best treatment option would be sotalol.  We will plan to start him on 80 mg twice daily and return to clinic in 1 week for a check of his QTc.    In regards to his complete heart block and presence of a CRT-D.  His LV lead appears to be located in the AIV.  This is causing suboptimal pacing morphology and I suspect that this may be part of the reason for his symptoms.  Additionally, I do worry that given the high output from the LV lead that this is causing rapid battery depletion.  As such, he may be a very reasonable candidate for either a new LV lead implant or a physiologic pacing lead implant.  Part of this depends on whether his axillary vein is still patent.  We will try to obtain his records from the previous device implants to better understand exactly what was previously done.    Plan:  -Start sotalol 80 mg twice daily (Crcl 66)  -Repeat ECG in 1 week to reassess Qtc  - Avoid additional QT prolonging medications  - Continue rivaroxaban given elevated [id number]  - Obtain additional records regarding his current implanted device  - Consider LV lead revision or physiologic pacing lead implant depending on the findings           I spent 63 minutes caring for Darren on this date of service (excluding the time spent on ECG interpretation and documentation). This time includes time spent by me in the following activities:preparing for the visit, reviewing tests, performing a medically appropriate  examination and/or evaluation , counseling and educating the patient/family/caregiver, ordering medications, tests, or procedures, and documenting information in the medical record  Follow Up     Return in about 3 months (around 11/29/2023).  Patient was given instructions and counseling regarding his condition or for health maintenance advice. Please see specific information pulled into the AVS if appropriate.     Part of this note may be an electronic transcription/translation of spoken language to printed text using the Dragon Dictation System.

## 2023-09-05 ENCOUNTER — TELEPHONE (OUTPATIENT)
Dept: CARDIOLOGY | Facility: CLINIC | Age: 78
End: 2023-09-05
Payer: MEDICARE

## 2023-09-05 DIAGNOSIS — R00.2 PALPITATIONS: Primary | ICD-10-CM

## 2023-09-05 NOTE — TELEPHONE ENCOUNTER
Patient has a [employer] BIV AICD.  Following provider changed to Dr. Oliver in both [facility] and [facility].

## 2023-09-05 NOTE — TELEPHONE ENCOUNTER
----- Message from Devika Oliver MD sent at 8/29/2023 12:17 PM CDT -----  Regarding: Device transfer  Could we transfer this patient's remote monitoring to me?

## 2023-09-06 ENCOUNTER — HOSPITAL ENCOUNTER (OUTPATIENT)
Dept: CARDIOLOGY | Facility: HOSPITAL | Age: 78
Discharge: HOME | End: 2023-09-06
Admitting: STUDENT IN AN ORGANIZED HEALTH CARE EDUCATION/TRAINING PROGRAM
Payer: MEDICARE

## 2023-09-06 DIAGNOSIS — Z95.810 PRESENCE OF BIVENTRICULAR AICD: ICD-10-CM

## 2023-09-06 LAB
QT INTERVAL: 452 MS
QTC INTERVAL: 508 MS

## 2023-09-06 PROCEDURE — 93005 ELECTROCARDIOGRAM TRACING: CPT | Performed by: STUDENT IN AN ORGANIZED HEALTH CARE EDUCATION/TRAINING PROGRAM

## 2023-09-07 ENCOUNTER — TELEPHONE (OUTPATIENT)
Dept: CARDIOLOGY | Facility: CLINIC | Age: 78
End: 2023-09-07
Payer: MEDICARE

## 2023-09-07 NOTE — TELEPHONE ENCOUNTER
Patient called in stating he was seen on 8/29 he was started on Sotalol and is now having some nausea and feet swelling(more than usual) along with dizziness and fatigue. Patient wondering if he should stop this medication? Patient states he hasn't been checking HR at home and is unsure what they have been running.

## 2023-09-11 NOTE — TELEPHONE ENCOUNTER
Patient notified, will stop the Carvedilol and will come in possibly Monday for ECG. Please place and order.

## 2023-09-13 DIAGNOSIS — Z95.810 PRESENCE OF BIVENTRICULAR AICD: Primary | ICD-10-CM

## 2023-09-20 RX ORDER — RIVAROXABAN 20 MG/1
TABLET, FILM COATED ORAL
Qty: 90 TABLET | Refills: 3 | Status: SHIPPED | OUTPATIENT
Start: 2023-09-20

## 2023-10-05 ENCOUNTER — CLINICAL SUPPORT NO REQUIREMENTS (OUTPATIENT)
Dept: CARDIOLOGY | Facility: CLINIC | Age: 78
End: 2023-10-05
Payer: MEDICARE

## 2023-10-05 ENCOUNTER — OFFICE VISIT (OUTPATIENT)
Dept: CARDIOLOGY | Facility: CLINIC | Age: 78
End: 2023-10-05
Payer: MEDICARE

## 2023-10-05 VITALS
DIASTOLIC BLOOD PRESSURE: 58 MMHG | WEIGHT: 216.4 LBS | HEART RATE: 79 BPM | HEIGHT: 71 IN | BODY MASS INDEX: 30.3 KG/M2 | SYSTOLIC BLOOD PRESSURE: 98 MMHG | OXYGEN SATURATION: 99 %

## 2023-10-05 DIAGNOSIS — Z95.810 PRESENCE OF BIVENTRICULAR AICD: Primary | ICD-10-CM

## 2023-10-05 DIAGNOSIS — I25.708 CORONARY ARTERY DISEASE OF BYPASS GRAFT OF NATIVE HEART WITH STABLE ANGINA PECTORIS: Primary | ICD-10-CM

## 2023-10-05 DIAGNOSIS — I50.42 CHRONIC COMBINED SYSTOLIC AND DIASTOLIC CONGESTIVE HEART FAILURE: ICD-10-CM

## 2023-10-05 DIAGNOSIS — Z98.890 S/P AV NODAL ABLATION: ICD-10-CM

## 2023-10-05 DIAGNOSIS — I25.5 ISCHEMIC CARDIOMYOPATHY: ICD-10-CM

## 2023-10-05 DIAGNOSIS — I48.21 PERMANENT ATRIAL FIBRILLATION: ICD-10-CM

## 2023-10-05 DIAGNOSIS — I10 ESSENTIAL HYPERTENSION: ICD-10-CM

## 2023-10-05 DIAGNOSIS — I47.20 VENTRICULAR TACHYCARDIA: ICD-10-CM

## 2023-10-05 DIAGNOSIS — Z95.810 PRESENCE OF BIVENTRICULAR AICD: ICD-10-CM

## 2023-10-05 RX ORDER — SACUBITRIL AND VALSARTAN 49; 51 MG/1; MG/1
1 TABLET, FILM COATED ORAL 2 TIMES DAILY
Qty: 60 TABLET | Refills: 11
Start: 2023-10-05

## 2023-10-05 NOTE — PROGRESS NOTES
Subjective:     Encounter date: 10/05/23      Patient ID: Darren Maria is a 78 y.o. male.    Chief Complaint:   f/u CHF/CAD/AF/VT    History of Present Illness     78-year-old today returns for follow-up.      He has coronary disease including prior CABG and subsequent PCI, chronic systolic heart failure, paroxysmal atrial fibrillation, status post AV node ablation with biventricular pacemaker placed, subsequently upgraded to BiV ICD.  We have attempted to ameliorate his symptoms with antianginal therapies and improving heart failure therapies since establishing care in 11/4/2021    Due to worsening symptoms despite medical therapy, Dr. Posadas did proceed with cardiac catheterization 12/15/2021; see full results and procedural note.  In short, there did not appear to be any culprit lesions in need of revascularization, though he could not locate the SVG->OM branch at his last operation at [facility] in 2011 or 2012. Otherwise, his anatomy was consistent with previous reports -patent PEREZ to LAD, patent SVG to RCA, and a known occlusion of an SVG arising from the ascending aorta to an OM branch.  Left ventriculogram showed EF 25 to 30%, but normal LVEDP.  Ranexa was increased for symptom benefit, and follow-up, he did report his chest tightness had improved significantly.    Subsequent visits have primarily revolved around orthostatic dizziness and try to reduce medications to minimize these.  Previously Entresto has been reduced from maximum dose to moderate dose for this reason.    He followed up with Dr. Posadas in late March 2023 and dizziness and blood pressure were better.  He had not required torsemide in approximately 3 weeks.  At that point his home weight was around 218 pounds.  A few days prior it was 214 pounds.  No changes were made at that visit.    By way of review, the patient received an AICD shock on 5/26/2023 for sustained ventricular tachycardia.  He tells me he was feeling weak and tired  and was walking around the house when this happened.  He denies any syncope or chest pain but does state he was having more shortness of breath at the time.  He was subsequently admitted to the hospital.  Potassium level was normal.  BNP was negative.  Troponins were elevated at 91 followed by 105.  Dr. Waterman saw him in consultation.  Echocardiogram revealed his LVEF had actually improved a little bit compared to previous.  Due to symptomatic hypotension it appears his Coreg was reduced to 6.25 mg twice daily and Entresto was reduced to low-dose.  He was discharged home with recommendations for a possible outpatient Lexiscan and an outpatient EP referral.    By way of review it appears he also received 2 ATP treatments on 6/29 for VT that lasted 32 seconds.    He later established care with Dr. Oliver on 8/29.  At that visit he started sotalol 80 mg twice daily and ordered repeat EKG in 1 week (not available to me at this time).  He also noted that LV lead revision or physiologic pacing lead implant could be considered after he is able to review additional records regarding his current implanted device.  It appears the patient called back with significant nausea after starting sotalol and Dr. Oliver instructed him to discontinue Coreg on 9/7.  He follows up with him again in December.    Today the patient reports he has been feeling very poorly overall, particularly over the past couple of months.  He reports worsening shortness of breath with minimal exertion for example when walking around the house.  Every 7 to 10 days he is having what he describes as weak spells-he states he has to sit and rest and sleep for several hours.  He has been nauseated and had a decreased appetite and subsequent weight loss since starting sotalol.  This did not improve when he stopped Coreg.  He has occasional episodes of orthopnea and PND-approximately weekly.  He states his systolic blood pressures are running around 92-1 12.  He takes  torsemide based on swelling 1 or 2 times per week.  Over the past 2 months he tells me he has been having substernal chest tightness that may persist for 3 to 4 hours.  He has not taken nitroglycerin.  This discomfort is worse with exertion and often associated with shortness of breath but not always.  He does report some left arm discomfort times as well, though this is not always occurring at the same time as his chest discomfort.  He tells me he is actually still taking the middle dose of Entresto.  He denies syncope.  He continues to have significant lightheadedness and dizziness, particularly after standing.  He can often feel palpitations during his weak spells.    =======================================================================  RELEVANT CARDIAC HX:    The patient has a longstanding complicated cardiac history dating back to initial myocardial infarction in 1989.  I have copied an outline of his cardiac history documented per Dr. Bernal below-see for full details.  In short, the patient has a history of CAD ( status post three-vessel CABG in 1990, followed by PCI (multiple prior stents to LCX) and left thoracotomy CABG x1 around 2011/2012), chronic systolic CHF/ischemic cardiomyopathy now with biventricular AICD in place followed by Dr. Mccarty ,  atrial fibrillation anticoagulated with warfarin, and prior AV node ablation (pacemaker was placed piror to eventual upgrade BIV AICD).       3/17/2021 Documentation per Dr. Bernal at [facility]:    Problem List:  1. Coronary atherosclerotic heart disease.  ---a. Status post remote myocardial infarction 1989.  ---b. Status post CABG ([facility]) 1990 with LIMA to LAD, SVG to PD, SVG to OM.  ---c. Chronic atrial fibrillation with subsequent permanent pacemaker implantation 1999 and prior RFA AVN  ---d. Chronic left ventricular dysfunction  ---e. Status post biventricular ICD 2005 ([facility])  ---f. Cardiac catheterization 01/06 with  LVEF 30%, severe native vessel coronary disease, patent LIMA to LAD, patent SVG to PD of RCA,  of SVG to OM with subsequent PTCA/stenting of native OM with 2.5mm x 23mm LEILANI.  ---g. Repeat cardiac catheterization 12/06 ([facility]) with patent LIMA to LAD, patent SVG to right PDA, occluded SVG to OM with repeat PCI/stenting with 2.5 mm x 23 mm stent  ---h. Adenosine dual isotope study 04/07 with LVEF 53%, inferolateral scar with no ischemia.  ---i. BNP level < 100 pg/ml 04/07 ; 101 pg/ml 06/08  ---k. Repeat cardiac catheterization 04/08 with LVEF 30%, patent LIMA to LAD and SVG to RCA,  of SVG to LCX, severe focal denovo stenosis of previously twice-stented native LCX with repeat stenting of native LCS ([facility]) with third 2.5mm x 23mm Cypher LEILANI  ---l. Pulse generator replacement 04/28/08 ([facility]) Medtronic Concerto [id number] (serial # [id number]); pulse generator replacement 11/19  ---m. Recurrent angina 04/08, repeat limited native LCA angiogram 04/08 with focal edge dissection and instent restenosis, status post additional stenting ([facility]) with 3 additional [place] stents  ---n. Recurrent angina 12/08 with repeat catheterization confirming LVEF 40%, patent LIMA to LAD, patent SVG to RCA, patent LCx stent site; medical therapy  ---o. TTE 03/10 with LVEF 30-35%, no significant valvular dysfunction, moderate LAE and LVE; repeat 12/10 uncjhanged except LVEWF improved to 35-40%  ---p. BNP 03/10 259 pg/ml > repeat 12/10 165 pg/ml > repeat 06/12 70 pg/ml > repeat 10/12 200 pg/ml>repeat 11/17 154 pg/ml  ---q. Adenosine radionuclide stress test 03/10 and 12/10 with fixed shannan-apical scar, no ischemia, LVEF 40% rising to 50% with stress; medical therapy  ---r. ACS 03/11 with cardiac catheterization revealing patent LIMA to LAD, patent SVG to PD and PL of RCA, severe ISR of previously multiply (x 4 procedures/6 stents) stented distal LM/proximal  LCX-OM  ---s. Status post limited left thoracotomy CABG x 1 with SVG from descending Ao to OM, CRISTOBAL ligation ([facility]) - ?[id number]  ---t. TTE 11/11 and 09/12 with LVEF 40-45%, no significant valvular dysfunction  ---u. Recurrent sx 06/12 with repeat cardiac catheterization with all grafts patent, no culprit lesion in need of PCI; medical therapy  ---v. Status post pulse generator replacement 07/12 and 01/16 ([facility])  ---w. Pharmacologic nuclear stress test 08/13 with LVEF approx 40%, predominant fixed shannan-apical defect, no interval change since prior studies; expectant medical mgmt; repeat 10/15 with no ischemia  ---x. Pharmacologic radionuclide stress test 10/16 with LVEF 30%, fixed anterior defect, no ischemia  ---y. TTE 10/16 with LVEF 45%, no significant valvular dysfunction  ---z. Pharmacologic radionuclide stress test 11/17 with LVEF 40-45%, fixed anteroapical scar, no significant reversible ischemia  ---aa. TTE 02/13/19 with LVEF est 30%, LAE 6cm, LVEDD 6.2 cm, segemental WMAs, no significant valvular dysfunction; est RVSP 40 mmHg.  ---bb. BNP level 2/20 274 pg/ml => 02/21 170 pg/ml  ---cc. TTE 03/21 with LVEF 25%, no significant valvular dysfunction  2. Hyperlipidemia, on statin therapy  3. Renal cell carcinoma with right nephrectomy 2000, no recurrence.  4. Status post remote cholecystectomy with subsequent ventral hernia and ventral herniorrhaphy.  5. Chronic coumadin anticoagulation (indication: atrial fibrillation, INR goal 2-3).  6. Erectile dysfunction  7. Mild chronic renal insufficiency, serum creatinine 1.6 mg% 04/08  8. DJD, status post left TKR 10/15    Electronically signed by Christopher Bernal III, MD at 03/17/2021 8:30 AM CDT   ===========================================================================================  The following portions of the patient's history were reviewed and updated as appropriate: allergies, current medications, past family history, past medical history, past  "social history, past surgical history and problem list.    Review of Systems   Constitutional: Positive for decreased appetite, malaise/fatigue and weight loss.   Cardiovascular:  Positive for chest pain, dyspnea on exertion, leg swelling, orthopnea, palpitations and paroxysmal nocturnal dyspnea. Negative for claudication and syncope.   Respiratory:  Positive for shortness of breath. Negative for cough.    Hematologic/Lymphatic: Does not bruise/bleed easily.   Musculoskeletal:  Negative for falls.   Gastrointestinal:  Positive for nausea. Negative for bloating.   Neurological:  Positive for light-headedness, loss of balance and weakness.     Allergies   Allergen Reactions    Azithromycin Nausea And Vomiting    Morphine And Related Other (See Comments)     SHAKES AND MAKES HIM \"WIRED\".   TAKES TRAMADOL WITHOUT PROBLEM       Current Outpatient Medications:     atorvastatin (LIPITOR) 40 MG tablet, Take 1 tablet by mouth Every Night., Disp: 90 tablet, Rfl: 3    cetirizine (zyrTEC) 10 MG tablet, Take 1 tablet by mouth Daily., Disp: , Rfl:     clopidogrel (PLAVIX) 75 MG tablet, Take 1 tablet by mouth Daily., Disp: , Rfl:     cyclobenzaprine (FLEXERIL) 5 MG tablet, Take 1 tablet by mouth 3 (Three) Times a Day., Disp: , Rfl:     finasteride (PROSCAR) 5 MG tablet, Take 1 tablet by mouth Daily., Disp: , Rfl:     fluticasone (FLONASE) 50 MCG/ACT nasal spray, 2 sprays into the nostril(s) as directed by provider Daily As Needed., Disp: , Rfl:     Jardiance 10 MG tablet tablet, TAKE 1 TABLET BY MOUTH EVERY DAY IN THE MORNING, Disp: 90 tablet, Rfl: 3    metFORMIN (GLUCOPHAGE) 500 MG tablet, Take 1 tablet by mouth 2 (Two) Times a Day With Meals. Two tabs daily, Disp: , Rfl:     Mirabegron ER (Myrbetriq) 50 MG tablet sustained-release 24 hour 24 hr tablet, Take 50 mg by mouth Daily., Disp: 30 tablet, Rfl: 11    nitroglycerin (NITROSTAT) 0.4 MG SL tablet, Place 1 tablet under the tongue Every 5 (Five) Minutes As Needed for Chest " "Pain. Take no more than 3 doses in 15 minutes., Disp: 25 tablet, Rfl: 2    omeprazole (PriLOSEC) 40 MG capsule, Take 1 capsule by mouth Daily., Disp: , Rfl:     pregabalin (LYRICA) 150 MG capsule, Take 1 capsule by mouth 2 (Two) Times a Day., Disp: , Rfl:     promethazine-dextromethorphan (PROMETHAZINE-DM) 6.25-15 MG/5ML syrup, As Needed., Disp: , Rfl:     ranolazine (RANEXA) 1000 MG 12 hr tablet, Take 1 tablet by mouth 2 (Two) Times a Day., Disp: 180 tablet, Rfl: 3    sacubitril-valsartan (Entresto) 49-51 MG tablet, Take 1 tablet by mouth 2 (Two) Times a Day., Disp: 60 tablet, Rfl: 11    Sotalol HCl AF 80 MG tablet, Take 1 tablet by mouth 2 (Two) Times a Day., Disp: 180 tablet, Rfl: 3    tamsulosin (FLOMAX) 0.4 MG capsule 24 hr capsule, Take 1 capsule by mouth Daily., Disp: , Rfl:     torsemide (DEMADEX) 20 MG tablet, Take 1 tablet by mouth Daily As Needed (weight gain >3 lbs in one day or >5 lbs in 2 days). With additional 20 mg PRN, Disp: , Rfl:     traMADol-acetaminophen (ULTRACET) 37.5-325 MG per tablet, as needed, Disp: , Rfl: 1    Xarelto 20 MG tablet, TAKE 1 TABLET BY MOUTH EVERY DAY WITH FOOD, Disp: 90 tablet, Rfl: 3    zolpidem (AMBIEN) 5 MG tablet, TAKE 1 TABLET BY MOUTH EVERY DAY AT BEDTIME AS NEEDED, Disp: , Rfl: 5         Objective:    BP 98/58   Pulse 79   Ht 180.3 cm (71\")   Wt 98.2 kg (216 lb 6.4 oz)   SpO2 99%   BMI 30.18 kg/m²        Vitals and nursing note reviewed.   Constitutional:       General: Not in acute distress.     Appearance: Not in distress. Chronically ill-appearing.   Neck:      Vascular: No JVD or JVR. JVD normal.   Pulmonary:      Effort: Pulmonary effort is normal.   Cardiovascular:      Normal rate. Regular rhythm.      Murmurs: There is no murmur.   Edema:     Peripheral edema (1+ LLE edema) present.  Skin:     General: Skin is warm and dry.   Neurological:      Mental Status: Alert, oriented to person, place, and time and oriented to person, place and time.       Lab " Review:    Lab Results   Component Value Date    GLUCOSE 210 (H) 05/27/2023    BUN 19 05/27/2023    CREATININE 1.38 (H) 05/27/2023    EGFRIFNONA 51 (L) 12/28/2021    EGFRIFAFRI 94 03/13/2017    BCR 13.8 05/27/2023    K 4.4 05/27/2023    CO2 22.0 05/27/2023    CALCIUM 8.9 05/27/2023    ALBUMIN 3.8 05/27/2023    AST 25 05/27/2023    ALT 16 05/27/2023     Lab Results   Component Value Date    CHOL 122 11/12/2021    CHLPL 136 (L) 03/13/2018    TRIG 108 11/12/2021    HDL 36 (L) 11/12/2021    LDL 66 11/12/2021     Lab Results   Component Value Date    HGBA1C 6.3 09/25/2018     Lab Results   Component Value Date    WBC 6.42 05/27/2023    HGB 11.0 (L) 05/27/2023    HCT 35.5 (L) 05/27/2023    .0 (H) 05/27/2023     05/27/2023                     ECG 12 Lead    Date/Time: 10/5/2023 12:39 PM  Performed by: Yolanda Perry APRN  Authorized by: Yolanda Perry APRN   Comparison: compared with previous ECG from 8/29/2023  Similar to previous ECG  Comparison to previous ECG: V-paced   Rhythm: paced  BPM: 75                 Results for orders placed during the hospital encounter of 05/27/23    Adult Transthoracic Echo Complete W/ Cont if Necessary Per Protocol    Interpretation Summary    Left ventricular systolic function is moderately decreased. Calculated left ventricular EF = 36.4% Left ventricular ejection fraction appears to be 36 - 40%.    The left ventricular cavity is mildly dilated.    The following left ventricular wall segments are akinetic: mid anterior, apical anterior, apical lateral, apical inferior, apical septal, apex and mid anteroseptal.    Left ventricular diastolic dysfunction is noted.    Left atrial volume is severely increased.    Mild pulmonary hypertension is present.      Assessment:      Problem List Items Addressed This Visit          Cardiac and Vasculature    Coronary artery disease of bypass graft of native heart with stable angina pectoris - Primary    Overview     3v CABG ([facility])  in 1990: LIMA to LAD, SVG to PD, SVG to OM  2006: other grafts patent but found to have  of SVG to OM; underwent PTCA/stenting of native OM with 2.5mm x 23mm LEILANI.  4/2008: only change was severe focal denovo stenosis of previously twice-stented native LCX with repeat stenting of native LCS [facility] with third 2.5mm x 23mm Cypher LEILANI  ---l. Pulse generator replacement 04/28/08 ([facility]) Medtronic Concerto [id number] (serial # [id number]); pulse generator replacement 11/19  ---m. Recurrent angina 04/08, repeat limited native LCA angiogram 04/08 with focal edge dissection and instent restenosis, status post additional stenting ([facility]) with 3 additional [place] stents  ---n. Recurrent angina 12/08 with repeat catheterization confirming LVEF 40%, patent LIMA to LAD, patent SVG to RCA, patent LCx stent site; medical therapy  ---o. TTE 03/10 with LVEF 30-35%, no significant valvular dysfunction, moderate LAE and LVE; repeat 12/10 uncjhanged except LVEWF improved to 35-40%  ---p. BNP 03/10 259 pg/ml > repeat 12/10 165 pg/ml > repeat 06/12 70 pg/ml > repeat 10/12 200 pg/ml>repeat 11/17 154 pg/ml  ---q. Adenosine radionuclide stress test 03/10 and 12/10 with fixed shannan-apical scar, no ischemia, LVEF 40% rising to 50% with stress; medical therapy  ---r. ACS 03/11 with cardiac catheterization revealing patent LIMA to LAD, patent SVG to PD and PL of RCA, severe ISR of previously multiply (x 4 procedures/6 stents) stented distal LM/proximal LCX-OM    Redo CABG via lateral thoracotomy at [facility] 3[facility]) with SVG from descending Ao (entered through 5th intercostal space) to OM.  Op note scanned in under 'media' under date of procedure         Relevant Medications    sacubitril-valsartan (Entresto) 49-51 MG tablet    Essential hypertension    Presence of biventricular AICD    Permanent atrial fibrillation    Relevant Medications    sacubitril-valsartan (Entresto) 49-51 MG tablet     Chronic combined systolic and diastolic congestive heart failure    Relevant Medications    sacubitril-valsartan (Entresto) 49-51 MG tablet    Ventricular tachycardia    Relevant Medications    sacubitril-valsartan (Entresto) 49-51 MG tablet       Plan:     1. Chronic systolic CHF/ischemic cardiomyopathy: Stage C, NYHA III: He appears euvolemic on exam but is feeling very poorly overall with significant exertional dyspnea.  He has occasional orthopnea PND.  He is actually losing weight due to decreased appetite.    -Continue Entresto 49/51 bid (had to be reduced from max dose d/t orthostatic dizziness)  -continue jardiance   -Coreg recently discontinued per Dr. Oliver  -Continue torsemide 20 mg on as needed basis only for weight gain of greater than 2 lbs overnight or 5 lbs in 2 days, increased dyspnea/orthopnea/PND, or significant edema. -He is requiring this approximately once or twice per week.    -His LVEF is actually slightly improved on most recent echocardiogram as noted above.      2.  Coronary artery disease: See notes above regarding worsening stamina, exertional dyspnea, chest tightness and left arm pain over the past couple of months.  Previously intolerant to imdur.   No culprit lesions noted on 12/2021 catheterization that would be targets for intervention.     -Continue Plavix (take ASA in it's place if ever held for surgeries or procedures)  -Coreg recently discontinued by EP.  -As needed sublingual nitroglycerin-has not had to use  -Continue atorvastatin 40 mg nightly and maintain goal LDL <70; LDL 66  -continue Ranexa 1000 mg twice daily for antianginal benefit    -Given his symptoms and recent VT, I will discuss the possibility of ischemic evaluation with Dr. Posadas    3.  Permanent atrial fibrillation: Biventricular paced and rate controlled. Stable/asymptomatic    -Continue Xarelto 20 mg daily with food for CVA prophylaxis (he transitioned from Eliquis to Xarelto due to insurance coverage reasons)  -> okay to hold if needed for medically necessary procedures, but as noted #2 above, will need to take aspirin 81 mg daily if that occurs      4.Orthostatic dizziness/hypotension: Chronic, stable. No changes in therapy recommended today.    5.  Mixed hyperlipidemia: as per above.  Goal LDL less than 70.  Continue high intensity statin.    6.  Ventricular tachycardia: He received an AICD shock for sustained ventricular tachycardia on 5/26/2023.  It also appears he received ATP x2 on 6/29/2023 for 32 seconds of ventricular tachycardia.  He was subsequently started on sotalol 80 mg twice daily per Dr. Oliver on 8/29/2023.  He has been very nauseated since starting this.  Dr. Oliver discontinued Coreg on 9/7.  The patient denies improvement in the nausea.  Continue sotalol at the direction of Dr. Oliver.  I will discuss this with him as well.  As he noted, he may be considered for LV lead revision or physiologic pacing lead in the future. Follow up with EP as scheduled.     No VT episodes noted on device interrogation today.    Follow-up 2 to 3 months with Dr. Posadas, we will be in touch with him sooner after I discussed his case with Dr. Posadas and Dr. Oliver

## 2023-10-05 NOTE — PROGRESS NOTES
BIV AICD Interrogation Report  IN OFFICE    October 5, 2023    Primary Cardiologist: Melody  : [place] Scientific Model: Dynagen CRT-D G151  Implant date: 11/18/2019    Reason for evaluation: Provider Requested, EVELIA Naqvi, to assess for additional VT episodes.  Indication for AICD:  Complete Heart Block s/p AV Node Ablation, Congestive Heart Failure, and Ischemic Cardiomyopathy    Interrogation performed by:  Seble Ariza RN    Measurements  Atrial sensing:  P wave: 3 mV  Atrial threshold: N/P - in AF  Atrial lead impedance: 398 ohms  Ventricular sensing:  R wave: PACED mV  RV Threshold:  1.1V @ 0.5 ms  RV Impedance:  499 ohms  Shock impedance:  RV 41 ohms  LV Threshold:  5.5V @ 1.2ms  LV Impedance:  774 ohms    Manual sensing and threshold testing performed:  Yes      Diagnostic Data  Atrial paced: 0 %  Ventricular paced: RV 96%, LV 96%    Episodes/Alerts since 9/25/23: None.  LV loss of capture noted in presenting - LV amplitude increased and capture verified.  Patient denies diaphragm stim.    Battery status: Intensify Follow Up  estimated 1 year remaining      Final Parameters  Mode: VVIR  Lower rate: 75 bpm      Atrial - Sensitivity: 0.25 mV   Ventricular:  RV Amplitude: 2 V @ 0.5 ms   Sensitivity: 0.6 mV            LV Amplitude:  6V @ 1.2ms    Changes made: Normal silvia LV output changed to 6V    Conclusions: Normal AICD Function, No Therapy Delivered, Elevated Ventricular Pacing Threshold, and Battery at 1 year remaining    Remote Monitor:  Yes, connected.    Follow up: Every 2 months via latitude, in December in office as previously scheduled.     Final impression:  Data above reviewed.  Agree with findings and conclusions as per the above note.

## 2023-10-16 ENCOUNTER — TELEPHONE (OUTPATIENT)
Dept: CARDIOLOGY | Facility: CLINIC | Age: 78
End: 2023-10-16
Payer: MEDICARE

## 2023-10-16 NOTE — TELEPHONE ENCOUNTER
Per verbal release, left VM with detailed message and instructions to call back with any questions.  Betina Bernard MA

## 2023-10-16 NOTE — TELEPHONE ENCOUNTER
----- Message from EVELIA Lozano sent at 10/16/2023 10:51 AM CDT -----  Please let the patient know I discussed his case with Dr. Oliver and Dr. Posadas.  No further testing at this time.  Dr. Oliver she does want him to try to stay on the sotalol if he can tolerate it.  Follow-up with him as planned.  He reports he may discuss ablation with him at that time as well.

## 2023-10-17 ENCOUNTER — TELEPHONE (OUTPATIENT)
Dept: CARDIOLOGY | Facility: CLINIC | Age: 78
End: 2023-10-17

## 2023-10-17 NOTE — TELEPHONE ENCOUNTER
[facility] received a fax that requires your attention. The document has been indexed to the patient’s chart for your review.      Reason for sending: EXTERNAL MEDICAL RECORD NOTIFICATION     Documents Description: [facility]-10.17.23    Name of Sender: [facility]     Date Indexed: 10.17.23

## 2023-10-19 NOTE — TELEPHONE ENCOUNTER
Patient called in stating he had Dr. Salvador call in Zofran as needed for nausea d/t Sotalol causing stomach issues.

## 2023-10-29 ENCOUNTER — APPOINTMENT (OUTPATIENT)
Dept: CT IMAGING | Facility: HOSPITAL | Age: 78
End: 2023-10-29
Payer: MEDICARE

## 2023-10-29 ENCOUNTER — APPOINTMENT (OUTPATIENT)
Dept: GENERAL RADIOLOGY | Facility: HOSPITAL | Age: 78
End: 2023-10-29
Payer: MEDICARE

## 2023-10-29 ENCOUNTER — HOSPITAL ENCOUNTER (INPATIENT)
Facility: HOSPITAL | Age: 78
LOS: 1 days | Discharge: HOME | End: 2023-11-01
Attending: INTERNAL MEDICINE | Admitting: INTERNAL MEDICINE
Payer: MEDICARE

## 2023-10-29 DIAGNOSIS — I50.9 ACUTE ON CHRONIC CONGESTIVE HEART FAILURE, UNSPECIFIED HEART FAILURE TYPE: Primary | ICD-10-CM

## 2023-10-29 DIAGNOSIS — R07.9 CHEST PAIN, UNSPECIFIED TYPE: ICD-10-CM

## 2023-10-29 DIAGNOSIS — R60.9 FLUID RETENTION: ICD-10-CM

## 2023-10-29 DIAGNOSIS — J90 BILATERAL PLEURAL EFFUSION: ICD-10-CM

## 2023-10-29 LAB
ALBUMIN SERPL-MCNC: 4.2 G/DL (ref 3.5–5.2)
ALBUMIN/GLOB SERPL: 1.8 G/DL
ALP SERPL-CCNC: 77 U/L (ref 39–117)
ALT SERPL W P-5'-P-CCNC: 6 U/L (ref 1–41)
ANION GAP SERPL CALCULATED.3IONS-SCNC: 10 MMOL/L (ref 5–15)
APTT PPP: 43.6 SECONDS (ref 24.5–36)
AST SERPL-CCNC: 11 U/L (ref 1–40)
BASOPHILS # BLD AUTO: 0.03 10*3/MM3 (ref 0–0.2)
BASOPHILS NFR BLD AUTO: 0.3 % (ref 0–1.5)
BILIRUB SERPL-MCNC: 0.9 MG/DL (ref 0–1.2)
BUN SERPL-MCNC: 12 MG/DL (ref 8–23)
BUN/CREAT SERPL: 14 (ref 7–25)
CALCIUM SPEC-SCNC: 9.1 MG/DL (ref 8.6–10.5)
CHLORIDE SERPL-SCNC: 105 MMOL/L (ref 98–107)
CO2 SERPL-SCNC: 27 MMOL/L (ref 22–29)
CREAT SERPL-MCNC: 0.86 MG/DL (ref 0.76–1.27)
DEPRECATED RDW RBC AUTO: 58 FL (ref 37–54)
EGFRCR SERPLBLD CKD-EPI 2021: 89.7 ML/MIN/1.73
EOSINOPHIL # BLD AUTO: 0.06 10*3/MM3 (ref 0–0.4)
EOSINOPHIL NFR BLD AUTO: 0.7 % (ref 0.3–6.2)
ERYTHROCYTE [DISTWIDTH] IN BLOOD BY AUTOMATED COUNT: 16.4 % (ref 12.3–15.4)
GEN 5 2HR TROPONIN T REFLEX: 36 NG/L
GLOBULIN UR ELPH-MCNC: 2.4 GM/DL
GLUCOSE SERPL-MCNC: 162 MG/DL (ref 65–99)
HCT VFR BLD AUTO: 31 % (ref 37.5–51)
HGB BLD-MCNC: 9.2 G/DL (ref 13–17.7)
HOLD SPECIMEN: NORMAL
HOLD SPECIMEN: NORMAL
IMM GRANULOCYTES # BLD AUTO: 0.02 10*3/MM3 (ref 0–0.05)
IMM GRANULOCYTES NFR BLD AUTO: 0.2 % (ref 0–0.5)
INR PPP: 1.83 (ref 0.91–1.09)
LYMPHOCYTES # BLD AUTO: 0.67 10*3/MM3 (ref 0.7–3.1)
LYMPHOCYTES NFR BLD AUTO: 7.5 % (ref 19.6–45.3)
MAGNESIUM SERPL-MCNC: 1.7 MG/DL (ref 1.6–2.4)
MCH RBC QN AUTO: 28.4 PG (ref 26.6–33)
MCHC RBC AUTO-ENTMCNC: 29.7 G/DL (ref 31.5–35.7)
MCV RBC AUTO: 95.7 FL (ref 79–97)
MONOCYTES # BLD AUTO: 1.03 10*3/MM3 (ref 0.1–0.9)
MONOCYTES NFR BLD AUTO: 11.5 % (ref 5–12)
NEUTROPHILS NFR BLD AUTO: 7.16 10*3/MM3 (ref 1.7–7)
NEUTROPHILS NFR BLD AUTO: 79.8 % (ref 42.7–76)
NRBC BLD AUTO-RTO: 0 /100 WBC (ref 0–0.2)
NT-PROBNP SERPL-MCNC: 2036 PG/ML (ref 0–1800)
PLATELET # BLD AUTO: 291 10*3/MM3 (ref 140–450)
PMV BLD AUTO: 9.4 FL (ref 6–12)
POTASSIUM SERPL-SCNC: 4.1 MMOL/L (ref 3.5–5.2)
PROT SERPL-MCNC: 6.6 G/DL (ref 6–8.5)
PROTHROMBIN TIME: 21.4 SECONDS (ref 11.8–14.8)
RBC # BLD AUTO: 3.24 10*6/MM3 (ref 4.14–5.8)
SODIUM SERPL-SCNC: 142 MMOL/L (ref 136–145)
TROPONIN T DELTA: -11 NG/L
TROPONIN T SERPL HS-MCNC: 47 NG/L
WBC NRBC COR # BLD: 8.97 10*3/MM3 (ref 3.4–10.8)
WHOLE BLOOD HOLD COAG: NORMAL
WHOLE BLOOD HOLD SPECIMEN: NORMAL

## 2023-10-29 PROCEDURE — 83735 ASSAY OF MAGNESIUM: CPT

## 2023-10-29 PROCEDURE — 93005 ELECTROCARDIOGRAM TRACING: CPT | Performed by: EMERGENCY MEDICINE

## 2023-10-29 PROCEDURE — G0378 HOSPITAL OBSERVATION PER HR: HCPCS

## 2023-10-29 PROCEDURE — 25010000002 ENOXAPARIN PER 10 MG

## 2023-10-29 PROCEDURE — 85025 COMPLETE CBC W/AUTO DIFF WBC: CPT | Performed by: EMERGENCY MEDICINE

## 2023-10-29 PROCEDURE — 36415 COLL VENOUS BLD VENIPUNCTURE: CPT

## 2023-10-29 PROCEDURE — 84484 ASSAY OF TROPONIN QUANT: CPT | Performed by: EMERGENCY MEDICINE

## 2023-10-29 PROCEDURE — 85610 PROTHROMBIN TIME: CPT

## 2023-10-29 PROCEDURE — 25510000001 IOPAMIDOL PER 1 ML

## 2023-10-29 PROCEDURE — 99285 EMERGENCY DEPT VISIT HI MDM: CPT

## 2023-10-29 PROCEDURE — 71275 CT ANGIOGRAPHY CHEST: CPT

## 2023-10-29 PROCEDURE — 25010000002 FUROSEMIDE PER 20 MG

## 2023-10-29 PROCEDURE — 93005 ELECTROCARDIOGRAM TRACING: CPT | Performed by: INTERNAL MEDICINE

## 2023-10-29 PROCEDURE — 93010 ELECTROCARDIOGRAM REPORT: CPT | Performed by: INTERNAL MEDICINE

## 2023-10-29 PROCEDURE — 80053 COMPREHEN METABOLIC PANEL: CPT | Performed by: EMERGENCY MEDICINE

## 2023-10-29 PROCEDURE — 83880 ASSAY OF NATRIURETIC PEPTIDE: CPT

## 2023-10-29 PROCEDURE — 85730 THROMBOPLASTIN TIME PARTIAL: CPT

## 2023-10-29 PROCEDURE — 71045 X-RAY EXAM CHEST 1 VIEW: CPT

## 2023-10-29 RX ORDER — ATORVASTATIN CALCIUM 40 MG/1
40 TABLET, FILM COATED ORAL NIGHTLY
Status: DISCONTINUED | OUTPATIENT
Start: 2023-10-29 | End: 2023-11-01 | Stop reason: HOSPADM

## 2023-10-29 RX ORDER — ENOXAPARIN SODIUM 100 MG/ML
40 INJECTION SUBCUTANEOUS
Status: DISCONTINUED | OUTPATIENT
Start: 2023-10-29 | End: 2023-10-30

## 2023-10-29 RX ORDER — FINASTERIDE 5 MG/1
5 TABLET, FILM COATED ORAL DAILY
Status: DISCONTINUED | OUTPATIENT
Start: 2023-10-30 | End: 2023-11-01 | Stop reason: HOSPADM

## 2023-10-29 RX ORDER — POLYETHYLENE GLYCOL 3350 17 G/17G
17 POWDER, FOR SOLUTION ORAL DAILY PRN
Status: DISCONTINUED | OUTPATIENT
Start: 2023-10-29 | End: 2023-10-30

## 2023-10-29 RX ORDER — SODIUM CHLORIDE 0.9 % (FLUSH) 0.9 %
10 SYRINGE (ML) INJECTION EVERY 12 HOURS SCHEDULED
Status: DISCONTINUED | OUTPATIENT
Start: 2023-10-29 | End: 2023-11-01 | Stop reason: HOSPADM

## 2023-10-29 RX ORDER — SODIUM CHLORIDE 9 MG/ML
40 INJECTION, SOLUTION INTRAVENOUS AS NEEDED
Status: DISCONTINUED | OUTPATIENT
Start: 2023-10-29 | End: 2023-11-01 | Stop reason: HOSPADM

## 2023-10-29 RX ORDER — SODIUM CHLORIDE 0.9 % (FLUSH) 0.9 %
10 SYRINGE (ML) INJECTION AS NEEDED
Status: DISCONTINUED | OUTPATIENT
Start: 2023-10-29 | End: 2023-11-01 | Stop reason: HOSPADM

## 2023-10-29 RX ORDER — ONDANSETRON 4 MG/1
4 TABLET, FILM COATED ORAL EVERY 6 HOURS PRN
Status: DISCONTINUED | OUTPATIENT
Start: 2023-10-29 | End: 2023-10-30

## 2023-10-29 RX ORDER — FUROSEMIDE 10 MG/ML
80 INJECTION INTRAMUSCULAR; INTRAVENOUS ONCE
Status: COMPLETED | OUTPATIENT
Start: 2023-10-29 | End: 2023-10-29

## 2023-10-29 RX ORDER — RANOLAZINE 500 MG/1
1000 TABLET, EXTENDED RELEASE ORAL EVERY 12 HOURS SCHEDULED
Status: DISCONTINUED | OUTPATIENT
Start: 2023-10-29 | End: 2023-11-01 | Stop reason: HOSPADM

## 2023-10-29 RX ORDER — NITROGLYCERIN 0.4 MG/1
0.4 TABLET SUBLINGUAL ONCE
Status: COMPLETED | OUTPATIENT
Start: 2023-10-29 | End: 2023-10-29

## 2023-10-29 RX ORDER — ONDANSETRON 2 MG/ML
4 INJECTION INTRAMUSCULAR; INTRAVENOUS EVERY 6 HOURS PRN
Status: DISCONTINUED | OUTPATIENT
Start: 2023-10-29 | End: 2023-10-30

## 2023-10-29 RX ORDER — BISACODYL 5 MG/1
5 TABLET, DELAYED RELEASE ORAL DAILY PRN
Status: DISCONTINUED | OUTPATIENT
Start: 2023-10-29 | End: 2023-10-30

## 2023-10-29 RX ORDER — AMOXICILLIN 250 MG
2 CAPSULE ORAL 2 TIMES DAILY
Status: DISCONTINUED | OUTPATIENT
Start: 2023-10-29 | End: 2023-10-30

## 2023-10-29 RX ORDER — ZOLPIDEM TARTRATE 5 MG/1
5 TABLET ORAL NIGHTLY PRN
Status: DISCONTINUED | OUTPATIENT
Start: 2023-10-29 | End: 2023-11-01 | Stop reason: HOSPADM

## 2023-10-29 RX ORDER — CLOPIDOGREL BISULFATE 75 MG/1
75 TABLET ORAL DAILY
Status: DISCONTINUED | OUTPATIENT
Start: 2023-10-30 | End: 2023-11-01 | Stop reason: HOSPADM

## 2023-10-29 RX ORDER — BISACODYL 10 MG
10 SUPPOSITORY, RECTAL RECTAL DAILY PRN
Status: DISCONTINUED | OUTPATIENT
Start: 2023-10-29 | End: 2023-10-30

## 2023-10-29 RX ORDER — SOTALOL HYDROCHLORIDE 80 MG/1
80 TABLET ORAL EVERY 12 HOURS SCHEDULED
Status: DISCONTINUED | OUTPATIENT
Start: 2023-10-29 | End: 2023-11-01 | Stop reason: HOSPADM

## 2023-10-29 RX ORDER — PREGABALIN 75 MG/1
150 CAPSULE ORAL EVERY 12 HOURS SCHEDULED
Status: DISCONTINUED | OUTPATIENT
Start: 2023-10-29 | End: 2023-11-01 | Stop reason: HOSPADM

## 2023-10-29 RX ORDER — ASPIRIN 81 MG/1
324 TABLET, CHEWABLE ORAL ONCE
Status: COMPLETED | OUTPATIENT
Start: 2023-10-29 | End: 2023-10-29

## 2023-10-29 RX ADMIN — FUROSEMIDE 80 MG: 10 INJECTION, SOLUTION INTRAVENOUS at 16:41

## 2023-10-29 RX ADMIN — ATORVASTATIN CALCIUM 40 MG: 40 TABLET ORAL at 22:07

## 2023-10-29 RX ADMIN — NITROGLYCERIN 0.4 MG: 0.4 TABLET SUBLINGUAL at 15:01

## 2023-10-29 RX ADMIN — PREGABALIN 150 MG: 75 CAPSULE ORAL at 22:07

## 2023-10-29 RX ADMIN — ENOXAPARIN SODIUM 40 MG: 100 INJECTION SUBCUTANEOUS at 17:54

## 2023-10-29 RX ADMIN — IOPAMIDOL 100 ML: 755 INJECTION, SOLUTION INTRAVENOUS at 14:27

## 2023-10-29 RX ADMIN — SOTALOL HYDROCHLORIDE 80 MG: 80 TABLET ORAL at 22:07

## 2023-10-29 RX ADMIN — Medication 10 ML: at 22:08

## 2023-10-29 RX ADMIN — ASPIRIN 324 MG: 81 TABLET, CHEWABLE ORAL at 13:47

## 2023-10-29 RX ADMIN — SACUBITRIL AND VALSARTAN 1 TABLET: 49; 51 TABLET, FILM COATED ORAL at 22:08

## 2023-10-29 RX ADMIN — ZOLPIDEM TARTRATE 5 MG: 5 TABLET ORAL at 22:07

## 2023-10-29 RX ADMIN — RANOLAZINE 1000 MG: 500 TABLET, FILM COATED, EXTENDED RELEASE ORAL at 22:07

## 2023-10-29 NOTE — PROGRESS NOTES
"Pharmacy Dosing Service  Anticoagulant   Lovenox      Assessment/Action/Plan:  Received a \"Pharmacy to Dose\" consult for Lovenox on the indication of VTE prophylaxis. BMI and labs noted. Initiated patient on enoxaparin [Lovenox] 40 mg Q24H for the listed indication. No other action required at this time per pharmacy based on available information and current clinical status. Will continue routine follow-up evaluation and make any necessary adjustments.     Subjective:  Darren Maria is a 78 y.o. male on  Lovenox  for indication of  VTE prophylaxis .    Objective:  [Ht: 180.3 cm (71\"); Wt: 99.3 kg (219 lb); BMI: Body mass index is 30.54 kg/m².]  Estimated Creatinine Clearance: 87.8 mL/min (by C-G formula based on SCr of 0.86 mg/dL).   Lab Results   Component Value Date    INR 1.83 (H) 10/29/2023    INR 1.85 (H) 12/22/2022    INR 2.05 (H) 11/13/2019    PROTIME 21.4 (H) 10/29/2023    PROTIME 21.6 (H) 12/22/2022    PROTIME 23.9 (H) 11/13/2019      Lab Results   Component Value Date    HGB 9.2 (L) 10/29/2023    HGB 11.0 (L) 05/27/2023    HGB 12.0 (L) 12/22/2022      Lab Results   Component Value Date     10/29/2023     05/27/2023     12/22/2022       Sue Nation, PharmD  10/29/23 17:11 CDT     "

## 2023-10-29 NOTE — PLAN OF CARE
Problem: Adult Inpatient Plan of Care  Goal: Plan of Care Review  Outcome: Ongoing, Progressing  Flowsheets (Taken 10/29/2023 1722)  Progress: improving  Plan of Care Reviewed With: patient  Outcome Evaluation: Patient admitted to room 454 from ED. Alert and oriented x4, respirations even and unlabored on room air. Pt a little SOB with exertion. Tele Sr 70s. Safety precautions maintained  Goal: Patient-Specific Goal (Individualized)  Outcome: Ongoing, Progressing  Goal: Absence of Hospital-Acquired Illness or Injury  Outcome: Ongoing, Progressing  Goal: Optimal Comfort and Wellbeing  Outcome: Ongoing, Progressing  Goal: Readiness for Transition of Care  Outcome: Ongoing, Progressing  Intervention: Mutually Develop Transition Plan  Recent Flowsheet Documentation  Taken 10/29/2023 1719 by Yary Woodard, RN  Equipment Currently Used at Home: none  Transportation Anticipated: family or friend will provide  Patient/Family Anticipated Services at Transition: none  Patient/Family Anticipates Transition to: home     Problem: Asthma Comorbidity  Goal: Maintenance of Asthma Control  Outcome: Ongoing, Progressing     Problem: Behavioral Health Comorbidity  Goal: Maintenance of Behavioral Health Symptom Control  Outcome: Ongoing, Progressing     Problem: COPD (Chronic Obstructive Pulmonary Disease) Comorbidity  Goal: Maintenance of COPD Symptom Control  Outcome: Ongoing, Progressing     Problem: Diabetes Comorbidity  Goal: Blood Glucose Level Within Targeted Range  Outcome: Ongoing, Progressing     Problem: Heart Failure Comorbidity  Goal: Maintenance of Heart Failure Symptom Control  Outcome: Ongoing, Progressing     Problem: Hypertension Comorbidity  Goal: Blood Pressure in Desired Range  Outcome: Ongoing, Progressing     Problem: Obstructive Sleep Apnea Risk or Actual Comorbidity Management  Goal: Unobstructed Breathing During Sleep  Outcome: Ongoing, Progressing     Problem: Osteoarthritis Comorbidity  Goal: Maintenance  of Osteoarthritis Symptom Control  Outcome: Ongoing, Progressing     Problem: Pain Chronic (Persistent) (Comorbidity Management)  Goal: Acceptable Pain Control and Functional Ability  Outcome: Ongoing, Progressing     Problem: Seizure Disorder Comorbidity  Goal: Maintenance of Seizure Control  Outcome: Ongoing, Progressing   Goal Outcome Evaluation:  Plan of Care Reviewed With: patient        Progress: improving  Outcome Evaluation: Patient admitted to room 454 from ED. Alert and oriented x4, respirations even and unlabored on room air. Pt a little SOB with exertion. Tele Sr 70s. Safety precautions maintained

## 2023-10-29 NOTE — ED PROVIDER NOTES
Subjective   History of Present Illness  Patient is a 78-year-old male that presents to the emergency department for chest pain and worsening shortness of breath.  Patient has an extensive cardiac history.  Past medical history includes CABG, A-fib, CHF, CAD, MI, pacemaker and defibrillator.  Patient reports he suffers from chronic chest pain.  He states that last night he began having midsternal chest pain that was just a light pressure.  He states that has not progressed the pain moved up higher into his chest and became more prominent.  He states that this lasted for about 30 minutes to an hour and then he did experience some relief.  He states that during this episode he also experienced a frontal headache and increase in shortness of breath as well as nausea.  Patient states he was unable to sleep last night due to constant discomfort and shortness of breath.  Wife reports that patient will become very labored when trying to lay down or any type of movement.  She states this is very abnormal for him. Patient denies any episodes of vomiting, diaphoresis or becoming pale or any loss of consciousness.  He states that he also had some radiation of pain down into his left arm but reports he has been experiencing some issues with his left arm for the last 3 weeks following a flu shot.  Wife reports that following the flu shot about 3 weeks ago his entire arm was swollen and is just now returning to normal size.  Patient also states he has been experiencing worsening lightheadedness upon standing.  States he does take Xarelto and has not missed any doses except for today.  Patient reports he was unable to take any medications today due to feeling unwell and nauseous.  Patient states he is currently experiencing some chest pain at this time.  He denies taking any aspirin or nitro prior to arrival to the ED.  He is also still complaining of shortness of breath.  He denies any abdominal pain.  He denies any urinary or  "bowel issues.  Denies any fevers, cough or congestion.      Review of Systems   Constitutional:  Positive for activity change and fatigue.   Respiratory:  Positive for chest tightness and shortness of breath.    Cardiovascular:  Positive for chest pain and leg swelling.   Gastrointestinal:  Positive for nausea.   Neurological:  Positive for light-headedness.   All other systems reviewed and are negative.      Past Medical History:   Diagnosis Date    Abdominal pain, epigastric     Abnormal abdominal exam     ABFND, RADIOLOGICAL, ABDOMINAL AREA     Abnormal ECG     Anticoagulated on Coumadin     Atherosclerosis of coronary artery bypass graft     ATHEROSCLEROSIS, CORONARY, ARTERY BYPASS GRFT    Atrial fibrillation     Cancer of right kidney     right sided kidney cancer    Cardiomyopathy     Cardiomyopathy, primary     CHF (congestive heart failure)     Clotting disorder     Colon polyps     Congenital heart disease     Congestive heart failure     Coronary artery disease     History of CAD/A-Fib/Pacemaker/Defibrillator placement: pt takes Coumadin and plavix therapy as well as ASA daily    Diabetes mellitus     Gastric polyp     Gastroesophageal reflux disease without esophagitis     History of colon polyps     Hypercholesterolemia     Hypertension, essential     Melena     Myocardial infarction     NSAID long-term use     Obesity     Pacemaker     Pacemaker Dependant    Platelet inhibition due to Plavix     Presence of stent in coronary artery     Multiple coronary stenting most recently 4/2008    Single kidney     Only 1 Kidney    Status post surgery     s/p Polypectomy Bleed       Allergies   Allergen Reactions    Azithromycin Nausea And Vomiting    Morphine And Related Other (See Comments)     SHAKES AND MAKES HIM \"WIRED\".   TAKES TRAMADOL WITHOUT PROBLEM       Past Surgical History:   Procedure Laterality Date    ABLATION OF DYSRHYTHMIC FOCUS      APPENDECTOMY      CARDIAC ABLATION      CARDIAC CATHETERIZATION  "     CARDIAC CATHETERIZATION Left 12/15/2021    Procedure: Coronary angiography;  Surgeon: Sarbjit Posadas MD;  Location: [facility] CATH INVASIVE LOCATION;  Service: Cardiology;  Laterality: Left;    CARDIAC CATHETERIZATION Left 12/15/2021    Procedure: Percutaneous Coronary Intervention;  Surgeon: Sarbjit Posadas MD;  Location: [facility] CATH INVASIVE LOCATION;  Service: Cardiology;  Laterality: Left;    CARDIAC DEFIBRILLATOR PLACEMENT      CARDIAC PACEMAKER PLACEMENT      Pacemaker Placement    CATARACT EXTRACTION, BILATERAL      CHOLECYSTECTOMY      COLONOSCOPY  08/27/2013    snare hep, trans tics recall 3 yr    COLONOSCOPY  10/02/2006    few scattered diverticula    COLONOSCOPY  07/22/2009    tubular adenoma in the ascending colon    COLONOSCOPY  07/25/2005    several polyps in the ascending colon, one in the transverse colon    COLONOSCOPY N/A 03/21/2022    Procedure: COLONOSCOPY WITH ANESTHESIA;  Surgeon: Phil Goodwin MD;  Location: [facility] ENDOSCOPY;  Service: Gastroenterology;  Laterality: N/A;  pre: hx polyps  post: polyps  Rolan Salvador MD    COLONOSCOPY W/ POLYPECTOMY  09/19/2016    Tubular adenoma hepatic flexure, 2 Hyperplastic polyps rectum and at 50 cm, Benign polyp at 70 cm repeat exam in 3-5 years    CORONARY ARTERY BYPASS GRAFT  02/2011    2nd time    CORONARY ARTERY BYPASS GRAFT  1990    CORONARY STENT PLACEMENT      ENDOSCOPY  04/14/2016    nl slant    ENDOSCOPY  11/10/2014    clips at polypectomy bx no residual recall 1 yr    ENDOSCOPY  06/19/2014    snare clip body recall 6 months    ENDOSCOPY  05/19/2014    polyp stomach bx and clip    HERNIA REPAIR      with mesh    JOINT REPLACEMENT      OTHER SURGICAL HISTORY      Defibrillator/Pacer maker exchange    OTHER SURGICAL HISTORY      Pacemaker/Defibillation exchange 2012    PENILE PROSTHESIS IMPLANT N/A 07/27/2018    Procedure: PENILE PROSTHESIS PLACEMENT;  Surgeon: Godwin Gupta MD;  Location: [facility] OR;  Service: Urology     PERIPHERAL ARTERIAL STENT GRAFT         Family History   Problem Relation Age of Onset    Lung cancer Mother     No Known Problems Father     Other Neg Hx         GI Malignancies    Colon cancer Neg Hx     Colon polyps Neg Hx        Social History     Socioeconomic History    Marital status:    Tobacco Use    Smoking status: Never    Smokeless tobacco: Never   Vaping Use    Vaping Use: Never used   Substance and Sexual Activity    Alcohol use: Yes     Comment: occ    Drug use: Never    Sexual activity: Not Currently     Partners: Female           Objective   Physical Exam  Vitals and nursing note reviewed.   Constitutional:       Appearance: Normal appearance.      Comments: Nontoxic appearing. In no acute distress.    HENT:      Head: Normocephalic and atraumatic.      Right Ear: External ear normal.      Left Ear: External ear normal.      Nose: Nose normal.      Mouth/Throat:      Mouth: Mucous membranes are moist.      Pharynx: Oropharynx is clear.   Eyes:      Extraocular Movements: Extraocular movements intact.      Conjunctiva/sclera: Conjunctivae normal.      Pupils: Pupils are equal, round, and reactive to light.   Cardiovascular:      Rate and Rhythm: Normal rate and regular rhythm.      Pulses: Normal pulses.      Heart sounds: Normal heart sounds.   Pulmonary:      Effort: Pulmonary effort is normal. No respiratory distress.      Breath sounds: Normal breath sounds. No wheezing.   Chest:      Chest wall: No mass or tenderness.   Abdominal:      General: Abdomen is flat. Bowel sounds are normal. There is no distension.      Palpations: Abdomen is soft.      Tenderness: There is no abdominal tenderness. There is no right CVA tenderness, left CVA tenderness, guarding or rebound.   Musculoskeletal:         General: Normal range of motion.      Cervical back: Normal range of motion and neck supple.      Right lower le+ Edema present.      Left lower le+ Edema present.      Comments: Patient  reports this is chronic and is baseline.    Skin:     General: Skin is warm and dry.      Capillary Refill: Capillary refill takes less than 2 seconds.      Coloration: Skin is pale.   Neurological:      General: No focal deficit present.      Mental Status: He is alert and oriented to person, place, and time. Mental status is at baseline.   Psychiatric:         Mood and Affect: Mood normal.         Behavior: Behavior normal.         Thought Content: Thought content normal.         Judgment: Judgment normal.       Labs Reviewed   COMPREHENSIVE METABOLIC PANEL - Abnormal; Notable for the following components:       Result Value    Glucose 162 (*)     All other components within normal limits    Narrative:     GFR Normal >60  Chronic Kidney Disease <60  Kidney Failure <15    The GFR formula is only valid for adults with stable renal function between ages 18 and 70.   TROPONIN - Abnormal; Notable for the following components:    HS Troponin T 47 (*)     All other components within normal limits    Narrative:     High Sensitive Troponin T Reference Range:  <10.0 ng/L- Negative Female for AMI  <15.0 ng/L- Negative Male for AMI  >=10 - Abnormal Female indicating possible myocardial injury.  >=15 - Abnormal Male indicating possible myocardial injury.   Clinicians would have to utilize clinical acumen, EKG, Troponin, and serial changes to determine if it is an Acute Myocardial Infarction or myocardial injury due to an underlying chronic condition.        CBC WITH AUTO DIFFERENTIAL - Abnormal; Notable for the following components:    RBC 3.24 (*)     Hemoglobin 9.2 (*)     Hematocrit 31.0 (*)     MCHC 29.7 (*)     RDW 16.4 (*)     RDW-SD 58.0 (*)     Neutrophil % 79.8 (*)     Lymphocyte % 7.5 (*)     Neutrophils, Absolute 7.16 (*)     Lymphocytes, Absolute 0.67 (*)     Monocytes, Absolute 1.03 (*)     All other components within normal limits   PROTIME-INR - Abnormal; Notable for the following components:    Protime 21.4 (*)      INR 1.83 (*)     All other components within normal limits   APTT - Abnormal; Notable for the following components:    PTT 43.6 (*)     All other components within normal limits   HIGH SENSITIVITIY TROPONIN T 2HR - Abnormal; Notable for the following components:    HS Troponin T 36 (*)     Troponin T Delta -11 (*)     All other components within normal limits    Narrative:     High Sensitive Troponin T Reference Range:  <10.0 ng/L- Negative Female for AMI  <15.0 ng/L- Negative Male for AMI  >=10 - Abnormal Female indicating possible myocardial injury.  >=15 - Abnormal Male indicating possible myocardial injury.   Clinicians would have to utilize clinical acumen, EKG, Troponin, and serial changes to determine if it is an Acute Myocardial Infarction or myocardial injury due to an underlying chronic condition.        BNP (IN-HOUSE) - Abnormal; Notable for the following components:    proBNP 2,036.0 (*)     All other components within normal limits    Narrative:     This assay is used as an aid in the diagnosis of individuals suspected of having heart failure. It can be used as an aid in the diagnosis of acute decompensated heart failure (ADHF) in patients presenting with signs and symptoms of ADHF to the emergency department (ED). In addition, NT-proBNP of <300 pg/mL indicates ADHF is not likely.    Age Range Result Interpretation  NT-proBNP Concentration (pg/mL:      <50             Positive            >450                   Gray                 300-450                    Negative             <300    50-75           Positive            >900                  Gray                300-900                  Negative            <300      >75             Positive            >1800                  Gray                300-1800                  Negative            <300   MAGNESIUM - Normal   RAINBOW DRAW    Narrative:     The following orders were created for panel order [place] Draw.  Procedure                                Abnormality         Status                     ---------                               -----------         ------                     Green Top (Gel)[[id number]]                                  Final result               Lavender Top[[id number]]                                     Final result               Red Top[[id number]]                                          Final result               Light Blue Top[[id number]]                                   Final result                 Please view results for these tests on the individual orders.   CBC AND DIFFERENTIAL    Narrative:     The following orders were created for panel order CBC & Differential.  Procedure                               Abnormality         Status                     ---------                               -----------         ------                     CBC Auto Differential[[id number]]        Abnormal            Final result                 Please view results for these tests on the individual orders.   GREEN TOP   LAVENDER TOP   RED TOP   LIGHT BLUE TOP      CT Angiogram Chest   Final Result       Right upper lobe segmental apparent filling defect is favored to   represent streak artifact, however difficult to completely exclude   pulmonary embolism. No evidence of right heart strain.       Mild bilateral groundglass opacities with small bilateral pleural   effusions, likely pulmonary edema       Mildly prominent mediastinal nodes, likely reactive.       Dilated ascending thoracic aorta (4.3 cm).       Hernia mesh repair in the anterior upper abdomen with 9.2 cm underlying   fluid collection, similar to prior study on 1/20/2023.               This report was signed and finalized on 10/29/2023 2:51 PM CDT by Ashvin Oden.          XR Chest 1 View   Final Result       No acute cardiopulmonary abnormality.               This report was signed and finalized on 10/29/2023 1:56 PM CDT by Ashvin Oden.               Procedures           ED  Course  ED Course as of 10/29/23 1636   [place] Oct 29, 2023   2567 Dr. Madeleine hodgson at this time regarding admission.  [KF]      ED Course User Index  [KF] Joyce Pastor, EVELIA                                           Medical Decision Making  Darren Maria is a 78 y.o. male who presents to the ED for chest pain and worsening shortness of breath.  Patient has an extensive cardiac history.  Past medical history includes CABG, A-fib, CHF, CAD, MI, pacemaker and defibrillator.  Patient reports he suffers from chronic chest pain.  He states that last night he began having midsternal chest pain that was just a light pressure.  He states that has not progressed the pain moved up higher into his chest and became more prominent.  He states that this lasted for about 30 minutes to an hour and then he did experience some relief.  He states that during this episode he also experienced a frontal headache and increase in shortness of breath as well as nausea.  Patient states he was unable to sleep last night due to constant discomfort and shortness of breath.  Wife reports that patient will become very labored when trying to lay down or any type of movement.  She states this is very abnormal for him. Patient denies any episodes of vomiting, diaphoresis or becoming pale or any loss of consciousness.  He states that he also had some radiation of pain down into his left arm but reports he has been experiencing some issues with his left arm for the last 3 weeks following a flu shot.  Wife reports that following the flu shot about 3 weeks ago his entire arm was swollen and is just now returning to normal size.  Patient also states he has been experiencing worsening lightheadedness upon standing.  States he does take Xarelto and has not missed any doses except for today.  Patient reports he was unable to take any medications today due to feeling unwell and nauseous.  Patient states he is currently experiencing some chest pain at  this time.  He denies taking any aspirin or nitro prior to arrival to the ED.  He is also still complaining of shortness of breath.  He denies any abdominal pain.  He denies any urinary or bowel issues.  Denies any fevers, cough or congestion.    Patient was non-toxic appearing on arrival. No acute distress was noted.  Vital signs stable.     Past medical history, surgical history, and medication regimen reviewed.     Previous notes, labs, imaging and more reviewed.    Patient's presentation raises suspicion for differentials including, but not limited to, ACS, PE, CHF exacerbation, pulmonary edema, pneumonia, electrolyte imbalance.    Please refer to above section of note for lab and imaging results that were reviewed and interpreted by radiology as well as attending physician.     Medications administered,   sodium chloride 0.9 % flush 10 mL (has no administration in time range)  sodium chloride 0.9 % flush 10 mL (has no administration in time range)  furosemide (LASIX) injection 80 mg (has no administration in time range)  aspirin chewable tablet 324 mg (324 mg Oral Given 10/29/23 1347)  nitroglycerin (NITROSTAT) SL tablet 0.4 mg (0.4 mg Sublingual Given 10/29/23 1501)  iopamidol (ISOVUE-370) 76 % injection 100 mL (100 mL Intravenous Given 10/29/23 1427)     Spoke with Dr. Lua who is on call for Dr. Salvador regarding admission. Dr. Lua has accepted patient for admission to the hospital at this time.     Given findings described above, patient's presentation is likely consistent with bilateral pleural effusions, fluid retention and CHF exacerbation.     I reassessed the patient and discussed the findings of the work up so far with patient and significant other present at bedside. I said that the next step in treatment would be admission to the hospital for further workup and care. I also said that there is always some diagnostic uncertainty in the ER, that symptoms may change, and new things may be found  after being admitted. Dr. Lua assumed primary care of the patient and the patient was admitted to their service in stable condition.    Dragon disclaimer:  Parts of this note may be an electronic transcription/translation of spoken language to printed text using the Dragon dictation system.       Problems Addressed:  Bilateral pleural effusion: complicated acute illness or injury  Chest pain, unspecified type: complicated acute illness or injury  Fluid retention: complicated acute illness or injury    Amount and/or Complexity of Data Reviewed  Radiology: ordered.    Risk  Prescription drug management.  Decision regarding hospitalization.        Final diagnoses:   Fluid retention   Bilateral pleural effusion   Chest pain, unspecified type   Acute on chronic congestive heart failure, unspecified heart failure type       ED Disposition  ED Disposition       ED Disposition   Decision to Admit    Condition   --    Comment   Level of Care: Telemetry [5]   Diagnosis: Acute exacerbation of CHF (congestive heart failure) [894821]   Admitting Physician: SCOOTER FERRARI [[id number]]   Attending Physician: SCOOTER FERRARI [[id number]]                 No follow-up provider specified.       Medication List      No changes were made to your prescriptions during this visit.            Joyce Pastor, APRN  10/29/23 1631

## 2023-10-30 PROBLEM — D64.9 ANEMIA: Status: ACTIVE | Noted: 2023-10-30

## 2023-10-30 LAB
ANION GAP SERPL CALCULATED.3IONS-SCNC: 7 MMOL/L (ref 5–15)
BASOPHILS # BLD AUTO: 0.03 10*3/MM3 (ref 0–0.2)
BASOPHILS NFR BLD AUTO: 0.7 % (ref 0–1.5)
BUN SERPL-MCNC: 13 MG/DL (ref 8–23)
BUN/CREAT SERPL: 14.3 (ref 7–25)
CALCIUM SPEC-SCNC: 8.7 MG/DL (ref 8.6–10.5)
CHLORIDE SERPL-SCNC: 104 MMOL/L (ref 98–107)
CO2 SERPL-SCNC: 32 MMOL/L (ref 22–29)
CREAT SERPL-MCNC: 0.91 MG/DL (ref 0.76–1.27)
CYTOLOGIST CVX/VAG CYTO: NORMAL
DEPRECATED RDW RBC AUTO: 56.7 FL (ref 37–54)
EGFRCR SERPLBLD CKD-EPI 2021: 87.3 ML/MIN/1.73
EOSINOPHIL # BLD AUTO: 0.09 10*3/MM3 (ref 0–0.4)
EOSINOPHIL NFR BLD AUTO: 2 % (ref 0.3–6.2)
ERYTHROCYTE [DISTWIDTH] IN BLOOD BY AUTOMATED COUNT: 16.3 % (ref 12.3–15.4)
FOLATE SERPL-MCNC: 12.4 NG/ML (ref 4.78–24.2)
GLUCOSE SERPL-MCNC: 105 MG/DL (ref 65–99)
HCT VFR BLD AUTO: 27.6 % (ref 37.5–51)
HGB BLD-MCNC: 8.1 G/DL (ref 13–17.7)
IMM GRANULOCYTES # BLD AUTO: 0.02 10*3/MM3 (ref 0–0.05)
IMM GRANULOCYTES NFR BLD AUTO: 0.4 % (ref 0–0.5)
IRON 24H UR-MRATE: 22 MCG/DL (ref 59–158)
IRON SATN MFR SERPL: 7 % (ref 20–50)
LYMPHOCYTES # BLD AUTO: 0.62 10*3/MM3 (ref 0.7–3.1)
LYMPHOCYTES NFR BLD AUTO: 13.6 % (ref 19.6–45.3)
MCH RBC QN AUTO: 27.9 PG (ref 26.6–33)
MCHC RBC AUTO-ENTMCNC: 29.3 G/DL (ref 31.5–35.7)
MCV RBC AUTO: 95.2 FL (ref 79–97)
MONOCYTES # BLD AUTO: 0.61 10*3/MM3 (ref 0.1–0.9)
MONOCYTES NFR BLD AUTO: 13.3 % (ref 5–12)
NEUTROPHILS NFR BLD AUTO: 3.2 10*3/MM3 (ref 1.7–7)
NEUTROPHILS NFR BLD AUTO: 70 % (ref 42.7–76)
NRBC BLD AUTO-RTO: 0 /100 WBC (ref 0–0.2)
PATH INTERP BLD-IMP: NORMAL
PLATELET # BLD AUTO: 232 10*3/MM3 (ref 140–450)
PMV BLD AUTO: 9.4 FL (ref 6–12)
POTASSIUM SERPL-SCNC: 3.6 MMOL/L (ref 3.5–5.2)
RBC # BLD AUTO: 2.9 10*6/MM3 (ref 4.14–5.8)
RETICS # AUTO: 0.08 10*6/MM3 (ref 0.02–0.13)
RETICS/RBC NFR AUTO: 2.73 % (ref 0.7–1.9)
SODIUM SERPL-SCNC: 143 MMOL/L (ref 136–145)
TIBC SERPL-MCNC: 323 MCG/DL (ref 298–536)
TRANSFERRIN SERPL-MCNC: 217 MG/DL (ref 200–360)
VIT B12 BLD-MCNC: 991 PG/ML (ref 211–946)
WBC NRBC COR # BLD: 4.57 10*3/MM3 (ref 3.4–10.8)

## 2023-10-30 PROCEDURE — 82607 VITAMIN B-12: CPT | Performed by: INTERNAL MEDICINE

## 2023-10-30 PROCEDURE — 85025 COMPLETE CBC W/AUTO DIFF WBC: CPT

## 2023-10-30 PROCEDURE — 84466 ASSAY OF TRANSFERRIN: CPT | Performed by: INTERNAL MEDICINE

## 2023-10-30 PROCEDURE — 83540 ASSAY OF IRON: CPT | Performed by: INTERNAL MEDICINE

## 2023-10-30 PROCEDURE — 85060 BLOOD SMEAR INTERPRETATION: CPT | Performed by: INTERNAL MEDICINE

## 2023-10-30 PROCEDURE — G0378 HOSPITAL OBSERVATION PER HR: HCPCS

## 2023-10-30 PROCEDURE — 80048 BASIC METABOLIC PNL TOTAL CA: CPT

## 2023-10-30 PROCEDURE — 25010000002 ENOXAPARIN PER 10 MG: Performed by: INTERNAL MEDICINE

## 2023-10-30 PROCEDURE — 25010000002 FUROSEMIDE PER 20 MG

## 2023-10-30 PROCEDURE — 85045 AUTOMATED RETICULOCYTE COUNT: CPT | Performed by: INTERNAL MEDICINE

## 2023-10-30 PROCEDURE — 99214 OFFICE O/P EST MOD 30 MIN: CPT | Performed by: NURSE PRACTITIONER

## 2023-10-30 PROCEDURE — 82746 ASSAY OF FOLIC ACID SERUM: CPT | Performed by: INTERNAL MEDICINE

## 2023-10-30 RX ORDER — ZOLPIDEM TARTRATE 5 MG/1
5 TABLET ORAL NIGHTLY PRN
COMMUNITY

## 2023-10-30 RX ORDER — ENOXAPARIN SODIUM 100 MG/ML
1 INJECTION SUBCUTANEOUS EVERY 12 HOURS
Status: DISCONTINUED | OUTPATIENT
Start: 2023-10-30 | End: 2023-11-01 | Stop reason: HOSPADM

## 2023-10-30 RX ORDER — FUROSEMIDE 10 MG/ML
40 INJECTION INTRAMUSCULAR; INTRAVENOUS EVERY 12 HOURS
Status: DISCONTINUED | OUTPATIENT
Start: 2023-10-30 | End: 2023-10-31

## 2023-10-30 RX ORDER — POTASSIUM CHLORIDE 750 MG/1
40 CAPSULE, EXTENDED RELEASE ORAL ONCE
Status: COMPLETED | OUTPATIENT
Start: 2023-10-30 | End: 2023-10-30

## 2023-10-30 RX ADMIN — PREGABALIN 150 MG: 75 CAPSULE ORAL at 20:21

## 2023-10-30 RX ADMIN — ENOXAPARIN SODIUM 100 MG: 100 INJECTION SUBCUTANEOUS at 20:19

## 2023-10-30 RX ADMIN — POTASSIUM CHLORIDE 40 MEQ: 750 CAPSULE, EXTENDED RELEASE ORAL at 11:47

## 2023-10-30 RX ADMIN — RANOLAZINE 1000 MG: 500 TABLET, FILM COATED, EXTENDED RELEASE ORAL at 20:19

## 2023-10-30 RX ADMIN — RANOLAZINE 1000 MG: 500 TABLET, FILM COATED, EXTENDED RELEASE ORAL at 09:06

## 2023-10-30 RX ADMIN — FUROSEMIDE 40 MG: 10 INJECTION, SOLUTION INTRAMUSCULAR; INTRAVENOUS at 09:06

## 2023-10-30 RX ADMIN — FUROSEMIDE 40 MG: 10 INJECTION, SOLUTION INTRAMUSCULAR; INTRAVENOUS at 20:19

## 2023-10-30 RX ADMIN — Medication 10 ML: at 20:19

## 2023-10-30 RX ADMIN — SOTALOL HYDROCHLORIDE 80 MG: 80 TABLET ORAL at 20:19

## 2023-10-30 RX ADMIN — ATORVASTATIN CALCIUM 40 MG: 40 TABLET ORAL at 20:19

## 2023-10-30 RX ADMIN — Medication 10 ML: at 09:06

## 2023-10-30 RX ADMIN — EMPAGLIFLOZIN 10 MG: 10 TABLET, FILM COATED ORAL at 09:06

## 2023-10-30 RX ADMIN — FINASTERIDE 5 MG: 5 TABLET, FILM COATED ORAL at 09:06

## 2023-10-30 RX ADMIN — ENOXAPARIN SODIUM 100 MG: 100 INJECTION SUBCUTANEOUS at 09:06

## 2023-10-30 RX ADMIN — CLOPIDOGREL BISULFATE 75 MG: 75 TABLET, FILM COATED ORAL at 09:06

## 2023-10-30 RX ADMIN — PREGABALIN 150 MG: 75 CAPSULE ORAL at 09:06

## 2023-10-30 RX ADMIN — SACUBITRIL AND VALSARTAN 1 TABLET: 49; 51 TABLET, FILM COATED ORAL at 20:19

## 2023-10-30 RX ADMIN — SOTALOL HYDROCHLORIDE 80 MG: 80 TABLET ORAL at 09:06

## 2023-10-30 NOTE — H&P
Date of Admission: 10/29/2023  Primary Care Physician: Rolan Salvador MD    Subjective     Chief Complaint: Shortness of breath, chest pain      Darren Rogers 78-year-old male with past medical history of significant coronary artery disease, ischemic cardiomyopathy, biventricular AICD, essential hypertension, atrial fibrillation, complete heart block, hyperlipidemia.  He presented to the emergency department last night after worsening shortness of breath over the past few days.  He states he has noticed that he has been more dyspneic on exertion over the past 2 weeks.  He states last night he was also having some diffuse chest pain that radiated down his left arm.  He is followed closely by cardiology with Dr. Posadas.  He has a significant history of CAD with CABG.  States he also had a significant weight gain over the past few days.  Previously he was taking torsemide as needed, but has not had this in quite some time.  He states that his abdomen felt very tight and he cannot lay flat without being very short of breath.    Work-up in the emergency department consistent with CHF exacerbation.  Troponins trended flat.  Elevated BNP at 2000.  Mild normocytic anemia.  Renal function is stable for him.  Chest x-ray showed pulmonary edema.  CT angiogram of the chest negative for PE.  Previous echocardiogram was performed in May of this year showing an EF of 36%.  Medications have been optimized.  He was diuresed last night and has 6 significant urinary output last night.  He is feeling some better this morning and his shortness of breath has improved.  He denies any chest pain this morning.  Ventricular pacing on cardiac monitoring.        Review of Systems   Constitutional:  Positive for fatigue. Negative for diaphoresis and fever.   Respiratory:  Positive for shortness of breath.    Cardiovascular:  Positive for chest pain and leg swelling. Negative for palpitations.   Gastrointestinal:  Positive for  abdominal distention. Negative for nausea and vomiting.   Musculoskeletal:  Negative for back pain.   Neurological:  Negative for dizziness and light-headedness.        Otherwise complete ROS reviewed and negative except as mentioned in the HPI.      Past Medical History:   Past Medical History:   Diagnosis Date    Abdominal pain, epigastric     Abnormal abdominal exam     ABFND, RADIOLOGICAL, ABDOMINAL AREA     Abnormal ECG     Anticoagulated on Coumadin     Atherosclerosis of coronary artery bypass graft     ATHEROSCLEROSIS, CORONARY, ARTERY BYPASS GRFT    Atrial fibrillation     Cancer of right kidney     right sided kidney cancer    Cardiomyopathy     Cardiomyopathy, primary     CHF (congestive heart failure)     Clotting disorder     Colon polyps     Congenital heart disease     Congestive heart failure     Coronary artery disease     History of CAD/A-Fib/Pacemaker/Defibrillator placement: pt takes Coumadin and plavix therapy as well as ASA daily    Diabetes mellitus     Gastric polyp     Gastroesophageal reflux disease without esophagitis     History of colon polyps     Hypercholesterolemia     Hypertension, essential     Melena     Myocardial infarction     NSAID long-term use     Obesity     Pacemaker     Pacemaker Dependant    Platelet inhibition due to Plavix     Presence of stent in coronary artery     Multiple coronary stenting most recently 4/2008    Single kidney     Only 1 Kidney    Status post surgery     s/p Polypectomy Bleed       Past Surgical History:  Past Surgical History:   Procedure Laterality Date    ABLATION OF DYSRHYTHMIC FOCUS      APPENDECTOMY      CARDIAC ABLATION      CARDIAC CATHETERIZATION      CARDIAC CATHETERIZATION Left 12/15/2021    Procedure: Coronary angiography;  Surgeon: Sarbjit Posadas MD;  Location: [facility] CATH INVASIVE LOCATION;  Service: Cardiology;  Laterality: Left;    CARDIAC CATHETERIZATION Left 12/15/2021    Procedure: Percutaneous Coronary Intervention;  Surgeon:  Sarbjit Posadas MD;  Location: [facility] CATH INVASIVE LOCATION;  Service: Cardiology;  Laterality: Left;    CARDIAC DEFIBRILLATOR PLACEMENT      CARDIAC PACEMAKER PLACEMENT      Pacemaker Placement    CATARACT EXTRACTION, BILATERAL      CHOLECYSTECTOMY      COLONOSCOPY  08/27/2013    snare hep, trans tics recall 3 yr    COLONOSCOPY  10/02/2006    few scattered diverticula    COLONOSCOPY  07/22/2009    tubular adenoma in the ascending colon    COLONOSCOPY  07/25/2005    several polyps in the ascending colon, one in the transverse colon    COLONOSCOPY N/A 03/21/2022    Procedure: COLONOSCOPY WITH ANESTHESIA;  Surgeon: Phil Goodwin MD;  Location: [facility] ENDOSCOPY;  Service: Gastroenterology;  Laterality: N/A;  pre: hx polyps  post: polyps  Rolan Salvador MD    COLONOSCOPY W/ POLYPECTOMY  09/19/2016    Tubular adenoma hepatic flexure, 2 Hyperplastic polyps rectum and at 50 cm, Benign polyp at 70 cm repeat exam in 3-5 years    CORONARY ARTERY BYPASS GRAFT  02/2011    2nd time    CORONARY ARTERY BYPASS GRAFT  1990    CORONARY STENT PLACEMENT      ENDOSCOPY  04/14/2016    nl slant    ENDOSCOPY  11/10/2014    clips at polypectomy bx no residual recall 1 yr    ENDOSCOPY  06/19/2014    snare clip body recall 6 months    ENDOSCOPY  05/19/2014    polyp stomach bx and clip    HERNIA REPAIR      with mesh    JOINT REPLACEMENT      OTHER SURGICAL HISTORY      Defibrillator/Pacer maker exchange    OTHER SURGICAL HISTORY      Pacemaker/Defibillation exchange 2012    PENILE PROSTHESIS IMPLANT N/A 07/27/2018    Procedure: PENILE PROSTHESIS PLACEMENT;  Surgeon: Godwin Gupta MD;  Location: [facility] OR;  Service: Urology    PERIPHERAL ARTERIAL STENT GRAFT         Social History:  reports that he has never smoked. He has never used smokeless tobacco. He reports current alcohol use. He reports that he does not use drugs.    Family History: family history includes Lung cancer in his mother; No Known Problems in his father.    "    Allergies:  Allergies   Allergen Reactions    Azithromycin Nausea And Vomiting    Morphine And Related Other (See Comments)     SHAKES AND MAKES HIM \"WIRED\".   TAKES TRAMADOL WITHOUT PROBLEM       Medications:  Prior to Admission medications    Medication Sig Start Date End Date Taking? Authorizing Provider   atorvastatin (LIPITOR) 40 MG tablet Take 1 tablet by mouth Every Night. 11/22/22  Yes Yolanda Perry APRN   clopidogrel (PLAVIX) 75 MG tablet Take 1 tablet by mouth Daily. 9/8/21  Yes Rony Vail MD   finasteride (PROSCAR) 5 MG tablet Take 1 tablet by mouth Daily.   Yes Rony Vail MD   Jardiance 10 MG tablet tablet TAKE 1 TABLET BY MOUTH EVERY DAY IN THE MORNING 4/7/23  Yes Yolanda Perry APRN   metFORMIN (GLUCOPHAGE) 500 MG tablet Take 1 tablet by mouth 2 (Two) Times a Day With Meals. Two tabs daily 9/21/21  Yes Rony Vail MD   Mirabegron ER (Myrbetriq) 50 MG tablet sustained-release 24 hour 24 hr tablet Take 50 mg by mouth Daily. 2/28/23  Yes Tyrone Broussard PA   omeprazole (PriLOSEC) 40 MG capsule Take 1 capsule by mouth Daily.   Yes Rony Vail MD   pregabalin (LYRICA) 150 MG capsule Take 1 capsule by mouth 2 (Two) Times a Day. 8/7/21  Yes Rony Vail MD   ranolazine (RANEXA) 1000 MG 12 hr tablet Take 1 tablet by mouth 2 (Two) Times a Day. 12/1/22  Yes Yolanda Perry APRN   sacubitril-valsartan (Entresto) 49-51 MG tablet Take 1 tablet by mouth 2 (Two) Times a Day. 10/5/23  Yes Yolanda Perry APRN   Sotalol HCl AF 80 MG tablet Take 1 tablet by mouth 2 (Two) Times a Day. 8/29/23  Yes Devika Oliver MD   tamsulosin (FLOMAX) 0.4 MG capsule 24 hr capsule Take 1 capsule by mouth Daily.   Yes Rony Vail MD   Xarelto 20 MG tablet TAKE 1 TABLET BY MOUTH EVERY DAY WITH FOOD 9/20/23  Yes Sarbjit Posadas MD   zolpidem (AMBIEN) 5 MG tablet TAKE 1 TABLET BY MOUTH EVERY DAY AT BEDTIME AS NEEDED 6/5/18  Yes Provider, Historical, MD sharmairizine (zyrTEC) " "10 MG tablet Take 1 tablet by mouth Daily.    Rony Vail MD   cyclobenzaprine (FLEXERIL) 5 MG tablet Take 1 tablet by mouth 3 (Three) Times a Day. 2/6/23   Rony Vail MD   fluticasone (FLONASE) 50 MCG/ACT nasal spray 2 sprays into the nostril(s) as directed by provider Daily As Needed.    Rony Vail MD   nitroglycerin (NITROSTAT) 0.4 MG SL tablet Place 1 tablet under the tongue Every 5 (Five) Minutes As Needed for Chest Pain. Take no more than 3 doses in 15 minutes. 10/18/21   Yolanda Perry APRN   promethazine-dextromethorphan (PROMETHAZINE-DM) 6.25-15 MG/5ML syrup As Needed. 3/24/23   Rony Vail MD   torsemide (DEMADEX) 20 MG tablet Take 1 tablet by mouth Daily As Needed (weight gain >3 lbs in one day or >5 lbs in 2 days). With additional 20 mg PRN 12/29/21   Sarbjit Posadas MD   traMADol-acetaminophen (ULTRACET) 37.5-325 MG per tablet as needed 11/3/16   Rony Vail MD       Objective     Vital Signs: /47 (BP Location: Right arm, Patient Position: Lying) Comment: RN notified  Pulse 75   Temp 97.9 °F (36.6 °C) (Oral)   Resp 16   Ht 180.3 cm (71\")   Wt 96 kg (211 lb 9.6 oz)   SpO2 94%   BMI 29.51 kg/m²   Physical Exam  Constitutional:       General: He is not in acute distress.     Appearance: Normal appearance. He is not ill-appearing.   HENT:      Head: Normocephalic and atraumatic.      Mouth/Throat:      Mouth: Mucous membranes are moist.      Pharynx: Oropharynx is clear.   Eyes:      Extraocular Movements: Extraocular movements intact.      Pupils: Pupils are equal, round, and reactive to light.   Cardiovascular:      Rate and Rhythm: Normal rate and regular rhythm.      Pulses: Normal pulses.      Heart sounds: Normal heart sounds.   Pulmonary:      Effort: Pulmonary effort is normal.      Breath sounds: Normal breath sounds.   Abdominal:      General: Abdomen is flat.      Palpations: Abdomen is soft.   Musculoskeletal:         General: " Normal range of motion.      Cervical back: Normal range of motion.   Skin:     General: Skin is warm and dry.      Capillary Refill: Capillary refill takes less than 2 seconds.   Neurological:      General: No focal deficit present.      Mental Status: He is alert and oriented to person, place, and time.   Psychiatric:         Mood and Affect: Mood normal.         Behavior: Behavior normal.             Results Reviewed:  Lab Results (last 24 hours)       Procedure Component Value Units Date/Time    Basic Metabolic Panel [[id number]]  (Abnormal) Collected: 10/30/23 0317    Specimen: Blood Updated: 10/30/23 0421     Glucose 105 mg/dL      BUN 13 mg/dL      Creatinine 0.91 mg/dL      Sodium 143 mmol/L      Potassium 3.6 mmol/L      Chloride 104 mmol/L      CO2 32.0 mmol/L      Calcium 8.7 mg/dL      BUN/Creatinine Ratio 14.3     Anion Gap 7.0 mmol/L      eGFR 87.3 mL/min/1.73     Narrative:      GFR Normal >60  Chronic Kidney Disease <60  Kidney Failure <15    The GFR formula is only valid for adults with stable renal function between ages 18 and 70.    CBC & Differential [[id number]]  (Abnormal) Collected: 10/30/23 0317    Specimen: Blood Updated: 10/30/23 0359    Narrative:      The following orders were created for panel order CBC & Differential.  Procedure                               Abnormality         Status                     ---------                               -----------         ------                     CBC Auto Differential[[id number]]        Abnormal            Final result                 Please view results for these tests on the individual orders.    CBC Auto Differential [[id number]]  (Abnormal) Collected: 10/30/23 0317    Specimen: Blood Updated: 10/30/23 0359     WBC 4.57 10*3/mm3      RBC 2.90 10*6/mm3      Hemoglobin 8.1 g/dL      Hematocrit 27.6 %      MCV 95.2 fL      MCH 27.9 pg      MCHC 29.3 g/dL      RDW 16.3 %      RDW-SD 56.7 fl      MPV 9.4 fL      Platelets 232 10*3/mm3       Neutrophil % 70.0 %      Lymphocyte % 13.6 %      Monocyte % 13.3 %      Eosinophil % 2.0 %      Basophil % 0.7 %      Immature Grans % 0.4 %      Neutrophils, Absolute 3.20 10*3/mm3      Lymphocytes, Absolute 0.62 10*3/mm3      Monocytes, Absolute 0.61 10*3/mm3      Eosinophils, Absolute 0.09 10*3/mm3      Basophils, Absolute 0.03 10*3/mm3      Immature Grans, Absolute 0.02 10*3/mm3      nRBC 0.0 /100 WBC     High Sensitivity Troponin T 2Hr [[id number]]  (Abnormal) Collected: 10/29/23 1526    Specimen: Blood Updated: 10/29/23 1551     HS Troponin T 36 ng/L      Troponin T Delta -11 ng/L     Narrative:      High Sensitive Troponin T Reference Range:  <10.0 ng/L- Negative Female for AMI  <15.0 ng/L- Negative Male for AMI  >=10 - Abnormal Female indicating possible myocardial injury.  >=15 - Abnormal Male indicating possible myocardial injury.   Clinicians would have to utilize clinical acumen, EKG, Troponin, and serial changes to determine if it is an Acute Myocardial Infarction or myocardial injury due to an underlying chronic condition.         [place] Draw [[id number]] Collected: 10/29/23 1329    Specimen: Blood Updated: 10/29/23 1431    Narrative:      The following orders were created for panel order [place] Draw.  Procedure                               Abnormality         Status                     ---------                               -----------         ------                     Green Top (Gel)[[id number]]                                  Final result               Lavender Top[[id number]]                                     Final result               Red Top[[id number]]                                          Final result               Light Blue Top[[id number]]                                   Final result                 Please view results for these tests on the individual orders.    Green Top (Gel) [[id number]] Collected: 10/29/23 1329    Specimen: Blood Updated: 10/29/23 1431     Extra Tube Hold for  add-ons.     Comment: Auto resulted.       Lavender Top [[id number]] Collected: 10/29/23 1329    Specimen: Blood Updated: 10/29/23 1431     Extra Tube hold for add-on     Comment: Auto resulted       Red Top [[id number]] Collected: 10/29/23 1329    Specimen: Blood Updated: 10/29/23 1431     Extra Tube Hold for add-ons.     Comment: Auto resulted.       Light Blue Top [[id number]] Collected: 10/29/23 1329    Specimen: Blood Updated: 10/29/23 1431     Extra Tube Hold for add-ons.     Comment: Auto resulted       BNP [[id number]]  (Abnormal) Collected: 10/29/23 1329    Specimen: Blood Updated: 10/29/23 1417     proBNP 2,036.0 pg/mL     Narrative:      This assay is used as an aid in the diagnosis of individuals suspected of having heart failure. It can be used as an aid in the diagnosis of acute decompensated heart failure (ADHF) in patients presenting with signs and symptoms of ADHF to the emergency department (ED). In addition, NT-proBNP of <300 pg/mL indicates ADHF is not likely.    Age Range Result Interpretation  NT-proBNP Concentration (pg/mL:      <50             Positive            >450                   Gray                 300-450                    Negative             <300    50-75           Positive            >900                  Gray                300-900                  Negative            <300      >75             Positive            >1800                  Gray                300-1800                  Negative            <300    Magnesium [[id number]]  (Normal) Collected: 10/29/23 1329    Specimen: Blood Updated: 10/29/23 1405     Magnesium 1.7 mg/dL     Comprehensive Metabolic Panel [[id number]]  (Abnormal) Collected: 10/29/23 1329    Specimen: Blood Updated: 10/29/23 1400     Glucose 162 mg/dL      BUN 12 mg/dL      Creatinine 0.86 mg/dL      Sodium 142 mmol/L      Potassium 4.1 mmol/L      Chloride 105 mmol/L      CO2 27.0 mmol/L      Calcium 9.1 mg/dL      Total Protein 6.6 g/dL      Albumin 4.2 g/dL       ALT (SGPT) 6 U/L      AST (SGOT) 11 U/L      Alkaline Phosphatase 77 U/L      Total Bilirubin 0.9 mg/dL      Globulin 2.4 gm/dL      A/G Ratio 1.8 g/dL      BUN/Creatinine Ratio 14.0     Anion Gap 10.0 mmol/L      eGFR 89.7 mL/min/1.73     Narrative:      GFR Normal >60  Chronic Kidney Disease <60  Kidney Failure <15    The GFR formula is only valid for adults with stable renal function between ages 18 and 70.    High Sensitivity Troponin T [[id number]]  (Abnormal) Collected: 10/29/23 1329    Specimen: Blood Updated: 10/29/23 1357     HS Troponin T 47 ng/L     Narrative:      High Sensitive Troponin T Reference Range:  <10.0 ng/L- Negative Female for AMI  <15.0 ng/L- Negative Male for AMI  >=10 - Abnormal Female indicating possible myocardial injury.  >=15 - Abnormal Male indicating possible myocardial injury.   Clinicians would have to utilize clinical acumen, EKG, Troponin, and serial changes to determine if it is an Acute Myocardial Infarction or myocardial injury due to an underlying chronic condition.         Protime-INR [[id number]]  (Abnormal) Collected: 10/29/23 1329    Specimen: Blood Updated: 10/29/23 1356     Protime 21.4 Seconds      INR 1.83    aPTT [[id number]]  (Abnormal) Collected: 10/29/23 1329    Specimen: Blood Updated: 10/29/23 1356     PTT 43.6 seconds     CBC & Differential [[id number]]  (Abnormal) Collected: 10/29/23 1329    Specimen: Blood Updated: 10/29/23 1340    Narrative:      The following orders were created for panel order CBC & Differential.  Procedure                               Abnormality         Status                     ---------                               -----------         ------                     CBC Auto Differential[[id number]]        Abnormal            Final result                 Please view results for these tests on the individual orders.    CBC Auto Differential [[id number]]  (Abnormal) Collected: 10/29/23 1329    Specimen: Blood Updated: 10/29/23 1340     WBC  8.97 10*3/mm3      RBC 3.24 10*6/mm3      Hemoglobin 9.2 g/dL      Hematocrit 31.0 %      MCV 95.7 fL      MCH 28.4 pg      MCHC 29.7 g/dL      RDW 16.4 %      RDW-SD 58.0 fl      MPV 9.4 fL      Platelets 291 10*3/mm3      Neutrophil % 79.8 %      Lymphocyte % 7.5 %      Monocyte % 11.5 %      Eosinophil % 0.7 %      Basophil % 0.3 %      Immature Grans % 0.2 %      Neutrophils, Absolute 7.16 10*3/mm3      Lymphocytes, Absolute 0.67 10*3/mm3      Monocytes, Absolute 1.03 10*3/mm3      Eosinophils, Absolute 0.06 10*3/mm3      Basophils, Absolute 0.03 10*3/mm3      Immature Grans, Absolute 0.02 10*3/mm3      nRBC 0.0 /100 WBC           Imaging Results (Last 24 Hours)       Procedure Component Value Units Date/Time    CT Angiogram Chest [[id number]] Collected: 10/29/23 1432     Updated: 10/29/23 1454    Narrative:      EXAM: CT ANGIOGRAM CHEST- 10/29/2023 2:20 PM CDT     HISTORY: worsening soa, labored breathing      COMPARISON: 10/24/2016, abdomen CT 1/20/2023     DOSE LENGTH PRODUCT: 281 mGy cm. Automated exposure control was also  utilized to decrease patient radiation dose.     TECHNIQUE: Helical tomographic images of the chest were obtained after  the administration of intravenous contrast following angiogram protocol.  Additionally, 3D and multiplanar reformatted images as well as MIPS were  provided.        FINDINGS:      Support Devices: Pacemaker device.     Central Airways: Patent.     Lungs/Pleura: Mild perihilar and dependent predominant groundglass  opacities. Small bilateral pleural effusions.     Nodules: No suspicious pulmonary nodules.     Lower Neck: Unremarkable.     Mediastinum/Hilum: Mildly prominent mediastinal lymph nodes, likely  reactive.     Heart: Left heart enlargement. Less than 1 RV to LV ratio. Mild aortic  valvular calcifications. Advanced coronary artery disease status post  CABG. Enlarged ascending thoracic aorta measuring up to 4.3 cm. Moderate  aortic atherosclerosis.     Pulmonary  Arteries: Right upper lobe segmental apparent filling defect  is favored to represent streak artifact, however difficult to exclude  pulmonary embolism. (series 4 image 60). Main pulmonary artery is normal  in caliber.     Chest wall/Soft Tissues: Mild bilateral gynecomastia.     Upper Abdomen: Hernia mesh repair in the anterior upper abdomen with  associated fluid collection measuring 9.2 x 2.8 cm (series 4 image 179),  similar to prior study on 1/20/2023. Status post right nephrectomy.     Osseous Structures: Diffuse heterogeneous marrow attenuation, likely due  to osteopenia. No acute osseous finding.       Impression:         Right upper lobe segmental apparent filling defect is favored to  represent streak artifact, however difficult to completely exclude  pulmonary embolism. No evidence of right heart strain.     Mild bilateral groundglass opacities with small bilateral pleural  effusions, likely pulmonary edema     Mildly prominent mediastinal nodes, likely reactive.     Dilated ascending thoracic aorta (4.3 cm).     Hernia mesh repair in the anterior upper abdomen with 9.2 cm underlying  fluid collection, similar to prior study on 1/20/2023.           This report was signed and finalized on 10/29/2023 2:51 PM CDT by Ashvin Oden.       XR Chest 1 View [[id number]] Collected: 10/29/23 1356     Updated: 10/29/23 1400    Narrative:      EXAM: XR CHEST 1 VW- 10/29/2023 1:36 PM CDT     HISTORY: Chest Pain Triage Protocol       COMPARISON: 5/27/2023.     TECHNIQUE: Single frontal radiograph of the chest was obtained.     FINDINGS:      Support Devices: Cardiac pacemaker device with the leads unchanged. Left  atrial appendage clip. Status post CABG.     Cardiac and Mediastinal Silhouettes: Unchanged.     Lungs/Pleura: No focal consolidation. No sizable pleural effusion. No  visible pneumothorax.     Osseous structures: No acute osseous finding.     Other: None.       Impression:         No acute cardiopulmonary  abnormality.           This report was signed and finalized on 10/29/2023 1:56 PM CDT by Ashvin Oden.               I have personally reviewed and interpreted the radiology studies and ECG obtained at time of admission.     Assessment / Plan     Assessment & Plan  Active Hospital Problems    Diagnosis     **Acute exacerbation of CHF (congestive heart failure)     Coronary artery disease of bypass graft of native heart with stable angina pectoris      Acute CHF exacerbation with significant past medical history of cardiovascular disease with bypass.  Symptoms are improving with diuresis.    1.  Cardiology consult for CHF exacerbation, chest pain with left arm pain, worsening dyspnea on exertion.  Significant CAD.  2.  Continue diuresis with IV Lasix today.  3.  Monitor renal function electrolytes closely.  4.  Continue cardiac monitoring.  5.  Anticoagulated with dual antiplatelet therapy.  DVT/PE prophylaxis with subcu Lovenox.    Further orders per Dr. Salvador.      Mednoza Smith, APRN   10/30/23   07:41 CDT

## 2023-10-30 NOTE — PLAN OF CARE
Goal Outcome Evaluation:           Progress: improving  Outcome Evaluation: Pt A&Ox4, VSS on RA, V paced on tele. No c/o pain or discomfort. Pt slept well throughout the night. Safety and comfort maintained.

## 2023-10-30 NOTE — CONSULTS
Patient Care Team:  Rolan Salvador MD as PCP - General (Internal Medicine)  Sarbjit Posadas MD as Cardiologist (Cardiology)  Yolanda Perry APRN as Nurse Practitioner (Family Medicine)  Druga Ochoa MD as Consulting Physician (Urology)  Tyrone Broussard PA as Physician Assistant (Urology)  Devika Oliver MD as Cardiologist (Cardiac Electrophysiology)  Rolan Salvador MD  REASON FOR REFERRAL: CHF, shortness of breath, chest pain   Chief complaint : shortness of breath     Subjective     Patient is a 78 y.o. male presents with chest pain and shortness of breath.  He tells me his primary symptoms are lack of energy and shortness of breath.  He states he can barely walk across the room without having to stop due to shortness of breath and lack of energy.  He also often has an associated heavy feeling or pressure in his chest.  He also describes orthopnea and a generalized tight feeling in his legs and abdomen.  He states his weight has been staying between 216 and 218 pounds.  He has been taking torsemide 20 mg as needed and lately has been taking this once every 2 to 3 days.  He denies fever, cough, chills.  He denies syncope, presyncope, palpitations.  He continues to struggle with nausea since starting sotalol.  He is compliant with all of his medications.    Work-up this admission revealed an elevated BNP.  CTA of the chest suggests pulmonary edema.  Troponins have trended from 47-36.  EKGs revealed ventricular pacing.  He is ventricular paced on telemetry with heart rates in the 70s.  Device interrogation yesterday did not reveal any arrhythmia events or shocks.    Treatment has been initiated for acute congestive heart failure and he has received Lasix 80 mg followed by 40 mg IV.  The patient reports good urine output and subsequent improvement in his shortness of breath though he does continue to have some orthopnea.  It appears he was also given therapeutic Lovenox, full dose aspirin and  nitroglycerin yesterday in ER.  The patient states nitroglycerin did help his breathing and chest discomfort but this took around 10 minutes.    The patient is followed by Dr. Posadas and myself for his coronary artery disease, chronic systolic congestive heart failure with biventricular AICD in place, permanent atrial fibrillation.  He is also followed by Dr. Oliver, electrophysiology for his recent sustained ventricular tachycardia-he received an AICD shock for this on 5/26.  On 8/29 he was started on sotalol.  The patient reports he has struggled with nausea since that time and has been taking Zofran intermittently.  Per my previous discussion with Dr. Oliver, the ventricular tachycardia was felt to be scar mediated.    See most recent cardiac catheterization findings and most recent echocardiogram below:    5/2023 echo:    Left ventricular systolic function is moderately decreased. Calculated left ventricular EF = 36.4% Left ventricular ejection fraction appears to be 36 - 40%.    The left ventricular cavity is mildly dilated.    The following left ventricular wall segments are akinetic: mid anterior, apical anterior, apical lateral, apical inferior, apical septal, apex and mid anteroseptal.    Left ventricular diastolic dysfunction is noted.    Left atrial volume is severely increased.    Mild pulmonary hypertension is present.    12/2021 cath report:    Impression:  1.  Severe native 3-vessel coronary artery disease (no change compared to old films)  2.  Patent LIMA to LAD and SVG to RCA  3.  Occluded vein graft to circumflex distribution (from original operation) - unable to SVG placed to this distribution at a later date (in '11 or '12 at [facility])  4.  LV systolic dysfunction with EF 25-30%, with normal LVEDP     Plan:   1.  Three hours bedrest, then can discharge home  2.  Continue baseline medical therapies for CAD and ischemic cardiomyopathy; will increase Entresto to 97/103 twice daily and check labs in 1 week.   We will also increase Ranexa to 1000 mg twice daily.  3.  F/u in 2 weeks     Sarbjit Posadas MD         Review of Systems   Review of Systems   Constitutional:  Positive for fatigue. Negative for diaphoresis, fever and unexpected weight change.   HENT:  Negative for nosebleeds.    Respiratory:  Positive for shortness of breath (ELLISON, orthopnea). Negative for apnea, cough, chest tightness and wheezing.    Cardiovascular:  Positive for chest pain. Negative for palpitations and leg swelling.   Gastrointestinal:  Negative for abdominal distention, nausea and vomiting.   Genitourinary:  Negative for hematuria.   Musculoskeletal:  Negative for gait problem.   Skin:  Negative for color change.   Neurological:  Positive for weakness. Negative for dizziness, syncope and light-headedness.       History  Past Medical History:   Diagnosis Date    Abdominal pain, epigastric     Abnormal abdominal exam     ABFND, RADIOLOGICAL, ABDOMINAL AREA     Abnormal ECG     Anticoagulated on Coumadin     Atherosclerosis of coronary artery bypass graft     ATHEROSCLEROSIS, CORONARY, ARTERY BYPASS GRFT    Atrial fibrillation     Cancer of right kidney     right sided kidney cancer    Cardiomyopathy     Cardiomyopathy, primary     CHF (congestive heart failure)     Clotting disorder     Colon polyps     Congenital heart disease     Congestive heart failure     Coronary artery disease     History of CAD/A-Fib/Pacemaker/Defibrillator placement: pt takes Coumadin and plavix therapy as well as ASA daily    Diabetes mellitus     Gastric polyp     Gastroesophageal reflux disease without esophagitis     History of colon polyps     Hypercholesterolemia     Hypertension, essential     Melena     Myocardial infarction     NSAID long-term use     Obesity     Pacemaker     Pacemaker Dependant    Platelet inhibition due to Plavix     Presence of stent in coronary artery     Multiple coronary stenting most recently 4/2008    Single kidney     Only 1 Kidney     Status post surgery     s/p Polypectomy Bleed     Past Surgical History:   Procedure Laterality Date    ABLATION OF DYSRHYTHMIC FOCUS      APPENDECTOMY      CARDIAC ABLATION      CARDIAC CATHETERIZATION      CARDIAC CATHETERIZATION Left 12/15/2021    Procedure: Coronary angiography;  Surgeon: Sarbjit Posadas MD;  Location:  [facility] CATH INVASIVE LOCATION;  Service: Cardiology;  Laterality: Left;    CARDIAC CATHETERIZATION Left 12/15/2021    Procedure: Percutaneous Coronary Intervention;  Surgeon: Sarbjit Posadas MD;  Location:  [facility] CATH INVASIVE LOCATION;  Service: Cardiology;  Laterality: Left;    CARDIAC DEFIBRILLATOR PLACEMENT      CARDIAC PACEMAKER PLACEMENT      Pacemaker Placement    CATARACT EXTRACTION, BILATERAL      CHOLECYSTECTOMY      COLONOSCOPY  08/27/2013    snare hep, trans tics recall 3 yr    COLONOSCOPY  10/02/2006    few scattered diverticula    COLONOSCOPY  07/22/2009    tubular adenoma in the ascending colon    COLONOSCOPY  07/25/2005    several polyps in the ascending colon, one in the transverse colon    COLONOSCOPY N/A 03/21/2022    Procedure: COLONOSCOPY WITH ANESTHESIA;  Surgeon: Phil Goodwin MD;  Location: [facility] ENDOSCOPY;  Service: Gastroenterology;  Laterality: N/A;  pre: hx polyps  post: polyps  Rolan Salvador MD    COLONOSCOPY W/ POLYPECTOMY  09/19/2016    Tubular adenoma hepatic flexure, 2 Hyperplastic polyps rectum and at 50 cm, Benign polyp at 70 cm repeat exam in 3-5 years    CORONARY ARTERY BYPASS GRAFT  02/2011    2nd time    CORONARY ARTERY BYPASS GRAFT  1990    CORONARY STENT PLACEMENT      ENDOSCOPY  04/14/2016    nl slant    ENDOSCOPY  11/10/2014    clips at polypectomy bx no residual recall 1 yr    ENDOSCOPY  06/19/2014    snare clip body recall 6 months    ENDOSCOPY  05/19/2014    polyp stomach bx and clip    HERNIA REPAIR      with mesh    JOINT REPLACEMENT      OTHER SURGICAL HISTORY      Defibrillator/Pacer maker exchange    OTHER SURGICAL HISTORY       Pacemaker/Defibillation exchange 2012    PENILE PROSTHESIS IMPLANT N/A 07/27/2018    Procedure: PENILE PROSTHESIS PLACEMENT;  Surgeon: Godwin Gupta MD;  Location:  [facility] OR;  Service: Urology    PERIPHERAL ARTERIAL STENT GRAFT       Family History   Problem Relation Age of Onset    Lung cancer Mother     No Known Problems Father     Other Neg Hx         GI Malignancies    Colon cancer Neg Hx     Colon polyps Neg Hx      Social History     Tobacco Use    Smoking status: Never    Smokeless tobacco: Never   Vaping Use    Vaping Use: Never used   Substance Use Topics    Alcohol use: Yes     Comment: occ    Drug use: Never     Medications Prior to Admission   Medication Sig Dispense Refill Last Dose    atorvastatin (LIPITOR) 40 MG tablet Take 1 tablet by mouth Every Night. 90 tablet 3 10/28/2023    clopidogrel (PLAVIX) 75 MG tablet Take 1 tablet by mouth Daily.   10/28/2023    finasteride (PROSCAR) 5 MG tablet Take 1 tablet by mouth Daily.   10/28/2023    Jardiance 10 MG tablet tablet TAKE 1 TABLET BY MOUTH EVERY DAY IN THE MORNING 90 tablet 3 10/28/2023    metFORMIN (GLUCOPHAGE) 500 MG tablet Take 1 tablet by mouth 2 (Two) Times a Day With Meals. Two tabs daily   10/28/2023    Mirabegron ER (Myrbetriq) 50 MG tablet sustained-release 24 hour 24 hr tablet Take 50 mg by mouth Daily. 30 tablet 11 10/28/2023    omeprazole (PriLOSEC) 40 MG capsule Take 1 capsule by mouth Daily.   10/28/2023    pregabalin (LYRICA) 150 MG capsule Take 1 capsule by mouth 2 (Two) Times a Day.   10/28/2023    ranolazine (RANEXA) 1000 MG 12 hr tablet Take 1 tablet by mouth 2 (Two) Times a Day. 180 tablet 3 10/28/2023    sacubitril-valsartan (Entresto) 49-51 MG tablet Take 1 tablet by mouth 2 (Two) Times a Day. 60 tablet 11 10/28/2023    Sotalol HCl AF 80 MG tablet Take 1 tablet by mouth 2 (Two) Times a Day. 180 tablet 3 10/28/2023    tamsulosin (FLOMAX) 0.4 MG capsule 24 hr capsule Take 1 capsule by mouth Daily.   10/28/2023    Xarelto 20 MG  tablet TAKE 1 TABLET BY MOUTH EVERY DAY WITH FOOD 90 tablet 3 10/28/2023    zolpidem (AMBIEN) 5 MG tablet TAKE 1 TABLET BY MOUTH EVERY DAY AT BEDTIME AS NEEDED  5 10/28/2023    cetirizine (zyrTEC) 10 MG tablet Take 1 tablet by mouth Daily.       cyclobenzaprine (FLEXERIL) 5 MG tablet Take 1 tablet by mouth 3 (Three) Times a Day.       fluticasone (FLONASE) 50 MCG/ACT nasal spray 2 sprays into the nostril(s) as directed by provider Daily As Needed.       nitroglycerin (NITROSTAT) 0.4 MG SL tablet Place 1 tablet under the tongue Every 5 (Five) Minutes As Needed for Chest Pain. Take no more than 3 doses in 15 minutes. 25 tablet 2     promethazine-dextromethorphan (PROMETHAZINE-DM) 6.25-15 MG/5ML syrup As Needed.       torsemide (DEMADEX) 20 MG tablet Take 1 tablet by mouth Daily As Needed (weight gain >3 lbs in one day or >5 lbs in 2 days). With additional 20 mg PRN   Unknown    traMADol-acetaminophen (ULTRACET) 37.5-325 MG per tablet as needed  1 Unknown       Current Facility-Administered Medications:     atorvastatin (LIPITOR) tablet 40 mg, 40 mg, Oral, Nightly, Jose Lua MD, 40 mg at 10/29/23 2207    clopidogrel (PLAVIX) tablet 75 mg, 75 mg, Oral, Daily, Jose Lua MD, 75 mg at 10/30/23 0906    empagliflozin (JARDIANCE) tablet 10 mg, 10 mg, Oral, Daily, Jose Lua MD, 10 mg at 10/30/23 0906    Enoxaparin Sodium (LOVENOX) syringe 100 mg, 1 mg/kg, Subcutaneous, Q12H, Rolan Salvador MD, 100 mg at 10/30/23 0906    finasteride (PROSCAR) tablet 5 mg, 5 mg, Oral, Daily, Jose Lua MD, 5 mg at 10/30/23 0906    furosemide (LASIX) injection 40 mg, 40 mg, Intravenous, Q12H, Mendoza Smith, APRN, 40 mg at 10/30/23 0906    ondansetron (ZOFRAN) tablet 4 mg, 4 mg, Oral, Q6H PRN **OR** ondansetron (ZOFRAN) injection 4 mg, 4 mg, Intravenous, Q6H PRN, Joyce Pastor, APRN    Pharmacy to Dose enoxaparin (LOVENOX), , Does not apply, Continuous PRN, Rolan Salvador MD    pregabalin  (LYRICA) capsule 150 mg, 150 mg, Oral, Q12H, Jose Lua MD, 150 mg at 10/30/23 0906    ranolazine (RANEXA) 12 hr tablet 1,000 mg, 1,000 mg, Oral, Q12H, Jose Lua MD, 1,000 mg at 10/30/23 0906    sacubitril-valsartan (ENTRESTO) 49-51 MG tablet 1 tablet, 1 tablet, Oral, Q12H, Jose Lua MD, 1 tablet at 10/29/23 2208    sodium chloride 0.9 % flush 10 mL, 10 mL, Intravenous, PRN, Rolan Salvador MD    [COMPLETED] Insert Peripheral IV, , , Once **AND** sodium chloride 0.9 % flush 10 mL, 10 mL, Intravenous, PRN, Rolan Salvador MD    sodium chloride 0.9 % flush 10 mL, 10 mL, Intravenous, Q12H, Darby, Katteeli D, APRN, 10 mL at 10/30/23 0906    sodium chloride 0.9 % flush 10 mL, 10 mL, Intravenous, PRN, Darby, Kheeli D, APRN    sodium chloride 0.9 % infusion 40 mL, 40 mL, Intravenous, PRN, Darby, Katteeli D, APRN    sotalol (BETAPACE) tablet 80 mg, 80 mg, Oral, Q12H, Jose Lua MD, 80 mg at 10/30/23 0906    zolpidem (AMBIEN) tablet 5 mg, 5 mg, Oral, Nightly PRN, Jose Lua MD, 5 mg at 10/29/23 2207  Allergies:  Azithromycin and Morphine and related    Objective     Vital Signs  Temp:  [97.5 °F (36.4 °C)-98.6 °F (37 °C)] 98.1 °F (36.7 °C)  Heart Rate:  [74-82] 76  Resp:  [16-18] 18  BP: ()/(47-84) 106/57    Physical Exam:   Vitals and nursing note reviewed.   Constitutional:       General: Not in acute distress.     Appearance: Well-developed and not in distress. Chronically ill-appearing and acutely ill-appearing. Not diaphoretic.   Neck:      Vascular: No JVD.   Pulmonary:      Effort: Pulmonary effort is normal. No respiratory distress.      Breath sounds: Decreased breath sounds (bases) present.   Cardiovascular:      Normal rate. Regular rhythm.      Murmurs: There is no murmur.   Edema:     Peripheral edema absent.   Abdominal:      Tenderness: There is no abdominal tenderness.   Skin:     General: Skin is warm and dry.   Neurological:      Mental Status: Alert  and oriented to person, place, and time.       Results Review:     Lab Results (last 72 hours)       Procedure Component Value Units Date/Time    Vitamin B12 [[id number]] Collected: 10/30/23 1013    Specimen: Blood Updated: 10/30/23 1043    Folate [[id number]] Collected: 10/30/23 1013    Specimen: Blood Updated: 10/30/23 1043    Reticulocytes [[id number]]  (Abnormal) Collected: 10/30/23 0317    Specimen: Blood Updated: 10/30/23 0824     Reticulocyte % 2.73 %      Reticulocyte Absolute 0.0773 10*6/mm3     Peripheral Blood Smear [[id number]] Collected: 10/30/23 0317    Specimen: Blood Updated: 10/30/23 0821    Iron Profile [[id number]] Collected: 10/30/23 0317    Specimen: Blood Updated: 10/30/23 0813    Basic Metabolic Panel [[id number]]  (Abnormal) Collected: 10/30/23 0317    Specimen: Blood Updated: 10/30/23 0421     Glucose 105 mg/dL      BUN 13 mg/dL      Creatinine 0.91 mg/dL      Sodium 143 mmol/L      Potassium 3.6 mmol/L      Chloride 104 mmol/L      CO2 32.0 mmol/L      Calcium 8.7 mg/dL      BUN/Creatinine Ratio 14.3     Anion Gap 7.0 mmol/L      eGFR 87.3 mL/min/1.73     Narrative:      GFR Normal >60  Chronic Kidney Disease <60  Kidney Failure <15    The GFR formula is only valid for adults with stable renal function between ages 18 and 70.    CBC & Differential [[id number]]  (Abnormal) Collected: 10/30/23 0317    Specimen: Blood Updated: 10/30/23 0359    Narrative:      The following orders were created for panel order CBC & Differential.  Procedure                               Abnormality         Status                     ---------                               -----------         ------                     CBC Auto Differential[[id number]]        Abnormal            Final result                 Please view results for these tests on the individual orders.    CBC Auto Differential [[id number]]  (Abnormal) Collected: 10/30/23 0317    Specimen: Blood Updated: 10/30/23 0359     WBC 4.57 10*3/mm3      RBC 2.90  10*6/mm3      Hemoglobin 8.1 g/dL      Hematocrit 27.6 %      MCV 95.2 fL      MCH 27.9 pg      MCHC 29.3 g/dL      RDW 16.3 %      RDW-SD 56.7 fl      MPV 9.4 fL      Platelets 232 10*3/mm3      Neutrophil % 70.0 %      Lymphocyte % 13.6 %      Monocyte % 13.3 %      Eosinophil % 2.0 %      Basophil % 0.7 %      Immature Grans % 0.4 %      Neutrophils, Absolute 3.20 10*3/mm3      Lymphocytes, Absolute 0.62 10*3/mm3      Monocytes, Absolute 0.61 10*3/mm3      Eosinophils, Absolute 0.09 10*3/mm3      Basophils, Absolute 0.03 10*3/mm3      Immature Grans, Absolute 0.02 10*3/mm3      nRBC 0.0 /100 WBC     High Sensitivity Troponin T 2Hr [[id number]]  (Abnormal) Collected: 10/29/23 1526    Specimen: Blood Updated: 10/29/23 1551     HS Troponin T 36 ng/L      Troponin T Delta -11 ng/L     Narrative:      High Sensitive Troponin T Reference Range:  <10.0 ng/L- Negative Female for AMI  <15.0 ng/L- Negative Male for AMI  >=10 - Abnormal Female indicating possible myocardial injury.  >=15 - Abnormal Male indicating possible myocardial injury.   Clinicians would have to utilize clinical acumen, EKG, Troponin, and serial changes to determine if it is an Acute Myocardial Infarction or myocardial injury due to an underlying chronic condition.         [place] Draw [[id number]] Collected: 10/29/23 1329    Specimen: Blood Updated: 10/29/23 1431    Narrative:      The following orders were created for panel order [place] Draw.  Procedure                               Abnormality         Status                     ---------                               -----------         ------                     Green Top (Gel)[[id number]]                                  Final result               Lavender Top[[id number]]                                     Final result               Red Top[[id number]]                                          Final result               Light Blue Top[[id number]]                                   Final result                  Please view results for these tests on the individual orders.    Green Top (Gel) [[id number]] Collected: 10/29/23 1329    Specimen: Blood Updated: 10/29/23 1431     Extra Tube Hold for add-ons.     Comment: Auto resulted.       Lavender Top [[id number]] Collected: 10/29/23 1329    Specimen: Blood Updated: 10/29/23 1431     Extra Tube hold for add-on     Comment: Auto resulted       Red Top [[id number]] Collected: 10/29/23 1329    Specimen: Blood Updated: 10/29/23 1431     Extra Tube Hold for add-ons.     Comment: Auto resulted.       Light Blue Top [[id number]] Collected: 10/29/23 1329    Specimen: Blood Updated: 10/29/23 1431     Extra Tube Hold for add-ons.     Comment: Auto resulted       BNP [[id number]]  (Abnormal) Collected: 10/29/23 1329    Specimen: Blood Updated: 10/29/23 1417     proBNP 2,036.0 pg/mL     Narrative:      This assay is used as an aid in the diagnosis of individuals suspected of having heart failure. It can be used as an aid in the diagnosis of acute decompensated heart failure (ADHF) in patients presenting with signs and symptoms of ADHF to the emergency department (ED). In addition, NT-proBNP of <300 pg/mL indicates ADHF is not likely.    Age Range Result Interpretation  NT-proBNP Concentration (pg/mL:      <50             Positive            >450                   Gray                 300-450                    Negative             <300    50-75           Positive            >900                  Gray                300-900                  Negative            <300      >75             Positive            >1800                  Gray                300-1800                  Negative            <300    Magnesium [[id number]]  (Normal) Collected: 10/29/23 1329    Specimen: Blood Updated: 10/29/23 1405     Magnesium 1.7 mg/dL     Comprehensive Metabolic Panel [[id number]]  (Abnormal) Collected: 10/29/23 1329    Specimen: Blood Updated: 10/29/23 1400     Glucose 162 mg/dL      BUN 12 mg/dL       Creatinine 0.86 mg/dL      Sodium 142 mmol/L      Potassium 4.1 mmol/L      Chloride 105 mmol/L      CO2 27.0 mmol/L      Calcium 9.1 mg/dL      Total Protein 6.6 g/dL      Albumin 4.2 g/dL      ALT (SGPT) 6 U/L      AST (SGOT) 11 U/L      Alkaline Phosphatase 77 U/L      Total Bilirubin 0.9 mg/dL      Globulin 2.4 gm/dL      A/G Ratio 1.8 g/dL      BUN/Creatinine Ratio 14.0     Anion Gap 10.0 mmol/L      eGFR 89.7 mL/min/1.73     Narrative:      GFR Normal >60  Chronic Kidney Disease <60  Kidney Failure <15    The GFR formula is only valid for adults with stable renal function between ages 18 and 70.    High Sensitivity Troponin T [[id number]]  (Abnormal) Collected: 10/29/23 1329    Specimen: Blood Updated: 10/29/23 1357     HS Troponin T 47 ng/L     Narrative:      High Sensitive Troponin T Reference Range:  <10.0 ng/L- Negative Female for AMI  <15.0 ng/L- Negative Male for AMI  >=10 - Abnormal Female indicating possible myocardial injury.  >=15 - Abnormal Male indicating possible myocardial injury.   Clinicians would have to utilize clinical acumen, EKG, Troponin, and serial changes to determine if it is an Acute Myocardial Infarction or myocardial injury due to an underlying chronic condition.         Protime-INR [[id number]]  (Abnormal) Collected: 10/29/23 1329    Specimen: Blood Updated: 10/29/23 1356     Protime 21.4 Seconds      INR 1.83    aPTT [[id number]]  (Abnormal) Collected: 10/29/23 1329    Specimen: Blood Updated: 10/29/23 1356     PTT 43.6 seconds     CBC & Differential [[id number]]  (Abnormal) Collected: 10/29/23 1329    Specimen: Blood Updated: 10/29/23 1340    Narrative:      The following orders were created for panel order CBC & Differential.  Procedure                               Abnormality         Status                     ---------                               -----------         ------                     CBC Auto Differential[[id number]]        Abnormal            Final result                  Please view results for these tests on the individual orders.    CBC Auto Differential [[id number]]  (Abnormal) Collected: 10/29/23 1329    Specimen: Blood Updated: 10/29/23 1340     WBC 8.97 10*3/mm3      RBC 3.24 10*6/mm3      Hemoglobin 9.2 g/dL      Hematocrit 31.0 %      MCV 95.7 fL      MCH 28.4 pg      MCHC 29.7 g/dL      RDW 16.4 %      RDW-SD 58.0 fl      MPV 9.4 fL      Platelets 291 10*3/mm3      Neutrophil % 79.8 %      Lymphocyte % 7.5 %      Monocyte % 11.5 %      Eosinophil % 0.7 %      Basophil % 0.3 %      Immature Grans % 0.2 %      Neutrophils, Absolute 7.16 10*3/mm3      Lymphocytes, Absolute 0.67 10*3/mm3      Monocytes, Absolute 1.03 10*3/mm3      Eosinophils, Absolute 0.06 10*3/mm3      Basophils, Absolute 0.03 10*3/mm3      Immature Grans, Absolute 0.02 10*3/mm3      nRBC 0.0 /100 WBC             Assessment & Plan     1.  Acute on chronic systolic congestive heart failure: Decompensated but has started to improve after receiving 2 doses of IV Lasix thus far, 80 mg followed by 40 mg.    -Continue Lasix 40 mg IV twice daily with close attention to intake, output, renal function and electrolytes.  -Continue middle dose Entresto, Jardiance, sotalol.  In the past Entresto has had to be reduced due to symptomatic hypotension  -Repeat echocardiogram     2.  Chest pain: He describes a pressure or heaviness associated with shortness of breath on exertion.  Troponins are relatively flat trending at 47 followed by 36 -suspect troponin elevation due to myocardial injury from acute congestive heart failure.  We will reevaluate his chest discomfort and the need for an ischemic evaluation after treatment of acute CHF.  Continue Plavix, high intensity statin, Ranexa for known coronary artery disease.  Previously intolerant to Imdur.    3.  Anemia: Treatment and work-up per primary team.  He does take Plavix and Xarelto at home.  This admission and Xarelto has been replaced with therapeutic Lovenox per  admitting physician.  Hemoglobin is down to 8.1 today.    4.  History of ventricular tachycardia: None noted on interrogation report from yesterday.  The patient denies palpitations or shocks.He received an AICD shock for sustained ventricular tachycardia on 5/26/2023.  It also appears he received ATP x2 on 6/29/2023 for 32 seconds of ventricular tachycardia.  He was subsequently started on sotalol 80 mg twice daily per Dr. Oliver on 8/29/2023.  He has been very nauseated since starting this.  Dr. Oliver discontinued Coreg on 9/7.  As he noted, he may be considered for LV lead revision or physiologic pacing lead in the future.  At this point, it is unclear if his nausea is a side effect of the sotalol versus related to his congestive heart failure.  Would advise against treating with Zofran to avoid QT prolongation and subsequent ventricular arrhythmias.    5.  Permanent atrial fibrillation: Rate controlled and anticoagulated    I discussed the patient's findings and my recommendations with patient.     Electronically signed by EVELIA Galvez, 10/30/23, 10:53 AM CDT.

## 2023-10-30 NOTE — CASE MANAGEMENT/SOCIAL WORK
Discharge Planning Assessment  [facility]     Patient Name: Darren Maria  MRN: [id number]  Today's Date: 10/30/2023    Admit Date: 10/29/2023        Discharge Needs Assessment       Row Name 10/30/23 1033       Living Environment    People in Home spouse    Name(s) of People in Home Kandis    Current Living Arrangements home    Primary Care Provided by self    Provides Primary Care For no one    Family Caregiver if Needed spouse    Family Caregiver Names Kandis    Able to Return to Prior Arrangements yes       Resource/Environmental Concerns    Resource/Environmental Concerns none       Transition Planning    Patient/Family Anticipates Transition to home with family    Transportation Anticipated family or friend will provide       Discharge Needs Assessment    Equipment Currently Used at Home cane, straight    Concerns to be Addressed no discharge needs identified    Equipment Needed After Discharge none    Discharge Coordination/Progress spoke to patient who lives with spouse; has RX coverage and PCP; independent at home prior to illness will follow for DC needs                   Discharge Plan    No documentation.                 Continued Care and Services - Admitted Since 10/29/2023    Coordination has not been started for this encounter.          Demographic Summary    No documentation.                  Functional Status    No documentation.                  Psychosocial    No documentation.                  Abuse/Neglect    No documentation.                  Legal    No documentation.                  Substance Abuse    No documentation.                  Patient Forms    No documentation.                     Dior Granda RN

## 2023-10-30 NOTE — PROGRESS NOTES
"Pharmacy Dosing Service  Anticoagulant  Enoxaparin    Assessment/Action/Plan:  Lovenox dosed at 1mg/kg 100mg sc q12 based on indication and renal function. Pharmacy will follow daily and adjust as needed.      Subjective:  Darren Maria is a 78 y.o. male  Pharmacy to dose Lovenox for indication of atrial fibrillation.    Objective:  [Ht: 180.3 cm (71\"); Wt: 96 kg (211 lb 9.6 oz); BMI: Body mass index is 29.51 kg/m².]  Estimated Creatinine Clearance: 81.7 mL/min (by C-G formula based on SCr of 0.91 mg/dL).    Lab Results   Component Value Date    INR 1.83 (H) 10/29/2023    PROTIME 21.4 (H) 10/29/2023      Lab Results   Component Value Date    HGB 8.1 (L) 10/30/2023    HGB 9.2 (L) 10/29/2023       Lab Results   Component Value Date     10/30/2023     10/29/2023       BRETT Hernadez PharmD  10/30/23 08:09 CDT     "

## 2023-10-31 ENCOUNTER — APPOINTMENT (OUTPATIENT)
Dept: CARDIOLOGY | Facility: HOSPITAL | Age: 78
End: 2023-10-31
Payer: MEDICARE

## 2023-10-31 LAB
ANION GAP SERPL CALCULATED.3IONS-SCNC: 8 MMOL/L (ref 5–15)
ASCENDING AORTA: 4.7 CM
BASOPHILS # BLD AUTO: 0.03 10*3/MM3 (ref 0–0.2)
BASOPHILS NFR BLD AUTO: 0.8 % (ref 0–1.5)
BH CV ECHO MEAS - AO MAX PG: 7.8 MMHG
BH CV ECHO MEAS - AO MEAN PG: 4 MMHG
BH CV ECHO MEAS - AO ROOT DIAM: 3.9 CM
BH CV ECHO MEAS - AO V2 MAX: 140 CM/SEC
BH CV ECHO MEAS - AO V2 VTI: 25.5 CM
BH CV ECHO MEAS - AVA(I,D): 2.47 CM2
BH CV ECHO MEAS - EDV(CUBED): 166.4 ML
BH CV ECHO MEAS - EDV(MOD-SP2): 210 ML
BH CV ECHO MEAS - EDV(MOD-SP4): 226 ML
BH CV ECHO MEAS - EF(MOD-BP): 37.3 %
BH CV ECHO MEAS - EF(MOD-SP2): 33.3 %
BH CV ECHO MEAS - EF(MOD-SP4): 38.9 %
BH CV ECHO MEAS - ESV(CUBED): 85.2 ML
BH CV ECHO MEAS - ESV(MOD-SP2): 140 ML
BH CV ECHO MEAS - ESV(MOD-SP4): 138 ML
BH CV ECHO MEAS - FS: 20 %
BH CV ECHO MEAS - IVS/LVPW: 1.3 CM
BH CV ECHO MEAS - IVSD: 1.3 CM
BH CV ECHO MEAS - LA DIMENSION: 6.2 CM
BH CV ECHO MEAS - LAT PEAK E' VEL: 12.8 CM/SEC
BH CV ECHO MEAS - LV DIASTOLIC VOL/BSA (35-75): 105.4 CM2
BH CV ECHO MEAS - LV MASS(C)D: 257 GRAMS
BH CV ECHO MEAS - LV MAX PG: 2.27 MMHG
BH CV ECHO MEAS - LV MEAN PG: 1 MMHG
BH CV ECHO MEAS - LV SYSTOLIC VOL/BSA (12-30): 64.4 CM2
BH CV ECHO MEAS - LV V1 MAX: 75.4 CM/SEC
BH CV ECHO MEAS - LV V1 VTI: 13.9 CM
BH CV ECHO MEAS - LVIDD: 5.5 CM
BH CV ECHO MEAS - LVIDS: 4.4 CM
BH CV ECHO MEAS - LVOT AREA: 4.5 CM2
BH CV ECHO MEAS - LVOT DIAM: 2.4 CM
BH CV ECHO MEAS - LVPWD: 1 CM
BH CV ECHO MEAS - MED PEAK E' VEL: 5.2 CM/SEC
BH CV ECHO MEAS - MV DEC SLOPE: 526 CM/SEC2
BH CV ECHO MEAS - MV DEC TIME: 0.18 SEC
BH CV ECHO MEAS - MV E MAX VEL: 92.6 CM/SEC
BH CV ECHO MEAS - MV MAX PG: 5.3 MMHG
BH CV ECHO MEAS - MV MEAN PG: 2 MMHG
BH CV ECHO MEAS - MV V2 VTI: 29.7 CM
BH CV ECHO MEAS - MVA(VTI): 2.12 CM2
BH CV ECHO MEAS - PA V2 MAX: 99 CM/SEC
BH CV ECHO MEAS - PI END-D VEL: 129 CM/SEC
BH CV ECHO MEAS - RAP SYSTOLE: 5 MMHG
BH CV ECHO MEAS - RV MAX PG: 2.9 MMHG
BH CV ECHO MEAS - RV V1 MAX: 85 CM/SEC
BH CV ECHO MEAS - RV V1 VTI: 13.9 CM
BH CV ECHO MEAS - RVDD: 3.3 CM
BH CV ECHO MEAS - RVSP: 34.2 MMHG
BH CV ECHO MEAS - SI(MOD-SP2): 32.6 ML/M2
BH CV ECHO MEAS - SI(MOD-SP4): 41 ML/M2
BH CV ECHO MEAS - SV(LVOT): 62.9 ML
BH CV ECHO MEAS - SV(MOD-SP2): 70 ML
BH CV ECHO MEAS - SV(MOD-SP4): 88 ML
BH CV ECHO MEAS - TAPSE (>1.6): 1.4 CM
BH CV ECHO MEAS - TR MAX PG: 29.2 MMHG
BH CV ECHO MEAS - TR MAX VEL: 270 CM/SEC
BH CV ECHO MEASUREMENTS AVERAGE E/E' RATIO: 10.29
BH CV XLRA - TDI S': 9.5 CM/SEC
BUN SERPL-MCNC: 18 MG/DL (ref 8–23)
BUN/CREAT SERPL: 17.1 (ref 7–25)
CALCIUM SPEC-SCNC: 9 MG/DL (ref 8.6–10.5)
CHLORIDE SERPL-SCNC: 103 MMOL/L (ref 98–107)
CO2 SERPL-SCNC: 30 MMOL/L (ref 22–29)
CREAT SERPL-MCNC: 1.05 MG/DL (ref 0.76–1.27)
DEPRECATED RDW RBC AUTO: 56.3 FL (ref 37–54)
EGFRCR SERPLBLD CKD-EPI 2021: 73.6 ML/MIN/1.73
EOSINOPHIL # BLD AUTO: 0.09 10*3/MM3 (ref 0–0.4)
EOSINOPHIL NFR BLD AUTO: 2.3 % (ref 0.3–6.2)
ERYTHROCYTE [DISTWIDTH] IN BLOOD BY AUTOMATED COUNT: 16.2 % (ref 12.3–15.4)
GLUCOSE SERPL-MCNC: 105 MG/DL (ref 65–99)
HCT VFR BLD AUTO: 29.1 % (ref 37.5–51)
HGB BLD-MCNC: 8.5 G/DL (ref 13–17.7)
IMM GRANULOCYTES # BLD AUTO: 0.01 10*3/MM3 (ref 0–0.05)
IMM GRANULOCYTES NFR BLD AUTO: 0.3 % (ref 0–0.5)
LEFT ATRIUM VOLUME INDEX: 93 ML/M2
LEFT ATRIUM VOLUME: 212 ML
LYMPHOCYTES # BLD AUTO: 0.73 10*3/MM3 (ref 0.7–3.1)
LYMPHOCYTES NFR BLD AUTO: 18.6 % (ref 19.6–45.3)
MAGNESIUM SERPL-MCNC: 2 MG/DL (ref 1.6–2.4)
MCH RBC QN AUTO: 27.6 PG (ref 26.6–33)
MCHC RBC AUTO-ENTMCNC: 29.2 G/DL (ref 31.5–35.7)
MCV RBC AUTO: 94.5 FL (ref 79–97)
MONOCYTES # BLD AUTO: 0.49 10*3/MM3 (ref 0.1–0.9)
MONOCYTES NFR BLD AUTO: 12.5 % (ref 5–12)
NEUTROPHILS NFR BLD AUTO: 2.57 10*3/MM3 (ref 1.7–7)
NEUTROPHILS NFR BLD AUTO: 65.5 % (ref 42.7–76)
NRBC BLD AUTO-RTO: 0 /100 WBC (ref 0–0.2)
PLATELET # BLD AUTO: 253 10*3/MM3 (ref 140–450)
PMV BLD AUTO: 9.9 FL (ref 6–12)
POTASSIUM SERPL-SCNC: 3.6 MMOL/L (ref 3.5–5.2)
RBC # BLD AUTO: 3.08 10*6/MM3 (ref 4.14–5.8)
SODIUM SERPL-SCNC: 141 MMOL/L (ref 136–145)
WBC NRBC COR # BLD: 3.92 10*3/MM3 (ref 3.4–10.8)

## 2023-10-31 PROCEDURE — 99232 SBSQ HOSP IP/OBS MODERATE 35: CPT | Performed by: NURSE PRACTITIONER

## 2023-10-31 PROCEDURE — 25510000001 PERFLUTREN 6.52 MG/ML SUSPENSION: Performed by: INTERNAL MEDICINE

## 2023-10-31 PROCEDURE — 25010000002 FUROSEMIDE PER 20 MG

## 2023-10-31 PROCEDURE — 83735 ASSAY OF MAGNESIUM: CPT | Performed by: INTERNAL MEDICINE

## 2023-10-31 PROCEDURE — 25810000003 SODIUM CHLORIDE 0.9 % SOLUTION 250 ML FLEX CONT: Performed by: INTERNAL MEDICINE

## 2023-10-31 PROCEDURE — 93306 TTE W/DOPPLER COMPLETE: CPT

## 2023-10-31 PROCEDURE — 85025 COMPLETE CBC W/AUTO DIFF WBC: CPT

## 2023-10-31 PROCEDURE — 25010000002 ENOXAPARIN PER 10 MG: Performed by: INTERNAL MEDICINE

## 2023-10-31 PROCEDURE — 93306 TTE W/DOPPLER COMPLETE: CPT | Performed by: INTERNAL MEDICINE

## 2023-10-31 PROCEDURE — 25010000002 NA FERRIC GLUC CPLX PER 12.5 MG: Performed by: INTERNAL MEDICINE

## 2023-10-31 PROCEDURE — 80048 BASIC METABOLIC PNL TOTAL CA: CPT

## 2023-10-31 RX ORDER — POTASSIUM CHLORIDE 750 MG/1
40 CAPSULE, EXTENDED RELEASE ORAL ONCE
Status: COMPLETED | OUTPATIENT
Start: 2023-10-31 | End: 2023-10-31

## 2023-10-31 RX ORDER — FUROSEMIDE 10 MG/ML
40 INJECTION INTRAMUSCULAR; INTRAVENOUS DAILY
Status: DISCONTINUED | OUTPATIENT
Start: 2023-11-01 | End: 2023-11-01 | Stop reason: HOSPADM

## 2023-10-31 RX ADMIN — ZOLPIDEM TARTRATE 5 MG: 5 TABLET ORAL at 00:39

## 2023-10-31 RX ADMIN — Medication 10 ML: at 10:53

## 2023-10-31 RX ADMIN — ENOXAPARIN SODIUM 100 MG: 100 INJECTION SUBCUTANEOUS at 22:02

## 2023-10-31 RX ADMIN — Medication 10 ML: at 22:01

## 2023-10-31 RX ADMIN — POTASSIUM CHLORIDE 40 MEQ: 750 CAPSULE, EXTENDED RELEASE ORAL at 14:50

## 2023-10-31 RX ADMIN — PREGABALIN 150 MG: 75 CAPSULE ORAL at 10:12

## 2023-10-31 RX ADMIN — FINASTERIDE 5 MG: 5 TABLET, FILM COATED ORAL at 10:14

## 2023-10-31 RX ADMIN — PREGABALIN 150 MG: 75 CAPSULE ORAL at 22:01

## 2023-10-31 RX ADMIN — ATORVASTATIN CALCIUM 40 MG: 40 TABLET ORAL at 22:02

## 2023-10-31 RX ADMIN — EMPAGLIFLOZIN 10 MG: 10 TABLET, FILM COATED ORAL at 10:13

## 2023-10-31 RX ADMIN — RANOLAZINE 1000 MG: 500 TABLET, FILM COATED, EXTENDED RELEASE ORAL at 10:15

## 2023-10-31 RX ADMIN — CLOPIDOGREL BISULFATE 75 MG: 75 TABLET, FILM COATED ORAL at 10:18

## 2023-10-31 RX ADMIN — SODIUM CHLORIDE 250 MG: 9 INJECTION, SOLUTION INTRAVENOUS at 10:52

## 2023-10-31 RX ADMIN — RANOLAZINE 1000 MG: 500 TABLET, FILM COATED, EXTENDED RELEASE ORAL at 22:02

## 2023-10-31 RX ADMIN — SOTALOL HYDROCHLORIDE 80 MG: 80 TABLET ORAL at 10:16

## 2023-10-31 RX ADMIN — SOTALOL HYDROCHLORIDE 80 MG: 80 TABLET ORAL at 22:01

## 2023-10-31 RX ADMIN — PERFLUTREN 13.04 MG: 6.52 INJECTION, SUSPENSION INTRAVENOUS at 10:53

## 2023-10-31 RX ADMIN — SACUBITRIL AND VALSARTAN 1 TABLET: 49; 51 TABLET, FILM COATED ORAL at 10:14

## 2023-10-31 RX ADMIN — ENOXAPARIN SODIUM 100 MG: 100 INJECTION SUBCUTANEOUS at 10:10

## 2023-10-31 RX ADMIN — FUROSEMIDE 40 MG: 10 INJECTION, SOLUTION INTRAMUSCULAR; INTRAVENOUS at 07:53

## 2023-10-31 RX ADMIN — SACUBITRIL AND VALSARTAN 1 TABLET: 49; 51 TABLET, FILM COATED ORAL at 22:02

## 2023-10-31 NOTE — PLAN OF CARE
Goal Outcome Evaluation:  Plan of Care Reviewed With: patient        Progress: improving  Outcome Evaluation: VSS. No c/o pain. Good urine output through night. Plans to have echo completed today?? Vpaced 75 with PVCs on tele. Will continue to monitor.

## 2023-10-31 NOTE — PLAN OF CARE
Goal Outcome Evaluation:  Plan of Care Reviewed With: patient        Progress: improving  Outcome Evaluation: No c/o pain today. Good UOP after lasix given Bp lower after am meds given. S/ST  per tele will continue to monitor.

## 2023-10-31 NOTE — PROGRESS NOTES
Chief Complaint: Shortness of breath, chest pain      Interval History:     He is feeling well this morning.  He states that shortness of breath is improving with IV Lasix.  He states he has had a significant amount of urinary output.  He has been evaluated by cardiology.  He has a significant cardiac history including ischemic cardiomyopathy, CAD, biventricular AICD, essential hypertension, atrial fibrillation, complete heart block, hyperlipidemia.  He denies any chest pain or left arm pain.  An echocardiogram has been ordered but has not been performed yet.    He has been anemic and hemoglobin is 8.5 with hematocrit of 29.1.  Anemia substrates were ordered yesterday and shows iron deficiency anemia.  Xarelto has been held and he has been started on Lovenox.  Renal function is stable.  Vital signs are stable.  He is on room air with oxygen saturation 95%.    Review of Systems:   General ROS: negative for - chills or fever  Respiratory ROS: no cough, shortness of breath, or wheezing  Cardiovascular ROS: no chest pain or dyspnea on exertion  Gastrointestinal ROS: negative for - abdominal pain, diarrhea or nausea/vomiting       Vital Signs  Temp:  [97.3 °F (36.3 °C)-98.1 °F (36.7 °C)] 97.3 °F (36.3 °C)  Heart Rate:  [73-76] 75  Resp:  [16-18] 16  BP: ()/(53-65) 112/57    Intake/Output Summary (Last 24 hours) at 10/31/2023 0740  Last data filed at 10/31/2023 0040  Gross per 24 hour   Intake 480 ml   Output 2650 ml   Net -2170 ml       Physical Exam:     General Appearance:    Alert, cooperative, in no acute distress   Head:    N/A   Throat:   N/A   Neck:   N/A   Lungs:     Clear to auscultation,respirations regular, even and                  unlabored    Heart:    Regular rhythm and normal rate, normal S1 and S2, no            murmur, no gallop, no rub   Abdomen:     Normal bowel sounds, no masses, no organomegaly, soft        non-tender, non-distended, no guarding, no rebound                tenderness    Extremities:   No edema, no cyanosis, no clubbing   Pulses:   N/A   Skin:   N/A   Lymph nodes:   N/A   Neurologic:   N/A          Lab Review:       Lab Results (last 24 hours)       Procedure Component Value Units Date/Time    Basic Metabolic Panel [[id number]]  (Abnormal) Collected: 10/31/23 0507    Specimen: Blood Updated: 10/31/23 0656     Glucose 105 mg/dL      BUN 18 mg/dL      Creatinine 1.05 mg/dL      Sodium 141 mmol/L      Potassium 3.6 mmol/L      Chloride 103 mmol/L      CO2 30.0 mmol/L      Calcium 9.0 mg/dL      BUN/Creatinine Ratio 17.1     Anion Gap 8.0 mmol/L      eGFR 73.6 mL/min/1.73     Narrative:      GFR Normal >60  Chronic Kidney Disease <60  Kidney Failure <15    The GFR formula is only valid for adults with stable renal function between ages 18 and 70.    Magnesium [[id number]]  (Normal) Collected: 10/31/23 0507    Specimen: Blood Updated: 10/31/23 0656     Magnesium 2.0 mg/dL     CBC & Differential [[id number]]  (Abnormal) Collected: 10/31/23 0507    Specimen: Blood Updated: 10/31/23 0619    Narrative:      The following orders were created for panel order CBC & Differential.  Procedure                               Abnormality         Status                     ---------                               -----------         ------                     CBC Auto Differential[[id number]]        Abnormal            Final result                 Please view results for these tests on the individual orders.    CBC Auto Differential [[id number]]  (Abnormal) Collected: 10/31/23 0507    Specimen: Blood Updated: 10/31/23 0619     WBC 3.92 10*3/mm3      RBC 3.08 10*6/mm3      Hemoglobin 8.5 g/dL      Hematocrit 29.1 %      MCV 94.5 fL      MCH 27.6 pg      MCHC 29.2 g/dL      RDW 16.2 %      RDW-SD 56.3 fl      MPV 9.9 fL      Platelets 253 10*3/mm3      Neutrophil % 65.5 %      Lymphocyte % 18.6 %      Monocyte % 12.5 %      Eosinophil % 2.3 %      Basophil % 0.8 %      Immature Grans % 0.3 %       "Neutrophils, Absolute 2.57 10*3/mm3      Lymphocytes, Absolute 0.73 10*3/mm3      Monocytes, Absolute 0.49 10*3/mm3      Eosinophils, Absolute 0.09 10*3/mm3      Basophils, Absolute 0.03 10*3/mm3      Immature Grans, Absolute 0.01 10*3/mm3      nRBC 0.0 /100 WBC     Vitamin B12 [[id number]]  (Abnormal) Collected: 10/30/23 1013    Specimen: Blood Updated: 10/30/23 1922     Vitamin B-12 991 pg/mL     Narrative:      Results may be falsely increased if patient taking Biotin.      Folate [[id number]]  (Normal) Collected: 10/30/23 1013    Specimen: Blood Updated: 10/30/23 1922     Folate 12.40 ng/mL     Narrative:      Results may be falsely increased if patient taking Biotin.      Peripheral Blood Smear [[id number]] Collected: 10/30/23 0317    Specimen: Blood Updated: 10/30/23 1403     Performed by: Cecil Silva MD     Pathologist Interpretation Peripheral blood smear, pathologist review:  Moderate normocytic anemia.  Mild anisopoikilocytosis.  1+ circulating schistocytes.  Normal white blood cell count with minimal lymphopenia.  No circulating blasts.  Adequate platelets with normal morphology.    Iron Profile [[id number]]  (Abnormal) Collected: 10/30/23 0317    Specimen: Blood Updated: 10/30/23 1121     Iron 22 mcg/dL      Iron Saturation (TSAT) 7 %      Transferrin 217 mg/dL      TIBC 323 mcg/dL     Reticulocytes [[id number]]  (Abnormal) Collected: 10/30/23 0317    Specimen: Blood Updated: 10/30/23 0824     Reticulocyte % 2.73 %      Reticulocyte Absolute 0.0773 10*6/mm3           Cultures:    No results found for: \"BLOODCX\", \"URINECX\", \"WOUNDCX\", \"MRSACX\", \"RESPCX\", \"STOOLCX\"    Radiology Review:  Imaging Results (Last 24 Hours)       ** No results found for the last 24 hours. **                     ASSESSMENT:      Acute exacerbation of CHF (congestive heart failure)    Coronary artery disease of bypass graft of native heart with stable angina pectoris    Ischemic cardiomyopathy    Permanent atrial fibrillation    " Anemia      PLAN:    Improvement in respiratory status.  Lab work-up shows iron deficiency anemia.  Echocardiogram pending.  Telemetry shows permanent A-fib with controlled rate.    1.  Echocardiogram today, cardiology following.  2.  Hold Xarelto, continue subcu Lovenox and Plavix, high intensity statin, beta-blocker, continue IV Lasix.  3.  Replace iron to address anemia.  4.  Monitor renal function electrolytes closely.  5.  Continue cardiac monitoring.    Further orders per Dr. Salvador.      Mendoza Smith, APRN  10/31/23  07:40 CDT        Part of this note may be an electronic transcription/translation of spoken language to printed text using the Dragon Dictation System.

## 2023-10-31 NOTE — PROGRESS NOTES
"[facility] -  Progress Note     LOS: 0 days   Patient Care Team:  Rolan Salvador MD as PCP - General (Internal Medicine)  Sarbjit Posadas MD as Cardiologist (Cardiology)  Yolanda Perry APRN as Nurse Practitioner (Family Medicine)  Durga Ochoa MD as Consulting Physician (Urology)  Tyrone Broussard PA as Physician Assistant (Urology)  Devika Oliver MD as Cardiologist (Cardiac Electrophysiology)    Chief Complaint: shortness of breath     Subjective     Interval History:     Patient Complaints: The patient is resting comfortably in the bedside chair.  He reports his breathing is substantially better today.  He states he still has some pressure when he ambulates in his room, \"but it is better because my breathing is better.\" .  He still sleeps with the head of the bed elevated, but he states this is chronic for him.  He is now receiving iron infusions for his anemia.  Systolic blood pressure is in the upper 80s per most recent reading.  He is V pacing on telemetry with heart rates mostly in the 70s.  Renal function is stable.    He is net negative between 4 and 5 L.  Hemoglobin is stable at 8.5.    Review of Systems:     Review of Systems   Constitutional:  Negative for diaphoresis, fatigue, fever and unexpected weight change.   HENT:  Negative for nosebleeds.    Respiratory:  Negative for apnea, cough, chest tightness, shortness of breath and wheezing.    Cardiovascular:  Negative for chest pain, palpitations and leg swelling.   Gastrointestinal:  Negative for abdominal distention, nausea and vomiting.   Genitourinary:  Negative for hematuria.   Musculoskeletal:  Negative for gait problem.   Skin:  Negative for color change.   Neurological:  Negative for dizziness, syncope, weakness and light-headedness.     Objective     Vital Sign Min/Max for last 24 hours  Temp  Min: 97.3 °F (36.3 °C)  Max: 97.8 °F (36.6 °C)   BP  Min: 88/42  Max: 121/64   Pulse  Min: 73  Max: 75   Resp  " Min: 14  Max: 18   SpO2  Min: 95 %  Max: 98 %   No data recorded   Weight  Min: 94.3 kg (208 lb)  Max: 94.6 kg (208 lb 9.6 oz)         10/31/23  0847   Weight: 94.3 kg (208 lb)       Physical Exam:    Vitals and nursing note reviewed.   Constitutional:       General: Not in acute distress.     Appearance: Well-developed and not in distress. Chronically ill-appearing and acutely ill-appearing. Not diaphoretic.   Neck:      Vascular: No JVD.   Pulmonary:      Effort: Pulmonary effort is normal. No respiratory distress.      Breath sounds: Normal breath sounds.   Cardiovascular:      Normal rate. Regular rhythm.      Murmurs: There is no murmur.   Edema:     Peripheral edema absent.   Abdominal:      Tenderness: There is no abdominal tenderness.   Skin:     General: Skin is warm and dry.   Neurological:      Mental Status: Alert and oriented to person, place, and time.       Results Review:   Lab Results (last 72 hours)       Procedure Component Value Units Date/Time    Basic Metabolic Panel [[id number]]  (Abnormal) Collected: 10/31/23 0507    Specimen: Blood Updated: 10/31/23 0656     Glucose 105 mg/dL      BUN 18 mg/dL      Creatinine 1.05 mg/dL      Sodium 141 mmol/L      Potassium 3.6 mmol/L      Chloride 103 mmol/L      CO2 30.0 mmol/L      Calcium 9.0 mg/dL      BUN/Creatinine Ratio 17.1     Anion Gap 8.0 mmol/L      eGFR 73.6 mL/min/1.73     Narrative:      GFR Normal >60  Chronic Kidney Disease <60  Kidney Failure <15    The GFR formula is only valid for adults with stable renal function between ages 18 and 70.    Magnesium [[id number]]  (Normal) Collected: 10/31/23 0507    Specimen: Blood Updated: 10/31/23 0656     Magnesium 2.0 mg/dL     CBC & Differential [[id number]]  (Abnormal) Collected: 10/31/23 0507    Specimen: Blood Updated: 10/31/23 0619    Narrative:      The following orders were created for panel order CBC & Differential.  Procedure                               Abnormality         Status                      ---------                               -----------         ------                     CBC Auto Differential[[id number]]        Abnormal            Final result                 Please view results for these tests on the individual orders.    CBC Auto Differential [[id number]]  (Abnormal) Collected: 10/31/23 0507    Specimen: Blood Updated: 10/31/23 0619     WBC 3.92 10*3/mm3      RBC 3.08 10*6/mm3      Hemoglobin 8.5 g/dL      Hematocrit 29.1 %      MCV 94.5 fL      MCH 27.6 pg      MCHC 29.2 g/dL      RDW 16.2 %      RDW-SD 56.3 fl      MPV 9.9 fL      Platelets 253 10*3/mm3      Neutrophil % 65.5 %      Lymphocyte % 18.6 %      Monocyte % 12.5 %      Eosinophil % 2.3 %      Basophil % 0.8 %      Immature Grans % 0.3 %      Neutrophils, Absolute 2.57 10*3/mm3      Lymphocytes, Absolute 0.73 10*3/mm3      Monocytes, Absolute 0.49 10*3/mm3      Eosinophils, Absolute 0.09 10*3/mm3      Basophils, Absolute 0.03 10*3/mm3      Immature Grans, Absolute 0.01 10*3/mm3      nRBC 0.0 /100 WBC     Vitamin B12 [[id number]]  (Abnormal) Collected: 10/30/23 1013    Specimen: Blood Updated: 10/30/23 1922     Vitamin B-12 991 pg/mL     Narrative:      Results may be falsely increased if patient taking Biotin.      Folate [[id number]]  (Normal) Collected: 10/30/23 1013    Specimen: Blood Updated: 10/30/23 1922     Folate 12.40 ng/mL     Narrative:      Results may be falsely increased if patient taking Biotin.      Peripheral Blood Smear [[id number]] Collected: 10/30/23 0317    Specimen: Blood Updated: 10/30/23 1403     Performed by: Cecil Silva MD     Pathologist Interpretation Peripheral blood smear, pathologist review:  Moderate normocytic anemia.  Mild anisopoikilocytosis.  1+ circulating schistocytes.  Normal white blood cell count with minimal lymphopenia.  No circulating blasts.  Adequate platelets with normal morphology.    Iron Profile [[id number]]  (Abnormal) Collected: 10/30/23 0317    Specimen: Blood Updated:  10/30/23 1121     Iron 22 mcg/dL      Iron Saturation (TSAT) 7 %      Transferrin 217 mg/dL      TIBC 323 mcg/dL     Reticulocytes [[id number]]  (Abnormal) Collected: 10/30/23 0317    Specimen: Blood Updated: 10/30/23 0824     Reticulocyte % 2.73 %      Reticulocyte Absolute 0.0773 10*6/mm3     Basic Metabolic Panel [[id number]]  (Abnormal) Collected: 10/30/23 0317    Specimen: Blood Updated: 10/30/23 0421     Glucose 105 mg/dL      BUN 13 mg/dL      Creatinine 0.91 mg/dL      Sodium 143 mmol/L      Potassium 3.6 mmol/L      Chloride 104 mmol/L      CO2 32.0 mmol/L      Calcium 8.7 mg/dL      BUN/Creatinine Ratio 14.3     Anion Gap 7.0 mmol/L      eGFR 87.3 mL/min/1.73     Narrative:      GFR Normal >60  Chronic Kidney Disease <60  Kidney Failure <15    The GFR formula is only valid for adults with stable renal function between ages 18 and 70.    CBC & Differential [[id number]]  (Abnormal) Collected: 10/30/23 0317    Specimen: Blood Updated: 10/30/23 0359    Narrative:      The following orders were created for panel order CBC & Differential.  Procedure                               Abnormality         Status                     ---------                               -----------         ------                     CBC Auto Differential[[id number]]        Abnormal            Final result                 Please view results for these tests on the individual orders.    CBC Auto Differential [[id number]]  (Abnormal) Collected: 10/30/23 0317    Specimen: Blood Updated: 10/30/23 0359     WBC 4.57 10*3/mm3      RBC 2.90 10*6/mm3      Hemoglobin 8.1 g/dL      Hematocrit 27.6 %      MCV 95.2 fL      MCH 27.9 pg      MCHC 29.3 g/dL      RDW 16.3 %      RDW-SD 56.7 fl      MPV 9.4 fL      Platelets 232 10*3/mm3      Neutrophil % 70.0 %      Lymphocyte % 13.6 %      Monocyte % 13.3 %      Eosinophil % 2.0 %      Basophil % 0.7 %      Immature Grans % 0.4 %      Neutrophils, Absolute 3.20 10*3/mm3      Lymphocytes, Absolute 0.62  10*3/mm3      Monocytes, Absolute 0.61 10*3/mm3      Eosinophils, Absolute 0.09 10*3/mm3      Basophils, Absolute 0.03 10*3/mm3      Immature Grans, Absolute 0.02 10*3/mm3      nRBC 0.0 /100 WBC     High Sensitivity Troponin T 2Hr [[id number]]  (Abnormal) Collected: 10/29/23 1526    Specimen: Blood Updated: 10/29/23 1551     HS Troponin T 36 ng/L      Troponin T Delta -11 ng/L     Narrative:      High Sensitive Troponin T Reference Range:  <10.0 ng/L- Negative Female for AMI  <15.0 ng/L- Negative Male for AMI  >=10 - Abnormal Female indicating possible myocardial injury.  >=15 - Abnormal Male indicating possible myocardial injury.   Clinicians would have to utilize clinical acumen, EKG, Troponin, and serial changes to determine if it is an Acute Myocardial Infarction or myocardial injury due to an underlying chronic condition.         [place] Draw [[id number]] Collected: 10/29/23 1329    Specimen: Blood Updated: 10/29/23 1431    Narrative:      The following orders were created for panel order [place] Draw.  Procedure                               Abnormality         Status                     ---------                               -----------         ------                     Green Top (Gel)[[id number]]                                  Final result               Lavender Top[[id number]]                                     Final result               Red Top[[id number]]                                          Final result               Light Blue Top[[id number]]                                   Final result                 Please view results for these tests on the individual orders.    Green Top (Gel) [[id number]] Collected: 10/29/23 1329    Specimen: Blood Updated: 10/29/23 1431     Extra Tube Hold for add-ons.     Comment: Auto resulted.       Lavender Top [[id number]] Collected: 10/29/23 1329    Specimen: Blood Updated: 10/29/23 1431     Extra Tube hold for add-on     Comment: Auto resulted       Red Top [[id number]]  Collected: 10/29/23 1329    Specimen: Blood Updated: 10/29/23 1431     Extra Tube Hold for add-ons.     Comment: Auto resulted.       Light Blue Top [[id number]] Collected: 10/29/23 1329    Specimen: Blood Updated: 10/29/23 1431     Extra Tube Hold for add-ons.     Comment: Auto resulted       BNP [[id number]]  (Abnormal) Collected: 10/29/23 1329    Specimen: Blood Updated: 10/29/23 1417     proBNP 2,036.0 pg/mL     Narrative:      This assay is used as an aid in the diagnosis of individuals suspected of having heart failure. It can be used as an aid in the diagnosis of acute decompensated heart failure (ADHF) in patients presenting with signs and symptoms of ADHF to the emergency department (ED). In addition, NT-proBNP of <300 pg/mL indicates ADHF is not likely.    Age Range Result Interpretation  NT-proBNP Concentration (pg/mL:      <50             Positive            >450                   Gray                 300-450                    Negative             <300    50-75           Positive            >900                  Gray                300-900                  Negative            <300      >75             Positive            >1800                  Gray                300-1800                  Negative            <300    Magnesium [[id number]]  (Normal) Collected: 10/29/23 1329    Specimen: Blood Updated: 10/29/23 1405     Magnesium 1.7 mg/dL     Comprehensive Metabolic Panel [[id number]]  (Abnormal) Collected: 10/29/23 1329    Specimen: Blood Updated: 10/29/23 1400     Glucose 162 mg/dL      BUN 12 mg/dL      Creatinine 0.86 mg/dL      Sodium 142 mmol/L      Potassium 4.1 mmol/L      Chloride 105 mmol/L      CO2 27.0 mmol/L      Calcium 9.1 mg/dL      Total Protein 6.6 g/dL      Albumin 4.2 g/dL      ALT (SGPT) 6 U/L      AST (SGOT) 11 U/L      Alkaline Phosphatase 77 U/L      Total Bilirubin 0.9 mg/dL      Globulin 2.4 gm/dL      A/G Ratio 1.8 g/dL      BUN/Creatinine Ratio 14.0     Anion Gap 10.0 mmol/L       eGFR 89.7 mL/min/1.73     Narrative:      GFR Normal >60  Chronic Kidney Disease <60  Kidney Failure <15    The GFR formula is only valid for adults with stable renal function between ages 18 and 70.    High Sensitivity Troponin T [[id number]]  (Abnormal) Collected: 10/29/23 1329    Specimen: Blood Updated: 10/29/23 1357     HS Troponin T 47 ng/L     Narrative:      High Sensitive Troponin T Reference Range:  <10.0 ng/L- Negative Female for AMI  <15.0 ng/L- Negative Male for AMI  >=10 - Abnormal Female indicating possible myocardial injury.  >=15 - Abnormal Male indicating possible myocardial injury.   Clinicians would have to utilize clinical acumen, EKG, Troponin, and serial changes to determine if it is an Acute Myocardial Infarction or myocardial injury due to an underlying chronic condition.         Protime-INR [[id number]]  (Abnormal) Collected: 10/29/23 1329    Specimen: Blood Updated: 10/29/23 1356     Protime 21.4 Seconds      INR 1.83    aPTT [[id number]]  (Abnormal) Collected: 10/29/23 1329    Specimen: Blood Updated: 10/29/23 1356     PTT 43.6 seconds     CBC & Differential [[id number]]  (Abnormal) Collected: 10/29/23 1329    Specimen: Blood Updated: 10/29/23 1340    Narrative:      The following orders were created for panel order CBC & Differential.  Procedure                               Abnormality         Status                     ---------                               -----------         ------                     CBC Auto Differential[[id number]]        Abnormal            Final result                 Please view results for these tests on the individual orders.    CBC Auto Differential [[id number]]  (Abnormal) Collected: 10/29/23 1329    Specimen: Blood Updated: 10/29/23 1340     WBC 8.97 10*3/mm3      RBC 3.24 10*6/mm3      Hemoglobin 9.2 g/dL      Hematocrit 31.0 %      MCV 95.7 fL      MCH 28.4 pg      MCHC 29.7 g/dL      RDW 16.4 %      RDW-SD 58.0 fl      MPV 9.4 fL      Platelets 291 10*3/mm3   "    Neutrophil % 79.8 %      Lymphocyte % 7.5 %      Monocyte % 11.5 %      Eosinophil % 0.7 %      Basophil % 0.3 %      Immature Grans % 0.2 %      Neutrophils, Absolute 7.16 10*3/mm3      Lymphocytes, Absolute 0.67 10*3/mm3      Monocytes, Absolute 1.03 10*3/mm3      Eosinophils, Absolute 0.06 10*3/mm3      Basophils, Absolute 0.03 10*3/mm3      Immature Grans, Absolute 0.02 10*3/mm3      nRBC 0.0 /100 WBC                 Echo EF Estimated  Lab Results   Component Value Date    ECHOEFEST 30 02/13/2019         Cath Ejection Fraction Quantitative  No results found for: \"CATHEF\"        Medication Review: yes  Current Facility-Administered Medications   Medication Dose Route Frequency Provider Last Rate Last Admin    atorvastatin (LIPITOR) tablet 40 mg  40 mg Oral Nightly Jose Lua MD   40 mg at 10/30/23 2019    clopidogrel (PLAVIX) tablet 75 mg  75 mg Oral Daily Jose Lua MD   75 mg at 10/31/23 1018    empagliflozin (JARDIANCE) tablet 10 mg  10 mg Oral Daily Jose Lua MD   10 mg at 10/31/23 1013    Enoxaparin Sodium (LOVENOX) syringe 100 mg  1 mg/kg Subcutaneous Q12H Rolan Salvador MD   100 mg at 10/31/23 1010    ferric gluconate (FERRLECIT) 250 mg in sodium chloride 0.9 % 250 mL IVPB  250 mg Intravenous Daily Rolan Salvador  mL/hr at 10/31/23 1052 250 mg at 10/31/23 1052    finasteride (PROSCAR) tablet 5 mg  5 mg Oral Daily Jose Lua MD   5 mg at 10/31/23 1014    [START ON 11/1/2023] furosemide (LASIX) injection 40 mg  40 mg Intravenous Daily Rolan Salvador MD        Pharmacy to Dose enoxaparin (LOVENOX)   Does not apply Continuous PRN Rolan Salvador MD        potassium chloride (MICRO-K/KLOR-CON) CR capsule  40 mEq Oral Once Yolanda Perry APRN        pregabalin (LYRICA) capsule 150 mg  150 mg Oral Q12H Jose Lua MD   150 mg at 10/31/23 1012    ranolazine (RANEXA) 12 hr tablet 1,000 mg  1,000 mg Oral Q12H Jose Lua MD   1,000 " mg at 10/31/23 1015    sacubitril-valsartan (ENTRESTO) 49-51 MG tablet 1 tablet  1 tablet Oral Q12H Jose Lua MD   1 tablet at 10/31/23 1014    sodium chloride 0.9 % flush 10 mL  10 mL Intravenous PRN Rolan Salvador MD        sodium chloride 0.9 % flush 10 mL  10 mL Intravenous PRN Rolan Salvador MD        sodium chloride 0.9 % flush 10 mL  10 mL Intravenous Q12H Darby, Kheeli D, APRN   10 mL at 10/31/23 1053    sodium chloride 0.9 % flush 10 mL  10 mL Intravenous PRN Darby, Kheeli D, APRN        sodium chloride 0.9 % infusion 40 mL  40 mL Intravenous PRN Darby, Kheeli D, APRN        sotalol (BETAPACE) tablet 80 mg  80 mg Oral Q12H Jose Lua MD   80 mg at 10/31/23 1016    zolpidem (AMBIEN) tablet 5 mg  5 mg Oral Nightly PRN Jose Lua MD   5 mg at 10/31/23 0039         Assessment & Plan     1.  Acute on chronic systolic congestive heart failure: He has improved significantly with 4 doses of IV Lasix so far and renal function is stable.     -Continue Lasix 40 mg IV-frequency has been reduced to daily per admitting team  -Continue middle dose Entresto, Jardiance, sotalol.  In the past Entresto has had to be reduced due to symptomatic hypotension  -Echo results pending     2.  Chest pain: He describes a pressure or heaviness associated with shortness of breath on exertion.  Troponins are relatively flat trending at 47 followed by 36 -suspect troponin elevation due to myocardial injury from acute congestive heart failure.  We will reevaluate his chest discomfort and the need for an ischemic evaluation after treatment of acute CHF.  Continue Plavix, high intensity statin, Ranexa for known coronary artery disease.  Previously intolerant to Imdur.    Chest discomfort improving with diuresis.     3.  Anemia: Treatment and work-up per primary team.  He does take Plavix and Xarelto at home.  This admission and Xarelto has been replaced with therapeutic Lovenox per admitting  physician.  Hemoglobin is stable at 8.5.  He is receiving iron infusions per admitting team.     4.  History of ventricular tachycardia: None noted on interrogation report from 2 days ago.  The patient denies palpitations or shocks.He received an AICD shock for sustained ventricular tachycardia on 5/26/2023.  It also appears he received ATP x2 on 6/29/2023 for 32 seconds of ventricular tachycardia.  He was subsequently started on sotalol 80 mg twice daily per Dr. Oliver on 8/29/2023.  He has been very nauseated since starting this.  Dr. Oliver discontinued Coreg on 9/7.  As he noted, he may be considered for LV lead revision or physiologic pacing lead in the future.  At this point, it is unclear if his nausea is a side effect of the sotalol versus related to his congestive heart failure.  Would advise against treating with Zofran to avoid QT prolongation and subsequent ventricular arrhythmias.     5.  Permanent atrial fibrillation: Rate controlled and anticoagulated    EVELIA Galvez  10/31/23  13:29 CDT

## 2023-11-01 ENCOUNTER — READMISSION MANAGEMENT (OUTPATIENT)
Dept: CALL CENTER | Facility: HOSPITAL | Age: 78
End: 2023-11-01
Payer: MEDICARE

## 2023-11-01 VITALS
BODY MASS INDEX: 29.02 KG/M2 | OXYGEN SATURATION: 97 % | DIASTOLIC BLOOD PRESSURE: 56 MMHG | WEIGHT: 207.25 LBS | HEIGHT: 71 IN | RESPIRATION RATE: 16 BRPM | SYSTOLIC BLOOD PRESSURE: 110 MMHG | HEART RATE: 72 BPM | TEMPERATURE: 97.7 F

## 2023-11-01 LAB
ANION GAP SERPL CALCULATED.3IONS-SCNC: 10 MMOL/L (ref 5–15)
BUN SERPL-MCNC: 19 MG/DL (ref 8–23)
BUN/CREAT SERPL: 22.6 (ref 7–25)
CALCIUM SPEC-SCNC: 9 MG/DL (ref 8.6–10.5)
CHLORIDE SERPL-SCNC: 104 MMOL/L (ref 98–107)
CO2 SERPL-SCNC: 27 MMOL/L (ref 22–29)
CREAT SERPL-MCNC: 0.84 MG/DL (ref 0.76–1.27)
EGFRCR SERPLBLD CKD-EPI 2021: 90.4 ML/MIN/1.73
GLUCOSE SERPL-MCNC: 127 MG/DL (ref 65–99)
MAGNESIUM SERPL-MCNC: 2.1 MG/DL (ref 1.6–2.4)
POTASSIUM SERPL-SCNC: 3.7 MMOL/L (ref 3.5–5.2)
SODIUM SERPL-SCNC: 141 MMOL/L (ref 136–145)

## 2023-11-01 PROCEDURE — 83735 ASSAY OF MAGNESIUM: CPT | Performed by: INTERNAL MEDICINE

## 2023-11-01 PROCEDURE — 25810000003 SODIUM CHLORIDE 0.9 % SOLUTION 250 ML FLEX CONT: Performed by: INTERNAL MEDICINE

## 2023-11-01 PROCEDURE — 25010000002 FUROSEMIDE PER 20 MG: Performed by: INTERNAL MEDICINE

## 2023-11-01 PROCEDURE — 25010000002 ENOXAPARIN PER 10 MG: Performed by: INTERNAL MEDICINE

## 2023-11-01 PROCEDURE — 80048 BASIC METABOLIC PNL TOTAL CA: CPT | Performed by: INTERNAL MEDICINE

## 2023-11-01 PROCEDURE — 25010000002 NA FERRIC GLUC CPLX PER 12.5 MG: Performed by: INTERNAL MEDICINE

## 2023-11-01 RX ORDER — TORSEMIDE 20 MG/1
20 TABLET ORAL DAILY
Start: 2023-11-01

## 2023-11-01 RX ADMIN — SOTALOL HYDROCHLORIDE 80 MG: 80 TABLET ORAL at 08:50

## 2023-11-01 RX ADMIN — RANOLAZINE 1000 MG: 500 TABLET, FILM COATED, EXTENDED RELEASE ORAL at 08:50

## 2023-11-01 RX ADMIN — FINASTERIDE 5 MG: 5 TABLET, FILM COATED ORAL at 08:50

## 2023-11-01 RX ADMIN — FUROSEMIDE 40 MG: 10 INJECTION, SOLUTION INTRAVENOUS at 08:50

## 2023-11-01 RX ADMIN — ZOLPIDEM TARTRATE 5 MG: 5 TABLET ORAL at 00:21

## 2023-11-01 RX ADMIN — CLOPIDOGREL BISULFATE 75 MG: 75 TABLET, FILM COATED ORAL at 08:50

## 2023-11-01 RX ADMIN — SACUBITRIL AND VALSARTAN 1 TABLET: 49; 51 TABLET, FILM COATED ORAL at 08:50

## 2023-11-01 RX ADMIN — EMPAGLIFLOZIN 10 MG: 10 TABLET, FILM COATED ORAL at 08:50

## 2023-11-01 RX ADMIN — SODIUM CHLORIDE 250 MG: 9 INJECTION, SOLUTION INTRAVENOUS at 08:50

## 2023-11-01 RX ADMIN — ENOXAPARIN SODIUM 100 MG: 100 INJECTION SUBCUTANEOUS at 08:50

## 2023-11-01 RX ADMIN — Medication 10 ML: at 08:51

## 2023-11-01 RX ADMIN — PREGABALIN 150 MG: 75 CAPSULE ORAL at 08:50

## 2023-11-01 NOTE — OUTREACH NOTE
Prep Survey      Flowsheet Row Responses   [facility] facility patient discharged from? [place]   Is LACE score < 7 ? No   Eligibility Readm Mgmt   Discharge diagnosis Acute exacerbation of CHF   Does the patient have one of the following disease processes/diagnoses(primary or secondary)? CHF   Does the patient have Home health ordered? No   Is there a DME ordered? No   Prep survey completed? Yes            GENEVA SHEA - Registered Nurse

## 2023-11-01 NOTE — DISCHARGE SUMMARY
DISCHARGE SUMMARY       Date of Admission: 10/29/2023  Date of Discharge:  11/1/2023  Primary Care Physician: Rolan Salvador MD    Discharge Diagnoses:    Acute exacerbation of CHF (congestive heart failure)    Coronary artery disease of bypass graft of native heart with stable angina pectoris    Ischemic cardiomyopathy    Permanent atrial fibrillation    Anemia        Presenting Problem/History of Present Illness  Acute exacerbation of CHF (congestive heart failure) [I50.9]       Hospital Course  Darren Maria is a 78-year-old male with past medical history of significant coronary artery disease, ischemic cardiomyopathy, biventricular AICD, essential hypertension, atrial fibrillation, complete heart block, and hyperlipidemia.  He presented to the emergency department with worsening shortness of breath and dyspnea on exertion over the past 2 weeks.  He was having some chest discomfort that radiated down his left arm.  He has been evaluated by cardiology, and a repeat echocardiogram was been performed.  There is no significant change.  He is being evaluated by cardiology.  His symptoms have improved with diuresis.  He was found to have some worsening anemia, and iron deficiency was identified.  He has been given iron infusions.  He is can receive 1 more iron infusion today and be evaluated by cardiology before he leaves.  Troponins have trended flat.  We will set up iron infusions outpatient and continue medical management for CHF and CAD.  His A-fib has been well controlled and he is anticoagulated.  We will continue with Entresto, Jardiance, Plavix, high intensity statin, and Ranexa beta-blocker, and diuretics.  He is anxious to go home, and we will follow-up with him closely outpatient.    Procedures Performed         Consults:   Consults       Date and Time Order Name Status Description    10/30/2023  7:40 AM Inpatient Cardiology Consult Completed             Pertinent Test Results:  Lab Results  (most recent)       Procedure Component Value Units Date/Time    Basic Metabolic Panel [[id number]]  (Abnormal) Collected: 11/01/23 0342    Specimen: Blood Updated: 11/01/23 0451     Glucose 127 mg/dL      BUN 19 mg/dL      Creatinine 0.84 mg/dL      Sodium 141 mmol/L      Potassium 3.7 mmol/L      Chloride 104 mmol/L      CO2 27.0 mmol/L      Calcium 9.0 mg/dL      BUN/Creatinine Ratio 22.6     Anion Gap 10.0 mmol/L      eGFR 90.4 mL/min/1.73     Narrative:      GFR Normal >60  Chronic Kidney Disease <60  Kidney Failure <15    The GFR formula is only valid for adults with stable renal function between ages 18 and 70.    Magnesium [[id number]]  (Normal) Collected: 11/01/23 0342    Specimen: Blood Updated: 11/01/23 0451     Magnesium 2.1 mg/dL     Basic Metabolic Panel [[id number]]  (Abnormal) Collected: 10/31/23 0507    Specimen: Blood Updated: 10/31/23 0656     Glucose 105 mg/dL      BUN 18 mg/dL      Creatinine 1.05 mg/dL      Sodium 141 mmol/L      Potassium 3.6 mmol/L      Chloride 103 mmol/L      CO2 30.0 mmol/L      Calcium 9.0 mg/dL      BUN/Creatinine Ratio 17.1     Anion Gap 8.0 mmol/L      eGFR 73.6 mL/min/1.73     Narrative:      GFR Normal >60  Chronic Kidney Disease <60  Kidney Failure <15    The GFR formula is only valid for adults with stable renal function between ages 18 and 70.    Magnesium [[id number]]  (Normal) Collected: 10/31/23 0507    Specimen: Blood Updated: 10/31/23 0656     Magnesium 2.0 mg/dL     CBC & Differential [[id number]]  (Abnormal) Collected: 10/31/23 0507    Specimen: Blood Updated: 10/31/23 0619    Narrative:      The following orders were created for panel order CBC & Differential.  Procedure                               Abnormality         Status                     ---------                               -----------         ------                     CBC Auto Differential[[id number]]        Abnormal            Final result                 Please view results for these tests on the  individual orders.    CBC Auto Differential [[id number]]  (Abnormal) Collected: 10/31/23 0507    Specimen: Blood Updated: 10/31/23 0619     WBC 3.92 10*3/mm3      RBC 3.08 10*6/mm3      Hemoglobin 8.5 g/dL      Hematocrit 29.1 %      MCV 94.5 fL      MCH 27.6 pg      MCHC 29.2 g/dL      RDW 16.2 %      RDW-SD 56.3 fl      MPV 9.9 fL      Platelets 253 10*3/mm3      Neutrophil % 65.5 %      Lymphocyte % 18.6 %      Monocyte % 12.5 %      Eosinophil % 2.3 %      Basophil % 0.8 %      Immature Grans % 0.3 %      Neutrophils, Absolute 2.57 10*3/mm3      Lymphocytes, Absolute 0.73 10*3/mm3      Monocytes, Absolute 0.49 10*3/mm3      Eosinophils, Absolute 0.09 10*3/mm3      Basophils, Absolute 0.03 10*3/mm3      Immature Grans, Absolute 0.01 10*3/mm3      nRBC 0.0 /100 WBC     Vitamin B12 [[id number]]  (Abnormal) Collected: 10/30/23 1013    Specimen: Blood Updated: 10/30/23 1922     Vitamin B-12 991 pg/mL     Narrative:      Results may be falsely increased if patient taking Biotin.      Folate [[id number]]  (Normal) Collected: 10/30/23 1013    Specimen: Blood Updated: 10/30/23 1922     Folate 12.40 ng/mL     Narrative:      Results may be falsely increased if patient taking Biotin.      Peripheral Blood Smear [[id number]] Collected: 10/30/23 0317    Specimen: Blood Updated: 10/30/23 1403     Performed by: Cecil Silva MD     Pathologist Interpretation Peripheral blood smear, pathologist review:  Moderate normocytic anemia.  Mild anisopoikilocytosis.  1+ circulating schistocytes.  Normal white blood cell count with minimal lymphopenia.  No circulating blasts.  Adequate platelets with normal morphology.    Iron Profile [[id number]]  (Abnormal) Collected: 10/30/23 0317    Specimen: Blood Updated: 10/30/23 1121     Iron 22 mcg/dL      Iron Saturation (TSAT) 7 %      Transferrin 217 mg/dL      TIBC 323 mcg/dL     Reticulocytes [[id number]]  (Abnormal) Collected: 10/30/23 0317    Specimen: Blood Updated: 10/30/23 0824      Reticulocyte % 2.73 %      Reticulocyte Absolute 0.0773 10*6/mm3     CBC & Differential [[id number]]  (Abnormal) Collected: 10/30/23 0317    Specimen: Blood Updated: 10/30/23 0359    Narrative:      The following orders were created for panel order CBC & Differential.  Procedure                               Abnormality         Status                     ---------                               -----------         ------                     CBC Auto Differential[[id number]]        Abnormal            Final result                 Please view results for these tests on the individual orders.    CBC Auto Differential [[id number]]  (Abnormal) Collected: 10/30/23 0317    Specimen: Blood Updated: 10/30/23 0359     WBC 4.57 10*3/mm3      RBC 2.90 10*6/mm3      Hemoglobin 8.1 g/dL      Hematocrit 27.6 %      MCV 95.2 fL      MCH 27.9 pg      MCHC 29.3 g/dL      RDW 16.3 %      RDW-SD 56.7 fl      MPV 9.4 fL      Platelets 232 10*3/mm3      Neutrophil % 70.0 %      Lymphocyte % 13.6 %      Monocyte % 13.3 %      Eosinophil % 2.0 %      Basophil % 0.7 %      Immature Grans % 0.4 %      Neutrophils, Absolute 3.20 10*3/mm3      Lymphocytes, Absolute 0.62 10*3/mm3      Monocytes, Absolute 0.61 10*3/mm3      Eosinophils, Absolute 0.09 10*3/mm3      Basophils, Absolute 0.03 10*3/mm3      Immature Grans, Absolute 0.02 10*3/mm3      nRBC 0.0 /100 WBC     High Sensitivity Troponin T 2Hr [[id number]]  (Abnormal) Collected: 10/29/23 1526    Specimen: Blood Updated: 10/29/23 1551     HS Troponin T 36 ng/L      Troponin T Delta -11 ng/L     Narrative:      High Sensitive Troponin T Reference Range:  <10.0 ng/L- Negative Female for AMI  <15.0 ng/L- Negative Male for AMI  >=10 - Abnormal Female indicating possible myocardial injury.  >=15 - Abnormal Male indicating possible myocardial injury.   Clinicians would have to utilize clinical acumen, EKG, Troponin, and serial changes to determine if it is an Acute Myocardial Infarction or myocardial  injury due to an underlying chronic condition.         [place] Draw [[id number]] Collected: 10/29/23 1329    Specimen: Blood Updated: 10/29/23 1431    Narrative:      The following orders were created for panel order [place] Draw.  Procedure                               Abnormality         Status                     ---------                               -----------         ------                     Green Top (Gel)[[id number]]                                  Final result               Lavender Top[[id number]]                                     Final result               Red Top[[id number]]                                          Final result               Light Blue Top[[id number]]                                   Final result                 Please view results for these tests on the individual orders.    Green Top (Gel) [[id number]] Collected: 10/29/23 1329    Specimen: Blood Updated: 10/29/23 1431     Extra Tube Hold for add-ons.     Comment: Auto resulted.       Lavender Top [[id number]] Collected: 10/29/23 1329    Specimen: Blood Updated: 10/29/23 1431     Extra Tube hold for add-on     Comment: Auto resulted       Red Top [[id number]] Collected: 10/29/23 1329    Specimen: Blood Updated: 10/29/23 1431     Extra Tube Hold for add-ons.     Comment: Auto resulted.       Light Blue Top [[id number]] Collected: 10/29/23 1329    Specimen: Blood Updated: 10/29/23 1431     Extra Tube Hold for add-ons.     Comment: Auto resulted       BNP [[id number]]  (Abnormal) Collected: 10/29/23 1329    Specimen: Blood Updated: 10/29/23 1417     proBNP 2,036.0 pg/mL     Narrative:      This assay is used as an aid in the diagnosis of individuals suspected of having heart failure. It can be used as an aid in the diagnosis of acute decompensated heart failure (ADHF) in patients presenting with signs and symptoms of ADHF to the emergency department (ED). In addition, NT-proBNP of <300 pg/mL indicates ADHF is not likely.    Age Range Result  Interpretation  NT-proBNP Concentration (pg/mL:      <50             Positive            >450                   Gray                 300-450                    Negative             <300    50-75           Positive            >900                  Gray                300-900                  Negative            <300      >75             Positive            >1800                  Gray                300-1800                  Negative            <300    Comprehensive Metabolic Panel [[id number]]  (Abnormal) Collected: 10/29/23 1329    Specimen: Blood Updated: 10/29/23 1400     Glucose 162 mg/dL      BUN 12 mg/dL      Creatinine 0.86 mg/dL      Sodium 142 mmol/L      Potassium 4.1 mmol/L      Chloride 105 mmol/L      CO2 27.0 mmol/L      Calcium 9.1 mg/dL      Total Protein 6.6 g/dL      Albumin 4.2 g/dL      ALT (SGPT) 6 U/L      AST (SGOT) 11 U/L      Alkaline Phosphatase 77 U/L      Total Bilirubin 0.9 mg/dL      Globulin 2.4 gm/dL      A/G Ratio 1.8 g/dL      BUN/Creatinine Ratio 14.0     Anion Gap 10.0 mmol/L      eGFR 89.7 mL/min/1.73     Narrative:      GFR Normal >60  Chronic Kidney Disease <60  Kidney Failure <15    The GFR formula is only valid for adults with stable renal function between ages 18 and 70.    High Sensitivity Troponin T [[id number]]  (Abnormal) Collected: 10/29/23 1329    Specimen: Blood Updated: 10/29/23 1357     HS Troponin T 47 ng/L     Narrative:      High Sensitive Troponin T Reference Range:  <10.0 ng/L- Negative Female for AMI  <15.0 ng/L- Negative Male for AMI  >=10 - Abnormal Female indicating possible myocardial injury.  >=15 - Abnormal Male indicating possible myocardial injury.   Clinicians would have to utilize clinical acumen, EKG, Troponin, and serial changes to determine if it is an Acute Myocardial Infarction or myocardial injury due to an underlying chronic condition.         Protime-INR [[id number]]  (Abnormal) Collected: 10/29/23 1329    Specimen: Blood Updated: 10/29/23 1357      Protime 21.4 Seconds      INR 1.83    aPTT [[id number]]  (Abnormal) Collected: 10/29/23 1329    Specimen: Blood Updated: 10/29/23 1356     PTT 43.6 seconds             Condition on Discharge: Stable    Discharge Disposition      Discharge Medications     Discharge Medications        ASK your doctor about these medications        Instructions Start Date   atorvastatin 40 MG tablet  Commonly known as: LIPITOR   40 mg, Oral, Nightly      cetirizine 10 MG tablet  Commonly known as: zyrTEC   10 mg, Oral, Daily      clopidogrel 75 MG tablet  Commonly known as: PLAVIX   75 mg, Oral, Daily      Cyanocobalamin 1000 MCG/ML kit   1 mL, Injection, Weekly, On Fridays till 11/11/23 then monthly thereafter.      Entresto 49-51 MG tablet  Generic drug: sacubitril-valsartan   1 tablet, Oral, 2 Times Daily      finasteride 5 MG tablet  Commonly known as: PROSCAR   5 mg, Oral, Daily      Jardiance 10 MG tablet tablet  Generic drug: empagliflozin  Ask about: Which instructions should I use?   10 mg, Oral, Daily      metFORMIN 500 MG tablet  Commonly known as: GLUCOPHAGE   500 mg, Oral, 2 Times Daily With Meals, Two tabs daily      Mirabegron ER 50 MG tablet sustained-release 24 hour 24 hr tablet  Commonly known as: Myrbetriq   50 mg, Oral, Daily      nitroglycerin 0.4 MG SL tablet  Commonly known as: NITROSTAT   0.4 mg, Sublingual, Every 5 Minutes PRN, Take no more than 3 doses in 15 minutes.      omeprazole 40 MG capsule  Commonly known as: priLOSEC   40 mg, Oral, Daily      pregabalin 150 MG capsule  Commonly known as: LYRICA   150 mg, Oral, 2 Times Daily      ranolazine 1000 MG 12 hr tablet  Commonly known as: RANEXA   1,000 mg, Oral, 2 Times Daily      rivaroxaban 20 MG tablet  Commonly known as: XARELTO  Ask about: Which instructions should I use?   20 mg, Oral, Daily With Dinner      Sotalol HCl AF 80 MG tablet   80 mg, Oral, 2 Times Daily      tamsulosin 0.4 MG capsule 24 hr capsule  Commonly known as: FLOMAX   1 capsule,  Oral, Daily      torsemide 20 MG tablet  Commonly known as: DEMADEX   20 mg, Oral, Daily PRN, With additional 20 mg PRN       traMADol-acetaminophen 37.5-325 MG per tablet  Commonly known as: ULTRACET   Take 1 tablet by mouth Every 12 (Twelve) Hours As Needed for Moderate Pain (pain).      zolpidem 5 MG tablet  Commonly known as: AMBIEN  Ask about: Which instructions should I use?   5 mg, Oral, Nightly PRN               Discharge Diet:     Discharge Care Plan / Instructions:    Activity at Discharge:     Follow-up Appointments  Future Appointments   Date Time Provider Department Center   12/19/2023 10:30 AM [facility] DEVICE CHECK [facility] P[facility]   12/19/2023 11:00 AM Devika Oliver MD [facility]   1/11/2024 10:15 AM Sarbjit Posadas MD [facility]   3/1/2024  1:30 PM Tyrone Broussard PA [facility]     Additional Instructions for the Follow-ups that You Need to Schedule       Discharge Follow-up with Specialty: Cardiology; 1 Week   As directed      Specialty: Cardiology   Follow Up: 1 Week   Follow Up Details: Heart Failure Patient, Needs to be seen in 7 days or less from discharge, *[facility]em[facility]/Yolanda patient*                Test Results Pending at Discharge       Mendoza Smith, EVELIA  11/01/23  08:02 CDT    Time: Discharge 35 min    Part of this note may be an electronic transcription/translation of spoken language to printed text using the Dragon Dictation System.

## 2023-11-01 NOTE — PLAN OF CARE
Problem: Adult Inpatient Plan of Care  Goal: Plan of Care Review  Outcome: Ongoing, Progressing  Flowsheets (Taken 11/1/2023 0407)  Plan of Care Reviewed With: patient  Goal: Patient-Specific Goal (Individualized)  Outcome: Ongoing, Progressing  Goal: Absence of Hospital-Acquired Illness or Injury  Outcome: Ongoing, Progressing  Intervention: Identify and Manage Fall Risk  Recent Flowsheet Documentation  Taken 11/1/2023 0000 by Tu Marsh RN  Safety Promotion/Fall Prevention: safety round/check completed  Taken 10/31/2023 2200 by Tu Marsh RN  Safety Promotion/Fall Prevention: safety round/check completed  Taken 10/31/2023 2100 by Tu Marsh RN  Safety Promotion/Fall Prevention: safety round/check completed  Taken 10/31/2023 2000 by Tu Marsh RN  Safety Promotion/Fall Prevention: safety round/check completed  Taken 10/31/2023 1915 by Tu Marsh RN  Safety Promotion/Fall Prevention: safety round/check completed  Intervention: Prevent Skin Injury  Recent Flowsheet Documentation  Taken 11/1/2023 0000 by Tu Marsh RN  Body Position: position changed independently  Taken 10/31/2023 2200 by Tu Marsh RN  Body Position: position changed independently  Taken 10/31/2023 2100 by Tu Marsh RN  Body Position: position changed independently  Taken 10/31/2023 2000 by Tu Marsh RN  Body Position: position changed independently  Taken 10/31/2023 1915 by Tu Marsh RN  Body Position: position changed independently  Intervention: Prevent and Manage VTE (Venous Thromboembolism) Risk  Recent Flowsheet Documentation  Taken 10/31/2023 2200 by Tu Marsh RN  Activity Management: up ad jade  Range of Motion: active ROM (range of motion) encouraged  Goal: Optimal Comfort and Wellbeing  Outcome: Ongoing, Progressing  Goal: Readiness for Transition of Care  Outcome: Ongoing, Progressing     Problem: Asthma Comorbidity  Goal: Maintenance of Asthma Control  Outcome:  Ongoing, Progressing     Problem: Behavioral Health Comorbidity  Goal: Maintenance of Behavioral Health Symptom Control  Outcome: Ongoing, Progressing     Problem: COPD (Chronic Obstructive Pulmonary Disease) Comorbidity  Goal: Maintenance of COPD Symptom Control  Outcome: Ongoing, Progressing     Problem: Diabetes Comorbidity  Goal: Blood Glucose Level Within Targeted Range  Outcome: Ongoing, Progressing     Problem: Heart Failure Comorbidity  Goal: Maintenance of Heart Failure Symptom Control  Outcome: Ongoing, Progressing     Problem: Hypertension Comorbidity  Goal: Blood Pressure in Desired Range  Outcome: Ongoing, Progressing     Problem: Obstructive Sleep Apnea Risk or Actual Comorbidity Management  Goal: Unobstructed Breathing During Sleep  Outcome: Ongoing, Progressing     Problem: Osteoarthritis Comorbidity  Goal: Maintenance of Osteoarthritis Symptom Control  Outcome: Ongoing, Progressing  Intervention: Maintain Osteoarthritis Symptom Control  Recent Flowsheet Documentation  Taken 10/31/2023 2200 by Tu Marsh RN  Activity Management: up ad jade     Problem: Pain Chronic (Persistent) (Comorbidity Management)  Goal: Acceptable Pain Control and Functional Ability  Outcome: Ongoing, Progressing     Problem: Seizure Disorder Comorbidity  Goal: Maintenance of Seizure Control  Outcome: Ongoing, Progressing     Problem: Fall Injury Risk  Goal: Absence of Fall and Fall-Related Injury  Outcome: Ongoing, Progressing  Intervention: Promote Injury-Free Environment  Recent Flowsheet Documentation  Taken 11/1/2023 0000 by Tu Marsh RN  Safety Promotion/Fall Prevention: safety round/check completed  Taken 10/31/2023 2200 by Tu Marsh RN  Safety Promotion/Fall Prevention: safety round/check completed  Taken 10/31/2023 2100 by Tu Marsh RN  Safety Promotion/Fall Prevention: safety round/check completed  Taken 10/31/2023 2000 by Tu Marsh RN  Safety Promotion/Fall Prevention: safety  round/check completed  Taken 10/31/2023 1915 by Tu Marsh, RN  Safety Promotion/Fall Prevention: safety round/check completed   Goal Outcome Evaluation:  Plan of Care Reviewed With: patient    Pt a&o x4, ELLISON. Pt was able to sleep with ambien.  77, PVC coup 3 row.

## 2023-11-01 NOTE — PROGRESS NOTES
Chief Complaint: Shortness of breath, chest pain      Interval History:     He is doing fine this morning.  He denies any shortness of breath or chest pain.  IV Lasix was decreased to once daily.  He has good urinary output.  Echocardiogram was performed yesterday with no significant change.  He is being followed by cardiology.  He has a significant cardiac history including ischemic cardiomyopathy, CAD, biventricular AICD, essential hypertension, atrial fibrillation, complete heart block, hyperlipidemia.  He is receiving iron infusions.  Hemoglobin is stable.  Vital signs are stable.    Review of Systems:   General ROS: negative for - chills or fever  Respiratory ROS: no cough, shortness of breath, or wheezing  Cardiovascular ROS: no chest pain or dyspnea on exertion  Gastrointestinal ROS: negative for - abdominal pain, diarrhea or nausea/vomiting       Vital Signs  Temp:  [97.2 °F (36.2 °C)-97.7 °F (36.5 °C)] 97.7 °F (36.5 °C)  Heart Rate:  [72-75] 72  Resp:  [14-16] 16  BP: ()/(42-59) 110/56    Intake/Output Summary (Last 24 hours) at 11/1/2023 0749  Last data filed at 10/31/2023 1800  Gross per 24 hour   Intake 525 ml   Output 1675 ml   Net -1150 ml       Physical Exam:     General Appearance:    Alert, cooperative, in no acute distress   Head:    N/A   Throat:   N/A   Neck:   N/A   Lungs:     Clear to auscultation,respirations regular, even and                  unlabored    Heart:    Regular rhythm and normal rate, normal S1 and S2, no            murmur, no gallop, no rub   Abdomen:     Normal bowel sounds, no masses, no organomegaly, soft        non-tender, non-distended, no guarding, no rebound                tenderness   Extremities:   No edema, no cyanosis, no clubbing   Pulses:   N/A   Skin:   N/A   Lymph nodes:   N/A   Neurologic:   N/A          Lab Review:       Lab Results (last 24 hours)       Procedure Component Value Units Date/Time    Basic Metabolic Panel [[id number]]  (Abnormal)  "Collected: 11/01/23 0342    Specimen: Blood Updated: 11/01/23 0451     Glucose 127 mg/dL      BUN 19 mg/dL      Creatinine 0.84 mg/dL      Sodium 141 mmol/L      Potassium 3.7 mmol/L      Chloride 104 mmol/L      CO2 27.0 mmol/L      Calcium 9.0 mg/dL      BUN/Creatinine Ratio 22.6     Anion Gap 10.0 mmol/L      eGFR 90.4 mL/min/1.73     Narrative:      GFR Normal >60  Chronic Kidney Disease <60  Kidney Failure <15    The GFR formula is only valid for adults with stable renal function between ages 18 and 70.    Magnesium [[id number]]  (Normal) Collected: 11/01/23 0342    Specimen: Blood Updated: 11/01/23 0451     Magnesium 2.1 mg/dL           Cultures:    No results found for: \"BLOODCX\", \"URINECX\", \"WOUNDCX\", \"MRSACX\", \"RESPCX\", \"STOOLCX\"    Radiology Review:  Imaging Results (Last 24 Hours)       ** No results found for the last 24 hours. **                     ASSESSMENT:      Acute exacerbation of CHF (congestive heart failure)    Coronary artery disease of bypass graft of native heart with stable angina pectoris    Ischemic cardiomyopathy    Permanent atrial fibrillation    Anemia      PLAN:    Anemia substrate showed iron deficiency anemia.  Improvement in respiratory status.  Telemetry shows A-fib but controlled rate.    1.  Appreciate cardiology's recommendations.  Echocardiogram was performed yesterday.  2.  Continue subcu Lovenox and Plavix, high intensity statin, beta-blocker, continue IV Lasix once daily.  3.  Continue iron infusions today.  4.  Closely monitor renal function.  5.  Continue cardiac monitoring.    Further orders per Dr. Salvador.      Mendoza Smith, APRN  11/01/23  07:49 CDT        Part of this note may be an electronic transcription/translation of spoken language to printed text using the Dragon Dictation System.  "

## 2023-11-02 ENCOUNTER — INFUSION (OUTPATIENT)
Dept: ONCOLOGY | Facility: HOSPITAL | Age: 78
End: 2023-11-02
Payer: MEDICARE

## 2023-11-02 VITALS
HEIGHT: 71 IN | SYSTOLIC BLOOD PRESSURE: 118 MMHG | OXYGEN SATURATION: 94 % | TEMPERATURE: 97.4 F | RESPIRATION RATE: 18 BRPM | BODY MASS INDEX: 29.4 KG/M2 | DIASTOLIC BLOOD PRESSURE: 56 MMHG | HEART RATE: 75 BPM | WEIGHT: 210 LBS

## 2023-11-02 DIAGNOSIS — D64.9 ANEMIA, UNSPECIFIED TYPE: Primary | ICD-10-CM

## 2023-11-02 DIAGNOSIS — N40.1 BENIGN PROSTATIC HYPERPLASIA WITH LOWER URINARY TRACT SYMPTOMS, SYMPTOM DETAILS UNSPECIFIED: ICD-10-CM

## 2023-11-02 PROCEDURE — 25810000003 SODIUM CHLORIDE 0.9 % SOLUTION 250 ML FLEX CONT: Performed by: INTERNAL MEDICINE

## 2023-11-02 PROCEDURE — 96365 THER/PROPH/DIAG IV INF INIT: CPT

## 2023-11-02 PROCEDURE — 96366 THER/PROPH/DIAG IV INF ADDON: CPT

## 2023-11-02 PROCEDURE — 25010000002 NA FERRIC GLUC CPLX PER 12.5 MG: Performed by: INTERNAL MEDICINE

## 2023-11-02 RX ORDER — ATORVASTATIN CALCIUM 40 MG/1
40 TABLET, FILM COATED ORAL
Qty: 90 TABLET | Refills: 3 | Status: SHIPPED | OUTPATIENT
Start: 2023-11-02

## 2023-11-02 RX ORDER — RANOLAZINE 1000 MG/1
1 TABLET, EXTENDED RELEASE ORAL 2 TIMES DAILY
Qty: 180 TABLET | Refills: 3 | Status: SHIPPED | OUTPATIENT
Start: 2023-11-02

## 2023-11-02 RX ORDER — MIRABEGRON 25 MG/1
25 TABLET, FILM COATED, EXTENDED RELEASE ORAL DAILY
Qty: 90 TABLET | Refills: 3 | OUTPATIENT
Start: 2023-11-02

## 2023-11-02 RX ADMIN — SODIUM CHLORIDE 250 MG: 9 INJECTION, SOLUTION INTRAVENOUS at 09:46

## 2023-11-02 NOTE — TELEPHONE ENCOUNTER
Called pt to ensure he was taking 50mg of Myrbetriw. He stated he had enough to last him to 2/2024.

## 2023-11-03 ENCOUNTER — INFUSION (OUTPATIENT)
Dept: ONCOLOGY | Facility: HOSPITAL | Age: 78
End: 2023-11-03
Payer: MEDICARE

## 2023-11-03 VITALS
HEIGHT: 71 IN | DIASTOLIC BLOOD PRESSURE: 44 MMHG | WEIGHT: 211.4 LBS | HEART RATE: 75 BPM | OXYGEN SATURATION: 100 % | TEMPERATURE: 97.7 F | SYSTOLIC BLOOD PRESSURE: 88 MMHG | BODY MASS INDEX: 29.6 KG/M2 | RESPIRATION RATE: 18 BRPM

## 2023-11-03 DIAGNOSIS — D64.9 ANEMIA, UNSPECIFIED TYPE: Primary | ICD-10-CM

## 2023-11-03 PROCEDURE — 96366 THER/PROPH/DIAG IV INF ADDON: CPT

## 2023-11-03 PROCEDURE — 25010000002 NA FERRIC GLUC CPLX PER 12.5 MG: Performed by: INTERNAL MEDICINE

## 2023-11-03 PROCEDURE — 96365 THER/PROPH/DIAG IV INF INIT: CPT

## 2023-11-03 PROCEDURE — 25810000003 SODIUM CHLORIDE 0.9 % SOLUTION 250 ML FLEX CONT: Performed by: INTERNAL MEDICINE

## 2023-11-03 RX ADMIN — SODIUM CHLORIDE 250 MG: 9 INJECTION, SOLUTION INTRAVENOUS at 09:54

## 2023-11-04 LAB
QT INTERVAL: 462 MS
QT INTERVAL: 472 MS
QTC INTERVAL: 515 MS
QTC INTERVAL: 527 MS

## 2023-11-06 ENCOUNTER — TELEPHONE (OUTPATIENT)
Dept: CARDIOLOGY | Facility: CLINIC | Age: 78
End: 2023-11-06
Payer: MEDICARE

## 2023-11-06 NOTE — TELEPHONE ENCOUNTER
----- Message from Devika Oliver MD sent at 11/5/2023 10:42 PM CST -----  Please let him know that sotalol does not typically cause nausea.  If he wants to try stopping it for a few days and seeing if the nausea gets better that would be fine with me.      In regards to the question, zofran is a bad idea with sotalol.  He could try taking some benadryl instead and see if it helps.  If it does seem like it is the sotalol causing the issue then we may need to consider an alternative strategy.    Thanks,  Devika  ----- Message -----  From: Betina Bernard MA  Sent: 10/16/2023   4:13 PM CST  To: MD Dr. Melody Greenwood, could you please advise on this patient's nausea and what he could take in place of his wife's Zofran in order to keep tolerating the Sotalol?  Please advise.  Betina Bernard MA    ----- Message -----  From: Yolanda Perry APRN  Sent: 10/16/2023   3:47 PM CDT  To: Betina Bernard MA    Probably not because this can prolong QTc and contribute to ventricular arrhythmias but this question should go to Dr. Oliver, his electrophysiologist who prescribes the sotalol. He should not take his wife's zofran for this reason. Thanks     ----- Message -----  From: Betina Bernard MA  Sent: 10/16/2023   3:39 PM CDT  To: EVELIA Lozano    Patient was given information about Dr. Oliver wanting him to continue the sotalol.  Patient states he has been taking his wife's Zofran 4mg to offset the nausea.  He would like to know if you can prescribe him the Zofran?  Please advise.  Betina Bernard MA

## 2023-11-06 NOTE — TELEPHONE ENCOUNTER
Patient notified, states he will stop the Sotalol for several days and will call me back at the end of the week and let me know if his nausea has improved or if he is going to go back on the medication and try benadryl.

## 2023-11-07 ENCOUNTER — READMISSION MANAGEMENT (OUTPATIENT)
Dept: CALL CENTER | Facility: HOSPITAL | Age: 78
End: 2023-11-07
Payer: MEDICARE

## 2023-11-07 DIAGNOSIS — N40.1 BENIGN PROSTATIC HYPERPLASIA WITH LOWER URINARY TRACT SYMPTOMS, SYMPTOM DETAILS UNSPECIFIED: ICD-10-CM

## 2023-11-07 NOTE — OUTREACH NOTE
CHF Week 1 Survey      Flowsheet Row Responses   [facility], [place], operated by [facility] patient discharged from? [place]   Does the patient have one of the following disease processes/diagnoses(primary or secondary)? CHF   CHF Week 1 attempt successful? Yes   Call start time 1212   Call end time 1223   Discharge diagnosis Acute exacerbation of CHF   Person spoke with today (if not patient) and relationship pt   Meds reviewed with patient/caregiver? Yes   Is the patient having any side effects they believe may be caused by any medication additions or changes? No   Does the patient have all medications ordered at discharge? Yes   Is the patient taking all medications as directed (includes completed medication regime)? Yes   Does the patient have a primary care provider?  Yes   Does the patient have an appointment with their PCP within 7 days of discharge? Yes   Comments regarding PCP 11/7/23   Has the patient kept scheduled appointments due by today? N/A   Comments HF clinic 11/8/23   Psychosocial issues? No   Did the patient receive a copy of their discharge instructions? Yes   Nursing interventions Reviewed instructions with patient   What is the patient's perception of their health status since discharge? Improving   Nursing interventions Nurse provided patient education   Is the patient able to teach back signs and symptoms of worsening condition? (i.e. weight gain, shortness of air, etc.) Yes   If the patient is a current smoker, are they able to teach back resources for cessation? Not a smoker   Is the patient/caregiver able to teach back the hierarchy of who to call/visit for symptoms/problems? PCP, Specialist, Home health nurse, Urgent Care, ED, 911 Yes   Is the patient able to teach back Heart Failure Zones? Yes   CHF Nursing Interventions Education provided on various zones   CHF Zone this Call Green Zone   Green Zone No new swelling -  feet, ankles and legs look normal for you, No new or worsening shortness of breath, Patient  reports doing well, Weight check stable   Green Zone Interventions Daily weight check, Meds as directed, Follow up visits planned    CHF Week 1 call completed? Yes   Is the patient interested in additional calls from an ambulatory ? No   Would this patient benefit from a Referral to [facility] Social Work? No   Wrap up additional comments Pt states he is doing ok, and denies SOB. Pt reports some chest pain, but shares it is nothing to be worried about. Pt verified PCP/HF clinic appts.   Call end time 1223            Sondra SALDIVAR - Registered Nurse

## 2023-11-08 ENCOUNTER — HOSPITAL ENCOUNTER (OUTPATIENT)
Dept: CARDIOLOGY | Facility: HOSPITAL | Age: 78
Discharge: HOME | End: 2023-11-08
Admitting: PHYSICIAN ASSISTANT
Payer: MEDICARE

## 2023-11-08 VITALS
HEART RATE: 75 BPM | SYSTOLIC BLOOD PRESSURE: 90 MMHG | DIASTOLIC BLOOD PRESSURE: 46 MMHG | BODY MASS INDEX: 29.48 KG/M2 | WEIGHT: 210.6 LBS | HEIGHT: 71 IN | OXYGEN SATURATION: 97 %

## 2023-11-08 DIAGNOSIS — I25.708 CORONARY ARTERY DISEASE OF BYPASS GRAFT OF NATIVE HEART WITH STABLE ANGINA PECTORIS: ICD-10-CM

## 2023-11-08 DIAGNOSIS — I50.42 CHRONIC COMBINED SYSTOLIC AND DIASTOLIC CONGESTIVE HEART FAILURE: Primary | ICD-10-CM

## 2023-11-08 DIAGNOSIS — Z95.810 PRESENCE OF BIVENTRICULAR AICD: ICD-10-CM

## 2023-11-08 DIAGNOSIS — D50.8 OTHER IRON DEFICIENCY ANEMIA: ICD-10-CM

## 2023-11-08 DIAGNOSIS — Z98.890 S/P AV NODAL ABLATION: ICD-10-CM

## 2023-11-08 LAB
ANION GAP SERPL CALCULATED.3IONS-SCNC: 12 MMOL/L (ref 5–15)
BUN SERPL-MCNC: 20 MG/DL (ref 8–23)
BUN/CREAT SERPL: 16.3 (ref 7–25)
CALCIUM SPEC-SCNC: 9.6 MG/DL (ref 8.6–10.5)
CHLORIDE SERPL-SCNC: 104 MMOL/L (ref 98–107)
CO2 SERPL-SCNC: 25 MMOL/L (ref 22–29)
CREAT SERPL-MCNC: 1.23 MG/DL (ref 0.76–1.27)
DEPRECATED RDW RBC AUTO: 66.6 FL (ref 37–54)
EGFRCR SERPLBLD CKD-EPI 2021: 60.8 ML/MIN/1.73
ERYTHROCYTE [DISTWIDTH] IN BLOOD BY AUTOMATED COUNT: 19.2 % (ref 12.3–15.4)
GLUCOSE SERPL-MCNC: 154 MG/DL (ref 65–99)
HCT VFR BLD AUTO: 37.9 % (ref 37.5–51)
HGB BLD-MCNC: 11.4 G/DL (ref 13–17.7)
MCH RBC QN AUTO: 29 PG (ref 26.6–33)
MCHC RBC AUTO-ENTMCNC: 30.1 G/DL (ref 31.5–35.7)
MCV RBC AUTO: 96.4 FL (ref 79–97)
NT-PROBNP SERPL-MCNC: 556 PG/ML (ref 0–1800)
PLATELET # BLD AUTO: 325 10*3/MM3 (ref 140–450)
PMV BLD AUTO: 9.7 FL (ref 6–12)
POTASSIUM SERPL-SCNC: 4 MMOL/L (ref 3.5–5.2)
RBC # BLD AUTO: 3.93 10*6/MM3 (ref 4.14–5.8)
SODIUM SERPL-SCNC: 141 MMOL/L (ref 136–145)
WBC NRBC COR # BLD: 6.45 10*3/MM3 (ref 3.4–10.8)

## 2023-11-08 PROCEDURE — 85027 COMPLETE CBC AUTOMATED: CPT | Performed by: PHYSICIAN ASSISTANT

## 2023-11-08 PROCEDURE — 80048 BASIC METABOLIC PNL TOTAL CA: CPT | Performed by: PHYSICIAN ASSISTANT

## 2023-11-08 PROCEDURE — G0463 HOSPITAL OUTPT CLINIC VISIT: HCPCS | Performed by: PHYSICIAN ASSISTANT

## 2023-11-08 PROCEDURE — 83880 ASSAY OF NATRIURETIC PEPTIDE: CPT | Performed by: PHYSICIAN ASSISTANT

## 2023-11-08 RX ORDER — SPIRONOLACTONE 25 MG/1
12.5 TABLET ORAL DAILY
Qty: 30 TABLET | Refills: 11 | Status: SHIPPED | OUTPATIENT
Start: 2023-11-08

## 2023-11-08 RX ORDER — MIRABEGRON 25 MG/1
25 TABLET, FILM COATED, EXTENDED RELEASE ORAL DAILY
Qty: 90 TABLET | Refills: 3 | Status: SHIPPED | OUTPATIENT
Start: 2023-11-08

## 2023-11-08 NOTE — PROGRESS NOTES
Patient name: Darren Maria  MRN: [id number]  YOB: 1945    Referring Provider: Sarbjit Posadas MD    EP Problems:  1.  Permanent atrial fibrillation  2.  Complete heart block status post AV node ablation  3.  Ventricular tachycardia  4.  Presence of a BiV ICD  -Elevated LV threshold causing rapid battery depletion with LV lead in the AIV     Cardiology Problems:  1.  Chronic systolic heart failure  -5/27/2023: EF 36 to 40%  2.  CAD status post CABG 1990  -Redo CABG 3/2011  -C 12/2021 Dr. Posadas  3.  Ischemic cardiomyopathy  -echo 10/31/2023: EF 36-40%   4.  Hypertension  5.  Hyperlipidemia     Medical Problems:  1.  BPH  2. Renal cell carcinoma  -s/p right nephrectomy 2000  3. Iron deficiency anemia  -IV infusions inpatient       History of present illness: Today Darren Castro was seen in the heart failure clinic in consultation for recent acute on chronic CHF at the request of Dr. Sarbjit Posadas, the patient's main cardiologist. He is a 78 year old male with a history a longstanding extensive history of cardiovascular disease since 1990.  He has coronary disease including prior CABG and subsequent PCI, chronic systolic heart failure, paroxysmal atrial fibrillation, status post AV node ablation with biventricular pacemaker placed, subsequently upgraded to BiV ICD. When he established care with [facility]/Dr. Posadas in 2021, he had been having intermittent CHF exacerbations and [facility] did not document any worsening ischemic disease. Since then, he has continued to maximize medical therapies without worsening symptoms of orthostasis.     He was admitted Oct 29 through Nov 1 for recurrent dyspnea and heart failure symptoms. This was the second admission this year. He came to the ER for recurrent chest pressure and pain, increasing SOB from chest tightness. He was only taking Torsemide once a day and wasn't really monitoring his weights as faithfully as he should have been. He was unable to  "sleep or lay flat on his back. He received 2 IV doses of Lasix and was diuresed better. He states now that he is breathing better than \"he has in the past 2 months\". From a functional standpoint, he is doing well, but is still only taking Torsemide 20mg daily now. Home scales today say 205 lbs. He manages his fluid intake well and tends to drink less than 64 oz a day.     Glass of OJ 6 oz  Diet Dew/Coke 0, 12-16 oz  Water 36 oz during day  Coffee 16 oz     In regards to nausea, he has now held his sotalol for 2 days to see if it made a difference, which he thinks there was a distinct difference in his nausea. He thinks for sure sotalol causes the nausea. It's not bothering him as much as it used to. Previously the nausea was a 10, now it's a 5-6. From an EP standpoint, he has been on sotalol since August 29th managed by Dr. Oliver. In May, he had sustained VT and received an appropriate shock. The following month he continued to receive 2 rounds of ATP fo recurrent VT. He is not eligible for flecainide/rythmol or multaq. His renal function is stable despite solitary kidney. His device also continues to demonstrate high LV output which drains his battery. Furthermore, Dr. Oliver felt that he had suboptimal pacing morphology as well on EKG, which affected his management of CHF recurrences. At his last EP appointment, records were obtained to review implant data. If he were to have LV lead revision or physiological pacing lead implant, his axillary vein would need to be patent. He follows up in EP clinic next month.     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    Benign prostatic hyperplasia with lower urinary tract symptoms    Cancer of kidney, right    Erectile dysfunction    Permanent atrial fibrillation    History of renal cell cancer    Malfunction of penile prosthesis    Urine frequency    S/P AV sam ablation    Chronic combined systolic and diastolic congestive heart failure    Mixed hyperlipidemia    Hx of adenomatous colonic polyps    Shockable cardiac rhythm detected by automated external defibrillator    CHB (complete heart block)    Ventricular tachycardia    Acute exacerbation of CHF (congestive heart failure)    Anemia     Past Medical History:   Diagnosis Date    Abdominal pain, epigastric     Abnormal abdominal exam     ABFND, RADIOLOGICAL, ABDOMINAL AREA     Abnormal ECG     Anticoagulated on Coumadin     Atherosclerosis of coronary artery bypass graft     ATHEROSCLEROSIS, CORONARY, ARTERY BYPASS GRFT    Atrial fibrillation     Cancer of right kidney     right sided kidney cancer    Cardiomyopathy     Cardiomyopathy, primary     CHF (congestive heart failure)     Clotting disorder     Colon polyps     Congenital heart disease     Congestive heart failure     Coronary artery disease     History of CAD/A-Fib/Pacemaker/Defibrillator placement: pt takes Coumadin and plavix therapy as well as ASA daily    Diabetes mellitus     Gastric polyp     Gastroesophageal reflux disease without esophagitis     History of colon polyps     Hypercholesterolemia     Hypertension, essential     Melena     Myocardial infarction     NSAID long-term use     Obesity     Pacemaker     Pacemaker Dependant    Platelet inhibition due to Plavix     Presence of stent in coronary artery     Multiple coronary stenting most recently 4/2008    Single kidney     Only 1 Kidney     Status post surgery     s/p Polypectomy Bleed     Past Surgical History:   Procedure Laterality Date    ABLATION OF DYSRHYTHMIC FOCUS      APPENDECTOMY      CARDIAC ABLATION      CARDIAC CATHETERIZATION      CARDIAC CATHETERIZATION Left 12/15/2021    Procedure: Coronary angiography;  Surgeon: Sarbjit Posadas MD;  Location:  [facility] CATH INVASIVE LOCATION;  Service: Cardiology;  Laterality: Left;    CARDIAC CATHETERIZATION Left 12/15/2021    Procedure: Percutaneous Coronary Intervention;  Surgeon: Sarbjit Posadas MD;  Location:  [facility] CATH INVASIVE LOCATION;  Service: Cardiology;  Laterality: Left;    CARDIAC DEFIBRILLATOR PLACEMENT      CARDIAC PACEMAKER PLACEMENT      Pacemaker Placement    CATARACT EXTRACTION, BILATERAL      CHOLECYSTECTOMY      COLONOSCOPY  08/27/2013    snare hep, trans tics recall 3 yr    COLONOSCOPY  10/02/2006    few scattered diverticula    COLONOSCOPY  07/22/2009    tubular adenoma in the ascending colon    COLONOSCOPY  07/25/2005    several polyps in the ascending colon, one in the transverse colon    COLONOSCOPY N/A 03/21/2022    Procedure: COLONOSCOPY WITH ANESTHESIA;  Surgeon: Phil Goodwin MD;  Location: [facility] ENDOSCOPY;  Service: Gastroenterology;  Laterality: N/A;  pre: hx polyps  post: polyps  Rolan Salvador MD    COLONOSCOPY W/ POLYPECTOMY  09/19/2016    Tubular adenoma hepatic flexure, 2 Hyperplastic polyps rectum and at 50 cm, Benign polyp at 70 cm repeat exam in 3-5 years    CORONARY ARTERY BYPASS GRAFT  02/2011    2nd time    CORONARY ARTERY BYPASS GRAFT  1990    CORONARY STENT PLACEMENT      ENDOSCOPY  04/14/2016    nl slant    ENDOSCOPY  11/10/2014    clips at polypectomy bx no residual recall 1 yr    ENDOSCOPY  06/19/2014    snare clip body recall 6 months    ENDOSCOPY  05/19/2014    polyp stomach bx and clip    HERNIA REPAIR      with mesh    JOINT REPLACEMENT      OTHER SURGICAL HISTORY      Defibrillator/Pacer maker exchange    OTHER SURGICAL HISTORY       "Pacemaker/Defibillation exchange 2012    PENILE PROSTHESIS IMPLANT N/A 07/27/2018    Procedure: PENILE PROSTHESIS PLACEMENT;  Surgeon: Godwin Gupta MD;  Location:  [facility] OR;  Service: Urology    PERIPHERAL ARTERIAL STENT GRAFT         Allergies   Allergen Reactions    Azithromycin Nausea And Vomiting    Morphine And Related Other (See Comments)     SHAKES AND MAKES HIM \"WIRED\".   TAKES TRAMADOL WITHOUT PROBLEM         Social History     Socioeconomic History    Marital status:    Tobacco Use    Smoking status: Never    Smokeless tobacco: Never   Vaping Use    Vaping Use: Never used   Substance and Sexual Activity    Alcohol use: Yes     Comment: occ    Drug use: Never    Sexual activity: Not Currently     Partners: Female     Family History   Problem Relation Age of Onset    Lung cancer Mother     No Known Problems Father     Other Neg Hx         GI Malignancies    Colon cancer Neg Hx     Colon polyps Neg Hx        Physical Exam  Constitutional:       Appearance: Normal appearance. He is ill-appearing.   HENT:      Head: Normocephalic and atraumatic.      Right Ear: External ear normal.      Left Ear: External ear normal.      Nose: Nose normal.   Eyes:      Extraocular Movements: Extraocular movements intact.      Pupils: Pupils are equal, round, and reactive to light.   Cardiovascular:      Rate and Rhythm: Normal rate and regular rhythm.      Pulses: Normal pulses.      Heart sounds: Normal heart sounds.      Comments: Venous stasis changes   Pulmonary:      Effort: Pulmonary effort is normal.      Breath sounds: Normal breath sounds.   Abdominal:      Palpations: Abdomen is soft.   Musculoskeletal:         General: Normal range of motion.      Cervical back: Normal range of motion and neck supple.   Skin:     General: Skin is warm and dry.      Coloration: Skin is pale.      Findings: Bruising present.   Neurological:      General: No focal deficit present.      Mental Status: He is alert and " oriented to person, place, and time.   Psychiatric:         Mood and Affect: Mood normal.         Behavior: Behavior normal.         Recent Labs:  Lab Results   Component Value Date    GLUCOSE 154 (H) 11/08/2023    CALCIUM 9.6 11/08/2023     11/08/2023    K 4.0 11/08/2023    CO2 25.0 11/08/2023     11/08/2023    BUN 20 11/08/2023    CREATININE 1.23 11/08/2023    EGFR 60.8 11/08/2023    BCR 16.3 11/08/2023    ANIONGAP 12.0 11/08/2023     Lab Results   Component Value Date    GLUCOSE 154 (H) 11/08/2023    CALCIUM 9.6 11/08/2023     11/08/2023    K 4.0 11/08/2023    CO2 25.0 11/08/2023     11/08/2023    BUN 20 11/08/2023    CREATININE 1.23 11/08/2023    EGFR 60.8 11/08/2023    BCR 16.3 11/08/2023    ANIONGAP 12.0 11/08/2023                 Diagnostic Studies    EKG:     Cardiac Catheterizations: Cardiac Catheterization/Vascular Study (12/15/2021 10:59)   Impression:  1.  Severe native 3-vessel coronary artery disease (no change compared to old films)  2.  Patent LIMA to LAD and SVG to RCA  3.  Occluded vein graft to circumflex distribution (from original operation) - unable to SVG placed to this distribution at a later date (in '11 or [facility]12 at [facility])  4.  LV systolic dysfunction with EF 25-30%, with normal LVEDP      Evidence Based Medical Therapy for Heart Failure:  -ACE-I/ARB/ARNI: Entreso 49/51mg BID  -Aldosterone Antagonist: Will try Spironolactone 12.5mg daily   -Beta-blocker: Sotalol 80mg BID currently   -Digoxin: none  -Nitrates/Hydralazine: none   -SGLT2i: Jardiance 10mg daily     Assessment & Plan    Diagnoses and all orders for this visit:    1. Chronic combined systolic and diastolic congestive heart failure (Primary)  -     Basic Metabolic Panel; Future  -     BNP; Future  -     CBC (No Diff); Future  -     Basic Metabolic Panel; Standing  -     Basic Metabolic Panel  -     BNP; Standing  -     BNP  -     CBC (No Diff); Standing  -     CBC (No Diff)    2. Coronary artery disease of  bypass graft of native heart with stable angina pectoris  Overview:  3v CABG ([facility]) in 1990: LIMA to LAD, SVG to PD, SVG to OM  2006: other grafts patent but found to have  of SVG to OM; underwent PTCA/stenting of native OM with 2.5mm x 23mm LEILANI.  4/2008: only change was severe focal denovo stenosis of previously twice-stented native LCX with repeat stenting of native LCS [facility]) with third 2.5mm x 23mm Cypher LEILANI  ---l. Pulse generator replacement 04/28/08 ([facility]) Medtronic Concerto [id number] (serial # [id number]); pulse generator replacement 11/19  ---m. Recurrent angina 04/08, repeat limited native LCA angiogram 04/08 with focal edge dissection and instent restenosis, status post additional stenting ([facility]) with 3 additional Broadbent stents  ---n. Recurrent angina 12/08 with repeat catheterization confirming LVEF 40%, patent LIMA to LAD, patent SVG to RCA, patent LCx stent site; medical therapy  ---o. TTE 03/10 with LVEF 30-35%, no significant valvular dysfunction, moderate LAE and LVE; repeat 12/10 uncjhanged except LVEWF improved to 35-40%  ---p. BNP 03/10 259 pg/ml > repeat 12/10 165 pg/ml > repeat 06/12 70 pg/ml > repeat 10/12 200 pg/ml>repeat 11/17 154 pg/ml  ---q. Adenosine radionuclide stress test 03/10 and 12/10 with fixed shannan-apical scar, no ischemia, LVEF 40% rising to 50% with stress; medical therapy  ---r. ACS 03/11 with cardiac catheterization revealing patent LIMA to LAD, patent SVG to PD and PL of RCA, severe ISR of previously multiply (x 4 procedures/6 stents) stented distal LM/proximal LCX-OM    Redo CABG via lateral thoracotomy at [facility] ([place] 3/18/11) with SVG from descending Ao (entered through 5th intercostal space) to OM.  Op note scanned in under 'media' under date of procedure      3. Presence of biventricular AICD    4. Other iron deficiency anemia    5. S/P AV sam ablation    Other orders  -     spironolactone (ALDACTONE) 25 MG  tablet; Take 0.5 tablets by mouth Daily.  Dispense: 30 tablet; Refill: 11      HFrEF: recent admission for A/C CHF with BNP 2200 now decreased to 556. Discussed weight monitoring at home and to take extra torsemide 20mg with weight gain 3-4lbs.   -Will add spironolactone 12.5mg daily, however, if he cannot tolerate additional med due to blood pressure, he may stop it.   -Could consider decreasing Entresto if BP continues to be low with symptoms.   -Follow up with BMP in 1 week.     Anemia: Improved after iron infusions inpatient. On chronic xarelto though. Can consider Watchman device in future.     BiV ICD with previous AVN ablation: Will follow up with Dr. Oliver to discuss LV lead revision given the chronic high outputs and suboptimal pacing morphologies.     VT: none recurrent on device check in October. Sotalol seems to be the culprit for the patient's nausea. Could discuss possible [facility] admission to change to with EP follow up. Will continue sotalol for now.     I spent 50 minutes caring for Darren on this date of service. This time includes time spent by me in the following activities:preparing for the visit, reviewing tests, obtaining and/or reviewing a separately obtained history, performing a medically appropriate examination and/or evaluation , counseling and educating the patient/family/caregiver, ordering medications, tests, or procedures, referring and communicating with other health care professionals , and documenting information in the medical record    RICCARDO Arroyo

## 2023-11-14 ENCOUNTER — HOSPITAL ENCOUNTER (OUTPATIENT)
Dept: CARDIOLOGY | Facility: HOSPITAL | Age: 78
Discharge: HOME | End: 2023-11-14
Payer: MEDICARE

## 2023-11-14 ENCOUNTER — READMISSION MANAGEMENT (OUTPATIENT)
Dept: CALL CENTER | Facility: HOSPITAL | Age: 78
End: 2023-11-14
Payer: MEDICARE

## 2023-11-14 VITALS
SYSTOLIC BLOOD PRESSURE: 89 MMHG | OXYGEN SATURATION: 99 % | HEART RATE: 75 BPM | DIASTOLIC BLOOD PRESSURE: 50 MMHG | WEIGHT: 207.6 LBS | BODY MASS INDEX: 28.95 KG/M2

## 2023-11-14 DIAGNOSIS — I50.42 CHRONIC COMBINED SYSTOLIC AND DIASTOLIC CONGESTIVE HEART FAILURE: Primary | ICD-10-CM

## 2023-11-14 DIAGNOSIS — Z95.810 PRESENCE OF BIVENTRICULAR AICD: ICD-10-CM

## 2023-11-14 DIAGNOSIS — I25.5 ISCHEMIC CARDIOMYOPATHY: ICD-10-CM

## 2023-11-14 DIAGNOSIS — D50.8 OTHER IRON DEFICIENCY ANEMIA: ICD-10-CM

## 2023-11-14 DIAGNOSIS — Z98.890 S/P AV NODAL ABLATION: ICD-10-CM

## 2023-11-14 DIAGNOSIS — I10 ESSENTIAL HYPERTENSION: ICD-10-CM

## 2023-11-14 DIAGNOSIS — I25.708 CORONARY ARTERY DISEASE OF BYPASS GRAFT OF NATIVE HEART WITH STABLE ANGINA PECTORIS: ICD-10-CM

## 2023-11-14 LAB
ANION GAP SERPL CALCULATED.3IONS-SCNC: 12 MMOL/L (ref 5–15)
BUN SERPL-MCNC: 20 MG/DL (ref 8–23)
BUN/CREAT SERPL: 16 (ref 7–25)
CALCIUM SPEC-SCNC: 8.6 MG/DL (ref 8.6–10.5)
CHLORIDE SERPL-SCNC: 100 MMOL/L (ref 98–107)
CO2 SERPL-SCNC: 28 MMOL/L (ref 22–29)
CREAT SERPL-MCNC: 1.25 MG/DL (ref 0.76–1.27)
EGFRCR SERPLBLD CKD-EPI 2021: 59.7 ML/MIN/1.73
FERRITIN SERPL-MCNC: 228.3 NG/ML (ref 30–400)
GLUCOSE SERPL-MCNC: 181 MG/DL (ref 65–99)
IRON 24H UR-MRATE: 107 MCG/DL (ref 59–158)
IRON SATN MFR SERPL: 28 % (ref 20–50)
POTASSIUM SERPL-SCNC: 3.8 MMOL/L (ref 3.5–5.2)
SODIUM SERPL-SCNC: 140 MMOL/L (ref 136–145)
TIBC SERPL-MCNC: 375 MCG/DL (ref 298–536)
TRANSFERRIN SERPL-MCNC: 252 MG/DL (ref 200–360)

## 2023-11-14 PROCEDURE — 80048 BASIC METABOLIC PNL TOTAL CA: CPT

## 2023-11-14 PROCEDURE — 84466 ASSAY OF TRANSFERRIN: CPT

## 2023-11-14 PROCEDURE — 83540 ASSAY OF IRON: CPT

## 2023-11-14 PROCEDURE — G0463 HOSPITAL OUTPT CLINIC VISIT: HCPCS | Performed by: HOSPITALIST

## 2023-11-14 PROCEDURE — 82728 ASSAY OF FERRITIN: CPT

## 2023-11-14 RX ORDER — SACUBITRIL AND VALSARTAN 49; 51 MG/1; MG/1
0.5 TABLET, FILM COATED ORAL 2 TIMES DAILY
Start: 2023-11-14

## 2023-11-14 NOTE — OUTREACH NOTE
CHF Week 2 Survey      Flowsheet Row Responses   [facility], [place] patient discharged from? [place]   Does the patient have one of the following disease processes/diagnoses(primary or secondary)? CHF   Week 2 attempt successful? Yes   Call start time 1348   Revoke Decline to participate  [Patient had appt today in HF clinic. Declines need for further f/u calls.]   Call end time 1350   Person spoke with today (if not patient) and relationship Patient   Call end time 1350            APRIL F - Registered Nurse

## 2023-11-14 NOTE — PROGRESS NOTES
"[facility]  Heart Failure Clinic  Subjective:     Encounter Date:11/14/2023      Patient ID: Darren Maria is a 78 y.o. male     Chief Complaint:  History of Present Illness  Darren Maria is a 78 y.o. male with the below pertinent PMH who presents for follow-up of CHF.    Patient was first evaluated in the heart failure clinic on 11/8/2023 after hospitalization for decompensated heart failure.  At that appointment he was functionally doing well and his home scale revealed him to be 205 pounds.  He was started on spironolactone 12.5 mg daily.    Since his previous point he has been doing reasonably well.  From heart failure standpoint denies any shortness of breath, dyspnea on exertion, or weight gain.  He denies any peripheral overload.  His main complaint is extreme dizziness.  He states that with any position change he gets very dizzy and almost lightheaded.  He has to grab onto the wall or to the door sometimes.  This is not any worse with the initiation of spironolactone.  He has a home weight log which shows that his weight is around 203.2 pounds    ROS: Pertinent findings as noted above    Pertinent PMH  -Chronic systolic congestive heart failure (EF 36 to 40%)  - Coronary artery disease status post CABG in 1990 (redo CABG in March 2011)  - Ischemic cardiomyopathy  - Hypertension  - Hyperlipidemia  - Permanent atrial fibrillation  - Complete heart block status post AV node ablation  - Status post BiV ICD.  - History of renal cell carcinoma status post right nephrectomy in 2000  - Iron deficiency anemia    The following portions of the patient's history were reviewed and updated as appropriate: allergies, current medications, past medical history, past social history, past and problem list.    Allergies   Allergen Reactions    Azithromycin Nausea And Vomiting    Morphine And Related Other (See Comments)     SHAKES AND MAKES HIM \"WIRED\".   TAKES TRAMADOL WITHOUT PROBLEM       Current " Outpatient Medications:     atorvastatin (LIPITOR) 40 MG tablet, TAKE 1 TABLET BY MOUTH EVERY DAY AT NIGHT, Disp: 90 tablet, Rfl: 3    cetirizine (zyrTEC) 10 MG tablet, Take 1 tablet by mouth Daily., Disp: , Rfl:     clopidogrel (PLAVIX) 75 MG tablet, Take 1 tablet by mouth Daily., Disp: , Rfl:     Cyanocobalamin 1000 MCG/ML kit, Inject 1 mL as directed 1 (One) Time Per Week. On Fridays till 11/11/23 then monthly thereafter., Disp: , Rfl:     empagliflozin (Jardiance) 10 MG tablet tablet, Take 1 tablet by mouth Daily., Disp: , Rfl:     finasteride (PROSCAR) 5 MG tablet, Take 1 tablet by mouth Daily., Disp: , Rfl:     metFORMIN (GLUCOPHAGE) 500 MG tablet, Take 1 tablet by mouth 2 (Two) Times a Day With Meals. Two tabs daily, Disp: , Rfl:     Mirabegron ER (Myrbetriq) 50 MG tablet sustained-release 24 hour 24 hr tablet, Take 50 mg by mouth Daily., Disp: 30 tablet, Rfl: 11    nitroglycerin (NITROSTAT) 0.4 MG SL tablet, Place 1 tablet under the tongue Every 5 (Five) Minutes As Needed for Chest Pain. Take no more than 3 doses in 15 minutes., Disp: 25 tablet, Rfl: 2    omeprazole (PriLOSEC) 40 MG capsule, Take 1 capsule by mouth Daily., Disp: , Rfl:     pregabalin (LYRICA) 150 MG capsule, Take 1 capsule by mouth 2 (Two) Times a Day., Disp: , Rfl:     ranolazine (RANEXA) 1000 MG 12 hr tablet, TAKE 1 TABLET BY MOUTH TWICE A DAY, Disp: 180 tablet, Rfl: 3    rivaroxaban (XARELTO) 20 MG tablet, Take 1 tablet by mouth Daily With Dinner., Disp: , Rfl:     sacubitril-valsartan (Entresto) 49-51 MG tablet, Take 0.5 tablets by mouth 2 (Two) Times a Day., Disp: , Rfl:     Sotalol HCl AF 80 MG tablet, Take 1 tablet by mouth 2 (Two) Times a Day., Disp: 180 tablet, Rfl: 3    spironolactone (ALDACTONE) 25 MG tablet, Take 0.5 tablets by mouth Daily., Disp: 30 tablet, Rfl: 11    tamsulosin (FLOMAX) 0.4 MG capsule 24 hr capsule, Take 1 capsule by mouth Daily., Disp: , Rfl:     torsemide (DEMADEX) 20 MG tablet, Take 1 tablet by mouth Daily.  With additional 20 mg PRN, Disp: , Rfl:     traMADol-acetaminophen (ULTRACET) 37.5-325 MG per tablet, Take 1 tablet by mouth Every 12 (Twelve) Hours As Needed for Moderate Pain (pain)., Disp: , Rfl: 1    zolpidem (AMBIEN) 5 MG tablet, Take 1 tablet by mouth At Night As Needed for Sleep., Disp: , Rfl:     Social History     Socioeconomic History    Marital status:    Tobacco Use    Smoking status: Never    Smokeless tobacco: Never   Vaping Use    Vaping Use: Never used   Substance and Sexual Activity    Alcohol use: Yes     Comment: occ    Drug use: Never    Sexual activity: Not Currently     Partners: Female                Objective:     Constitutional:       Appearance: Healthy appearance. Not in distress.   Neck:      Vascular: JVD normal.   Pulmonary:      Effort: Pulmonary effort is normal.      Breath sounds: Normal breath sounds. No wheezing. No rhonchi. No rales.   Cardiovascular:      PMI at left midclavicular line. Normal rate. Regular rhythm. Normal S1. Normal S2.       Murmurs: There is no murmur.      No gallop.  No click. No rub.   Edema:     Peripheral edema absent.   Musculoskeletal: Normal range of motion.      Cervical back: Normal range of motion. Skin:     General: Skin is warm and dry.   Neurological:      Mental Status: Alert and oriented to person, place and time.           Procedures  BP (!) 89/50 (BP Location: Right arm, Patient Position: Sitting)   Pulse 75   Wt 94.2 kg (207 lb 9.6 oz)   SpO2 99%   BMI 28.95 kg/m²       11/14/23  0811   Weight: 94.2 kg (207 lb 9.6 oz)      Lab Review:   I have reviewed      BMP          11/1/2023    03:42 11/8/2023    11:06 11/14/2023    08:07   BMP   BUN 19  20  20    Creatinine 0.84  1.23  1.25    Sodium 141  141  140    Potassium 3.7  4.0  3.8    Chloride 104  104  100    CO2 27.0  25.0  28.0    Calcium 9.0  9.6  8.6       Lab Results   Component Value Date    CHOL 122 11/12/2021    CHLPL 136 (L) 03/13/2018    TRIG 108 11/12/2021    HDL 36 (L)  11/12/2021    LDL 66 11/12/2021      Results for orders placed during the hospital encounter of 10/29/23    Adult Transthoracic Echo Complete W/ Cont if Necessary Per Protocol    Interpretation Summary    Left ventricular systolic function is moderately decreased. Left ventricular ejection fraction appears to be 36 - 40%.    The left ventricular cavity is borderline dilated (LVIDd 5.5 cm).    The following left ventricular wall segments are hypokinetic: mid anterior, apical anterior, apical lateral, basal inferoseptal, mid inferoseptal, mid anteroseptal, basal anterior and basal inferoseptal. The following left ventricular wall segments are akinetic: mid inferolateral, apical inferior, apical septal and apex.    The left atrial cavity is severely dilated.    Estimated right ventricular systolic pressure from tricuspid regurgitation is normal (<35 mmHg).    Normal size in the right ventricle, with mildly reduced systolic function noted.    The right atrial cavity is severely  dilated.    Moderate dilation of the ascending aorta is present (4.7 cm).    Compared to prior exam from 5/27/2023, no significant change.       Assessment and Plan   Diagnoses and all orders for this visit:    1. Chronic combined systolic and diastolic congestive heart failure (Primary)  2. Ischemic cardiomyopathy  - LVEF: 36-40%  - NYHA Class: II  - BB: Sotalol per electrophysiology  - ACEi/ARB/ARNi: Reduce Entresto to half tablet of the 49/51 mg twice daily  - SGLT2i: Jardiance 10 mg daily  - MRA: Spironolactone 12.5 mg daily  - Diuretics: Torsemide 20 mg daily  - Electrolytes: Stable today  - ICD/CRT: Status post BiV ICD  - IV Iron: Iron profile pending    Patient appears euvolemic on exam.  However I believe that he may be slightly dehydrated given his hypotension as well as dizziness with position changes.  I will decrease his Entresto to the half tablet of this current dose.  We will keep the rest of his regimen the same.  We will see him  back in 2 to 3 weeks for recheck of labs and weight status.  Iron profile is pending, may qualify for IV iron.      3. Coronary artery disease of bypass graft of native heart with stable angina pectoris  4. Essential hypertension  -No anginal symptoms as of late  - Borderline hypotensive at home, see changes above  - Continue on Xarelto and Plavix per cardiology  - Continue on Ranexa 1000 mg twice daily for antianginal  - Continue Lipitor 40 for hyperlipidemia      5. Presence of biventricular AICD  6. S/P AV sam ablation  -Patient follows with electrophysiology team and has an appointment in December  - Potential discussion to be had about LV lead revision given his chronic high outputs and suboptimal pacing morphologies.             Follow Up   No follow-ups on file.  Patient was given instructions and counseling regarding his condition or for health maintenance advice. Please see specific information pulled into the AVS if appropriate.       EMR Dragon/Transcription disclaimer: Much of this encounter note is an electronic transcription/translation of spoken language to printed text. The electronic translation of spoken language may permit erroneous, or at times, nonsensical words or phrases to be inadvertently transcribed; although I have reviewed the note for such errors, some may still exist.

## 2023-11-17 ENCOUNTER — TELEPHONE (OUTPATIENT)
Dept: CARDIOLOGY | Facility: HOSPITAL | Age: 78
End: 2023-11-17
Payer: MEDICARE

## 2023-11-17 NOTE — TELEPHONE ENCOUNTER
Patient called asking about his lab results and whether or not he qualifies for iron infusions?  Please advise.

## 2023-11-17 NOTE — TELEPHONE ENCOUNTER
Per Artur-    His iron studies are in the normal range and therefore he does not qualify for IV iron.       Spoke with patient.  He expressed understanding.

## 2023-12-01 ENCOUNTER — HOSPITAL ENCOUNTER (OUTPATIENT)
Dept: CARDIOLOGY | Facility: HOSPITAL | Age: 78
Discharge: HOME | End: 2023-12-01
Payer: MEDICARE

## 2023-12-01 VITALS
SYSTOLIC BLOOD PRESSURE: 103 MMHG | BODY MASS INDEX: 28.79 KG/M2 | WEIGHT: 206.4 LBS | HEART RATE: 75 BPM | DIASTOLIC BLOOD PRESSURE: 57 MMHG | OXYGEN SATURATION: 96 %

## 2023-12-01 DIAGNOSIS — I10 ESSENTIAL HYPERTENSION: ICD-10-CM

## 2023-12-01 DIAGNOSIS — Z98.890 S/P AV NODAL ABLATION: ICD-10-CM

## 2023-12-01 DIAGNOSIS — I25.5 ISCHEMIC CARDIOMYOPATHY: ICD-10-CM

## 2023-12-01 DIAGNOSIS — I25.708 CORONARY ARTERY DISEASE OF BYPASS GRAFT OF NATIVE HEART WITH STABLE ANGINA PECTORIS: ICD-10-CM

## 2023-12-01 DIAGNOSIS — I50.42 CHRONIC COMBINED SYSTOLIC AND DIASTOLIC CONGESTIVE HEART FAILURE: Primary | ICD-10-CM

## 2023-12-01 DIAGNOSIS — Z95.810 PRESENCE OF BIVENTRICULAR AICD: ICD-10-CM

## 2023-12-01 LAB
ANION GAP SERPL CALCULATED.3IONS-SCNC: 11 MMOL/L (ref 5–15)
BUN SERPL-MCNC: 21 MG/DL (ref 8–23)
BUN/CREAT SERPL: 19.1 (ref 7–25)
CALCIUM SPEC-SCNC: 9.3 MG/DL (ref 8.6–10.5)
CHLORIDE SERPL-SCNC: 103 MMOL/L (ref 98–107)
CO2 SERPL-SCNC: 26 MMOL/L (ref 22–29)
CREAT SERPL-MCNC: 1.1 MG/DL (ref 0.76–1.27)
EGFRCR SERPLBLD CKD-EPI 2021: 69.6 ML/MIN/1.73
GLUCOSE SERPL-MCNC: 186 MG/DL (ref 65–99)
POTASSIUM SERPL-SCNC: 4.2 MMOL/L (ref 3.5–5.2)
SODIUM SERPL-SCNC: 140 MMOL/L (ref 136–145)

## 2023-12-01 PROCEDURE — 80048 BASIC METABOLIC PNL TOTAL CA: CPT

## 2023-12-01 PROCEDURE — G0463 HOSPITAL OUTPT CLINIC VISIT: HCPCS | Performed by: HOSPITALIST

## 2023-12-01 NOTE — PROGRESS NOTES
"[facility]  Heart Failure Clinic  Subjective:     Encounter Date:12/01/2023      Patient ID: Darren Maria is a 78 y.o. male     Chief Complaint:  History of Present Illness  Darren Maria is a 78 y.o. male with the below pertinent PMH who presents for follow-up of congestive heart failure.    Patient was last seen 11/14/2023.  At that time he was doing better from a heart failure standpoint.  His main complaint was dizziness with position changes.  I reduced his Entresto to half tablet of the 49/51 mg twice daily.  Patient had iron profile which showed that he did not qualify for IV iron at that time.    Since his previous appointment he has been doing well.  He denies any significant heart failure issues.  He has tolerated the reduction of Entresto well.  He did have a day, after Thanksgiving, where he gained a significant amount of weight.  He took an extra torsemide with resolution of his swelling and weight gain.  He denies any significant dyspnea on exertion.  Still has postural dizziness.      ROS: Pertinent findings as noted above    Pertinent PMH  -Chronic systolic congestive heart failure (EF 36 to 40%)  - Coronary artery disease status post CABG in 1990 (redo CABG in March 2011)  - Ischemic cardiomyopathy  - Hypertension  - Hyperlipidemia  - Permanent atrial fibrillation  - Complete heart block status post AV node ablation  - Status post BiV ICD.  - History of renal cell carcinoma status post right nephrectomy in 2000  - Iron deficiency anemia    The following portions of the patient's history were reviewed and updated as appropriate: allergies, current medications, past medical history, past social history, past and problem list.    Allergies   Allergen Reactions    Azithromycin Nausea And Vomiting    Morphine And Related Other (See Comments)     SHAKES AND MAKES HIM \"WIRED\".   TAKES TRAMADOL WITHOUT PROBLEM       Current Outpatient Medications:     atorvastatin (LIPITOR) 40 MG " tablet, TAKE 1 TABLET BY MOUTH EVERY DAY AT NIGHT, Disp: 90 tablet, Rfl: 3    cetirizine (zyrTEC) 10 MG tablet, Take 1 tablet by mouth Daily., Disp: , Rfl:     clopidogrel (PLAVIX) 75 MG tablet, Take 1 tablet by mouth Daily., Disp: , Rfl:     Cyanocobalamin 1000 MCG/ML kit, Inject 1 mL as directed 1 (One) Time Per Week. On Fridays till 11/11/23 then monthly thereafter., Disp: , Rfl:     empagliflozin (Jardiance) 10 MG tablet tablet, Take 1 tablet by mouth Daily., Disp: , Rfl:     finasteride (PROSCAR) 5 MG tablet, Take 1 tablet by mouth Daily., Disp: , Rfl:     metFORMIN (GLUCOPHAGE) 500 MG tablet, Take 1 tablet by mouth 2 (Two) Times a Day With Meals. Two tabs daily, Disp: , Rfl:     Mirabegron ER (Myrbetriq) 50 MG tablet sustained-release 24 hour 24 hr tablet, Take 50 mg by mouth Daily., Disp: 30 tablet, Rfl: 11    nitroglycerin (NITROSTAT) 0.4 MG SL tablet, Place 1 tablet under the tongue Every 5 (Five) Minutes As Needed for Chest Pain. Take no more than 3 doses in 15 minutes., Disp: 25 tablet, Rfl: 2    omeprazole (PriLOSEC) 40 MG capsule, Take 1 capsule by mouth Daily., Disp: , Rfl:     pregabalin (LYRICA) 150 MG capsule, Take 1 capsule by mouth 2 (Two) Times a Day., Disp: , Rfl:     ranolazine (RANEXA) 1000 MG 12 hr tablet, TAKE 1 TABLET BY MOUTH TWICE A DAY, Disp: 180 tablet, Rfl: 3    rivaroxaban (XARELTO) 20 MG tablet, Take 1 tablet by mouth Daily With Dinner., Disp: , Rfl:     sacubitril-valsartan (Entresto) 49-51 MG tablet, Take 0.5 tablets by mouth 2 (Two) Times a Day., Disp: , Rfl:     Sotalol HCl AF 80 MG tablet, Take 1 tablet by mouth 2 (Two) Times a Day., Disp: 180 tablet, Rfl: 3    spironolactone (ALDACTONE) 25 MG tablet, Take 0.5 tablets by mouth Daily., Disp: 30 tablet, Rfl: 11    tamsulosin (FLOMAX) 0.4 MG capsule 24 hr capsule, Take 1 capsule by mouth Daily., Disp: , Rfl:     torsemide (DEMADEX) 20 MG tablet, Take 1 tablet by mouth Daily. With additional 20 mg PRN, Disp: , Rfl:      traMADol-acetaminophen (ULTRACET) 37.5-325 MG per tablet, Take 1 tablet by mouth Every 12 (Twelve) Hours As Needed for Moderate Pain (pain)., Disp: , Rfl: 1    zolpidem (AMBIEN) 5 MG tablet, Take 1 tablet by mouth At Night As Needed for Sleep., Disp: , Rfl:     Social History     Socioeconomic History    Marital status:    Tobacco Use    Smoking status: Never    Smokeless tobacco: Never   Vaping Use    Vaping Use: Never used   Substance and Sexual Activity    Alcohol use: Yes     Comment: occ    Drug use: Never    Sexual activity: Not Currently     Partners: Female                Objective:     Constitutional:       Appearance: Healthy appearance. Not in distress.   Neck:      Vascular: JVD normal.   Pulmonary:      Effort: Pulmonary effort is normal.      Breath sounds: Normal breath sounds. No wheezing. No rhonchi. No rales.   Cardiovascular:      PMI at left midclavicular line. Normal rate. Regular rhythm. Normal S1. Normal S2.       Murmurs: There is no murmur.      No gallop.  No click. No rub.   Edema:     Peripheral edema absent.   Musculoskeletal: Normal range of motion.      Cervical back: Normal range of motion. Skin:     General: Skin is warm and dry.   Neurological:      Mental Status: Alert and oriented to person, place and time.           Procedures  /57 (BP Location: Right arm, Patient Position: Sitting)   Pulse 75   Wt 93.6 kg (206 lb 6.4 oz)   SpO2 96%   BMI 28.79 kg/m²       12/01/23  0909   Weight: 93.6 kg (206 lb 6.4 oz)      Lab Review:   I have reviewed      BMP          11/8/2023    11:06 11/14/2023    08:07 12/1/2023    09:07   BMP   BUN 20  20  21    Creatinine 1.23  1.25  1.10    Sodium 141  140  140    Potassium 4.0  3.8  4.2    Chloride 104  100  103    CO2 25.0  28.0  26.0    Calcium 9.6  8.6  9.3       Lab Results   Component Value Date    CHOL 122 11/12/2021    CHLPL 136 (L) 03/13/2018    TRIG 108 11/12/2021    HDL 36 (L) 11/12/2021    LDL 66 11/12/2021      Results  for orders placed during the hospital encounter of 10/29/23    Adult Transthoracic Echo Complete W/ Cont if Necessary Per Protocol    Interpretation Summary    Left ventricular systolic function is moderately decreased. Left ventricular ejection fraction appears to be 36 - 40%.    The left ventricular cavity is borderline dilated (LVIDd 5.5 cm).    The following left ventricular wall segments are hypokinetic: mid anterior, apical anterior, apical lateral, basal inferoseptal, mid inferoseptal, mid anteroseptal, basal anterior and basal inferoseptal. The following left ventricular wall segments are akinetic: mid inferolateral, apical inferior, apical septal and apex.    The left atrial cavity is severely dilated.    Estimated right ventricular systolic pressure from tricuspid regurgitation is normal (<35 mmHg).    Normal size in the right ventricle, with mildly reduced systolic function noted.    The right atrial cavity is severely  dilated.    Moderate dilation of the ascending aorta is present (4.7 cm).    Compared to prior exam from 5/27/2023, no significant change.       Assessment and Plan   Diagnoses and all orders for this visit:    1. Chronic combined systolic and diastolic congestive heart failure (Primary)  2. Ischemic cardiomyopathy  - LVEF: 36-40%  - NYHA Class: II  - BB: Sotalol per electrophysiology  - ACEi/ARB/ARNi: Entresto half tablet of the 49/51 mg twice daily  - SGLT2i: Jardiance 10 mg daily  - MRA: Spironolactone 12.5 mg daily  - Diuretics: Torsemide 20 mg daily with additional 20 mg as needed for weight gain or swelling.  - Electrolytes: Stable today  - ICD/CRT: Status post BiV ICD  - IV Iron: Does not qualify for IV iron.    Patient appears euvolemic and tolerating his current regimen well.  Patient's continues to have postural dizziness, I recommend that he speak with his urology provider and see if trialing Flomax would improve this.  Otherwise I will leave his heart failure regimen alone.  I  would see the patient back in 6 weeks, but he is seeing his primary cardiology team then.  I will turn the patient back over to his primary cardiology team of Dr. Benítez.  I advised the patient that if he is having any acute issues with his heart failure that he may contact our clinic and we can acutely assess him, however I believe that he will be taking well care of under the care of Dr. Posadas.      3. Coronary artery disease of bypass graft of native heart with stable angina pectoris  4. Essential hypertension  -No anginal symptoms as of late  - Continue on Xarelto and Plavix  - Continue on Ranexa 1000 mg twice daily for antianginal effect  - Continue on Lipitor 40 for hyperlipidemia      5. Presence of biventricular AICD  6. S/P AV sam ablation  -Patient has appointment with electrophysiology team in a few weeks  - There is been discussion about potential LV lead revision given his chronic high outputs and suboptimal pacing morphology             Follow Up   No follow-ups on file.  Patient was given instructions and counseling regarding his condition or for health maintenance advice. Please see specific information pulled into the AVS if appropriate.       EMR Dragon/Transcription disclaimer: Much of this encounter note is an electronic transcription/translation of spoken language to printed text. The electronic translation of spoken language may permit erroneous, or at times, nonsensical words or phrases to be inadvertently transcribed; although I have reviewed the note for such errors, some may still exist.

## 2023-12-19 ENCOUNTER — TELEMEDICINE (OUTPATIENT)
Dept: CARDIOLOGY | Facility: CLINIC | Age: 78
End: 2023-12-19
Payer: MEDICARE

## 2023-12-19 ENCOUNTER — PREP FOR SURGERY (OUTPATIENT)
Dept: OTHER | Facility: HOSPITAL | Age: 78
End: 2023-12-19
Payer: MEDICARE

## 2023-12-19 VITALS
HEART RATE: 75 BPM | WEIGHT: 204 LBS | BODY MASS INDEX: 28.56 KG/M2 | HEIGHT: 71 IN | RESPIRATION RATE: 18 BRPM | SYSTOLIC BLOOD PRESSURE: 100 MMHG | DIASTOLIC BLOOD PRESSURE: 65 MMHG

## 2023-12-19 DIAGNOSIS — I47.20 VENTRICULAR TACHYCARDIA: ICD-10-CM

## 2023-12-19 DIAGNOSIS — T82.828D: Primary | ICD-10-CM

## 2023-12-19 DIAGNOSIS — I48.21 PERMANENT ATRIAL FIBRILLATION: Primary | ICD-10-CM

## 2023-12-19 DIAGNOSIS — T82.828A FIBROSIS DUE TO VASCULAR PROSTHETIC DEVICES, IMPLANTS AND GRAFTS, INITIAL ENCOUNTER: ICD-10-CM

## 2023-12-19 DIAGNOSIS — I44.2 CHB (COMPLETE HEART BLOCK): ICD-10-CM

## 2023-12-19 PROCEDURE — 3074F SYST BP LT 130 MM HG: CPT | Performed by: STUDENT IN AN ORGANIZED HEALTH CARE EDUCATION/TRAINING PROGRAM

## 2023-12-19 PROCEDURE — 99214 OFFICE O/P EST MOD 30 MIN: CPT | Performed by: STUDENT IN AN ORGANIZED HEALTH CARE EDUCATION/TRAINING PROGRAM

## 2023-12-19 PROCEDURE — 3078F DIAST BP <80 MM HG: CPT | Performed by: STUDENT IN AN ORGANIZED HEALTH CARE EDUCATION/TRAINING PROGRAM

## 2023-12-19 RX ORDER — SODIUM CHLORIDE 0.9 % (FLUSH) 0.9 %
10 SYRINGE (ML) INJECTION EVERY 12 HOURS SCHEDULED
OUTPATIENT
Start: 2023-12-19

## 2023-12-19 RX ORDER — SACUBITRIL AND VALSARTAN 97; 103 MG/1; MG/1
TABLET, FILM COATED ORAL
Qty: 60 TABLET | Refills: 11 | OUTPATIENT
Start: 2023-12-19

## 2023-12-19 RX ORDER — SODIUM CHLORIDE 0.9 % (FLUSH) 0.9 %
10 SYRINGE (ML) INJECTION AS NEEDED
OUTPATIENT
Start: 2023-12-19

## 2023-12-19 RX ORDER — SODIUM CHLORIDE 9 MG/ML
40 INJECTION, SOLUTION INTRAVENOUS AS NEEDED
OUTPATIENT
Start: 2023-12-19

## 2023-12-19 NOTE — H&P (VIEW-ONLY)
EP FOLLOW UP PATIENT VISIT      Subjective        History of Present Illness    EP Problems:  1.  Permanent atrial fibrillation  2.  Complete heart block status post AV node ablation  3.  Ventricular tachycardia  4.  Presence of a BiV ICD  -Elevated LV threshold causing rapid battery depletion with LV lead in the AIV    Cardiology Problems:  1.  Chronic systolic heart failure  -5/27/2023: EF 36 to 40%  2.  CAD status post CABG  3.  Ischemic cardiomyopathy  4.  Hypertension  5.  Hyperlipidemia    Medical Problems:  1.  BPH      Darren Maria is a 78 y.o. male with problem list as above who presents to the clinic for follow up of permanent atrial fibrillation, complete heart block, presence of a BiV ICD with LV lead dysfunction, chronic systolic heart failure, ventricular tachycardia.    He feels fatigued and has significant SOB.  No significant change in symptoms since he was seen in clinic last.       Physical Exam  Constitutional:       Appearance: Normal appearance.   HENT:      Head: Normocephalic and atraumatic.   Pulmonary:      Comments: Speaking in full sentences without appreciable increased effort  Neurological:      Mental Status: He is alert.        Result Review :  The following data was reviewed by: Devika Oliver MD on 08/29/2023:  CMP          11/8/2023    11:06 11/14/2023    08:07 12/1/2023    09:07   CMP   Glucose 154  181  186    BUN 20  20  21    Creatinine 1.23  1.25  1.10    EGFR 60.8  59.7  69.6    Sodium 141  140  140    Potassium 4.0  3.8  4.2    Chloride 104  100  103    Calcium 9.6  8.6  9.3    BUN/Creatinine Ratio 16.3  16.0  19.1    Anion Gap 12.0  12.0  11.0      CBC          10/30/2023    03:17 10/31/2023    05:07 11/8/2023    11:06   CBC   WBC 4.57  3.92  6.45    RBC 2.90  3.08  3.93    Hemoglobin 8.1  8.5  11.4    Hematocrit 27.6  29.1  37.9    MCV 95.2  94.5  96.4    MCH 27.9  27.6  29.0    MCHC 29.3  29.2  30.1    RDW 16.3  16.2  19.2    Platelets 232  253  325             [id number] SCORE   [id number] Score: 6 (12/24/2023  9:55 PM)                   Assessment and Plan     Diagnoses and all orders for this visit:    1. Permanent atrial fibrillation (Primary)    2. CHB (complete heart block)    3. Ventricular tachycardia          Darren Maria is a 78 y.o. male with problem list as above who presents to the clinic for follow up of permanent atrial fibrillation, complete heart block, ventricular tachycardia.     Plan:  -Continue sotalol  - Avoid additional QT prolonging medications  - Continue rivaroxaban given elevated [id number]  - Schedule venogram           Follow Up     No follow-ups on file.

## 2023-12-19 NOTE — PROGRESS NOTES
EP FOLLOW UP PATIENT VISIT      Subjective        History of Present Illness    EP Problems:  1.  Permanent atrial fibrillation  2.  Complete heart block status post AV node ablation  3.  Ventricular tachycardia  4.  Presence of a BiV ICD  -Elevated LV threshold causing rapid battery depletion with LV lead in the AIV    Cardiology Problems:  1.  Chronic systolic heart failure  -5/27/2023: EF 36 to 40%  2.  CAD status post CABG  3.  Ischemic cardiomyopathy  4.  Hypertension  5.  Hyperlipidemia    Medical Problems:  1.  BPH      Darren Maria is a 78 y.o. male with problem list as above who presents to the clinic for follow up of permanent atrial fibrillation, complete heart block, presence of a BiV ICD with LV lead dysfunction, chronic systolic heart failure, ventricular tachycardia.    He feels fatigued and has significant SOB.  No significant change in symptoms since he was seen in clinic last.       Physical Exam  Constitutional:       Appearance: Normal appearance.   HENT:      Head: Normocephalic and atraumatic.   Pulmonary:      Comments: Speaking in full sentences without appreciable increased effort  Neurological:      Mental Status: He is alert.        Result Review :  The following data was reviewed by: Devika Oliver MD on 08/29/2023:  CMP          11/8/2023    11:06 11/14/2023    08:07 12/1/2023    09:07   CMP   Glucose 154  181  186    BUN 20  20  21    Creatinine 1.23  1.25  1.10    EGFR 60.8  59.7  69.6    Sodium 141  140  140    Potassium 4.0  3.8  4.2    Chloride 104  100  103    Calcium 9.6  8.6  9.3    BUN/Creatinine Ratio 16.3  16.0  19.1    Anion Gap 12.0  12.0  11.0      CBC          10/30/2023    03:17 10/31/2023    05:07 11/8/2023    11:06   CBC   WBC 4.57  3.92  6.45    RBC 2.90  3.08  3.93    Hemoglobin 8.1  8.5  11.4    Hematocrit 27.6  29.1  37.9    MCV 95.2  94.5  96.4    MCH 27.9  27.6  29.0    MCHC 29.3  29.2  30.1    RDW 16.3  16.2  19.2    Platelets 232  253  325             [id number] SCORE   [id number] Score: 6 (12/24/2023  9:55 PM)                   Assessment and Plan     Diagnoses and all orders for this visit:    1. Permanent atrial fibrillation (Primary)    2. CHB (complete heart block)    3. Ventricular tachycardia          Darren Maria is a 78 y.o. male with problem list as above who presents to the clinic for follow up of permanent atrial fibrillation, complete heart block, ventricular tachycardia.     Plan:  -Continue sotalol  - Avoid additional QT prolonging medications  - Continue rivaroxaban given elevated [id number]  - Schedule venogram           Follow Up     No follow-ups on file.

## 2023-12-20 PROBLEM — T82.828D: Status: ACTIVE | Noted: 2023-12-19

## 2023-12-20 PROBLEM — T82.828A FIBROSIS DUE TO VASCULAR PROSTHETIC DEVICES, IMPLANTS AND GRAFTS, INITIAL ENCOUNTER: Status: ACTIVE | Noted: 2023-12-19

## 2023-12-26 ENCOUNTER — TRANSCRIBE ORDERS (OUTPATIENT)
Dept: ADMINISTRATIVE | Facility: HOSPITAL | Age: 78
End: 2023-12-26
Payer: MEDICARE

## 2023-12-26 DIAGNOSIS — I73.9 PERIPHERAL VASCULAR DISEASE, UNSPECIFIED: Primary | ICD-10-CM

## 2023-12-28 RX ORDER — SACUBITRIL AND VALSARTAN 97; 103 MG/1; MG/1
TABLET, FILM COATED ORAL
Qty: 60 TABLET | Refills: 11 | OUTPATIENT
Start: 2023-12-28

## 2024-01-03 ENCOUNTER — HOSPITAL ENCOUNTER (OUTPATIENT)
Dept: ULTRASOUND IMAGING | Facility: HOSPITAL | Age: 79
Discharge: HOME | End: 2024-01-03
Admitting: INTERNAL MEDICINE
Payer: MEDICARE

## 2024-01-03 DIAGNOSIS — I73.9 PERIPHERAL VASCULAR DISEASE, UNSPECIFIED: ICD-10-CM

## 2024-01-03 PROCEDURE — 93923 UPR/LXTR ART STDY 3+ LVLS: CPT

## 2024-01-11 ENCOUNTER — OFFICE VISIT (OUTPATIENT)
Dept: CARDIOLOGY | Facility: CLINIC | Age: 79
End: 2024-01-11
Payer: MEDICARE

## 2024-01-11 ENCOUNTER — CLINICAL SUPPORT NO REQUIREMENTS (OUTPATIENT)
Dept: CARDIOLOGY | Facility: CLINIC | Age: 79
End: 2024-01-11
Payer: MEDICARE

## 2024-01-11 VITALS
DIASTOLIC BLOOD PRESSURE: 58 MMHG | SYSTOLIC BLOOD PRESSURE: 90 MMHG | HEART RATE: 102 BPM | BODY MASS INDEX: 28.98 KG/M2 | WEIGHT: 207 LBS | HEIGHT: 71 IN | OXYGEN SATURATION: 98 %

## 2024-01-11 DIAGNOSIS — I25.708 CORONARY ARTERY DISEASE OF BYPASS GRAFT OF NATIVE HEART WITH STABLE ANGINA PECTORIS: ICD-10-CM

## 2024-01-11 DIAGNOSIS — I50.42 CHRONIC COMBINED SYSTOLIC AND DIASTOLIC CONGESTIVE HEART FAILURE: Primary | ICD-10-CM

## 2024-01-11 DIAGNOSIS — I44.2 CHB (COMPLETE HEART BLOCK): ICD-10-CM

## 2024-01-11 DIAGNOSIS — Z95.810 PRESENCE OF BIVENTRICULAR AICD: ICD-10-CM

## 2024-01-11 DIAGNOSIS — Z98.890 S/P AV NODAL ABLATION: ICD-10-CM

## 2024-01-11 DIAGNOSIS — I50.42 CHRONIC COMBINED SYSTOLIC AND DIASTOLIC CONGESTIVE HEART FAILURE: ICD-10-CM

## 2024-01-11 DIAGNOSIS — I47.20 VENTRICULAR TACHYCARDIA: ICD-10-CM

## 2024-01-11 DIAGNOSIS — Z95.810 PRESENCE OF BIVENTRICULAR AICD: Primary | ICD-10-CM

## 2024-01-11 DIAGNOSIS — I48.21 PERMANENT ATRIAL FIBRILLATION: ICD-10-CM

## 2024-01-11 RX ORDER — NITROGLYCERIN 0.4 MG/1
0.4 TABLET SUBLINGUAL
Qty: 25 TABLET | Refills: 2 | Status: SHIPPED | OUTPATIENT
Start: 2024-01-11

## 2024-01-11 NOTE — PROGRESS NOTES
BIV AICD Interrogation Report  IN OFFICE    January 11, 2024    Primary Cardiologist: Melody  : [place] [employer] Model: Dynagen CRT-D G151  Implant date: 11.18.2019    Reason for evaluation: Routine - LV Lead Check  Indication for AICD:  Complete Heart Block s/p AV Node Ablation    Interrogation performed by: Seble Ariza RN    Measurements  Atrial sensing:  P wave: 1.7 mV  Atrial threshold: N/P - Permanent AF  Atrial lead impedance: 437 ohms  Ventricular sensing:  R wave: PACED mV  RV Threshold:  1.4V @ 0.5 ms  RV Impedance:  522 ohms  Shock impedance:  RV 44 ohms  LV Threshold:  6V @ 1.2ms (chronic, trending up)  LV Impedance:  733 ohms    Manual sensing and threshold testing performed:  Yes      Diagnostic Data  Atrial paced: 0 %  Ventricular paced: RV 98%, LV 98%    Episodes/Alerts: None.    Battery status: Intensify Follow Up, estimated 9 months until RRT.      Final Parameters  Mode: VVIR  Lower rate: 75 bpm   Upper sensor rate: 130 bpm  Atrial - Sensitivity: 0.25 mV   Ventricular:  RV Amplitude: 2.8 V @ 0.5 ms   Sensitivity: 0.6 mV            LV Amplitude:  6.5V @ 1.2ms      Changes made: Normal silvia LV output increased to 6.5V; Normal silvia RV output increased to 2.8V    Conclusions: Normal AICD Function, Stable Pacing and Sensing Thresholds, and Elevated LV threshold    Remote Monitor:  Yes, connected.    Follow up: Every 1 month via latitude, 2 month in office LV lead threshold check - Patient has a scheduled venogram by Dr. Oliver on 1.17.2024.    Final impression:  Data above reviewed.  Agree with findings and conclusions as per the above note.

## 2024-01-11 NOTE — Clinical Note
AICD check.  Chronically elevated LV threshold that is trending up.  Has venogram scheduled for 1.17.24.  9 months until RRT.  Transmitting remotely once a month.  Please review and sign.

## 2024-01-11 NOTE — PROGRESS NOTES
Subjective:     Encounter Date:01/11/2024      Patient ID: Darren Maria is a 78 y.o. male.    Chief Complaint: Follow-up heart failure, coronary disease, atrial fibrillation, and ventricular tachycardia.    History of Present Illness  Mr. Darren Maria returns today for follow-up after having last been seen here on 10/05/2023 by EVELIA Naqvi. At that visit, he did report he was feeling poorly with worsening exertional dyspnea, including shortness of breath just walking around the house. He was also having weak spells with nausea. He was felt to be euvolemic that day. It was noted that he was also following Dr. Oliver for ventricular tachycardia for which he received an appropriate AICD shock on 05/26/2023, and subsequently treated with ATP twice on 06/29/2023. Dr. Oliver had started him on sotalol in 08/2023, but he had felt poorly, particularly with nausea since starting it. Coreg was discontinued by Dr. Oliver on 09/07/2023, but the patient did not report any change in his nausea at the last visit here with Yolanda in 10/2023. Device interrogation that day did not reveal any further episodes of VT. He did end up requiring hospitalization for an exacerbation of heart failure with Dr. Salvador from 10/29/2023 until 11/01/2023. Dr. Salvador noted in the discharge summary that symptoms had improved with diuresis, and he was also noted to have worsening iron deficiency anemia during that stay for which he was given iron infusions. Though he was complaining of some chest discomfort as well, his troponins were relatively flat, and symptoms improved after diuresis, so further ischemic evaluation was not undertaken at the time. He was seen after the discharge there on 11/14/2023 in the heart failure clinic by EVELIA Bernard. He felt like he might be slightly dehydrated that day given hypotension, and orthostatic dizziness, so decreased his Entresto to half his current dose. When seen back by RICCARDO Justice,  "in the EP clinic on 11/08/2023, she advised adding spironolactone. He was then seen again on 12/01/2023 by EVELIA Bernard, in the heart failure clinic at which point he did not make any changes other than suggesting the patient discuss stopping Flomax with his urologist, and stated no further CHF clinic follow-up was required as the patient would be continued to follow with me. He then had a telemedicine visit with Dr. Oliver on 12/19/2023 in which case his plan was to continue sotalol, and do a venogram to consider upgrading his defibrillator device. He is scheduled for that on 01/17/2024.    The patient comes to the clinic today accompanied by an adult female who contributes to his history. He reports he went to the hospital in 10/2023 because he \"reached a tipping point\" from a steady decline, and he started having chest pain, and pressure. He reports that he knew something was \"not right,\" so he did not go to [facility] that day. When his wife got home, he told her he needed to go to the emergency room. He states the chest pressure occurred when he was at rest, and with exertion. He adds he could not breathe when he walked, but his breathing was normal at rest. He confirms during the course of his hospital stay, he was given 4 total doses of i.v. Lasix, and the chest tightness improved.    He reports since his discharge on 11/01/2023, the chest tightness occurs intermittently, but it is not as \"hard\" as it was then. He occasionally experiences pressure, and tightness, but it does not stay like it previously did. He states he \"knew he had something going wrong\" when he was in the hospital, but now he can breathe, and it does not bother him. He adds it is not to the point where it hurts, and it is to the point where he \"barely\" feels it. He states that he takes an extra torsemide when he starts feeling the pressure, and it improves. His routine dose of torsemide is 1 tablet once per day. When he feels the chest " "pressure, he takes 1 tablet in the morning, and 1 tablet at approximately 4:00 p.m. He confirms after one day of this, his symptoms improve. His leg swelling also improves. He states that he has to stay at home because he has to go to the bathroom. The adult female reports part of the reason he got to be problematic in 10/2023 was because he was under the impression that he could take the torsemide as needed instead of taking it every day. He states that he needs to take the second dose of torsemide every third day. He notes for the first 2 days before he ends up needing the second dose of torsemide, his weight starts to increase. He notes he has been weighing approximately 201 to 203 pounds. In the last 2 days, he has weighed 204 to 205 pounds. He confirms he is taking Entresto 49 mg, 0.5 tablet twice daily. He is also taking Jardiance 10 mg, spironolactone 25 mg, 0.5 tablet daily, and sotalol.    He reports there are days where his weight, and fluid are down, but he still experiences spells of chest pressure, and tightness intermittently that does not last long. The other night he was sitting down watching television, and he could \"feel something\" that lasted approximately 30 minutes. He did not get up or move during those 30 minutes. Once it resolved, he got up, went to the bathroom, and got water. He rates the chest discomfort as a 4 out of 10 during those 30 minutes, so is \"noticeable.\" He confirms it stayed at a 4 out of 10 for the 30 minutes. 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 It also radiates up, and down the back of his neck. He states that it makes him nervous. He confirms he has nitroglycerin, but he did not take it that night when he was experiencing the symptoms at rest because it causes him to have severe headaches.     He states that if he tries to walk any amount of distance at all, he gets out of breath, but this is staying the same. He adds he " "cannot get on any steps, and he has to stop, and rest on the way up. He states that he can walk from the office to the parking lot, and his shortness of breath is not as bad. He can make it slightly farther as long as he \"keeps the water off of him.\" He states that any kind of stress at all bothers him. When he tries to stand up, he gets dizzy. He sometimes becomes so dizzy that he has to sit back down. He states that he leans over, and tries to get up slowly. He has to stand there, and wait before he can \"take off.\"     He reports he could not tell a difference after his Entresto dose was decreased to 0.5 tablet twice daily. He has not spoken to his urologist about his Flomax. He notes Dr. Salvador recommended he discontinue Flomax for a while, and see how he urinates. He was off of Flomax for 8 to 9 days, and he did not notice any difference in the dizziness with position changes during that time.    He notes if there is anything that can be done to improve his situation, he would be willing to try it. He states he does not go to many places because he cannot walk far, and this bothers him. He reports the shortness of breath bothers him more than the dizziness, and adds he can \"live with\" the dizziness. He states when the fluid increases, he cannot walk as well. He reports he was dizzy yesterday, 01/10/2024. He had not taken an additional dose of torsemide, but took an additional dose the day before on 01/09/2024. The adult female states that the patient has been drinking Diet Mountain Dew, and Coke Zero, and inquires if the patient should avoid drinking these when he is taking the torsemide. He notes he tries to limit himself to one caffeinated beverage a day, but the adult female believes if he is taking the extra torsemide, he needs to \"skip it altogether.\"    He confirms he is scheduled for a procedure with Dr. Oliver on 01/17/2024.     The following portions of the patient's history were reviewed and updated as " appropriate: allergies, current medications, past family history, past medical history, past social history, past surgical history, and problem list.    Review of Systems   Constitutional: Negative for malaise/fatigue.   Cardiovascular:  Positive for dyspnea on exertion and leg swelling. Negative for chest pain, claudication, near-syncope, orthopnea, palpitations, paroxysmal nocturnal dyspnea and syncope.        Chest discomfort   Respiratory:  Negative for shortness of breath.    Hematologic/Lymphatic: Does not bruise/bleed easily.   Neurological:  Positive for dizziness (upon standing).           Current Outpatient Medications:     atorvastatin (LIPITOR) 40 MG tablet, TAKE 1 TABLET BY MOUTH EVERY DAY AT NIGHT, Disp: 90 tablet, Rfl: 3    cetirizine (zyrTEC) 10 MG tablet, Take 1 tablet by mouth Daily., Disp: , Rfl:     clopidogrel (PLAVIX) 75 MG tablet, Take 1 tablet by mouth Daily., Disp: , Rfl:     Cyanocobalamin 1000 MCG/ML kit, Inject 1 mL as directed Every 30 (Thirty) Days. On Fridays till 11/11/23 then monthly thereafter., Disp: , Rfl:     empagliflozin (Jardiance) 10 MG tablet tablet, Take 1 tablet by mouth Daily., Disp: , Rfl:     finasteride (PROSCAR) 5 MG tablet, Take 1 tablet by mouth Daily., Disp: , Rfl:     metFORMIN (GLUCOPHAGE) 500 MG tablet, Take 1 tablet by mouth 2 (Two) Times a Day With Meals. Two tabs daily, Disp: , Rfl:     Mirabegron ER (Myrbetriq) 50 MG tablet sustained-release 24 hour 24 hr tablet, Take 50 mg by mouth Daily., Disp: 30 tablet, Rfl: 11    nitroglycerin (NITROSTAT) 0.4 MG SL tablet, Place 1 tablet under the tongue Every 5 (Five) Minutes As Needed for Chest Pain. Take no more than 3 doses in 15 minutes., Disp: 25 tablet, Rfl: 2    omeprazole (PriLOSEC) 40 MG capsule, Take 1 capsule by mouth Daily., Disp: , Rfl:     pregabalin (LYRICA) 150 MG capsule, Take 1 capsule by mouth 2 (Two) Times a Day., Disp: , Rfl:     ranolazine (RANEXA) 1000 MG 12 hr tablet, TAKE 1 TABLET BY MOUTH  TWICE A DAY, Disp: 180 tablet, Rfl: 3    rivaroxaban (XARELTO) 20 MG tablet, Take 1 tablet by mouth Daily With Dinner., Disp: , Rfl:     sacubitril-valsartan (Entresto) 49-51 MG tablet, Take 0.5 tablets by mouth 2 (Two) Times a Day., Disp: , Rfl:     Sotalol HCl AF 80 MG tablet, Take 1 tablet by mouth 2 (Two) Times a Day., Disp: 180 tablet, Rfl: 3    spironolactone (ALDACTONE) 25 MG tablet, Take 0.5 tablets by mouth Daily., Disp: 30 tablet, Rfl: 11    tamsulosin (FLOMAX) 0.4 MG capsule 24 hr capsule, Take 1 capsule by mouth Daily., Disp: , Rfl:     torsemide (DEMADEX) 20 MG tablet, Take 1 tablet by mouth Daily. With additional 20 mg PRN, Disp: , Rfl:     traMADol-acetaminophen (ULTRACET) 37.5-325 MG per tablet, Take 1 tablet by mouth Every 12 (Twelve) Hours As Needed for Moderate Pain (pain)., Disp: , Rfl: 1    zolpidem (AMBIEN) 5 MG tablet, Take 1 tablet by mouth At Night As Needed for Sleep., Disp: , Rfl:     Vericiguat 2.5 MG tablet, Take 1 tablet by mouth Daily., Disp: 30 tablet, Rfl: 11    Vericiguat 2.5 MG tablet, Take 1 tablet by mouth Daily., Disp: 14 tablet, Rfl: 0       Objective:      Vitals:    01/11/24 1038   BP: 90/58   Pulse: 102   SpO2: 98%     Vitals and nursing note reviewed.   Constitutional:       General: Not in acute distress.     Appearance: Not in distress.   Neck:      Vascular: No JVD or JVR. JVD normal.   Pulmonary:      Effort: Pulmonary effort is normal.      Breath sounds: Normal breath sounds.   Cardiovascular:      Normal rate. Regular rhythm.      Murmurs: There is no murmur.      No gallop.  No rub.   Pulses:     Intact distal pulses.   Edema:     Peripheral edema absent.   Skin:     General: Skin is warm and dry.   Neurological:      Mental Status: Alert, oriented to person, place, and time and oriented to person, place and time.         Lab Review:                   ECG 12 Lead    Date/Time: 1/11/2024 10:52 AM  Performed by: Sarbjit Posadas MD    Authorized by: Sarbjit Posadas,  MD  Comparison: compared with previous ECG from 10/29/2023  Comparison to previous ECG: ventricular rate is increased from 75 up to 100  Rhythm: atrial fibrillation and paced  Rhythm comments: V pacing at a rate of 100  Pacing: ventricular paced rhythm  Clinical impression: abnormal EKG        Today's device interrogation was reviewed, which shows an increased LV threshold in 9 months until RRT. He appears to be in atrial fibrillation with no ATP or shocks delivered since last reset on 10/05/2023.    Assessment/Plan:     Problem List Items Addressed This Visit          Cardiac and Vasculature    Coronary artery disease of bypass graft of native heart with stable angina pectoris: Stable.  Has angina with increased wall tension and stress in the setting of fluid retention/elevated EDP.  Otherwise, does not have angina and has not required nitroglycerin.    Overview     3v CABG ([facility]) in 1990: LIMA to LAD, SVG to PD, SVG to OM  2006: other grafts patent but found to have  of SVG to OM; underwent PTCA/stenting of native OM with 2.5mm x 23mm LEILANI.  4/2008: only change was severe focal denovo stenosis of previously twice-stented native LCX with [facility] with third 2.5mm x 23mm Cypher LEILANI  ---l. Pulse generator replacement 04/28/08 ([facility]) Medtronic Concerto 154DWIC (serial # [id number]); pulse generator replacement 11/19  ---m. Recurrent angina 04/08, repeat limited native LCA angiogram 04/08 with focal edge dissection and instent restenosis, status post additional stenting ([facility]) with 3 additional [place] stents  ---n. Recurrent angina 12/08 with repeat catheterization confirming LVEF 40%, patent LIMA to LAD, patent SVG to RCA, patent LCx stent site; medical therapy  ---o. TTE 03/10 with LVEF 30-35%, no significant valvular dysfunction, moderate LAE and LVE; repeat 12/10 uncjhanged except LVEWF improved to 35-40%  ---p. BNP 03/10 259  pg/ml > repeat 12/10 165 pg/ml > repeat 06/12 70 pg/ml > repeat 10/12 200 pg/ml>repeat 11/17 154 pg/ml  ---q. Adenosine radionuclide stress test 03/10 and 12/10 with fixed shannan-apical scar, no ischemia, LVEF 40% rising to 50% with stress; medical therapy  ---r. ACS 03/11 with cardiac catheterization revealing patent LIMA to LAD, patent SVG to PD and PL of RCA, severe ISR of previously multiply (x 4 procedures/6 stents) stented distal LM/proximal LCX-OM    Redo CABG via lateral thoracotomy at [facility] ([place], 3/18/11) with SVG from descending Ao (entered through 5th intercostal space) to OM.  Op note scanned in under 'media' under date of procedure         Relevant Medications    Vericiguat 2.5 MG tablet    Vericiguat 2.5 MG tablet    nitroglycerin (NITROSTAT) 0.4 MG SL tablet    Presence of biventricular AICD    Permanent atrial fibrillation: Stable.  Appropriately anticoagulated with Xarelto.  We could consider Watchman if indefinite anticoagulation becomes problematic for him.    Relevant Medications    Vericiguat 2.5 MG tablet    Vericiguat 2.5 MG tablet    nitroglycerin (NITROSTAT) 0.4 MG SL tablet    S/P AV sma ablation    Chronic combined systolic and diastolic congestive heart failure - Primary: Worsening.  NYHA III.    Relevant Medications    Vericiguat 2.5 MG tablet    Vericiguat 2.5 MG tablet    nitroglycerin (NITROSTAT) 0.4 MG SL tablet    Ventricular tachycardia: No recent episodes requiring device therapy by today's device interrogation    Relevant Medications    Vericiguat 2.5 MG tablet    Vericiguat 2.5 MG tablet    nitroglycerin (NITROSTAT) 0.4 MG SL tablet         Recommendations/plans:    Mr. Darren Maria continues to struggle with NYHA II-III CHF symptoms with daily torsemide, and a need for an additional dose every third day.  He also starts to have more angina as fluid builds up, which resolves with additional diuretic (which was also proven during a hospitalization a few months ago, when  troponins were minimally elevated without significant delta, and presenting chest discomfort actually resolved after aggressive IV diuresis).  He also struggles mildly with orthostatic hypotension that did not improve much even on a trial off Flomax or with previous reduction in his Entresto dose.     We discussed the competing nature of treatments for heart failure and vasodilator for coronary disease, with causing orthostasis. He does state that the orthostasis is something he can live with, but if he could breathe better, and not retain fluid, he would prefer that    For now, given the complexity of all of his issues, I would like to try him on Verquvo 2.5 mg daily. I provided him with 2 weeks of samples, and advised that this may cause more hypotension, and dizziness, and to alert us if it does, but otherwise we might expect it to reduce heart failure symptoms that are plaguing him. Otherwise, continue all of his current CHF medications including Entresto, Jardiance, and spironolactone.    He also sought my advice on proceeding with procedure with Dr. Oliver. I advised him that I would certainly do so as any additional lead that might help improve his cardiac function could certainly help his symptoms, and quality of life.    Otherwise, we will continue to see him back for medication adjustment, and tentatively plan 3-month follow-up.    Transcribed from ambient dictation for Sarbjit Posadas MD by Tara Plaza.  01/11/24   14:05 CST    Patient or patient representative verbalized consent to the visit recording.    I Sarbjit Posadas MD have personally performed the services described in this document as scribed by the above individual, and it is both accurate and complete.   I have edited each component as needed.    58 minutes spent on day of service    Sarbjit Posadas MD  1/11/2024  21:53 CST

## 2024-01-17 ENCOUNTER — HOSPITAL ENCOUNTER (OUTPATIENT)
Facility: HOSPITAL | Age: 79
Setting detail: HOSPITAL OUTPATIENT SURGERY
Discharge: HOME | End: 2024-01-17
Attending: STUDENT IN AN ORGANIZED HEALTH CARE EDUCATION/TRAINING PROGRAM | Admitting: STUDENT IN AN ORGANIZED HEALTH CARE EDUCATION/TRAINING PROGRAM
Payer: MEDICARE

## 2024-01-17 VITALS
OXYGEN SATURATION: 100 % | HEART RATE: 77 BPM | SYSTOLIC BLOOD PRESSURE: 92 MMHG | TEMPERATURE: 97 F | HEIGHT: 71 IN | DIASTOLIC BLOOD PRESSURE: 54 MMHG | WEIGHT: 206.4 LBS | BODY MASS INDEX: 28.9 KG/M2 | RESPIRATION RATE: 16 BRPM

## 2024-01-17 DIAGNOSIS — T82.828A FIBROSIS DUE TO VASCULAR PROSTHETIC DEVICES, IMPLANTS AND GRAFTS, INITIAL ENCOUNTER: ICD-10-CM

## 2024-01-17 DIAGNOSIS — T82.828D: ICD-10-CM

## 2024-01-17 LAB
ANION GAP SERPL CALCULATED.3IONS-SCNC: 10 MMOL/L (ref 5–15)
BUN SERPL-MCNC: 22 MG/DL (ref 8–23)
BUN/CREAT SERPL: 22.4 (ref 7–25)
CALCIUM SPEC-SCNC: 9.1 MG/DL (ref 8.6–10.5)
CHLORIDE SERPL-SCNC: 106 MMOL/L (ref 98–107)
CO2 SERPL-SCNC: 25 MMOL/L (ref 22–29)
CREAT SERPL-MCNC: 0.98 MG/DL (ref 0.76–1.27)
EGFRCR SERPLBLD CKD-EPI 2021: 79.9 ML/MIN/1.73
GLUCOSE SERPL-MCNC: 121 MG/DL (ref 65–99)
POTASSIUM SERPL-SCNC: 4.7 MMOL/L (ref 3.5–5.2)
SODIUM SERPL-SCNC: 141 MMOL/L (ref 136–145)

## 2024-01-17 PROCEDURE — 25510000001 IOPAMIDOL PER 1 ML: Performed by: STUDENT IN AN ORGANIZED HEALTH CARE EDUCATION/TRAINING PROGRAM

## 2024-01-17 PROCEDURE — 75820 VEIN X-RAY ARM/LEG: CPT | Performed by: STUDENT IN AN ORGANIZED HEALTH CARE EDUCATION/TRAINING PROGRAM

## 2024-01-17 PROCEDURE — 80048 BASIC METABOLIC PNL TOTAL CA: CPT | Performed by: STUDENT IN AN ORGANIZED HEALTH CARE EDUCATION/TRAINING PROGRAM

## 2024-01-17 RX ORDER — SODIUM CHLORIDE 0.9 % (FLUSH) 0.9 %
10 SYRINGE (ML) INJECTION EVERY 12 HOURS SCHEDULED
Status: DISCONTINUED | OUTPATIENT
Start: 2024-01-17 | End: 2024-01-17 | Stop reason: HOSPADM

## 2024-01-17 RX ORDER — SODIUM CHLORIDE 0.9 % (FLUSH) 0.9 %
10 SYRINGE (ML) INJECTION AS NEEDED
Status: DISCONTINUED | OUTPATIENT
Start: 2024-01-17 | End: 2024-01-17 | Stop reason: HOSPADM

## 2024-01-17 RX ORDER — SODIUM CHLORIDE 9 MG/ML
40 INJECTION, SOLUTION INTRAVENOUS AS NEEDED
Status: DISCONTINUED | OUTPATIENT
Start: 2024-01-17 | End: 2024-01-17 | Stop reason: HOSPADM

## 2024-01-17 NOTE — NURSING NOTE
Pt arrived to [facility] for venogram of LUE this afternoon. Procedure without complication, pt stable afterwards. Pt spoke with Dr. Oliver briefly and requested that he have a conversation with his wife present. However, after waiting for approximately 2 hours pt was ready to d/c. Notified Dr. Oliver, informed pt that he could continue to wait or test results would be discussed at his next f/u appt. Pt d/c from [facility] in stable condition with wife

## 2024-01-17 NOTE — INTERVAL H&P NOTE
H&P reviewed. The patient was examined and there are no changes to the H&P.      Since the time of the last clinic visit, denies significant change in symptoms.  Denies missing any doses of his medications.  No new ER visits or hospitalizations.    Exam:  Unchanged from last clinic visit.    Labs:  Reviewed.    Assessment/plan:  Darren Maria is a 78 y.o. male who presents to the hospital for outpatient venogram for CRT-D malfunction and rapid battery depletion.  There are no apparent contraindications to proceeding and he is medically appropriate for outpatient management with this procedure.      I discussed risk and benefits of venogram including but not limited to contrast-induced nephropathy, lactic acidosis, allergic reaction among others.  Despite this, he would like to proceed.    Plan:  - Proceed with venogram

## 2024-01-18 RX ORDER — EMPAGLIFLOZIN 10 MG/1
10 TABLET, FILM COATED ORAL EVERY MORNING
Qty: 90 TABLET | Refills: 3 | OUTPATIENT
Start: 2024-01-18

## 2024-01-18 RX ORDER — SACUBITRIL AND VALSARTAN 97; 103 MG/1; MG/1
TABLET, FILM COATED ORAL
Qty: 60 TABLET | Refills: 11 | OUTPATIENT
Start: 2024-01-18

## 2024-01-23 ENCOUNTER — OFFICE VISIT (OUTPATIENT)
Dept: CARDIOLOGY | Facility: CLINIC | Age: 79
End: 2024-01-23
Payer: MEDICARE

## 2024-01-23 VITALS
HEART RATE: 75 BPM | OXYGEN SATURATION: 94 % | SYSTOLIC BLOOD PRESSURE: 78 MMHG | DIASTOLIC BLOOD PRESSURE: 44 MMHG | BODY MASS INDEX: 28.98 KG/M2 | HEIGHT: 71 IN | RESPIRATION RATE: 18 BRPM | WEIGHT: 207 LBS

## 2024-01-23 DIAGNOSIS — I44.2 CHB (COMPLETE HEART BLOCK): ICD-10-CM

## 2024-01-23 DIAGNOSIS — I48.21 PERMANENT ATRIAL FIBRILLATION: Primary | ICD-10-CM

## 2024-01-23 DIAGNOSIS — I50.42 CHRONIC COMBINED SYSTOLIC AND DIASTOLIC CONGESTIVE HEART FAILURE: ICD-10-CM

## 2024-01-23 RX ORDER — POTASSIUM CHLORIDE 1.5 G/1.58G
20 POWDER, FOR SOLUTION ORAL 2 TIMES DAILY
COMMUNITY

## 2024-01-29 ENCOUNTER — PREP FOR SURGERY (OUTPATIENT)
Dept: OTHER | Facility: HOSPITAL | Age: 79
End: 2024-01-29
Payer: MEDICARE

## 2024-01-29 DIAGNOSIS — I50.42 CHRONIC COMBINED SYSTOLIC AND DIASTOLIC CONGESTIVE HEART FAILURE: ICD-10-CM

## 2024-01-29 DIAGNOSIS — I44.2 CHB (COMPLETE HEART BLOCK): Primary | ICD-10-CM

## 2024-01-29 RX ORDER — SODIUM CHLORIDE 9 MG/ML
40 INJECTION, SOLUTION INTRAVENOUS AS NEEDED
OUTPATIENT
Start: 2024-01-29

## 2024-01-29 RX ORDER — CEFAZOLIN SODIUM 2 G/50ML
2000 SOLUTION INTRAVENOUS ONCE
OUTPATIENT
Start: 2024-01-29

## 2024-01-29 RX ORDER — VANCOMYCIN/0.9 % SOD CHLORIDE 1.5G/250ML
15 PLASTIC BAG, INJECTION (ML) INTRAVENOUS ONCE
OUTPATIENT
Start: 2024-01-29 | End: 2024-01-29

## 2024-01-29 RX ORDER — SODIUM CHLORIDE 0.9 % (FLUSH) 0.9 %
10 SYRINGE (ML) INJECTION EVERY 12 HOURS SCHEDULED
OUTPATIENT
Start: 2024-01-29

## 2024-01-29 RX ORDER — SODIUM CHLORIDE 0.9 % (FLUSH) 0.9 %
10 SYRINGE (ML) INJECTION AS NEEDED
OUTPATIENT
Start: 2024-01-29

## 2024-01-29 NOTE — PROGRESS NOTES
"Chief Complaint  Congestive Heart Failure (1 WK FU ) and Atrial Fibrillation    Subjective        History of Present Illness    EP Problems:  1.  Permanent atrial fibrillation  2.  Complete heart block status post AV node ablation  3.  Ventricular tachycardia  4.  Presence of a BiV ICD  -Elevated LV threshold causing rapid battery depletion with LV lead in the AIV     Cardiology Problems:  1.  Chronic systolic heart failure  -5/27/2023: EF 36 to 40%  2.  CAD status post CABG  3.  Ischemic cardiomyopathy  4.  Hypertension  5.  Hyperlipidemia     Medical Problems:  1.  BPH      Darren Maria is a 78 y.o. male with problem list as above who presents to the clinic for follow up of chronic systolic heart failure, CRT nonresponder.  He underwent venogram recently.  Feels about the same.    Objective   Vital Signs:  BP (!) 78/44 (BP Location: Right arm, Patient Position: Sitting)   Pulse 75   Resp 18   Ht 180.3 cm (71\")   Wt 93.9 kg (207 lb)   SpO2 94%   BMI 28.87 kg/m²   Estimated body mass index is 28.87 kg/m² as calculated from the following:    Height as of this encounter: 180.3 cm (71\").    Weight as of this encounter: 93.9 kg (207 lb).      Physical Exam  Vitals reviewed.   Constitutional:       Appearance: Normal appearance.   Cardiovascular:      Rate and Rhythm: Normal rate and regular rhythm.      Pulses: Normal pulses.      Heart sounds: Normal heart sounds.   Pulmonary:      Effort: Pulmonary effort is normal.      Breath sounds: Normal breath sounds.   Musculoskeletal:         General: No swelling.   Neurological:      Mental Status: He is alert and oriented to person, place, and time.   Psychiatric:         Mood and Affect: Mood normal.         Judgment: Judgment normal.        Result Review :  The following data was reviewed by: Devika Oliver MD on 01/23/2024:    [id number] SCORE   [id number] Score: 6 (1/29/2024  1:03 AM)                 Assessment and Plan   Diagnoses and all orders for this " visit:    1. Permanent atrial fibrillation (Primary)    2. CHB (complete heart block)    3. Chronic combined systolic and diastolic congestive heart failure        Darren Maria is a 78 y.o. male with problem list as above who presents to the clinic for follow up of chronic systolic heart failure, complete heart block, permanent atrial fibrillation, CRT nonresponder.  We discussed available treatment options today.  I do think that his best option would be for consideration of a new LV lead implant given that his old LV lead is both draining his battery life as well as not providing appropriate resynchronization.  There is some question whether his axillary vein is open and not from the available venogram.  Will plan to try to implanting the lead from the left side but this is not possible we will consider implanting a new lead from the right side and tunneling it to the left.    I have discussed risks, benefits, and alternatives of an implantable cardiac defibrillator generator change and new lead implant with the patient.  Alternatives discussed include continued observation and medical management.  An implantable cardiac defibrillator implant is an inherently high risk procedure with possible complications that include but are not limited to acute or chronic pain at the implant site, bleeding and hematoma, air embolism, pneumothorax, hemothorax, pericardial effusion requiring pericardiocentesis or cardiac surgery, lead dislodgement, lead or device malfunction, infection, inappropriate shocks, contrast induced nephropathy, and ultimately death.  We discussed that while defibrillators reduce mortality, they are not perfect devices and people can still pass away with from arrhythmias despite the presence of a defibrillator.  The possible need for additional procedures and/or medical therapy was additionally discussed. Questions asked were appropriately answered.  No guarantees were made or implied.     Despite  this, they would still like to proceed.      Plan:  -Schedule for LV lead revision  -No medication changes         Follow Up   Return in about 6 weeks (around 3/5/2024).  Patient was given instructions and counseling regarding his condition or for health maintenance advice. Please see specific information pulled into the AVS if appropriate.     Part of this note may be an electronic transcription/translation of spoken language to printed text using the Dragon Dictation System.

## 2024-02-25 ENCOUNTER — HOSPITAL ENCOUNTER (INPATIENT)
Facility: HOSPITAL | Age: 79
LOS: 2 days | Discharge: HOME | DRG: 277 | End: 2024-02-29
Attending: INTERNAL MEDICINE | Admitting: INTERNAL MEDICINE
Payer: MEDICARE

## 2024-02-25 ENCOUNTER — APPOINTMENT (OUTPATIENT)
Dept: GENERAL RADIOLOGY | Facility: HOSPITAL | Age: 79
DRG: 277 | End: 2024-02-25
Payer: MEDICARE

## 2024-02-25 DIAGNOSIS — I44.2 CHB (COMPLETE HEART BLOCK): ICD-10-CM

## 2024-02-25 DIAGNOSIS — I50.42 CHRONIC COMBINED SYSTOLIC AND DIASTOLIC CONGESTIVE HEART FAILURE: ICD-10-CM

## 2024-02-25 DIAGNOSIS — R06.00 DYSPNEA, UNSPECIFIED TYPE: Primary | ICD-10-CM

## 2024-02-25 DIAGNOSIS — R07.9 CHEST PAIN, UNSPECIFIED TYPE: ICD-10-CM

## 2024-02-25 LAB
ALBUMIN SERPL-MCNC: 3.7 G/DL (ref 3.5–5.2)
ALBUMIN/GLOB SERPL: 1.9 G/DL
ALP SERPL-CCNC: 46 U/L (ref 39–117)
ALT SERPL W P-5'-P-CCNC: 9 U/L (ref 1–41)
ANION GAP SERPL CALCULATED.3IONS-SCNC: 12 MMOL/L (ref 5–15)
AST SERPL-CCNC: 13 U/L (ref 1–40)
BASOPHILS # BLD AUTO: 0.03 10*3/MM3 (ref 0–0.2)
BASOPHILS NFR BLD AUTO: 0.4 % (ref 0–1.5)
BILIRUB SERPL-MCNC: 0.8 MG/DL (ref 0–1.2)
BUN SERPL-MCNC: 45 MG/DL (ref 8–23)
BUN/CREAT SERPL: 44.1 (ref 7–25)
CALCIUM SPEC-SCNC: 9.1 MG/DL (ref 8.6–10.5)
CHLORIDE SERPL-SCNC: 106 MMOL/L (ref 98–107)
CO2 SERPL-SCNC: 22 MMOL/L (ref 22–29)
CREAT SERPL-MCNC: 1.02 MG/DL (ref 0.76–1.27)
D DIMER PPP FEU-MCNC: <0.27 MCGFEU/ML (ref 0–0.76)
DEPRECATED RDW RBC AUTO: 55 FL (ref 37–54)
EGFRCR SERPLBLD CKD-EPI 2021: 76.2 ML/MIN/1.73
EOSINOPHIL # BLD AUTO: 0.03 10*3/MM3 (ref 0–0.4)
EOSINOPHIL NFR BLD AUTO: 0.4 % (ref 0.3–6.2)
ERYTHROCYTE [DISTWIDTH] IN BLOOD BY AUTOMATED COUNT: 15.6 % (ref 12.3–15.4)
GEN 5 2HR TROPONIN T REFLEX: 40 NG/L
GLOBULIN UR ELPH-MCNC: 2 GM/DL
GLUCOSE BLDC GLUCOMTR-MCNC: 111 MG/DL (ref 70–130)
GLUCOSE SERPL-MCNC: 132 MG/DL (ref 65–99)
HCT VFR BLD AUTO: 30.9 % (ref 37.5–51)
HGB BLD-MCNC: 9.8 G/DL (ref 13–17.7)
HOLD SPECIMEN: NORMAL
HOLD SPECIMEN: NORMAL
IMM GRANULOCYTES # BLD AUTO: 0.03 10*3/MM3 (ref 0–0.05)
IMM GRANULOCYTES NFR BLD AUTO: 0.4 % (ref 0–0.5)
LYMPHOCYTES # BLD AUTO: 0.6 10*3/MM3 (ref 0.7–3.1)
LYMPHOCYTES NFR BLD AUTO: 7.8 % (ref 19.6–45.3)
MCH RBC QN AUTO: 31.5 PG (ref 26.6–33)
MCHC RBC AUTO-ENTMCNC: 31.7 G/DL (ref 31.5–35.7)
MCV RBC AUTO: 99.4 FL (ref 79–97)
MONOCYTES # BLD AUTO: 0.55 10*3/MM3 (ref 0.1–0.9)
MONOCYTES NFR BLD AUTO: 7.1 % (ref 5–12)
NEUTROPHILS NFR BLD AUTO: 6.47 10*3/MM3 (ref 1.7–7)
NEUTROPHILS NFR BLD AUTO: 83.9 % (ref 42.7–76)
NRBC BLD AUTO-RTO: 0 /100 WBC (ref 0–0.2)
NT-PROBNP SERPL-MCNC: 579.4 PG/ML (ref 0–1800)
PLATELET # BLD AUTO: 272 10*3/MM3 (ref 140–450)
PMV BLD AUTO: 10.2 FL (ref 6–12)
POTASSIUM SERPL-SCNC: 4.7 MMOL/L (ref 3.5–5.2)
PROT SERPL-MCNC: 5.7 G/DL (ref 6–8.5)
RBC # BLD AUTO: 3.11 10*6/MM3 (ref 4.14–5.8)
SODIUM SERPL-SCNC: 140 MMOL/L (ref 136–145)
TROPONIN T DELTA: 0 NG/L
TROPONIN T SERPL HS-MCNC: 40 NG/L
WBC NRBC COR # BLD AUTO: 7.71 10*3/MM3 (ref 3.4–10.8)
WHOLE BLOOD HOLD COAG: NORMAL
WHOLE BLOOD HOLD SPECIMEN: NORMAL

## 2024-02-25 PROCEDURE — 93005 ELECTROCARDIOGRAM TRACING: CPT

## 2024-02-25 PROCEDURE — 93010 ELECTROCARDIOGRAM REPORT: CPT | Performed by: INTERNAL MEDICINE

## 2024-02-25 PROCEDURE — 85379 FIBRIN DEGRADATION QUANT: CPT | Performed by: INTERNAL MEDICINE

## 2024-02-25 PROCEDURE — 80053 COMPREHEN METABOLIC PANEL: CPT | Performed by: INTERNAL MEDICINE

## 2024-02-25 PROCEDURE — 36415 COLL VENOUS BLD VENIPUNCTURE: CPT

## 2024-02-25 PROCEDURE — G0378 HOSPITAL OBSERVATION PER HR: HCPCS

## 2024-02-25 PROCEDURE — 84484 ASSAY OF TROPONIN QUANT: CPT | Performed by: INTERNAL MEDICINE

## 2024-02-25 PROCEDURE — 99285 EMERGENCY DEPT VISIT HI MDM: CPT

## 2024-02-25 PROCEDURE — 71045 X-RAY EXAM CHEST 1 VIEW: CPT

## 2024-02-25 PROCEDURE — 82948 REAGENT STRIP/BLOOD GLUCOSE: CPT

## 2024-02-25 PROCEDURE — 93005 ELECTROCARDIOGRAM TRACING: CPT | Performed by: INTERNAL MEDICINE

## 2024-02-25 PROCEDURE — 85025 COMPLETE CBC W/AUTO DIFF WBC: CPT | Performed by: INTERNAL MEDICINE

## 2024-02-25 PROCEDURE — 83880 ASSAY OF NATRIURETIC PEPTIDE: CPT | Performed by: INTERNAL MEDICINE

## 2024-02-25 RX ORDER — ZOLPIDEM TARTRATE 5 MG/1
5 TABLET ORAL NIGHTLY PRN
Status: DISCONTINUED | OUTPATIENT
Start: 2024-02-25 | End: 2024-02-29 | Stop reason: HOSPADM

## 2024-02-25 RX ORDER — PREGABALIN 75 MG/1
150 CAPSULE ORAL 2 TIMES DAILY
Status: DISCONTINUED | OUTPATIENT
Start: 2024-02-25 | End: 2024-02-29 | Stop reason: HOSPADM

## 2024-02-25 RX ORDER — ATORVASTATIN CALCIUM 40 MG/1
40 TABLET, FILM COATED ORAL DAILY
Status: DISCONTINUED | OUTPATIENT
Start: 2024-02-25 | End: 2024-02-29 | Stop reason: HOSPADM

## 2024-02-25 RX ORDER — SODIUM CHLORIDE 0.9 % (FLUSH) 0.9 %
10 SYRINGE (ML) INJECTION AS NEEDED
Status: DISCONTINUED | OUTPATIENT
Start: 2024-02-25 | End: 2024-02-29 | Stop reason: HOSPADM

## 2024-02-25 RX ORDER — SODIUM CHLORIDE 9 MG/ML
40 INJECTION, SOLUTION INTRAVENOUS AS NEEDED
Status: DISCONTINUED | OUTPATIENT
Start: 2024-02-25 | End: 2024-02-29 | Stop reason: HOSPADM

## 2024-02-25 RX ORDER — TAMSULOSIN HYDROCHLORIDE 0.4 MG/1
0.4 CAPSULE ORAL DAILY
Status: DISCONTINUED | OUTPATIENT
Start: 2024-02-25 | End: 2024-02-29 | Stop reason: HOSPADM

## 2024-02-25 RX ORDER — FINASTERIDE 5 MG/1
5 TABLET, FILM COATED ORAL DAILY
Status: DISCONTINUED | OUTPATIENT
Start: 2024-02-25 | End: 2024-02-29 | Stop reason: HOSPADM

## 2024-02-25 RX ORDER — CLOPIDOGREL BISULFATE 75 MG/1
75 TABLET ORAL DAILY
Status: DISCONTINUED | OUTPATIENT
Start: 2024-02-25 | End: 2024-02-29 | Stop reason: HOSPADM

## 2024-02-25 RX ORDER — ACETAMINOPHEN 325 MG/1
650 TABLET ORAL EVERY 4 HOURS PRN
Status: DISCONTINUED | OUTPATIENT
Start: 2024-02-25 | End: 2024-02-29 | Stop reason: HOSPADM

## 2024-02-25 RX ORDER — PANTOPRAZOLE SODIUM 40 MG/1
40 TABLET, DELAYED RELEASE ORAL
Status: DISCONTINUED | OUTPATIENT
Start: 2024-02-26 | End: 2024-02-29 | Stop reason: HOSPADM

## 2024-02-25 RX ORDER — CETIRIZINE HYDROCHLORIDE 10 MG/1
10 TABLET ORAL AS NEEDED
Status: DISCONTINUED | OUTPATIENT
Start: 2024-02-25 | End: 2024-02-29 | Stop reason: HOSPADM

## 2024-02-25 RX ORDER — SOTALOL HYDROCHLORIDE 80 MG/1
80 TABLET ORAL EVERY 12 HOURS SCHEDULED
Status: DISCONTINUED | OUTPATIENT
Start: 2024-02-25 | End: 2024-02-29 | Stop reason: HOSPADM

## 2024-02-25 RX ORDER — SODIUM CHLORIDE 0.9 % (FLUSH) 0.9 %
10 SYRINGE (ML) INJECTION AS NEEDED
Status: DISCONTINUED | OUTPATIENT
Start: 2024-02-25 | End: 2024-02-27 | Stop reason: SDUPTHER

## 2024-02-25 RX ORDER — FUROSEMIDE 10 MG/ML
80 INJECTION INTRAMUSCULAR; INTRAVENOUS ONCE
Status: DISCONTINUED | OUTPATIENT
Start: 2024-02-25 | End: 2024-02-26

## 2024-02-25 RX ORDER — RANOLAZINE 500 MG/1
1000 TABLET, EXTENDED RELEASE ORAL 2 TIMES DAILY
Status: DISCONTINUED | OUTPATIENT
Start: 2024-02-25 | End: 2024-02-29 | Stop reason: HOSPADM

## 2024-02-25 RX ORDER — SODIUM CHLORIDE 0.9 % (FLUSH) 0.9 %
10 SYRINGE (ML) INJECTION EVERY 12 HOURS SCHEDULED
Status: DISCONTINUED | OUTPATIENT
Start: 2024-02-25 | End: 2024-02-29 | Stop reason: HOSPADM

## 2024-02-25 RX ADMIN — SOTALOL HYDROCHLORIDE 80 MG: 80 TABLET ORAL at 20:04

## 2024-02-25 RX ADMIN — FINASTERIDE 5 MG: 5 TABLET, FILM COATED ORAL at 20:04

## 2024-02-25 RX ADMIN — METFORMIN HCL 500 MG: 500 TABLET ORAL at 20:04

## 2024-02-25 RX ADMIN — ATORVASTATIN CALCIUM 40 MG: 40 TABLET ORAL at 20:04

## 2024-02-25 RX ADMIN — RIVAROXABAN 20 MG: 20 TABLET, FILM COATED ORAL at 20:04

## 2024-02-25 RX ADMIN — ZOLPIDEM TARTRATE 5 MG: 5 TABLET ORAL at 23:19

## 2024-02-25 RX ADMIN — PREGABALIN 150 MG: 75 CAPSULE ORAL at 20:04

## 2024-02-25 RX ADMIN — Medication 12.5 MG: at 20:04

## 2024-02-25 RX ADMIN — TAMSULOSIN HYDROCHLORIDE 0.4 MG: 0.4 CAPSULE ORAL at 20:04

## 2024-02-25 RX ADMIN — EMPAGLIFLOZIN 10 MG: 10 TABLET, FILM COATED ORAL at 20:04

## 2024-02-25 RX ADMIN — RANOLAZINE 1000 MG: 500 TABLET, FILM COATED, EXTENDED RELEASE ORAL at 20:04

## 2024-02-25 RX ADMIN — CLOPIDOGREL BISULFATE 75 MG: 75 TABLET, FILM COATED ORAL at 20:04

## 2024-02-25 RX ADMIN — Medication 10 ML: at 20:05

## 2024-02-25 NOTE — Clinical Note
Prepped: chest. Prepped with: Hibiclens. The site was clipped. The patient was draped in a sterile fashion.

## 2024-02-25 NOTE — PLAN OF CARE
Problem: Adult Inpatient Plan of Care  Goal: Plan of Care Review  Outcome: Ongoing, Progressing  Goal: Patient-Specific Goal (Individualized)  Outcome: Ongoing, Progressing  Goal: Absence of Hospital-Acquired Illness or Injury  Outcome: Ongoing, Progressing  Intervention: Identify and Manage Fall Risk  Recent Flowsheet Documentation  Taken 2/25/2024 1713 by Marco Jacques RN  Safety Promotion/Fall Prevention: safety round/check completed  Intervention: Prevent Skin Injury  Recent Flowsheet Documentation  Taken 2/25/2024 1713 by Marco Jacques RN  Body Position: position changed independently  Intervention: Prevent and Manage VTE (Venous Thromboembolism) Risk  Recent Flowsheet Documentation  Taken 2/25/2024 1713 by Marco Jacques RN  Activity Management: up ad jade  Goal: Optimal Comfort and Wellbeing  Outcome: Ongoing, Progressing  Intervention: Provide Person-Centered Care  Recent Flowsheet Documentation  Taken 2/25/2024 1713 by Marco Jacques RN  Trust Relationship/Rapport: care explained  Goal: Readiness for Transition of Care  Outcome: Ongoing, Progressing  Intervention: Mutually Develop Transition Plan  Recent Flowsheet Documentation  Taken 2/25/2024 1707 by Marco Jacques RN  Transportation Anticipated: car, drives self  Patient/Family Anticipated Services at Transition: none  Patient/Family Anticipates Transition to: home with family  Taken 2/25/2024 1706 by Marco Jacques RN  Equipment Currently Used at Home: cane, quad tip     Problem: Heart Failure Comorbidity  Goal: Maintenance of Heart Failure Symptom Control  Outcome: Ongoing, Progressing  Intervention: Maintain Heart Failure-Management  Recent Flowsheet Documentation  Taken 2/25/2024 1713 by Marco Jacques RN  Medication Review/Management: medications reviewed   Goal Outcome Evaluation:

## 2024-02-25 NOTE — ED PROVIDER NOTES
Subjective   History of Present Illness  78-year-old male who presents via EMS.  He notes he has had a history of CABG x 2 with 4-5 pacemakers in the past.  He states Wednesday he is scheduled to get a new pacemaker.  He has developed chest pain that is light in the wall, heavy.  He noticed that he could not breathe during the episode.  When he sits down and improves.  The improvement is to a light pressure, which she describes as present on my exam.  He states when he would walk to the door go to the bathroom that he has dizziness, chest pain that worsens, and shortness of breath so severe that he does not feel like he can make it back to the chair.  He took nitro today around 11-11 30 and it did not help him.  EMS was called at noon.  He does experience nausea with these episodes.  He also notes that the pain at severe times will go down into his shoulder and left arm.  He has no acute bowel or kidney changes.    Review of Systems   Constitutional:  Negative for chills and fever.   Respiratory:  Positive for shortness of breath. Negative for cough.    Cardiovascular:  Positive for chest pain and leg swelling.       Past Medical History:   Diagnosis Date    Abdominal pain, epigastric     Abnormal abdominal exam     ABFND, RADIOLOGICAL, ABDOMINAL AREA     Abnormal ECG     Anticoagulated on Coumadin     Atherosclerosis of coronary artery bypass graft     ATHEROSCLEROSIS, CORONARY, ARTERY BYPASS GRFT    Atrial fibrillation     Cancer of right kidney     right sided kidney cancer    Cardiomyopathy     Cardiomyopathy, primary     CHF (congestive heart failure)     Clotting disorder     Colon polyps     Congenital heart disease     Congestive heart failure     Coronary artery disease     History of CAD/A-Fib/Pacemaker/Defibrillator placement: pt takes Coumadin and plavix therapy as well as ASA daily    Diabetes mellitus     Gastric polyp     Gastroesophageal reflux disease without esophagitis     History of colon polyps   "   Hypercholesterolemia     Hypertension, essential     Melena     Myocardial infarction     NSAID long-term use     Obesity     Pacemaker     Pacemaker Dependant    Platelet inhibition due to Plavix     Presence of stent in coronary artery     Multiple coronary stenting most recently 4/2008    Single kidney     Only 1 Kidney    Status post surgery     s/p Polypectomy Bleed       Allergies   Allergen Reactions    Azithromycin Nausea And Vomiting    Morphine And Related Other (See Comments)     SHAKES AND MAKES HIM \"WIRED\".   TAKES TRAMADOL WITHOUT PROBLEM       Past Surgical History:   Procedure Laterality Date    ABLATION OF DYSRHYTHMIC FOCUS      APPENDECTOMY      CARDIAC ABLATION      CARDIAC CATHETERIZATION      CARDIAC CATHETERIZATION Left 12/15/2021    Procedure: Coronary angiography;  Surgeon: Sarbjit Posadas MD;  Location:  [facility] CATH INVASIVE LOCATION;  Service: Cardiology;  Laterality: Left;    CARDIAC CATHETERIZATION Left 12/15/2021    Procedure: Percutaneous Coronary Intervention;  Surgeon: Sarbjit Posadas MD;  Location: [facility] CATH INVASIVE LOCATION;  Service: Cardiology;  Laterality: Left;    CARDIAC DEFIBRILLATOR PLACEMENT      CARDIAC PACEMAKER PLACEMENT      Pacemaker Placement    CAROTID STENT  2001    CATARACT EXTRACTION, BILATERAL      CHOLECYSTECTOMY      COLONOSCOPY  08/27/2013    snare hep, trans tics recall 3 yr    COLONOSCOPY  10/02/2006    few scattered diverticula    COLONOSCOPY  07/22/2009    tubular adenoma in the ascending colon    COLONOSCOPY  07/25/2005    several polyps in the ascending colon, one in the transverse colon    COLONOSCOPY N/A 03/21/2022    Procedure: COLONOSCOPY WITH ANESTHESIA;  Surgeon: Phil Goodwin MD;  Location: [facility] ENDOSCOPY;  Service: Gastroenterology;  Laterality: N/A;  pre: hx polyps  post: polyps  Rolan Salvador MD    COLONOSCOPY W/ POLYPECTOMY  09/19/2016    Tubular adenoma hepatic flexure, 2 Hyperplastic polyps rectum and at 50 cm, Benign " polyp at 70 cm repeat exam in 3-5 years    CORONARY ARTERY BYPASS GRAFT  02/2011    2nd time    CORONARY ARTERY BYPASS GRAFT  1990    CORONARY STENT PLACEMENT      ENDOSCOPY  04/14/2016    nl slant    ENDOSCOPY  11/10/2014    clips at polypectomy bx no residual recall 1 yr    ENDOSCOPY  06/19/2014    snare clip body recall 6 months    ENDOSCOPY  05/19/2014    polyp stomach bx and clip    HERNIA REPAIR      with mesh    INTERVENTIONAL RADIOLOGY PROCEDURE Left 1/17/2024    Procedure: Venogram upper extremity left, 03949, [id number];  Surgeon: Devika Oliver MD;  Location:  [facility] CATH INVASIVE LOCATION;  Service: Cardiovascular;  Laterality: Left;    JOINT REPLACEMENT      OTHER SURGICAL HISTORY      Defibrillator/Pacer maker exchange    OTHER SURGICAL HISTORY      Pacemaker/Defibillation exchange 2012    PENILE PROSTHESIS IMPLANT N/A 07/27/2018    Procedure: PENILE PROSTHESIS PLACEMENT;  Surgeon: Godwin Gupta MD;  Location:  [facility] OR;  Service: Urology    PERIPHERAL ARTERIAL STENT GRAFT         Family History   Problem Relation Age of Onset    Lung cancer Mother     Heart attack Mother     No Known Problems Father     Other Neg Hx         GI Malignancies    Colon cancer Neg Hx     Colon polyps Neg Hx        Social History     Socioeconomic History    Marital status:    Tobacco Use    Smoking status: Never    Smokeless tobacco: Never   Vaping Use    Vaping Use: Never used   Substance and Sexual Activity    Alcohol use: Not Currently     Comment: occ    Drug use: Never    Sexual activity: Not Currently     Partners: Female     Birth control/protection: Vasectomy           Objective   Physical Exam  Vitals reviewed.   Constitutional:       Appearance: He is ill-appearing.   HENT:      Head: Normocephalic and atraumatic.      Nose: Nose normal.   Eyes:      Conjunctiva/sclera: Conjunctivae normal.   Cardiovascular:      Rate and Rhythm: Normal rate and regular rhythm.      Heart sounds: Normal heart sounds.    Pulmonary:      Effort: Pulmonary effort is normal.      Breath sounds: Normal breath sounds. No decreased breath sounds, wheezing or rhonchi.   Abdominal:      General: Bowel sounds are normal.      Palpations: Abdomen is soft.   Musculoskeletal:         General: No tenderness.      Cervical back: Normal range of motion and neck supple.      Right lower leg: Edema present.      Left lower leg: Edema present.      Comments: +1 pitting bilateral LE edema.   Skin:     General: Skin is warm and dry.   Neurological:      General: No focal deficit present.      Mental Status: He is alert.      Cranial Nerves: No cranial nerve deficit.   Psychiatric:         Mood and Affect: Mood normal. Mood is not anxious.         Procedures       Lab Results (last 24 hours)       Procedure Component Value Units Date/Time    CBC & Differential [[id number]]  (Abnormal) Collected: 02/25/24 1302    Specimen: Blood Updated: 02/25/24 1311    Narrative:      The following orders were created for panel order CBC & Differential.  Procedure                               Abnormality         Status                     ---------                               -----------         ------                     CBC Auto Differential[[id number]]        Abnormal            Final result                 Please view results for these tests on the individual orders.    CBC Auto Differential [[id number]]  (Abnormal) Collected: 02/25/24 1302    Specimen: Blood Updated: 02/25/24 1311     WBC 7.71 10*3/mm3      RBC 3.11 10*6/mm3      Hemoglobin 9.8 g/dL      Hematocrit 30.9 %      MCV 99.4 fL      MCH 31.5 pg      MCHC 31.7 g/dL      RDW 15.6 %      RDW-SD 55.0 fl      MPV 10.2 fL      Platelets 272 10*3/mm3      Neutrophil % 83.9 %      Lymphocyte % 7.8 %      Monocyte % 7.1 %      Eosinophil % 0.4 %      Basophil % 0.4 %      Immature Grans % 0.4 %      Neutrophils, Absolute 6.47 10*3/mm3      Lymphocytes, Absolute 0.60 10*3/mm3      Monocytes, Absolute 0.55  "10*3/mm3      Eosinophils, Absolute 0.03 10*3/mm3      Basophils, Absolute 0.03 10*3/mm3      Immature Grans, Absolute 0.03 10*3/mm3      nRBC 0.0 /100 WBC     D-dimer, Quantitative [[id number]]  (Normal) Collected: 02/25/24 1302    Specimen: Blood Updated: 02/25/24 1448     D-Dimer, Quantitative <0.27 MCGFEU/mL     Narrative:      According to the assay 's published package insert, a normal (<0.50 MCGFEU/mL) D-dimer result in conjunction with a non-high clinical probability assessment, excludes deep vein thrombosis (DVT) and pulmonary embolism (PE) with high sensitivity.    D-dimer values increase with age and this can make VTE exclusion of an older population difficult. To address this, the [employer], based on best available evidence and recent guidelines, recommends that clinicians use age-adjusted D-dimer thresholds in patients greater than 50 years of age with: a) a low probability of PE who do not meet all Pulmonary Embolism Rule Out Criteria, or b) in those with intermediate probability of PE.   The formula for an age-adjusted D-dimer cut-off is \"age/100\".  For example, a 60 year old patient would have an age-adjusted cut-off of 0.60 MCGFEU/mL and an 80 year old 0.80 MCGFEU/mL.    Comprehensive Metabolic Panel [[id number]]  (Abnormal) Collected: 02/25/24 1322    Specimen: Blood Updated: 02/25/24 1358     Glucose 132 mg/dL      BUN 45 mg/dL      Creatinine 1.02 mg/dL      Sodium 140 mmol/L      Potassium 4.7 mmol/L      Comment: Slight hemolysis detected by analyzer. Result may be falsely elevated.        Chloride 106 mmol/L      CO2 22.0 mmol/L      Calcium 9.1 mg/dL      Total Protein 5.7 g/dL      Albumin 3.7 g/dL      ALT (SGPT) 9 U/L      AST (SGOT) 13 U/L      Alkaline Phosphatase 46 U/L      Total Bilirubin 0.8 mg/dL      Globulin 2.0 gm/dL      A/G Ratio 1.9 g/dL      BUN/Creatinine Ratio 44.1     Anion Gap 12.0 mmol/L      eGFR 76.2 mL/min/1.73     Narrative:      GFR " Normal >60  Chronic Kidney Disease <60  Kidney Failure <15    The GFR formula is only valid for adults with stable renal function between ages 18 and 70.    High Sensitivity Troponin T [[id number]]  (Abnormal) Collected: 02/25/24 1322    Specimen: Blood Updated: 02/25/24 1356     HS Troponin T 40 ng/L     Narrative:      High Sensitive Troponin T Reference Range:  <14.0 ng/L- Negative Female for AMI  <22.0 ng/L- Negative Male for AMI  >=14 - Abnormal Female indicating possible myocardial injury.  >=22 - Abnormal Male indicating possible myocardial injury.   Clinicians would have to utilize clinical acumen, EKG, Troponin, and serial changes to determine if it is an Acute Myocardial Infarction or myocardial injury due to an underlying chronic condition.         BNP [[id number]]  (Normal) Collected: 02/25/24 1322    Specimen: Blood Updated: 02/25/24 1356     proBNP 579.4 pg/mL     Narrative:      This assay is used as an aid in the diagnosis of individuals suspected of having heart failure. It can be used as an aid in the diagnosis of acute decompensated heart failure (ADHF) in patients presenting with signs and symptoms of ADHF to the emergency department (ED). In addition, NT-proBNP of <300 pg/mL indicates ADHF is not likely.    Age Range Result Interpretation  NT-proBNP Concentration (pg/mL:      <50             Positive            >450                   Gray                 300-450                    Negative             <300    50-75           Positive            >900                  Gray                300-900                  Negative            <300      >75             Positive            >1800                  Gray                300-1800                  Negative            <300    High Sensitivity Troponin T 2Hr [[id number]]  (Abnormal) Collected: 02/25/24 1517    Specimen: Blood from Arm, Left Updated: 02/25/24 1539     HS Troponin T 40 ng/L      Troponin T Delta 0 ng/L     Narrative:      High Sensitive  "Troponin T Reference Range:  <14.0 ng/L- Negative Female for AMI  <22.0 ng/L- Negative Male for AMI  >=14 - Abnormal Female indicating possible myocardial injury.  >=22 - Abnormal Male indicating possible myocardial injury.   Clinicians would have to utilize clinical acumen, EKG, Troponin, and serial changes to determine if it is an Acute Myocardial Infarction or myocardial injury due to an underlying chronic condition.               XR Chest 1 View   Final Result   1. No acute lung disease.               This report was signed and finalized on 2/25/2024 2:14 PM by Dr. Samuel Allen MD.                ED Course  ED Course as of 02/25/24 1610   Sun Feb 25, 2024   1432 I spoke with Dr. Salvador.  He asked that I contact Dr. Benítez, cardiology as he believes his cardiac disease is an operable but once me to review the case with cardiology. [AJ]   2256 I reviewed the patient's labs and x-rays with him.  I discussed at this time that I did not have a reason for his chest pain and shortness of breath.  I discussed with him that I would call cardiology to discuss further.  He tells me \"I guess.\" [AJ]   9923 Spoke with the patient and family at bedside RE admission and plan of care.  [AJ]      ED Course User Index  [AJ] Qing Webber, DO                                             Medical Decision Making  DDx: Cardiac arrhythmia, congestive heart failure, NSTEMI    Problems Addressed:  Chest pain, unspecified type: complicated acute illness or injury  Dyspnea, unspecified type: complicated acute illness or injury    Amount and/or Complexity of Data Reviewed  Labs: ordered.     Details: CBC CMP BNP troponin  Radiology: ordered.     Details: Chest x-ray  ECG/medicine tests: ordered.    Risk  Prescription drug management.  Decision regarding hospitalization.        Final diagnoses:   Dyspnea, unspecified type   Chest pain, unspecified type       ED Disposition  ED Disposition       ED Disposition   Decision to Admit    " Condition   --    Comment   Level of Care: Telemetry [5]   Diagnosis: Dyspnea [378809]   Admitting Physician: SCOOTER FERRARI [[id number]]                 No follow-up provider specified.       Medication List      No changes were made to your prescriptions during this visit.            Qing Webber,   02/25/24 1301       Qing Webber,   02/25/24 1610

## 2024-02-25 NOTE — ED NOTES
Nursing report ED to floor  Darren Maria  78 y.o.  male    HPI:   Chief Complaint   Patient presents with    Chest Pain    Shortness of Breath       Admitting doctor:   Rolan Salvador MD    Consulting provider(s):  Consults       No orders found from 1/27/2024 to 2/26/2024.             Admitting diagnosis:   The primary encounter diagnosis was Dyspnea, unspecified type. A diagnosis of Chest pain, unspecified type was also pertinent to this visit.    Code status:   Current Code Status       Date Active Code Status Order ID Comments User Context       Prior            Allergies:   Azithromycin and Morphine and related    Intake and Output  No intake or output data in the 24 hours ending 02/25/24 1623    Weight:       02/25/24  1238   Weight: 93.4 kg (206 lb)       Most recent vitals:   Vitals:    02/25/24 1432 02/25/24 1546 02/25/24 1601 02/25/24 1616   BP: 97/59 96/79 97/58 112/58   Pulse: 75 75 75 75   Resp:       Temp:       TempSrc:       SpO2: 99% 90% 92% 100%   Weight:       Height:         Oxygen Therapy: .    Active LDAs/IV Access:   Lines, Drains & Airways       Active LDAs       Name Placement date Placement time Site Days    Peripheral IV 01/17/24 1240 Right;Posterior Forearm 01/17/24  1240  Forearm  39    Peripheral IV 02/25/24 1306 Posterior;Right Wrist 02/25/24  1306  Wrist  less than 1                    Labs (abnormal labs have a star):   Labs Reviewed   COMPREHENSIVE METABOLIC PANEL - Abnormal; Notable for the following components:       Result Value    Glucose 132 (*)     BUN 45 (*)     Total Protein 5.7 (*)     BUN/Creatinine Ratio 44.1 (*)     All other components within normal limits    Narrative:     GFR Normal >60  Chronic Kidney Disease <60  Kidney Failure <15    The GFR formula is only valid for adults with stable renal function between ages 18 and 70.   TROPONIN - Abnormal; Notable for the following components:    HS Troponin T 40 (*)     All other components within normal limits     Narrative:     High Sensitive Troponin T Reference Range:  <14.0 ng/L- Negative Female for AMI  <22.0 ng/L- Negative Male for AMI  >=14 - Abnormal Female indicating possible myocardial injury.  >=22 - Abnormal Male indicating possible myocardial injury.   Clinicians would have to utilize clinical acumen, EKG, Troponin, and serial changes to determine if it is an Acute Myocardial Infarction or myocardial injury due to an underlying chronic condition.        CBC WITH AUTO DIFFERENTIAL - Abnormal; Notable for the following components:    RBC 3.11 (*)     Hemoglobin 9.8 (*)     Hematocrit 30.9 (*)     MCV 99.4 (*)     RDW 15.6 (*)     RDW-SD 55.0 (*)     Neutrophil % 83.9 (*)     Lymphocyte % 7.8 (*)     Lymphocytes, Absolute 0.60 (*)     All other components within normal limits   HIGH SENSITIVITIY TROPONIN T 2HR - Abnormal; Notable for the following components:    HS Troponin T 40 (*)     All other components within normal limits    Narrative:     High Sensitive Troponin T Reference Range:  <14.0 ng/L- Negative Female for AMI  <22.0 ng/L- Negative Male for AMI  >=14 - Abnormal Female indicating possible myocardial injury.  >=22 - Abnormal Male indicating possible myocardial injury.   Clinicians would have to utilize clinical acumen, EKG, Troponin, and serial changes to determine if it is an Acute Myocardial Infarction or myocardial injury due to an underlying chronic condition.        BNP (IN-HOUSE) - Normal    Narrative:     This assay is used as an aid in the diagnosis of individuals suspected of having heart failure. It can be used as an aid in the diagnosis of acute decompensated heart failure (ADHF) in patients presenting with signs and symptoms of ADHF to the emergency department (ED). In addition, NT-proBNP of <300 pg/mL indicates ADHF is not likely.    Age Range Result Interpretation  NT-proBNP Concentration (pg/mL:      <50             Positive            >450                   [place]                  "300-450                    Negative             <300    50-75           Positive            >900                  Gray                300-900                  Negative            <300      >75             Positive            >1800                  Gray                300-1800                  Negative            <300   D-DIMER, QUANTITATIVE - Normal    Narrative:     According to the assay 's published package insert, a normal (<0.50 MCGFEU/mL) D-dimer result in conjunction with a non-high clinical probability assessment, excludes deep vein thrombosis (DVT) and pulmonary embolism (PE) with high sensitivity.    D-dimer values increase with age and this can make VTE exclusion of an older population difficult. To address this, the [employer], based on best available evidence and recent guidelines, recommends that clinicians use age-adjusted D-dimer thresholds in patients greater than 50 years of age with: a) a low probability of PE who do not meet all Pulmonary Embolism Rule Out Criteria, or b) in those with intermediate probability of PE.   The formula for an age-adjusted D-dimer cut-off is \"age/100\".  For example, a 60 year old patient would have an age-adjusted cut-off of 0.60 MCGFEU/mL and an 80 year old 0.80 MCGFEU/mL.   RAINBOW DRAW    Narrative:     The following orders were created for panel order [place] Draw.  Procedure                               Abnormality         Status                     ---------                               -----------         ------                     Green Top (Gel)[[id number]]                                  Final result               Lavender Top[[id number]]                                     Final result               Red Top[[id number]]                                          Final result               Light Blue Top[[id number]]                                   Final result                 Please view results for these tests on the individual " orders.   CBC AND DIFFERENTIAL    Narrative:     The following orders were created for panel order CBC & Differential.  Procedure                               Abnormality         Status                     ---------                               -----------         ------                     CBC Auto Differential[[id number]]        Abnormal            Final result                 Please view results for these tests on the individual orders.   GREEN TOP   LAVENDER TOP   RED TOP   LIGHT BLUE TOP       Meds given in ED:   Medications   sodium chloride 0.9 % flush 10 mL (has no administration in time range)           NIH Stroke Scale:       Isolation/Infection(s):  No active isolations   No active infections     COVID Testing  Collected .  Resulted .    Nursing report ED to floor:  Mental status: .a&ox4  Ambulatory status: .up adlib  Precautions: .none    ED nurse phone extentsion- .. [phone]  Report given MOUNA Harry

## 2024-02-26 PROBLEM — R53.1 WEAKNESS: Status: ACTIVE | Noted: 2024-02-26

## 2024-02-26 LAB
ANION GAP SERPL CALCULATED.3IONS-SCNC: 9 MMOL/L (ref 5–15)
BUN SERPL-MCNC: 43 MG/DL (ref 8–23)
BUN/CREAT SERPL: 43.4 (ref 7–25)
CALCIUM SPEC-SCNC: 8.6 MG/DL (ref 8.6–10.5)
CHLORIDE SERPL-SCNC: 106 MMOL/L (ref 98–107)
CO2 SERPL-SCNC: 24 MMOL/L (ref 22–29)
CREAT SERPL-MCNC: 0.99 MG/DL (ref 0.76–1.27)
EGFRCR SERPLBLD CKD-EPI 2021: 78.9 ML/MIN/1.73
GLUCOSE SERPL-MCNC: 102 MG/DL (ref 65–99)
MAGNESIUM SERPL-MCNC: 2 MG/DL (ref 1.6–2.4)
POTASSIUM SERPL-SCNC: 4 MMOL/L (ref 3.5–5.2)
SODIUM SERPL-SCNC: 139 MMOL/L (ref 136–145)

## 2024-02-26 PROCEDURE — 99222 1ST HOSP IP/OBS MODERATE 55: CPT | Performed by: INTERNAL MEDICINE

## 2024-02-26 PROCEDURE — G0378 HOSPITAL OBSERVATION PER HR: HCPCS

## 2024-02-26 PROCEDURE — 25810000003 SODIUM CHLORIDE 0.9 % SOLUTION: Performed by: NURSE PRACTITIONER

## 2024-02-26 PROCEDURE — 80048 BASIC METABOLIC PNL TOTAL CA: CPT | Performed by: INTERNAL MEDICINE

## 2024-02-26 PROCEDURE — 83735 ASSAY OF MAGNESIUM: CPT | Performed by: INTERNAL MEDICINE

## 2024-02-26 RX ORDER — SACUBITRIL AND VALSARTAN 49; 51 MG/1; MG/1
1 TABLET, FILM COATED ORAL 2 TIMES DAILY
COMMUNITY
End: 2024-02-29 | Stop reason: HOSPADM

## 2024-02-26 RX ORDER — TORSEMIDE 20 MG/1
20 TABLET ORAL DAILY
Status: ON HOLD | COMMUNITY
End: 2024-02-26

## 2024-02-26 RX ORDER — ATORVASTATIN CALCIUM 40 MG/1
40 TABLET, FILM COATED ORAL NIGHTLY
COMMUNITY

## 2024-02-26 RX ORDER — RANOLAZINE 1000 MG/1
1000 TABLET, EXTENDED RELEASE ORAL 2 TIMES DAILY
COMMUNITY

## 2024-02-26 RX ORDER — ISOSORBIDE MONONITRATE 30 MG/1
30 TABLET, EXTENDED RELEASE ORAL
Status: DISCONTINUED | OUTPATIENT
Start: 2024-02-26 | End: 2024-02-29 | Stop reason: HOSPADM

## 2024-02-26 RX ADMIN — SOTALOL HYDROCHLORIDE 80 MG: 80 TABLET ORAL at 09:25

## 2024-02-26 RX ADMIN — FINASTERIDE 5 MG: 5 TABLET, FILM COATED ORAL at 09:25

## 2024-02-26 RX ADMIN — PREGABALIN 150 MG: 75 CAPSULE ORAL at 20:09

## 2024-02-26 RX ADMIN — ZOLPIDEM TARTRATE 5 MG: 5 TABLET ORAL at 23:19

## 2024-02-26 RX ADMIN — PREGABALIN 150 MG: 75 CAPSULE ORAL at 09:25

## 2024-02-26 RX ADMIN — METFORMIN HCL 500 MG: 500 TABLET ORAL at 09:25

## 2024-02-26 RX ADMIN — PANTOPRAZOLE SODIUM 40 MG: 40 TABLET, DELAYED RELEASE ORAL at 05:08

## 2024-02-26 RX ADMIN — SOTALOL HYDROCHLORIDE 80 MG: 80 TABLET ORAL at 20:09

## 2024-02-26 RX ADMIN — Medication 10 ML: at 09:43

## 2024-02-26 RX ADMIN — Medication 10 ML: at 20:10

## 2024-02-26 RX ADMIN — SODIUM CHLORIDE 500 ML: 9 INJECTION, SOLUTION INTRAVENOUS at 12:25

## 2024-02-26 RX ADMIN — RIVAROXABAN 20 MG: 20 TABLET, FILM COATED ORAL at 17:07

## 2024-02-26 RX ADMIN — ISOSORBIDE MONONITRATE 30 MG: 30 TABLET, EXTENDED RELEASE ORAL at 13:10

## 2024-02-26 RX ADMIN — METFORMIN HCL 500 MG: 500 TABLET ORAL at 17:07

## 2024-02-26 RX ADMIN — RANOLAZINE 1000 MG: 500 TABLET, FILM COATED, EXTENDED RELEASE ORAL at 20:09

## 2024-02-26 RX ADMIN — ATORVASTATIN CALCIUM 40 MG: 40 TABLET ORAL at 09:25

## 2024-02-26 RX ADMIN — ACETAMINOPHEN 650 MG: 325 TABLET, FILM COATED ORAL at 23:19

## 2024-02-26 RX ADMIN — EMPAGLIFLOZIN 10 MG: 10 TABLET, FILM COATED ORAL at 09:25

## 2024-02-26 RX ADMIN — CLOPIDOGREL BISULFATE 75 MG: 75 TABLET, FILM COATED ORAL at 09:25

## 2024-02-26 NOTE — H&P
"    Date of Admission: 2/25/2024  Primary Care Physician: Rolan Salvador MD    Subjective     Chief Complaint: Chest pain, dyspnea, weakness    Chest Pain   Associated symptoms include shortness of breath. Pertinent negatives include no abdominal pain, cough, fever, nausea or vomiting.   Shortness of Breath  Associated symptoms include chest pain. Pertinent negatives include no abdominal pain, fever or vomiting.     Darren Maria is a 78-year-old male with past medical history of severe CAD, cardiomyopathy, CHF, A-fib, hypertension, MI.  He has an extensive past cardiac history within inoperable coronary disease.  He has been hospitalized multiple times for acute exacerbations.  He presented to the emergency department last night for chest discomfort, weakness, and dyspnea.  Workup thus far has been negative.  Chest x-ray showed no acute cardiopulmonary process, troponins trended flat.      Dr. Salvador spoke with Dr. Benítez in cardiology last night.  The patient tends to have worsening angina with fluid overload.  The plan was to diurese the patient to see if he has improvement of symptoms.  This morning he is complaining of weakness, and dyspnea on exertion.  He states he feels like he can't just \"get up and go\".  He is hypotensive this morning with blood pressure of 77/50.        Review of Systems   Constitutional:  Positive for fatigue. Negative for fever.   Respiratory:  Positive for chest tightness and shortness of breath. Negative for cough.    Cardiovascular:  Positive for chest pain.   Gastrointestinal:  Negative for abdominal pain, nausea and vomiting.        Otherwise complete ROS reviewed and negative except as mentioned in the HPI.      Past Medical History:   Past Medical History:   Diagnosis Date    Abdominal pain, epigastric     Abnormal abdominal exam     ABFND, RADIOLOGICAL, ABDOMINAL AREA     Abnormal ECG     Anticoagulated on Coumadin     Atherosclerosis of coronary artery bypass graft  "    ATHEROSCLEROSIS, CORONARY, ARTERY BYPASS GRFT    Atrial fibrillation     Cancer of right kidney     right sided kidney cancer    Cardiomyopathy     Cardiomyopathy, primary     CHF (congestive heart failure)     Clotting disorder     Colon polyps     Congenital heart disease     Congestive heart failure     Coronary artery disease     History of CAD/A-Fib/Pacemaker/Defibrillator placement: pt takes Coumadin and plavix therapy as well as ASA daily    Diabetes mellitus     Gastric polyp     Gastroesophageal reflux disease without esophagitis     History of colon polyps     Hypercholesterolemia     Hypertension, essential     Melena     Myocardial infarction     NSAID long-term use     Obesity     Pacemaker     Pacemaker Dependant    Platelet inhibition due to Plavix     Presence of stent in coronary artery     Multiple coronary stenting most recently 4/2008    Single kidney     Only 1 Kidney    Status post surgery     s/p Polypectomy Bleed       Past Surgical History:  Past Surgical History:   Procedure Laterality Date    ABLATION OF DYSRHYTHMIC FOCUS      APPENDECTOMY      CARDIAC ABLATION      CARDIAC CATHETERIZATION      CARDIAC CATHETERIZATION Left 12/15/2021    Procedure: Coronary angiography;  Surgeon: Sarbjit Posadas MD;  Location:  [facility] CATH INVASIVE LOCATION;  Service: Cardiology;  Laterality: Left;    CARDIAC CATHETERIZATION Left 12/15/2021    Procedure: Percutaneous Coronary Intervention;  Surgeon: Sarbjit Posadas MD;  Location:  [facility] CATH INVASIVE LOCATION;  Service: Cardiology;  Laterality: Left;    CARDIAC DEFIBRILLATOR PLACEMENT      CARDIAC PACEMAKER PLACEMENT      Pacemaker Placement    CAROTID STENT  2001    CATARACT EXTRACTION, BILATERAL      CHOLECYSTECTOMY      COLONOSCOPY  08/27/2013    snare hep, trans tics recall 3 yr    COLONOSCOPY  10/02/2006    few scattered diverticula    COLONOSCOPY  07/22/2009    tubular adenoma in the ascending colon    COLONOSCOPY  07/25/2005    several polyps in  "the ascending colon, one in the transverse colon    COLONOSCOPY N/A 03/21/2022    Procedure: COLONOSCOPY WITH ANESTHESIA;  Surgeon: Phil Goodwin MD;  Location:  [facility] ENDOSCOPY;  Service: Gastroenterology;  Laterality: N/A;  pre: hx polyps  post: polyps  Rolan Salvador MD    COLONOSCOPY W/ POLYPECTOMY  09/19/2016    Tubular adenoma hepatic flexure, 2 Hyperplastic polyps rectum and at 50 cm, Benign polyp at 70 cm repeat exam in 3-5 years    CORONARY ARTERY BYPASS GRAFT  02/2011    2nd time    CORONARY ARTERY BYPASS GRAFT  1990    CORONARY STENT PLACEMENT      ENDOSCOPY  04/14/2016    nl slant    ENDOSCOPY  11/10/2014    clips at polypectomy bx no residual recall 1 yr    ENDOSCOPY  06/19/2014    snare clip body recall 6 months    ENDOSCOPY  05/19/2014    polyp stomach bx and clip    HERNIA REPAIR      with mesh    INTERVENTIONAL RADIOLOGY PROCEDURE Left 1/17/2024    Procedure: Venogram upper extremity left, [id number], [id number];  Surgeon: Devika Oliver MD;  Location: [facility] CATH INVASIVE LOCATION;  Service: Cardiovascular;  Laterality: Left;    JOINT REPLACEMENT      OTHER SURGICAL HISTORY      Defibrillator/Pacer maker exchange    OTHER SURGICAL HISTORY      Pacemaker/Defibillation exchange 2012    PENILE PROSTHESIS IMPLANT N/A 07/27/2018    Procedure: PENILE PROSTHESIS PLACEMENT;  Surgeon: Godwin Gupta MD;  Location: [facility] OR;  Service: Urology    PERIPHERAL ARTERIAL STENT GRAFT         Social History:  reports that he has never smoked. He has never used smokeless tobacco. He reports that he does not currently use alcohol. He reports that he does not use drugs.    Family History: family history includes Heart attack in his mother; Lung cancer in his mother; No Known Problems in his father.       Allergies:  Allergies   Allergen Reactions    Azithromycin Nausea And Vomiting    Morphine And Related Other (See Comments)     SHAKES AND MAKES HIM \"WIRED\".   TAKES TRAMADOL WITHOUT PROBLEM "       Medications:  Prior to Admission medications    Medication Sig Start Date End Date Taking? Authorizing Provider   atorvastatin (LIPITOR) 40 MG tablet TAKE 1 TABLET BY MOUTH EVERY DAY AT NIGHT 11/2/23  Yes Yolanda Perry APRN   clopidogrel (PLAVIX) 75 MG tablet Take 1 tablet by mouth Daily. 9/8/21  Yes Rony Vail MD   Cyanocobalamin 1000 MCG/ML kit Inject 1 mL as directed Every 30 (Thirty) Days. On Fridays till 11/11/23 then monthly thereafter.   Yes Rony Vail MD   empagliflozin (Jardiance) 10 MG tablet tablet Take 1 tablet by mouth Daily. 1/19/24  Yes Sarbjit Posadas MD   finasteride (PROSCAR) 5 MG tablet Take 1 tablet by mouth Daily.   Yes Rony Vail MD   metFORMIN (GLUCOPHAGE) 500 MG tablet Take 1 tablet by mouth 2 (Two) Times a Day With Meals. Two tabs daily 9/21/21  Yes oRny Vail MD   Mirabegron ER (Myrbetriq) 50 MG tablet sustained-release 24 hour 24 hr tablet Take 50 mg by mouth Daily. 2/28/23  Yes Tyrone Broussard PA   nitroglycerin (NITROSTAT) 0.4 MG SL tablet Place 1 tablet under the tongue Every 5 (Five) Minutes As Needed for Chest Pain. Take no more than 3 doses in 15 minutes. 1/11/24  Yes Sarbjit Posadas MD   omeprazole (PriLOSEC) 40 MG capsule Take 1 capsule by mouth Daily.   Yes Rony Vail MD   pregabalin (LYRICA) 150 MG capsule Take 1 capsule by mouth 2 (Two) Times a Day. 8/7/21  Yes Rony Vail MD   ranolazine (RANEXA) 1000 MG 12 hr tablet TAKE 1 TABLET BY MOUTH TWICE A DAY 11/2/23  Yes Yolanda Perry APRN   rivaroxaban (XARELTO) 20 MG tablet Take 1 tablet by mouth Daily With Dinner.   Yes Rony Vail MD   sacubitril-valsartan (Entresto) 49-51 MG tablet Take 0.5 tablets by mouth 2 (Two) Times a Day. 11/14/23  Yes Artur Randle APRN   Sotalol HCl AF 80 MG tablet Take 1 tablet by mouth 2 (Two) Times a Day. 8/29/23  Yes Devika Oliver MD   spironolactone (ALDACTONE) 25 MG tablet Take 0.5 tablets by mouth Daily.  "11/8/23  Yes Erin Rocha PA   tamsulosin (FLOMAX) 0.4 MG capsule 24 hr capsule Take 1 capsule by mouth Daily.   Yes Rony Vail MD   traMADol-acetaminophen (ULTRACET) 37.5-325 MG per tablet Take 1 tablet by mouth Every 12 (Twelve) Hours As Needed for Moderate Pain (pain). 11/3/16  Yes Rony Vail MD   Vericiguat 2.5 MG tablet Take 1 tablet by mouth Daily. 1/11/24  Yes Sarbjit Posadas MD   zolpidem (AMBIEN) 5 MG tablet Take 1 tablet by mouth At Night As Needed for Sleep.   Yes Rony Vail MD   cetirizine (zyrTEC) 10 MG tablet Take 1 tablet by mouth As Needed for Allergies.    Rony Vail MD   potassium chloride (KLOR-CON) 20 MEQ packet Take 20 mEq by mouth As Needed (LOW K).    Rony Vail MD   torsemide (DEMADEX) 20 MG tablet Take 1 tablet by mouth Daily. With additional 20 mg PRN  Patient taking differently: Take 1 tablet by mouth As Needed (EDEMA). 11/1/23   Mendoza Smith, APRN       Objective     Vital Signs: BP (!) 77/50 (BP Location: Right arm, Patient Position: Lying)   Pulse 74   Temp 98.2 °F (36.8 °C) (Oral)   Resp 18   Ht 180.3 cm (71\")   Wt 90.8 kg (200 lb 3.2 oz)   SpO2 98%   BMI 27.92 kg/m²   Physical Exam  Constitutional:       Appearance: He is normal weight.   HENT:      Head: Normocephalic and atraumatic.      Mouth/Throat:      Mouth: Mucous membranes are moist.      Pharynx: Oropharynx is clear.   Eyes:      Extraocular Movements: Extraocular movements intact.      Pupils: Pupils are equal, round, and reactive to light.   Cardiovascular:      Rate and Rhythm: Normal rate and regular rhythm.      Pulses: Normal pulses.      Heart sounds: Normal heart sounds.   Pulmonary:      Effort: Pulmonary effort is normal.      Breath sounds: Normal breath sounds.   Abdominal:      General: Abdomen is flat.      Palpations: Abdomen is soft.   Musculoskeletal:         General: Normal range of motion.   Skin:     General: Skin is warm and dry. "      Capillary Refill: Capillary refill takes less than 2 seconds.   Neurological:      General: No focal deficit present.      Mental Status: He is alert and oriented to person, place, and time.   Psychiatric:         Mood and Affect: Mood normal.         Behavior: Behavior normal.             Results Reviewed:  Lab Results (last 24 hours)       Procedure Component Value Units Date/Time    Basic Metabolic Panel [[id number]]  (Abnormal) Collected: 02/26/24 0427    Specimen: Blood Updated: 02/26/24 0459     Glucose 102 mg/dL      BUN 43 mg/dL      Creatinine 0.99 mg/dL      Sodium 139 mmol/L      Potassium 4.0 mmol/L      Chloride 106 mmol/L      CO2 24.0 mmol/L      Calcium 8.6 mg/dL      BUN/Creatinine Ratio 43.4     Anion Gap 9.0 mmol/L      eGFR 78.9 mL/min/1.73     Narrative:      GFR Normal >60  Chronic Kidney Disease <60  Kidney Failure <15    The GFR formula is only valid for adults with stable renal function between ages 18 and 70.    Magnesium [[id number]]  (Normal) Collected: 02/26/24 0427    Specimen: Blood Updated: 02/26/24 0459     Magnesium 2.0 mg/dL     POC Glucose Once [[id number]]  (Normal) Collected: 02/25/24 1652    Specimen: Blood Updated: 02/25/24 1703     Glucose 111 mg/dL      Comment: [facility] [id number] [facility] [id number]       High Sensitivity Troponin T 2Hr [[id number]]  (Abnormal) Collected: 02/25/24 1517    Specimen: Blood from Arm, Left Updated: 02/25/24 1539     HS Troponin T 40 ng/L      Troponin T Delta 0 ng/L     Narrative:      High Sensitive Troponin T Reference Range:  <14.0 ng/L- Negative Female for AMI  <22.0 ng/L- Negative Male for AMI  >=14 - Abnormal Female indicating possible myocardial injury.  >=22 - Abnormal Male indicating possible myocardial injury.   Clinicians would have to utilize clinical acumen, EKG, Troponin, and serial changes to determine if it is an Acute Myocardial Infarction or myocardial injury due to an underlying chronic condition.          "D-dimer, Quantitative [[id number]]  (Normal) Collected: 02/25/24 1302    Specimen: Blood Updated: 02/25/24 1448     D-Dimer, Quantitative <0.27 MCGFEU/mL     Narrative:      According to the assay 's published package insert, a normal (<0.50 MCGFEU/mL) D-dimer result in conjunction with a non-high clinical probability assessment, excludes deep vein thrombosis (DVT) and pulmonary embolism (PE) with high sensitivity.    D-dimer values increase with age and this can make VTE exclusion of an older population difficult. To address this, the [employer], based on best available evidence and recent guidelines, recommends that clinicians use age-adjusted D-dimer thresholds in patients greater than 50 years of age with: a) a low probability of PE who do not meet all Pulmonary Embolism Rule Out Criteria, or b) in those with intermediate probability of PE.   The formula for an age-adjusted D-dimer cut-off is \"age/100\".  For example, a 60 year old patient would have an age-adjusted cut-off of 0.60 MCGFEU/mL and an 80 year old 0.80 MCGFEU/mL.    [place] Draw [[id number]] Collected: 02/25/24 1302    Specimen: Blood Updated: 02/25/24 1417    Narrative:      The following orders were created for panel order [place] Draw.  Procedure                               Abnormality         Status                     ---------                               -----------         ------                     Green Top (Gel)[[id number]]                                  Final result               Lavender Top[[id number]]                                     Final result               Red Top[[id number]]                                          Final result               Light Blue Top[[id number]]                                   Final result                 Please view results for these tests on the individual orders.    Green Top (Gel) [[id number]] Collected: 02/25/24 1302    Specimen: Blood Updated: 02/25/24 1417     Extra " Tube Hold for add-ons.     Comment: Auto resulted.       Lavender Top [[id number]] Collected: 02/25/24 1302    Specimen: Blood Updated: 02/25/24 1417     Extra Tube hold for add-on     Comment: Auto resulted       Red Top [[id number]] Collected: 02/25/24 1302    Specimen: Blood Updated: 02/25/24 1417     Extra Tube Hold for add-ons.     Comment: Auto resulted.       Light Blue Top [[id number]] Collected: 02/25/24 1302    Specimen: Blood Updated: 02/25/24 1417     Extra Tube Hold for add-ons.     Comment: Auto resulted       Comprehensive Metabolic Panel [[id number]]  (Abnormal) Collected: 02/25/24 1322    Specimen: Blood Updated: 02/25/24 1358     Glucose 132 mg/dL      BUN 45 mg/dL      Creatinine 1.02 mg/dL      Sodium 140 mmol/L      Potassium 4.7 mmol/L      Comment: Slight hemolysis detected by analyzer. Result may be falsely elevated.        Chloride 106 mmol/L      CO2 22.0 mmol/L      Calcium 9.1 mg/dL      Total Protein 5.7 g/dL      Albumin 3.7 g/dL      ALT (SGPT) 9 U/L      AST (SGOT) 13 U/L      Alkaline Phosphatase 46 U/L      Total Bilirubin 0.8 mg/dL      Globulin 2.0 gm/dL      A/G Ratio 1.9 g/dL      BUN/Creatinine Ratio 44.1     Anion Gap 12.0 mmol/L      eGFR 76.2 mL/min/1.73     Narrative:      GFR Normal >60  Chronic Kidney Disease <60  Kidney Failure <15    The GFR formula is only valid for adults with stable renal function between ages 18 and 70.    BNP [[id number]]  (Normal) Collected: 02/25/24 1322    Specimen: Blood Updated: 02/25/24 1356     proBNP 579.4 pg/mL     Narrative:      This assay is used as an aid in the diagnosis of individuals suspected of having heart failure. It can be used as an aid in the diagnosis of acute decompensated heart failure (ADHF) in patients presenting with signs and symptoms of ADHF to the emergency department (ED). In addition, NT-proBNP of <300 pg/mL indicates ADHF is not likely.    Age Range Result Interpretation  NT-proBNP Concentration (pg/mL:      <50              Positive            >450                   Gray                 300-450                    Negative             <300    50-75           Positive            >900                  Gray                300-900                  Negative            <300      >75             Positive            >1800                  Gray                300-1800                  Negative            <300    High Sensitivity Troponin T [[id number]]  (Abnormal) Collected: 02/25/24 1322    Specimen: Blood Updated: 02/25/24 1356     HS Troponin T 40 ng/L     Narrative:      High Sensitive Troponin T Reference Range:  <14.0 ng/L- Negative Female for AMI  <22.0 ng/L- Negative Male for AMI  >=14 - Abnormal Female indicating possible myocardial injury.  >=22 - Abnormal Male indicating possible myocardial injury.   Clinicians would have to utilize clinical acumen, EKG, Troponin, and serial changes to determine if it is an Acute Myocardial Infarction or myocardial injury due to an underlying chronic condition.         CBC & Differential [[id number]]  (Abnormal) Collected: 02/25/24 1302    Specimen: Blood Updated: 02/25/24 1311    Narrative:      The following orders were created for panel order CBC & Differential.  Procedure                               Abnormality         Status                     ---------                               -----------         ------                     CBC Auto Differential[[id number]]        Abnormal            Final result                 Please view results for these tests on the individual orders.    CBC Auto Differential [[id number]]  (Abnormal) Collected: 02/25/24 1302    Specimen: Blood Updated: 02/25/24 1311     WBC 7.71 10*3/mm3      RBC 3.11 10*6/mm3      Hemoglobin 9.8 g/dL      Hematocrit 30.9 %      MCV 99.4 fL      MCH 31.5 pg      MCHC 31.7 g/dL      RDW 15.6 %      RDW-SD 55.0 fl      MPV 10.2 fL      Platelets 272 10*3/mm3      Neutrophil % 83.9 %      Lymphocyte % 7.8 %      Monocyte % 7.1 %       Eosinophil % 0.4 %      Basophil % 0.4 %      Immature Grans % 0.4 %      Neutrophils, Absolute 6.47 10*3/mm3      Lymphocytes, Absolute 0.60 10*3/mm3      Monocytes, Absolute 0.55 10*3/mm3      Eosinophils, Absolute 0.03 10*3/mm3      Basophils, Absolute 0.03 10*3/mm3      Immature Grans, Absolute 0.03 10*3/mm3      nRBC 0.0 /100 WBC           Imaging Results (Last 24 Hours)       Procedure Component Value Units Date/Time    XR Chest 1 View [[id number]] Collected: 02/25/24 1413     Updated: 02/25/24 1417    Narrative:      EXAMINATION: XR CHEST 1 VW-     2/25/2024 1:05 PM     HISTORY: Chest pain.     1 view chest x-ray.     COMPARISON:  10/29/2023.     Stable heart size.  CABG changes.  LEFT cardiac pacer.     Adequately expanded lungs with no focal infiltrate.     No pneumothorax or heart failure.       Impression:      1. No acute lung disease.           This report was signed and finalized on 2/25/2024 2:14 PM by Dr. Samuel Allen MD.               I have personally reviewed and interpreted the radiology studies and ECG obtained at time of admission.     Assessment / Plan     Assessment & Plan  Active Hospital Problems    Diagnosis     **Dyspnea     Weakness     Chronic combined systolic and diastolic congestive heart failure     Coronary artery disease of bypass graft of native heart with stable angina pectoris      Inoperable CAD.  Admitted with persistent weakness, chest discomfort, hypotension.  Originally was going to try to diurese the patient to see if he has improvement of symptoms, but he is extremely hypotensive this morning.    1.  Hold IV diuresis for now given hypotension.  Will hold Entresto and Flomax for now.  2.  Cardiology consult.  He is also scheduled for pacemaker placement later this week.  3.  Closely monitor renal function electrolytes, daily weights, intake and output.  4.  Continue other home medications.  5.  Anticoagulated with Xarelto and Plavix.    Further orders per   Madeleine.      Mendoza Smith, APRN   02/26/24   07:50 CST

## 2024-02-26 NOTE — CONSULTS
Referring Provider: No ref. provider found    Reason for Consultation: Chest pain; CHF    Chief complaint:   Chief Complaint   Patient presents with    Chest Pain    Shortness of Breath       Subjective .     History of present illness:  Darren Maria is a 78 y.o. male with significant past medical history of coronary artery disease s/p 3V CABG in 1990 by Dr Ko (PEREZ to LAD, SVG to PD, SVG to OM and in 2006 he underwent PTCA/stenting of native OM with 2.5 mm x 23 mm LEILANI, at that time he was found to have a  of SVG to OM with other grafts patent, 4/2008 he underwent 3 additional [place] stents due to focal edge dissection and instent restenosis, redo CABG in 3/18/2011 with SVG from descending Ao to OM. 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 Patient was seen in office 1/11/2024 by Dr Posadas and reported worsening exertional dyspnea with just waking around his house. At that time he was taking additional toresemide every third day so Vericiquat 2.5 mg was started in office. Patient underwent venogram with Dr Oliver on 1/17/2024 and saw Dr Oliver on 1/23/2024 to discuss possible LV lead revision which is scheduled for 2/28/2024.   Patient presented to the ER yesterday with worsening shortness of breath and chest pain. He took 2 NTG with no improvement. He reports nausea during these episodes as well. He reports that he has had worsening dizziness as well. ER workup revealed a BNP of 579, HS troponin of 40 with repeat at 40. Hgb 9.8. CXR with no acute disease. Patient was admitted overnight with the plan to diurese patient to see if there was improvement in symptoms. He has been hypotensive this am with BP 77/50. In the past, patient has had worsening anginal symptoms with fluid overload. Today patient is sitting in  his chair comfortable. He reports that for the past month he has noted his BP has been lower. He reports that it has been running 79/40-90/53 at home and he has noted his dizziness has become much worse. He reports occasionally that he will have a BP of 110/63 and he notices improvement in dizziness and feeling better at that BP. He states that Dr Salvador did advise him to hold his flomax and he did for about 10 days previously with no improvement in dizziness or BP. He reports that he hasn't noticed any improvement with dizziness/dyspnea on exertion since starting the Vericiquat. He states that he monitors with weight daily and if he gains 2-3 lbs overnight he has been taking an additional toresemide. He reports that this is happening every 2-3 days. He states that he does notice that his weight will improve with taking the toresemide. He reports that he will have dizziness when he sits on the side of the bed, before walking to the restroom he will stand for about 10 seconds but dizziness doesn't improve. He will get to the restroom and be severely dyspneic, when he is done urinating and washing his hands he reports the dizziness is severe and he will have a chest pressure on the left side of his chest that radiates into his shoulder. He reports that he feels like he can barely make it back to the bed. Once he lays there for a few minutes the dizziness resolves. He reports that his chest pain eases up and his breathing returns to normal. He denies any orthopnea or PND. He denies any leg swelling. Patient denies any heart racing or palpitations.       History  Past Medical History:   Diagnosis Date    Abdominal pain, epigastric     Abnormal abdominal exam     ABFND, RADIOLOGICAL, ABDOMINAL AREA     Abnormal ECG     Anticoagulated on Coumadin     Atherosclerosis of coronary artery bypass graft     ATHEROSCLEROSIS, CORONARY, ARTERY BYPASS GRFT    Atrial fibrillation     Cancer of right kidney     right sided kidney  cancer    Cardiomyopathy     Cardiomyopathy, primary     CHF (congestive heart failure)     Clotting disorder     Colon polyps     Congenital heart disease     Congestive heart failure     Coronary artery disease     History of CAD/A-Fib/Pacemaker/Defibrillator placement: pt takes Coumadin and plavix therapy as well as ASA daily    Diabetes mellitus     Gastric polyp     Gastroesophageal reflux disease without esophagitis     History of colon polyps     Hypercholesterolemia     Hypertension, essential     Melena     Myocardial infarction     NSAID long-term use     Obesity     Pacemaker     Pacemaker Dependant    Platelet inhibition due to Plavix     Presence of stent in coronary artery     Multiple coronary stenting most recently 4/2008    Single kidney     Only 1 Kidney    Status post surgery     s/p Polypectomy Bleed   ,   Past Surgical History:   Procedure Laterality Date    ABLATION OF DYSRHYTHMIC FOCUS      APPENDECTOMY      CARDIAC ABLATION      CARDIAC CATHETERIZATION      CARDIAC CATHETERIZATION Left 12/15/2021    Procedure: Coronary angiography;  Surgeon: Sarbjit Posadas MD;  Location:  [facility] CATH INVASIVE LOCATION;  Service: Cardiology;  Laterality: Left;    CARDIAC CATHETERIZATION Left 12/15/2021    Procedure: Percutaneous Coronary Intervention;  Surgeon: Sarbjit Posadas MD;  Location:  [facility] CATH INVASIVE LOCATION;  Service: Cardiology;  Laterality: Left;    CARDIAC DEFIBRILLATOR PLACEMENT      CARDIAC PACEMAKER PLACEMENT      Pacemaker Placement    CAROTID STENT  2001    CATARACT EXTRACTION, BILATERAL      CHOLECYSTECTOMY      COLONOSCOPY  08/27/2013    snare hep, trans tics recall 3 yr    COLONOSCOPY  10/02/2006    few scattered diverticula    COLONOSCOPY  07/22/2009    tubular adenoma in the ascending colon    COLONOSCOPY  07/25/2005    several polyps in the ascending colon, one in the transverse colon    COLONOSCOPY N/A 03/21/2022    Procedure: COLONOSCOPY WITH ANESTHESIA;  Surgeon: Phil Goodwin  MD BRETT;  Location: [facility] ENDOSCOPY;  Service: Gastroenterology;  Laterality: N/A;  pre: hx polyps  post: polyps  Rolan Salvador MD    COLONOSCOPY W/ POLYPECTOMY  09/19/2016    Tubular adenoma hepatic flexure, 2 Hyperplastic polyps rectum and at 50 cm, Benign polyp at 70 cm repeat exam in 3-5 years    CORONARY ARTERY BYPASS GRAFT  02/2011    2nd time    CORONARY ARTERY BYPASS GRAFT  1990    CORONARY STENT PLACEMENT      ENDOSCOPY  04/14/2016    nl slant    ENDOSCOPY  11/10/2014    clips at polypectomy bx no residual recall 1 yr    ENDOSCOPY  06/19/2014    snare clip body recall 6 months    ENDOSCOPY  05/19/2014    polyp stomach bx and clip    HERNIA REPAIR      with mesh    INTERVENTIONAL RADIOLOGY PROCEDURE Left 1/17/2024    Procedure: Venogram upper extremity left, 12182, [id number];  Surgeon: Devika Oliver MD;  Location: [facility] CATH INVASIVE LOCATION;  Service: Cardiovascular;  Laterality: Left;    JOINT REPLACEMENT      OTHER SURGICAL HISTORY      Defibrillator/Pacer maker exchange    OTHER SURGICAL HISTORY      Pacemaker/Defibillation exchange 2012    PENILE PROSTHESIS IMPLANT N/A 07/27/2018    Procedure: PENILE PROSTHESIS PLACEMENT;  Surgeon: Godwin Gupta MD;  Location: [facility] OR;  Service: Urology    PERIPHERAL ARTERIAL STENT GRAFT     ,   Family History   Problem Relation Age of Onset    Lung cancer Mother     Heart attack Mother     No Known Problems Father     Other Neg Hx         GI Malignancies    Colon cancer Neg Hx     Colon polyps Neg Hx    ,   Social History     Tobacco Use    Smoking status: Never    Smokeless tobacco: Never   Vaping Use    Vaping Use: Never used   Substance Use Topics    Alcohol use: Not Currently     Comment: occ    Drug use: Never   ,     Medications    Prior to Admission medications    Medication Sig Start Date End Date Taking? Authorizing Provider   atorvastatin (LIPITOR) 40 MG tablet TAKE 1 TABLET BY MOUTH EVERY DAY AT NIGHT 11/2/23  Yes Yolanda Perry APRN    clopidogrel (PLAVIX) 75 MG tablet Take 1 tablet by mouth Daily. 9/8/21  Yes Rony Vail MD   Cyanocobalamin 1000 MCG/ML kit Inject 1 mL as directed Every 30 (Thirty) Days. On Fridays till 11/11/23 then monthly thereafter.   Yes Rony Vail MD   empagliflozin (Jardiance) 10 MG tablet tablet Take 1 tablet by mouth Daily. 1/19/24  Yes Sarbjit Posadas MD   finasteride (PROSCAR) 5 MG tablet Take 1 tablet by mouth Daily.   Yes Rony Vail MD   metFORMIN (GLUCOPHAGE) 500 MG tablet Take 1 tablet by mouth 2 (Two) Times a Day With Meals. Two tabs daily 9/21/21  Yes Rony Vail MD   Mirabegron ER (Myrbetriq) 50 MG tablet sustained-release 24 hour 24 hr tablet Take 50 mg by mouth Daily. 2/28/23  Yes Tyrone Broussard PA   nitroglycerin (NITROSTAT) 0.4 MG SL tablet Place 1 tablet under the tongue Every 5 (Five) Minutes As Needed for Chest Pain. Take no more than 3 doses in 15 minutes. 1/11/24  Yes Sarbjit Posadas MD   omeprazole (PriLOSEC) 40 MG capsule Take 1 capsule by mouth Daily.   Yes Rony Vail MD   pregabalin (LYRICA) 150 MG capsule Take 1 capsule by mouth 2 (Two) Times a Day. 8/7/21  Yes Rony Vail MD   ranolazine (RANEXA) 1000 MG 12 hr tablet TAKE 1 TABLET BY MOUTH TWICE A DAY 11/2/23  Yes Yolanda Perry APRN   rivaroxaban (XARELTO) 20 MG tablet Take 1 tablet by mouth Daily With Dinner.   Yes Rony Vail MD   sacubitril-valsartan (Entresto) 49-51 MG tablet Take 0.5 tablets by mouth 2 (Two) Times a Day. 11/14/23  Yes Artur Randle APRN   Sotalol HCl AF 80 MG tablet Take 1 tablet by mouth 2 (Two) Times a Day. 8/29/23  Yes Devika Oliver MD   spironolactone (ALDACTONE) 25 MG tablet Take 0.5 tablets by mouth Daily. 11/8/23  Yes Erin Rocha PA   tamsulosin (FLOMAX) 0.4 MG capsule 24 hr capsule Take 1 capsule by mouth Daily.   Yes Provider, MD Rony   traMADol-acetaminophen (ULTRACET) 37.5-325 MG per tablet Take 1 tablet by mouth Every  12 (Twelve) Hours As Needed for Moderate Pain (pain). 11/3/16  Yes Rony Vail MD   Vericiguat 2.5 MG tablet Take 1 tablet by mouth Daily. 1/11/24  Yes Sarbjit Posadas MD   zolpidem (AMBIEN) 5 MG tablet Take 1 tablet by mouth At Night As Needed for Sleep.   Yes Rony Vail MD   cetirizine (zyrTEC) 10 MG tablet Take 1 tablet by mouth As Needed for Allergies.    ProviderRony MD   potassium chloride (KLOR-CON) 20 MEQ packet Take 20 mEq by mouth As Needed (LOW K).    ProviderRony MD   torsemide (DEMADEX) 20 MG tablet Take 1 tablet by mouth Daily. With additional 20 mg PRN  Patient taking differently: Take 1 tablet by mouth As Needed (EDEMA). 11/1/23   Mendoza Smith, EVELIA       Current Facility-Administered Medications   Medication Dose Route Frequency Provider Last Rate Last Admin    acetaminophen (TYLENOL) tablet 650 mg  650 mg Oral Q4H PRN Rolan Salvador MD        atorvastatin (LIPITOR) tablet 40 mg  40 mg Oral Daily Rolan Salvador MD   40 mg at 02/25/24 2004    Calcium Replacement - Follow Nurse / BPA Driven Protocol   Does not apply PRN Rolan Salvador MD        cetirizine (zyrTEC) tablet 10 mg  10 mg Oral PRN Rolan Salvador MD        clopidogrel (PLAVIX) tablet 75 mg  75 mg Oral Daily Rolan Salvador MD   75 mg at 02/25/24 2004    empagliflozin (JARDIANCE) tablet 10 mg  10 mg Oral Daily Rolan Salvador MD   10 mg at 02/25/24 2004    finasteride (PROSCAR) tablet 5 mg  5 mg Oral Daily Rolan Salvador MD   5 mg at 02/25/24 2004    Magnesium Standard Dose Replacement - Follow Nurse / BPA Driven Protocol   Does not apply PRN Rolan Salvador MD        metFORMIN (GLUCOPHAGE) tablet 500 mg  500 mg Oral BID With Meals Rolan Salvador MD   500 mg at 02/25/24 2004    pantoprazole (PROTONIX) EC tablet 40 mg  40 mg Oral Q AM Rolan Salvador MD   40 mg at 02/26/24 0508    Phosphorus Replacement - Follow Nurse / BPA Driven  Protocol   Does not apply PRN Rolan Salvador MD        Potassium Replacement - Follow Nurse / BPA Driven Protocol   Does not apply Rolan Do MD        pregabalin (LYRICA) capsule 150 mg  150 mg Oral BID Rolan Salvador MD   150 mg at 02/25/24 2004    ranolazine (RANEXA) 12 hr tablet 1,000 mg  1,000 mg Oral BID Rolan Salvador MD   1,000 mg at 02/25/24 2004    rivaroxaban (XARELTO) tablet 20 mg  20 mg Oral Daily With Dinner Rolan Salvador MD   20 mg at 02/25/24 2004    [Held by provider] sacubitril-valsartan (ENTRESTO) 49-51 MG tablet 0.5 tablet  0.5 tablet Oral BID Rolan Salvador MD        sodium chloride 0.9 % flush 10 mL  10 mL Intravenous PRN Rolan Salvador MD        sodium chloride 0.9 % flush 10 mL  10 mL Intravenous Q12H Rolan Salvador MD   10 mL at 02/25/24 2005    sodium chloride 0.9 % flush 10 mL  10 mL Intravenous PRN Rolan Salvador MD        sodium chloride 0.9 % infusion 40 mL  40 mL Intravenous PRN Rolan Salvador MD        sotalol (BETAPACE) tablet 80 mg  80 mg Oral Q12H Rolan Salvador MD   80 mg at 02/25/24 2004    spironolactone (ALDACTONE) half tablet 12.5 mg  12.5 mg Oral Daily Rolan Salvador MD   12.5 mg at 02/25/24 2004    [Held by provider] tamsulosin (FLOMAX) 24 hr capsule 0.4 mg  0.4 mg Oral Daily Rolan Salvador MD   0.4 mg at 02/25/24 2004    traMADol-acetaminophen (ULTRACET) 37.5-325 MG per tablet 1 tablet  1 tablet Oral Q12H PRN Rolan Salvador MD        zolpidem (AMBIEN) tablet 5 mg  5 mg Oral Nightly PRN Rolan Salvador MD   5 mg at 02/25/24 9977       Allergies:  Azithromycin and Morphine and related    Review of Systems  Review of Systems   Constitutional: Positive for malaise/fatigue.   HENT:  Negative for nosebleeds.    Cardiovascular:  Positive for chest pain (chest pressure) and dyspnea on exertion. Negative for irregular heartbeat, leg swelling, near-syncope, orthopnea,  "palpitations, paroxysmal nocturnal dyspnea and syncope.   Hematologic/Lymphatic: Bruises/bleeds easily.   Genitourinary:  Negative for hematuria.   Neurological:  Positive for dizziness and weakness.   All other systems reviewed and are negative.      Objective     Physical Exam:  Patient Vitals for the past 24 hrs:   BP Temp Temp src Pulse Resp SpO2 Height Weight   02/26/24 0700 (!) 77/50 98.2 °F (36.8 °C) Oral 74 18 98 % -- --   02/26/24 0453 -- -- -- -- -- -- -- 90.8 kg (200 lb 3.2 oz)   02/26/24 0351 (!) 89/46 98.4 °F (36.9 °C) Oral 76 16 98 % -- --   02/25/24 2335 100/56 97.4 °F (36.3 °C) Oral 74 16 97 % -- --   02/25/24 1936 95/46 98.3 °F (36.8 °C) Oral 70 18 100 % -- --   02/25/24 1616 112/58 -- -- 75 -- 100 % -- --   02/25/24 1601 97/58 -- -- 75 -- 92 % -- --   02/25/24 1546 96/79 -- -- 75 -- 90 % -- --   02/25/24 1432 97/59 -- -- 75 -- 99 % -- --   02/25/24 1402 101/51 -- -- 75 -- 97 % -- --   02/25/24 1238 112/71 97.7 °F (36.5 °C) Oral 75 20 -- 180.3 cm (71\") 93.4 kg (206 lb)       Intake/Output Summary (Last 24 hours) at 2/26/2024 0953  Last data filed at 2/26/2024 0900  Gross per 24 hour   Intake 420 ml   Output --   Net 420 ml     Telemetry: Atrial fibrillation; v-paced at 75    Vitals reviewed.   Constitutional:       General: Not in acute distress.     Appearance: Normal appearance. Well-developed.   Eyes:      Pupils: Pupils are equal, round, and reactive to light.   HENT:      Head: Normocephalic and atraumatic.      Nose: Nose normal.   Neck:      Vascular: No carotid bruit.   Pulmonary:      Effort: Pulmonary effort is normal. No respiratory distress.      Breath sounds: Normal breath sounds. No wheezing. No rales.   Cardiovascular:      Normal rate. Regularly irregular rhythm.      Murmurs: There is no murmur.   Edema:     Peripheral edema absent.   Abdominal:      General: There is no distension.      Palpations: Abdomen is soft.   Musculoskeletal: Normal range of motion.      Cervical back: " Normal range of motion and neck supple. Skin:     General: Skin is warm.      Findings: No erythema or rash.   Neurological:      General: No focal deficit present.      Mental Status: Alert and oriented to person, place, and time.   Psychiatric:         Attention and Perception: Attention normal.         Mood and Affect: Mood normal.         Speech: Speech normal.         Behavior: Behavior normal.         Thought Content: Thought content normal.         Judgment: Judgment normal.         Results Review:   I reviewed the patient's new clinical results.    Lab Results (last 72 hours)       Procedure Component Value Units Date/Time    Basic Metabolic Panel [[id number]]  (Abnormal) Collected: 02/26/24 0427    Specimen: Blood Updated: 02/26/24 0459     Glucose 102 mg/dL      BUN 43 mg/dL      Creatinine 0.99 mg/dL      Sodium 139 mmol/L      Potassium 4.0 mmol/L      Chloride 106 mmol/L      CO2 24.0 mmol/L      Calcium 8.6 mg/dL      BUN/Creatinine Ratio 43.4     Anion Gap 9.0 mmol/L      eGFR 78.9 mL/min/1.73     Narrative:      GFR Normal >60  Chronic Kidney Disease <60  Kidney Failure <15    The GFR formula is only valid for adults with stable renal function between ages 18 and 70.    Magnesium [[id number]]  (Normal) Collected: 02/26/24 0427    Specimen: Blood Updated: 02/26/24 0459     Magnesium 2.0 mg/dL     POC Glucose Once [[id number]]  (Normal) Collected: 02/25/24 1652    Specimen: Blood Updated: 02/25/24 1703     Glucose 111 mg/dL      Comment: : [id number] [facility] ID: [id number]       High Sensitivity Troponin T 2Hr [[id number]]  (Abnormal) Collected: 02/25/24 1517    Specimen: Blood from Arm, Left Updated: 02/25/24 1539     HS Troponin T 40 ng/L      Troponin T Delta 0 ng/L     Narrative:      High Sensitive Troponin T Reference Range:  <14.0 ng/L- Negative Female for AMI  <22.0 ng/L- Negative Male for AMI  >=14 - Abnormal Female indicating possible myocardial injury.  >=22 - Abnormal Male  "indicating possible myocardial injury.   Clinicians would have to utilize clinical acumen, EKG, Troponin, and serial changes to determine if it is an Acute Myocardial Infarction or myocardial injury due to an underlying chronic condition.         D-dimer, Quantitative [[id number]]  (Normal) Collected: 02/25/24 1302    Specimen: Blood Updated: 02/25/24 1448     D-Dimer, Quantitative <0.27 MCGFEU/mL     Narrative:      According to the assay 's published package insert, a normal (<0.50 MCGFEU/mL) D-dimer result in conjunction with a non-high clinical probability assessment, excludes deep vein thrombosis (DVT) and pulmonary embolism (PE) with high sensitivity.    D-dimer values increase with age and this can make VTE exclusion of an older population difficult. To address this, the [employer], based on best available evidence and recent guidelines, recommends that clinicians use age-adjusted D-dimer thresholds in patients greater than 50 years of age with: a) a low probability of PE who do not meet all Pulmonary Embolism Rule Out Criteria, or b) in those with intermediate probability of PE.   The formula for an age-adjusted D-dimer cut-off is \"age/100\".  For example, a 60 year old patient would have an age-adjusted cut-off of 0.60 MCGFEU/mL and an 80 year old 0.80 MCGFEU/mL.    [place] Draw [[id number]] Collected: 02/25/24 1302    Specimen: Blood Updated: 02/25/24 1417    Narrative:      The following orders were created for panel order [place] Draw.  Procedure                               Abnormality         Status                     ---------                               -----------         ------                     Green Top (Gel)[[id number]]                                  Final result               Lavender Top[[id number]]                                     Final result               Red Top[[id number]]                                          Final result               Light Blue " Top[[id number]]                                   Final result                 Please view results for these tests on the individual orders.    Green Top (Gel) [[id number]] Collected: 02/25/24 1302    Specimen: Blood Updated: 02/25/24 1417     Extra Tube Hold for add-ons.     Comment: Auto resulted.       Lavender Top [[id number]] Collected: 02/25/24 1302    Specimen: Blood Updated: 02/25/24 1417     Extra Tube hold for add-on     Comment: Auto resulted       Red Top [[id number]] Collected: 02/25/24 1302    Specimen: Blood Updated: 02/25/24 1417     Extra Tube Hold for add-ons.     Comment: Auto resulted.       Light Blue Top [[id number]] Collected: 02/25/24 1302    Specimen: Blood Updated: 02/25/24 1417     Extra Tube Hold for add-ons.     Comment: Auto resulted       Comprehensive Metabolic Panel [[id number]]  (Abnormal) Collected: 02/25/24 1322    Specimen: Blood Updated: 02/25/24 1358     Glucose 132 mg/dL      BUN 45 mg/dL      Creatinine 1.02 mg/dL      Sodium 140 mmol/L      Potassium 4.7 mmol/L      Comment: Slight hemolysis detected by analyzer. Result may be falsely elevated.        Chloride 106 mmol/L      CO2 22.0 mmol/L      Calcium 9.1 mg/dL      Total Protein 5.7 g/dL      Albumin 3.7 g/dL      ALT (SGPT) 9 U/L      AST (SGOT) 13 U/L      Alkaline Phosphatase 46 U/L      Total Bilirubin 0.8 mg/dL      Globulin 2.0 gm/dL      A/G Ratio 1.9 g/dL      BUN/Creatinine Ratio 44.1     Anion Gap 12.0 mmol/L      eGFR 76.2 mL/min/1.73     Narrative:      GFR Normal >60  Chronic Kidney Disease <60  Kidney Failure <15    The GFR formula is only valid for adults with stable renal function between ages 18 and 70.    BNP [[id number]]  (Normal) Collected: 02/25/24 1322    Specimen: Blood Updated: 02/25/24 1356     proBNP 579.4 pg/mL     Narrative:      This assay is used as an aid in the diagnosis of individuals suspected of having heart failure. It can be used as an aid in the diagnosis of acute decompensated heart  failure (ADHF) in patients presenting with signs and symptoms of ADHF to the emergency department (ED). In addition, NT-proBNP of <300 pg/mL indicates ADHF is not likely.    Age Range Result Interpretation  NT-proBNP Concentration (pg/mL:      <50             Positive            >450                   Gray                 300-450                    Negative             <300    50-75           Positive            >900                  Gray                300-900                  Negative            <300      >75             Positive            >1800                  Gray                300-1800                  Negative            <300    High Sensitivity Troponin T [[id number]]  (Abnormal) Collected: 02/25/24 1322    Specimen: Blood Updated: 02/25/24 1356     HS Troponin T 40 ng/L     Narrative:      High Sensitive Troponin T Reference Range:  <14.0 ng/L- Negative Female for AMI  <22.0 ng/L- Negative Male for AMI  >=14 - Abnormal Female indicating possible myocardial injury.  >=22 - Abnormal Male indicating possible myocardial injury.   Clinicians would have to utilize clinical acumen, EKG, Troponin, and serial changes to determine if it is an Acute Myocardial Infarction or myocardial injury due to an underlying chronic condition.         CBC & Differential [[id number]]  (Abnormal) Collected: 02/25/24 1302    Specimen: Blood Updated: 02/25/24 1311    Narrative:      The following orders were created for panel order CBC & Differential.  Procedure                               Abnormality         Status                     ---------                               -----------         ------                     CBC Auto Differential[[id number]]        Abnormal            Final result                 Please view results for these tests on the individual orders.    CBC Auto Differential [[id number]]  (Abnormal) Collected: 02/25/24 1302    Specimen: Blood Updated: 02/25/24 1311     WBC 7.71 10*3/mm3      RBC 3.11 10*6/mm3       Hemoglobin 9.8 g/dL      Hematocrit 30.9 %      MCV 99.4 fL      MCH 31.5 pg      MCHC 31.7 g/dL      RDW 15.6 %      RDW-SD 55.0 fl      MPV 10.2 fL      Platelets 272 10*3/mm3      Neutrophil % 83.9 %      Lymphocyte % 7.8 %      Monocyte % 7.1 %      Eosinophil % 0.4 %      Basophil % 0.4 %      Immature Grans % 0.4 %      Neutrophils, Absolute 6.47 10*3/mm3      Lymphocytes, Absolute 0.60 10*3/mm3      Monocytes, Absolute 0.55 10*3/mm3      Eosinophils, Absolute 0.03 10*3/mm3      Basophils, Absolute 0.03 10*3/mm3      Immature Grans, Absolute 0.03 10*3/mm3      nRBC 0.0 /100 WBC             Lab Results   Component Value Date    ECHOEFEST 30 02/13/2019       Imaging Results (Last 72 Hours)       Procedure Component Value Units Date/Time    XR Chest 1 View [[id number]] Collected: 02/25/24 1413     Updated: 02/25/24 1417    Narrative:      EXAMINATION: XR CHEST 1 VW-     2/25/2024 1:05 PM     HISTORY: Chest pain.     1 view chest x-ray.     COMPARISON:  10/29/2023.     Stable heart size.  CABG changes.  LEFT cardiac pacer.     Adequately expanded lungs with no focal infiltrate.     No pneumothorax or heart failure.       Impression:      1. No acute lung disease.           This report was signed and finalized on 2/25/2024 2:14 PM by Dr. Samuel Allen MD.             Assessment & Plan     -Dyspnea     -Dizziness: Has been chronic but over the past month has been significantly worse for the past month. Patient tried to stop Flomax for 10 days with no improvement. Will try to gently give patient small IV bolus    -Orthostatic hypotension: Has been chronic but over the past month has been significantly worse. Patient has been hypotensive at home for the past month with Bps 70-90/40-50's. Will try to gently give patient small IV bolus.    -CAD: Extensive coronary artery disease with CABG and redo as well as subsequent stenting. Patient is have chest pressure that is likely a combination of worsening disease and in the  past has been related to volume overload with evidence of symptoms improving with diuresis. Will continue atorvastatin, plavix, ranexa and try to start imdur    -CHF: , CXR no active disease. LVEF 36-40% on echo from 5/2023. Patient has had worsening anginal symptoms with fluid overload in the past.     -permanent atrial fibrillation: Telemetry shows rates remaining controlled. Continue sotalol and xarelto    -BiV AICD: interrogation of 1/7/2024 showed battery 10 months, permanent atrial fibrillation with no RVR, no episodes of Ventricular tachycardia, AP 0%, RV Pacing 98%, LV Pacing 98%.     -Ventricular Tachycardia: noted on past interrogations with appropriate treatments and shock. No episodes noted on recent 1/7/2024 interrogation.       Plan:   - will continue to hold flomax  - entresto was held this am; will hold spironolactone this am  - IV diuresis has not been given due to hypotension    - will try to gently hydrate patient with small fluid bolus to see if there is improvement in BP/dizziness  - will try to start Imdur   - continue atorvastatin, plavix, sotalol and Jardiance  - will continue to monitor BP closely   - will further discuss patient with Dr Jordan   - will discuss patient with Dr Oliver regarding   - continue to monitor Telemetry  - continue to monitor I/O and daily weights    Further orders per Dr Jordna    Thank you for asking us to follow this patient with you.     Please note this cardiology consultation note is the result of a face to face consultation with the patient, in addition to reviewing medical records at length by myself, EVELIA Shukla.    Time: 60 minutes    Electronically signed by EVELIA Shukla, 02/26/24, 9:01 AM CST.

## 2024-02-26 NOTE — PLAN OF CARE
Goal Outcome Evaluation:  Plan of Care Reviewed With: patient        Pt denied chest pain this shift, some ELLISON, but otherwise slept well and VSS. CTM    Progress: no change

## 2024-02-26 NOTE — PLAN OF CARE
Goal Outcome Evaluation:  Plan of Care Reviewed With: patient        Progress: no change  Outcome Evaluation:  74-93 on tele. Pt has had low BP this shift. Gave 500ml bolus. Started pt on Imdur. Pt was scheduled for outpatient AICD replacement on Wednesday. Plans per MD. Pt denies pain or SOB. Safety maintained.

## 2024-02-27 PROCEDURE — 25810000003 SODIUM CHLORIDE 0.9 % SOLUTION

## 2024-02-27 PROCEDURE — 93005 ELECTROCARDIOGRAM TRACING: CPT | Performed by: STUDENT IN AN ORGANIZED HEALTH CARE EDUCATION/TRAINING PROGRAM

## 2024-02-27 PROCEDURE — 99232 SBSQ HOSP IP/OBS MODERATE 35: CPT | Performed by: NURSE PRACTITIONER

## 2024-02-27 RX ADMIN — METFORMIN HCL 500 MG: 500 TABLET ORAL at 17:39

## 2024-02-27 RX ADMIN — RIVAROXABAN 20 MG: 20 TABLET, FILM COATED ORAL at 17:39

## 2024-02-27 RX ADMIN — Medication 10 ML: at 21:36

## 2024-02-27 RX ADMIN — ATORVASTATIN CALCIUM 40 MG: 40 TABLET ORAL at 08:17

## 2024-02-27 RX ADMIN — PREGABALIN 150 MG: 75 CAPSULE ORAL at 08:18

## 2024-02-27 RX ADMIN — CLOPIDOGREL BISULFATE 75 MG: 75 TABLET, FILM COATED ORAL at 08:17

## 2024-02-27 RX ADMIN — SODIUM CHLORIDE 500 ML: 9 INJECTION, SOLUTION INTRAVENOUS at 08:39

## 2024-02-27 RX ADMIN — ACETAMINOPHEN 650 MG: 325 TABLET, FILM COATED ORAL at 23:35

## 2024-02-27 RX ADMIN — SOTALOL HYDROCHLORIDE 80 MG: 80 TABLET ORAL at 08:18

## 2024-02-27 RX ADMIN — EMPAGLIFLOZIN 10 MG: 10 TABLET, FILM COATED ORAL at 08:18

## 2024-02-27 RX ADMIN — Medication 10 ML: at 08:23

## 2024-02-27 RX ADMIN — ZOLPIDEM TARTRATE 5 MG: 5 TABLET ORAL at 23:34

## 2024-02-27 RX ADMIN — METFORMIN HCL 500 MG: 500 TABLET ORAL at 08:17

## 2024-02-27 RX ADMIN — FINASTERIDE 5 MG: 5 TABLET, FILM COATED ORAL at 08:16

## 2024-02-27 RX ADMIN — PANTOPRAZOLE SODIUM 40 MG: 40 TABLET, DELAYED RELEASE ORAL at 05:53

## 2024-02-27 RX ADMIN — PREGABALIN 150 MG: 75 CAPSULE ORAL at 21:36

## 2024-02-27 RX ADMIN — SOTALOL HYDROCHLORIDE 80 MG: 80 TABLET ORAL at 21:35

## 2024-02-27 RX ADMIN — RANOLAZINE 1000 MG: 500 TABLET, FILM COATED, EXTENDED RELEASE ORAL at 21:35

## 2024-02-27 RX ADMIN — ISOSORBIDE MONONITRATE 30 MG: 30 TABLET, EXTENDED RELEASE ORAL at 08:18

## 2024-02-27 RX ADMIN — RANOLAZINE 1000 MG: 500 TABLET, FILM COATED, EXTENDED RELEASE ORAL at 08:18

## 2024-02-27 NOTE — PLAN OF CARE
Goal Outcome Evaluation:         Pt reported that he is ready to get his pacer repaired and possibly make him feel better.  He stated that his wires are not in th correct place causing his heart to work harder and it is causing issues with his blood pressure.  Pt reported a slight head ache today and was given tylenol and it was effective.  Family at bedside until Pt asleep.  Call button on bedside.  Voiced 0 needs

## 2024-02-27 NOTE — PROGRESS NOTES
[facility] -  Progress Note     LOS: 0 days   Patient Care Team:  Rolan Salvador MD as PCP - General (Internal Medicine)  Sarbjit Posadas MD as Cardiologist (Cardiology)  Yolanda Perry APRN as Nurse Practitioner (Family Medicine)  Durga Ochoa MD as Consulting Physician (Urology)  Tyrone Broussard PA as Physician Assistant (Urology)  Devika Oliver MD as Cardiologist (Cardiac Electrophysiology)    Chief Complaint: dizziness, dyspnea on exertion and chest pain    Subjective     Interval History:     Patient Complaints: Today patient is sitting in his chair. He reports that he feels about the same today. He reports the first time he got up to the restroom he felt like dizziness was improved. He reports that he got up a second time to wash his hands prior to breakfast his dizziness was much worse. Dyspnea on exertion had also returned. He denies any leg swelling, orthopnea or PND. He denies any heart racing or palpitations. He is concerned of procedure with Dr Oliver being done tomorrow or having to reschedule.     Review of Systems:     Review of Systems   Respiratory:  Negative for shortness of breath.    Cardiovascular:  Positive for chest pain. Negative for palpitations and leg swelling.        Dyspnea on exertion    Neurological:  Positive for dizziness.   All other systems reviewed and are negative.    Objective     Vital Sign Min/Max for last 24 hours  Temp  Min: 97.4 °F (36.3 °C)  Max: 98.4 °F (36.9 °C)   BP  Min: 82/38  Max: 98/59   Pulse  Min: 73  Max: 79   Resp  Min: 18  Max: 18   SpO2  Min: 99 %  Max: 100 %   No data recorded   Weight  Min: 91.4 kg (201 lb 6.4 oz)  Max: 91.4 kg (201 lb 6.4 oz)         02/27/24  0637   Weight: 91.4 kg (201 lb 6.4 oz)       Intake/Output Summary (Last 24 hours) at 2/27/2024 1004  Last data filed at 2/26/2024 1700  Gross per 24 hour   Intake 440 ml   Output 400 ml   Net 40 ml     Telemetry: atrial fibrillation 70-75; v pacing      Physical  Exam:    Vitals reviewed.   Constitutional:       General: Not in acute distress.     Appearance: Normal appearance. Well-developed.   Eyes:      Pupils: Pupils are equal, round, and reactive to light.   HENT:      Head: Normocephalic and atraumatic.      Nose: Nose normal.   Neck:      Vascular: No carotid bruit.   Pulmonary:      Effort: Pulmonary effort is normal. No respiratory distress.      Breath sounds: Normal breath sounds. No wheezing. No rales.   Cardiovascular:      Normal rate. Regularly irregular rhythm.   Edema:     Peripheral edema absent.   Abdominal:      General: There is no distension.      Palpations: Abdomen is soft.   Musculoskeletal: Normal range of motion.      Cervical back: Normal range of motion and neck supple. Skin:     General: Skin is warm.      Findings: No erythema or rash.   Neurological:      General: No focal deficit present.      Mental Status: Alert and oriented to person, place, and time.   Psychiatric:         Attention and Perception: Attention normal.         Mood and Affect: Mood normal.         Speech: Speech normal.         Behavior: Behavior normal.         Thought Content: Thought content normal.         Judgment: Judgment normal.       Results Review:   Lab Results (last 72 hours)       Procedure Component Value Units Date/Time    Basic Metabolic Panel [[id number]]  (Abnormal) Collected: 02/26/24 0427    Specimen: Blood Updated: 02/26/24 0459     Glucose 102 mg/dL      BUN 43 mg/dL      Creatinine 0.99 mg/dL      Sodium 139 mmol/L      Potassium 4.0 mmol/L      Chloride 106 mmol/L      CO2 24.0 mmol/L      Calcium 8.6 mg/dL      BUN/Creatinine Ratio 43.4     Anion Gap 9.0 mmol/L      eGFR 78.9 mL/min/1.73     Narrative:      GFR Normal >60  Chronic Kidney Disease <60  Kidney Failure <15    The GFR formula is only valid for adults with stable renal function between ages 18 and 70.    Magnesium [[id number]]  (Normal) Collected: 02/26/24 0427    Specimen: Blood Updated:  "02/26/24 0459     Magnesium 2.0 mg/dL     POC Glucose Once [[id number]]  (Normal) Collected: 02/25/24 1652    Specimen: Blood Updated: 02/25/24 1703     Glucose 111 mg/dL      Comment: : [id number] Micaela Walker ID: [id number]       High Sensitivity Troponin T 2Hr [[id number]]  (Abnormal) Collected: 02/25/24 1517    Specimen: Blood from Arm, Left Updated: 02/25/24 1539     HS Troponin T 40 ng/L      Troponin T Delta 0 ng/L     Narrative:      High Sensitive Troponin T Reference Range:  <14.0 ng/L- Negative Female for AMI  <22.0 ng/L- Negative Male for AMI  >=14 - Abnormal Female indicating possible myocardial injury.  >=22 - Abnormal Male indicating possible myocardial injury.   Clinicians would have to utilize clinical acumen, EKG, Troponin, and serial changes to determine if it is an Acute Myocardial Infarction or myocardial injury due to an underlying chronic condition.         D-dimer, Quantitative [[id number]]  (Normal) Collected: 02/25/24 1302    Specimen: Blood Updated: 02/25/24 1448     D-Dimer, Quantitative <0.27 MCGFEU/mL     Narrative:      According to the assay 's published package insert, a normal (<0.50 MCGFEU/mL) D-dimer result in conjunction with a non-high clinical probability assessment, excludes deep vein thrombosis (DVT) and pulmonary embolism (PE) with high sensitivity.    D-dimer values increase with age and this can make VTE exclusion of an older population difficult. To address this, the [employer], based on best available evidence and recent guidelines, recommends that clinicians use age-adjusted D-dimer thresholds in patients greater than 50 years of age with: a) a low probability of PE who do not meet all Pulmonary Embolism Rule Out Criteria, or b) in those with intermediate probability of PE.   The formula for an age-adjusted D-dimer cut-off is \"age/100\".  For example, a 60 year old patient would have an age-adjusted cut-off of 0.60 MCGFEU/mL and " an 80 year old 0.80 MCGFEU/mL.    Alexis Draw [[id number]] Collected: 02/25/24 1302    Specimen: Blood Updated: 02/25/24 1417    Narrative:      The following orders were created for panel order Alexis Draw.  Procedure                               Abnormality         Status                     ---------                               -----------         ------                     Green Top (Gel)[[id number]]                                  Final result               Lavender Top[[id number]]                                     Final result               Red Top[[id number]]                                          Final result               Light Blue Top[[id number]]                                   Final result                 Please view results for these tests on the individual orders.    Green Top (Gel) [[id number]] Collected: 02/25/24 1302    Specimen: Blood Updated: 02/25/24 1417     Extra Tube Hold for add-ons.     Comment: Auto resulted.       Lavender Top [[id number]] Collected: 02/25/24 1302    Specimen: Blood Updated: 02/25/24 1417     Extra Tube hold for add-on     Comment: Auto resulted       Red Top [[id number]] Collected: 02/25/24 1302    Specimen: Blood Updated: 02/25/24 1417     Extra Tube Hold for add-ons.     Comment: Auto resulted.       Light Blue Top [[id number]] Collected: 02/25/24 1302    Specimen: Blood Updated: 02/25/24 1417     Extra Tube Hold for add-ons.     Comment: Auto resulted       Comprehensive Metabolic Panel [[id number]]  (Abnormal) Collected: 02/25/24 1322    Specimen: Blood Updated: 02/25/24 1358     Glucose 132 mg/dL      BUN 45 mg/dL      Creatinine 1.02 mg/dL      Sodium 140 mmol/L      Potassium 4.7 mmol/L      Comment: Slight hemolysis detected by analyzer. Result may be falsely elevated.        Chloride 106 mmol/L      CO2 22.0 mmol/L      Calcium 9.1 mg/dL      Total Protein 5.7 g/dL      Albumin 3.7 g/dL      ALT (SGPT) 9 U/L      AST (SGOT) 13 U/L      Alkaline Phosphatase 46  U/L      Total Bilirubin 0.8 mg/dL      Globulin 2.0 gm/dL      A/G Ratio 1.9 g/dL      BUN/Creatinine Ratio 44.1     Anion Gap 12.0 mmol/L      eGFR 76.2 mL/min/1.73     Narrative:      GFR Normal >60  Chronic Kidney Disease <60  Kidney Failure <15    The GFR formula is only valid for adults with stable renal function between ages 18 and 70.    BNP [[id number]]  (Normal) Collected: 02/25/24 1322    Specimen: Blood Updated: 02/25/24 1356     proBNP 579.4 pg/mL     Narrative:      This assay is used as an aid in the diagnosis of individuals suspected of having heart failure. It can be used as an aid in the diagnosis of acute decompensated heart failure (ADHF) in patients presenting with signs and symptoms of ADHF to the emergency department (ED). In addition, NT-proBNP of <300 pg/mL indicates ADHF is not likely.    Age Range Result Interpretation  NT-proBNP Concentration (pg/mL:      <50             Positive            >450                   Gray                 300-450                    Negative             <300    50-75           Positive            >900                  Gray                300-900                  Negative            <300      >75             Positive            >1800                  Gray                300-1800                  Negative            <300    High Sensitivity Troponin T [[id number]]  (Abnormal) Collected: 02/25/24 1322    Specimen: Blood Updated: 02/25/24 1356     HS Troponin T 40 ng/L     Narrative:      High Sensitive Troponin T Reference Range:  <14.0 ng/L- Negative Female for AMI  <22.0 ng/L- Negative Male for AMI  >=14 - Abnormal Female indicating possible myocardial injury.  >=22 - Abnormal Male indicating possible myocardial injury.   Clinicians would have to utilize clinical acumen, EKG, Troponin, and serial changes to determine if it is an Acute Myocardial Infarction or myocardial injury due to an underlying chronic condition.         CBC & Differential [[id number]]   "(Abnormal) Collected: 02/25/24 1302    Specimen: Blood Updated: 02/25/24 1311    Narrative:      The following orders were created for panel order CBC & Differential.  Procedure                               Abnormality         Status                     ---------                               -----------         ------                     CBC Auto Differential[[id number]]        Abnormal            Final result                 Please view results for these tests on the individual orders.    CBC Auto Differential [[id number]]  (Abnormal) Collected: 02/25/24 1302    Specimen: Blood Updated: 02/25/24 1311     WBC 7.71 10*3/mm3      RBC 3.11 10*6/mm3      Hemoglobin 9.8 g/dL      Hematocrit 30.9 %      MCV 99.4 fL      MCH 31.5 pg      MCHC 31.7 g/dL      RDW 15.6 %      RDW-SD 55.0 fl      MPV 10.2 fL      Platelets 272 10*3/mm3      Neutrophil % 83.9 %      Lymphocyte % 7.8 %      Monocyte % 7.1 %      Eosinophil % 0.4 %      Basophil % 0.4 %      Immature Grans % 0.4 %      Neutrophils, Absolute 6.47 10*3/mm3      Lymphocytes, Absolute 0.60 10*3/mm3      Monocytes, Absolute 0.55 10*3/mm3      Eosinophils, Absolute 0.03 10*3/mm3      Basophils, Absolute 0.03 10*3/mm3      Immature Grans, Absolute 0.03 10*3/mm3      nRBC 0.0 /100 WBC                 Echo EF Estimated  Lab Results   Component Value Date    ECHOEFEST 30 02/13/2019         Cath Ejection Fraction Quantitative  No results found for: \"CATHEF\"        Medication Review: yes  Current Facility-Administered Medications   Medication Dose Route Frequency Provider Last Rate Last Admin    acetaminophen (TYLENOL) tablet 650 mg  650 mg Oral Q4H PRN Rolan Salvador MD   650 mg at 02/26/24 2319    atorvastatin (LIPITOR) tablet 40 mg  40 mg Oral Daily Rolan Salvador MD   40 mg at 02/27/24 0817    Calcium Replacement - Follow Nurse / BPA Driven Protocol   Does not apply PRN Rolan Salvador MD        cetirizine (zyrTEC) tablet 10 mg  10 mg Oral PRN " Rolan Salvador MD        clopidogrel (PLAVIX) tablet 75 mg  75 mg Oral Daily Rolan Salvador MD   75 mg at 02/27/24 0817    empagliflozin (JARDIANCE) tablet 10 mg  10 mg Oral Daily Rolan Salvador MD   10 mg at 02/27/24 0818    finasteride (PROSCAR) tablet 5 mg  5 mg Oral Daily Rolan Salvador MD   5 mg at 02/27/24 0816    isosorbide mononitrate (IMDUR) 24 hr tablet 30 mg  30 mg Oral Q24H Johan Hubbard APRN   30 mg at 02/27/24 0818    Magnesium Standard Dose Replacement - Follow Nurse / BPA Driven Protocol   Does not apply PRN Rolan Salvador MD        metFORMIN (GLUCOPHAGE) tablet 500 mg  500 mg Oral BID With Meals Rolan Salvador MD   500 mg at 02/27/24 0817    pantoprazole (PROTONIX) EC tablet 40 mg  40 mg Oral Q AM Rolan Salvador MD   40 mg at 02/27/24 0553    Phosphorus Replacement - Follow Nurse / BPA Driven Protocol   Does not apply PRN Rolan Salvador MD        Potassium Replacement - Follow Nurse / BPA Driven Protocol   Does not apply PRN Rolan Salvador MD        pregabalin (LYRICA) capsule 150 mg  150 mg Oral BID Rolan Salvador MD   150 mg at 02/27/24 0818    ranolazine (RANEXA) 12 hr tablet 1,000 mg  1,000 mg Oral BID Rolan Salvador MD   1,000 mg at 02/27/24 0818    rivaroxaban (XARELTO) tablet 20 mg  20 mg Oral Daily With Dinner Rolan Salvador MD   20 mg at 02/26/24 1707    [Held by provider] sacubitril-valsartan (ENTRESTO) 49-51 MG tablet 0.5 tablet  0.5 tablet Oral BID Rolan Salvador MD        sodium chloride 0.9 % flush 10 mL  10 mL Intravenous PRN Rolan Salvador MD        sodium chloride 0.9 % flush 10 mL  10 mL Intravenous Q12H Rolan Salvador MD   10 mL at 02/27/24 0823    sodium chloride 0.9 % flush 10 mL  10 mL Intravenous PRN Rolan Salvador MD        sodium chloride 0.9 % infusion 40 mL  40 mL Intravenous PRN Rolan Salvador MD        sotalol (BETAPACE) tablet 80 mg  80 mg Oral Q12H  Rolan Salvador MD   80 mg at 02/27/24 0818    spironolactone (ALDACTONE) half tablet 12.5 mg  12.5 mg Oral Daily Rolan Salvador MD   12.5 mg at 02/25/24 2004    [Held by provider] tamsulosin (FLOMAX) 24 hr capsule 0.4 mg  0.4 mg Oral Daily Rolan Salvador MD   0.4 mg at 02/25/24 2004    traMADol-acetaminophen (ULTRACET) 37.5-325 MG per tablet 1 tablet  1 tablet Oral Q12H PRN Rolan Salvador MD        zolpidem (AMBIEN) tablet 5 mg  5 mg Oral Nightly PRN Rolan Salvador MD   5 mg at 02/26/24 2319         Assessment & Plan     -Dyspnea      -Dizziness: Has been chronic but over the past month has been significantly worse for the past month. Patient tried to stop Flomax for 10 days with no improvement. BP remained low this am with orthostasis present. IV bolus repeated today. Patient reports mild improvement in dizziness.     -Orthostatic hypotension: Has been chronic but over the past month has been significantly worse. Patient has been hypotensive at home for the past month with Bps 70-90/40-50's. BP remained low this am with orthostasis present. IV bolus repeated today. Patient reports mild improvement in dizziness.      -CAD: Extensive coronary artery disease with CABG and redo as well as subsequent stenting. Patient is have chest pressure that is likely a combination of worsening disease and in the past has been related to volume overload with evidence of symptoms improving with diuresis. Will continue atorvastatin, plavix, ranexa and Imdur     -CHF: , CXR no active disease. LVEF 36-40% on echo from 5/2023. Patient has had worsening anginal symptoms with fluid overload in the past. Still seems euvolemic on examination today.      -permanent atrial fibrillation: Telemetry shows rates remaining controlled. Continue sotalol and xarelto     -BiV AICD: interrogation of 1/7/2024 showed battery 10 months, permanent atrial fibrillation with no RVR, no episodes of Ventricular  tachycardia, AP 0%, RV Pacing 98%, LV Pacing 98%.      -Ventricular Tachycardia: noted on past interrogations with appropriate treatments and shock. No episodes noted on recent 1/7/2024 interrogation.         Plan:   - will continue to hold flomax, spironolactone and entresto  - IV diuresis has not been given due to hypotension    - gentle hydration repeated today due to orthostasis still being present this am  - continue atorvastatin, plavix, sotalol and Jardiance  - will continue to monitor BP closely   - continue to monitor Telemetry  - continue to monitor I/O and daily weights         Electronically signed by EVELIA Shukla, 02/27/24, 9:57 AM CST.

## 2024-02-27 NOTE — PROGRESS NOTES
"    Chief Complaint: Chest pain, dyspnea, weakness      Interval History:     Overall patient's condition is the same.  He states he is feeling dizzy still.  Orthostasis present.  He was evaluated by cardiology yesterday and adjustments were made to antianginals.  He received 1 dose of IV Lasix.  Renal function stable yesterday.    Review of Systems:   General ROS: negative for - chills or fever  Respiratory ROS: no cough, shortness of breath, or wheezing  Cardiovascular ROS: no chest pain or dyspnea on exertion  Gastrointestinal ROS: negative for - abdominal pain, diarrhea or nausea/vomiting       Vital Signs  Temp:  [97.4 °F (36.3 °C)-98.4 °F (36.9 °C)] 98.4 °F (36.9 °C)  Heart Rate:  [73-79] 75  Resp:  [18] 18  BP: (82-98)/(32-59) 98/59    Intake/Output Summary (Last 24 hours) at 2/27/2024 0742  Last data filed at 2/26/2024 1700  Gross per 24 hour   Intake 680 ml   Output 600 ml   Net 80 ml       Physical Exam:     General Appearance:    Alert, cooperative, in no acute distress   Head:    N/A   Throat:   N/A   Neck:   N/A   Lungs:     Clear to auscultation,respirations regular, even and                  unlabored    Heart:    Regular rhythm and normal rate, normal S1 and S2, no            murmur, no gallop, no rub   Abdomen:     Normal bowel sounds, no masses, no organomegaly, soft        non-tender, non-distended, no guarding, no rebound                tenderness   Extremities:   No edema, no cyanosis, no clubbing   Pulses:   N/A   Skin:   N/A   Lymph nodes:   N/A   Neurologic:   N/A          Lab Review:       Lab Results (last 24 hours)       ** No results found for the last 24 hours. **          Cultures:    No results found for: \"BLOODCX\", \"URINECX\", \"WOUNDCX\", \"MRSACX\", \"RESPCX\", \"STOOLCX\"    Radiology Review:  Imaging Results (Last 24 Hours)       ** No results found for the last 24 hours. **                     ASSESSMENT:      Dyspnea    Coronary artery disease of bypass graft of native heart with stable " angina pectoris    Chronic combined systolic and diastolic congestive heart failure    Weakness      PLAN:    1.  Cardiology consult.  Adjustment in antianginals.  He was given IV fluids yesterday.  Will repeat again today and monitor volume status.  Still orthostatic.  2.  Continue to hold IV diuresis given hypotension.  Continue to hold Entresto and Flomax for now.  3.  Closely monitor renal function, electrolytes, daily weights, intake and output.  4.  Continue home medications.  5.  Anticoagulated with Xarelto and Plavix.    Further orders per Dr. Salvador.      Mendoza Smith, APRN  02/27/24  07:42 CST        Part of this note may be an electronic transcription/translation of spoken language to printed text using the Dragon Dictation System.

## 2024-02-28 ENCOUNTER — APPOINTMENT (OUTPATIENT)
Dept: GENERAL RADIOLOGY | Facility: HOSPITAL | Age: 79
DRG: 277 | End: 2024-02-28
Payer: MEDICARE

## 2024-02-28 LAB
ANION GAP SERPL CALCULATED.3IONS-SCNC: 9 MMOL/L (ref 5–15)
BUN SERPL-MCNC: 24 MG/DL (ref 8–23)
BUN/CREAT SERPL: 28.9 (ref 7–25)
CALCIUM SPEC-SCNC: 8.7 MG/DL (ref 8.6–10.5)
CHLORIDE SERPL-SCNC: 109 MMOL/L (ref 98–107)
CO2 SERPL-SCNC: 23 MMOL/L (ref 22–29)
CREAT SERPL-MCNC: 0.83 MG/DL (ref 0.76–1.27)
EGFRCR SERPLBLD CKD-EPI 2021: 90.7 ML/MIN/1.73
GLUCOSE SERPL-MCNC: 114 MG/DL (ref 65–99)
POTASSIUM SERPL-SCNC: 3.9 MMOL/L (ref 3.5–5.2)
QT INTERVAL: 464 MS
QT INTERVAL: 472 MS
QT INTERVAL: 480 MS
QT INTERVAL: 486 MS
QTC INTERVAL: 518 MS
QTC INTERVAL: 527 MS
QTC INTERVAL: 536 MS
QTC INTERVAL: 546 MS
SODIUM SERPL-SCNC: 141 MMOL/L (ref 136–145)

## 2024-02-28 PROCEDURE — C1892 INTRO/SHEATH,FIXED,PEEL-AWAY: HCPCS

## 2024-02-28 PROCEDURE — 33225 L VENTRIC PACING LEAD ADD-ON: CPT | Performed by: STUDENT IN AN ORGANIZED HEALTH CARE EDUCATION/TRAINING PROGRAM

## 2024-02-28 PROCEDURE — 25010000002 MIDAZOLAM PER 1 MG: Performed by: STUDENT IN AN ORGANIZED HEALTH CARE EDUCATION/TRAINING PROGRAM

## 2024-02-28 PROCEDURE — C1894 INTRO/SHEATH, NON-LASER: HCPCS | Performed by: STUDENT IN AN ORGANIZED HEALTH CARE EDUCATION/TRAINING PROGRAM

## 2024-02-28 PROCEDURE — 93005 ELECTROCARDIOGRAM TRACING: CPT | Performed by: INTERNAL MEDICINE

## 2024-02-28 PROCEDURE — 99152 MOD SED SAME PHYS/QHP 5/>YRS: CPT | Performed by: STUDENT IN AN ORGANIZED HEALTH CARE EDUCATION/TRAINING PROGRAM

## 2024-02-28 PROCEDURE — 93010 ELECTROCARDIOGRAM REPORT: CPT | Performed by: INTERNAL MEDICINE

## 2024-02-28 PROCEDURE — 25010000002 CEFAZOLIN PER 500 MG: Performed by: STUDENT IN AN ORGANIZED HEALTH CARE EDUCATION/TRAINING PROGRAM

## 2024-02-28 PROCEDURE — C1889 IMPLANT/INSERT DEVICE, NOC: HCPCS | Performed by: STUDENT IN AN ORGANIZED HEALTH CARE EDUCATION/TRAINING PROGRAM

## 2024-02-28 PROCEDURE — C1882 AICD, OTHER THAN SING/DUAL: HCPCS | Performed by: STUDENT IN AN ORGANIZED HEALTH CARE EDUCATION/TRAINING PROGRAM

## 2024-02-28 PROCEDURE — C1769 GUIDE WIRE: HCPCS | Performed by: STUDENT IN AN ORGANIZED HEALTH CARE EDUCATION/TRAINING PROGRAM

## 2024-02-28 PROCEDURE — C1900 LEAD, CORONARY VENOUS: HCPCS | Performed by: STUDENT IN AN ORGANIZED HEALTH CARE EDUCATION/TRAINING PROGRAM

## 2024-02-28 PROCEDURE — 71045 X-RAY EXAM CHEST 1 VIEW: CPT

## 2024-02-28 PROCEDURE — 99232 SBSQ HOSP IP/OBS MODERATE 35: CPT | Performed by: NURSE PRACTITIONER

## 2024-02-28 PROCEDURE — S0260 H&P FOR SURGERY: HCPCS | Performed by: STUDENT IN AN ORGANIZED HEALTH CARE EDUCATION/TRAINING PROGRAM

## 2024-02-28 PROCEDURE — 25510000001 IOPAMIDOL PER 1 ML: Performed by: STUDENT IN AN ORGANIZED HEALTH CARE EDUCATION/TRAINING PROGRAM

## 2024-02-28 PROCEDURE — 3E0102A INTRODUCTION OF ANTI-INFECTIVE ENVELOPE INTO SUBCUTANEOUS TISSUE, OPEN APPROACH: ICD-10-PCS | Performed by: STUDENT IN AN ORGANIZED HEALTH CARE EDUCATION/TRAINING PROGRAM

## 2024-02-28 PROCEDURE — 5A2204Z RESTORATION OF CARDIAC RHYTHM, SINGLE: ICD-10-PCS | Performed by: STUDENT IN AN ORGANIZED HEALTH CARE EDUCATION/TRAINING PROGRAM

## 2024-02-28 PROCEDURE — 0JPT0PZ REMOVAL OF CARDIAC RHYTHM RELATED DEVICE FROM TRUNK SUBCUTANEOUS TISSUE AND FASCIA, OPEN APPROACH: ICD-10-PCS | Performed by: STUDENT IN AN ORGANIZED HEALTH CARE EDUCATION/TRAINING PROGRAM

## 2024-02-28 PROCEDURE — 93005 ELECTROCARDIOGRAM TRACING: CPT | Performed by: STUDENT IN AN ORGANIZED HEALTH CARE EDUCATION/TRAINING PROGRAM

## 2024-02-28 PROCEDURE — C1751 CATH, INF, PER/CENT/MIDLINE: HCPCS | Performed by: STUDENT IN AN ORGANIZED HEALTH CARE EDUCATION/TRAINING PROGRAM

## 2024-02-28 PROCEDURE — 25010000002 FENTANYL CITRATE (PF) 50 MCG/ML SOLUTION: Performed by: STUDENT IN AN ORGANIZED HEALTH CARE EDUCATION/TRAINING PROGRAM

## 2024-02-28 PROCEDURE — 0JH609Z INSERTION OF CARDIAC RESYNCHRONIZATION DEFIBRILLATOR PULSE GENERATOR INTO CHEST SUBCUTANEOUS TISSUE AND FASCIA, OPEN APPROACH: ICD-10-PCS | Performed by: STUDENT IN AN ORGANIZED HEALTH CARE EDUCATION/TRAINING PROGRAM

## 2024-02-28 PROCEDURE — 33264 RMVL & RPLCMT DFB GEN MLT LD: CPT | Performed by: STUDENT IN AN ORGANIZED HEALTH CARE EDUCATION/TRAINING PROGRAM

## 2024-02-28 PROCEDURE — 02H43KZ INSERTION OF DEFIBRILLATOR LEAD INTO CORONARY VEIN, PERCUTANEOUS APPROACH: ICD-10-PCS | Performed by: STUDENT IN AN ORGANIZED HEALTH CARE EDUCATION/TRAINING PROGRAM

## 2024-02-28 PROCEDURE — 80048 BASIC METABOLIC PNL TOTAL CA: CPT

## 2024-02-28 PROCEDURE — 99153 MOD SED SAME PHYS/QHP EA: CPT | Performed by: STUDENT IN AN ORGANIZED HEALTH CARE EDUCATION/TRAINING PROGRAM

## 2024-02-28 DEVICE — IMPLANTABLE DEVICE: Type: IMPLANTABLE DEVICE | Site: HEART | Status: FUNCTIONAL

## 2024-02-28 DEVICE — ENV PM AIGISRX ANTIBAC RESORB 2.9X3.3IN LG: Type: IMPLANTABLE DEVICE | Site: HEART | Status: FUNCTIONAL

## 2024-02-28 RX ORDER — MAGNESIUM HYDROXIDE 1200 MG/15ML
LIQUID ORAL
Status: DISCONTINUED | OUTPATIENT
Start: 2024-02-28 | End: 2024-02-28 | Stop reason: HOSPADM

## 2024-02-28 RX ORDER — FENTANYL CITRATE 50 UG/ML
INJECTION, SOLUTION INTRAMUSCULAR; INTRAVENOUS
Status: DISCONTINUED | OUTPATIENT
Start: 2024-02-28 | End: 2024-02-28 | Stop reason: HOSPADM

## 2024-02-28 RX ORDER — LIDOCAINE HYDROCHLORIDE 20 MG/ML
INJECTION, SOLUTION INFILTRATION; PERINEURAL
Status: DISCONTINUED | OUTPATIENT
Start: 2024-02-28 | End: 2024-02-28 | Stop reason: HOSPADM

## 2024-02-28 RX ORDER — MIDAZOLAM HYDROCHLORIDE 1 MG/ML
INJECTION INTRAMUSCULAR; INTRAVENOUS
Status: DISCONTINUED | OUTPATIENT
Start: 2024-02-28 | End: 2024-02-28 | Stop reason: HOSPADM

## 2024-02-28 RX ORDER — VANCOMYCIN/0.9 % SOD CHLORIDE 1.5G/250ML
15 PLASTIC BAG, INJECTION (ML) INTRAVENOUS ONCE
Status: COMPLETED | OUTPATIENT
Start: 2024-02-28 | End: 2024-02-28

## 2024-02-28 RX ADMIN — SOTALOL HYDROCHLORIDE 80 MG: 80 TABLET ORAL at 08:34

## 2024-02-28 RX ADMIN — PANTOPRAZOLE SODIUM 40 MG: 40 TABLET, DELAYED RELEASE ORAL at 06:38

## 2024-02-28 RX ADMIN — PREGABALIN 150 MG: 75 CAPSULE ORAL at 08:35

## 2024-02-28 RX ADMIN — EMPAGLIFLOZIN 10 MG: 10 TABLET, FILM COATED ORAL at 08:35

## 2024-02-28 RX ADMIN — Medication 1500 MG: at 14:56

## 2024-02-28 RX ADMIN — ACETAMINOPHEN 650 MG: 325 TABLET, FILM COATED ORAL at 19:46

## 2024-02-28 RX ADMIN — TRAMADOL HYDROCHLORIDE AND ACETAMINOPHEN 1 TABLET: 37.5; 325 TABLET, FILM COATED ORAL at 23:52

## 2024-02-28 RX ADMIN — RANOLAZINE 1000 MG: 500 TABLET, FILM COATED, EXTENDED RELEASE ORAL at 08:34

## 2024-02-28 RX ADMIN — ZOLPIDEM TARTRATE 5 MG: 5 TABLET ORAL at 23:34

## 2024-02-28 RX ADMIN — ISOSORBIDE MONONITRATE 30 MG: 30 TABLET, EXTENDED RELEASE ORAL at 08:34

## 2024-02-28 RX ADMIN — ATORVASTATIN CALCIUM 40 MG: 40 TABLET ORAL at 08:35

## 2024-02-28 RX ADMIN — CEFAZOLIN 2000 MG: 2 INJECTION, POWDER, FOR SOLUTION INTRAMUSCULAR; INTRAVENOUS at 15:26

## 2024-02-28 RX ADMIN — RIVAROXABAN 20 MG: 20 TABLET, FILM COATED ORAL at 18:57

## 2024-02-28 RX ADMIN — PREGABALIN 150 MG: 75 CAPSULE ORAL at 20:08

## 2024-02-28 RX ADMIN — FINASTERIDE 5 MG: 5 TABLET, FILM COATED ORAL at 08:35

## 2024-02-28 RX ADMIN — CLOPIDOGREL BISULFATE 75 MG: 75 TABLET, FILM COATED ORAL at 08:35

## 2024-02-28 RX ADMIN — SOTALOL HYDROCHLORIDE 80 MG: 80 TABLET ORAL at 20:08

## 2024-02-28 RX ADMIN — RANOLAZINE 1000 MG: 500 TABLET, FILM COATED, EXTENDED RELEASE ORAL at 20:08

## 2024-02-28 RX ADMIN — Medication 10 ML: at 20:08

## 2024-02-28 RX ADMIN — Medication 10 ML: at 08:38

## 2024-02-28 NOTE — PLAN OF CARE
Goal Outcome Evaluation:  Plan of Care Reviewed With: patient        Progress: no change  Outcome Evaluation: iid patent. tolerates diet. up ad jade. denies pain. tele  75-80. no acute distress noted. cont to monitor.

## 2024-02-28 NOTE — PLAN OF CARE
Goal Outcome Evaluation:   Pt awaiting surgical intervention 2/28/24. Pt pleasant, NPO since 0000 2/28/24 CHG bath completed. Pt remains A&OX4, continent of B&B. C/O h/a pain 2/10 during shift, relieved with admin of tylenol (see MAR). Pt able to rest well between care, safety maintained.

## 2024-02-28 NOTE — CASE MANAGEMENT/SOCIAL WORK
Discharge Planning Assessment  [facility]     Patient Name: Darren Maria  MRN: [id number]  Today's Date: 2/28/2024    Admit Date: 2/25/2024        Discharge Needs Assessment       Row Name 02/28/24 0840       Living Environment    People in Home spouse    Current Living Arrangements home    Potentially Unsafe Housing Conditions none    Primary Care Provided by self    Family Caregiver if Needed spouse    Quality of Family Relationships helpful;involved;supportive    Able to Return to Prior Arrangements yes       Resource/Environmental Concerns    Resource/Environmental Concerns none    Transportation Concerns none       Transition Planning    Patient/Family Anticipates Transition to home with family;home with help/services  [facility]ce[facility] from a nurse with [facility]    Patient/Family Anticipated Services at Transition none    Transportation Anticipated family or friend will provide       Discharge Needs Assessment    Readmission Within the Last 30 Days no previous admission in last 30 days    Equipment Currently Used at Home cane, quad    Concerns to be Addressed no discharge needs identified;denies needs/concerns at this time    Anticipated Changes Related to Illness none    Equipment Needed After Discharge none    Discharge Coordination/Progress Spoke with pt for DC planning. Pt plans to return home with spouse upon DC and denies the need for HH at this time.  Has a PCP and Rx coverage.  Will follow for any DC needs.                   Discharge Plan    No documentation.                 Continued Care and Services - Admitted Since 2/25/2024    Coordination has not been started for this encounter.       Expected Discharge Date and Time       Expected Discharge Date Expected Discharge Time    Feb 29, 2024            Demographic Summary    No documentation.                  Functional Status    No documentation.                  Psychosocial    No documentation.                  Abuse/Neglect    No  documentation.                  Legal    No documentation.                  Substance Abuse    No documentation.                  Patient Forms    No documentation.                     Betty George RN

## 2024-02-28 NOTE — PROGRESS NOTES
"    Chief Complaint: Chest pain, dyspnea, weakness      Interval History:     Overall condition is improved this morning.  Blood pressure and dizziness is improved with small fluid boluses.  Appears euvolemic this morning.  On examination not in distress his lungs are clear and he does not have any edema.  Denies any chest discomfort.    Review of Systems:   General ROS: negative for - chills or fever  Respiratory ROS: no cough, shortness of breath, or wheezing  Cardiovascular ROS: no chest pain or dyspnea on exertion  Gastrointestinal ROS: negative for - abdominal pain, diarrhea or nausea/vomiting       Vital Signs  Temp:  [98.2 °F (36.8 °C)-98.8 °F (37.1 °C)] 98.3 °F (36.8 °C)  Heart Rate:  [73-76] 74  Resp:  [16-20] 18  BP: (101-122)/(41-58) 103/42    Intake/Output Summary (Last 24 hours) at 2/28/2024 0800  Last data filed at 2/27/2024 2325  Gross per 24 hour   Intake --   Output 900 ml   Net -900 ml       Physical Exam:     General Appearance:    Alert, cooperative, in no acute distress   Head:    N/A   Throat:   N/A   Neck:   N/A   Lungs:     Clear to auscultation,respirations regular, even and                  unlabored    Heart:    Regular rhythm and normal rate, normal S1 and S2, no            murmur, no gallop, no rub   Abdomen:     Normal bowel sounds, no masses, no organomegaly, soft        non-tender, non-distended, no guarding, no rebound                tenderness   Extremities:   No edema, no cyanosis, no clubbing   Pulses:   N/A   Skin:   N/A   Lymph nodes:   N/A   Neurologic:   N/A          Lab Review:       Lab Results (last 24 hours)       ** No results found for the last 24 hours. **          Cultures:    No results found for: \"BLOODCX\", \"URINECX\", \"WOUNDCX\", \"MRSACX\", \"RESPCX\", \"STOOLCX\"    Radiology Review:  Imaging Results (Last 24 Hours)       ** No results found for the last 24 hours. 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  May proceed with left ventricular lead replacement today by electrophysiology.  2.  Continue to hold cardiac and heart failure medications at this time due to symptomatic hypotension.  May need to restart at some point in the near future.  3.  Hold off on any diuresis for now.  4.  Recheck renal function this morning.  Daily weights and intake and output.  5.  Anticoagulated with Xarelto and Plavix.    Further orders per Dr. Salvador.      Mendoza Smith, APRN  02/28/24  08:00 CST        Part of this note may be an electronic transcription/translation of spoken language to printed text using the Dragon Dictation System.

## 2024-02-28 NOTE — PROGRESS NOTES
[facility] -  Progress Note     LOS: 1 day   Patient Care Team:  Rolan Salvador MD as PCP - General (Internal Medicine)  Sarbjit Posadas MD as Cardiologist (Cardiology)  Yolanda Perry APRN as Nurse Practitioner (Family Medicine)  Durga Ochoa MD as Consulting Physician (Urology)  Tyrone Broussard PA as Physician Assistant (Urology)  Devika Oliver MD as Cardiologist (Cardiac Electrophysiology)    Chief Complaint: dizziness, dyspnea on exertion and chest pain    Subjective     Interval History:     Patient Complaints: patient is lying in bed comfortably this am. He reports that his dizziness has improved. He states that yesterday afternoon he walked around his room without hold on to the walls or using his cane. He denies any chest pain with this ambulation as well. He states that he got a little dizzy this am after getting to the restroom and standing to urinate, wash hands and brush teeth. He reports it was not as severe as the past couple of days. He denies any leg swelling. He denies any heart racing or palpitations. Plan to proceed with LV lead revision this afternoon with Dr Oliver.     Review of Systems:     Review of Systems   Cardiovascular:  Negative for chest pain and leg swelling.   Neurological:  Positive for dizziness (improving).   All other systems reviewed and are negative.    Objective     Vital Sign Min/Max for last 24 hours  Temp  Min: 97.9 °F (36.6 °C)  Max: 98.8 °F (37.1 °C)   BP  Min: 101/46  Max: 122/58   Pulse  Min: 73  Max: 76   Resp  Min: 16  Max: 20   SpO2  Min: 94 %  Max: 100 %   No data recorded   No data recorded         02/27/24  0637   Weight: 91.4 kg (201 lb 6.4 oz)       Intake/Output Summary (Last 24 hours) at 2/28/2024 1112  Last data filed at 2/27/2024 2325  Gross per 24 hour   Intake --   Output 900 ml   Net -900 ml     Telemetry: Atrial fibrillation  Vpacing rates 70-75    Physical Exam:    Vitals reviewed.   Constitutional:       General:  Not in acute distress.     Appearance: Normal appearance. Well-developed.   Eyes:      Pupils: Pupils are equal, round, and reactive to light.   HENT:      Head: Normocephalic and atraumatic.      Nose: Nose normal.   Neck:      Vascular: No carotid bruit.   Pulmonary:      Effort: Pulmonary effort is normal. No respiratory distress.      Breath sounds: Normal breath sounds. No wheezing. No rales.   Cardiovascular:      Normal rate. Regularly irregular rhythm.      Murmurs: There is no murmur.   Edema:     Peripheral edema absent.   Abdominal:      General: There is no distension.      Palpations: Abdomen is soft.   Musculoskeletal: Normal range of motion.      Cervical back: Normal range of motion and neck supple. Skin:     General: Skin is warm.      Findings: No erythema or rash.   Neurological:      General: No focal deficit present.      Mental Status: Alert and oriented to person, place, and time.   Psychiatric:         Attention and Perception: Attention normal.         Mood and Affect: Mood normal.         Speech: Speech normal.         Behavior: Behavior normal.         Thought Content: Thought content normal.         Judgment: Judgment normal.       Results Review:   Lab Results (last 72 hours)       Procedure Component Value Units Date/Time    Basic Metabolic Panel [[id number]]  (Abnormal) Collected: 02/28/24 0824    Specimen: Blood Updated: 02/28/24 0906     Glucose 114 mg/dL      BUN 24 mg/dL      Creatinine 0.83 mg/dL      Sodium 141 mmol/L      Potassium 3.9 mmol/L      Chloride 109 mmol/L      CO2 23.0 mmol/L      Calcium 8.7 mg/dL      BUN/Creatinine Ratio 28.9     Anion Gap 9.0 mmol/L      eGFR 90.7 mL/min/1.73     Narrative:      GFR Normal >60  Chronic Kidney Disease <60  Kidney Failure <15    The GFR formula is only valid for adults with stable renal function between ages 18 and 70.    Basic Metabolic Panel [[id number]]  (Abnormal) Collected: 02/26/24 0427    Specimen: Blood Updated: 02/26/24 0450      Glucose 102 mg/dL      BUN 43 mg/dL      Creatinine 0.99 mg/dL      Sodium 139 mmol/L      Potassium 4.0 mmol/L      Chloride 106 mmol/L      CO2 24.0 mmol/L      Calcium 8.6 mg/dL      BUN/Creatinine Ratio 43.4     Anion Gap 9.0 mmol/L      eGFR 78.9 mL/min/1.73     Narrative:      GFR Normal >60  Chronic Kidney Disease <60  Kidney Failure <15    The GFR formula is only valid for adults with stable renal function between ages 18 and 70.    Magnesium [[id number]]  (Normal) Collected: 02/26/24 0427    Specimen: Blood Updated: 02/26/24 0459     Magnesium 2.0 mg/dL     POC Glucose Once [[id number]]  (Normal) Collected: 02/25/24 1652    Specimen: Blood Updated: 02/25/24 1703     Glucose 111 mg/dL      Comment: : [id number] Micaela PoncetanyMeter ID: [id number]       High Sensitivity Troponin T 2Hr [[id number]]  (Abnormal) Collected: 02/25/24 1517    Specimen: Blood from Arm, Left Updated: 02/25/24 1539     HS Troponin T 40 ng/L      Troponin T Delta 0 ng/L     Narrative:      High Sensitive Troponin T Reference Range:  <14.0 ng/L- Negative Female for AMI  <22.0 ng/L- Negative Male for AMI  >=14 - Abnormal Female indicating possible myocardial injury.  >=22 - Abnormal Male indicating possible myocardial injury.   Clinicians would have to utilize clinical acumen, EKG, Troponin, and serial changes to determine if it is an Acute Myocardial Infarction or myocardial injury due to an underlying chronic condition.         D-dimer, Quantitative [[id number]]  (Normal) Collected: 02/25/24 1302    Specimen: Blood Updated: 02/25/24 1448     D-Dimer, Quantitative <0.27 MCGFEU/mL     Narrative:      According to the assay 's published package insert, a normal (<0.50 MCGFEU/mL) D-dimer result in conjunction with a non-high clinical probability assessment, excludes deep vein thrombosis (DVT) and pulmonary embolism (PE) with high sensitivity.    D-dimer values increase with age and this can make VTE exclusion of an older  "population difficult. To address this, the [employer], based on best available evidence and recent guidelines, recommends that clinicians use age-adjusted D-dimer thresholds in patients greater than 50 years of age with: a) a low probability of PE who do not meet all Pulmonary Embolism Rule Out Criteria, or b) in those with intermediate probability of PE.   The formula for an age-adjusted D-dimer cut-off is \"age/100\".  For example, a 60 year old patient would have an age-adjusted cut-off of 0.60 MCGFEU/mL and an 80 year old 0.80 MCGFEU/mL.    [place] [facility] [[id number]] Collected: 02/25/24 1302    Specimen: Blood Updated: 02/25/24 1417    Narrative:      The following orders were created for panel order [place] [facility].  Procedure                               Abnormality         Status                     ---------                               -----------         ------                     Green Top (Gel)[[id number]]                                  Final result               Lavender Top[[id number]]                                     Final result               Red Top[[id number]]                                          Final result               Light Blue Top[[id number]]                                   Final result                 Please view results for these tests on the individual orders.    Green Top (Gel) [[id number]] Collected: 02/25/24 1302    Specimen: Blood Updated: 02/25/24 1417     Extra Tube Hold for add-ons.     Comment: Auto resulted.       Lavender Top [[id number]] Collected: 02/25/24 1302    Specimen: Blood Updated: 02/25/24 1417     Extra Tube hold for add-on     Comment: Auto resulted       Red Top [[id number]] Collected: 02/25/24 1302    Specimen: Blood Updated: 02/25/24 1417     Extra Tube Hold for add-ons.     Comment: Auto resulted.       Light Blue Top [[id number]] Collected: 02/25/24 1302    Specimen: Blood Updated: 02/25/24 1417     Extra Tube Hold for add-ons.     Comment: " Auto resulted       Comprehensive Metabolic Panel [[id number]]  (Abnormal) Collected: 02/25/24 1322    Specimen: Blood Updated: 02/25/24 1358     Glucose 132 mg/dL      BUN 45 mg/dL      Creatinine 1.02 mg/dL      Sodium 140 mmol/L      Potassium 4.7 mmol/L      Comment: Slight hemolysis detected by analyzer. Result may be falsely elevated.        Chloride 106 mmol/L      CO2 22.0 mmol/L      Calcium 9.1 mg/dL      Total Protein 5.7 g/dL      Albumin 3.7 g/dL      ALT (SGPT) 9 U/L      AST (SGOT) 13 U/L      Alkaline Phosphatase 46 U/L      Total Bilirubin 0.8 mg/dL      Globulin 2.0 gm/dL      A/G Ratio 1.9 g/dL      BUN/Creatinine Ratio 44.1     Anion Gap 12.0 mmol/L      eGFR 76.2 mL/min/1.73     Narrative:      GFR Normal >60  Chronic Kidney Disease <60  Kidney Failure <15    The GFR formula is only valid for adults with stable renal function between ages 18 and 70.    BNP [[id number]]  (Normal) Collected: 02/25/24 1322    Specimen: Blood Updated: 02/25/24 1356     proBNP 579.4 pg/mL     Narrative:      This assay is used as an aid in the diagnosis of individuals suspected of having heart failure. It can be used as an aid in the diagnosis of acute decompensated heart failure (ADHF) in patients presenting with signs and symptoms of ADHF to the emergency department (ED). In addition, NT-proBNP of <300 pg/mL indicates ADHF is not likely.    Age Range Result Interpretation  NT-proBNP Concentration (pg/mL:      <50             Positive            >450                   Gray                 300-450                    Negative             <300    50-75           Positive            >900                  Gray                300-900                  Negative            <300      >75             Positive            >1800                  Gray                300-1800                  Negative            <300    High Sensitivity Troponin T [[id number]]  (Abnormal) Collected: 02/25/24 1322    Specimen: Blood Updated: 02/25/24  1356     HS Troponin T 40 ng/L     Narrative:      High Sensitive Troponin T Reference Range:  <14.0 ng/L- Negative Female for AMI  <22.0 ng/L- Negative Male for AMI  >=14 - Abnormal Female indicating possible myocardial injury.  >=22 - Abnormal Male indicating possible myocardial injury.   Clinicians would have to utilize clinical acumen, EKG, Troponin, and serial changes to determine if it is an Acute Myocardial Infarction or myocardial injury due to an underlying chronic condition.         CBC & Differential [[id number]]  (Abnormal) Collected: 02/25/24 1302    Specimen: Blood Updated: 02/25/24 1311    Narrative:      The following orders were created for panel order CBC & Differential.  Procedure                               Abnormality         Status                     ---------                               -----------         ------                     CBC Auto Differential[[id number]]        Abnormal            Final result                 Please view results for these tests on the individual orders.    CBC Auto Differential [[id number]]  (Abnormal) Collected: 02/25/24 1302    Specimen: Blood Updated: 02/25/24 1311     WBC 7.71 10*3/mm3      RBC 3.11 10*6/mm3      Hemoglobin 9.8 g/dL      Hematocrit 30.9 %      MCV 99.4 fL      MCH 31.5 pg      MCHC 31.7 g/dL      RDW 15.6 %      RDW-SD 55.0 fl      MPV 10.2 fL      Platelets 272 10*3/mm3      Neutrophil % 83.9 %      Lymphocyte % 7.8 %      Monocyte % 7.1 %      Eosinophil % 0.4 %      Basophil % 0.4 %      Immature Grans % 0.4 %      Neutrophils, Absolute 6.47 10*3/mm3      Lymphocytes, Absolute 0.60 10*3/mm3      Monocytes, Absolute 0.55 10*3/mm3      Eosinophils, Absolute 0.03 10*3/mm3      Basophils, Absolute 0.03 10*3/mm3      Immature Grans, Absolute 0.03 10*3/mm3      nRBC 0.0 /100 WBC                 Echo EF Estimated  Lab Results   Component Value Date    ECHOEFEST 30 02/13/2019     Results for orders placed during the hospital encounter of  "10/29/23    Adult Transthoracic Echo Complete W/ Cont if Necessary Per Protocol    Interpretation Summary    Left ventricular systolic function is moderately decreased. Left ventricular ejection fraction appears to be 36 - 40%.    The left ventricular cavity is borderline dilated (LVIDd 5.5 cm).    The following left ventricular wall segments are hypokinetic: mid anterior, apical anterior, apical lateral, basal inferoseptal, mid inferoseptal, mid anteroseptal, basal anterior and basal inferoseptal. The following left ventricular wall segments are akinetic: mid inferolateral, apical inferior, apical septal and apex.    The left atrial cavity is severely dilated.    Estimated right ventricular systolic pressure from tricuspid regurgitation is normal (<35 mmHg).    Normal size in the right ventricle, with mildly reduced systolic function noted.    The right atrial cavity is severely  dilated.    Moderate dilation of the ascending aorta is present (4.7 cm).    Compared to prior exam from 5/27/2023, no significant change.        Cath Ejection Fraction Quantitative  No results found for: \"CATHEF\"        Medication Review: yes  Current Facility-Administered Medications   Medication Dose Route Frequency Provider Last Rate Last Admin    acetaminophen (TYLENOL) tablet 650 mg  650 mg Oral Q4H PRN Rolan Salvador MD   650 mg at 02/27/24 2335    atorvastatin (LIPITOR) tablet 40 mg  40 mg Oral Daily Rolan Salvador MD   40 mg at 02/28/24 0835    Calcium Replacement - Follow Nurse / BPA Driven Protocol   Does not apply PRN Rolan Salvador MD        cetirizine (zyrTEC) tablet 10 mg  10 mg Oral PRN Rolan Salvador MD        clopidogrel (PLAVIX) tablet 75 mg  75 mg Oral Daily Rolan Salvador MD   75 mg at 02/28/24 0835    empagliflozin (JARDIANCE) tablet 10 mg  10 mg Oral Daily Rolan Salvador MD   10 mg at 02/28/24 0835    finasteride (PROSCAR) tablet 5 mg  5 mg Oral Daily Rolan Salvador MD "   5 mg at 02/28/24 0835    isosorbide mononitrate (IMDUR) 24 hr tablet 30 mg  30 mg Oral Q24H Johan Hubbard APRN   30 mg at 02/28/24 0834    Magnesium Standard Dose Replacement - Follow Nurse / BPA Driven Protocol   Does not apply PRN Rolan Salvador MD        [Held by provider] metFORMIN (GLUCOPHAGE) tablet 500 mg  500 mg Oral BID With Meals Rolan Salvador MD   500 mg at 02/27/24 1739    pantoprazole (PROTONIX) EC tablet 40 mg  40 mg Oral Q AM Rolan Salvador MD   40 mg at 02/28/24 0638    Phosphorus Replacement - Follow Nurse / BPA Driven Protocol   Does not apply PRN Rolan Salvador MD        Potassium Replacement - Follow Nurse / BPA Driven Protocol   Does not apply PRN Rolan Salvador MD        pregabalin (LYRICA) capsule 150 mg  150 mg Oral BID Rolan Salvador MD   150 mg at 02/28/24 0835    ranolazine (RANEXA) 12 hr tablet 1,000 mg  1,000 mg Oral BID Rolan Salvador MD   1,000 mg at 02/28/24 0834    rivaroxaban (XARELTO) tablet 20 mg  20 mg Oral Daily With Dinner Rolan Salvador MD   20 mg at 02/27/24 1739    [Held by provider] sacubitril-valsartan (ENTRESTO) 49-51 MG tablet 0.5 tablet  0.5 tablet Oral BID Rolan Salvador MD        sodium chloride 0.9 % flush 10 mL  10 mL Intravenous Q12H Rolan Salvador MD   10 mL at 02/28/24 0838    sodium chloride 0.9 % flush 10 mL  10 mL Intravenous PRN Rolan Salvador MD        sodium chloride 0.9 % infusion 40 mL  40 mL Intravenous PRN Rolan Salvador MD        sotalol (BETAPACE) tablet 80 mg  80 mg Oral Q12H Rolan Salvador MD   80 mg at 02/28/24 0834    spironolactone (ALDACTONE) half tablet 12.5 mg  12.5 mg Oral Daily Rolan Salvador MD   12.5 mg at 02/25/24 2004    [Held by provider] tamsulosin (FLOMAX) 24 hr capsule 0.4 mg  0.4 mg Oral Daily Rolan Salvador MD   0.4 mg at 02/25/24 2004    traMADol-acetaminophen (ULTRACET) 37.5-325 MG per tablet 1 tablet  1 tablet Oral Q12H  Rolan Do MD        zolpidem (AMBIEN) tablet 5 mg  5 mg Oral Nightly PRRolan Bryant MD   5 mg at 02/27/24 2334         Assessment & Plan     -Dyspnea      -Dizziness: Improved after IV bolus. Flomax, spironolactone and entresto have been held this admission due to symptomatic hypotension.      -Orthostatic hypotension: Improved after IV bolus. Flomax, spironolactone and entresto have been held this admission due to symptomatic hypotension.      -CAD: Extensive coronary artery disease with CABG and redo as well as subsequent stenting. Patient is have chest pressure that is likely a combination of worsening disease and in the past has been related to volume overload with evidence of symptoms improving with diuresis. Will continue atorvastatin, plavix, ranexa and Imdur     -CHF: , CXR no active disease. LVEF 36-40% on echo from 5/2023. Patient has had worsening anginal symptoms with fluid overload in the past. Still seems euvolemic on examination today.      -permanent atrial fibrillation: Telemetry shows rates remaining controlled 70's. Continue sotalol and xarelto     -BiV AICD: interrogation of 1/7/2024 showed battery 10 months, permanent atrial fibrillation with no RVR, no episodes of Ventricular tachycardia, AP 0%, RV Pacing 98%, LV Pacing 98%.      -Ventricular Tachycardia: noted on past interrogations with appropriate treatments and shock. No episodes noted on recent 1/7/2024 interrogation.         Plan:   - will continue to hold flomax, spironolactone and entresto  - will proceed with LV lead revision with Dr Oliver this afternoon  - continue atorvastatin, plavix, sotalol and Jardiance  - will continue to monitor BP closely   - continue to monitor Telemetry  - continue to monitor I/O and daily weights       Electronically signed by EVELIA Shukla, 02/28/24, 11:09 AM CST.

## 2024-02-29 ENCOUNTER — READMISSION MANAGEMENT (OUTPATIENT)
Dept: CALL CENTER | Facility: HOSPITAL | Age: 79
End: 2024-02-29
Payer: MEDICARE

## 2024-02-29 VITALS
BODY MASS INDEX: 28.56 KG/M2 | TEMPERATURE: 97.4 F | DIASTOLIC BLOOD PRESSURE: 47 MMHG | HEART RATE: 75 BPM | SYSTOLIC BLOOD PRESSURE: 101 MMHG | HEIGHT: 71 IN | OXYGEN SATURATION: 94 % | RESPIRATION RATE: 16 BRPM | WEIGHT: 204 LBS

## 2024-02-29 LAB
QT INTERVAL: 480 MS
QT INTERVAL: 486 MS
QTC INTERVAL: 536 MS
QTC INTERVAL: 542 MS

## 2024-02-29 PROCEDURE — 99232 SBSQ HOSP IP/OBS MODERATE 35: CPT | Performed by: NURSE PRACTITIONER

## 2024-02-29 PROCEDURE — 99024 POSTOP FOLLOW-UP VISIT: CPT | Performed by: STUDENT IN AN ORGANIZED HEALTH CARE EDUCATION/TRAINING PROGRAM

## 2024-02-29 RX ORDER — HYDROCODONE BITARTRATE AND ACETAMINOPHEN 5; 325 MG/1; MG/1
1 TABLET ORAL EVERY 6 HOURS PRN
Qty: 10 TABLET | Refills: 0 | Status: SHIPPED | OUTPATIENT
Start: 2024-02-29 | End: 2024-02-29 | Stop reason: SDUPTHER

## 2024-02-29 RX ORDER — HYDROCODONE BITARTRATE AND ACETAMINOPHEN 5; 325 MG/1; MG/1
1 TABLET ORAL EVERY 6 HOURS PRN
Status: DISCONTINUED | OUTPATIENT
Start: 2024-02-29 | End: 2024-02-29 | Stop reason: HOSPADM

## 2024-02-29 RX ORDER — ISOSORBIDE MONONITRATE 30 MG/1
30 TABLET, EXTENDED RELEASE ORAL
Qty: 30 TABLET | Refills: 5 | Status: SHIPPED | OUTPATIENT
Start: 2024-03-01

## 2024-02-29 RX ORDER — DOXYCYCLINE HYCLATE 100 MG
100 TABLET ORAL 2 TIMES DAILY
Qty: 14 TABLET | Refills: 0 | Status: SHIPPED | OUTPATIENT
Start: 2024-02-29

## 2024-02-29 RX ORDER — HYDROCODONE BITARTRATE AND ACETAMINOPHEN 5; 325 MG/1; MG/1
1 TABLET ORAL EVERY 8 HOURS PRN
Status: DISCONTINUED | OUTPATIENT
Start: 2024-02-29 | End: 2024-02-29

## 2024-02-29 RX ORDER — HYDROCODONE BITARTRATE AND ACETAMINOPHEN 5; 325 MG/1; MG/1
1 TABLET ORAL EVERY 6 HOURS PRN
Qty: 12 TABLET | Refills: 0 | Status: SHIPPED | OUTPATIENT
Start: 2024-02-29

## 2024-02-29 RX ADMIN — FINASTERIDE 5 MG: 5 TABLET, FILM COATED ORAL at 08:13

## 2024-02-29 RX ADMIN — ISOSORBIDE MONONITRATE 30 MG: 30 TABLET, EXTENDED RELEASE ORAL at 08:13

## 2024-02-29 RX ADMIN — Medication 10 ML: at 08:13

## 2024-02-29 RX ADMIN — HYDROCODONE BITARTRATE AND ACETAMINOPHEN 1 TABLET: 5; 325 TABLET ORAL at 11:32

## 2024-02-29 RX ADMIN — HYDROCODONE BITARTRATE AND ACETAMINOPHEN 1 TABLET: 5; 325 TABLET ORAL at 05:10

## 2024-02-29 RX ADMIN — RANOLAZINE 1000 MG: 500 TABLET, FILM COATED, EXTENDED RELEASE ORAL at 08:13

## 2024-02-29 RX ADMIN — CLOPIDOGREL BISULFATE 75 MG: 75 TABLET, FILM COATED ORAL at 08:13

## 2024-02-29 RX ADMIN — ATORVASTATIN CALCIUM 40 MG: 40 TABLET ORAL at 08:13

## 2024-02-29 RX ADMIN — Medication 12.5 MG: at 08:13

## 2024-02-29 RX ADMIN — EMPAGLIFLOZIN 10 MG: 10 TABLET, FILM COATED ORAL at 08:13

## 2024-02-29 RX ADMIN — SOTALOL HYDROCHLORIDE 80 MG: 80 TABLET ORAL at 08:13

## 2024-02-29 RX ADMIN — PREGABALIN 150 MG: 75 CAPSULE ORAL at 08:13

## 2024-02-29 RX ADMIN — PANTOPRAZOLE SODIUM 40 MG: 40 TABLET, DELAYED RELEASE ORAL at 05:11

## 2024-02-29 NOTE — PROGRESS NOTES
[facility] -  Progress Note     LOS: 2 days   Patient Care Team:  Rolan Salvador MD as PCP - General (Internal Medicine)  Sarbjit Posadas MD as Cardiologist (Cardiology)  Yolanda Perry APRN as Nurse Practitioner (Family Medicine)  Durga Ochoa MD as Consulting Physician (Urology)  Tyrone Broussard PA as Physician Assistant (Urology)  Devika Oliver MD as Cardiologist (Cardiac Electrophysiology)    Chief Complaint: dizziness, dyspnea on exertion and chest pain    Subjective     Interval History:     Patient Complaints: Patient underwent LV lead revision yesterday with Dr Oliver. Patient is sitting up in recliner at time of exam. He reports that he is doing ok today. He is having quite a bit of pain from the site of his LV lead revision. He denies any chest pain. He reports that he had mild dizziness this am. He denies any orthopnea, PND or leg swelling    Review of Systems:     Review of Systems   Cardiovascular:  Negative for chest pain and leg swelling.   Neurological:  Positive for dizziness (improving).   All other systems reviewed and are negative.    Objective     Vital Sign Min/Max for last 24 hours  Temp  Min: 97.4 °F (36.3 °C)  Max: 97.8 °F (36.6 °C)   BP  Min: 101/47  Max: 130/57   Pulse  Min: 75  Max: 96   Resp  Min: 16  Max: 18   SpO2  Min: 94 %  Max: 100 %   No data recorded   Weight  Min: 92.5 kg (204 lb)  Max: 92.5 kg (204 lb)         02/29/24  0606   Weight: 92.5 kg (204 lb)       Intake/Output Summary (Last 24 hours) at 2/29/2024 1209  Last data filed at 2/29/2024 0900  Gross per 24 hour   Intake 420 ml   Output --   Net 420 ml     Telemetry: Atrial fibrillation  Vpacing rates 70-75    Physical Exam:    Vitals reviewed.   Constitutional:       General: Not in acute distress.     Appearance: Normal appearance. Well-developed.   Eyes:      Pupils: Pupils are equal, round, and reactive to light.   HENT:      Head: Normocephalic and atraumatic.      Nose: Nose  normal.   Neck:      Vascular: No carotid bruit.   Pulmonary:      Effort: Pulmonary effort is normal. No respiratory distress.      Breath sounds: Normal breath sounds. No wheezing. No rales.   Chest:       Cardiovascular:      Normal rate. Regularly irregular rhythm.      Murmurs: There is no murmur.   Edema:     Peripheral edema absent.   Abdominal:      General: There is no distension.      Palpations: Abdomen is soft.   Musculoskeletal: Normal range of motion.      Cervical back: Normal range of motion and neck supple. Skin:     General: Skin is warm.      Findings: No erythema or rash.   Neurological:      General: No focal deficit present.      Mental Status: Alert and oriented to person, place, and time.   Psychiatric:         Attention and Perception: Attention normal.         Mood and Affect: Mood normal.         Speech: Speech normal.         Behavior: Behavior normal.         Thought Content: Thought content normal.         Judgment: Judgment normal.       Results Review:   Lab Results (last 72 hours)       Procedure Component Value Units Date/Time    Basic Metabolic Panel [[id number]]  (Abnormal) Collected: 02/28/24 0824    Specimen: Blood Updated: 02/28/24 0906     Glucose 114 mg/dL      BUN 24 mg/dL      Creatinine 0.83 mg/dL      Sodium 141 mmol/L      Potassium 3.9 mmol/L      Chloride 109 mmol/L      CO2 23.0 mmol/L      Calcium 8.7 mg/dL      BUN/Creatinine Ratio 28.9     Anion Gap 9.0 mmol/L      eGFR 90.7 mL/min/1.73     Narrative:      GFR Normal >60  Chronic Kidney Disease <60  Kidney Failure <15    The GFR formula is only valid for adults with stable renal function between ages 18 and 70.    Basic Metabolic Panel [[id number]]  (Abnormal) Collected: 02/26/24 0427    Specimen: Blood Updated: 02/26/24 0459     Glucose 102 mg/dL      BUN 43 mg/dL      Creatinine 0.99 mg/dL      Sodium 139 mmol/L      Potassium 4.0 mmol/L      Chloride 106 mmol/L      CO2 24.0 mmol/L      Calcium 8.6 mg/dL       BUN/Creatinine Ratio 43.4     Anion Gap 9.0 mmol/L      eGFR 78.9 mL/min/1.73     Narrative:      GFR Normal >60  Chronic Kidney Disease <60  Kidney Failure <15    The GFR formula is only valid for adults with stable renal function between ages 18 and 70.    Magnesium [[id number]]  (Normal) Collected: 02/26/24 0427    Specimen: Blood Updated: 02/26/24 0459     Magnesium 2.0 mg/dL     POC Glucose Once [[id number]]  (Normal) Collected: 02/25/24 1652    Specimen: Blood Updated: 02/25/24 1703     Glucose 111 mg/dL      Comment: : [id number] Micaela WorkmanyMeter ID: [id number]       High Sensitivity Troponin T 2Hr [[id number]]  (Abnormal) Collected: 02/25/24 1517    Specimen: Blood from Arm, Left Updated: 02/25/24 1539     HS Troponin T 40 ng/L      Troponin T Delta 0 ng/L     Narrative:      High Sensitive Troponin T Reference Range:  <14.0 ng/L- Negative Female for AMI  <22.0 ng/L- Negative Male for AMI  >=14 - Abnormal Female indicating possible myocardial injury.  >=22 - Abnormal Male indicating possible myocardial injury.   Clinicians would have to utilize clinical acumen, EKG, Troponin, and serial changes to determine if it is an Acute Myocardial Infarction or myocardial injury due to an underlying chronic condition.         D-dimer, Quantitative [[id number]]  (Normal) Collected: 02/25/24 1302    Specimen: Blood Updated: 02/25/24 1448     D-Dimer, Quantitative <0.27 MCGFEU/mL     Narrative:      According to the assay 's published package insert, a normal (<0.50 MCGFEU/mL) D-dimer result in conjunction with a non-high clinical probability assessment, excludes deep vein thrombosis (DVT) and pulmonary embolism (PE) with high sensitivity.    D-dimer values increase with age and this can make VTE exclusion of an older population difficult. To address this, the [employer], based on best available evidence and recent guidelines, recommends that clinicians use age-adjusted D-dimer  "thresholds in patients greater than 50 years of age with: a) a low probability of PE who do not meet all Pulmonary Embolism Rule Out Criteria, or b) in those with intermediate probability of PE.   The formula for an age-adjusted D-dimer cut-off is \"age/100\".  For example, a 60 year old patient would have an age-adjusted cut-off of 0.60 MCGFEU/mL and an 80 year old 0.80 MCGFEU/mL.    [place] Draw [[id number]] Collected: 02/25/24 1302    Specimen: Blood Updated: 02/25/24 1417    Narrative:      The following orders were created for panel order [place] Draw.  Procedure                               Abnormality         Status                     ---------                               -----------         ------                     Green Top (Gel)[[id number]]                                  Final result               Lavender Top[[id number]]                                     Final result               Red Top[[id number]]                                          Final result               Light Blue Top[[id number]]                                   Final result                 Please view results for these tests on the individual orders.    Green Top (Gel) [[id number]] Collected: 02/25/24 1302    Specimen: Blood Updated: 02/25/24 1417     Extra Tube Hold for add-ons.     Comment: Auto resulted.       Lavender Top [[id number]] Collected: 02/25/24 1302    Specimen: Blood Updated: 02/25/24 1417     Extra Tube hold for add-on     Comment: Auto resulted       Red Top [[id number]] Collected: 02/25/24 1302    Specimen: Blood Updated: 02/25/24 1417     Extra Tube Hold for add-ons.     Comment: Auto resulted.       Light Blue Top [[id number]] Collected: 02/25/24 1302    Specimen: Blood Updated: 02/25/24 1417     Extra Tube Hold for add-ons.     Comment: Auto resulted       Comprehensive Metabolic Panel [[id number]]  (Abnormal) Collected: 02/25/24 1322    Specimen: Blood Updated: 02/25/24 1358     Glucose 132 mg/dL      BUN 45 mg/dL      " Creatinine 1.02 mg/dL      Sodium 140 mmol/L      Potassium 4.7 mmol/L      Comment: Slight hemolysis detected by analyzer. Result may be falsely elevated.        Chloride 106 mmol/L      CO2 22.0 mmol/L      Calcium 9.1 mg/dL      Total Protein 5.7 g/dL      Albumin 3.7 g/dL      ALT (SGPT) 9 U/L      AST (SGOT) 13 U/L      Alkaline Phosphatase 46 U/L      Total Bilirubin 0.8 mg/dL      Globulin 2.0 gm/dL      A/G Ratio 1.9 g/dL      BUN/Creatinine Ratio 44.1     Anion Gap 12.0 mmol/L      eGFR 76.2 mL/min/1.73     Narrative:      GFR Normal >60  Chronic Kidney Disease <60  Kidney Failure <15    The GFR formula is only valid for adults with stable renal function between ages 18 and 70.    BNP [[id number]]  (Normal) Collected: 02/25/24 1322    Specimen: Blood Updated: 02/25/24 1356     proBNP 579.4 pg/mL     Narrative:      This assay is used as an aid in the diagnosis of individuals suspected of having heart failure. It can be used as an aid in the diagnosis of acute decompensated heart failure (ADHF) in patients presenting with signs and symptoms of ADHF to the emergency department (ED). In addition, NT-proBNP of <300 pg/mL indicates ADHF is not likely.    Age Range Result Interpretation  NT-proBNP Concentration (pg/mL:      <50             Positive            >450                   Gray                 300-450                    Negative             <300    50-75           Positive            >900                  Gray                300-900                  Negative            <300      >75             Positive            >1800                  Gray                300-1800                  Negative            <300    High Sensitivity Troponin T [[id number]]  (Abnormal) Collected: 02/25/24 1322    Specimen: Blood Updated: 02/25/24 1356     HS Troponin T 40 ng/L     Narrative:      High Sensitive Troponin T Reference Range:  <14.0 ng/L- Negative Female for AMI  <22.0 ng/L- Negative Male for AMI  >=14 - Abnormal  Female indicating possible myocardial injury.  >=22 - Abnormal Male indicating possible myocardial injury.   Clinicians would have to utilize clinical acumen, EKG, Troponin, and serial changes to determine if it is an Acute Myocardial Infarction or myocardial injury due to an underlying chronic condition.         CBC & Differential [[id number]]  (Abnormal) Collected: 02/25/24 1302    Specimen: Blood Updated: 02/25/24 1311    Narrative:      The following orders were created for panel order CBC & Differential.  Procedure                               Abnormality         Status                     ---------                               -----------         ------                     CBC Auto Differential[[id number]]        Abnormal            Final result                 Please view results for these tests on the individual orders.    CBC Auto Differential [[id number]]  (Abnormal) Collected: 02/25/24 1302    Specimen: Blood Updated: 02/25/24 1311     WBC 7.71 10*3/mm3      RBC 3.11 10*6/mm3      Hemoglobin 9.8 g/dL      Hematocrit 30.9 %      MCV 99.4 fL      MCH 31.5 pg      MCHC 31.7 g/dL      RDW 15.6 %      RDW-SD 55.0 fl      MPV 10.2 fL      Platelets 272 10*3/mm3      Neutrophil % 83.9 %      Lymphocyte % 7.8 %      Monocyte % 7.1 %      Eosinophil % 0.4 %      Basophil % 0.4 %      Immature Grans % 0.4 %      Neutrophils, Absolute 6.47 10*3/mm3      Lymphocytes, Absolute 0.60 10*3/mm3      Monocytes, Absolute 0.55 10*3/mm3      Eosinophils, Absolute 0.03 10*3/mm3      Basophils, Absolute 0.03 10*3/mm3      Immature Grans, Absolute 0.03 10*3/mm3      nRBC 0.0 /100 WBC                 Echo EF Estimated  Lab Results   Component Value Date    ECHOEFEST 30 02/13/2019     Results for orders placed during the hospital encounter of 10/29/23    Adult Transthoracic Echo Complete W/ Cont if Necessary Per Protocol    Interpretation Summary    Left ventricular systolic function is moderately decreased. Left ventricular  "ejection fraction appears to be 36 - 40%.    The left ventricular cavity is borderline dilated (LVIDd 5.5 cm).    The following left ventricular wall segments are hypokinetic: mid anterior, apical anterior, apical lateral, basal inferoseptal, mid inferoseptal, mid anteroseptal, basal anterior and basal inferoseptal. The following left ventricular wall segments are akinetic: mid inferolateral, apical inferior, apical septal and apex.    The left atrial cavity is severely dilated.    Estimated right ventricular systolic pressure from tricuspid regurgitation is normal (<35 mmHg).    Normal size in the right ventricle, with mildly reduced systolic function noted.    The right atrial cavity is severely  dilated.    Moderate dilation of the ascending aorta is present (4.7 cm).    Compared to prior exam from 5/27/2023, no significant change.        Cath Ejection Fraction Quantitative  No results found for: \"CATHEF\"        Medication Review: yes  Current Facility-Administered Medications   Medication Dose Route Frequency Provider Last Rate Last Admin    acetaminophen (TYLENOL) tablet 650 mg  650 mg Oral Q4H PRN Devika Oliver MD   650 mg at 02/28/24 1946    atorvastatin (LIPITOR) tablet 40 mg  40 mg Oral Daily Devika Oliver MD   40 mg at 02/29/24 0813    Calcium Replacement - Follow Nurse / BPA Driven Protocol   Does not apply PRN Devika Oliver MD        cetirizine (zyrTEC) tablet 10 mg  10 mg Oral PRN Devika Oliver MD        clopidogrel (PLAVIX) tablet 75 mg  75 mg Oral Daily Devika Oliver MD   75 mg at 02/29/24 0813    empagliflozin (JARDIANCE) tablet 10 mg  10 mg Oral Daily Devika Oliver MD   10 mg at 02/29/24 0813    finasteride (PROSCAR) tablet 5 mg  5 mg Oral Daily Devika Oliver MD   5 mg at 02/29/24 0813    HYDROcodone-acetaminophen (NORCO) 5-325 MG per tablet 1 tablet  1 tablet Oral Q6H PRN Devika Oliver MD   1 tablet at 02/29/24 1132    isosorbide mononitrate (IMDUR) 24 hr tablet 30 mg  30 mg Oral Q24H Devika Oliver" MD   30 mg at 02/29/24 0813    Magnesium Standard Dose Replacement - Follow Nurse / BPA Driven Protocol   Does not apply PRN Devika Oliver MD        [Held by provider] metFORMIN (GLUCOPHAGE) tablet 500 mg  500 mg Oral BID With Meals Devika Oliver MD   500 mg at 02/27/24 1739    pantoprazole (PROTONIX) EC tablet 40 mg  40 mg Oral Q AM Devika Oliver MD   40 mg at 02/29/24 0511    Phosphorus Replacement - Follow Nurse / BPA Driven Protocol   Does not apply PRN Devika Oliver MD        Potassium Replacement - Follow Nurse / BPA Driven Protocol   Does not apply PRN Devika Oliver MD        pregabalin (LYRICA) capsule 150 mg  150 mg Oral BID Devika Oliver MD   150 mg at 02/29/24 0813    ranolazine (RANEXA) 12 hr tablet 1,000 mg  1,000 mg Oral BID Devika Oliver MD   1,000 mg at 02/29/24 0813    rivaroxaban (XARELTO) tablet 20 mg  20 mg Oral Daily With Dinner Devika Oliver MD   20 mg at 02/28/24 1857    [Held by provider] sacubitril-valsartan (ENTRESTO) 49-51 MG tablet 0.5 tablet  0.5 tablet Oral BID Devika Oliver MD        sodium chloride 0.9 % flush 10 mL  10 mL Intravenous Q12H Devika Oliver MD   10 mL at 02/29/24 0813    sodium chloride 0.9 % flush 10 mL  10 mL Intravenous PRN Devika Oliver MD        sodium chloride 0.9 % infusion 40 mL  40 mL Intravenous PRN Devika Oliver MD        sotalol (BETAPACE) tablet 80 mg  80 mg Oral Q12H Devika Oliver MD   80 mg at 02/29/24 0813    spironolactone (ALDACTONE) half tablet 12.5 mg  12.5 mg Oral Daily Devika Oliver MD   12.5 mg at 02/29/24 0813    [Held by provider] tamsulosin (FLOMAX) 24 hr capsule 0.4 mg  0.4 mg Oral Daily Devika Oliver MD   0.4 mg at 02/25/24 2004    traMADol-acetaminophen (ULTRACET) 37.5-325 MG per tablet 1 tablet  1 tablet Oral Q12H PRN Devika Oliver MD   1 tablet at 02/28/24 2352    zolpidem (AMBIEN) tablet 5 mg  5 mg Oral Nightly PRN Devika Oliver MD   5 mg at 02/28/24 2331         Assessment & Plan     -Dyspnea      -Dizziness: Improved after IV bolus.  Flomax, spironolactone and entresto have been held this admission due to symptomatic hypotension.      -Orthostatic hypotension: Improved after IV bolus. Flomax, spironolactone and entresto have been held this admission due to symptomatic hypotension.      -CAD: Extensive coronary artery disease with CABG and redo as well as subsequent stenting. Patient is have chest pressure that is likely a combination of worsening disease and in the past has been related to volume overload with evidence of symptoms improving with diuresis. Will continue atorvastatin, plavix, ranexa and Imdur     -CHF: , CXR no active disease. LVEF 36-40% on echo from 5/2023. Patient has had worsening anginal symptoms with fluid overload in the past. Still euvolemic on examination today.      -permanent atrial fibrillation: Telemetry shows rates remaining controlled 70's. Continue sotalol and xarelto     -BiV AICD: interrogation of 1/7/2024 showed battery 10 months, permanent atrial fibrillation with no RVR, no episodes of Ventricular tachycardia, AP 0%, RV Pacing 98%, LV Pacing 98%. Underwent LV lead revision with Dr Oliver yesterday.      -Ventricular Tachycardia: noted on past interrogations with appropriate treatments and shock. No episodes noted on recent 1/7/2024 interrogation.         Plan:   - patient is ok for discharge from cardiology standpoint  - will continue to hold flomax, spironolactone and entresto on discharge  - continue atorvastatin, plavix, sotalol and Jardiance  - instructed patient to continue daily weights and take   - patient is to follow up in PCP in 1-2 weeks  - patient is to follow up in with Dr Posadas or Yolanda ALTAMIRANO in 3-4 weeks       Electronically signed by EVELIA Shukla, 02/28/24, 11:09 AM CST.

## 2024-02-29 NOTE — PROGRESS NOTES
Subjective: Did well overnight, no acute events following generator change and new lead implant.  Still having pain from the pacemaker implant site.    Vitals: Reviewed  Well-appearing, no acute distress  Pulse generator dressing removed with overlying Steri-Strips intact.  There is a moderate-sized hematoma adjacent to the pocket.  No evidence of purulence or drainage.    Chest x-ray reviewed: No pneumothorax, appropriately positioned  Device interrogation performed: Stable lead parameters    Plan:  1.  Start doxycycline for one 1 week for prevention of infection given hematoma  2.  Norco as needed for pain control  3.  Okay for discharge from EP standpoint  4.  Will arrange EP follow-up

## 2024-02-29 NOTE — NURSING NOTE
Pt reported pain in his chest post op. Pt asked what pain medications he could take and he said that he felt that he could take norco or oxy but could not take morphine. Received order for norco from on call Dr. Ferreira.  Order placed.

## 2024-02-29 NOTE — PLAN OF CARE
G  Problem: Adult Inpatient Plan of Care  Goal: Plan of Care Review  Outcome: Ongoing, Progressing  Flowsheets (Taken 2/28/2024 1809)  Plan of Care Reviewed With: patient  Outcome Evaluation: Pt. had LV lead replaced and new battery for AICD today with Dr. Oliver. Still awaiting pt. back onto floor. Tele-  75-79, PVC's. Care plan ongoing.

## 2024-02-29 NOTE — PLAN OF CARE
Goal Outcome Evaluation:        Problem: Heart Failure Comorbidity  Goal: Maintenance of Heart Failure Symptom Control  Outcome: Ongoing, Progressing      Pt reported that he is feeling better with his new pacer wire change. He is c/o pain and given prn pain medication that was not effective. Called on call and received stronger pain medication. Dressing to chest is intact. Following pacer precautions.

## 2024-02-29 NOTE — PROGRESS NOTES
Chief Complaint: Chest pain, dyspnea, weakness      Interval History:     Complaining of some pain this morning around procedure site.  States it has improved with a pain pill last night.  Blood pressure dizziness improved with small fluid bolus.  Appears euvolemic.  Does not appear in distress.  On exam his lungs are clear.  Had left ventricular lead revision and pulse generator change for chronic LV lead malfunction and chronic systolic heart failure secondary to ischemic cardiomyopathy.    Review of Systems:   General ROS: negative for - chills or fever  Respiratory ROS: no cough, shortness of breath, or wheezing  Cardiovascular ROS: no chest pain or dyspnea on exertion  Gastrointestinal ROS: negative for - abdominal pain, diarrhea or nausea/vomiting       Vital Signs  Temp:  [97.5 °F (36.4 °C)-97.8 °F (36.6 °C)] 97.5 °F (36.4 °C)  Heart Rate:  [75-84] 80  Resp:  [16-18] 18  BP: (103-130)/(47-73) 118/58  No intake or output data in the 24 hours ending 02/29/24 0804    Physical Exam:     General Appearance:    Alert, cooperative, in no acute distress   Head:    N/A   Throat:   N/A   Neck:   N/A   Lungs:     Clear to auscultation,respirations regular, even and                  unlabored    Heart:    Regular rhythm and normal rate, normal S1 and S2, no            murmur, no gallop, no rub   Abdomen:     Normal bowel sounds, no masses, no organomegaly, soft        non-tender, non-distended, no guarding, no rebound                tenderness   Extremities:   No edema, no cyanosis, no clubbing   Pulses:   N/A   Skin:   N/A   Lymph nodes:   N/A   Neurologic:   N/A          Lab Review:       Lab Results (last 24 hours)       Procedure Component Value Units Date/Time    Basic Metabolic Panel [[id number]]  (Abnormal) Collected: 02/28/24 0824    Specimen: Blood Updated: 02/28/24 0906     Glucose 114 mg/dL      BUN 24 mg/dL      Creatinine 0.83 mg/dL      Sodium 141 mmol/L      Potassium 3.9 mmol/L      Chloride 109 mmol/L  "     CO2 23.0 mmol/L      Calcium 8.7 mg/dL      BUN/Creatinine Ratio 28.9     Anion Gap 9.0 mmol/L      eGFR 90.7 mL/min/1.73     Narrative:      GFR Normal >60  Chronic Kidney Disease <60  Kidney Failure <15    The GFR formula is only valid for adults with stable renal function between ages 18 and 70.          Cultures:    No results found for: \"BLOODCX\", \"URINECX\", \"WOUNDCX\", \"MRSACX\", \"RESPCX\", \"STOOLCX\"    Radiology Review:  Imaging Results (Last 24 Hours)       Procedure Component Value Units Date/Time    XR Chest 1 View [[id number]] Collected: 02/28/24 1813     Updated: 02/28/24 1817    Narrative:      EXAM: XR CHEST 1 VW-      DATE: 2/28/2024 5:03 PM     HISTORY: post lead placement and icd generator change; R06.00-Dyspnea,  unspecified; R07.9-Chest pain, unspecified; I44.2-Atrioventricular  block, complete; I50.42-Chronic combined systolic (congestive) and  diastolic (congestive) heart failure       COMPARISON: 2/25/2024.     TECHNIQUE:  Frontal view(s) of the chest submitted.     FINDINGS:    The lungs are grossly clear. No effusion or pneumothorax is seen. The  patient is status post median sternotomy, CABG, and left atrial  appendage occlusion. Cardiac pacemaker and AICD generator and leads  noted.       Impression:         1. No active disease in the chest.     This report was signed and finalized on 2/28/2024 6:14 PM by Haseeb Lauren.                        ASSESSMENT:      Dyspnea    Coronary artery disease of bypass graft of native heart with stable angina pectoris    Chronic combined systolic and diastolic congestive heart failure    CHB (complete heart block)    Weakness      PLAN:    1.  LV lead revision performed yesterday.  Complaining of increased pain this morning.  Continue with current pain medication for now.  2.  Cardiology electrophysiology following.  Potentially could be discharged within the next day or 2.  Medication management per cardiology.  3.  Continue to hold off on diuresis " and monitor renal function.  4.  Anticoagulated with Xarelto and Plavix.    Further orders per Dr. Salvador.      Mendoza Smith, APRN  02/29/24  08:04 CST        Part of this note may be an electronic transcription/translation of spoken language to printed text using the Dragon Dictation System.

## 2024-02-29 NOTE — DISCHARGE SUMMARY
DISCHARGE SUMMARY       Date of Admission: 2/25/2024  Date of Discharge:  2/29/2024  Primary Care Physician: Rolan Salvador MD    Discharge Diagnoses:    Dyspnea    Coronary artery disease of bypass graft of native heart with stable angina pectoris    Chronic combined systolic and diastolic congestive heart failure    CHB (complete heart block)    Weakness        Presenting Problem/History of Present Illness  Dyspnea [R06.00]       Hospital Course  Patient presented with worsening dyspnea on exertion and fatigue.  Felt to be possible anginal equivalent or related to his heart failure.  Attempt was made to give 1 dose of Lasix and he became hypotensive and symptomatic with this.  He was then given fluid resuscitation and felt better overall.  His cardiac meds including Entresto were held.  He was seen by cardiology and placed on low-dose of Imdur.  He continued to feel poorly.  He was scheduled for left ventricular lead replacement due to malfunction and poor cardiac synchronization.  He was seen by electrophysiology and had the lead replaced.  He thinks he feels better since then.  He has developed some hematoma over the site and is being placed on prophylactic doxycycline.  Plan at this time is to discharge home without restarting any of those cardiac meds including Entresto until further follow-up.  We may start those if his blood pressure tolerates at a later time.    Procedures Performed  Procedure(s):  ICD Generator Change - Bi-Ventricular (14042) with new LV lead implant (12924)       Consults:   Consults       Date and Time Order Name Status Description    2/26/2024  7:57 AM Inpatient Cardiology Consult Completed             Pertinent Test Results:  Lab Results (most recent)       Procedure Component Value Units Date/Time    Basic Metabolic Panel [[id number]]  (Abnormal) Collected: 02/28/24 0824    Specimen: Blood Updated: 02/28/24 0906     Glucose 114 mg/dL      BUN 24 mg/dL      Creatinine 0.83  mg/dL      Sodium 141 mmol/L      Potassium 3.9 mmol/L      Chloride 109 mmol/L      CO2 23.0 mmol/L      Calcium 8.7 mg/dL      BUN/Creatinine Ratio 28.9     Anion Gap 9.0 mmol/L      eGFR 90.7 mL/min/1.73     Narrative:      GFR Normal >60  Chronic Kidney Disease <60  Kidney Failure <15    The GFR formula is only valid for adults with stable renal function between ages 18 and 70.    Basic Metabolic Panel [[id number]]  (Abnormal) Collected: 02/26/24 0427    Specimen: Blood Updated: 02/26/24 0459     Glucose 102 mg/dL      BUN 43 mg/dL      Creatinine 0.99 mg/dL      Sodium 139 mmol/L      Potassium 4.0 mmol/L      Chloride 106 mmol/L      CO2 24.0 mmol/L      Calcium 8.6 mg/dL      BUN/Creatinine Ratio 43.4     Anion Gap 9.0 mmol/L      eGFR 78.9 mL/min/1.73     Narrative:      GFR Normal >60  Chronic Kidney Disease <60  Kidney Failure <15    The GFR formula is only valid for adults with stable renal function between ages 18 and 70.    Magnesium [[id number]]  (Normal) Collected: 02/26/24 0427    Specimen: Blood Updated: 02/26/24 0459     Magnesium 2.0 mg/dL     POC Glucose Once [[id number]]  (Normal) Collected: 02/25/24 1652    Specimen: Blood Updated: 02/25/24 1703     Glucose 111 mg/dL      Comment: : [id number] [facility] ID: [id number]       High Sensitivity Troponin T 2Hr [[id number]]  (Abnormal) Collected: 02/25/24 1517    Specimen: Blood from Arm, Left Updated: 02/25/24 1539     HS Troponin T 40 ng/L      Troponin T Delta 0 ng/L     Narrative:      High Sensitive Troponin T Reference Range:  <14.0 ng/L- Negative Female for AMI  <22.0 ng/L- Negative Male for AMI  >=14 - Abnormal Female indicating possible myocardial injury.  >=22 - Abnormal Male indicating possible myocardial injury.   Clinicians would have to utilize clinical acumen, EKG, Troponin, and serial changes to determine if it is an Acute Myocardial Infarction or myocardial injury due to an underlying chronic condition.         D-dimer,  "Quantitative [[id number]]  (Normal) Collected: 02/25/24 1302    Specimen: Blood Updated: 02/25/24 1448     D-Dimer, Quantitative <0.27 MCGFEU/mL     Narrative:      According to the assay 's published package insert, a normal (<0.50 MCGFEU/mL) D-dimer result in conjunction with a non-high clinical probability assessment, excludes deep vein thrombosis (DVT) and pulmonary embolism (PE) with high sensitivity.    D-dimer values increase with age and this can make VTE exclusion of an older population difficult. To address this, the [employer], based on best available evidence and recent guidelines, recommends that clinicians use age-adjusted D-dimer thresholds in patients greater than 50 years of age with: a) a low probability of PE who do not meet all Pulmonary Embolism Rule Out Criteria, or b) in those with intermediate probability of PE.   The formula for an age-adjusted D-dimer cut-off is \"age/100\".  For example, a 60 year old patient would have an age-adjusted cut-off of 0.60 MCGFEU/mL and an 80 year old 0.80 MCGFEU/mL.    [facility] Draw [[id number]] Collected: 02/25/24 1302    Specimen: Blood Updated: 02/25/24 1417    Narrative:      The following orders were created for panel order [facility] Draw.  Procedure                               Abnormality         Status                     ---------                               -----------         ------                     Green Top (Gel)[[id number]]                                  Final result               Lavender Top[[id number]]                                     Final result               Red Top[[id number]]                                          Final result               Light Blue Top[[id number]]                                   Final result                 Please view results for these tests on the individual orders.    Green Top (Gel) [[id number]] Collected: 02/25/24 1302    Specimen: Blood Updated: 02/25/24 1417     Extra Tube Hold " for add-ons.     Comment: Auto resulted.       Lavender Top [[id number]] Collected: 02/25/24 1302    Specimen: Blood Updated: 02/25/24 1417     Extra Tube hold for add-on     Comment: Auto resulted       Red Top [[id number]] Collected: 02/25/24 1302    Specimen: Blood Updated: 02/25/24 1417     Extra Tube Hold for add-ons.     Comment: Auto resulted.       Light Blue Top [[id number]] Collected: 02/25/24 1302    Specimen: Blood Updated: 02/25/24 1417     Extra Tube Hold for add-ons.     Comment: Auto resulted       Comprehensive Metabolic Panel [[id number]]  (Abnormal) Collected: 02/25/24 1322    Specimen: Blood Updated: 02/25/24 1358     Glucose 132 mg/dL      BUN 45 mg/dL      Creatinine 1.02 mg/dL      Sodium 140 mmol/L      Potassium 4.7 mmol/L      Comment: Slight hemolysis detected by analyzer. Result may be falsely elevated.        Chloride 106 mmol/L      CO2 22.0 mmol/L      Calcium 9.1 mg/dL      Total Protein 5.7 g/dL      Albumin 3.7 g/dL      ALT (SGPT) 9 U/L      AST (SGOT) 13 U/L      Alkaline Phosphatase 46 U/L      Total Bilirubin 0.8 mg/dL      Globulin 2.0 gm/dL      A/G Ratio 1.9 g/dL      BUN/Creatinine Ratio 44.1     Anion Gap 12.0 mmol/L      eGFR 76.2 mL/min/1.73     Narrative:      GFR Normal >60  Chronic Kidney Disease <60  Kidney Failure <15    The GFR formula is only valid for adults with stable renal function between ages 18 and 70.    BNP [[id number]]  (Normal) Collected: 02/25/24 1322    Specimen: Blood Updated: 02/25/24 1356     proBNP 579.4 pg/mL     Narrative:      This assay is used as an aid in the diagnosis of individuals suspected of having heart failure. It can be used as an aid in the diagnosis of acute decompensated heart failure (ADHF) in patients presenting with signs and symptoms of ADHF to the emergency department (ED). In addition, NT-proBNP of <300 pg/mL indicates ADHF is not likely.    Age Range Result Interpretation  NT-proBNP Concentration (pg/mL:      <50              Positive            >450                   Gray                 300-450                    Negative             <300    50-75           Positive            >900                  Gray                300-900                  Negative            <300      >75             Positive            >1800                  Gray                300-1800                  Negative            <300    High Sensitivity Troponin T [[id number]]  (Abnormal) Collected: 02/25/24 1322    Specimen: Blood Updated: 02/25/24 1356     HS Troponin T 40 ng/L     Narrative:      High Sensitive Troponin T Reference Range:  <14.0 ng/L- Negative Female for AMI  <22.0 ng/L- Negative Male for AMI  >=14 - Abnormal Female indicating possible myocardial injury.  >=22 - Abnormal Male indicating possible myocardial injury.   Clinicians would have to utilize clinical acumen, EKG, Troponin, and serial changes to determine if it is an Acute Myocardial Infarction or myocardial injury due to an underlying chronic condition.         CBC & Differential [[id number]]  (Abnormal) Collected: 02/25/24 1302    Specimen: Blood Updated: 02/25/24 1311    Narrative:      The following orders were created for panel order CBC & Differential.  Procedure                               Abnormality         Status                     ---------                               -----------         ------                     CBC Auto Differential[[id number]]        Abnormal            Final result                 Please view results for these tests on the individual orders.    CBC Auto Differential [[id number]]  (Abnormal) Collected: 02/25/24 1302    Specimen: Blood Updated: 02/25/24 1311     WBC 7.71 10*3/mm3      RBC 3.11 10*6/mm3      Hemoglobin 9.8 g/dL      Hematocrit 30.9 %      MCV 99.4 fL      MCH 31.5 pg      MCHC 31.7 g/dL      RDW 15.6 %      RDW-SD 55.0 fl      MPV 10.2 fL      Platelets 272 10*3/mm3      Neutrophil % 83.9 %      Lymphocyte % 7.8 %      Monocyte % 7.1 %       Eosinophil % 0.4 %      Basophil % 0.4 %      Immature Grans % 0.4 %      Neutrophils, Absolute 6.47 10*3/mm3      Lymphocytes, Absolute 0.60 10*3/mm3      Monocytes, Absolute 0.55 10*3/mm3      Eosinophils, Absolute 0.03 10*3/mm3      Basophils, Absolute 0.03 10*3/mm3      Immature Grans, Absolute 0.03 10*3/mm3      nRBC 0.0 /100 WBC             Condition on Discharge: Stable    Discharge Disposition  Home or Self Care    Discharge Medications     Discharge Medications        New Medications        Instructions Start Date   HYDROcodone-acetaminophen 5-325 MG per tablet  Commonly known as: NORCO   1 tablet, Oral, Every 6 Hours PRN      isosorbide mononitrate 30 MG 24 hr tablet  Commonly known as: IMDUR   30 mg, Oral, Every 24 Hours Scheduled   Start Date: March 1, 2024            Continue These Medications        Instructions Start Date   atorvastatin 40 MG tablet  Commonly known as: LIPITOR   40 mg, Oral, Nightly      cetirizine 10 MG tablet  Commonly known as: zyrTEC   10 mg, Oral, Daily PRN      clopidogrel 75 MG tablet  Commonly known as: PLAVIX   75 mg, Oral, Daily      Cyanocobalamin 1000 MCG/ML kit   1 mL, Injection, Every 30 Days, On Fridays till 11/11/23 then monthly thereafter.      empagliflozin 10 MG tablet tablet  Commonly known as: Jardiance   10 mg, Oral, Daily      finasteride 5 MG tablet  Commonly known as: PROSCAR   5 mg, Oral, Daily      metFORMIN 500 MG tablet  Commonly known as: GLUCOPHAGE   500 mg, Oral, 2 Times Daily With Meals      Mirabegron ER 50 MG tablet sustained-release 24 hour 24 hr tablet  Commonly known as: Myrbetriq   50 mg, Oral, Daily      nitroglycerin 0.4 MG SL tablet  Commonly known as: NITROSTAT   0.4 mg, Sublingual, Every 5 Minutes PRN, Take no more than 3 doses in 15 minutes.      omeprazole 40 MG capsule  Commonly known as: priLOSEC   40 mg, Oral, Daily      pregabalin 150 MG capsule  Commonly known as: LYRICA   150 mg, Oral, 2 Times Daily      Ranexa 1000 MG 12 hr  tablet  Generic drug: ranolazine   1,000 mg, Oral, 2 Times Daily      rivaroxaban 20 MG tablet  Commonly known as: XARELTO   20 mg, Oral, Daily With Dinner      Sotalol HCl AF 80 MG tablet   80 mg, Oral, 2 Times Daily      spironolactone 25 MG tablet  Commonly known as: ALDACTONE   12.5 mg, Oral, Daily      traMADol-acetaminophen 37.5-325 MG per tablet  Commonly known as: ULTRACET   Take 1 tablet by mouth Every 12 (Twelve) Hours As Needed for Moderate Pain (pain).      zolpidem 5 MG tablet  Commonly known as: AMBIEN   5 mg, Oral, Nightly PRN             Stop These Medications      Entresto 49-51 MG tablet  Generic drug: sacubitril-valsartan     tamsulosin 0.4 MG capsule 24 hr capsule  Commonly known as: FLOMAX     Vericiguat 2.5 MG tablet              Discharge Diet:   Diet Instructions       Diet: Cardiac Diets; Healthy Heart (2-3 Na+); Thin (IDDSI 0)      Discharge Diet: Cardiac Diets    Cardiac Diet: Healthy Heart (2-3 Na+)    Fluid Consistency: Thin (IDDSI 0)            Discharge Care Plan / Instructions:    Activity at Discharge:   Activity Instructions       Activity as Tolerated              Follow-up Appointments  Future Appointments   Date Time Provider Department Center   3/12/2024 10:30 AM [facility] DEVICE CHECK [facility]   4/8/2024  1:30 PM Erin Rocha PA [facility]     Additional Instructions for the Follow-ups that You Need to Schedule       Discharge Follow-up with PCP   As directed       Currently Documented PCP:    Rolan Salvador MD    PCP Phone Number:    [phone]     Follow Up Details: 1 week                Test Results Pending at Discharge       Rolan Salvador MD  02/29/24  12:41 CST        Part of this note may be an electronic transcription/translation of spoken language to printed text using the Dragon Dictation System.

## 2024-02-29 NOTE — CONSULTS
"EP CONSULT NOTE    Subjective        History of Present Illness    EP Problems:  1.  Permanent atrial fibrillation  2.  Complete heart block status post AV node ablation  3.  Ventricular tachycardia  4.  Presence of a BiV ICD  -Elevated LV threshold causing rapid battery depletion with LV lead in the AIV     Cardiology Problems:  1.  Chronic systolic heart failure  -5/27/2023: EF 36 to 40%  2.  CAD status post CABG  3.  Ischemic cardiomyopathy  4.  Hypertension  5.  Hyperlipidemia     Medical Problems:  1.  BPH    Darren Maria is a 78 y.o. male with problem list as above for whom EP is consulted regarding chronic systolic heart failure, complete heart block.  He presented to the hospital with chest pains and shortness of breath.  He was found to have likely shortness of breath relating to his heart failure.  He has known chronic malfunction of his LV pacing lead causing rapid battery depletion as well as NYHA class III symptoms.  Given these findings, he was previously scheduled for LV lead revision to try to improve his symptoms.    Family History:  family history includes Heart attack in his mother; Lung cancer in his mother; No Known Problems in his father.    Social History:  Social History     Tobacco Use    Smoking status: Never    Smokeless tobacco: Never   Vaping Use    Vaping Use: Never used   Substance Use Topics    Alcohol use: Not Currently     Comment: occ    Drug use: Never       Allergies:  Azithromycin and Morphine and related      Objective   Vital Signs:  /57 (BP Location: Right arm, Patient Position: Lying)   Pulse 84   Temp 97.5 °F (36.4 °C) (Axillary)   Resp 16   Ht 180.3 cm (71\")   Wt 91.4 kg (201 lb 6.4 oz)   SpO2 98%   BMI 28.09 kg/m²   Estimated body mass index is 28.09 kg/m² as calculated from the following:    Height as of this encounter: 180.3 cm (71\").    Weight as of this encounter: 91.4 kg (201 lb 6.4 oz).      Physical Exam  Vitals reviewed.   Constitutional:       " Appearance: Normal appearance.   Cardiovascular:      Rate and Rhythm: Normal rate and regular rhythm.      Pulses: Normal pulses.      Heart sounds: Normal heart sounds.      Comments: Pulse generator site is clean, dry, intact  Pulmonary:      Effort: Pulmonary effort is normal.      Breath sounds: Normal breath sounds.   Musculoskeletal:         General: No swelling.   Neurological:      Mental Status: He is alert and oriented to person, place, and time.   Psychiatric:         Mood and Affect: Mood normal.         Judgment: Judgment normal.          Result Review :  The following data was reviewed by: Devika Oliver MD on 02/25/2024:  CMP          2/25/2024    13:22 2/26/2024    04:27 2/28/2024    08:24   CMP   Glucose 132  102  114    BUN 45  43  24    Creatinine 1.02  0.99  0.83    EGFR 76.2  78.9  90.7    Sodium 140  139  141    Potassium 4.7  4.0  3.9    Chloride 106  106  109    Calcium 9.1  8.6  8.7    Total Protein 5.7      Albumin 3.7      Globulin 2.0      Total Bilirubin 0.8      Alkaline Phosphatase 46      AST (SGOT) 13      ALT (SGPT) 9      Albumin/Globulin Ratio 1.9      BUN/Creatinine Ratio 44.1  43.4  28.9    Anion Gap 12.0  9.0  9.0      CBC          10/31/2023    05:07 11/8/2023    11:06 2/25/2024    13:02   CBC   WBC 3.92  6.45  7.71    RBC 3.08  3.93  3.11    Hemoglobin 8.5  11.4  9.8    Hematocrit 29.1  37.9  30.9    MCV 94.5  96.4  99.4    MCH 27.6  29.0  31.5    MCHC 29.2  30.1  31.7    RDW 16.2  19.2  15.6    Platelets 253  325  272      [id number] SCORE   No data recorded           Assessment and Plan   Problems:  Complete heart block  Ventricular tachycardia  Permanent atrial fibrillation  LV lead malfunction    Darren Maria is a 78 y.o. male with problem list as above for whom EP is consulted regarding complete heart block, LV lead malfunction, permanent atrial fibrillation, ventricular tachycardia.  Given his worsening heart failure symptoms, I do think that it is reasonable to  to proceed with LV lead implant and pulse generator change.  I discussed the risks and benefits of this procedure with the patient including but not limited to vascular access site complications, pain, bleeding, damage to the existing lead, infection, lead dislodgment, and ultimately death.  Despite these risks, he would like to proceed.    Plan:  -Proceed with LV lead revision and pulse generator change for chronic LV lead malfunction and chronic systolic heart failure with NYHA class III symptoms secondary to ischemic cardiomyopathy    Device to be implanted:  CRT-D    NYHA class: 3  Most recent EF: (36-40% by TTE):   QRS duration in milliseconds (ms): 164  QRS morphology: Paced     ICD indication: Secdondary prevention - Ischemic cardiomyopathy with prior MI >40 days prior to the planned procedure and an LV EF of 36-40% by transthoracic echocardiogram (TTE) with NYHA class 3 symptoms on maximally tolerated guideline directed medical therapy.   Left ventricular pacing indications:  Complete heart block with LV lead malfunction    None of the following is present:   NYHA class IV nonambulatory heart failure   Cardiogenic shock or symptomatic hypotension in a stable baseline rhythm   Coronary revascularization within the past 3 months   Clinical symptoms or signs that would make them a candidate for coronary revascularization   Non-cardiac disease associated with an expected survival of <1 year   Irreversible brain damage from preexisting cerebral disease                 Part of this note may be an electronic transcription/translation of spoken language to printed text using the Dragon Dictation System.

## 2024-03-01 NOTE — OUTREACH NOTE
Prep Survey      Flowsheet Row Responses   [facility] facility patient discharged from? [facility]   Is LACE score < 7 ? No   Eligibility Readm Mgmt   Discharge diagnosis Dyspnea-ICD Generator Change - Bi-Ventricular (46591) with new LV lead implant (12868)   Does the patient have one of the following disease processes/diagnoses(primary or secondary)? Other   Does the patient have Home health ordered? No   Is there a DME ordered? No   Prep survey completed? Yes            APRIL VILLAREAL - Registered Nurse

## 2024-03-05 ENCOUNTER — READMISSION MANAGEMENT (OUTPATIENT)
Dept: CALL CENTER | Facility: HOSPITAL | Age: 79
End: 2024-03-05
Payer: MEDICARE

## 2024-03-05 NOTE — OUTREACH NOTE
Medical Week 1 Survey      Flowsheet Row Responses   [facility] patient discharged from? [place]   Does the patient have one of the following disease processes/diagnoses(primary or secondary)? Other   Week 1 attempt successful? No   Unsuccessful attempts Attempt 1            APRIL ELLISON - Registered Nurse

## 2024-03-11 LAB
QT INTERVAL: 472 MS
QT INTERVAL: 486 MS
QTC INTERVAL: 527 MS
QTC INTERVAL: 546 MS

## 2024-03-12 ENCOUNTER — TELEPHONE (OUTPATIENT)
Dept: CARDIOLOGY | Facility: CLINIC | Age: 79
End: 2024-03-12
Payer: MEDICARE

## 2024-03-12 NOTE — TELEPHONE ENCOUNTER
RN will change mode to VVIR 70 bpm at patient's device clinic appointment on 4.8.2024, per Dr. Oliver.

## 2024-03-13 ENCOUNTER — READMISSION MANAGEMENT (OUTPATIENT)
Dept: CALL CENTER | Facility: HOSPITAL | Age: 79
End: 2024-03-13
Payer: MEDICARE

## 2024-03-13 NOTE — OUTREACH NOTE
Medical Week 2 Survey      Flowsheet Row Responses   [facility] facility patient discharged from? [place]   Does the patient have one of the following disease processes/diagnoses(primary or secondary)? Other   Week 2 attempt successful? No   Unsuccessful attempts Attempt 1            AGUS MALDONADO - Registered Nurse

## 2024-03-15 DIAGNOSIS — I50.42 CHRONIC COMBINED SYSTOLIC AND DIASTOLIC CONGESTIVE HEART FAILURE: Primary | ICD-10-CM

## 2024-03-19 ENCOUNTER — READMISSION MANAGEMENT (OUTPATIENT)
Dept: CALL CENTER | Facility: HOSPITAL | Age: 79
End: 2024-03-19
Payer: MEDICARE

## 2024-03-19 NOTE — OUTREACH NOTE
Medical Week 3 Survey      Flowsheet Row Responses   [facility] patient discharged from? [place]   Does the patient have one of the following disease processes/diagnoses(primary or secondary)? Other   Week 3 attempt successful? No   Unsuccessful attempts Attempt 1            Christine KERN - Licensed Nurse   [General Appearance - Alert] : alert [General Appearance - In No Acute Distress] : in no acute distress [Oriented To Time, Place, And Person] : oriented to person, place, and time [Cranial Nerves Optic (II)] : visual acuity intact bilaterally,  pupils equal round and reactive to light [Cranial Nerves Oculomotor (III)] : extraocular motion intact [Cranial Nerves Trigeminal (V)] : facial sensation intact symmetrically [Cranial Nerves Facial (VII)] : face symmetrical [Cranial Nerves Vestibulocochlear (VIII)] : hearing was intact bilaterally [Cranial Nerves Accessory (XI - Cranial And Spinal)] : head turning and shoulder shrug symmetric [Cranial Nerves Hypoglossal (XII)] : there was no tongue deviation with protrusion [Motor Tone] : muscle tone was normal in all four extremities [Motor Strength] : muscle strength was normal in all four extremities [Involuntary Movements] : no involuntary movements were seen [Sensation Tactile Decrease] : light touch was intact [Balance] : balance was intact [0] : Ankle jerk left 0 [No Visual Abnormalities] : no visible abnormailities [Able to toe walk] : the patient was able to toe walk [Able to heel walk] : the patient was able to heel walk [Sclera] : the sclera and conjunctiva were normal [PERRL With Normal Accommodation] : pupils were equal in size, round, reactive to light, with normal accommodation [Extraocular Movements] : extraocular movements were intact [Outer Ear] : the ears and nose were normal in appearance [Hearing Threshold Finger Rub Not [place]] : hearing was normal [Neck Appearance] : the appearance of the neck was normal [] : no respiratory distress [Exaggerated Use Of Accessory Muscles For Inspiration] : no accessory muscle use [Abnormal Walk] : normal gait [Skin Color & Pigmentation] : normal skin color and pigmentation [Straight-Leg Raise Test - Left] : straight leg raise of the left leg was negative [Straight-Leg Raise Test - Right] : straight leg raise  of the right leg was negative

## 2024-03-29 DIAGNOSIS — N39.44 URINARY INCONTINENCE, NOCTURNAL ENURESIS: ICD-10-CM

## 2024-03-29 RX ORDER — MIRABEGRON 50 MG/1
50 TABLET, FILM COATED, EXTENDED RELEASE ORAL DAILY
Qty: 30 TABLET | Refills: 11 | Status: SHIPPED | OUTPATIENT
Start: 2024-03-29

## 2024-04-10 ENCOUNTER — OFFICE VISIT (OUTPATIENT)
Dept: CARDIOLOGY | Facility: CLINIC | Age: 79
End: 2024-04-10
Payer: MEDICARE

## 2024-04-10 ENCOUNTER — HOSPITAL ENCOUNTER (OUTPATIENT)
Dept: GENERAL RADIOLOGY | Facility: HOSPITAL | Age: 79
Discharge: HOME | End: 2024-04-10
Payer: MEDICARE

## 2024-04-10 ENCOUNTER — TRANSCRIBE ORDERS (OUTPATIENT)
Dept: ADMINISTRATIVE | Facility: HOSPITAL | Age: 79
End: 2024-04-10
Payer: MEDICARE

## 2024-04-10 ENCOUNTER — CLINICAL SUPPORT NO REQUIREMENTS (OUTPATIENT)
Dept: CARDIOLOGY | Facility: CLINIC | Age: 79
End: 2024-04-10
Payer: MEDICARE

## 2024-04-10 ENCOUNTER — LAB (OUTPATIENT)
Dept: LAB | Facility: HOSPITAL | Age: 79
End: 2024-04-10
Payer: MEDICARE

## 2024-04-10 VITALS
HEART RATE: 75 BPM | HEIGHT: 71 IN | WEIGHT: 204 LBS | BODY MASS INDEX: 28.56 KG/M2 | SYSTOLIC BLOOD PRESSURE: 126 MMHG | OXYGEN SATURATION: 98 % | DIASTOLIC BLOOD PRESSURE: 72 MMHG

## 2024-04-10 DIAGNOSIS — I44.2 CHB (COMPLETE HEART BLOCK): ICD-10-CM

## 2024-04-10 DIAGNOSIS — I48.21 PERMANENT ATRIAL FIBRILLATION: Primary | ICD-10-CM

## 2024-04-10 DIAGNOSIS — I50.42 CHRONIC COMBINED SYSTOLIC AND DIASTOLIC CONGESTIVE HEART FAILURE: ICD-10-CM

## 2024-04-10 DIAGNOSIS — R06.02 SOB (SHORTNESS OF BREATH): ICD-10-CM

## 2024-04-10 DIAGNOSIS — D50.8 OTHER IRON DEFICIENCY ANEMIA: ICD-10-CM

## 2024-04-10 DIAGNOSIS — Z95.810 PRESENCE OF BIVENTRICULAR AICD: Primary | ICD-10-CM

## 2024-04-10 DIAGNOSIS — Z95.810 PRESENCE OF BIVENTRICULAR AICD: ICD-10-CM

## 2024-04-10 DIAGNOSIS — I48.21 PERMANENT ATRIAL FIBRILLATION: ICD-10-CM

## 2024-04-10 DIAGNOSIS — D64.9 ANEMIA, UNSPECIFIED TYPE: Primary | ICD-10-CM

## 2024-04-10 DIAGNOSIS — I25.5 ISCHEMIC CARDIOMYOPATHY: ICD-10-CM

## 2024-04-10 LAB
ANION GAP SERPL CALCULATED.3IONS-SCNC: 9 MMOL/L (ref 5–15)
BUN SERPL-MCNC: 13 MG/DL (ref 8–23)
BUN/CREAT SERPL: 15.3 (ref 7–25)
CALCIUM SPEC-SCNC: 8.8 MG/DL (ref 8.6–10.5)
CHLORIDE SERPL-SCNC: 108 MMOL/L (ref 98–107)
CO2 SERPL-SCNC: 26 MMOL/L (ref 22–29)
CREAT SERPL-MCNC: 0.85 MG/DL (ref 0.76–1.27)
DEPRECATED RDW RBC AUTO: 58.4 FL (ref 37–54)
EGFRCR SERPLBLD CKD-EPI 2021: 90.1 ML/MIN/1.73
ERYTHROCYTE [DISTWIDTH] IN BLOOD BY AUTOMATED COUNT: 18.3 % (ref 12.3–15.4)
GLUCOSE SERPL-MCNC: 106 MG/DL (ref 65–99)
HCT VFR BLD AUTO: 25.9 % (ref 37.5–51)
HGB BLD-MCNC: 7.6 G/DL (ref 13–17.7)
MCH RBC QN AUTO: 25.5 PG (ref 26.6–33)
MCHC RBC AUTO-ENTMCNC: 29.3 G/DL (ref 31.5–35.7)
MCV RBC AUTO: 86.9 FL (ref 79–97)
NT-PROBNP SERPL-MCNC: 2777 PG/ML (ref 0–1800)
PLATELET # BLD AUTO: 331 10*3/MM3 (ref 140–450)
PMV BLD AUTO: 8.9 FL (ref 6–12)
POTASSIUM SERPL-SCNC: 3.9 MMOL/L (ref 3.5–5.2)
RBC # BLD AUTO: 2.98 10*6/MM3 (ref 4.14–5.8)
SODIUM SERPL-SCNC: 143 MMOL/L (ref 136–145)
WBC NRBC COR # BLD AUTO: 7.25 10*3/MM3 (ref 3.4–10.8)

## 2024-04-10 PROCEDURE — 71046 X-RAY EXAM CHEST 2 VIEWS: CPT

## 2024-04-10 PROCEDURE — 85027 COMPLETE CBC AUTOMATED: CPT

## 2024-04-10 PROCEDURE — 93000 ELECTROCARDIOGRAM COMPLETE: CPT | Performed by: PHYSICIAN ASSISTANT

## 2024-04-10 PROCEDURE — 83880 ASSAY OF NATRIURETIC PEPTIDE: CPT

## 2024-04-10 PROCEDURE — 36415 COLL VENOUS BLD VENIPUNCTURE: CPT

## 2024-04-10 PROCEDURE — 80048 BASIC METABOLIC PNL TOTAL CA: CPT

## 2024-04-10 PROCEDURE — 99214 OFFICE O/P EST MOD 30 MIN: CPT | Performed by: PHYSICIAN ASSISTANT

## 2024-04-10 RX ORDER — FUROSEMIDE 10 MG/ML
40 INJECTION INTRAMUSCULAR; INTRAVENOUS ONCE
Status: CANCELLED
Start: 2024-04-11 | End: 2024-04-11

## 2024-04-10 NOTE — PROGRESS NOTES
BIV AICD Interrogation Report  IN OFFICE    April 10, 2024    Primary Cardiologist: Melody  : [employer] Model: Momentum X4 CRT-D G138  Implant date: 2.28.2024    Reason for evaluation: Gen Change + New LV Lead Follow Up  Indication for AICD:  Complete Heart Block s/p AV Node Ablation and Congestive Heart Failure    Interrogation performed by: Seble Ariza RN    Incision:  Healed.  Well approximated without erythema, warmth, open areas, or drainage.  Mild - Moderate edema noted that continues to improve.  Resolving ecchymosis present.    Measurements  Atrial sensing:  P wave: 2 mV  Atrial threshold: N/P - in AF  Atrial lead impedance: 350 ohms  Ventricular sensing:  R wave: PACED mV  RV Threshold:  1V @ 0.4 ms  RV Impedance:  491 ohms  Shock impedance:  RV 36 ohms  LV Threshold:  1.2V @ 0.4ms  LV Impedance:  LV A 641 ohms, LV B 640 ohms  - HAS CAPPED LV LEAD -     Manual sensing and threshold testing performed:  Yes      Diagnostic Data  Atrial paced: 0 %  Ventricular paced: RV 95%, LV 96%    Episodes/Alerts: 2 ventricular high rate episodes, AF with RVR, rates 140-147 bpm.  HeartLogic HF index is elevated at 23.  Patient reports BLE edema, PND, fatigue, and dyspnea on exertion.    Battery status: Satisfactory  estimated 10.5 years remaining      Final Parameters  Mode: VVIR  Lower rate: 75 bpm   Upper sensor rate: 120 bpm  Atrial - Sensitivity: 0.25 mV   Ventricular:  RV Amplitude: 2 V @ 0.4 ms   Sensitivity: 0.6 mV            LV Amplitude:  2.2V @ 0.4ms      Changes made: See below:            Conclusions: Normal AICD Function, No Therapy Delivered, Stable Pacing and Sensing Thresholds, and Adequate Battery Reserve    Remote Monitor:  Yes, connected.    Follow up: 1 weeks symptom check since increasing LRL.  Monthly HeartLogic, Every 3 months via latitude, annually in office.     Final impression:  Data above reviewed.  Agree with findings and conclusions as per the above note. Stable lead  parameters.  In AF.

## 2024-04-10 NOTE — PROGRESS NOTES
[facility] -  CLINIC FOLLOW UP     Patient Care Team:  Rolan Salvador MD as PCP - General (Internal Medicine)  Sarbjit Posadas MD as Cardiologist (Cardiology)  Yolanda Perry APRN as Nurse Practitioner (Family Medicine)  Durga Ochoa MD as Consulting Physician (Urology)  Tyrone Broussard PA as Physician Assistant (Urology)  Devika Oliver MD as Cardiologist (Cardiac Electrophysiology)    Chief Complaint: follow up to device     Subjective   EP Problems:  1.  Permanent atrial fibrillation  2.  Complete heart block status post AV node ablation  3.  Ventricular tachycardia  4.  Presence of a BiV ICD  -Elevated LV threshold causing rapid battery depletion with LV lead in the AIV  -3/4/24: Gen change, New LV lead insertion   5. CRISTOBAL ligation 2011     Cardiology Problems:  1.  Chronic systolic heart failure  -5/27/2023: EF 36 to 40%  2.  CAD status post CABG  3.  Ischemic cardiomyopathy  4.  Hypertension  5.  Hyperlipidemia     Medical Problems:  1.  BPH    HPI: Today I had the pleasure of seeing Darren Maria in the cardiology clinic for follow up. He had known chronic malfunction of his LV pacing lead causing rapid battery depletion as well as NYHA class III symptoms. Given these findings, he was previously scheduled for LV lead revision to try to improve his symptoms, however, he required admisstion in February with chest pain and SOB. He was then able to have his LV lead implanted with gen change. His old LV lead now capped.     Last night, he woke up with PND and had to go to his recliner to sleep better. He has noted increased SOB with activity, fatigue, walking from the living room to the kitchen. Since discharge Feb 29th, he hasn't taken any torsemide or Entreso. He tried restarting entresto once home at a lower dose, but was just too dizzy. He is currently tolerating jardiance, imdur, and Borger. Currently today in clinic, he appears pale and states he feels like his arms  "weigh 40lbs each.     Labs today indicate worsening anemia at 7.6, possibly related to recent procedure and hematoma. BNP elevated at 2770.     Objective     Visit Vitals  /72   Pulse 75   Ht 180.3 cm (70.98\")   Wt 92.5 kg (204 lb)   SpO2 98%   BMI 28.47 kg/m²           Vitals reviewed.   Constitutional:       Appearance: Not in distress. Chronically ill-appearing.   Eyes:      Extraocular Movements: Extraocular movements intact.      Conjunctiva/sclera: Conjunctivae normal.      Pupils: Pupils are equal, round, and reactive to light.   HENT:      Head: Normocephalic and atraumatic.      Nose: Nose normal.    Mouth/Throat:      Lips: Pink.      Mouth: Mucous membranes are moist.      Pharynx: Oropharynx is clear.   Neck:      Vascular: No carotid bruit or JVD. JVD normal.   Pulmonary:      Effort: Pulmonary effort is normal.      Comments: Decreased left mid lung to base  Chest:      Chest wall: Not tender to palpatation.      Comments: Left sided device site old ecchymosis   Clean, dry   Cardiovascular:      PMI at left midclavicular line. Normal rate. Regular rhythm. Normal S1. Normal S2.       Murmurs: There is no murmur.      No gallop.  No rub.   Pulses:     Radial: 2+ bilaterally.  Edema:     Pretibial: bilateral 1+ edema of the pretibial area.     Ankle: bilateral 2+ edema of the ankle.     Feet: bilateral 2+ edema of the feet.  Abdominal:      Palpations: Abdomen is soft.   Musculoskeletal:      Extremities: No clubbing present.     Cervical back: Normal range of motion. Skin:     General: Skin is dry.      Coloration: Skin is pale.   Neurological:      General: No focal deficit present.      Mental Status: Alert and oriented to person, place, and time.   Psychiatric:         Attention and Perception: Attention normal.         Mood and Affect: Affect normal.         Speech: Speech normal.         Behavior: Behavior normal.         Cognition and Memory: Cognition normal.             The following " portions of the patient's history were reviewed and updated as appropriate: allergies, current medications, past medical history, past social history, past and problem list.     Review of Systems   Constitutional:  Positive for fatigue.   HENT: Negative.     Eyes: Negative.    Respiratory:  Positive for shortness of breath.    Cardiovascular:  Positive for leg swelling.   Gastrointestinal: Negative.    Endocrine: Negative.    Genitourinary: Negative.    Musculoskeletal: Negative.    Skin: Negative.    Allergic/Immunologic: Negative.    Neurological:  Positive for dizziness.   Hematological: Negative.    Psychiatric/Behavioral: Negative.            Echo EF Estimated  Lab Results   Component Value Date    ECHOEFEST 30 02/13/2019         ECG 12 Lead    Date/Time: 4/10/2024 1:09 PM  Performed by: Erin Rocha PA    Authorized by: Erin Rocha PA  Comparison: compared with previous ECG from 2/28/2024  Rhythm: atrial fibrillation  Rate: normal  Pacing: ventricular paced rhythm  Clinical impression: abnormal EKG      CARDIOLOGY VISIT - SCAN - [facility] clinic, Dr Oliver 4/10/24 (04/10/2024)       Medication Review: yes    Current Outpatient Medications:     atorvastatin (LIPITOR) 40 MG tablet, Take 1 tablet by mouth Every Night., Disp: , Rfl:     cetirizine (zyrTEC) 10 MG tablet, Take 1 tablet by mouth Daily As Needed for Allergies., Disp: , Rfl:     clopidogrel (PLAVIX) 75 MG tablet, Take 1 tablet by mouth Daily., Disp: , Rfl:     Cyanocobalamin 1000 MCG/ML kit, Inject 1 mL as directed Every 30 (Thirty) Days. On Fridays till 11/11/23 then monthly thereafter., Disp: , Rfl:     empagliflozin (Jardiance) 10 MG tablet tablet, Take 1 tablet by mouth Daily., Disp: 30 tablet, Rfl: 11    finasteride (PROSCAR) 5 MG tablet, Take 1 tablet by mouth Daily., Disp: , Rfl:     HYDROcodone-acetaminophen (NORCO) 5-325 MG per tablet, Take 1 tablet by mouth Every 6 (Six) Hours As Needed for Moderate Pain., Disp: 12 tablet, Rfl: 0     "isosorbide mononitrate (IMDUR) 30 MG 24 hr tablet, Take 1 tablet by mouth Daily., Disp: 30 tablet, Rfl: 5    metFORMIN (GLUCOPHAGE) 500 MG tablet, Take 1 tablet by mouth 2 (Two) Times a Day With Meals., Disp: , Rfl:     Myrbetriq 50 MG tablet sustained-release 24 hour 24 hr tablet, TAKE 1 TABLET BY MOUTH EVERY DAY, Disp: 30 tablet, Rfl: 11    nitroglycerin (NITROSTAT) 0.4 MG SL tablet, Place 1 tablet under the tongue Every 5 (Five) Minutes As Needed for Chest Pain. Take no more than 3 doses in 15 minutes., Disp: 25 tablet, Rfl: 2    omeprazole (PriLOSEC) 40 MG capsule, Take 1 capsule by mouth Daily., Disp: , Rfl:     pregabalin (LYRICA) 150 MG capsule, Take 1 capsule by mouth 2 (Two) Times a Day., Disp: , Rfl:     ranolazine (Ranexa) 1000 MG 12 hr tablet, Take 1 tablet by mouth 2 (Two) Times a Day., Disp: , Rfl:     rivaroxaban (XARELTO) 20 MG tablet, Take 1 tablet by mouth Daily With Dinner., Disp: , Rfl:     Sotalol HCl AF 80 MG tablet, Take 1 tablet by mouth 2 (Two) Times a Day., Disp: 180 tablet, Rfl: 3    spironolactone (ALDACTONE) 25 MG tablet, Take 0.5 tablets by mouth Daily., Disp: 30 tablet, Rfl: 11    traMADol-acetaminophen (ULTRACET) 37.5-325 MG per tablet, Take 1 tablet by mouth Every 12 (Twelve) Hours As Needed for Moderate Pain (pain)., Disp: , Rfl: 1    zolpidem (AMBIEN) 5 MG tablet, Take 1 tablet by mouth At Night As Needed for Sleep., Disp: , Rfl:    Allergies   Allergen Reactions    Azithromycin Nausea And Vomiting    Morphine And Related Other (See Comments)     SHAKES AND MAKES HIM \"WIRED\".   TAKES TRAMADOL WITHOUT PROBLEM       I have reviewed       CBC:  Lab Results - Last 18 Months   Lab Units 04/10/24  1223 02/25/24  1302 11/14/23  0807   WBC 10*3/mm3 7.25   < >  --    HEMOGLOBIN g/dL 7.6*   < >  --    HEMATOCRIT % 25.9*   < >  --    PLATELETS 10*3/mm3 331   < >  --    IRON mcg/dL  --   --  107    < > = values in this interval not displayed.      BMP/CMP:  Lab Results - Last 18 Months   Lab " Units 04/10/24  1223   SODIUM mmol/L 143   POTASSIUM mmol/L 3.9   CHLORIDE mmol/L 108*   CO2 mmol/L 26.0   GLUCOSE mg/dL 106*   BUN mg/dL 13   CREATININE mg/dL 0.85   CALCIUM mg/dL 8.8     BNP:   Lab Results - Last 18 Months   Lab Units 04/10/24  1223   PROBNP pg/mL 2,777.0*          Results for orders placed during the hospital encounter of 10/29/23    Adult Transthoracic Echo Complete W/ Cont if Necessary Per Protocol    Interpretation Summary    Left ventricular systolic function is moderately decreased. Left ventricular ejection fraction appears to be 36 - 40%.    The left ventricular cavity is borderline dilated (LVIDd 5.5 cm).    The following left ventricular wall segments are hypokinetic: mid anterior, apical anterior, apical lateral, basal inferoseptal, mid inferoseptal, mid anteroseptal, basal anterior and basal inferoseptal. The following left ventricular wall segments are akinetic: mid inferolateral, apical inferior, apical septal and apex.    The left atrial cavity is severely dilated.    Estimated right ventricular systolic pressure from tricuspid regurgitation is normal (<35 mmHg).    Normal size in the right ventricle, with mildly reduced systolic function noted.    The right atrial cavity is severely  dilated.    Moderate dilation of the ascending aorta is present (4.7 cm).    Compared to prior exam from 5/27/2023, no significant change.     Assessment:   Diagnoses and all orders for this visit:    1. Permanent atrial fibrillation (Primary)  -     ECG 12 Lead; Future  -     Basic Metabolic Panel; Future    2. SOB (shortness of breath)  -     XR Chest 2 View; Future  -     BNP; Future  -     CBC (No Diff); Future    3. Presence of biventricular AICD    4. Chronic combined systolic and diastolic congestive heart failure    5. Other iron deficiency anemia    Other orders  -     ECG 12 Lead    Chronic afib/AVN ablation/CHB: On anticoagulation, but given his previous CRISTOBAL ligation and ongoing anemia, will  recommend to hold xarelto for now.     Anemia: Worse on CBC today with drop to 7.6. Was able to discuss with Dr. Salvador by phone today who rather try to arrange outpatient transfusion tomorrow instead of ER evaluation understandably.   -He has been chronically anticoagulated, but review of records show that he has had previous CRISTOBAL ligation by Dr. Ty at [place] in [id number]. This is also seen on CXR today.     CRT-D: LV lead revision per Dr. Oliver successful. Mode was changed to VVIR today and base rate increased to 75bpm. Incision is stable today, soft hematoma present. Hematoma much improved per device staff.   -Old LV lead is capped  -follow up in EP clinic in one month     HFrEF/PND/orthopnea: He has been off of his torsemide and entresto since discharge due to hypotension previously. Continues with spironolactone and Imdur, tolerating Jardiance. Will try to work him in with heart failure clinic for med titration once stabilized from anemia.   -BNP elevated at 2777 today   -Discussed that Torsemide 20mg x 1 with his timothy, weigh daily, and see if he can lose 3-4lbs overnight depending on BP.       I spent 30 minutes caring for Darren on this date of service. This time includes time spent by me in the following activities:preparing for the visit, reviewing tests, obtaining and/or reviewing a separately obtained history, performing a medically appropriate examination and/or evaluation , counseling and educating the patient/family/caregiver, ordering medications, tests, or procedures, referring and communicating with other health care professionals , documenting information in the medical record, and independently interpreting results and communicating that information with the patient/family/caregiver        Electronically signed by RICCARDO Arroyo

## 2024-04-11 ENCOUNTER — LAB (OUTPATIENT)
Dept: LAB | Facility: HOSPITAL | Age: 79
End: 2024-04-11
Payer: MEDICARE

## 2024-04-11 ENCOUNTER — INFUSION (OUTPATIENT)
Dept: ONCOLOGY | Facility: HOSPITAL | Age: 79
End: 2024-04-11
Payer: MEDICARE

## 2024-04-11 VITALS
OXYGEN SATURATION: 100 % | DIASTOLIC BLOOD PRESSURE: 77 MMHG | TEMPERATURE: 97 F | HEART RATE: 73 BPM | RESPIRATION RATE: 18 BRPM | SYSTOLIC BLOOD PRESSURE: 131 MMHG

## 2024-04-11 DIAGNOSIS — D64.9 ANEMIA, UNSPECIFIED TYPE: ICD-10-CM

## 2024-04-11 DIAGNOSIS — D50.8 OTHER IRON DEFICIENCY ANEMIA: Primary | ICD-10-CM

## 2024-04-11 LAB
ABO GROUP BLD: NORMAL
BLD GP AB SCN SERPL QL: NEGATIVE
RH BLD: POSITIVE
T&S EXPIRATION DATE: NORMAL

## 2024-04-11 PROCEDURE — 86901 BLOOD TYPING SEROLOGIC RH(D): CPT

## 2024-04-11 PROCEDURE — 86923 COMPATIBILITY TEST ELECTRIC: CPT

## 2024-04-11 PROCEDURE — 96374 THER/PROPH/DIAG INJ IV PUSH: CPT

## 2024-04-11 PROCEDURE — 86900 BLOOD TYPING SEROLOGIC ABO: CPT

## 2024-04-11 PROCEDURE — 86850 RBC ANTIBODY SCREEN: CPT

## 2024-04-11 PROCEDURE — P9016 RBC LEUKOCYTES REDUCED: HCPCS

## 2024-04-11 PROCEDURE — 36430 TRANSFUSION BLD/BLD COMPNT: CPT

## 2024-04-11 PROCEDURE — 25010000002 FUROSEMIDE PER 20 MG: Performed by: INTERNAL MEDICINE

## 2024-04-11 RX ORDER — FUROSEMIDE 10 MG/ML
40 INJECTION INTRAMUSCULAR; INTRAVENOUS ONCE
Status: COMPLETED | OUTPATIENT
Start: 2024-04-11 | End: 2024-04-11

## 2024-04-11 RX ORDER — SODIUM CHLORIDE 9 MG/ML
250 INJECTION, SOLUTION INTRAVENOUS AS NEEDED
Status: DISCONTINUED | OUTPATIENT
Start: 2024-04-11 | End: 2024-04-11 | Stop reason: HOSPADM

## 2024-04-11 RX ORDER — FUROSEMIDE 10 MG/ML
40 INJECTION INTRAMUSCULAR; INTRAVENOUS ONCE
Status: CANCELLED
Start: 2024-04-11 | End: 2024-04-11

## 2024-04-11 RX ADMIN — FUROSEMIDE 40 MG: 10 INJECTION, SOLUTION INTRAVENOUS at 15:05

## 2024-04-12 LAB
BH BB BLOOD EXPIRATION DATE: NORMAL
BH BB BLOOD TYPE BARCODE: 6200
BH BB DISPENSE STATUS: NORMAL
BH BB PRODUCT CODE: NORMAL
BH BB UNIT NUMBER: NORMAL
CROSSMATCH INTERPRETATION: NORMAL
UNIT  ABO: NORMAL
UNIT  RH: NORMAL

## 2024-04-18 ENCOUNTER — TELEPHONE (OUTPATIENT)
Dept: CARDIOLOGY | Facility: CLINIC | Age: 79
End: 2024-04-18
Payer: MEDICARE

## 2024-04-18 NOTE — TELEPHONE ENCOUNTER
RN spoke with patient.  He reports his hemoglobin increased from 7.6 - 8.5 after the PRBC transfusion.  He has been feeling slightly better the past two days; however, he still reports significant fatigue after activity.  He does not feel increasing his LRL to 75 bpm has helped much.  RN will discuss with Dr. Oliver to see if any additional device reprogramming would benefit patient.  Of note, patient has appointments in HF Clinic and with [facility], next week.  If device changes are needed, RN will schedule on a day patient will be on campus.

## 2024-04-18 NOTE — TELEPHONE ENCOUNTER
----- Message from Seble Ariza RN sent at 4/10/2024 11:47 AM CDT -----  Regarding: Symptom Check  Contact patient to see if fatigue and dyspnea on exertion have improved since increasing LRL to 75 bpm on 4.10.2024.

## 2024-04-22 NOTE — TELEPHONE ENCOUNTER
RN discussed with Dr. Oliver and he feels patient's symptoms are most likely related to anemia and that no further device adjustments are recommended at this time.

## 2024-04-23 ENCOUNTER — HOSPITAL ENCOUNTER (OUTPATIENT)
Dept: CARDIOLOGY | Facility: HOSPITAL | Age: 79
Discharge: HOME | End: 2024-04-23
Payer: MEDICARE

## 2024-04-23 VITALS
HEART RATE: 75 BPM | SYSTOLIC BLOOD PRESSURE: 117 MMHG | BODY MASS INDEX: 27.01 KG/M2 | OXYGEN SATURATION: 98 % | DIASTOLIC BLOOD PRESSURE: 60 MMHG | WEIGHT: 193.6 LBS

## 2024-04-23 DIAGNOSIS — I10 ESSENTIAL HYPERTENSION: ICD-10-CM

## 2024-04-23 DIAGNOSIS — I25.5 ISCHEMIC CARDIOMYOPATHY: ICD-10-CM

## 2024-04-23 DIAGNOSIS — Z95.810 PRESENCE OF BIVENTRICULAR AICD: ICD-10-CM

## 2024-04-23 DIAGNOSIS — Z98.890 S/P AV NODAL ABLATION: ICD-10-CM

## 2024-04-23 DIAGNOSIS — I50.42 CHRONIC COMBINED SYSTOLIC AND DIASTOLIC CONGESTIVE HEART FAILURE: Primary | ICD-10-CM

## 2024-04-23 DIAGNOSIS — D50.8 OTHER IRON DEFICIENCY ANEMIA: ICD-10-CM

## 2024-04-23 DIAGNOSIS — I25.708 CORONARY ARTERY DISEASE OF BYPASS GRAFT OF NATIVE HEART WITH STABLE ANGINA PECTORIS: ICD-10-CM

## 2024-04-23 DIAGNOSIS — I48.21 PERMANENT ATRIAL FIBRILLATION: ICD-10-CM

## 2024-04-23 PROBLEM — D50.9 IRON DEFICIENCY ANEMIA: Status: ACTIVE | Noted: 2024-04-23

## 2024-04-23 LAB
ABSOLUTE LUNG FLUID CONTENT: 26 % (ref 20–35)
ANION GAP SERPL CALCULATED.3IONS-SCNC: 11 MMOL/L (ref 5–15)
BUN SERPL-MCNC: 12 MG/DL (ref 8–23)
BUN/CREAT SERPL: 13.3 (ref 7–25)
CALCIUM SPEC-SCNC: 9.1 MG/DL (ref 8.6–10.5)
CHLORIDE SERPL-SCNC: 104 MMOL/L (ref 98–107)
CO2 SERPL-SCNC: 29 MMOL/L (ref 22–29)
CREAT SERPL-MCNC: 0.9 MG/DL (ref 0.76–1.27)
DEPRECATED RDW RBC AUTO: 61.2 FL (ref 37–54)
EGFRCR SERPLBLD CKD-EPI 2021: 88 ML/MIN/1.73
ERYTHROCYTE [DISTWIDTH] IN BLOOD BY AUTOMATED COUNT: 19.5 % (ref 12.3–15.4)
FERRITIN SERPL-MCNC: 33.7 NG/ML (ref 30–400)
GLUCOSE SERPL-MCNC: 126 MG/DL (ref 65–99)
HCT VFR BLD AUTO: 30.8 % (ref 37.5–51)
HGB BLD-MCNC: 8.9 G/DL (ref 13–17.7)
IRON 24H UR-MRATE: 26 MCG/DL (ref 59–158)
IRON SATN MFR SERPL: 7 % (ref 20–50)
MCH RBC QN AUTO: 24.7 PG (ref 26.6–33)
MCHC RBC AUTO-ENTMCNC: 28.9 G/DL (ref 31.5–35.7)
MCV RBC AUTO: 85.6 FL (ref 79–97)
PLATELET # BLD AUTO: 308 10*3/MM3 (ref 140–450)
PMV BLD AUTO: 9.5 FL (ref 6–12)
POTASSIUM SERPL-SCNC: 3.7 MMOL/L (ref 3.5–5.2)
RBC # BLD AUTO: 3.6 10*6/MM3 (ref 4.14–5.8)
SODIUM SERPL-SCNC: 144 MMOL/L (ref 136–145)
TIBC SERPL-MCNC: 392 MCG/DL (ref 298–536)
TRANSFERRIN SERPL-MCNC: 263 MG/DL (ref 200–360)
WBC NRBC COR # BLD AUTO: 6.62 10*3/MM3 (ref 3.4–10.8)

## 2024-04-23 PROCEDURE — 83540 ASSAY OF IRON: CPT

## 2024-04-23 PROCEDURE — 80048 BASIC METABOLIC PNL TOTAL CA: CPT

## 2024-04-23 PROCEDURE — 85027 COMPLETE CBC AUTOMATED: CPT

## 2024-04-23 PROCEDURE — 84466 ASSAY OF TRANSFERRIN: CPT

## 2024-04-23 PROCEDURE — 82728 ASSAY OF FERRITIN: CPT

## 2024-04-23 PROCEDURE — 94726 PLETHYSMOGRAPHY LUNG VOLUMES: CPT

## 2024-04-23 RX ORDER — TORSEMIDE 20 MG/1
20 TABLET ORAL DAILY PRN
COMMUNITY

## 2024-04-23 RX ORDER — SODIUM CHLORIDE 9 MG/ML
20 INJECTION, SOLUTION INTRAVENOUS ONCE
OUTPATIENT
Start: 2024-04-23

## 2024-04-23 RX ORDER — DIPHENHYDRAMINE HYDROCHLORIDE 50 MG/ML
50 INJECTION INTRAMUSCULAR; INTRAVENOUS AS NEEDED
OUTPATIENT
Start: 2024-04-23

## 2024-04-23 RX ORDER — SPIRONOLACTONE 25 MG/1
25 TABLET ORAL DAILY
Start: 2024-04-23

## 2024-04-23 RX ORDER — FAMOTIDINE 10 MG/ML
20 INJECTION, SOLUTION INTRAVENOUS AS NEEDED
OUTPATIENT
Start: 2024-04-23

## 2024-04-23 RX ORDER — LOSARTAN POTASSIUM 25 MG/1
25 TABLET ORAL DAILY
COMMUNITY

## 2024-04-23 NOTE — PROGRESS NOTES
Heart Failure Clinic    Date: 04/23/24     Vitals:    04/23/24 1052   BP: 117/60   Pulse: 75   SpO2: 98%        Indication:  Heart Failure    Procedure:  ReDS device sensor unit applied to right side of chest and right side of back.  Appropriate positioning confirmed based off of the unit's calculation.  Chest measurement obtained with the chest size ruler.  Measurement session performed over 45 seconds.      Method of arrival: Ambulatory with cane    Weighing self daily: Most days    Taking medications as prescribed: Yes    Edema: Yes and 1+ pitting edema to left lower leg    Shortness of Air: Yes, moderate exertion    Number of pillows used at night: <2, patient sleeps on one pillow    Results: ReDS Value= 26     Interpretation:  25-35% - normal/ideal lung fluid content    Mallory L Haase, RN 04/23/24 10:52 CDT

## 2024-04-23 NOTE — PROGRESS NOTES
[facility]  Heart Failure Clinic  Subjective:     Encounter Date:04/23/2024      Patient ID: Darren Maria is a 79 y.o. male     Chief Complaint:  History of Present Illness  Darren Maria is a 79 y.o. male with the below pertinent PMH who presents for follow-up of CHF.    Patient was last seen in the heart failure clinic 12/1/2023.  At that time he is doing well without any significant heart failure complaints.  He appeared euvolemic on exam that day.  Still having some postural dizziness and I recommended discussing trialing being off Flomax.  Given how many providers he was being seen by I discharged him from the heart failure clinic to be followed by his primary cardiology team and electrophysiologist going forward.    He was evaluated by Dr. Posadas on 1/11/2024.  At that time he was still dizziness still having dizziness but his shortness of breath was worse.  Dr. Posadas started the patient on Verquvo at that appointment.  Patient then had a hospitalization on 2/25/2024 until 2/29/2024 due to worsening dyspnea on exertion and fatigue.  He was given IV diuretics however became hypotensive and symptomatic.  He had to get some fluid resuscitation.  He ended up needing a LV lead revision which was done by Dr. Oliver.  Unfortunately, the patient was still hypotensive near discharge and his Entresto and Verquvo were held.    Patient was seen in electrophysiology follow-up 4/10/2024.  At that time it appears he was complaining of worsening PND and orthopnea.  He is also pale and complaining of muscle weakness.  Lab work done that day showed worsening anemia.  He was arranged that he was to get a unit of blood.  He was then asked to reestablish with the heart failure clinic for further medication titration.    Today the patient reports that he is doing reasonably well.  He does report taking 3 days of Lasix from this previous Friday until Sunday because of lower extremity swelling and shortness of  "breath.  He went from 196 pounds down to 190 pounds on his home scale.  Today he is doing well.  Still has some lower extremity swelling.  Denies any PND or orthopnea.  He recently was tried on low-dose Entresto by his PCP and failed this due to worsening hypotension.  He was placed on losartan 25 mg a day and is tolerating this well.  He has not retried Verquvo.      ROS: Pertinent findings as noted above    Pertinent PMH  -Chronic systolic congestive heart failure (EF 36 to 40%)  - Coronary artery disease status post CABG in 1990 (redo CABG in March 2011)  - Ischemic cardiomyopathy  - Hypertension  - Hyperlipidemia  - Permanent atrial fibrillation  - Complete heart block status post AV node ablation  - Status post BiV ICD with lead revision in March 2024  - Left atrial appendage ligation in 2011  - History of renal cell carcinoma status post right nephrectomy in 2000  - Iron deficiency anemia    The following portions of the patient's history were reviewed and updated as appropriate: allergies, current medications, past medical history, past social history, past and problem list.    Allergies   Allergen Reactions    Azithromycin Nausea And Vomiting    Morphine And Related Other (See Comments)     SHAKES AND MAKES HIM \"WIRED\".   TAKES TRAMADOL WITHOUT PROBLEM       Current Outpatient Medications:     atorvastatin (LIPITOR) 40 MG tablet, Take 1 tablet by mouth Every Night., Disp: , Rfl:     cetirizine (zyrTEC) 10 MG tablet, Take 1 tablet by mouth Daily As Needed for Allergies., Disp: , Rfl:     clopidogrel (PLAVIX) 75 MG tablet, Take 1 tablet by mouth Daily., Disp: , Rfl:     Cyanocobalamin 1000 MCG/ML kit, Inject 1 mL as directed Every 30 (Thirty) Days. On Fridays till 11/11/23 then monthly thereafter., Disp: , Rfl:     empagliflozin (Jardiance) 10 MG tablet tablet, Take 1 tablet by mouth Daily., Disp: 30 tablet, Rfl: 11    finasteride (PROSCAR) 5 MG tablet, Take 1 tablet by mouth Daily., Disp: , Rfl:     " isosorbide mononitrate (IMDUR) 30 MG 24 hr tablet, Take 1 tablet by mouth Daily., Disp: 30 tablet, Rfl: 5    metFORMIN (GLUCOPHAGE) 500 MG tablet, Take 1 tablet by mouth 2 (Two) Times a Day With Meals., Disp: , Rfl:     Myrbetriq 50 MG tablet sustained-release 24 hour 24 hr tablet, TAKE 1 TABLET BY MOUTH EVERY DAY, Disp: 30 tablet, Rfl: 11    nitroglycerin (NITROSTAT) 0.4 MG SL tablet, Place 1 tablet under the tongue Every 5 (Five) Minutes As Needed for Chest Pain. Take no more than 3 doses in 15 minutes., Disp: 25 tablet, Rfl: 2    omeprazole (PriLOSEC) 40 MG capsule, Take 1 capsule by mouth Daily., Disp: , Rfl:     pregabalin (LYRICA) 150 MG capsule, Take 1 capsule by mouth 2 (Two) Times a Day., Disp: , Rfl:     ranolazine (Ranexa) 1000 MG 12 hr tablet, Take 1 tablet by mouth 2 (Two) Times a Day., Disp: , Rfl:     rivaroxaban (XARELTO) 20 MG tablet, Take 1 tablet by mouth Daily With Dinner., Disp: , Rfl:     Sotalol HCl AF 80 MG tablet, Take 1 tablet by mouth 2 (Two) Times a Day., Disp: 180 tablet, Rfl: 3    spironolactone (ALDACTONE) 25 MG tablet, Take 1 tablet by mouth Daily., Disp: , Rfl:     traMADol-acetaminophen (ULTRACET) 37.5-325 MG per tablet, Take 1 tablet by mouth Every 12 (Twelve) Hours As Needed for Moderate Pain (pain)., Disp: , Rfl: 1    zolpidem (AMBIEN) 5 MG tablet, Take 1 tablet by mouth At Night As Needed for Sleep., Disp: , Rfl:     losartan (COZAAR) 25 MG tablet, Take 1 tablet by mouth Daily., Disp: , Rfl:     torsemide (DEMADEX) 20 MG tablet, Take 1 tablet by mouth Daily As Needed (As needed for weight gain of 2lbs overnight or 4 lbs in a week.)., Disp: , Rfl:     Social History     Socioeconomic History    Marital status:    Tobacco Use    Smoking status: Never    Smokeless tobacco: Never   Vaping Use    Vaping status: Never Used   Substance and Sexual Activity    Alcohol use: Not Currently     Comment: occ    Drug use: Never    Sexual activity: Not Currently     Partners: Female      Birth control/protection: Vasectomy                Objective:     Constitutional:       Appearance: Healthy appearance. Not in distress.   Neck:      Vascular: JVD normal.   Pulmonary:      Effort: Pulmonary effort is normal.      Breath sounds: Normal breath sounds. No wheezing. No rhonchi. No rales.   Cardiovascular:      PMI at left midclavicular line. Normal rate. Irregularly irregular rhythm. Normal S1. Normal S2.       Murmurs: There is no murmur.      No gallop.  No click. No rub.   Edema:     Peripheral edema present.     Pretibial: bilateral trace edema of the pretibial area.     Ankle: bilateral 1+ edema of the ankle with pitting on the left.     Feet: bilateral 1+ edema of the feet with pitting on the left.  Musculoskeletal: Normal range of motion.      Cervical back: Normal range of motion. Skin:     General: Skin is warm and dry.   Neurological:      Mental Status: Alert and oriented to person, place and time.           Procedures  /60 (BP Location: Right arm, Patient Position: Sitting)   Pulse 75   Wt 87.8 kg (193 lb 9.6 oz)   SpO2 98%   BMI 27.01 kg/m²       04/23/24  1052   Weight: 87.8 kg (193 lb 9.6 oz)      Lab Review:   I have reviewed      BMP          2/28/2024    08:24 4/10/2024    12:23 4/23/2024    10:45   [facility]   BUN 24  13  12    Creatinine 0.83  0.85  0.90    Sodium 141  143  144    Potassium 3.9  3.9  3.7    Chloride 109  108  104    CO2 23.0  26.0  29.0    Calcium 8.7  8.8  9.1       Lab Results   Component Value Date    CHOL 122 11/12/2021    CHLPL 136 (L) 03/13/2018    TRIG 108 11/12/2021    HDL 36 (L) 11/12/2021    LDL 66 11/12/2021      Results for orders placed during the hospital encounter of 10/29/23    Adult Transthoracic Echo Complete W/ Cont if Necessary Per Protocol    Interpretation Summary    Left ventricular systolic function is moderately decreased. Left ventricular ejection fraction appears to be 36 - 40%.    The left ventricular cavity is borderline dilated  (LVIDd 5.5 cm).    The following left ventricular wall segments are hypokinetic: mid anterior, apical anterior, apical lateral, basal inferoseptal, mid inferoseptal, mid anteroseptal, basal anterior and basal inferoseptal. The following left ventricular wall segments are akinetic: mid inferolateral, apical inferior, apical septal and apex.    The left atrial cavity is severely dilated.    Estimated right ventricular systolic pressure from tricuspid regurgitation is normal (<35 mmHg).    Normal size in the right ventricle, with mildly reduced systolic function noted.    The right atrial cavity is severely  dilated.    Moderate dilation of the ascending aorta is present (4.7 cm).    Compared to prior exam from 5/27/2023, no significant change.       Assessment and Plan   Diagnoses and all orders for this visit:    1. Chronic combined systolic and diastolic congestive heart failure (Primary)  2. Ischemic cardiomyopathy  - LVEF: 36 to 40%  - NYHA Class: II/III  - BB: Sotalol 80 mg twice daily  - ACEi/ARB/ARNi: Losartan 25 mg daily  - SGLT2i: Jardiance 10 mg daily  - MRA: Increasing spironolactone 25 mg daily  - Diuretics: As needed torsemide 20 mg daily  - Electrolytes: Stable on lab work today  - ICD/CRT: Status post ICD  - IV Iron: Qualifies based off lab work today; orders placed for IV iron    Overall it appears the patient has had a decline in his heart failure status as well as overall general health.  When I saw him in December he is down 10 to 15 pounds even while euvolemic.  This is suggestive of some poor oral intake as well as potential muscle wasting.  Complains of not being able to eat as much and not having as much strength.  I do think with him having to take more torsemide as of late that we should attempt to increase his diuretic therapy.  I have increased spironolactone to 25 mg today.  He can continue on as needed torsemide for weight gain or shortness of breath.  I would like to potentially restart  Verquvo at his next appointment given that this was stopped in the same setting as his hypotension in the hospitalization and he would likely be able to tolerate reinitiation.  I did order IV iron given his iron deficiency anemia.  I will plan on seeing him back in 2 weeks for volume assessment as well as to check renal function.    Echo pending at the end of May.      3. Coronary artery disease of bypass graft of native heart with stable angina pectoris  4. Essential hypertension  -No anginal symptoms as of late  - Continue Plavix 75 mg indefinitely  - Antihypertensives as noted above  - Continue Lipitor 40 mg nightly  - Continue Imdur 30 mg daily as well as Ranexa 1000 mg twice daily (no obvious anginal symptoms as of late)      5. Presence of biventricular AICD  6. S/P AV sam ablation  -Recent LV lead revision by electrophysiology with optimal results  - Continue follow-up with electrophysiology  - Continue sotalol per electrophysiology      7. Permanent atrial fibrillation  -Patient rate controlled atrial fibrillation  - Patient currently is on Xarelto and has not missed any doses of this, if bleeding ever was to become a concern in the future likely would first do PING to evaluate atrial appendage occlusion rather than just stopping DOAC therapy  - Continue regimen as noted above         I spent 52 minutes caring for Darren on this date of service. This time includes time spent by me in the following activities:preparing for the visit, reviewing tests, obtaining and/or reviewing a separately obtained history, performing a medically appropriate examination and/or evaluation , counseling and educating the patient/family/caregiver, ordering medications, tests, or procedures, referring and communicating with other health care professionals , documenting information in the medical record, independently interpreting results and communicating that information with the patient/family/caregiver, and care  coordination    Follow Up   No follow-ups on file.  Patient was given instructions and counseling regarding his condition or for health maintenance advice. Please see specific information pulled into the AVS if appropriate.     Part of this note may be an electronic transcription/translation of spoken language to printed text using the Dragon Dictation System.

## 2024-04-30 RX ORDER — SACUBITRIL AND VALSARTAN 49; 51 MG/1; MG/1
TABLET, FILM COATED ORAL
OUTPATIENT
Start: 2024-04-30

## 2024-05-01 ENCOUNTER — INFUSION (OUTPATIENT)
Dept: ONCOLOGY | Facility: HOSPITAL | Age: 79
End: 2024-05-01
Payer: MEDICARE

## 2024-05-01 VITALS
DIASTOLIC BLOOD PRESSURE: 53 MMHG | TEMPERATURE: 97.2 F | BODY MASS INDEX: 26.96 KG/M2 | HEART RATE: 76 BPM | RESPIRATION RATE: 18 BRPM | SYSTOLIC BLOOD PRESSURE: 109 MMHG | WEIGHT: 192.6 LBS | OXYGEN SATURATION: 100 % | HEIGHT: 71 IN

## 2024-05-01 DIAGNOSIS — D50.9 IRON DEFICIENCY ANEMIA, UNSPECIFIED IRON DEFICIENCY ANEMIA TYPE: Primary | ICD-10-CM

## 2024-05-01 DIAGNOSIS — I50.42 CHRONIC COMBINED SYSTOLIC AND DIASTOLIC CONGESTIVE HEART FAILURE: ICD-10-CM

## 2024-05-01 PROCEDURE — 96365 THER/PROPH/DIAG IV INF INIT: CPT

## 2024-05-01 PROCEDURE — 25010000002 FERRIC CARBOXYMALTOSE 750 MG/15ML SOLUTION 15 ML VIAL

## 2024-05-01 PROCEDURE — 25810000003 SODIUM CHLORIDE 0.9 % SOLUTION

## 2024-05-01 RX ORDER — SODIUM CHLORIDE 9 MG/ML
20 INJECTION, SOLUTION INTRAVENOUS ONCE
Status: COMPLETED | OUTPATIENT
Start: 2024-05-01 | End: 2024-05-01

## 2024-05-01 RX ORDER — DIPHENHYDRAMINE HYDROCHLORIDE 50 MG/ML
50 INJECTION INTRAMUSCULAR; INTRAVENOUS AS NEEDED
Status: DISCONTINUED | OUTPATIENT
Start: 2024-05-01 | End: 2024-05-01 | Stop reason: HOSPADM

## 2024-05-01 RX ORDER — DIPHENHYDRAMINE HYDROCHLORIDE 50 MG/ML
50 INJECTION INTRAMUSCULAR; INTRAVENOUS AS NEEDED
OUTPATIENT
Start: 2024-05-08

## 2024-05-01 RX ORDER — FAMOTIDINE 10 MG/ML
20 INJECTION, SOLUTION INTRAVENOUS AS NEEDED
Status: DISCONTINUED | OUTPATIENT
Start: 2024-05-01 | End: 2024-05-01 | Stop reason: HOSPADM

## 2024-05-01 RX ORDER — SODIUM CHLORIDE 9 MG/ML
20 INJECTION, SOLUTION INTRAVENOUS ONCE
OUTPATIENT
Start: 2024-05-08

## 2024-05-01 RX ORDER — FAMOTIDINE 10 MG/ML
20 INJECTION, SOLUTION INTRAVENOUS AS NEEDED
OUTPATIENT
Start: 2024-05-08

## 2024-05-01 RX ADMIN — SODIUM CHLORIDE 20 ML/HR: 9 INJECTION, SOLUTION INTRAVENOUS at 12:50

## 2024-05-01 RX ADMIN — FERRIC CARBOXYMALTOSE INJECTION 750 MG: 50 INJECTION, SOLUTION INTRAVENOUS at 13:05

## 2024-05-03 ENCOUNTER — HOSPITAL ENCOUNTER (INPATIENT)
Facility: HOSPITAL | Age: 79
LOS: 4 days | Discharge: HOME | DRG: 378 | End: 2024-05-07
Attending: EMERGENCY MEDICINE | Admitting: INTERNAL MEDICINE
Payer: MEDICARE

## 2024-05-03 ENCOUNTER — APPOINTMENT (OUTPATIENT)
Dept: CT IMAGING | Facility: HOSPITAL | Age: 79
DRG: 378 | End: 2024-05-03
Payer: MEDICARE

## 2024-05-03 DIAGNOSIS — K92.1 GASTROINTESTINAL HEMORRHAGE WITH MELENA: ICD-10-CM

## 2024-05-03 DIAGNOSIS — D50.0 IRON DEFICIENCY ANEMIA DUE TO CHRONIC BLOOD LOSS: ICD-10-CM

## 2024-05-03 DIAGNOSIS — K92.2 GASTROINTESTINAL HEMORRHAGE, UNSPECIFIED GASTROINTESTINAL HEMORRHAGE TYPE: Primary | ICD-10-CM

## 2024-05-03 LAB
ABO GROUP BLD: NORMAL
ALBUMIN SERPL-MCNC: 4 G/DL (ref 3.5–5.2)
ALBUMIN/GLOB SERPL: 1.7 G/DL
ALP SERPL-CCNC: 53 U/L (ref 39–117)
ALT SERPL W P-5'-P-CCNC: 8 U/L (ref 1–41)
ANION GAP SERPL CALCULATED.3IONS-SCNC: 11 MMOL/L (ref 5–15)
AST SERPL-CCNC: 16 U/L (ref 1–40)
BASOPHILS # BLD AUTO: 0.03 10*3/MM3 (ref 0–0.2)
BASOPHILS NFR BLD AUTO: 0.5 % (ref 0–1.5)
BILIRUB SERPL-MCNC: 0.4 MG/DL (ref 0–1.2)
BLD GP AB SCN SERPL QL: NEGATIVE
BUN SERPL-MCNC: 10 MG/DL (ref 8–23)
BUN/CREAT SERPL: 12.5 (ref 7–25)
CALCIUM SPEC-SCNC: 9 MG/DL (ref 8.6–10.5)
CHLORIDE SERPL-SCNC: 108 MMOL/L (ref 98–107)
CO2 SERPL-SCNC: 25 MMOL/L (ref 22–29)
CREAT SERPL-MCNC: 0.8 MG/DL (ref 0.76–1.27)
DEPRECATED RDW RBC AUTO: 66.4 FL (ref 37–54)
DEVELOPER EXPIRATION DATE: ABNORMAL
DEVELOPER LOT NUMBER: 257
EGFRCR SERPLBLD CKD-EPI 2021: 91.2 ML/MIN/1.73
EOSINOPHIL # BLD AUTO: 0.09 10*3/MM3 (ref 0–0.4)
EOSINOPHIL NFR BLD AUTO: 1.4 % (ref 0.3–6.2)
ERYTHROCYTE [DISTWIDTH] IN BLOOD BY AUTOMATED COUNT: 21.7 % (ref 12.3–15.4)
EXPIRATION DATE: ABNORMAL
FECAL OCCULT BLOOD SCREEN, POC: POSITIVE
GLOBULIN UR ELPH-MCNC: 2.3 GM/DL
GLUCOSE BLDC GLUCOMTR-MCNC: 89 MG/DL (ref 70–130)
GLUCOSE SERPL-MCNC: 121 MG/DL (ref 65–99)
HCT VFR BLD AUTO: 24.6 % (ref 37.5–51)
HGB BLD-MCNC: 7.2 G/DL (ref 13–17.7)
HOLD SPECIMEN: NORMAL
IMM GRANULOCYTES # BLD AUTO: 0.09 10*3/MM3 (ref 0–0.05)
IMM GRANULOCYTES NFR BLD AUTO: 1.4 % (ref 0–0.5)
INR PPP: 1.95 (ref 0.91–1.09)
LYMPHOCYTES # BLD AUTO: 0.68 10*3/MM3 (ref 0.7–3.1)
LYMPHOCYTES NFR BLD AUTO: 10.3 % (ref 19.6–45.3)
Lab: 257
MCH RBC QN AUTO: 25.4 PG (ref 26.6–33)
MCHC RBC AUTO-ENTMCNC: 29.3 G/DL (ref 31.5–35.7)
MCV RBC AUTO: 86.6 FL (ref 79–97)
MONOCYTES # BLD AUTO: 0.74 10*3/MM3 (ref 0.1–0.9)
MONOCYTES NFR BLD AUTO: 11.2 % (ref 5–12)
NEGATIVE CONTROL: NEGATIVE
NEUTROPHILS NFR BLD AUTO: 4.97 10*3/MM3 (ref 1.7–7)
NEUTROPHILS NFR BLD AUTO: 75.2 % (ref 42.7–76)
NRBC BLD AUTO-RTO: 0 /100 WBC (ref 0–0.2)
PLATELET # BLD AUTO: 384 10*3/MM3 (ref 140–450)
PMV BLD AUTO: 9.1 FL (ref 6–12)
POSITIVE CONTROL: POSITIVE
POTASSIUM SERPL-SCNC: 4.2 MMOL/L (ref 3.5–5.2)
PROT SERPL-MCNC: 6.3 G/DL (ref 6–8.5)
PROTHROMBIN TIME: 23 SECONDS (ref 11.8–14.8)
RBC # BLD AUTO: 2.84 10*6/MM3 (ref 4.14–5.8)
RH BLD: POSITIVE
SODIUM SERPL-SCNC: 144 MMOL/L (ref 136–145)
T&S EXPIRATION DATE: NORMAL
WBC NRBC COR # BLD AUTO: 6.6 10*3/MM3 (ref 3.4–10.8)
WHOLE BLOOD HOLD COAG: NORMAL
WHOLE BLOOD HOLD SPECIMEN: NORMAL

## 2024-05-03 PROCEDURE — 85610 PROTHROMBIN TIME: CPT | Performed by: EMERGENCY MEDICINE

## 2024-05-03 PROCEDURE — P9016 RBC LEUKOCYTES REDUCED: HCPCS

## 2024-05-03 PROCEDURE — 93010 ELECTROCARDIOGRAM REPORT: CPT | Performed by: INTERNAL MEDICINE

## 2024-05-03 PROCEDURE — 93005 ELECTROCARDIOGRAM TRACING: CPT

## 2024-05-03 PROCEDURE — 74177 CT ABD & PELVIS W/CONTRAST: CPT

## 2024-05-03 PROCEDURE — 80053 COMPREHEN METABOLIC PANEL: CPT | Performed by: EMERGENCY MEDICINE

## 2024-05-03 PROCEDURE — 86900 BLOOD TYPING SEROLOGIC ABO: CPT | Performed by: EMERGENCY MEDICINE

## 2024-05-03 PROCEDURE — 86901 BLOOD TYPING SEROLOGIC RH(D): CPT

## 2024-05-03 PROCEDURE — 82948 REAGENT STRIP/BLOOD GLUCOSE: CPT

## 2024-05-03 PROCEDURE — 36430 TRANSFUSION BLD/BLD COMPNT: CPT

## 2024-05-03 PROCEDURE — 82270 OCCULT BLOOD FECES: CPT | Performed by: EMERGENCY MEDICINE

## 2024-05-03 PROCEDURE — 86850 RBC ANTIBODY SCREEN: CPT | Performed by: EMERGENCY MEDICINE

## 2024-05-03 PROCEDURE — 85025 COMPLETE CBC W/AUTO DIFF WBC: CPT | Performed by: EMERGENCY MEDICINE

## 2024-05-03 PROCEDURE — 99285 EMERGENCY DEPT VISIT HI MDM: CPT

## 2024-05-03 PROCEDURE — 25510000001 IOPAMIDOL 61 % SOLUTION: Performed by: EMERGENCY MEDICINE

## 2024-05-03 PROCEDURE — 86901 BLOOD TYPING SEROLOGIC RH(D): CPT | Performed by: EMERGENCY MEDICINE

## 2024-05-03 PROCEDURE — 86923 COMPATIBILITY TEST ELECTRIC: CPT

## 2024-05-03 PROCEDURE — 86900 BLOOD TYPING SEROLOGIC ABO: CPT

## 2024-05-03 RX ORDER — SODIUM CHLORIDE 0.9 % (FLUSH) 0.9 %
10 SYRINGE (ML) INJECTION EVERY 12 HOURS SCHEDULED
Status: DISCONTINUED | OUTPATIENT
Start: 2024-05-03 | End: 2024-05-07 | Stop reason: HOSPADM

## 2024-05-03 RX ORDER — SODIUM CHLORIDE 0.9 % (FLUSH) 0.9 %
10 SYRINGE (ML) INJECTION AS NEEDED
Status: DISCONTINUED | OUTPATIENT
Start: 2024-05-03 | End: 2024-05-07 | Stop reason: HOSPADM

## 2024-05-03 RX ORDER — LOSARTAN POTASSIUM 50 MG/1
25 TABLET ORAL DAILY
Status: DISCONTINUED | OUTPATIENT
Start: 2024-05-03 | End: 2024-05-07 | Stop reason: HOSPADM

## 2024-05-03 RX ORDER — ISOSORBIDE MONONITRATE 30 MG/1
30 TABLET, EXTENDED RELEASE ORAL
Status: DISCONTINUED | OUTPATIENT
Start: 2024-05-03 | End: 2024-05-07 | Stop reason: HOSPADM

## 2024-05-03 RX ORDER — SODIUM CHLORIDE 9 MG/ML
40 INJECTION, SOLUTION INTRAVENOUS AS NEEDED
Status: DISCONTINUED | OUTPATIENT
Start: 2024-05-03 | End: 2024-05-07 | Stop reason: HOSPADM

## 2024-05-03 RX ORDER — PANTOPRAZOLE SODIUM 40 MG/10ML
40 INJECTION, POWDER, LYOPHILIZED, FOR SOLUTION INTRAVENOUS EVERY 12 HOURS SCHEDULED
Status: DISCONTINUED | OUTPATIENT
Start: 2024-05-03 | End: 2024-05-07

## 2024-05-03 RX ORDER — ATORVASTATIN CALCIUM 40 MG/1
40 TABLET, FILM COATED ORAL NIGHTLY
Status: DISCONTINUED | OUTPATIENT
Start: 2024-05-03 | End: 2024-05-07 | Stop reason: HOSPADM

## 2024-05-03 RX ORDER — PREGABALIN 75 MG/1
150 CAPSULE ORAL 2 TIMES DAILY
Status: DISCONTINUED | OUTPATIENT
Start: 2024-05-03 | End: 2024-05-07 | Stop reason: HOSPADM

## 2024-05-03 RX ORDER — ACETAMINOPHEN 325 MG/1
650 TABLET ORAL EVERY 4 HOURS PRN
Status: DISCONTINUED | OUTPATIENT
Start: 2024-05-03 | End: 2024-05-07 | Stop reason: HOSPADM

## 2024-05-03 RX ORDER — PANTOPRAZOLE SODIUM 40 MG/10ML
80 INJECTION, POWDER, LYOPHILIZED, FOR SOLUTION INTRAVENOUS ONCE
Status: COMPLETED | OUTPATIENT
Start: 2024-05-03 | End: 2024-05-03

## 2024-05-03 RX ORDER — SOTALOL HYDROCHLORIDE 80 MG/1
80 TABLET ORAL EVERY 12 HOURS SCHEDULED
Status: DISCONTINUED | OUTPATIENT
Start: 2024-05-03 | End: 2024-05-07 | Stop reason: HOSPADM

## 2024-05-03 RX ORDER — NITROGLYCERIN 0.4 MG/1
0.4 TABLET SUBLINGUAL
Status: DISCONTINUED | OUTPATIENT
Start: 2024-05-03 | End: 2024-05-07 | Stop reason: HOSPADM

## 2024-05-03 RX ADMIN — LOSARTAN POTASSIUM 25 MG: 50 TABLET, FILM COATED ORAL at 20:57

## 2024-05-03 RX ADMIN — ACETAMINOPHEN 650 MG: 325 TABLET, FILM COATED ORAL at 23:50

## 2024-05-03 RX ADMIN — PANTOPRAZOLE SODIUM 40 MG: 40 INJECTION, POWDER, FOR SOLUTION INTRAVENOUS at 21:11

## 2024-05-03 RX ADMIN — ISOSORBIDE MONONITRATE 30 MG: 30 TABLET, EXTENDED RELEASE ORAL at 20:57

## 2024-05-03 RX ADMIN — PANTOPRAZOLE SODIUM 80 MG: 40 INJECTION, POWDER, FOR SOLUTION INTRAVENOUS at 14:48

## 2024-05-03 RX ADMIN — PREGABALIN 150 MG: 75 CAPSULE ORAL at 21:11

## 2024-05-03 RX ADMIN — EMPAGLIFLOZIN 10 MG: 10 TABLET, FILM COATED ORAL at 21:11

## 2024-05-03 RX ADMIN — Medication 10 ML: at 20:57

## 2024-05-03 RX ADMIN — IOPAMIDOL 100 ML: 612 INJECTION, SOLUTION INTRAVENOUS at 15:02

## 2024-05-03 RX ADMIN — SOTALOL HYDROCHLORIDE 80 MG: 80 TABLET ORAL at 20:57

## 2024-05-03 RX ADMIN — ATORVASTATIN CALCIUM 40 MG: 40 TABLET, FILM COATED ORAL at 20:57

## 2024-05-03 NOTE — ED PROVIDER NOTES
Subjective   History of Present Illness  Patient is a 79 year old male that presents to the ER after receiving an iron infusion on Wednesday prior to presentation.  Patient reports he had a large black bowel movement yesterday.  Patient is on Xarelto.  Patient states he had to have transfusion a little over a week ago.  Patient reports has been weak, short of breath, has had dyspnea on exertion and noticed that he has been getting pale.  Patient denies CP, nausea, vomiting and no fever.        Review of Systems   Constitutional:  Positive for fatigue. Negative for appetite change, chills and fever.   HENT:  Negative for congestion, ear pain and sore throat.    Eyes:  Negative for discharge and redness.   Respiratory:  Negative for cough and shortness of breath.    Cardiovascular:  Negative for chest pain.   Gastrointestinal:  Negative for abdominal pain, diarrhea, nausea and vomiting.        Black stool   Genitourinary:  Negative for difficulty urinating, dysuria, flank pain and urgency.   Musculoskeletal:  Negative for arthralgias, myalgias and neck stiffness.   Skin:  Positive for color change (patient reports being pale). Negative for rash.   Neurological:  Positive for dizziness, weakness and light-headedness. Negative for headaches.   Psychiatric/Behavioral:  Negative for behavioral problems.        Past Medical History:   Diagnosis Date    Abdominal pain, epigastric     Abnormal abdominal exam     ABFND, RADIOLOGICAL, ABDOMINAL AREA     Abnormal ECG     Anticoagulated on Coumadin     Atherosclerosis of coronary artery bypass graft     ATHEROSCLEROSIS, CORONARY, ARTERY BYPASS GRFT    Atrial fibrillation     Cancer of right kidney     right sided kidney cancer    Cardiomyopathy     Cardiomyopathy, primary     CHF (congestive heart failure)     Clotting disorder     Colon polyps     Congenital heart disease     Congestive heart failure     Coronary artery disease     History of CAD/A-Fib/Pacemaker/Defibrillator  "placement: pt takes Coumadin and plavix therapy as well as ASA daily    Diabetes mellitus     Gastric polyp     Gastroesophageal reflux disease without esophagitis     History of colon polyps     Hypercholesterolemia     Hypertension, essential     Melena     Myocardial infarction     NSAID long-term use     Obesity     Pacemaker     Pacemaker Dependant    Platelet inhibition due to Plavix     Presence of stent in coronary artery     Multiple coronary stenting most recently 4/2008    Single kidney     Only 1 Kidney    Status post surgery     s/p Polypectomy Bleed       Allergies   Allergen Reactions    Azithromycin Nausea And Vomiting    Morphine And Related Other (See Comments)     SHAKES AND MAKES HIM \"WIRED\".   TAKES TRAMADOL WITHOUT PROBLEM       Past Surgical History:   Procedure Laterality Date    ABLATION OF DYSRHYTHMIC FOCUS      APPENDECTOMY      CARDIAC ABLATION      CARDIAC CATHETERIZATION      CARDIAC CATHETERIZATION Left 12/15/2021    Procedure: Coronary angiography;  Surgeon: Sarbjit Posadas MD;  Location:  [facility] CATH INVASIVE LOCATION;  Service: Cardiology;  Laterality: Left;    CARDIAC CATHETERIZATION Left 12/15/2021    Procedure: Percutaneous Coronary Intervention;  Surgeon: Sarbjit Posadas MD;  Location: [facility] CATH INVASIVE LOCATION;  Service: Cardiology;  Laterality: Left;    CARDIAC DEFIBRILLATOR PLACEMENT      CARDIAC PACEMAKER PLACEMENT      Pacemaker Placement    CAROTID STENT  2001    CATARACT EXTRACTION, BILATERAL      CHOLECYSTECTOMY      COLONOSCOPY  08/27/2013    snare hep, trans tics recall 3 yr    COLONOSCOPY  10/02/2006    few scattered diverticula    COLONOSCOPY  07/22/2009    tubular adenoma in the ascending colon    COLONOSCOPY  07/25/2005    several polyps in the ascending colon, one in the transverse colon    COLONOSCOPY N/A 03/21/2022    Procedure: COLONOSCOPY WITH ANESTHESIA;  Surgeon: Phil Goodwin MD;  Location: [facility] ENDOSCOPY;  Service: Gastroenterology;  Laterality: " N/A;  pre: hx polyps  post: polyps  Rolan Salvador MD    COLONOSCOPY W/ POLYPECTOMY  09/19/2016    Tubular adenoma hepatic flexure, 2 Hyperplastic polyps rectum and at 50 cm, Benign polyp at 70 cm repeat exam in 3-5 years    CORONARY ARTERY BYPASS GRAFT  02/2011    2nd time    CORONARY ARTERY BYPASS GRAFT  1990    CORONARY STENT PLACEMENT      ENDOSCOPY  04/14/2016    nl slant    ENDOSCOPY  11/10/2014    clips at polypectomy bx no residual recall 1 yr    ENDOSCOPY  06/19/2014    snare clip body recall 6 months    ENDOSCOPY  05/19/2014    polyp stomach bx and clip    HERNIA REPAIR      with mesh    ICD GENERATOR REPLACEMENT N/A 2/28/2024    Procedure: ICD Generator Change - Bi-Ventricular (16674) with new LV lead implant (26971);  Surgeon: Devika Oliver MD;  Location:  [facility] CATH INVASIVE LOCATION;  Service: Cardiovascular;  Laterality: N/A;    INTERVENTIONAL RADIOLOGY PROCEDURE Left 1/17/2024    Procedure: Venogram upper extremity left, 74632, 66773;  Surgeon: Devika Oliver MD;  Location:  [facility] CATH INVASIVE LOCATION;  Service: Cardiovascular;  Laterality: Left;    JOINT REPLACEMENT      OTHER SURGICAL HISTORY      Defibrillator/Pacer maker exchange    OTHER SURGICAL HISTORY      Pacemaker/Defibillation exchange 2012    PENILE PROSTHESIS IMPLANT N/A 07/27/2018    Procedure: PENILE PROSTHESIS PLACEMENT;  Surgeon: Godwin Gupta MD;  Location:  [facility] OR;  Service: Urology    PERIPHERAL ARTERIAL STENT GRAFT         Family History   Problem Relation Age of Onset    Lung cancer Mother     Heart attack Mother     No Known Problems Father     Other Neg Hx         GI Malignancies    Colon cancer Neg Hx     Colon polyps Neg Hx        Social History     Socioeconomic History    Marital status:    Tobacco Use    Smoking status: Never    Smokeless tobacco: Never   Vaping Use    Vaping status: Never Used   Substance and Sexual Activity    Alcohol use: Not Currently     Comment: occ    Drug use: Never     Sexual activity: Not Currently     Partners: Female     Birth control/protection: Vasectomy           Objective   Physical Exam  Constitutional:       General: He is not in acute distress.     Appearance: Normal appearance. He is normal weight. He is not ill-appearing, toxic-appearing or diaphoretic.   HENT:      Head: Normocephalic and atraumatic.      Right Ear: Tympanic membrane, ear canal and external ear normal. There is no impacted cerumen.      Left Ear: Tympanic membrane, ear canal and external ear normal. There is no impacted cerumen.      Nose: Nose normal. No congestion or rhinorrhea.      Mouth/Throat:      Mouth: Mucous membranes are moist.      Pharynx: Oropharynx is clear.   Eyes:      General: No scleral icterus.        Right eye: No discharge.         Left eye: No discharge.      Extraocular Movements: Extraocular movements intact.      Conjunctiva/sclera: Conjunctivae normal.      Pupils: Pupils are equal, round, and reactive to light.   Cardiovascular:      Rate and Rhythm: Normal rate and regular rhythm.      Pulses: Normal pulses.      Heart sounds: Normal heart sounds. No murmur heard.     No friction rub. No gallop.   Pulmonary:      Effort: Pulmonary effort is normal. No respiratory distress.      Breath sounds: Normal breath sounds. No wheezing, rhonchi or rales.   Abdominal:      General: Abdomen is flat. Bowel sounds are normal. There is no distension.      Palpations: Abdomen is soft. There is no mass.      Tenderness: There is no abdominal tenderness. There is no right CVA tenderness, left CVA tenderness, guarding or rebound.   Genitourinary:     Rectum: Guaiac result positive (black stool).      Comments: Lack stool on exam.  No masses palpable on rectal exam.  Musculoskeletal:         General: No swelling, tenderness or deformity. Normal range of motion.      Cervical back: Normal range of motion and neck supple. No rigidity or tenderness.      Right lower leg: No edema.      Left  lower leg: No edema.   Skin:     General: Skin is warm and dry.      Capillary Refill: Capillary refill takes less than 2 seconds.      Coloration: Skin is pale. Skin is not jaundiced.      Findings: No bruising or lesion.   Neurological:      General: No focal deficit present.      Mental Status: He is alert and oriented to person, place, and time. Mental status is at baseline.      Cranial Nerves: No cranial nerve deficit.      Sensory: No sensory deficit.      Motor: No weakness.         Procedures           ED Course  ED Course as of 05/03/24 1629   Fri May 03, 2024   1624 Hemoglobin dropping from 8.9-7.2 from 10 days ago.    CT abdomen pelvis with IV contrast:IMPRESSION:     1.   No acute and/or inflammatory process in the abdomen or pelvis.  2.  In particular, there is no acute and/or inflammatory process  identified along the bowel.           This report was signed and finalized on 5/3/2024 3:30 PM by Dr Olvin Walker.   [TE]      ED Course User Index  [TE] Juan Contreras MD                                             Medical Decision Making  Patient is a 79 year old male that presents to the ER after receiving an iron infusion on Wednesday prior to presentation.  Patient reports he had a large black bowel movement yesterday.  Patient is on Xarelto.  Patient states he had to have transfusion a little over a week ago.  Patient reports has been weak, short of breath, has had dyspnea on exertion and noticed that he has been getting pale.  Patient denies CP, nausea, vomiting and no fever.Physical exam is significant for pale skin, black stool that is guiac positive on rectal exam.  Labs significan for Hgb of 7.2, down from 8.9 10 days prior.  Differential diagnosis of upper GI bleed, lower GI bleed, bleeding ulcer, intestinal mass.  CT of abdomen and pelvis negative.  Patient diagnosed with GI bleed on Xeralto.  Consult to Dr Salvador for admission.  Dr. Salvador not available and therefore spoke to partner   Yovany regarding admit and discussed reversal of Xarelto and he states to speak with gastroenterology, Dr. Lacey who is on.  He agrees to admit patient for transfusion understanding that patient is on Xarelto and has a drop in his hemoglobin of 2 points and is symptomatic at this point with a hemoglobin of 7.2.  At 1655 consult placed to Dr. Lacey with gastroenterology.  Spoke with Dr. Lacey he states that reversal of the Xarelto would be the purview of the cardiologist and to contact cardiology and that he, Dr. Lacey, would see the patient tomorrow.  Spoke with Dr. Brown and he reviewed patient's findings and states to just hold the Xarelto for now and patient to be admitted to primary care for transfusion and evaluation by gastroenterology tomorrow but for now to not reverse the Xarelto.physical exam significant for pale skin, pale conjunctiva, Hemoccult positive rectal and black stool.  Workup includes a hemoglobin that is dropped 2 points over the past 10 days to 7.2 while patient is on xeralto.for cardiac issues and has symptomatic bradycardia    Problems Addressed:  Gastrointestinal hemorrhage, unspecified gastrointestinal hemorrhage type: complicated acute illness or injury    Amount and/or Complexity of Data Reviewed  Labs: ordered.  Radiology: ordered.    Risk  Prescription drug management.  Decision regarding hospitalization.        Final diagnoses:   None       ED Disposition  ED Disposition       None            No follow-up provider specified.       Medication List      No changes were made to your prescriptions during this visit.            Juan Contreras MD  05/06/24 [id number]

## 2024-05-04 LAB
ANION GAP SERPL CALCULATED.3IONS-SCNC: 7 MMOL/L (ref 5–15)
BASOPHILS # BLD AUTO: 0.05 10*3/MM3 (ref 0–0.2)
BASOPHILS NFR BLD AUTO: 1 % (ref 0–1.5)
BUN SERPL-MCNC: 9 MG/DL (ref 8–23)
BUN/CREAT SERPL: 13 (ref 7–25)
CALCIUM SPEC-SCNC: 8.6 MG/DL (ref 8.6–10.5)
CHLORIDE SERPL-SCNC: 110 MMOL/L (ref 98–107)
CO2 SERPL-SCNC: 25 MMOL/L (ref 22–29)
CREAT SERPL-MCNC: 0.69 MG/DL (ref 0.76–1.27)
DEPRECATED RDW RBC AUTO: 63.6 FL (ref 37–54)
EGFRCR SERPLBLD CKD-EPI 2021: 95.3 ML/MIN/1.73
EOSINOPHIL # BLD AUTO: 0.14 10*3/MM3 (ref 0–0.4)
EOSINOPHIL NFR BLD AUTO: 2.8 % (ref 0.3–6.2)
ERYTHROCYTE [DISTWIDTH] IN BLOOD BY AUTOMATED COUNT: 21.2 % (ref 12.3–15.4)
GLUCOSE BLDC GLUCOMTR-MCNC: 103 MG/DL (ref 70–130)
GLUCOSE SERPL-MCNC: 94 MG/DL (ref 65–99)
HCT VFR BLD AUTO: 27.1 % (ref 37.5–51)
HGB BLD-MCNC: 8.2 G/DL (ref 13–17.7)
HGB BLD-MCNC: 8.7 G/DL (ref 13–17.7)
HGB BLD-MCNC: 8.9 G/DL (ref 13–17.7)
IMM GRANULOCYTES # BLD AUTO: 0.07 10*3/MM3 (ref 0–0.05)
IMM GRANULOCYTES NFR BLD AUTO: 1.4 % (ref 0–0.5)
LYMPHOCYTES # BLD AUTO: 0.66 10*3/MM3 (ref 0.7–3.1)
LYMPHOCYTES NFR BLD AUTO: 13.3 % (ref 19.6–45.3)
MCH RBC QN AUTO: 25.7 PG (ref 26.6–33)
MCHC RBC AUTO-ENTMCNC: 30.3 G/DL (ref 31.5–35.7)
MCV RBC AUTO: 85 FL (ref 79–97)
MONOCYTES # BLD AUTO: 0.63 10*3/MM3 (ref 0.1–0.9)
MONOCYTES NFR BLD AUTO: 12.7 % (ref 5–12)
NEUTROPHILS NFR BLD AUTO: 3.42 10*3/MM3 (ref 1.7–7)
NEUTROPHILS NFR BLD AUTO: 68.8 % (ref 42.7–76)
NRBC BLD AUTO-RTO: 0 /100 WBC (ref 0–0.2)
PLATELET # BLD AUTO: 327 10*3/MM3 (ref 140–450)
PMV BLD AUTO: 8.7 FL (ref 6–12)
POTASSIUM SERPL-SCNC: 3.9 MMOL/L (ref 3.5–5.2)
RBC # BLD AUTO: 3.19 10*6/MM3 (ref 4.14–5.8)
SODIUM SERPL-SCNC: 142 MMOL/L (ref 136–145)
WBC NRBC COR # BLD AUTO: 4.97 10*3/MM3 (ref 3.4–10.8)

## 2024-05-04 PROCEDURE — 80048 BASIC METABOLIC PNL TOTAL CA: CPT | Performed by: FAMILY MEDICINE

## 2024-05-04 PROCEDURE — 82948 REAGENT STRIP/BLOOD GLUCOSE: CPT

## 2024-05-04 PROCEDURE — 85018 HEMOGLOBIN: CPT | Performed by: FAMILY MEDICINE

## 2024-05-04 PROCEDURE — 99222 1ST HOSP IP/OBS MODERATE 55: CPT | Performed by: NURSE PRACTITIONER

## 2024-05-04 PROCEDURE — 85025 COMPLETE CBC W/AUTO DIFF WBC: CPT | Performed by: FAMILY MEDICINE

## 2024-05-04 PROCEDURE — 99222 1ST HOSP IP/OBS MODERATE 55: CPT | Performed by: INTERNAL MEDICINE

## 2024-05-04 PROCEDURE — 25010000002 ONDANSETRON PER 1 MG: Performed by: FAMILY MEDICINE

## 2024-05-04 RX ORDER — ZOLPIDEM TARTRATE 5 MG/1
5 TABLET ORAL NIGHTLY PRN
Status: DISCONTINUED | OUTPATIENT
Start: 2024-05-04 | End: 2024-05-07 | Stop reason: HOSPADM

## 2024-05-04 RX ORDER — ONDANSETRON 2 MG/ML
4 INJECTION INTRAMUSCULAR; INTRAVENOUS EVERY 6 HOURS PRN
Status: DISCONTINUED | OUTPATIENT
Start: 2024-05-04 | End: 2024-05-07 | Stop reason: HOSPADM

## 2024-05-04 RX ORDER — NYSTATIN 100000 [USP'U]/G
1 POWDER TOPICAL 2 TIMES DAILY
COMMUNITY
End: 2024-05-07 | Stop reason: HOSPADM

## 2024-05-04 RX ADMIN — ISOSORBIDE MONONITRATE 30 MG: 30 TABLET, EXTENDED RELEASE ORAL at 08:48

## 2024-05-04 RX ADMIN — EMPAGLIFLOZIN 10 MG: 10 TABLET, FILM COATED ORAL at 08:48

## 2024-05-04 RX ADMIN — LOSARTAN POTASSIUM 25 MG: 50 TABLET, FILM COATED ORAL at 08:48

## 2024-05-04 RX ADMIN — PANTOPRAZOLE SODIUM 40 MG: 40 INJECTION, POWDER, FOR SOLUTION INTRAVENOUS at 08:48

## 2024-05-04 RX ADMIN — ACETAMINOPHEN 650 MG: 325 TABLET, FILM COATED ORAL at 07:19

## 2024-05-04 RX ADMIN — SOTALOL HYDROCHLORIDE 80 MG: 80 TABLET ORAL at 20:14

## 2024-05-04 RX ADMIN — PREGABALIN 150 MG: 75 CAPSULE ORAL at 08:48

## 2024-05-04 RX ADMIN — ACETAMINOPHEN 650 MG: 325 TABLET, FILM COATED ORAL at 15:02

## 2024-05-04 RX ADMIN — ATORVASTATIN CALCIUM 40 MG: 40 TABLET, FILM COATED ORAL at 20:14

## 2024-05-04 RX ADMIN — Medication 10 ML: at 08:48

## 2024-05-04 RX ADMIN — Medication 10 ML: at 20:14

## 2024-05-04 RX ADMIN — ONDANSETRON 4 MG: 2 INJECTION INTRAMUSCULAR; INTRAVENOUS at 15:02

## 2024-05-04 RX ADMIN — SOTALOL HYDROCHLORIDE 80 MG: 80 TABLET ORAL at 08:48

## 2024-05-04 RX ADMIN — PREGABALIN 150 MG: 75 CAPSULE ORAL at 20:14

## 2024-05-04 RX ADMIN — PANTOPRAZOLE SODIUM 40 MG: 40 INJECTION, POWDER, FOR SOLUTION INTRAVENOUS at 20:14

## 2024-05-04 NOTE — H&P
Patient Care Team:  Rolan Salvador MD as PCP - General (Internal Medicine)  Sarbjit Posadas MD as Cardiologist (Cardiology)  Yolanda Perry APRN as Nurse Practitioner (Family Medicine)  Durga Ochoa MD as Consulting Physician (Urology)  Tyrone Broussard PA as Physician Assistant (Urology)  Devika Oliver MD as Cardiologist (Cardiac Electrophysiology)  Artur Randle APRN as Nurse Practitioner (Cardiology)    Chief complaint melanotic stools    Subjective     Patient is a 79 y.o. male presents with black stools suspicious for GI bleeding. Onset of symptoms was gradual starting a few days ago.  Symptoms are associated with dyspnea on exertion and generalized weakness and fatigue.  Symptoms are aggravated by any activity of daily living that requires exertion.   Symptoms improve with nothing prior to arrival but feeling a little bit better today. Severity moderate.  Context unknown but likely related to the significant number of anticoagulants he is on due to his cardiac disease.  Quality is as described above.    Review of Systems   Pertinent items are noted in HPI    History  Past Medical History:   Diagnosis Date    Abdominal pain, epigastric     Abnormal abdominal exam     ABFND, RADIOLOGICAL, ABDOMINAL AREA     Abnormal ECG     Anticoagulated on Coumadin     Atherosclerosis of coronary artery bypass graft     ATHEROSCLEROSIS, CORONARY, ARTERY BYPASS GRFT    Atrial fibrillation     Cancer of right kidney     right sided kidney cancer    Cardiomyopathy     Cardiomyopathy, primary     CHF (congestive heart failure)     Clotting disorder     Colon polyps     Congenital heart disease     Congestive heart failure     Coronary artery disease     History of CAD/A-Fib/Pacemaker/Defibrillator placement: pt takes Coumadin and plavix therapy as well as ASA daily    Diabetes mellitus     Gastric polyp     Gastroesophageal reflux disease without esophagitis     History of colon polyps      Hypercholesterolemia     Hypertension, essential     Melena     Myocardial infarction     NSAID long-term use     Obesity     Pacemaker     Pacemaker Dependant    Platelet inhibition due to Plavix     Presence of stent in coronary artery     Multiple coronary stenting most recently 4/2008    Single kidney     Only 1 Kidney    Status post surgery     s/p Polypectomy Bleed     Past Surgical History:   Procedure Laterality Date    ABLATION OF DYSRHYTHMIC FOCUS      APPENDECTOMY      CARDIAC ABLATION      CARDIAC CATHETERIZATION      CARDIAC CATHETERIZATION Left 12/15/2021    Procedure: Coronary angiography;  Surgeon: Sarbjit Posadas MD;  Location: [facility] CATH INVASIVE LOCATION;  Service: Cardiology;  Laterality: Left;    CARDIAC CATHETERIZATION Left 12/15/2021    Procedure: Percutaneous Coronary Intervention;  Surgeon: Sarbjit Posadas MD;  Location: [facility] CATH INVASIVE LOCATION;  Service: Cardiology;  Laterality: Left;    CARDIAC DEFIBRILLATOR PLACEMENT      CARDIAC PACEMAKER PLACEMENT      Pacemaker Placement    CAROTID STENT  2001    CATARACT EXTRACTION, BILATERAL      CHOLECYSTECTOMY      COLONOSCOPY  08/27/2013    snare hep, trans tics recall 3 yr    COLONOSCOPY  10/02/2006    few scattered diverticula    COLONOSCOPY  07/22/2009    tubular adenoma in the ascending colon    COLONOSCOPY  07/25/2005    several polyps in the ascending colon, one in the transverse colon    COLONOSCOPY N/A 03/21/2022    Procedure: COLONOSCOPY WITH ANESTHESIA;  Surgeon: Phil Goodwin MD;  Location: [facility] ENDOSCOPY;  Service: Gastroenterology;  Laterality: N/A;  pre: hx polyps  post: polyps  Rolan Salvador MD    COLONOSCOPY W/ POLYPECTOMY  09/19/2016    Tubular adenoma hepatic flexure, 2 Hyperplastic polyps rectum and at 50 cm, Benign polyp at 70 cm repeat exam in 3-5 years    CORONARY ARTERY BYPASS GRAFT  02/2011    2nd time    CORONARY ARTERY BYPASS GRAFT  1990    CORONARY STENT PLACEMENT      ENDOSCOPY  04/14/2016    nl  slant    ENDOSCOPY  11/10/2014    clips at polypectomy bx no residual recall 1 yr    ENDOSCOPY  06/19/2014    snare clip body recall 6 months    ENDOSCOPY  05/19/2014    polyp stomach bx and clip    HERNIA REPAIR      with mesh    ICD GENERATOR REPLACEMENT N/A 2/28/2024    Procedure: ICD Generator Change - Bi-Ventricular (20152) with new LV lead implant (87335);  Surgeon: Devika Oliver MD;  Location:  [facility] CATH INVASIVE LOCATION;  Service: Cardiovascular;  Laterality: N/A;    INTERVENTIONAL RADIOLOGY PROCEDURE Left 1/17/2024    Procedure: Venogram upper extremity left, 89367, 31663;  Surgeon: Devika Oliver MD;  Location:  [facility] CATH INVASIVE LOCATION;  Service: Cardiovascular;  Laterality: Left;    JOINT REPLACEMENT      OTHER SURGICAL HISTORY      Defibrillator/Pacer maker exchange    OTHER SURGICAL HISTORY      Pacemaker/Defibillation exchange 2012    PENILE PROSTHESIS IMPLANT N/A 07/27/2018    Procedure: PENILE PROSTHESIS PLACEMENT;  Surgeon: Godwin Gupta MD;  Location:  [facility] OR;  Service: Urology    PERIPHERAL ARTERIAL STENT GRAFT       Family History   Problem Relation Age of Onset    Lung cancer Mother     Heart attack Mother     No Known Problems Father     Other Neg Hx         GI Malignancies    Colon cancer Neg Hx     Colon polyps Neg Hx      Social History     Tobacco Use    Smoking status: Never    Smokeless tobacco: Never   Vaping Use    Vaping status: Never Used   Substance Use Topics    Alcohol use: Not Currently     Comment: occ    Drug use: Never     E-cigarette/Vaping    E-cigarette/Vaping Use Never User      E-cigarette/Vaping Substances     Medications Prior to Admission   Medication Sig Dispense Refill Last Dose    atorvastatin (LIPITOR) 40 MG tablet Take 1 tablet by mouth Every Night.       cetirizine (zyrTEC) 10 MG tablet Take 1 tablet by mouth Daily As Needed for Allergies.       clopidogrel (PLAVIX) 75 MG tablet Take 1 tablet by mouth Daily.       Cyanocobalamin 1000 MCG/ML kit  Inject 1 mL as directed Every 30 (Thirty) Days. On Fridays till 11/11/23 then monthly thereafter.       empagliflozin (Jardiance) 10 MG tablet tablet Take 1 tablet by mouth Daily. 30 tablet 11     finasteride (PROSCAR) 5 MG tablet Take 1 tablet by mouth Daily.       isosorbide mononitrate (IMDUR) 30 MG 24 hr tablet Take 1 tablet by mouth Daily. 30 tablet 5     losartan (COZAAR) 25 MG tablet Take 1 tablet by mouth Daily.       metFORMIN (GLUCOPHAGE) 500 MG tablet Take 1 tablet by mouth 2 (Two) Times a Day With Meals.       Myrbetriq 50 MG tablet sustained-release 24 hour 24 hr tablet TAKE 1 TABLET BY MOUTH EVERY DAY 30 tablet 11     nitroglycerin (NITROSTAT) 0.4 MG SL tablet Place 1 tablet under the tongue Every 5 (Five) Minutes As Needed for Chest Pain. Take no more than 3 doses in 15 minutes. 25 tablet 2     omeprazole (PriLOSEC) 40 MG capsule Take 1 capsule by mouth Daily.       pregabalin (LYRICA) 150 MG capsule Take 1 capsule by mouth 2 (Two) Times a Day.       ranolazine (Ranexa) 1000 MG 12 hr tablet Take 1 tablet by mouth 2 (Two) Times a Day.       rivaroxaban (XARELTO) 20 MG tablet Take 1 tablet by mouth Daily With Dinner.       Sotalol HCl AF 80 MG tablet Take 1 tablet by mouth 2 (Two) Times a Day. 180 tablet 3     spironolactone (ALDACTONE) 25 MG tablet Take 1 tablet by mouth Daily.       torsemide (DEMADEX) 20 MG tablet Take 1 tablet by mouth Daily As Needed (As needed for weight gain of 2lbs overnight or 4 lbs in a week.).       traMADol-acetaminophen (ULTRACET) 37.5-325 MG per tablet Take 1 tablet by mouth Every 12 (Twelve) Hours As Needed for Moderate Pain (pain).  1     zolpidem (AMBIEN) 5 MG tablet Take 1 tablet by mouth At Night As Needed for Sleep.        Allergies:  Azithromycin and Morphine and related    Objective     Vital Signs  Temp:  [97.4 °F (36.3 °C)-98.1 °F (36.7 °C)] 97.4 °F (36.3 °C)  Heart Rate:  [74-81] 75  Resp:  [16-20] 18  BP: (103-134)/(50-69) 117/62    Physical Exam:    General  Appearance alert, appears stated age, and cooperative  Head normocephalic, without obvious abnormality and atraumatic  Eyes lids and lashes normal, conjunctivae and sclerae normal, and no icterus  Neck supple, trachea midline, no thyromegaly, and no carotid bruit  Lungs clear to auscultation, respirations regular, respirations even, and respirations unlabored  Heart regular rhythm & normal rate, normal S1, S2, and no murmur, no gallop, no rub  Abdomen normal bowel sounds, no masses, soft non-tender, no guarding, and no rebound tenderness  Extremities moves extremities well, no edema, no cyanosis, and no redness  Skin turgor normal  Neurologic Mental Status orientated to person, place, time and situation, Cranial Nerves cranial nerves 2 - 12 grossly intact as examined    Results Review:    I reviewed the patient's new clinical results.    Assessment & Plan       Gastrointestinal bleeding, upper    Coronary artery disease of bypass graft of native heart with stable angina pectoris    Essential hypertension    Presence of biventricular AICD    Permanent atrial fibrillation    Chronic combined systolic and diastolic congestive heart failure    Symptomatic anemia    GI bleed      Given significant melena suspect upper bleeding but with NovaSure.  He is significantly anticoagulated with combination of Xarelto, Brilinta, and clopidogrel.  Seen by GI and no plan for reversal of Xarelto.  Recommending washout given his hemoglobin is now up to 8.2 and proceed with endoscopy on Monday.  In the meantime we will place him on clear liquid diet.  Will ask cardiology to evaluate and weigh in on his anticoagulation both now and following his workup for GI bleed.  Transfuse as necessary.  Complaining of some insomnia and he is on chronic sleep aid.  Have restarted that as well as his home pain medication now that no intervention or anesthesia is planned for today.    I discussed the patients findings and my recommendations with  patient and nursing staff.     Christiano Atkins MD  05/04/24  09:06 CDT    Time:  Greater than 45 minutes       no

## 2024-05-04 NOTE — PLAN OF CARE
Goal Outcome Evaluation:  Plan of Care Reviewed With: patient        Progress: no change  Outcome Evaluation: Pt c/o pain PRN meds given per orders, blood given per orders alexander well, ambulating in room, voiding

## 2024-05-04 NOTE — CONSULTS
[facility] HEART GROUP CONSULT NOTE    Referring Provider: Juan Contreras MD    Reason for Consultation: GI Bleed on Anticoagulation    Chief complaint:   Chief Complaint   Patient presents with    Abnormal Lab         History of present illness:   Mr. Darren Maria is a 79 y.o. male with PMHx CAD - S/P 3V CABG in 1990; redo CABG x 1 03/18/2011, combined CHF, permanent atrial fibrillation, VT - S/P BiV AICD, and HTN who presented to the ER with complaint of dark, tarry stools.     Work-up in the ER, Hgb/Hct 7.2 / 24.6. CT abdomen and pelvis was unremarkable.     Patient admitted to Dr. Salvador, PCP, with GI and cardiology consultations.     History  Past Medical History:   Diagnosis Date    Abdominal pain, epigastric     Abnormal abdominal exam     ABFND, RADIOLOGICAL, ABDOMINAL AREA     Abnormal ECG     Anticoagulated on Coumadin     Atherosclerosis of coronary artery bypass graft     ATHEROSCLEROSIS, CORONARY, ARTERY BYPASS GRFT    Atrial fibrillation     Cancer of right kidney     right sided kidney cancer    Cardiomyopathy     Cardiomyopathy, primary     CHF (congestive heart failure)     Clotting disorder     Colon polyps     Congenital heart disease     Congestive heart failure     Coronary artery disease     History of CAD/A-Fib/Pacemaker/Defibrillator placement: pt takes Coumadin and plavix therapy as well as ASA daily    Diabetes mellitus     Gastric polyp     Gastroesophageal reflux disease without esophagitis     History of colon polyps     Hypercholesterolemia     Hypertension, essential     Melena     Myocardial infarction     NSAID long-term use     Obesity     Pacemaker     Pacemaker Dependant    Platelet inhibition due to Plavix     Presence of stent in coronary artery     Multiple coronary stenting most recently 4/2008    Single kidney     Only 1 Kidney    Status post surgery     s/p Polypectomy Bleed   ,   Past Surgical History:   Procedure Laterality Date    ABLATION OF  DYSRHYTHMIC FOCUS      APPENDECTOMY      CARDIAC ABLATION      CARDIAC CATHETERIZATION      CARDIAC CATHETERIZATION Left 12/15/2021    Procedure: Coronary angiography;  Surgeon: Sarbjit Posadas MD;  Location:  [facility] CATH INVASIVE LOCATION;  Service: Cardiology;  Laterality: Left;    CARDIAC CATHETERIZATION Left 12/15/2021    Procedure: Percutaneous Coronary Intervention;  Surgeon: Sarbjit Posadas MD;  Location:  [facility] CATH INVASIVE LOCATION;  Service: Cardiology;  Laterality: Left;    CARDIAC DEFIBRILLATOR PLACEMENT      CARDIAC PACEMAKER PLACEMENT      Pacemaker Placement    CAROTID STENT  2001    CATARACT EXTRACTION, BILATERAL      CHOLECYSTECTOMY      COLONOSCOPY  08/27/2013    snare hep, trans tics recall 3 yr    COLONOSCOPY  10/02/2006    few scattered diverticula    COLONOSCOPY  07/22/2009    tubular adenoma in the ascending colon    COLONOSCOPY  07/25/2005    several polyps in the ascending colon, one in the transverse colon    COLONOSCOPY N/A 03/21/2022    Procedure: COLONOSCOPY WITH ANESTHESIA;  Surgeon: Phil Goodwin MD;  Location: [facility] ENDOSCOPY;  Service: Gastroenterology;  Laterality: N/A;  pre: hx polyps  post: polyps  Rolan Salvador MD    COLONOSCOPY W/ POLYPECTOMY  09/19/2016    Tubular adenoma hepatic flexure, 2 Hyperplastic polyps rectum and at 50 cm, Benign polyp at 70 cm repeat exam in 3-5 years    CORONARY ARTERY BYPASS GRAFT  02/2011    2nd time    CORONARY ARTERY BYPASS GRAFT  1990    CORONARY STENT PLACEMENT      ENDOSCOPY  04/14/2016    nl slant    ENDOSCOPY  11/10/2014    clips at polypectomy bx no residual recall 1 yr    ENDOSCOPY  06/19/2014    snare clip body recall 6 months    ENDOSCOPY  05/19/2014    polyp stomach bx and clip    HERNIA REPAIR      with mesh    ICD GENERATOR REPLACEMENT N/A 2/28/2024    Procedure: ICD Generator Change - Bi-Ventricular (39234) with new LV lead implant (51940);  Surgeon: Devika Oliver MD;  Location:  [facility] CATH INVASIVE LOCATION;  Service:  Cardiovascular;  Laterality: N/A;    INTERVENTIONAL RADIOLOGY PROCEDURE Left 1/17/2024    Procedure: Venogram upper extremity left, 00135, [id number];  Surgeon: Devika Oliver MD;  Location:  [facility] CATH INVASIVE LOCATION;  Service: Cardiovascular;  Laterality: Left;    JOINT REPLACEMENT      OTHER SURGICAL HISTORY      Defibrillator/Pacer maker exchange    OTHER SURGICAL HISTORY      Pacemaker/Defibillation exchange 2012    PENILE PROSTHESIS IMPLANT N/A 07/27/2018    Procedure: PENILE PROSTHESIS PLACEMENT;  Surgeon: Godwin Gupta MD;  Location:  [facility] OR;  Service: Urology    PERIPHERAL ARTERIAL STENT GRAFT     ,   Family History   Problem Relation Age of Onset    Lung cancer Mother     Heart attack Mother     No Known Problems Father     Other Neg Hx         GI Malignancies    Colon cancer Neg Hx     Colon polyps Neg Hx    ,   Social History     Tobacco Use    Smoking status: Never    Smokeless tobacco: Never   Vaping Use    Vaping status: Never Used   Substance Use Topics    Alcohol use: Not Currently     Comment: occ    Drug use: Never   ,     Medications  Current Facility-Administered Medications   Medication Dose Route Frequency Provider Last Rate Last Admin    acetaminophen (TYLENOL) tablet 650 mg  650 mg Oral Q4H PRN Christiano Atkins MD   650 mg at 05/04/24 0719    atorvastatin (LIPITOR) tablet 40 mg  40 mg Oral Nightly Christiano Atkins MD   40 mg at 05/03/24 2057    empagliflozin (JARDIANCE) tablet 10 mg  10 mg Oral Daily Christiano Atkins MD   10 mg at 05/04/24 0848    isosorbide mononitrate (IMDUR) 24 hr tablet 30 mg  30 mg Oral Q24H Christiano Atkins MD   30 mg at 05/04/24 0848    losartan (COZAAR) tablet 25 mg  25 mg Oral Daily Christiano Atkins MD   25 mg at 05/04/24 0848    nitroglycerin (NITROSTAT) SL tablet 0.4 mg  0.4 mg Sublingual Q5 Min PRN Christiano Atkins MD        pantoprazole (PROTONIX) injection 40 mg  40 mg Intravenous Q12H Everardo Lacey MD   40 mg at 05/04/24  "0848    pregabalin (LYRICA) capsule 150 mg  150 mg Oral BID Christiano Atkins MD   150 mg at 05/04/24 0848    sodium chloride 0.9 % flush 10 mL  10 mL Intravenous PRN Christiano Atkins MD        sodium chloride 0.9 % flush 10 mL  10 mL Intravenous Q12H Christiano Atkins MD   10 mL at 05/04/24 0848    sodium chloride 0.9 % flush 10 mL  10 mL Intravenous PRN Christiano Atkins MD        sodium chloride 0.9 % infusion 40 mL  40 mL Intravenous PRN Christiano Atkins MD        sotalol (BETAPACE) tablet 80 mg  80 mg Oral Q12H Christiano Atkins MD   80 mg at 05/04/24 0848    traMADol-acetaminophen (ULTRACET) 37.5-325 MG per tablet 1 tablet  1 tablet Oral Q12H PRN Christiano Atkins MD        zolpidem (AMBIEN) tablet 5 mg  5 mg Oral Nightly PRN Christiano Atkins MD           Allergies:  Azithromycin and Morphine and related    REVIEW OF SYSTEMS:  Constitutional: Denies fever or chills. Worsening fatigue.  HEENT: Denies headaches or visual disturbances. Denies difficulty swallowing or sore throat.  Respiratory: Denies cough or hoarseness. Short of breath with any activity, increasingly getting worse over the past couple of months.    Cardiovascular: Occasional chest pressure - \"comes and goes.\" Denies palpitations. Denies presyncope/syncope. Denies orthopnea/PND. Denies lower extremity edema.   Gastrointestinal: Denies abdominal pain. Denies nausea/vomiting. Dark, tarry stools for the past few days.    Genitourinary: Denies urinary urgency or frequency. Denies dysuria, hematuria.  Musculoskeletal: Denies pain or swelling in joints.  Neurological: Denies paresthesias. Denies headache. Denies seizure or stroke symptoms.  Behavioral/Psych: Denies problems with anxiety or depression.      All other systems are negative except where stated above.        Objective     Physical Exam:    /62 (BP Location: Left arm, Patient Position: Lying)   Pulse 75   Temp 97.4 °F (36.3 °C) (Oral)   Resp 18   " "Ht 180.3 cm (70.98\")   Wt 86.5 kg (190 lb 12.8 oz)   SpO2 99%   BMI 26.63 kg/m²        General Appearance:  Alert, cooperative, in no acute distress   HEENT:  Normocephalic. Atraumatic. RASHAUN.   Neck:  No adenopathy, supple, trachea midline, no thyromegaly, no carotid bruit, no JVD   Lungs:  Clear to auscultation, respirations regular, even and unlabored    Heart:  Regular rhythm and normal rate, normal S1 and S2, no murmur, no gallop, no rub, no click. Telemetry is v-paced.   Abdomen:  Normal bowel sounds, no masses, no organomegaly, soft, non-tender, non-distended, no guarding, no rebound tenderness   Extremities:  Moves all extremities well, no edema, no cyanosis, no redness   Pulses:  Pulses palpable and equal bilaterally   Skin:  No bleeding, bruising or rash   Lymph nodes:  No palpable adenopathy   Neurologic:  Grossly intact     Results Review:   I reviewed the patient's new clinical results.    Lab Results (last 72 hours)       Procedure Component Value Units Date/Time    POC Glucose Once [[id number]]  (Normal) Collected: 05/04/24 0445    Specimen: Blood Updated: 05/04/24 0456     Glucose 103 mg/dL      Comment: : [id number] Robert BrittaneyMeter ID: [id number]       Basic Metabolic Panel [[id number]]  (Abnormal) Collected: 05/04/24 0338    Specimen: Blood Updated: 05/04/24 0425     Glucose 94 mg/dL      BUN 9 mg/dL      Creatinine 0.69 mg/dL      Sodium 142 mmol/L      Potassium 3.9 mmol/L      Chloride 110 mmol/L      CO2 25.0 mmol/L      Calcium 8.6 mg/dL      BUN/Creatinine Ratio 13.0     Anion Gap 7.0 mmol/L      eGFR 95.3 mL/min/1.73     Narrative:      GFR Normal >60  Chronic Kidney Disease <60  Kidney Failure <15    The GFR formula is only valid for adults with stable renal function between ages 18 and 70.    CBC & Differential [[id number]]  (Abnormal) Collected: 05/04/24 0338    Specimen: Blood Updated: 05/04/24 0359    Narrative:      The following orders were created for panel order CBC & " Differential.  Procedure                               Abnormality         Status                     ---------                               -----------         ------                     CBC Auto Differential[[id number]]        Abnormal            Final result                 Please view results for these tests on the individual orders.    CBC Auto Differential [[id number]]  (Abnormal) Collected: 05/04/24 0338    Specimen: Blood Updated: 05/04/24 0359     WBC 4.97 10*3/mm3      RBC 3.19 10*6/mm3      Hemoglobin 8.2 g/dL      Hematocrit 27.1 %      MCV 85.0 fL      MCH 25.7 pg      MCHC 30.3 g/dL      RDW 21.2 %      RDW-SD 63.6 fl      MPV 8.7 fL      Platelets 327 10*3/mm3      Neutrophil % 68.8 %      Lymphocyte % 13.3 %      Monocyte % 12.7 %      Eosinophil % 2.8 %      Basophil % 1.0 %      Immature Grans % 1.4 %      Neutrophils, Absolute 3.42 10*3/mm3      Lymphocytes, Absolute 0.66 10*3/mm3      Monocytes, Absolute 0.63 10*3/mm3      Eosinophils, Absolute 0.14 10*3/mm3      Basophils, Absolute 0.05 10*3/mm3      Immature Grans, Absolute 0.07 10*3/mm3      nRBC 0.0 /100 WBC     POC Glucose Once [[id number]]  (Normal) Collected: 05/03/24 2113    Specimen: Blood Updated: 05/03/24 2125     Glucose 89 mg/dL      Comment: : [id number] Robert BrittaneyMeter ID: [id number]       POC Occult Blood Stool [[id number]]  (Abnormal) Resulted: 05/03/24 1623    Specimen: Stool Updated: 05/03/24 1624     Fecal Occult Blood Positive     Lot Number [id number]     Expiration Date 07/31/2024     DEVELOPER LOT NUMBER [id number]     DEVELOPER EXPIRATION DATE 07/31/2024     Positive Control Positive     Negative Control Negative    CBC & Differential [[id number]]  (Abnormal) Collected: 05/03/24 1357    Specimen: Blood Updated: 05/03/24 1452    Narrative:      The following orders were created for panel order CBC & Differential.  Procedure                               Abnormality         Status                     ---------                                -----------         ------                     CBC Auto Differential[[id number]]        Abnormal            Final result                 Please view results for these tests on the individual orders.    CBC Auto Differential [[id number]]  (Abnormal) Collected: 05/03/24 1357    Specimen: Blood Updated: 05/03/24 1452     WBC 6.60 10*3/mm3      RBC 2.84 10*6/mm3      Hemoglobin 7.2 g/dL      Hematocrit 24.6 %      MCV 86.6 fL      MCH 25.4 pg      MCHC 29.3 g/dL      RDW 21.7 %      RDW-SD 66.4 fl      MPV 9.1 fL      Platelets 384 10*3/mm3      Neutrophil % 75.2 %      Lymphocyte % 10.3 %      Monocyte % 11.2 %      Eosinophil % 1.4 %      Basophil % 0.5 %      Immature Grans % 1.4 %      Neutrophils, Absolute 4.97 10*3/mm3      Lymphocytes, Absolute 0.68 10*3/mm3      Monocytes, Absolute 0.74 10*3/mm3      Eosinophils, Absolute 0.09 10*3/mm3      Basophils, Absolute 0.03 10*3/mm3      Immature Grans, Absolute 0.09 10*3/mm3      nRBC 0.0 /100 WBC     Comprehensive Metabolic Panel [[id number]]  (Abnormal) Collected: 05/03/24 1357    Specimen: Blood Updated: 05/03/24 1436     Glucose 121 mg/dL      BUN 10 mg/dL      Creatinine 0.80 mg/dL      Sodium 144 mmol/L      Potassium 4.2 mmol/L      Comment: Slight hemolysis detected by analyzer. Result may be falsely elevated.        Chloride 108 mmol/L      CO2 25.0 mmol/L      Calcium 9.0 mg/dL      Total Protein 6.3 g/dL      Albumin 4.0 g/dL      ALT (SGPT) 8 U/L      AST (SGOT) 16 U/L      Alkaline Phosphatase 53 U/L      Total Bilirubin 0.4 mg/dL      Globulin 2.3 gm/dL      A/G Ratio 1.7 g/dL      BUN/Creatinine Ratio 12.5     Anion Gap 11.0 mmol/L      eGFR 91.2 mL/min/1.73     Narrative:      GFR Normal >60  Chronic Kidney Disease <60  Kidney Failure <15    The GFR formula is only valid for adults with stable renal function between ages 18 and 70.    Protime-INR [[id number]]  (Abnormal) Collected: 05/03/24 1357    Specimen: Blood Updated: 05/03/24 1429     Protime  23.0 Seconds      INR 1.95    [place] Draw [[id number]] Collected: 05/03/24 1357    Specimen: Blood Updated: 05/03/24 1415    Narrative:      The following orders were created for panel order [place] Draw.  Procedure                               Abnormality         Status                     ---------                               -----------         ------                     Green Top (Gel)[[id number]]                                  Final result               Lavender Top[[id number]]                                     Final result               Red Top[[id number]]                                          Final result               Marrufo Top[[id number]]                                         Final result               Light Blue Top[[id number]]                                   Final result                 Please view results for these tests on the individual orders.    Green Top (Gel) [[id number]] Collected: 05/03/24 1357    Specimen: Blood Updated: 05/03/24 1415     Extra Tube Hold for add-ons.     Comment: Auto resulted.       Lavender Top [[id number]] Collected: 05/03/24 1357    Specimen: Blood Updated: 05/03/24 1415     Extra Tube hold for add-on     Comment: Auto resulted       Red Top [[id number]] Collected: 05/03/24 1357    Specimen: Blood Updated: 05/03/24 1415     Extra Tube Hold for add-ons.     Comment: Auto resulted.       Marrufo Top [[id number]] Collected: 05/03/24 1357    Specimen: Blood Updated: 05/03/24 1415     Extra Tube Hold for add-ons.     Comment: Auto resulted.       Light Blue Top [[id number]] Collected: 05/03/24 1357    Specimen: Blood Updated: 05/03/24 1415     Extra Tube Hold for add-ons.     Comment: Auto resulted                 Imaging Results (Last 72 Hours)       Procedure Component Value Units Date/Time    CT Abdomen Pelvis With Contrast [[id number]] Collected: 05/03/24 1525     Updated: 05/03/24 1533    Narrative:      CT ABDOMEN PELVIS W CONTRAST- 5/3/2024 1:52 PM     HISTORY: GI bleed        COMPARISON: CT scan dated 1/20/2023.     DOSE LENGTH PRODUCT: 447.7 mGy.cm. Automated exposure control was also  utilized to decrease patient radiation dose.     TECHNIQUE: Following the intravenous administration of contrast, helical  CT tomographic images of the abdomen and pelvis were acquired.  Multiplanar reformatted images were provided for review.     FINDINGS:  LOWER CHEST: Four-chamber cardiomegaly. Biventricular pacer wires. Lung  bases are clear.     LIVER: No suspicious liver lesion. The portal veins are patent.     BILIARY SYSTEM: The gallbladder has been removed. There is no evidence  of biliary ductal dilation.     PANCREAS: Diffuse atrophy. No focal lesion.     SPLEEN: Unremarkable.     KIDNEYS AND ADRENALS: Adrenal glands are unremarkable. Prior right  nephrectomy. Left kidney is unremarkable.     RETROPERITONEUM: No mass, lymphadenopathy or hemorrhage.     GI TRACT: Stomach is completely decompressed. No abnormal wall  thickening identified. The small bowel loops are nondilated. Mild  colonic stool. No inflammatory changes along the small or large bowel.     OTHER: There is no mesenteric mass, lymphadenopathy, free fluid or free  air. The abdominal vascular structures are patent. Prior ventral hernia  repair with underlying chronic postoperative seroma. No acute bony  abnormality.     PELVIS: Penile prosthesis. Urinary bladder is unremarkable. No pelvic  adenopathy.       Impression:         1.   No acute and/or inflammatory process in the abdomen or pelvis.  2.  In particular, there is no acute and/or inflammatory process  identified along the bowel.           This report was signed and finalized on 5/3/2024 3:30 PM by Dr Olvin Walker.                     Assessment & Plan       Symptomatic anemia with suspected Gastrointestinal bleeding, upper - GI consulted    Permanent atrial fibrillation - currently V-paced. On sotalol 80 mg BID. Anticoagulated with Xarelto 20 mg nightly (currently on hold  with GI bleed)    Coronary artery disease of bypass graft of native heart with stable angina pectoris - patient describes chronic chest pressure that comes and goes. Hx CABG with redo via left thoracotomy, multiple PCI/LEILANI. He is on Imdur 30 mg daily, Plavix 75 mg daily (currently on hold with GI bleed), losartan 25 mg daily, sotalol 80 mg BID, and atorvastatin 80 mg nightly.     Essential hypertension - controlled    Chronic combined systolic and diastolic congestive heart failure - S/P BiV AICD. Patient denies any ICD firings. On BB and ARB, Jardiance 10 mg daily. Patient appears to be euvolemic. Will watch closely, especially if he requires blood transfusion - recommend IV diuretic.     Presence of biventricular AICD - patient denies any ICD firings.            Agree with holding Xarelto and Plavix. Await GI evaluation and recommendations. Patient's last coronary intervention was in 2011, therefore, could discontinue Plavix. If no significant bleeding source, would recommend resumption of Xarelto. If recurrent bleeding, patient would likely need Watchman Device.     Further orders per Dr. Chace Brown.     Thank you for asking us to follow this patient with you. Please note that this documentation resulted in a face-to-face encounter provided by me.         EVELIA Tavera

## 2024-05-04 NOTE — CONSULTS
[facility] Gastroenterology  Inpatient Consult Note  Today's date:  05/04/24    Darren Maria  1945       Referring Provider: Juan Contreras MD  Primary Physician: Rolan Salvador MD   Primary Gastroenterologist: Dr. Traylor    Date of Admission: 5/3/2024  Date of Service:  05/04/24    Reason for Consultation/Chief Complaint: Black stools, acute on chronic anemia    History of present illness: 79-year-old patient with chronic anemia on Xarelto who has had black stools for greater than 1 to 2 months.  He presented with weakness with a hemoglobin of 7.2 which was now 8.2.  He usually runs 8-9 per chart review and has been anemic for greater than 1 year per chart review.  He has been on Xarelto up until yesterday and has an INR of 1.95.  He denies any further melena and denies hematemesis or hematochezia.  He has had intermittent nausea over the past several months as well and apparently has brought this to his physicians attention.  He did have a colonoscopy in March 2022 with removal of 5 polyps and had an upper endoscopy report dated April 2016 suggesting gastritis.  He denies abdominal pain, dysphagia, odynophagia, involuntary weight loss, diarrhea or constipation.    Past Medical History:   Diagnosis Date    Abdominal pain, epigastric     Abnormal abdominal exam     ABFND, RADIOLOGICAL, ABDOMINAL AREA     Abnormal ECG     Anticoagulated on Coumadin     Atherosclerosis of coronary artery bypass graft     ATHEROSCLEROSIS, CORONARY, ARTERY BYPASS GRFT    Atrial fibrillation     Cancer of right kidney     right sided kidney cancer    Cardiomyopathy     Cardiomyopathy, primary     CHF (congestive heart failure)     Clotting disorder     Colon polyps     Congenital heart disease     Congestive heart failure     Coronary artery disease     History of CAD/A-Fib/Pacemaker/Defibrillator placement: pt takes Coumadin and plavix therapy as well as ASA daily    Diabetes mellitus     Gastric polyp      Gastroesophageal reflux disease without esophagitis     History of colon polyps     Hypercholesterolemia     Hypertension, essential     Melena     Myocardial infarction     NSAID long-term use     Obesity     Pacemaker     Pacemaker Dependant    Platelet inhibition due to Plavix     Presence of stent in coronary artery     Multiple coronary stenting most recently 4/2008    Single kidney     Only 1 Kidney    Status post surgery     s/p Polypectomy Bleed       Past Surgical History:   Procedure Laterality Date    ABLATION OF DYSRHYTHMIC FOCUS      APPENDECTOMY      CARDIAC ABLATION      CARDIAC CATHETERIZATION      CARDIAC CATHETERIZATION Left 12/15/2021    Procedure: Coronary angiography;  Surgeon: Sarbjit Posadas MD;  Location: [facility] CATH INVASIVE LOCATION;  Service: Cardiology;  Laterality: Left;    CARDIAC CATHETERIZATION Left 12/15/2021    Procedure: Percutaneous Coronary Intervention;  Surgeon: Sarbjit Posadas MD;  Location: [facility] CATH INVASIVE LOCATION;  Service: Cardiology;  Laterality: Left;    CARDIAC DEFIBRILLATOR PLACEMENT      CARDIAC PACEMAKER PLACEMENT      Pacemaker Placement    CAROTID STENT  2001    CATARACT EXTRACTION, BILATERAL      CHOLECYSTECTOMY      COLONOSCOPY  08/27/2013    snare hep, trans tics recall 3 yr    COLONOSCOPY  10/02/2006    few scattered diverticula    COLONOSCOPY  07/22/2009    tubular adenoma in the ascending colon    COLONOSCOPY  07/25/2005    several polyps in the ascending colon, one in the transverse colon    COLONOSCOPY N/A 03/21/2022    Procedure: COLONOSCOPY WITH ANESTHESIA;  Surgeon: Phil Goodwin MD;  Location: [facility] ENDOSCOPY;  Service: Gastroenterology;  Laterality: N/A;  pre: hx polyps  post: polyps  Rolan Salvador MD    COLONOSCOPY W/ POLYPECTOMY  09/19/2016    Tubular adenoma hepatic flexure, 2 Hyperplastic polyps rectum and at 50 cm, Benign polyp at 70 cm repeat exam in 3-5 years    CORONARY ARTERY BYPASS GRAFT  02/2011    2nd time    CORONARY  "ARTERY BYPASS GRAFT  1990    CORONARY STENT PLACEMENT      ENDOSCOPY  04/14/2016    nl slant    ENDOSCOPY  11/10/2014    clips at polypectomy bx no residual recall 1 yr    ENDOSCOPY  06/19/2014    snare clip body recall 6 months    ENDOSCOPY  05/19/2014    polyp stomach bx and clip    HERNIA REPAIR      with mesh    ICD GENERATOR REPLACEMENT N/A 2/28/2024    Procedure: ICD Generator Change - Bi-Ventricular (31687) with new LV lead implant (39134);  Surgeon: Devika Oliver MD;  Location:  [facility] CATH INVASIVE LOCATION;  Service: Cardiovascular;  Laterality: N/A;    INTERVENTIONAL RADIOLOGY PROCEDURE Left 1/17/2024    Procedure: Venogram upper extremity left, 35103, 37579;  Surgeon: Devika Oliver MD;  Location:  [facility] CATH INVASIVE LOCATION;  Service: Cardiovascular;  Laterality: Left;    JOINT REPLACEMENT      OTHER SURGICAL HISTORY      Defibrillator/Pacer maker exchange    OTHER SURGICAL HISTORY      Pacemaker/Defibillation exchange 2012    PENILE PROSTHESIS IMPLANT N/A 07/27/2018    Procedure: PENILE PROSTHESIS PLACEMENT;  Surgeon: Godwin Gupta MD;  Location:  [facility] OR;  Service: Urology    PERIPHERAL ARTERIAL STENT GRAFT          Allergies   Allergen Reactions    Azithromycin Nausea And Vomiting    Morphine And Related Other (See Comments)     SHAKES AND MAKES HIM \"WIRED\".   TAKES TRAMADOL WITHOUT PROBLEM       Medications Prior to Admission   Medication Sig Dispense Refill Last Dose    atorvastatin (LIPITOR) 40 MG tablet Take 1 tablet by mouth Every Night.       cetirizine (zyrTEC) 10 MG tablet Take 1 tablet by mouth Daily As Needed for Allergies.       clopidogrel (PLAVIX) 75 MG tablet Take 1 tablet by mouth Daily.       Cyanocobalamin 1000 MCG/ML kit Inject 1 mL as directed Every 30 (Thirty) Days. On Fridays till 11/11/23 then monthly thereafter.       empagliflozin (Jardiance) 10 MG tablet tablet Take 1 tablet by mouth Daily. 30 tablet 11     finasteride (PROSCAR) 5 MG tablet Take 1 tablet by mouth " Daily.       isosorbide mononitrate (IMDUR) 30 MG 24 hr tablet Take 1 tablet by mouth Daily. 30 tablet 5     losartan (COZAAR) 25 MG tablet Take 1 tablet by mouth Daily.       metFORMIN (GLUCOPHAGE) 500 MG tablet Take 1 tablet by mouth 2 (Two) Times a Day With Meals.       Myrbetriq 50 MG tablet sustained-release 24 hour 24 hr tablet TAKE 1 TABLET BY MOUTH EVERY DAY 30 tablet 11     nitroglycerin (NITROSTAT) 0.4 MG SL tablet Place 1 tablet under the tongue Every 5 (Five) Minutes As Needed for Chest Pain. Take no more than 3 doses in 15 minutes. 25 tablet 2     omeprazole (PriLOSEC) 40 MG capsule Take 1 capsule by mouth Daily.       pregabalin (LYRICA) 150 MG capsule Take 1 capsule by mouth 2 (Two) Times a Day.       ranolazine (Ranexa) 1000 MG 12 hr tablet Take 1 tablet by mouth 2 (Two) Times a Day.       rivaroxaban (XARELTO) 20 MG tablet Take 1 tablet by mouth Daily With Dinner.       Sotalol HCl AF 80 MG tablet Take 1 tablet by mouth 2 (Two) Times a Day. 180 tablet 3     spironolactone (ALDACTONE) 25 MG tablet Take 1 tablet by mouth Daily.       torsemide (DEMADEX) 20 MG tablet Take 1 tablet by mouth Daily As Needed (As needed for weight gain of 2lbs overnight or 4 lbs in a week.).       traMADol-acetaminophen (ULTRACET) 37.5-325 MG per tablet Take 1 tablet by mouth Every 12 (Twelve) Hours As Needed for Moderate Pain (pain).  1     zolpidem (AMBIEN) 5 MG tablet Take 1 tablet by mouth At Night As Needed for Sleep.          Hospital Medications (active)         Dose Frequency Start End    acetaminophen (TYLENOL) tablet 650 mg 650 mg Every 4 Hours PRN 5/3/2024 --    Admin Instructions: If given for fever, use fever parameter: fever greater than 100.4 °F  Based on patient request - if ordered for moderate or severe pain, provider allows for administration of a medication prescribed for a lower pain scale.    Do not exceed 4 grams of acetaminophen in a 24 hr period. Max dose of 2gm for AST/ALT greater than 120  units/L.    If given for pain, use the following pain scale:   Mild Pain = Pain Score of 1-3, CPOT 1-2  Moderate Pain = Pain Score of 4-6, CPOT 3-4  Severe Pain = Pain Score of 7-10, CPOT 5-8    Route: Oral    atorvastatin (LIPITOR) tablet 40 mg 40 mg Nightly 5/3/2024 --    Admin Instructions: Avoid grapefruit juice.    Route: Oral    empagliflozin (JARDIANCE) tablet 10 mg 10 mg Daily 5/3/2024 --    Route: Oral    isosorbide mononitrate (IMDUR) 24 hr tablet 30 mg 30 mg Every 24 Hours Scheduled 5/3/2024 --    Admin Instructions: Do not crush or chew the capsules or tablets. The drug may not work as designed if the capsule or tablet is crushed or chewed. Swallow whole.    Route: Oral    losartan (COZAAR) tablet 25 mg 25 mg Daily 5/3/2024 --    Admin Instructions: Hold for SBP less than 100, DBP less than 60.    Route: Oral    nitroglycerin (NITROSTAT) SL tablet 0.4 mg 0.4 mg Every 5 Minutes PRN 5/3/2024 --    Admin Instructions: If Pain Unrelieved After 3 Doses Notify MD  May administer up to 3 doses per episode.    Route: Sublingual    pantoprazole (PROTONIX) injection 40 mg 40 mg Every 12 Hours Scheduled 5/3/2024 --    Admin Instructions: Dilute with 10 mL of 0.9% NaCl and give IV push over 2 minutes.    Route: Intravenous    pregabalin (LYRICA) capsule 150 mg 150 mg 2 Times Daily 5/3/2024 --    Admin Instructions: Unknown    Route: Oral    sodium chloride 0.9 % flush 10 mL 10 mL As Needed 5/3/2024 --    Route: Intravenous    Cosign for Ordering: Required by Juan Contreras MD    sodium chloride 0.9 % flush 10 mL 10 mL Every 12 Hours Scheduled 5/3/2024 --    Route: Intravenous    sodium chloride 0.9 % flush 10 mL 10 mL As Needed 5/3/2024 --    Route: Intravenous    sodium chloride 0.9 % infusion 40 mL 40 mL As Needed 5/3/2024 --    Admin Instructions: Following administration of an IV intermittent medication, flush line with 40mL NS at 100mL/hr.    Route: Intravenous    sotalol (BETAPACE) tablet 80 mg 80 mg Every  12 Hours Scheduled 5/3/2024 --    Admin Instructions: Per Black Box Warning, RN will release PRN EKG order for first 5 Sotalol doses for new orders or dose changes    Route: Oral            Social History     Tobacco Use    Smoking status: Never    Smokeless tobacco: Never   Substance Use Topics    Alcohol use: Not Currently     Comment: occ        Past Family History:  Family History   Problem Relation Age of Onset    Lung cancer Mother     Heart attack Mother     No Known Problems Father     Other Neg Hx         GI Malignancies    Colon cancer Neg Hx     Colon polyps Neg Hx        Review of Systems:  Constitutional: No unexpected weight change, no fatigue, no unexplained fever, no sweats or chills.   HEENT: No icteric sclera.  No hearing or visual deficits.  No sore throat.  No chronic nasal discharge.  Pulmonary: No chronic cough.  No hemoptysis.  No shortness of breath.  Cardiovascular: No chest pain.  No palpitations.  No shortness of breath.  Gastrointestinal: As above.  Musculoskeletal/extremities: No peripheral edema.  No cyanosis.  No claudications.  No back pain.  Genitourinary: No dysuria.  No blood in stool.  No urethral discharges.  Neurologic: No seizures.  No headaches.  No dizziness.  No gait problems.  Skin: No rash.  No icterus.  Mental: No psychosis.  No confusions.  No hallucinations.      Physical Exam:  Temp:  [97.4 °F (36.3 °C)-98.1 °F (36.7 °C)] 97.4 °F (36.3 °C)  Heart Rate:  [74-81] 75  Resp:  [16-20] 18  BP: (103-134)/(50-69) 117/62  Body mass index is 26.63 kg/m².    Intake/Output Summary (Last 24 hours) at 5/4/2024 0842  Last data filed at 5/4/2024 0300  Gross per 24 hour   Intake 561.25 ml   Output 400 ml   Net 161.25 ml     No intake/output data recorded.    General appearance:   HEENT: Nonicteric sclerae.  Moist oral mucosa.  PERRLA.  EOMI.  Clear pharynx.  Lungs: Clear to auscultation bilaterally.  No wheezing, rales or rhonchi.  Heart: Regular rate and rhythm.  Normal S1 and S2, no  S3, S4 or murmur.  Abdomen: Soft, nondistended, nontender to palpation, with normoactive bowel sounds, no hepatosplenomegaly, no palpable masses.  Extremities: No cyanosis, edema or pulse deficits.  Skin: No rash or jaundice.    Neuro: Alert and oriented to person place and time.  No focal motor deficits.    Results Review:  Lab Results (last 24 hours)       Procedure Component Value Units Date/Time    POC Glucose Once [[id number]]  (Normal) Collected: 05/04/24 0445    Specimen: Blood Updated: 05/04/24 0456     Glucose 103 mg/dL      Comment: : [id number] Robert BrittaneyMeter ID: [id number]       Basic Metabolic Panel [[id number]]  (Abnormal) Collected: 05/04/24 0338    Specimen: Blood Updated: 05/04/24 0425     Glucose 94 mg/dL      BUN 9 mg/dL      Creatinine 0.69 mg/dL      Sodium 142 mmol/L      Potassium 3.9 mmol/L      Chloride 110 mmol/L      CO2 25.0 mmol/L      Calcium 8.6 mg/dL      BUN/Creatinine Ratio 13.0     Anion Gap 7.0 mmol/L      eGFR 95.3 mL/min/1.73     Narrative:      GFR Normal >60  Chronic Kidney Disease <60  Kidney Failure <15    The GFR formula is only valid for adults with stable renal function between ages 18 and 70.    CBC & Differential [[id number]]  (Abnormal) Collected: 05/04/24 0338    Specimen: Blood Updated: 05/04/24 0359    Narrative:      The following orders were created for panel order CBC & Differential.  Procedure                               Abnormality         Status                     ---------                               -----------         ------                     CBC Auto Differential[[id number]]        Abnormal            Final result                 Please view results for these tests on the individual orders.    CBC Auto Differential [[id number]]  (Abnormal) Collected: 05/04/24 0338    Specimen: Blood Updated: 05/04/24 0359     WBC 4.97 10*3/mm3      RBC 3.19 10*6/mm3      Hemoglobin 8.2 g/dL      Hematocrit 27.1 %      MCV 85.0 fL      MCH 25.7 pg      MCHC 30.3  g/dL      RDW 21.2 %      RDW-SD 63.6 fl      MPV 8.7 fL      Platelets 327 10*3/mm3      Neutrophil % 68.8 %      Lymphocyte % 13.3 %      Monocyte % 12.7 %      Eosinophil % 2.8 %      Basophil % 1.0 %      Immature Grans % 1.4 %      Neutrophils, Absolute 3.42 10*3/mm3      Lymphocytes, Absolute 0.66 10*3/mm3      Monocytes, Absolute 0.63 10*3/mm3      Eosinophils, Absolute 0.14 10*3/mm3      Basophils, Absolute 0.05 10*3/mm3      Immature Grans, Absolute 0.07 10*3/mm3      nRBC 0.0 /100 WBC     POC Glucose Once [[id number]]  (Normal) Collected: 05/03/24 2113    Specimen: Blood Updated: 05/03/24 2125     Glucose 89 mg/dL      Comment: : [id number] Robert WorkmaneyMeter ID: [id number]       POC Occult Blood Stool [[id number]]  (Abnormal) Resulted: 05/03/24 1623    Specimen: Stool Updated: 05/03/24 1624     Fecal Occult Blood Positive     Lot Number [id number]     Expiration Date 07/31/2024     DEVELOPER LOT NUMBER [id number]     DEVELOPER EXPIRATION DATE 07/31/2024     Positive Control Positive     Negative Control Negative    CBC & Differential [[id number]]  (Abnormal) Collected: 05/03/24 1357    Specimen: Blood Updated: 05/03/24 1452    Narrative:      The following orders were created for panel order CBC & Differential.  Procedure                               Abnormality         Status                     ---------                               -----------         ------                     CBC Auto Differential[[id number]]        Abnormal            Final result                 Please view results for these tests on the individual orders.    CBC Auto Differential [[id number]]  (Abnormal) Collected: 05/03/24 1357    Specimen: Blood Updated: 05/03/24 1452     WBC 6.60 10*3/mm3      RBC 2.84 10*6/mm3      Hemoglobin 7.2 g/dL      Hematocrit 24.6 %      MCV 86.6 fL      MCH 25.4 pg      MCHC 29.3 g/dL      RDW 21.7 %      RDW-SD 66.4 fl      MPV 9.1 fL      Platelets 384 10*3/mm3      Neutrophil % 75.2 %      Lymphocyte % 10.3 %       Monocyte % 11.2 %      Eosinophil % 1.4 %      Basophil % 0.5 %      Immature Grans % 1.4 %      Neutrophils, Absolute 4.97 10*3/mm3      Lymphocytes, Absolute 0.68 10*3/mm3      Monocytes, Absolute 0.74 10*3/mm3      Eosinophils, Absolute 0.09 10*3/mm3      Basophils, Absolute 0.03 10*3/mm3      Immature Grans, Absolute 0.09 10*3/mm3      nRBC 0.0 /100 WBC     Comprehensive Metabolic Panel [[id number]]  (Abnormal) Collected: 05/03/24 1357    Specimen: Blood Updated: 05/03/24 1436     Glucose 121 mg/dL      BUN 10 mg/dL      Creatinine 0.80 mg/dL      Sodium 144 mmol/L      Potassium 4.2 mmol/L      Comment: Slight hemolysis detected by analyzer. Result may be falsely elevated.        Chloride 108 mmol/L      CO2 25.0 mmol/L      Calcium 9.0 mg/dL      Total Protein 6.3 g/dL      Albumin 4.0 g/dL      ALT (SGPT) 8 U/L      AST (SGOT) 16 U/L      Alkaline Phosphatase 53 U/L      Total Bilirubin 0.4 mg/dL      Globulin 2.3 gm/dL      A/G Ratio 1.7 g/dL      BUN/Creatinine Ratio 12.5     Anion Gap 11.0 mmol/L      eGFR 91.2 mL/min/1.73     Narrative:      GFR Normal >60  Chronic Kidney Disease <60  Kidney Failure <15    The GFR formula is only valid for adults with stable renal function between ages 18 and 70.    Protime-INR [[id number]]  (Abnormal) Collected: 05/03/24 1357    Specimen: Blood Updated: 05/03/24 1429     Protime 23.0 Seconds      INR 1.95    [place] Draw [[id number]] Collected: 05/03/24 1357    Specimen: Blood Updated: 05/03/24 1415    Narrative:      The following orders were created for panel order [place] Draw.  Procedure                               Abnormality         Status                     ---------                               -----------         ------                     Green Top (Gel)[[id number]]                                  Final result               Lavender Top[[id number]]                                     Final result               Red Top[[id number]]                                           Final result               Marrufo Top[[id number]]                                         Final result               Light Blue Top[[id number]]                                   Final result                 Please view results for these tests on the individual orders.    Green Top (Gel) [[id number]] Collected: 05/03/24 1357    Specimen: Blood Updated: 05/03/24 1415     Extra Tube Hold for add-ons.     Comment: Auto resulted.       Lavender Top [[id number]] Collected: 05/03/24 1357    Specimen: Blood Updated: 05/03/24 1415     Extra Tube hold for add-on     Comment: Auto resulted       Red Top [[id number]] Collected: 05/03/24 1357    Specimen: Blood Updated: 05/03/24 1415     Extra Tube Hold for add-ons.     Comment: Auto resulted.       Marrufo Top [[id number]] Collected: 05/03/24 1357    Specimen: Blood Updated: 05/03/24 1415     Extra Tube Hold for add-ons.     Comment: Auto resulted.       Light Blue Top [[id number]] Collected: 05/03/24 1357    Specimen: Blood Updated: 05/03/24 1415     Extra Tube Hold for add-ons.     Comment: Auto resulted               Radiology Review:  Imaging Results (Last 72 Hours)       Procedure Component Value Units Date/Time    CT Abdomen Pelvis With Contrast [[id number]] Collected: 05/03/24 1525     Updated: 05/03/24 1533    Narrative:      CT ABDOMEN PELVIS W CONTRAST- 5/3/2024 1:52 PM     HISTORY: GI bleed       COMPARISON: CT scan dated 1/20/2023.     DOSE LENGTH PRODUCT: 447.7 mGy.cm. Automated exposure control was also  utilized to decrease patient radiation dose.     TECHNIQUE: Following the intravenous administration of contrast, helical  CT tomographic images of the abdomen and pelvis were acquired.  Multiplanar reformatted images were provided for review.     FINDINGS:  LOWER CHEST: Four-chamber cardiomegaly. Biventricular pacer wires. Lung  bases are clear.     LIVER: No suspicious liver lesion. The portal veins are patent.     BILIARY SYSTEM: The gallbladder has been  removed. There is no evidence  of biliary ductal dilation.     PANCREAS: Diffuse atrophy. No focal lesion.     SPLEEN: Unremarkable.     KIDNEYS AND ADRENALS: Adrenal glands are unremarkable. Prior right  nephrectomy. Left kidney is unremarkable.     RETROPERITONEUM: No mass, lymphadenopathy or hemorrhage.     GI TRACT: Stomach is completely decompressed. No abnormal wall  thickening identified. The small bowel loops are nondilated. Mild  colonic stool. No inflammatory changes along the small or large bowel.     OTHER: There is no mesenteric mass, lymphadenopathy, free fluid or free  air. The abdominal vascular structures are patent. Prior ventral hernia  repair with underlying chronic postoperative seroma. No acute bony  abnormality.     PELVIS: Penile prosthesis. Urinary bladder is unremarkable. No pelvic  adenopathy.       Impression:         1.   No acute and/or inflammatory process in the abdomen or pelvis.  2.  In particular, there is no acute and/or inflammatory process  identified along the bowel.           This report was signed and finalized on 5/3/2024 3:30 PM by Dr Olvin Walker.               Impression/Plan:    Gastrointestinal bleeding, upper    Coronary artery disease of bypass graft of native heart with stable angina pectoris    Essential hypertension    Presence of biventricular AICD    Permanent atrial fibrillation    Chronic combined systolic and diastolic congestive heart failure    Symptomatic anemia    GI bleed      Chronic black stools and chronic anemia, rule out peptic ulcer disease, rule out upper GI bleeding, on Xarelto, elevated INR.  Chronic intermittent nausea.  The patient will need an upper endoscopy which we will tentatively schedule for Monday.  Agree with holding Xarelto if approved by the prescribing physician.  Blood transfusions as needed and serial CBCs per the primary/referring service.  We agree with proton pump inhibitor twice daily.  The inpatient GI service remains  available should an urgent endoscopic intervention be necessary.  The patient can have clear liquids and advance to full liquids as tolerated per the primary/referring service.  Further recommendations will be made based on the results of the above studies and the patient's clinical course and response to therapy.  Would recommend avoiding aspirin and nonsteroidal anti-inflammatory drugs if possible.  The above was discussed in detail with the patient and he appeared to understand and agreed.  Thank you    Everardo Lacey MD  05/04/24   08:42 CDT    DISCLAIMER:    This physician works through a [employer] as an inpatient consultant gastroenterologist only and has no outpatient clinic for patient follow up.  Any results not available at time of inpatient discharge and/or GI clinic follow up should be managed by the hospitalist team, PCP, or outpatient gastroenterologist.

## 2024-05-04 NOTE — PLAN OF CARE
Goal Outcome Evaluation:  Plan of Care Reviewed With: patient        Progress: no change  Outcome Evaluation: Pt c/o headache x2 this shift, medicated with PRN tylenol; c/o nausea x1, PRN zofran given; tolerating clears; up ad jade; voiding; still no BM; planning EGD on Monday; safety maintained.

## 2024-05-05 LAB
ANION GAP SERPL CALCULATED.3IONS-SCNC: 8 MMOL/L (ref 5–15)
APTT PPP: 34.8 SECONDS (ref 24.5–36)
BASOPHILS # BLD AUTO: 0.03 10*3/MM3 (ref 0–0.2)
BASOPHILS NFR BLD AUTO: 0.6 % (ref 0–1.5)
BH BB BLOOD EXPIRATION DATE: NORMAL
BH BB BLOOD EXPIRATION DATE: NORMAL
BH BB BLOOD TYPE BARCODE: 6200
BH BB BLOOD TYPE BARCODE: 6200
BH BB DISPENSE STATUS: NORMAL
BH BB DISPENSE STATUS: NORMAL
BH BB PRODUCT CODE: NORMAL
BH BB PRODUCT CODE: NORMAL
BH BB UNIT NUMBER: NORMAL
BH BB UNIT NUMBER: NORMAL
BUN SERPL-MCNC: 8 MG/DL (ref 8–23)
BUN/CREAT SERPL: 10.7 (ref 7–25)
CALCIUM SPEC-SCNC: 8.8 MG/DL (ref 8.6–10.5)
CHLORIDE SERPL-SCNC: 110 MMOL/L (ref 98–107)
CO2 SERPL-SCNC: 27 MMOL/L (ref 22–29)
CREAT SERPL-MCNC: 0.75 MG/DL (ref 0.76–1.27)
CROSSMATCH INTERPRETATION: NORMAL
CROSSMATCH INTERPRETATION: NORMAL
DEPRECATED RDW RBC AUTO: 63.1 FL (ref 37–54)
EGFRCR SERPLBLD CKD-EPI 2021: 92.9 ML/MIN/1.73
EOSINOPHIL # BLD AUTO: 0.15 10*3/MM3 (ref 0–0.4)
EOSINOPHIL NFR BLD AUTO: 3 % (ref 0.3–6.2)
ERYTHROCYTE [DISTWIDTH] IN BLOOD BY AUTOMATED COUNT: 22.1 % (ref 12.3–15.4)
GLUCOSE SERPL-MCNC: 94 MG/DL (ref 65–99)
HCT VFR BLD AUTO: 30.8 % (ref 37.5–51)
HGB BLD-MCNC: 9.1 G/DL (ref 13–17.7)
IMM GRANULOCYTES # BLD AUTO: 0.04 10*3/MM3 (ref 0–0.05)
IMM GRANULOCYTES NFR BLD AUTO: 0.8 % (ref 0–0.5)
INR PPP: 1.14 (ref 0.91–1.09)
LYMPHOCYTES # BLD AUTO: 0.67 10*3/MM3 (ref 0.7–3.1)
LYMPHOCYTES NFR BLD AUTO: 13.6 % (ref 19.6–45.3)
MCH RBC QN AUTO: 25.6 PG (ref 26.6–33)
MCHC RBC AUTO-ENTMCNC: 29.5 G/DL (ref 31.5–35.7)
MCV RBC AUTO: 86.5 FL (ref 79–97)
MONOCYTES # BLD AUTO: 0.66 10*3/MM3 (ref 0.1–0.9)
MONOCYTES NFR BLD AUTO: 13.4 % (ref 5–12)
NEUTROPHILS NFR BLD AUTO: 3.37 10*3/MM3 (ref 1.7–7)
NEUTROPHILS NFR BLD AUTO: 68.6 % (ref 42.7–76)
NRBC BLD AUTO-RTO: 0 /100 WBC (ref 0–0.2)
PLATELET # BLD AUTO: 318 10*3/MM3 (ref 140–450)
PMV BLD AUTO: 8.9 FL (ref 6–12)
POTASSIUM SERPL-SCNC: 3.9 MMOL/L (ref 3.5–5.2)
PROTHROMBIN TIME: 15.1 SECONDS (ref 11.8–14.8)
QT INTERVAL: 416 MS
QTC INTERVAL: 464 MS
RBC # BLD AUTO: 3.56 10*6/MM3 (ref 4.14–5.8)
SODIUM SERPL-SCNC: 145 MMOL/L (ref 136–145)
UNIT  ABO: NORMAL
UNIT  ABO: NORMAL
UNIT  RH: NORMAL
UNIT  RH: NORMAL
WBC NRBC COR # BLD AUTO: 4.92 10*3/MM3 (ref 3.4–10.8)

## 2024-05-05 PROCEDURE — 85025 COMPLETE CBC W/AUTO DIFF WBC: CPT | Performed by: FAMILY MEDICINE

## 2024-05-05 PROCEDURE — 85610 PROTHROMBIN TIME: CPT | Performed by: INTERNAL MEDICINE

## 2024-05-05 PROCEDURE — 85730 THROMBOPLASTIN TIME PARTIAL: CPT | Performed by: INTERNAL MEDICINE

## 2024-05-05 PROCEDURE — 99232 SBSQ HOSP IP/OBS MODERATE 35: CPT | Performed by: INTERNAL MEDICINE

## 2024-05-05 PROCEDURE — 80048 BASIC METABOLIC PNL TOTAL CA: CPT | Performed by: FAMILY MEDICINE

## 2024-05-05 RX ADMIN — ATORVASTATIN CALCIUM 40 MG: 40 TABLET, FILM COATED ORAL at 20:13

## 2024-05-05 RX ADMIN — SOTALOL HYDROCHLORIDE 80 MG: 80 TABLET ORAL at 09:04

## 2024-05-05 RX ADMIN — Medication 10 ML: at 09:04

## 2024-05-05 RX ADMIN — ACETAMINOPHEN 650 MG: 325 TABLET, FILM COATED ORAL at 23:48

## 2024-05-05 RX ADMIN — PANTOPRAZOLE SODIUM 40 MG: 40 INJECTION, POWDER, FOR SOLUTION INTRAVENOUS at 09:03

## 2024-05-05 RX ADMIN — ACETAMINOPHEN 650 MG: 325 TABLET, FILM COATED ORAL at 16:20

## 2024-05-05 RX ADMIN — ZOLPIDEM TARTRATE 5 MG: 5 TABLET ORAL at 20:13

## 2024-05-05 RX ADMIN — PANTOPRAZOLE SODIUM 40 MG: 40 INJECTION, POWDER, FOR SOLUTION INTRAVENOUS at 20:12

## 2024-05-05 RX ADMIN — EMPAGLIFLOZIN 10 MG: 10 TABLET, FILM COATED ORAL at 09:03

## 2024-05-05 RX ADMIN — Medication 10 ML: at 20:28

## 2024-05-05 RX ADMIN — ISOSORBIDE MONONITRATE 30 MG: 30 TABLET, EXTENDED RELEASE ORAL at 09:04

## 2024-05-05 RX ADMIN — LOSARTAN POTASSIUM 25 MG: 50 TABLET, FILM COATED ORAL at 09:03

## 2024-05-05 RX ADMIN — SOTALOL HYDROCHLORIDE 80 MG: 80 TABLET ORAL at 20:13

## 2024-05-05 RX ADMIN — PREGABALIN 150 MG: 75 CAPSULE ORAL at 20:13

## 2024-05-05 RX ADMIN — PREGABALIN 150 MG: 75 CAPSULE ORAL at 09:03

## 2024-05-05 RX ADMIN — ZOLPIDEM TARTRATE 5 MG: 5 TABLET ORAL at 00:27

## 2024-05-05 NOTE — PLAN OF CARE
Goal Outcome Evaluation:  Plan of Care Reviewed With: patient        Progress: no change  Outcome Evaluation: Pt denies pain and nausea so far this shift; IID; voiding; up ad jade; tolerating full liquids; will be NPO after MN for EGD; no BM this shift; safety maintained.

## 2024-05-05 NOTE — PROGRESS NOTES
Family Medicine Progress Note    Patient:  Darren Maria  YOB: 1945    MRN: [id number]     Acct: [id number]     Admit date: 5/3/2024    Patient Seen, Chart, Consults notes, Labs, Radiology studies reviewed.    Subjective: Day 2 of stay with gastrointestinal bleed presumed to be upper and most recent (in last 24 hours) has had no further melanotic stools.  Stomach still feels a little uneasy.  Tolerating liquid diet.  No nausea.  Still did not rest well but better than he did the night before.    Past, Family, Social History unchanged from admission.    Diet: NPO Diet NPO Type: Strict NPO  Diet: Liquid; Full Liquid; Fluid Consistency: Thin (IDDSI 0)    Medications:  Scheduled Meds:atorvastatin, 40 mg, Oral, Nightly  empagliflozin, 10 mg, Oral, Daily  isosorbide mononitrate, 30 mg, Oral, Q24H  losartan, 25 mg, Oral, Daily  pantoprazole, 40 mg, Intravenous, Q12H  pregabalin, 150 mg, Oral, BID  sodium chloride, 10 mL, Intravenous, Q12H  sotalol, 80 mg, Oral, Q12H      Continuous Infusions:   PRN Meds:  acetaminophen    nitroglycerin    ondansetron    sodium chloride    sodium chloride    sodium chloride    traMADol-acetaminophen    zolpidem    Objective:    Lab Results (last 24 hours)       Procedure Component Value Units Date/Time    Protime-INR [[id number]] Collected: 05/05/24 0833    Specimen: Blood Updated: 05/05/24 0844    aPTT [[id number]] Collected: 05/05/24 0833    Specimen: Blood Updated: 05/05/24 0844    Basic Metabolic Panel [[id number]]  (Abnormal) Collected: 05/05/24 0540    Specimen: Blood Updated: 05/05/24 0612     Glucose 94 mg/dL      BUN 8 mg/dL      Creatinine 0.75 mg/dL      Sodium 145 mmol/L      Potassium 3.9 mmol/L      Chloride 110 mmol/L      CO2 27.0 mmol/L      Calcium 8.8 mg/dL      BUN/Creatinine Ratio 10.7     Anion Gap 8.0 mmol/L      eGFR 92.9 mL/min/1.73     Narrative:      GFR Normal >60  Chronic Kidney Disease <60  Kidney Failure <15    The GFR formula is only valid  for adults with stable renal function between ages 18 and 70.    CBC & Differential [[id number]]  (Abnormal) Collected: 05/05/24 0540    Specimen: Blood Updated: 05/05/24 0555    Narrative:      The following orders were created for panel order CBC & Differential.  Procedure                               Abnormality         Status                     ---------                               -----------         ------                     CBC Auto Differential[[id number]]        Abnormal            Final result                 Please view results for these tests on the individual orders.    CBC Auto Differential [[id number]]  (Abnormal) Collected: 05/05/24 0540    Specimen: Blood Updated: 05/05/24 0555     WBC 4.92 10*3/mm3      RBC 3.56 10*6/mm3      Hemoglobin 9.1 g/dL      Hematocrit 30.8 %      MCV 86.5 fL      MCH 25.6 pg      MCHC 29.5 g/dL      RDW 22.1 %      RDW-SD 63.1 fl      MPV 8.9 fL      Platelets 318 10*3/mm3      Neutrophil % 68.6 %      Lymphocyte % 13.6 %      Monocyte % 13.4 %      Eosinophil % 3.0 %      Basophil % 0.6 %      Immature Grans % 0.8 %      Neutrophils, Absolute 3.37 10*3/mm3      Lymphocytes, Absolute 0.67 10*3/mm3      Monocytes, Absolute 0.66 10*3/mm3      Eosinophils, Absolute 0.15 10*3/mm3      Basophils, Absolute 0.03 10*3/mm3      Immature Grans, Absolute 0.04 10*3/mm3      nRBC 0.0 /100 WBC     Hemoglobin [[id number]]  (Abnormal) Collected: 05/04/24 1801    Specimen: Blood Updated: 05/04/24 1820     Hemoglobin 8.9 g/dL     Hemoglobin [[id number]]  (Abnormal) Collected: 05/04/24 1212    Specimen: Blood Updated: 05/04/24 1252     Hemoglobin 8.7 g/dL              Imaging Results (Last 72 Hours)       Procedure Component Value Units Date/Time    CT Abdomen Pelvis With Contrast [[id number]] Collected: 05/03/24 1525     Updated: 05/03/24 1533    Narrative:      CT ABDOMEN PELVIS W CONTRAST- 5/3/2024 1:52 PM     HISTORY: GI bleed       COMPARISON: CT scan dated 1/20/2023.     DOSE LENGTH  "PRODUCT: 447.7 mGy.cm. Automated exposure control was also  utilized to decrease patient radiation dose.     TECHNIQUE: Following the intravenous administration of contrast, helical  CT tomographic images of the abdomen and pelvis were acquired.  Multiplanar reformatted images were provided for review.     FINDINGS:  LOWER CHEST: Four-chamber cardiomegaly. Biventricular pacer wires. Lung  bases are clear.     LIVER: No suspicious liver lesion. The portal veins are patent.     BILIARY SYSTEM: The gallbladder has been removed. There is no evidence  of biliary ductal dilation.     PANCREAS: Diffuse atrophy. No focal lesion.     SPLEEN: Unremarkable.     KIDNEYS AND ADRENALS: Adrenal glands are unremarkable. Prior right  nephrectomy. Left kidney is unremarkable.     RETROPERITONEUM: No mass, lymphadenopathy or hemorrhage.     GI TRACT: Stomach is completely decompressed. No abnormal wall  thickening identified. The small bowel loops are nondilated. Mild  colonic stool. No inflammatory changes along the small or large bowel.     OTHER: There is no mesenteric mass, lymphadenopathy, free fluid or free  air. The abdominal vascular structures are patent. Prior ventral hernia  repair with underlying chronic postoperative seroma. No acute bony  abnormality.     PELVIS: Penile prosthesis. Urinary bladder is unremarkable. No pelvic  adenopathy.       Impression:         1.   No acute and/or inflammatory process in the abdomen or pelvis.  2.  In particular, there is no acute and/or inflammatory process  identified along the bowel.           This report was signed and finalized on 5/3/2024 3:30 PM by Dr Olvin Walker.                Physical Exam:    Vitals: /55 (BP Location: Right arm, Patient Position: Lying)   Pulse 79   Temp 97.6 °F (36.4 °C) (Oral)   Resp 16   Ht 180.3 cm (70.98\")   Wt 86.6 kg (191 lb)   SpO2 95%   BMI 26.65 kg/m²   24 hour intake/output:  Intake/Output Summary (Last 24 hours) at 5/5/2024 " 0907  Last data filed at 5/5/2024 0551  Gross per 24 hour   Intake 580 ml   Output 775 ml   Net -195 ml     Last 3 weights:  Wt Readings from Last 3 Encounters:   05/05/24 86.6 kg (191 lb)   05/01/24 87.4 kg (192 lb 9.6 oz)   04/23/24 87.8 kg (193 lb 9.6 oz)       General Appearance alert, appears stated age, and cooperative  Head normocephalic, without obvious abnormality  Eyes lids and lashes normal, conjunctivae and sclerae normal, and no icterus  Neck no adenopathy, supple, and trachea midline  Lungs clear to auscultation, respirations regular, and respirations even  Heart regular rhythm & normal rate, normal S1, S2, and no murmur, no gallop, no rub  Abdomen normal bowel sounds, minimally tender with no guarding  Extremities moves extremities well, no edema, no cyanosis, and no redness  Skin turgor normal  Neurologic Mental Status orientated to person, place, time and situation        Assessment:      Gastrointestinal bleeding, upper    Coronary artery disease of bypass graft of native heart with stable angina pectoris    Essential hypertension    Presence of biventricular AICD    Permanent atrial fibrillation    Chronic combined systolic and diastolic congestive heart failure    Symptomatic anemia    GI bleed          Plan:  Hemoglobin remained stable posttransfusion.  No evidence of active bleeding.  Scheduled for endoscopy tomorrow.  Is ordered n.p.o. after midnight.  Appreciate cardiology following while looking for source of bleeding so that anticoagulation might be properly adjusted given his significant heart history.      Electronically signed by Christiano Atkins MD on 5/5/2024 at 09:07 CDT

## 2024-05-05 NOTE — PROGRESS NOTES
"      [facility]astroenterology  Inpatient Progress Note  Today's date:  05/05/24    Darren Maria  1945       Reason for Follow Up: Black stools, anemia    Subjective:   Patient is feeling improved without any black stools, hematemesis or hematochezia.  He is tolerating clear liquids without abdominal pain.  He still complains of nausea but this has improved mildly.  Hemoglobin increased to 9.1.  His Xarelto was on hold.    Allergies   Allergen Reactions    Azithromycin Nausea And Vomiting    Morphine And Related Other (See Comments)     SHAKES AND MAKES HIM \"WIRED\".   TAKES TRAMADOL WITHOUT PROBLEM       Current Facility-Administered Medications:     acetaminophen (TYLENOL) tablet 650 mg, 650 mg, Oral, Q4H PRN, Christiano Atkins MD, 650 mg at 05/04/24 1502    atorvastatin (LIPITOR) tablet 40 mg, 40 mg, Oral, Nightly, Christiano Atkins MD, 40 mg at 05/04/24 2014    empagliflozin (JARDIANCE) tablet 10 mg, 10 mg, Oral, Daily, Christiano Atkins MD, 10 mg at 05/04/24 0848    isosorbide mononitrate (IMDUR) 24 hr tablet 30 mg, 30 mg, Oral, Q24H, Christiano Atkins MD, 30 mg at 05/04/24 0848    losartan (COZAAR) tablet 25 mg, 25 mg, Oral, Daily, Christiano Atkins MD, 25 mg at 05/04/24 0848    nitroglycerin (NITROSTAT) SL tablet 0.4 mg, 0.4 mg, Sublingual, Q5 Min PRN, Christiano Atkins MD    ondansetron (ZOFRAN) injection 4 mg, 4 mg, Intravenous, Q6H PRN, Christiano Atkins MD, 4 mg at 05/04/24 1502    pantoprazole (PROTONIX) injection 40 mg, 40 mg, Intravenous, Q12H, Everardo Lacey MD, 40 mg at 05/04/24 2014    pregabalin (LYRICA) capsule 150 mg, 150 mg, Oral, BID, Christiano Atkins MD, 150 mg at 05/04/24 2014    sodium chloride 0.9 % flush 10 mL, 10 mL, Intravenous, PRN, Christiano Atkins MD    sodium chloride 0.9 % flush 10 mL, 10 mL, Intravenous, Q12H, Christiano Atkins MD, 10 mL at 05/04/24 2014    sodium chloride 0.9 % flush 10 mL, 10 mL, Intravenous, PRN, " Christiano Atkins MD    sodium chloride 0.9 % infusion 40 mL, 40 mL, Intravenous, PRN, Christiano Atkins MD    sotalol (BETAPACE) tablet 80 mg, 80 mg, Oral, Q12H, Christiano Atkins MD, 80 mg at 05/04/24 2014    traMADol-acetaminophen (ULTRACET) 37.5-325 MG per tablet 1 tablet, 1 tablet, Oral, Q12H PRN, Christiano Atkins MD    zolpidem (AMBIEN) tablet 5 mg, 5 mg, Oral, Nightly PRN, Christiano Atkins MD, 5 mg at 05/05/24 0027    Review of Systems:   Review of Systems   See above  Vital Signs:  Temp:  [97.3 °F (36.3 °C)-98.3 °F (36.8 °C)] 97.6 °F (36.4 °C)  Heart Rate:  [58-83] 79  Resp:  [16] 16  BP: (108-126)/(48-73) 115/55  Body mass index is 26.65 kg/m².     Intake/Output Summary (Last 24 hours) at 5/5/2024 0854  Last data filed at 5/5/2024 0551  Gross per 24 hour   Intake 580 ml   Output 775 ml   Net -195 ml     No intake/output data recorded.  Physical Exam:  Physical Exam  Lungs: Clear to auscultation    Cardiac: Regular rhythm without murmurs    Abdomen: Soft, nontender, positive bowel sounds.   Results Review:   I have reviewed all of the patient's current test results    Results from last 7 days   Lab Units 05/05/24  0540 05/04/24  1801 05/04/24  1212 05/04/24  0338 05/03/24  1357   WBC 10*3/mm3 4.92  --   --  4.97 6.60   HEMOGLOBIN g/dL 9.1* 8.9* 8.7* 8.2* 7.2*   HEMATOCRIT % 30.8*  --   --  27.1* 24.6*   PLATELETS 10*3/mm3 318  --   --  327 384       Results from last 7 days   Lab Units 05/05/24  0540 05/04/24  0338 05/03/24  1357   SODIUM mmol/L 145 142 144   POTASSIUM mmol/L 3.9 3.9 4.2   CHLORIDE mmol/L 110* 110* 108*   CO2 mmol/L 27.0 25.0 25.0   BUN mg/dL 8 9 10   CREATININE mg/dL 0.75* 0.69* 0.80   CALCIUM mg/dL 8.8 8.6 9.0   BILIRUBIN mg/dL  --   --  0.4   ALK PHOS U/L  --   --  53   ALT (SGPT) U/L  --   --  8   AST (SGOT) U/L  --   --  16   GLUCOSE mg/dL 94 94 121*       Results from last 7 days   Lab Units 05/03/24  1357   INR  1.95*       Lab Results   Lab Value Date/Time     LIPASE 13 12/22/2022 1440       Radiology Review:  Imaging Results (Last 24 Hours)       ** No results found for the last 24 hours. **            Impression/Plan:    Gastrointestinal bleeding, upper    Coronary artery disease of bypass graft of native heart with stable angina pectoris    Essential hypertension    Presence of biventricular AICD    Permanent atrial fibrillation    Chronic combined systolic and diastolic congestive heart failure    Symptomatic anemia    GI bleed    Patient is scheduled for an upper endoscopy tomorrow with another gastroenterologist that will be taking over the GI service.  Will increase his diet to full liquids today as tolerated.  Would recommend continuing serial CBCs and blood transfusions as needed per the primary/referring service.  Etiology of nausea is unclear.  Will make further recommendations after results of the upper endoscopy are known.  Avoid aspirin and nonsteroidal anti-inflammatory drugs if possible.  The patient should follow-up with his gastroenterologist soon after hospital release.  The above was discussed in detail with the patient and he appeared to understand and agreed to proceed.  Thank you    Everardo Lacey MD  05/05/24  08:54 CDT    DISCLAIMER:    This physician works through a [employer] as an inpatient consultant gastroenterologist only and has no outpatient clinic for patient follow up.  Any results not available at time of inpatient discharge and/or GI clinic follow up should be managed by the hospitalist team, PCP, or outpatient gastroenterologist.

## 2024-05-05 NOTE — PLAN OF CARE
Problem: Adult Inpatient Plan of Care  Goal: Plan of Care Review  Outcome: Ongoing, Progressing  Flowsheets (Taken 5/5/2024 0448)  Progress: improving  Plan of Care Reviewed With: patient  Outcome Evaluation: VSS. AOx4. Up SB with cane to BR. Ambulating to BR. PRN ambien given. Tele running v-paced. SCD. IV L FA IID. No BM this shift. Call light within reach, safety maintained, will continue to monitor.

## 2024-05-06 ENCOUNTER — ANESTHESIA EVENT (OUTPATIENT)
Dept: GASTROENTEROLOGY | Facility: HOSPITAL | Age: 79
DRG: 378 | End: 2024-05-06
Payer: MEDICARE

## 2024-05-06 ENCOUNTER — ANESTHESIA (OUTPATIENT)
Dept: GASTROENTEROLOGY | Facility: HOSPITAL | Age: 79
DRG: 378 | End: 2024-05-06
Payer: MEDICARE

## 2024-05-06 LAB
ANION GAP SERPL CALCULATED.3IONS-SCNC: 6 MMOL/L (ref 5–15)
BASOPHILS # BLD AUTO: 0.03 10*3/MM3 (ref 0–0.2)
BASOPHILS NFR BLD AUTO: 0.6 % (ref 0–1.5)
BUN SERPL-MCNC: 7 MG/DL (ref 8–23)
BUN/CREAT SERPL: 9 (ref 7–25)
CALCIUM SPEC-SCNC: 8.8 MG/DL (ref 8.6–10.5)
CHLORIDE SERPL-SCNC: 111 MMOL/L (ref 98–107)
CO2 SERPL-SCNC: 27 MMOL/L (ref 22–29)
CREAT SERPL-MCNC: 0.78 MG/DL (ref 0.76–1.27)
DEPRECATED RDW RBC AUTO: 65.7 FL (ref 37–54)
EGFRCR SERPLBLD CKD-EPI 2021: 91.9 ML/MIN/1.73
EOSINOPHIL # BLD AUTO: 0.14 10*3/MM3 (ref 0–0.4)
EOSINOPHIL NFR BLD AUTO: 2.9 % (ref 0.3–6.2)
ERYTHROCYTE [DISTWIDTH] IN BLOOD BY AUTOMATED COUNT: 23 % (ref 12.3–15.4)
GLUCOSE SERPL-MCNC: 102 MG/DL (ref 65–99)
HCT VFR BLD AUTO: 31.4 % (ref 37.5–51)
HGB BLD-MCNC: 9.3 G/DL (ref 13–17.7)
IMM GRANULOCYTES # BLD AUTO: 0.02 10*3/MM3 (ref 0–0.05)
IMM GRANULOCYTES NFR BLD AUTO: 0.4 % (ref 0–0.5)
LYMPHOCYTES # BLD AUTO: 0.69 10*3/MM3 (ref 0.7–3.1)
LYMPHOCYTES NFR BLD AUTO: 14.1 % (ref 19.6–45.3)
MCH RBC QN AUTO: 25.8 PG (ref 26.6–33)
MCHC RBC AUTO-ENTMCNC: 29.6 G/DL (ref 31.5–35.7)
MCV RBC AUTO: 87 FL (ref 79–97)
MONOCYTES # BLD AUTO: 0.59 10*3/MM3 (ref 0.1–0.9)
MONOCYTES NFR BLD AUTO: 12 % (ref 5–12)
NEUTROPHILS NFR BLD AUTO: 3.44 10*3/MM3 (ref 1.7–7)
NEUTROPHILS NFR BLD AUTO: 70 % (ref 42.7–76)
NRBC BLD AUTO-RTO: 0 /100 WBC (ref 0–0.2)
PLATELET # BLD AUTO: 325 10*3/MM3 (ref 140–450)
PMV BLD AUTO: 9.2 FL (ref 6–12)
POTASSIUM SERPL-SCNC: 3.8 MMOL/L (ref 3.5–5.2)
RBC # BLD AUTO: 3.61 10*6/MM3 (ref 4.14–5.8)
SODIUM SERPL-SCNC: 144 MMOL/L (ref 136–145)
WBC NRBC COR # BLD AUTO: 4.91 10*3/MM3 (ref 3.4–10.8)

## 2024-05-06 PROCEDURE — 0DB68ZX EXCISION OF STOMACH, VIA NATURAL OR ARTIFICIAL OPENING ENDOSCOPIC, DIAGNOSTIC: ICD-10-PCS | Performed by: INTERNAL MEDICINE

## 2024-05-06 PROCEDURE — 0DB98ZX EXCISION OF DUODENUM, VIA NATURAL OR ARTIFICIAL OPENING ENDOSCOPIC, DIAGNOSTIC: ICD-10-PCS | Performed by: INTERNAL MEDICINE

## 2024-05-06 PROCEDURE — 80048 BASIC METABOLIC PNL TOTAL CA: CPT | Performed by: FAMILY MEDICINE

## 2024-05-06 PROCEDURE — 25010000002 PROPOFOL 10 MG/ML EMULSION: Performed by: NURSE ANESTHETIST, CERTIFIED REGISTERED

## 2024-05-06 PROCEDURE — 43239 EGD BIOPSY SINGLE/MULTIPLE: CPT | Performed by: INTERNAL MEDICINE

## 2024-05-06 PROCEDURE — 25810000003 SODIUM CHLORIDE 0.9 % SOLUTION: Performed by: ANESTHESIOLOGY

## 2024-05-06 PROCEDURE — 25810000003 SODIUM CHLORIDE 0.9 % SOLUTION 250 ML FLEX CONT: Performed by: INTERNAL MEDICINE

## 2024-05-06 PROCEDURE — 88305 TISSUE EXAM BY PATHOLOGIST: CPT | Performed by: INTERNAL MEDICINE

## 2024-05-06 PROCEDURE — 25010000002 NA FERRIC GLUC CPLX PER 12.5 MG: Performed by: INTERNAL MEDICINE

## 2024-05-06 PROCEDURE — 85025 COMPLETE CBC W/AUTO DIFF WBC: CPT | Performed by: FAMILY MEDICINE

## 2024-05-06 RX ORDER — POLYETHYLENE GLYCOL 3350 17 G/17G
0.5 POWDER, FOR SOLUTION ORAL EVERY 12 HOURS
Status: COMPLETED | OUTPATIENT
Start: 2024-05-06 | End: 2024-05-07

## 2024-05-06 RX ORDER — LIDOCAINE HYDROCHLORIDE 20 MG/ML
INJECTION, SOLUTION EPIDURAL; INFILTRATION; INTRACAUDAL; PERINEURAL AS NEEDED
Status: DISCONTINUED | OUTPATIENT
Start: 2024-05-06 | End: 2024-05-06 | Stop reason: SURG

## 2024-05-06 RX ORDER — SODIUM CHLORIDE 0.9 % (FLUSH) 0.9 %
10 SYRINGE (ML) INJECTION EVERY 12 HOURS SCHEDULED
Status: DISCONTINUED | OUTPATIENT
Start: 2024-05-06 | End: 2024-05-06 | Stop reason: HOSPADM

## 2024-05-06 RX ORDER — SODIUM CHLORIDE 9 MG/ML
100 INJECTION, SOLUTION INTRAVENOUS CONTINUOUS
Status: DISCONTINUED | OUTPATIENT
Start: 2024-05-06 | End: 2024-05-06

## 2024-05-06 RX ORDER — SODIUM CHLORIDE 9 MG/ML
40 INJECTION, SOLUTION INTRAVENOUS AS NEEDED
Status: DISCONTINUED | OUTPATIENT
Start: 2024-05-06 | End: 2024-05-06 | Stop reason: HOSPADM

## 2024-05-06 RX ORDER — PROPOFOL 10 MG/ML
VIAL (ML) INTRAVENOUS AS NEEDED
Status: DISCONTINUED | OUTPATIENT
Start: 2024-05-06 | End: 2024-05-06 | Stop reason: SURG

## 2024-05-06 RX ORDER — SODIUM CHLORIDE 0.9 % (FLUSH) 0.9 %
10 SYRINGE (ML) INJECTION AS NEEDED
Status: DISCONTINUED | OUTPATIENT
Start: 2024-05-06 | End: 2024-05-06 | Stop reason: HOSPADM

## 2024-05-06 RX ADMIN — PROPOFOL INJECTABLE EMULSION 150 MG: 10 INJECTION, EMULSION INTRAVENOUS at 12:04

## 2024-05-06 RX ADMIN — SOTALOL HYDROCHLORIDE 80 MG: 80 TABLET ORAL at 19:41

## 2024-05-06 RX ADMIN — PREGABALIN 150 MG: 75 CAPSULE ORAL at 19:41

## 2024-05-06 RX ADMIN — LOSARTAN POTASSIUM 25 MG: 50 TABLET, FILM COATED ORAL at 13:08

## 2024-05-06 RX ADMIN — ISOSORBIDE MONONITRATE 30 MG: 30 TABLET, EXTENDED RELEASE ORAL at 13:08

## 2024-05-06 RX ADMIN — PANTOPRAZOLE SODIUM 40 MG: 40 INJECTION, POWDER, FOR SOLUTION INTRAVENOUS at 19:41

## 2024-05-06 RX ADMIN — LIDOCAINE HYDROCHLORIDE 50 MG: 20 INJECTION, SOLUTION EPIDURAL; INFILTRATION; INTRACAUDAL; PERINEURAL at 12:04

## 2024-05-06 RX ADMIN — SODIUM CHLORIDE 250 MG: 9 INJECTION, SOLUTION INTRAVENOUS at 19:41

## 2024-05-06 RX ADMIN — PANTOPRAZOLE SODIUM 40 MG: 40 INJECTION, POWDER, FOR SOLUTION INTRAVENOUS at 08:00

## 2024-05-06 RX ADMIN — SOTALOL HYDROCHLORIDE 80 MG: 80 TABLET ORAL at 13:08

## 2024-05-06 RX ADMIN — SODIUM CHLORIDE 100 ML/HR: 9 INJECTION, SOLUTION INTRAVENOUS at 11:29

## 2024-05-06 RX ADMIN — POLYETHYLENE GLYCOL 3350 0.5 BOTTLE: 17 POWDER, FOR SOLUTION ORAL at 16:50

## 2024-05-06 RX ADMIN — ACETAMINOPHEN 650 MG: 325 TABLET, FILM COATED ORAL at 13:15

## 2024-05-06 RX ADMIN — ATORVASTATIN CALCIUM 40 MG: 40 TABLET, FILM COATED ORAL at 19:41

## 2024-05-06 NOTE — PLAN OF CARE
Problem: Adult Inpatient Plan of Care  Goal: Plan of Care Review  Outcome: Ongoing, Progressing  Flowsheets (Taken 5/6/2024 4418)  Progress: no change  Plan of Care Reviewed With: patient  Outcome Evaluation: VSS. AOx4. PRN ambien. Voiding per urinal, ambulating to BR. NPO for EGD today. IV L FA IID. Tele v paced, SCD. Up ad. No C/O pain. Call light within reach, safety maintained.

## 2024-05-06 NOTE — PROGRESS NOTES
Chief Complaint: Rectal bleeding      Interval History:     Patient denies any episodes of rectal bleeding last night.  States he is feeling better after receiving blood transfusion.  Hemoglobin is stable this morning.  Vital signs are stable.  He has some mild abdominal epigastric tenderness on palpation.  Abdomen is soft.  He does have some intermittent nausea after eating, no vomiting.  He is tolerating a liquid diet fairly well.  He has been n.p.o. since midnight.    Review of Systems:   General ROS: negative for - chills or fever  Respiratory ROS: no cough, shortness of breath, or wheezing  Cardiovascular ROS: no chest pain or dyspnea on exertion  Gastrointestinal ROS: negative for - abdominal pain, diarrhea or nausea/vomiting       Vital Signs  Temp:  [97.4 °F (36.3 °C)-97.6 °F (36.4 °C)] 97.5 °F (36.4 °C)  Heart Rate:  [75-84] 79  Resp:  [16] 16  BP: (107-118)/(51-63) 107/53    Intake/Output Summary (Last 24 hours) at 5/6/2024 0752  Last data filed at 5/5/2024 1945  Gross per 24 hour   Intake 720 ml   Output --   Net 720 ml       Physical Exam:     General Appearance:    Alert, cooperative, in no acute distress   Head:    N/A   Throat:   N/A   Neck:   N/A   Lungs:     Clear to auscultation,respirations regular, even and                  unlabored    Heart:    Regular rhythm and normal rate, normal S1 and S2, no            murmur, no gallop, no rub   Abdomen:   Soft, epigastric tenderness   Extremities:   No edema, no cyanosis, no clubbing   Pulses:   N/A   Skin:   N/A   Lymph nodes:   N/A   Neurologic:   N/A          Lab Review:       Lab Results (last 24 hours)       Procedure Component Value Units Date/Time    Basic Metabolic Panel [[id number]]  (Abnormal) Collected: 05/06/24 0523    Specimen: Blood Updated: 05/06/24 0606     Glucose 102 mg/dL      BUN 7 mg/dL      Creatinine 0.78 mg/dL      Sodium 144 mmol/L      Potassium 3.8 mmol/L      Chloride 111 mmol/L      CO2 27.0 mmol/L      Calcium 8.8 mg/dL   "    BUN/Creatinine Ratio 9.0     Anion Gap 6.0 mmol/L      eGFR 91.9 mL/min/1.73     Narrative:      GFR Normal >60  Chronic Kidney Disease <60  Kidney Failure <15    The GFR formula is only valid for adults with stable renal function between ages 18 and 70.    CBC & Differential [[id number]]  (Abnormal) Collected: 05/06/24 0523    Specimen: Blood Updated: 05/06/24 0544    Narrative:      The following orders were created for panel order CBC & Differential.  Procedure                               Abnormality         Status                     ---------                               -----------         ------                     CBC Auto Differential[[id number]]        Abnormal            Final result                 Please view results for these tests on the individual orders.    CBC Auto Differential [[id number]]  (Abnormal) Collected: 05/06/24 0523    Specimen: Blood Updated: 05/06/24 0544     WBC 4.91 10*3/mm3      RBC 3.61 10*6/mm3      Hemoglobin 9.3 g/dL      Hematocrit 31.4 %      MCV 87.0 fL      MCH 25.8 pg      MCHC 29.6 g/dL      RDW 23.0 %      RDW-SD 65.7 fl      MPV 9.2 fL      Platelets 325 10*3/mm3      Neutrophil % 70.0 %      Lymphocyte % 14.1 %      Monocyte % 12.0 %      Eosinophil % 2.9 %      Basophil % 0.6 %      Immature Grans % 0.4 %      Neutrophils, Absolute 3.44 10*3/mm3      Lymphocytes, Absolute 0.69 10*3/mm3      Monocytes, Absolute 0.59 10*3/mm3      Eosinophils, Absolute 0.14 10*3/mm3      Basophils, Absolute 0.03 10*3/mm3      Immature Grans, Absolute 0.02 10*3/mm3      nRBC 0.0 /100 WBC     Protime-INR [[id number]]  (Abnormal) Collected: 05/05/24 0833    Specimen: Blood Updated: 05/05/24 0918     Protime 15.1 Seconds      INR 1.14    aPTT [[id number]]  (Normal) Collected: 05/05/24 0833    Specimen: Blood Updated: 05/05/24 0918     PTT 34.8 seconds           Cultures:    No results found for: \"BLOODCX\", \"URINECX\", \"WOUNDCX\", \"MRSACX\", \"RESPCX\", \"STOOLCX\"    Radiology Review:  Imaging " Results (Last 24 Hours)       ** No results found for the last 24 hours. **                     ASSESSMENT:      Gastrointestinal bleeding, upper    Coronary artery disease of bypass graft of native heart with stable angina pectoris    Essential hypertension    Presence of biventricular AICD    Permanent atrial fibrillation    Chronic combined systolic and diastolic congestive heart failure    Symptomatic anemia    GI bleed      PLAN:    1.  Gastroenterology following.  Plans for endoscopy today.  He has been n.p.o. since midnight.  2.  Cardiology following while looking for source of bleeding so that anticoagulation might be properly adjusted given his significant cardiac history.  3.  Hemoglobin stable after transfusion.  Will continue to monitor with daily labs.  4.  Continue with proton pump inhibitor.    Further orders per Dr. Salvador.      Mendoza Smith, APRN  05/06/24  07:52 CDT        Part of this note may be an electronic transcription/translation of spoken language to printed text using the Dragon Dictation System.

## 2024-05-06 NOTE — PLAN OF CARE
Goal Outcome Evaluation:  Plan of Care Reviewed With: patient           Outcome Evaluation: pt went for endo this shift pt going for a colonoscopy in the am

## 2024-05-06 NOTE — PROGRESS NOTES
Brief (courtesy) procedure note:    Procedure: EGD    Pre-op diagnosis: GI bleed    Post-op diagnosis: Non-specific gastritis with rugal hypertrophy, extensive Bx. No bleeding lesions.     Recommendations: Await path. Colonoscopy tomorrow.     Please refer to the Prov[employer]-generated note for photos and further details.     Jose Guzmán MD

## 2024-05-06 NOTE — CASE MANAGEMENT/SOCIAL WORK
Discharge Planning Assessment  [facility]     Patient Name: Draren Maria  MRN: [id number]  Today's Date: 5/6/2024    Admit Date: 5/3/2024        Discharge Needs Assessment       Row Name 05/06/24 1015       Living Environment    People in Home spouse    Current Living Arrangements home    Potentially Unsafe Housing Conditions none    Family Caregiver if Needed spouse       Resource/Environmental Concerns    Transportation Concerns none       Transition Planning    Patient/Family Anticipates Transition to home with family    Patient/Family Anticipated Services at Transition none    Transportation Anticipated family or friend will provide       Discharge Needs Assessment    Current Outpatient/Agency/Support Group homecare agency  [facility]    Equipment Currently Used at Home cane, straight    Concerns to be Addressed no discharge needs identified;denies needs/concerns at this time    Anticipated Changes Related to Illness none    Equipment Needed After Discharge none    Discharge Coordination/Progress Pt plans to return home with his spouse upon DC.  Has [facility] through [facility] who assists as needed and a nurse visits monthly.  Has a PCP and Rx coverage.  Will follow for any DC needs.                   Discharge Plan    No documentation.                 Continued Care and Services - Admitted Since 5/3/2024    No active coordination exists for this encounter.          Demographic Summary    No documentation.                  Functional Status    No documentation.                  Psychosocial    No documentation.                  Abuse/Neglect    No documentation.                  Legal    No documentation.                  Substance Abuse    No documentation.                  Patient Forms    No documentation.                     Betty George RN

## 2024-05-07 ENCOUNTER — ANESTHESIA EVENT (OUTPATIENT)
Dept: GASTROENTEROLOGY | Facility: HOSPITAL | Age: 79
DRG: 378 | End: 2024-05-07
Payer: MEDICARE

## 2024-05-07 ENCOUNTER — ANESTHESIA (OUTPATIENT)
Dept: GASTROENTEROLOGY | Facility: HOSPITAL | Age: 79
DRG: 378 | End: 2024-05-07
Payer: MEDICARE

## 2024-05-07 ENCOUNTER — READMISSION MANAGEMENT (OUTPATIENT)
Dept: CALL CENTER | Facility: HOSPITAL | Age: 79
End: 2024-05-07
Payer: MEDICARE

## 2024-05-07 VITALS
HEIGHT: 71 IN | OXYGEN SATURATION: 99 % | WEIGHT: 187.6 LBS | BODY MASS INDEX: 26.26 KG/M2 | TEMPERATURE: 97.5 F | RESPIRATION RATE: 16 BRPM | HEART RATE: 78 BPM | DIASTOLIC BLOOD PRESSURE: 65 MMHG | SYSTOLIC BLOOD PRESSURE: 120 MMHG

## 2024-05-07 PROCEDURE — 25810000003 SODIUM CHLORIDE 0.9 % SOLUTION: Performed by: NURSE ANESTHETIST, CERTIFIED REGISTERED

## 2024-05-07 PROCEDURE — 25010000002 PROPOFOL 10 MG/ML EMULSION: Performed by: NURSE ANESTHETIST, CERTIFIED REGISTERED

## 2024-05-07 PROCEDURE — 25010000002 NA FERRIC GLUC CPLX PER 12.5 MG: Performed by: INTERNAL MEDICINE

## 2024-05-07 PROCEDURE — 45385 COLONOSCOPY W/LESION REMOVAL: CPT | Performed by: INTERNAL MEDICINE

## 2024-05-07 PROCEDURE — 25810000003 SODIUM CHLORIDE 0.9 % SOLUTION 250 ML FLEX CONT: Performed by: INTERNAL MEDICINE

## 2024-05-07 PROCEDURE — 88305 TISSUE EXAM BY PATHOLOGIST: CPT | Performed by: INTERNAL MEDICINE

## 2024-05-07 PROCEDURE — 0DBL8ZX EXCISION OF TRANSVERSE COLON, VIA NATURAL OR ARTIFICIAL OPENING ENDOSCOPIC, DIAGNOSTIC: ICD-10-PCS | Performed by: INTERNAL MEDICINE

## 2024-05-07 RX ORDER — PROPOFOL 10 MG/ML
VIAL (ML) INTRAVENOUS AS NEEDED
Status: DISCONTINUED | OUTPATIENT
Start: 2024-05-07 | End: 2024-05-07 | Stop reason: SURG

## 2024-05-07 RX ORDER — LIDOCAINE HYDROCHLORIDE 10 MG/ML
0.5 INJECTION, SOLUTION EPIDURAL; INFILTRATION; INTRACAUDAL; PERINEURAL ONCE AS NEEDED
Status: DISCONTINUED | OUTPATIENT
Start: 2024-05-07 | End: 2024-05-07 | Stop reason: HOSPADM

## 2024-05-07 RX ORDER — LOSARTAN POTASSIUM 25 MG/1
25 TABLET ORAL DAILY
Start: 2024-05-07

## 2024-05-07 RX ORDER — LIDOCAINE HYDROCHLORIDE 20 MG/ML
INJECTION, SOLUTION EPIDURAL; INFILTRATION; INTRACAUDAL; PERINEURAL AS NEEDED
Status: DISCONTINUED | OUTPATIENT
Start: 2024-05-07 | End: 2024-05-07 | Stop reason: SURG

## 2024-05-07 RX ORDER — SPIRONOLACTONE 25 MG/1
12.5 TABLET ORAL DAILY
Start: 2024-05-07

## 2024-05-07 RX ORDER — SODIUM CHLORIDE 0.9 % (FLUSH) 0.9 %
10 SYRINGE (ML) INJECTION AS NEEDED
Status: DISCONTINUED | OUTPATIENT
Start: 2024-05-07 | End: 2024-05-07 | Stop reason: HOSPADM

## 2024-05-07 RX ORDER — SODIUM CHLORIDE 9 MG/ML
500 INJECTION, SOLUTION INTRAVENOUS CONTINUOUS PRN
Status: DISCONTINUED | OUTPATIENT
Start: 2024-05-07 | End: 2024-05-07 | Stop reason: HOSPADM

## 2024-05-07 RX ADMIN — ISOSORBIDE MONONITRATE 30 MG: 30 TABLET, EXTENDED RELEASE ORAL at 08:03

## 2024-05-07 RX ADMIN — SOTALOL HYDROCHLORIDE 80 MG: 80 TABLET ORAL at 08:03

## 2024-05-07 RX ADMIN — POLYETHYLENE GLYCOL 3350 0.5 BOTTLE: 17 POWDER, FOR SOLUTION ORAL at 03:38

## 2024-05-07 RX ADMIN — SODIUM CHLORIDE 500 ML: 0.9 INJECTION, SOLUTION INTRAVENOUS at 11:12

## 2024-05-07 RX ADMIN — SODIUM CHLORIDE 250 MG: 9 INJECTION, SOLUTION INTRAVENOUS at 08:04

## 2024-05-07 RX ADMIN — Medication 10 ML: at 08:10

## 2024-05-07 RX ADMIN — LOSARTAN POTASSIUM 25 MG: 50 TABLET, FILM COATED ORAL at 08:03

## 2024-05-07 RX ADMIN — PANTOPRAZOLE SODIUM 40 MG: 40 INJECTION, POWDER, FOR SOLUTION INTRAVENOUS at 08:04

## 2024-05-07 RX ADMIN — EMPAGLIFLOZIN 10 MG: 10 TABLET, FILM COATED ORAL at 08:03

## 2024-05-07 RX ADMIN — PROPOFOL 200 MG: 10 INJECTION, EMULSION INTRAVENOUS at 11:29

## 2024-05-07 RX ADMIN — LIDOCAINE HYDROCHLORIDE 80 MG: 20 INJECTION, SOLUTION EPIDURAL; INFILTRATION; INTRACAUDAL; PERINEURAL at 11:29

## 2024-05-07 RX ADMIN — PREGABALIN 150 MG: 75 CAPSULE ORAL at 08:03

## 2024-05-07 NOTE — PLAN OF CARE
Goal Outcome Evaluation:           Progress: no change  Outcome Evaluation: No c/o pain . Up ad jade. Voiding. Tap water enema given prior to colonoscopy.

## 2024-05-07 NOTE — PROGRESS NOTES
"    Chief Complaint: Rectal bleeding      Interval History:     Plans for colonoscopy today.  Had endoscopy performed yesterday showing gastritis.  Labs are stable after blood transfusion.  Still has some mild epigastric tenderness on palpation.  Abdomen is soft.  Denies any nausea, vomiting, rectal bleeding.  He completed bowel prep last night.    Review of Systems:   General ROS: negative for - chills or fever  Respiratory ROS: no cough, shortness of breath, or wheezing  Cardiovascular ROS: no chest pain or dyspnea on exertion  Gastrointestinal ROS: negative for - abdominal pain, diarrhea or nausea/vomiting       Vital Signs  Temp:  [97.3 °F (36.3 °C)-97.6 °F (36.4 °C)] 97.6 °F (36.4 °C)  Heart Rate:  [74-79] 78  Resp:  [14-23] 16  BP: ()/(36-65) 118/58    Intake/Output Summary (Last 24 hours) at 5/7/2024 0827  Last data filed at 5/6/2024 1831  Gross per 24 hour   Intake 480 ml   Output --   Net 480 ml       Physical Exam:     General Appearance:    Alert, cooperative, in no acute distress   Head:    N/A   Throat:   N/A   Neck:   N/A   Lungs:     Clear to auscultation,respirations regular, even and                  unlabored    Heart:    Regular rhythm and normal rate, normal S1 and S2, no            murmur, no gallop, no rub   Abdomen:     Normal bowel sounds, no masses, no organomegaly, soft        non-tender, non-distended, no guarding, no rebound                tenderness   Extremities:   No edema, no cyanosis, no clubbing   Pulses:   N/A   Skin:   N/A   Lymph nodes:   N/A   Neurologic:   N/A          Lab Review:       Lab Results (last 24 hours)       Procedure Component Value Units Date/Time    Tissue Pathology Exam [[id number]] Collected: 05/06/24 1208    Specimen: Tissue from Small Intestine, Tissue from Stomach, Tissue from Stomach Updated: 05/06/24 1427          Cultures:    No results found for: \"BLOODCX\", \"URINECX\", \"WOUNDCX\", \"MRSACX\", \"RESPCX\", \"STOOLCX\"    Radiology Review:  Imaging Results " (Last 24 Hours)       ** No results found for the last 24 hours. **                     ASSESSMENT:      Gastrointestinal bleeding, upper    Coronary artery disease of bypass graft of native heart with stable angina pectoris    Essential hypertension    Presence of biventricular AICD    Permanent atrial fibrillation    Chronic combined systolic and diastolic congestive heart failure    Symptomatic anemia    Iron deficiency anemia    GI bleed      PLAN:    1.  Gastroenterology following.  Plans for colonoscopy today.  Finished prep last night.  2.  Cardiology following the looking for source of bleeding so the anticoagulation not be properly adjusted given his significant cardiac history.  3.  Hemoglobin stable after transfusion.  Will monitor.  Give IV iron.  4.  Continue with proton pump inhibitor.    Further orders per Dr. Salvador.      Mendoza Smith, APRN  05/07/24  08:27 CDT        Part of this note may be an electronic transcription/translation of spoken language to printed text using the Dragon Dictation System.

## 2024-05-07 NOTE — DISCHARGE SUMMARY
DISCHARGE SUMMARY       Date of Admission: 5/3/2024  Date of Discharge:  5/7/2024  Primary Care Physician: Rolan Salvador MD    Discharge Diagnoses:    Gastrointestinal bleeding, upper    Coronary artery disease of bypass graft of native heart with stable angina pectoris    Essential hypertension    Presence of biventricular AICD    Permanent atrial fibrillation    Chronic combined systolic and diastolic congestive heart failure    Symptomatic anemia    Iron deficiency anemia    GI bleed        Presenting Problem/History of Present Illness  GI bleed [K92.2]       Hospital Course  Patient was sent to the hospital by nephrology because of outpatient labs showing worsening anemia.  He reported some intermittent dark stools.  He had previous iron deficiency and received IV iron and transfusion.  He had colonoscopy 2 years ago.  He has been to the hospital received 1 unit of packed red blood cells.  He did have stool occult blood positive.  He was seen by GI and underwent upper endoscopy which showed some findings of gastritis.  Biopsies showed mild chronic gastritis.  Small bowel biopsies negative.  He had a colonoscopy with 2 small polyps.  No clear active source of bleeding.  Case was discussed with cardiology and we are going to discontinue his Plavix.  I am also changing his Xarelto to Eliquis because of some data on decreased bleeding risks there.  Will also continue on 81 mg aspirin daily for his coronary disease.    Patient also complains of chronic nausea.  Discussed that this could be medication related.  We are going to discontinue metformin.  If he has continued nausea may try stopping other meds.    We have already made an outpatient referral to hematology because of recurrent iron deficiency anemia for consideration of intermittent iron infusions.    Procedures Performed  Procedure(s):  COLONOSCOPY WITH ANESTHESIA  05/07 1116 Colonoscopy  05/06 1200 Upper GI Endoscopy    Consults:   Consults        Date and Time Order Name Status Description    5/4/2024  9:06 AM Inpatient Cardiology Consult Completed     5/3/2024  7:38 PM Gastroenterology Consult              Pertinent Test Results:  Lab Results (most recent)       Procedure Component Value Units Date/Time    Tissue Pathology Exam [[id number]] Collected: 05/07/24 1136    Specimen: Polyp from Large Intestine Updated: 05/07/24 1446    Tissue Pathology Exam [[id number]] Collected: 05/06/24 1208    Specimen: Tissue from Small Intestine, Tissue from Stomach, Tissue from Stomach Updated: 05/07/24 1334     Note to Patients --     This report may contain a detailed description of human tissue sent by a health care provider to the laboratory for pathologic evaluation. The content of this report is essential for diagnosis and may provide important critical findings. This information may be unfamiliar to patients to review without a medical professional present. It is advised that the patient review this report in the presence of a health care provider who can answer questions and explain the results.       Case Report --     Surgical Pathology Report                         Case: [id number]                                  Authorizing Provider:  Jose Guzmán MD       Collected:           05/06/2024 12:08 PM          Ordering Location:     [facility]     Received:            05/06/2024 02:27 PM                                 ENDOSCOPY                                                                    Pathologist:           Cecil Silva MD                                                        Specimens:   1) - Small Intestine, small bowel bx - R/O Celiac                                                   2) - Stomach, antral bx - gastritis                                                                 3) - Stomach, gastric bx - gastritis                                                        Final Diagnosis --     1.  Small bowel,  "biopsies:  Small bowel mucosa with no significant pathologic changes.  No evidence of celiac disease.    2.  Antrum, biopsies:  Minimal chronic gastritis.    3.  Stomach, biopsies:  Minimal chronic gastritis.  Features suggestive of fundic gland polyp.       Gross Description --     1. Small Intestine.   Received in a formalin filled container labeled with the patient's name, date of birth, and \"small bowel biopsy rule out celiac\".  The specimen consists of 4 yellow-tan soft tissue fragments aggregating to 0.7 x 0.6 x 0.2 cm, totally submitted in block 1A.    2. Stomach.   Received in a formalin filled container labeled with the patient's name, date of birth, and \"antral biopsy gastritis\".  The specimen consists of 3 yellow to gray soft tissue fragments aggregating to 0.5 x 0.3 x 0.2 cm, totally submitted in block 2A    3. Stomach.   Received in a formalin filled container labeled with the patient's name, date of birth, and \"gastric biopsy\".  The specimen consists of 5 yellow-tan soft tissue fragments aggregating to 0.7 x 0.6 x 0.2 cm, totally submitted in block 3A.         Microscopic Description --     Microscopic examination performed.      Basic Metabolic Panel [[id number]]  (Abnormal) Collected: 05/06/24 0523    Specimen: Blood Updated: 05/06/24 0606     Glucose 102 mg/dL      BUN 7 mg/dL      Creatinine 0.78 mg/dL      Sodium 144 mmol/L      Potassium 3.8 mmol/L      Chloride 111 mmol/L      CO2 27.0 mmol/L      Calcium 8.8 mg/dL      BUN/Creatinine Ratio 9.0     Anion Gap 6.0 mmol/L      eGFR 91.9 mL/min/1.73     Narrative:      GFR Normal >60  Chronic Kidney Disease <60  Kidney Failure <15    The GFR formula is only valid for adults with stable renal function between ages 18 and 70.    CBC & Differential [[id number]]  (Abnormal) Collected: 05/06/24 0523    Specimen: Blood Updated: 05/06/24 0544    Narrative:      The following orders were created for panel order CBC & Differential.  Procedure                   "             Abnormality         Status                     ---------                               -----------         ------                     CBC Auto Differential[[id number]]        Abnormal            Final result                 Please view results for these tests on the individual orders.    CBC Auto Differential [[id number]]  (Abnormal) Collected: 05/06/24 0523    Specimen: Blood Updated: 05/06/24 0544     WBC 4.91 10*3/mm3      RBC 3.61 10*6/mm3      Hemoglobin 9.3 g/dL      Hematocrit 31.4 %      MCV 87.0 fL      MCH 25.8 pg      MCHC 29.6 g/dL      RDW 23.0 %      RDW-SD 65.7 fl      MPV 9.2 fL      Platelets 325 10*3/mm3      Neutrophil % 70.0 %      Lymphocyte % 14.1 %      Monocyte % 12.0 %      Eosinophil % 2.9 %      Basophil % 0.6 %      Immature Grans % 0.4 %      Neutrophils, Absolute 3.44 10*3/mm3      Lymphocytes, Absolute 0.69 10*3/mm3      Monocytes, Absolute 0.59 10*3/mm3      Eosinophils, Absolute 0.14 10*3/mm3      Basophils, Absolute 0.03 10*3/mm3      Immature Grans, Absolute 0.02 10*3/mm3      nRBC 0.0 /100 WBC     Protime-INR [[id number]]  (Abnormal) Collected: 05/05/24 0833    Specimen: Blood Updated: 05/05/24 0918     Protime 15.1 Seconds      INR 1.14    aPTT [[id number]]  (Normal) Collected: 05/05/24 0833    Specimen: Blood Updated: 05/05/24 0918     PTT 34.8 seconds     Basic Metabolic Panel [[id number]]  (Abnormal) Collected: 05/05/24 0540    Specimen: Blood Updated: 05/05/24 0612     Glucose 94 mg/dL      BUN 8 mg/dL      Creatinine 0.75 mg/dL      Sodium 145 mmol/L      Potassium 3.9 mmol/L      Chloride 110 mmol/L      CO2 27.0 mmol/L      Calcium 8.8 mg/dL      BUN/Creatinine Ratio 10.7     Anion Gap 8.0 mmol/L      eGFR 92.9 mL/min/1.73     Narrative:      GFR Normal >60  Chronic Kidney Disease <60  Kidney Failure <15    The GFR formula is only valid for adults with stable renal function between ages 18 and 70.    CBC & Differential [[id number]]  (Abnormal) Collected: 05/05/24  0540    Specimen: Blood Updated: 05/05/24 0555    Narrative:      The following orders were created for panel order CBC & Differential.  Procedure                               Abnormality         Status                     ---------                               -----------         ------                     CBC Auto Differential[[id number]]        Abnormal            Final result                 Please view results for these tests on the individual orders.    CBC Auto Differential [[id number]]  (Abnormal) Collected: 05/05/24 0540    Specimen: Blood Updated: 05/05/24 0555     WBC 4.92 10*3/mm3      RBC 3.56 10*6/mm3      Hemoglobin 9.1 g/dL      Hematocrit 30.8 %      MCV 86.5 fL      MCH 25.6 pg      MCHC 29.5 g/dL      RDW 22.1 %      RDW-SD 63.1 fl      MPV 8.9 fL      Platelets 318 10*3/mm3      Neutrophil % 68.6 %      Lymphocyte % 13.6 %      Monocyte % 13.4 %      Eosinophil % 3.0 %      Basophil % 0.6 %      Immature Grans % 0.8 %      Neutrophils, Absolute 3.37 10*3/mm3      Lymphocytes, Absolute 0.67 10*3/mm3      Monocytes, Absolute 0.66 10*3/mm3      Eosinophils, Absolute 0.15 10*3/mm3      Basophils, Absolute 0.03 10*3/mm3      Immature Grans, Absolute 0.04 10*3/mm3      nRBC 0.0 /100 WBC     Hemoglobin [[id number]]  (Abnormal) Collected: 05/04/24 1801    Specimen: Blood Updated: 05/04/24 1820     Hemoglobin 8.9 g/dL     Hemoglobin [[id number]]  (Abnormal) Collected: 05/04/24 1212    Specimen: Blood Updated: 05/04/24 1252     Hemoglobin 8.7 g/dL     POC Glucose Once [[id number]]  (Normal) Collected: 05/04/24 0445    Specimen: Blood Updated: 05/04/24 0456     Glucose 103 mg/dL      Comment: : [id number] ePark SystemstaneyMeter ID: [id number]       POC Glucose Once [[id number]]  (Normal) Collected: 05/03/24 2113    Specimen: Blood Updated: 05/03/24 2125     Glucose 89 mg/dL      Comment: : [id number] Robert BrittaneyMeter ID: [id number]       POC Occult Blood Stool [[id number]]  (Abnormal) Resulted: 05/03/24  1623    Specimen: Stool Updated: 05/03/24 1624     Fecal Occult Blood Positive     Lot Number [id number]     Expiration Date 07/31/2024     DEVELOPER LOT NUMBER 257     DEVELOPER EXPIRATION DATE 07/31/2024     Positive Control Positive     Negative Control Negative    Comprehensive Metabolic Panel [[id number]]  (Abnormal) Collected: 05/03/24 1357    Specimen: Blood Updated: 05/03/24 1436     Glucose 121 mg/dL      BUN 10 mg/dL      Creatinine 0.80 mg/dL      Sodium 144 mmol/L      Potassium 4.2 mmol/L      Comment: Slight hemolysis detected by analyzer. Result may be falsely elevated.        Chloride 108 mmol/L      CO2 25.0 mmol/L      Calcium 9.0 mg/dL      Total Protein 6.3 g/dL      Albumin 4.0 g/dL      ALT (SGPT) 8 U/L      AST (SGOT) 16 U/L      Alkaline Phosphatase 53 U/L      Total Bilirubin 0.4 mg/dL      Globulin 2.3 gm/dL      A/G Ratio 1.7 g/dL      BUN/Creatinine Ratio 12.5     Anion Gap 11.0 mmol/L      eGFR 91.2 mL/min/1.73     Narrative:      GFR Normal >60  Chronic Kidney Disease <60  Kidney Failure <15    The GFR formula is only valid for adults with stable renal function between ages 18 and 70.    Protime-INR [[id number]]  (Abnormal) Collected: 05/03/24 1357    Specimen: Blood Updated: 05/03/24 1429     Protime 23.0 Seconds      INR 1.95    [place] Draw [[id number]] Collected: 05/03/24 1357    Specimen: Blood Updated: 05/03/24 1415    Narrative:      The following orders were created for panel order [place] Draw.  Procedure                               Abnormality         Status                     ---------                               -----------         ------                     Green Top (Gel)[[id number]]                                  Final result               Lavender Top[[id number]]                                     Final result               Red Top[[id number]]                                          Final result               Marrufo Top[[id number]]                                         Final  result               Light Blue Top[[id number]]                                   Final result                 Please view results for these tests on the individual orders.    Green Top (Gel) [[id number]] Collected: 05/03/24 1357    Specimen: Blood Updated: 05/03/24 1415     Extra Tube Hold for add-ons.     Comment: Auto resulted.       Lavender Top [[id number]] Collected: 05/03/24 1357    Specimen: Blood Updated: 05/03/24 1415     Extra Tube hold for add-on     Comment: Auto resulted       Red Top [[id number]] Collected: 05/03/24 1357    Specimen: Blood Updated: 05/03/24 1415     Extra Tube Hold for add-ons.     Comment: Auto resulted.       Marrufo Top [[id number]] Collected: 05/03/24 1357    Specimen: Blood Updated: 05/03/24 1415     Extra Tube Hold for add-ons.     Comment: Auto resulted.       Light Blue Top [[id number]] Collected: 05/03/24 1357    Specimen: Blood Updated: 05/03/24 1415     Extra Tube Hold for add-ons.     Comment: Auto resulted               Condition on Discharge: Stable and improved    Discharge Disposition  Home or Self Care    Discharge Medications     Discharge Medications        New Medications        Instructions Start Date   apixaban 5 MG tablet tablet  Commonly known as: ELIQUIS   5 mg, Oral, 2 Times Daily             Continue These Medications        Instructions Start Date   atorvastatin 40 MG tablet  Commonly known as: LIPITOR   40 mg, Oral, Nightly      cetirizine 10 MG tablet  Commonly known as: zyrTEC   10 mg, Oral, Daily PRN      Cyanocobalamin 1000 MCG/ML kit   1 mL, Injection, Every 30 Days, On Fridays till 11/11/23 then monthly thereafter.      empagliflozin 10 MG tablet tablet  Commonly known as: Jardiance   10 mg, Oral, Daily      finasteride 5 MG tablet  Commonly known as: PROSCAR   5 mg, Oral, Daily      isosorbide mononitrate 30 MG 24 hr tablet  Commonly known as: IMDUR   30 mg, Oral, Every 24 Hours Scheduled      losartan 25 MG tablet  Commonly known as: COZAAR   25 mg, Oral,  Daily      Mirabegron ER 50 MG tablet sustained-release 24 hour 24 hr tablet  Commonly known as: MYRBETRIQ   50 mg, Oral, Daily      nitroglycerin 0.4 MG SL tablet  Commonly known as: NITROSTAT   0.4 mg, Sublingual, Every 5 Minutes PRN, Take no more than 3 doses in 15 minutes.      omeprazole 40 MG capsule  Commonly known as: priLOSEC   40 mg, Oral, Daily      pregabalin 150 MG capsule  Commonly known as: LYRICA   150 mg, Oral, 2 Times Daily      Ranexa 1000 MG 12 hr tablet  Generic drug: ranolazine   1,000 mg, Oral, 2 Times Daily      Sotalol HCl AF 80 MG tablet   80 mg, Oral, 2 Times Daily      spironolactone 25 MG tablet  Commonly known as: ALDACTONE   12.5 mg, Oral, Daily      traMADol-acetaminophen 37.5-325 MG per tablet  Commonly known as: ULTRACET   Take 1 tablet by mouth Every 12 (Twelve) Hours As Needed for Moderate Pain (pain).      zolpidem 5 MG tablet  Commonly known as: AMBIEN   5 mg, Oral, Nightly PRN             Stop These Medications      clopidogrel 75 MG tablet  Commonly known as: PLAVIX     metFORMIN 500 MG tablet  Commonly known as: GLUCOPHAGE     nystatin 533445 UNIT/GM powder  Commonly known as: MYCOSTATIN     rivaroxaban 20 MG tablet  Commonly known as: XARELTO              Discharge Diet:   Diet Instructions       Diet: Cardiac Diets; Healthy Heart (2-3 Na+); Thin (IDDSI 0)      Discharge Diet: Cardiac Diets    Cardiac Diet: Healthy Heart (2-3 Na+)    Fluid Consistency: Thin (IDDSI 0)            Discharge Care Plan / Instructions:    Activity at Discharge:   Activity Instructions       Activity as Tolerated              Follow-up Appointments  Future Appointments   Date Time Provider Department Center   5/15/2024  9:30 AM [facility] HEART FAILURE CLINIC - APC [facility] HFC None   5/15/2024 11:00 AM Erin Rocha PA [facility]   5/16/2024 10:00 AM CHAIR 12 [facility] OP INFU ONC [facility]   5/23/2024 12:30 PM [facility] CANCER CTR LAB [facility] CCLAB [facility] [place] [facility]   5/23/2024  1:00 PM Neetu Barron APRN  [facility] ONC PAD PAD   5/28/2024 11:00 AM [facility] ECHO ROOM 2 [facility] CARDI PAD   4/15/2025  1:30 PM [facility] HEART GROUP PAD DEVICE CHECK [facility] CD PAD PAD     Additional Instructions for the Follow-ups that You Need to Schedule       Discharge Follow-up with PCP   As directed       Currently Documented PCP:    Rolan Salvador MD    PCP Phone Number:    [phone]     Follow Up Details: 1 week                Test Results Pending at Discharge  Pending Labs       Order Current Status    Tissue Pathology Exam In process             Rolan Salvador MD  05/07/24  17:22 CDT        Part of this note may be an electronic transcription/translation of spoken language to printed text using the Dragon Dictation System.

## 2024-05-07 NOTE — PROGRESS NOTES
Brief (courtesy) procedure note:    Procedure: Colonoscopy    Pre-op diagnosis: GI bleed    Post-op diagnosis: 2 diminutive polyps. Diverticulosis. Hemorrhoids.     Recommendations: Await GASTRIC biopsies.     Please refer to the Provation-generated note for photos and further details.     Jose Guzmán MD

## 2024-05-14 ENCOUNTER — READMISSION MANAGEMENT (OUTPATIENT)
Dept: CALL CENTER | Facility: HOSPITAL | Age: 79
End: 2024-05-14
Payer: MEDICARE

## 2024-05-14 ENCOUNTER — OFFICE VISIT (OUTPATIENT)
Dept: GASTROENTEROLOGY | Facility: CLINIC | Age: 79
End: 2024-05-14
Payer: MEDICARE

## 2024-05-14 VITALS
WEIGHT: 199.8 LBS | OXYGEN SATURATION: 97 % | TEMPERATURE: 96.4 F | SYSTOLIC BLOOD PRESSURE: 130 MMHG | DIASTOLIC BLOOD PRESSURE: 70 MMHG | HEART RATE: 80 BPM | BODY MASS INDEX: 27.97 KG/M2 | HEIGHT: 71 IN

## 2024-05-14 DIAGNOSIS — Z78.9 NONSMOKER: ICD-10-CM

## 2024-05-14 DIAGNOSIS — D50.9 IRON DEFICIENCY ANEMIA, UNSPECIFIED IRON DEFICIENCY ANEMIA TYPE: Primary | ICD-10-CM

## 2024-05-14 RX ORDER — METFORMIN HYDROCHLORIDE 500 MG/1
500 TABLET, EXTENDED RELEASE ORAL 2 TIMES DAILY
COMMUNITY
End: 2024-05-15

## 2024-05-14 RX ORDER — TORSEMIDE 20 MG/1
20 TABLET ORAL AS NEEDED
COMMUNITY

## 2024-05-14 NOTE — PROGRESS NOTES
Darren Maria  1945 5/14/2024  Chief Complaint   Patient presents with    GI Problem     hfu -gi bleed     Subjective   HPI  Darren Maria is a 79 y.o. male who presents with a recent hospitalization H/H on 5/3/24 7.2/24.6. intermittent black stool. Most recent 5/6/24 9.3/31.4.   CBC & Differential (05/06/2024 05:23)   Hospital Course admission 5/3/24 discharged on 5/7/24  Patient was sent to the hospital by nephrology because of outpatient labs showing worsening anemia.  He reported some intermittent dark stools.  He had previous iron deficiency and received IV iron and transfusion.  He had colonoscopy 2 years ago.  He has been to the hospital received 1 unit of packed red blood cells.  He did have stool occult blood positive.  He was seen by GI and underwent upper endoscopy which showed some findings of gastritis.  Biopsies showed mild chronic gastritis.  Small bowel biopsies negative.  He had a colonoscopy with 2 small polyps.  No clear active source of bleeding.  Case was discussed with cardiology and we are going to discontinue his Plavix.  I am also changing his Xarelto to Eliquis because of some data on decreased bleeding risks there.  Will also continue on 81 mg aspirin daily for his coronary disease.     Patient also complains of chronic nausea.  Discussed that this could be medication related.  We are going to discontinue metformin.  If he has continued nausea may try stopping other meds.     We have already made an outpatient referral to hematology because of recurrent iron deficiency anemia for consideration of intermittent iron infusions.  Upper GI Endoscopy (05/06/2024 12:00)    - Normal esophagus. - Enlarged gastric folds. Biopsied. - Gastritis. Biopsied. - Normal examined duodenum. Biopsied.  Colonoscopy (05/07/2024 11:16)   - The examined portion of the ileum was normal. - Two diminutive polyps in the distal transverse colon, removed with a cold snare. Resected and retrieved. -  Diverticulosis in the sigmoid colon. - Non-bleeding hemorrhoids. - The examination was otherwise normal.  Past Medical History:   Diagnosis Date    Abdominal pain, epigastric     Abnormal abdominal exam     ABFND, RADIOLOGICAL, ABDOMINAL AREA     Abnormal ECG     Anticoagulated on Coumadin     Atherosclerosis of coronary artery bypass graft     ATHEROSCLEROSIS, CORONARY, ARTERY BYPASS GRFT    Atrial fibrillation     Cancer of right kidney     right sided kidney cancer    Cardiomyopathy     Cardiomyopathy, primary     CHF (congestive heart failure)     Clotting disorder     Colon polyps     Congenital heart disease     Congestive heart failure     Coronary artery disease     History of CAD/A-Fib/Pacemaker/Defibrillator placement: pt takes Coumadin and plavix therapy as well as ASA daily    Diabetes mellitus     Gastric polyp     Gastroesophageal reflux disease without esophagitis     History of colon polyps     Hypercholesterolemia     Hypertension, essential     Melena     Myocardial infarction     NSAID long-term use     Obesity     Pacemaker     Pacemaker Dependant    Platelet inhibition due to Plavix     Presence of stent in coronary artery     Multiple coronary stenting most recently 4/2008    Single kidney     Only 1 Kidney    Status post surgery     s/p Polypectomy Bleed     Past Surgical History:   Procedure Laterality Date    ABLATION OF DYSRHYTHMIC FOCUS      APPENDECTOMY      CARDIAC ABLATION      CARDIAC CATHETERIZATION      CARDIAC CATHETERIZATION Left 12/15/2021    Procedure: Coronary angiography;  Surgeon: Sarbjit Posadas MD;  Location:  [facility];  Service: Cardiology;  Laterality: Left;    CARDIAC CATHETERIZATION Left 12/15/2021    Procedure: Percutaneous Coronary Intervention;  Surgeon: Sarbjit Posadas MD;  Location:  [facility];  Service: Cardiology;  Laterality: Left;    CARDIAC DEFIBRILLATOR PLACEMENT      CARDIAC PACEMAKER PLACEMENT      Pacemaker Placement     CAROTID STENT  2001    CATARACT EXTRACTION, BILATERAL      CHOLECYSTECTOMY      COLONOSCOPY  08/27/2013    snare hep, trans tics recall 3 yr    COLONOSCOPY  10/02/2006    few scattered diverticula    COLONOSCOPY  07/22/2009    tubular adenoma in the ascending colon    COLONOSCOPY  07/25/2005    several polyps in the ascending colon, one in the transverse colon    COLONOSCOPY N/A 03/21/2022    Procedure: COLONOSCOPY WITH ANESTHESIA;  Surgeon: Phil Goodwin MD;  Location:  [facility];  Service: Gastroenterology;  Laterality: N/A;  pre: hx polyps  post: polyps  Rolan Salvador MD    COLONOSCOPY N/A 05/07/2024    The examined portion of the ileum was normal. - Two diminutive tubular adenoma polyps in the distal transverse colon, removed with a cold snare. Resected and retrieved. - Diverticulosis in the sigmoid colon. - Non-bleeding hemorrhoids. - The examination was otherwise normal    COLONOSCOPY W/ POLYPECTOMY  09/19/2016    Tubular adenoma hepatic flexure, 2 Hyperplastic polyps rectum and at 50 cm, Benign polyp at 70 cm repeat exam in 3-5 years    CORONARY ARTERY BYPASS GRAFT  02/2011    2nd time    CORONARY ARTERY BYPASS GRAFT  1990    CORONARY STENT PLACEMENT      ENDOSCOPY  04/14/2016    nl slant    ENDOSCOPY  11/10/2014    clips at polypectomy bx no residual recall 1 yr    ENDOSCOPY  06/19/2014    snare clip body recall 6 months    ENDOSCOPY  05/19/2014    polyp stomach bx and clip    ENDOSCOPY N/A 05/06/2024    1. Small bowel, biopsies: Small bowel mucosa with no significant pathologic changes. No evidence of celiac disease. 2. Antrum, biopsies: Minimal chronic gastritis. 3. Stomach, biopsies: Minimal chronic gastritis. Features suggestive of fundic gland polyp    HERNIA REPAIR      with mesh    ICD GENERATOR REPLACEMENT N/A 02/28/2024    Procedure: ICD Generator Change - Bi-Ventricular (27855) with new LV lead implant (35275);  Surgeon: Devika Oliver MD;  Location:  [facility]  [facility];  Service: Cardiovascular;  Laterality: N/A;    INTERVENTIONAL RADIOLOGY PROCEDURE Left 01/17/2024    Procedure: Venogram upper extremity left, 04646, 89401;  Surgeon: Devika Oliver MD;  Location:  [facility];  Service: Cardiovascular;  Laterality: Left;    JOINT REPLACEMENT      OTHER SURGICAL HISTORY      Defibrillator/Pacer maker exchange    OTHER SURGICAL HISTORY      Pacemaker/Defibillation exchange 2012    PENILE PROSTHESIS IMPLANT N/A 07/27/2018    Procedure: PENILE PROSTHESIS PLACEMENT;  Surgeon: Godwin Gupta MD;  Location:  [facility] OR;  Service: Urology    PERIPHERAL ARTERIAL STENT GRAFT         Outpatient Medications Marked as Taking for the 5/14/24 encounter (Office Visit) with Lainey Torres APRN   Medication Sig Dispense Refill    apixaban (ELIQUIS) 5 MG tablet tablet Take 1 tablet by mouth 2 (Two) Times a Day. 60 tablet 5    atorvastatin (LIPITOR) 40 MG tablet Take 1 tablet by mouth Every Night.      cetirizine (zyrTEC) 10 MG tablet Take 1 tablet by mouth Daily As Needed for Allergies.      Cyanocobalamin 1000 MCG/ML kit Inject 1 mL as directed Every 30 (Thirty) Days. On Fridays till 11/11/23 then monthly thereafter.      empagliflozin (Jardiance) 10 MG tablet tablet Take 1 tablet by mouth Daily. 30 tablet 11    finasteride (PROSCAR) 5 MG tablet Take 1 tablet by mouth Daily.      isosorbide mononitrate (IMDUR) 30 MG 24 hr tablet Take 1 tablet by mouth Daily. 30 tablet 5    metFORMIN ER (GLUCOPHAGE-XR) 500 MG 24 hr tablet Take 1 tablet by mouth 2 (Two) Times a Day.      Mirabegron ER (MYRBETRIQ) 50 MG tablet sustained-release 24 hour 24 hr tablet Take 50 mg by mouth Daily.      nitroglycerin (NITROSTAT) 0.4 MG SL tablet Place 1 tablet under the tongue Every 5 (Five) Minutes As Needed for Chest Pain. Take no more than 3 doses in 15 minutes. 25 tablet 2    omeprazole (PriLOSEC) 40 MG capsule Take 1 capsule by mouth Daily.      pregabalin (LYRICA) 150 MG capsule Take  "1 capsule by mouth 2 (Two) Times a Day.      ranolazine (Ranexa) 1000 MG 12 hr tablet Take 1 tablet by mouth 2 (Two) Times a Day.      Sotalol HCl AF 80 MG tablet Take 1 tablet by mouth 2 (Two) Times a Day. 180 tablet 3    spironolactone (ALDACTONE) 25 MG tablet Take 0.5 tablets by mouth Daily.      torsemide (DEMADEX) 20 MG tablet Take 1 tablet by mouth As Needed.      traMADol-acetaminophen (ULTRACET) 37.5-325 MG per tablet Take 1 tablet by mouth Every 12 (Twelve) Hours As Needed for Moderate Pain (pain).  1    zolpidem (AMBIEN) 5 MG tablet Take 1 tablet by mouth At Night As Needed for Sleep.       Allergies   Allergen Reactions    Azithromycin Nausea And Vomiting    Morphine And Related Other (See Comments)     SHAKES AND MAKES HIM \"WIRED\".   TAKES TRAMADOL WITHOUT PROBLEM     Social History     Socioeconomic History    Marital status:    Tobacco Use    Smoking status: Never    Smokeless tobacco: Never   Vaping Use    Vaping status: Never Used   Substance and Sexual Activity    Alcohol use: Not Currently     Comment: occ    Drug use: Never    Sexual activity: Not Currently     Partners: Female     Birth control/protection: Vasectomy     Family History   Problem Relation Age of Onset    Lung cancer Mother     Heart attack Mother     No Known Problems Father     Other Neg Hx         GI Malignancies    Colon cancer Neg Hx     Colon polyps Neg Hx      Health Maintenance   Topic Date Due    URINE MICROALBUMIN  Never done    Pneumococcal Vaccine 65+ (1 of 2 - PCV) Never done    RSV Vaccine - Adults (1 - 1-dose 60+ series) Never done    ZOSTER VACCINE (2 of 3) 08/04/2020    DIABETIC EYE EXAM  04/26/2022    BMI FOLLOWUP  10/26/2022    COVID-19 Vaccine (5 - 2023-24 season) 09/01/2023    HEMOGLOBIN A1C  05/29/2024    INFLUENZA VACCINE  08/01/2024    LIPID PANEL  11/29/2024    ANNUAL WELLNESS VISIT  12/06/2024    COLORECTAL CANCER SCREENING  05/07/2027    TDAP/TD VACCINES (2 - Td or Tdap) 08/14/2029    HEPATITIS C " "SCREENING  Completed     Review of Systems   Constitutional:  Negative for activity change, appetite change, chills, diaphoresis, fatigue, fever and unexpected weight change.   HENT:  Negative for ear pain, hearing loss, mouth sores, sore throat, trouble swallowing and voice change.    Eyes: Negative.    Respiratory:  Negative for cough, choking, shortness of breath and wheezing.    Cardiovascular:  Negative for chest pain and palpitations.   Gastrointestinal:  Negative for abdominal pain, blood in stool, constipation, diarrhea, nausea and vomiting.   Endocrine: Negative for cold intolerance and heat intolerance.   Genitourinary:  Negative for decreased urine volume, dysuria, frequency, hematuria and urgency.   Musculoskeletal:  Negative for back pain, gait problem and myalgias.   Skin:  Negative for color change, pallor and rash.   Allergic/Immunologic: Negative for food allergies and immunocompromised state.   Neurological:  Negative for dizziness, tremors, seizures, syncope, weakness, light-headedness, numbness and headaches.   Hematological:  Negative for adenopathy. Does not bruise/bleed easily.   Psychiatric/Behavioral:  Negative for agitation and confusion. The patient is not nervous/anxious.    All other systems reviewed and are negative.    Objective   Vitals:    05/14/24 1034   BP: 130/70   BP Location: Left arm   Pulse: 80   Temp: 96.4 °F (35.8 °C)   TempSrc: Temporal   SpO2: 97%   Weight: 90.6 kg (199 lb 12.8 oz)   Height: 180.3 cm (70.98\")     Body mass index is 27.88 kg/m².  Physical Exam  Constitutional:       Appearance: He is well-developed.   HENT:      Head: Normocephalic and atraumatic.   Eyes:      Pupils: Pupils are equal, round, and reactive to light.   Neck:      Trachea: No tracheal deviation.   Cardiovascular:      Rate and Rhythm: Normal rate and regular rhythm.      Heart sounds: Normal heart sounds. No murmur heard.     No friction rub. No gallop.   Pulmonary:      Effort: Pulmonary " effort is normal. No respiratory distress.      Breath sounds: Normal breath sounds. No wheezing or rales.   Chest:      Chest wall: No tenderness.   Abdominal:      General: Bowel sounds are normal. There is no distension.      Palpations: Abdomen is soft. Abdomen is not rigid.      Tenderness: There is no abdominal tenderness. There is no guarding or rebound.   Musculoskeletal:         General: No tenderness or deformity. Normal range of motion.      Cervical back: Normal range of motion and neck supple.   Skin:     General: Skin is warm and dry.      Coloration: Skin is not pale.      Findings: No rash.   Neurological:      Mental Status: He is alert and oriented to person, place, and time.      Deep Tendon Reflexes: Reflexes are normal and symmetric.   Psychiatric:         Behavior: Behavior normal.         Thought Content: Thought content normal.         Judgment: Judgment normal.       Assessment & Plan   Diagnoses and all orders for this visit:    1. Iron deficiency anemia, unspecified iron deficiency anemia type (Primary)    2. Nonsmoker    He is off of Xarelto and now on eliquis he is off Plavix as well he was taking both.   No signs for recurrent bleeding. We discussed M2A capsule and I went over all R/B/I/A and he does not desire at this time. I advised him to follow his H/H with PCP and transfuse as needed. To come back with any recurrent signs of bleeding.       Part of this note may be an electronic transcription/translation of spoken language to printed text using the Dragon Dictation System.  Body mass index is 27.88 kg/m².  No follow-ups on file.    BMI is >= 25 and <30. (Overweight) The following options were offered after discussion;: weight loss educational material (shared in after visit summary)      All risks, benefits, alternatives, and indications of colonoscopy and/or Endoscopy procedure have been discussed with the patient. Risks to include perforation of the colon requiring possible  surgery or colostomy, risk of bleeding from biopsies or removal of colon tissue, possibility of missing a colon polyp or cancer, or adverse drug reaction.  Benefits to include the diagnosis and management of disease of the colon and rectum. Alternatives to include barium enema, radiographic evaluation, lab testing or no intervention. Pt verbalizes understanding and agrees.     Lainey Torres, APRN  5/14/2024  10:55 CDT          If you smoke or use tobacco, 4 minutes reading provided  Steps to Quit Smoking  Smoking tobacco can be harmful to your health and can affect almost every organ in your body. Smoking puts you, and those around you, at risk for developing many serious chronic diseases. Quitting smoking is difficult, but it is one of the best things that you can do for your health. It is never too late to quit.  What are the benefits of quitting smoking?  When you quit smoking, you lower your risk of developing serious diseases and conditions, such as:  Lung cancer or lung disease, such as COPD.  Heart disease.  Stroke.  Heart attack.  Infertility.  Osteoporosis and bone fractures.  Additionally, symptoms such as coughing, wheezing, and shortness of breath may get better when you quit. You may also find that you get sick less often because your body is stronger at fighting off colds and infections. If you are pregnant, quitting smoking can help to reduce your chances of having a baby of low birth weight.  How do I get ready to quit?  When you decide to quit smoking, create a plan to make sure that you are successful. Before you quit:  Pick a date to quit. Set a date within the next two weeks to give you time to prepare.  Write down the reasons why you are quitting. Keep this list in places where you will see it often, such as on your bathroom mirror or in your car or wallet.  Identify the people, places, things, and activities that make you want to smoke (triggers) and avoid them. Make sure to take these  actions:  Throw away all cigarettes at home, at work, and in your car.  Throw away smoking accessories, such as ashtrays and lighters.  Clean your car and make sure to empty the ashtray.  Clean your home, including curtains and carpets.  Tell your family, friends, and coworkers that you are quitting. Support from your loved ones can make quitting easier.  Talk with your health care provider about your options for quitting smoking.  Find out what treatment options are covered by your health insurance.  What strategies can I use to quit smoking?  Talk with your healthcare provider about different strategies to quit smoking. Some strategies include:  Quitting smoking altogether instead of gradually lessening how much you smoke over a period of time. Research shows that quitting “cold turkey” is more successful than gradually quitting.  Attending in-person counseling to help you build problem-solving skills. You are more likely to have success in quitting if you attend several counseling sessions. Even short sessions of 10 minutes can be effective.  Finding resources and support systems that can help you to quit smoking and remain smoke-free after you quit. These resources are most helpful when you use them often. They can include:  Online chats with a counselor.  Telephone quitlines.  Printed self-help materials.  Support groups or group counseling.  Text messaging programs.  Mobile phone applications.  Taking medicines to help you quit smoking. (If you are pregnant or breastfeeding, talk with your health care provider first.) Some medicines contain nicotine and some do not. Both types of medicines help with cravings, but the medicines that include nicotine help to relieve withdrawal symptoms. Your health care provider may recommend:  Nicotine patches, gum, or lozenges.  Nicotine inhalers or sprays.  Non-nicotine medicine that is taken by mouth.  Talk with your health care provider about combining strategies, such as  taking medicines while you are also receiving in-person counseling. Using these two strategies together makes you more likely to succeed in quitting than if you used either strategy on its own.  If you are pregnant or breastfeeding, talk with your health care provider about finding counseling or other support strategies to quit smoking. Do not take medicine to help you quit smoking unless told to do so by your health care provider.  What things can I do to make it easier to quit?  Quitting smoking might feel overwhelming at first, but there is a lot that you can do to make it easier. Take these important actions:  Reach out to your family and friends and ask that they support and encourage you during this time. Call telephone quitlines, reach out to support groups, or work with a counselor for support.  Ask people who smoke to avoid smoking around you.  Avoid places that trigger you to smoke, such as bars, parties, or smoke-break areas at work.  Spend time around people who do not smoke.  Lessen stress in your life, because stress can be a smoking trigger for some people. To lessen stress, try:  Exercising regularly.  Deep-breathing exercises.  Yoga.  Meditating.  Performing a body scan. This involves closing your eyes, scanning your body from head to toe, and noticing which parts of your body are particularly tense. Purposefully relax the muscles in those areas.  Download or purchase mobile phone or tablet apps (applications) that can help you stick to your quit plan by providing reminders, tips, and encouragement. There are many free apps, such as QuitGuide from the CDC (Centers for Disease Control and Prevention). You can find other support for quitting smoking (smoking cessation) through smokefree.gov and other websites.  How will I feel when I quit smoking?  Within the first 24 hours of quitting smoking, you may start to feel some withdrawal symptoms. These symptoms are usually most noticeable 2-3 days after  quitting, but they usually do not last beyond 2-3 weeks. Changes or symptoms that you might experience include:  Mood swings.  Restlessness, anxiety, or irritation.  Difficulty concentrating.  Dizziness.  Strong cravings for sugary foods in addition to nicotine.  Mild weight gain.  Constipation.  Nausea.  Coughing or a sore throat.  Changes in how your medicines work in your body.  A depressed mood.  Difficulty sleeping (insomnia).  After the first 2-3 weeks of quitting, you may start to notice more positive results, such as:  Improved sense of smell and taste.  Decreased coughing and sore throat.  Slower heart rate.  Lower blood pressure.  Clearer skin.  The ability to breathe more easily.  Fewer sick days.  Quitting smoking is very challenging for most people. Do not get discouraged if you are not successful the first time. Some people need to make many attempts to quit before they achieve long-term success. Do your best to stick to your quit plan, and talk with your health care provider if you have any questions or concerns.  This information is not intended to replace advice given to you by your health care provider. Make sure you discuss any questions you have with your health care provider.  Document Released: 12/12/2002 Document Revised: 08/15/2017 Document Reviewed: 05/03/2016  [employer] Interactive Patient Education © 2017 [employer]

## 2024-05-14 NOTE — OUTREACH NOTE
Medical Week 1 Survey      Flowsheet Row Responses   [facility], [place] patient discharged from? [place]   Does the patient have one of the following disease processes/diagnoses(primary or secondary)? Other   Week 1 attempt successful? Yes   Call start time 1444   Call end time 1450   Discharge diagnosis GI bleed   Medication alerts for this patient ELIQUIS   Meds reviewed with patient/caregiver? Yes   Is the patient having any side effects they believe may be caused by any medication additions or changes? No   Does the patient have all medications ordered at discharge? Yes   Is the patient taking all medications as directed (includes completed medication regime)? Yes   Medication comments Pt aware of other meds changes on AVS   Comments regarding appointments GI on 5/14/24   Does the patient have a primary care provider?  Yes   Does the patient have an appointment with their PCP within 7 days of discharge? Yes  [5/13/24]   Has the patient kept scheduled appointments due by today? Yes   Comments Hematology on 5/23/24   Has home health visited the patient within 72 hours of discharge? N/A   Psychosocial issues? No   Did the patient receive a copy of their discharge instructions? Yes   Nursing interventions Reviewed instructions with patient   What is the patient's perception of their health status since discharge? Improving  [Reports he is doing well, no s/s bleeding or anemia noted. Completed GI appt today and pill cam discussed but pt declines at this time. Reviewed return precautions with pt. Pt has upcoming hematology appt]   Is the patient/caregiver able to teach back signs and symptoms related to disease process for when to call PCP? Yes   Is the patient/caregiver able to teach back signs and symptoms related to disease process for when to call 911? Yes   Additional teach back comments reports he has new PM/defib back in Feb 2024,  no issues noted   Week 1 call completed? Yes   Graduated Yes  [Improving, no  immediate needs, appts in place]   Call end time 1450            NAMAN MAHONEY - Registered Nurse

## 2024-05-15 ENCOUNTER — OFFICE VISIT (OUTPATIENT)
Dept: CARDIOLOGY | Facility: CLINIC | Age: 79
End: 2024-05-15
Payer: MEDICARE

## 2024-05-15 ENCOUNTER — HOSPITAL ENCOUNTER (OUTPATIENT)
Dept: CARDIOLOGY | Facility: HOSPITAL | Age: 79
Discharge: HOME | End: 2024-05-15
Payer: MEDICARE

## 2024-05-15 VITALS
BODY MASS INDEX: 27.86 KG/M2 | SYSTOLIC BLOOD PRESSURE: 122 MMHG | WEIGHT: 199 LBS | DIASTOLIC BLOOD PRESSURE: 72 MMHG | HEIGHT: 71 IN | OXYGEN SATURATION: 99 % | HEART RATE: 75 BPM

## 2024-05-15 VITALS
HEART RATE: 75 BPM | BODY MASS INDEX: 27.77 KG/M2 | WEIGHT: 199 LBS | OXYGEN SATURATION: 98 % | SYSTOLIC BLOOD PRESSURE: 120 MMHG | DIASTOLIC BLOOD PRESSURE: 69 MMHG

## 2024-05-15 DIAGNOSIS — D50.0 ANEMIA DUE TO GI BLOOD LOSS: Primary | ICD-10-CM

## 2024-05-15 DIAGNOSIS — Z95.810 PRESENCE OF BIVENTRICULAR AICD: ICD-10-CM

## 2024-05-15 DIAGNOSIS — I47.29 VENTRICULAR TACHYCARDIA (PAROXYSMAL): ICD-10-CM

## 2024-05-15 DIAGNOSIS — I10 ESSENTIAL HYPERTENSION: ICD-10-CM

## 2024-05-15 DIAGNOSIS — D50.9 IRON DEFICIENCY ANEMIA, UNSPECIFIED IRON DEFICIENCY ANEMIA TYPE: ICD-10-CM

## 2024-05-15 DIAGNOSIS — I25.5 ISCHEMIC CARDIOMYOPATHY: ICD-10-CM

## 2024-05-15 DIAGNOSIS — Z98.890 S/P AV NODAL ABLATION: ICD-10-CM

## 2024-05-15 DIAGNOSIS — I50.42 CHRONIC COMBINED SYSTOLIC AND DIASTOLIC CONGESTIVE HEART FAILURE: Primary | ICD-10-CM

## 2024-05-15 DIAGNOSIS — I48.21 PERMANENT ATRIAL FIBRILLATION: ICD-10-CM

## 2024-05-15 DIAGNOSIS — I25.708 CORONARY ARTERY DISEASE OF BYPASS GRAFT OF NATIVE HEART WITH STABLE ANGINA PECTORIS: ICD-10-CM

## 2024-05-15 LAB
ABSOLUTE LUNG FLUID CONTENT: 29 % (ref 20–35)
ALBUMIN SERPL-MCNC: 3.9 G/DL (ref 3.5–5.2)
ALBUMIN/GLOB SERPL: 1.6 G/DL
ALP SERPL-CCNC: 65 U/L (ref 39–117)
ALT SERPL W P-5'-P-CCNC: 10 U/L (ref 1–41)
ANION GAP SERPL CALCULATED.3IONS-SCNC: 10 MMOL/L (ref 5–15)
ANION GAP SERPL CALCULATED.3IONS-SCNC: 10 MMOL/L (ref 5–15)
AST SERPL-CCNC: 18 U/L (ref 1–40)
BASOPHILS # BLD AUTO: 0.03 10*3/MM3 (ref 0–0.2)
BASOPHILS NFR BLD AUTO: 0.5 % (ref 0–1.5)
BILIRUB SERPL-MCNC: 0.7 MG/DL (ref 0–1.2)
BUN SERPL-MCNC: 11 MG/DL (ref 8–23)
BUN SERPL-MCNC: 11 MG/DL (ref 8–23)
BUN/CREAT SERPL: 12.6 (ref 7–25)
BUN/CREAT SERPL: 12.6 (ref 7–25)
CALCIUM SPEC-SCNC: 8.7 MG/DL (ref 8.6–10.5)
CALCIUM SPEC-SCNC: 8.7 MG/DL (ref 8.6–10.5)
CHLORIDE SERPL-SCNC: 107 MMOL/L (ref 98–107)
CHLORIDE SERPL-SCNC: 107 MMOL/L (ref 98–107)
CO2 SERPL-SCNC: 25 MMOL/L (ref 22–29)
CO2 SERPL-SCNC: 25 MMOL/L (ref 22–29)
CREAT SERPL-MCNC: 0.87 MG/DL (ref 0.76–1.27)
CREAT SERPL-MCNC: 0.87 MG/DL (ref 0.76–1.27)
DEPRECATED RDW RBC AUTO: 85.9 FL (ref 37–54)
DEPRECATED RDW RBC AUTO: 87.4 FL (ref 37–54)
EGFRCR SERPLBLD CKD-EPI 2021: 88.9 ML/MIN/1.73
EGFRCR SERPLBLD CKD-EPI 2021: 88.9 ML/MIN/1.73
EOSINOPHIL # BLD AUTO: 0.14 10*3/MM3 (ref 0–0.4)
EOSINOPHIL NFR BLD AUTO: 2.4 % (ref 0.3–6.2)
ERYTHROCYTE [DISTWIDTH] IN BLOOD BY AUTOMATED COUNT: 25.7 % (ref 12.3–15.4)
ERYTHROCYTE [DISTWIDTH] IN BLOOD BY AUTOMATED COUNT: 25.9 % (ref 12.3–15.4)
FERRITIN SERPL-MCNC: 369.7 NG/ML (ref 30–400)
GLOBULIN UR ELPH-MCNC: 2.4 GM/DL
GLUCOSE SERPL-MCNC: 162 MG/DL (ref 65–99)
GLUCOSE SERPL-MCNC: 162 MG/DL (ref 65–99)
HCT VFR BLD AUTO: 37.8 % (ref 37.5–51)
HCT VFR BLD AUTO: 38.8 % (ref 37.5–51)
HGB BLD-MCNC: 11.2 G/DL (ref 13–17.7)
HGB BLD-MCNC: 11.3 G/DL (ref 13–17.7)
IMM GRANULOCYTES # BLD AUTO: 0.03 10*3/MM3 (ref 0–0.05)
IMM GRANULOCYTES NFR BLD AUTO: 0.5 % (ref 0–0.5)
IRON 24H UR-MRATE: 92 MCG/DL (ref 59–158)
IRON SATN MFR SERPL: 28 % (ref 20–50)
LYMPHOCYTES # BLD AUTO: 0.62 10*3/MM3 (ref 0.7–3.1)
LYMPHOCYTES NFR BLD AUTO: 10.5 % (ref 19.6–45.3)
MCH RBC QN AUTO: 26.7 PG (ref 26.6–33)
MCH RBC QN AUTO: 27.4 PG (ref 26.6–33)
MCHC RBC AUTO-ENTMCNC: 28.9 G/DL (ref 31.5–35.7)
MCHC RBC AUTO-ENTMCNC: 29.9 G/DL (ref 31.5–35.7)
MCV RBC AUTO: 91.7 FL (ref 79–97)
MCV RBC AUTO: 92.4 FL (ref 79–97)
MONOCYTES # BLD AUTO: 0.6 10*3/MM3 (ref 0.1–0.9)
MONOCYTES NFR BLD AUTO: 10.2 % (ref 5–12)
NEUTROPHILS NFR BLD AUTO: 4.47 10*3/MM3 (ref 1.7–7)
NEUTROPHILS NFR BLD AUTO: 75.9 % (ref 42.7–76)
NRBC BLD AUTO-RTO: 0 /100 WBC (ref 0–0.2)
PLATELET # BLD AUTO: 208 10*3/MM3 (ref 140–450)
PLATELET # BLD AUTO: 220 10*3/MM3 (ref 140–450)
PMV BLD AUTO: 10 FL (ref 6–12)
PMV BLD AUTO: 9.5 FL (ref 6–12)
POTASSIUM SERPL-SCNC: 4.4 MMOL/L (ref 3.5–5.2)
POTASSIUM SERPL-SCNC: 4.4 MMOL/L (ref 3.5–5.2)
PROT SERPL-MCNC: 6.3 G/DL (ref 6–8.5)
RBC # BLD AUTO: 4.12 10*6/MM3 (ref 4.14–5.8)
RBC # BLD AUTO: 4.2 10*6/MM3 (ref 4.14–5.8)
SODIUM SERPL-SCNC: 142 MMOL/L (ref 136–145)
SODIUM SERPL-SCNC: 142 MMOL/L (ref 136–145)
TIBC SERPL-MCNC: 331 MCG/DL (ref 298–536)
TRANSFERRIN SERPL-MCNC: 222 MG/DL (ref 200–360)
WBC NRBC COR # BLD AUTO: 5.89 10*3/MM3 (ref 3.4–10.8)
WBC NRBC COR # BLD AUTO: 6.19 10*3/MM3 (ref 3.4–10.8)

## 2024-05-15 PROCEDURE — 3074F SYST BP LT 130 MM HG: CPT | Performed by: PHYSICIAN ASSISTANT

## 2024-05-15 PROCEDURE — 94726 PLETHYSMOGRAPHY LUNG VOLUMES: CPT

## 2024-05-15 PROCEDURE — 3078F DIAST BP <80 MM HG: CPT | Performed by: PHYSICIAN ASSISTANT

## 2024-05-15 PROCEDURE — 93000 ELECTROCARDIOGRAM COMPLETE: CPT | Performed by: PHYSICIAN ASSISTANT

## 2024-05-15 PROCEDURE — 82728 ASSAY OF FERRITIN: CPT | Performed by: NURSE PRACTITIONER

## 2024-05-15 PROCEDURE — 80053 COMPREHEN METABOLIC PANEL: CPT | Performed by: NURSE PRACTITIONER

## 2024-05-15 PROCEDURE — 85025 COMPLETE CBC W/AUTO DIFF WBC: CPT | Performed by: NURSE PRACTITIONER

## 2024-05-15 PROCEDURE — 83540 ASSAY OF IRON: CPT | Performed by: NURSE PRACTITIONER

## 2024-05-15 PROCEDURE — 85027 COMPLETE CBC AUTOMATED: CPT

## 2024-05-15 PROCEDURE — 84466 ASSAY OF TRANSFERRIN: CPT | Performed by: NURSE PRACTITIONER

## 2024-05-15 PROCEDURE — 99214 OFFICE O/P EST MOD 30 MIN: CPT | Performed by: PHYSICIAN ASSISTANT

## 2024-05-15 RX ORDER — SPIRONOLACTONE 25 MG/1
25 TABLET ORAL DAILY
Start: 2024-05-15 | End: 2024-05-15 | Stop reason: SDUPTHER

## 2024-05-15 RX ORDER — SPIRONOLACTONE 25 MG/1
25 TABLET ORAL DAILY
Qty: 90 TABLET | Refills: 3 | Status: SHIPPED | OUTPATIENT
Start: 2024-05-15 | End: 2025-05-15

## 2024-05-15 RX ORDER — ASPIRIN 81 MG/1
81 TABLET ORAL DAILY
COMMUNITY

## 2024-05-15 NOTE — PROGRESS NOTES
[facility]  Heart Failure Clinic  Subjective:     Encounter Date:05/15/2024      Patient ID: Darren Maria is a 79 y.o. male     Chief Complaint:  History of Present Illness  Darren Maria is a 79 y.o. male with the below pertinent PMH who presents for follow-up of CHF.    Patient was last seen in the heart failure clinic 4/23/2024.  At that time he was doing reasonably well but had had some recent increase of shortness of breath and swelling.  He took 3 doses of Lasix and lost 6 pounds of this.  On the day of the appointment he was doing well.  He had recently tried low-dose Entresto by his primary care but could not tolerate it due to low blood pressure.  He was placed on losartan and was tolerating it well.  At that appointment I increased his spironolactone to 25 mg daily.  Subsequently the patient had an admission to the hospital in early May for GI bleeding.  He did have to receive unit of packed red blood cells.  His Xarelto was changed to Eliquis and he was stopped on Plavix and maintain on aspirin 81 mg daily.  He was also referred to hematology for further management of his iron deficiency anemia.    Since hospital discharge she has been doing well.  Denies any significant heart failure complaints.  He was able to walk into the clinic today without any significant shortness of breath.  Weight is up slightly but he is also been eating more since leaving the hospital.  Denies any use of his as needed torsemide.  He states he got to infusions of IV iron during his hospitalization and he feels like that helped his fatigue tremendously.  He denies any signs of bleeding since discharge.      ROS: Pertinent findings as noted above    Pertinent PMH  -Chronic systolic congestive heart failure (EF 36 to 40%)  - Coronary artery disease status post CABG in 1990 (redo CABG in March 2011)  - Ischemic cardiomyopathy  - Hypertension  - Hyperlipidemia  - Permanent atrial fibrillation  - Complete heart  "block status post AV node ablation  - Status post BiV ICD with lead revision in March 2024  - Left atrial appendage ligation in 2011  - History of renal cell carcinoma status post right nephrectomy in 2000  - Iron deficiency anemia    The following portions of the patient's history were reviewed and updated as appropriate: allergies, current medications, past medical history, past social history, past and problem list.    Allergies   Allergen Reactions    Azithromycin Nausea And Vomiting    Morphine And Related Other (See Comments)     SHAKES AND MAKES HIM \"WIRED\".   TAKES TRAMADOL WITHOUT PROBLEM       Current Outpatient Medications:     apixaban (ELIQUIS) 5 MG tablet tablet, Take 1 tablet by mouth 2 (Two) Times a Day., Disp: 60 tablet, Rfl: 5    aspirin 81 MG EC tablet, Take 1 tablet by mouth Daily., Disp: , Rfl:     atorvastatin (LIPITOR) 40 MG tablet, Take 1 tablet by mouth Every Night., Disp: , Rfl:     cetirizine (zyrTEC) 10 MG tablet, Take 1 tablet by mouth Daily As Needed for Allergies., Disp: , Rfl:     Cyanocobalamin 1000 MCG/ML kit, Inject 1 mL as directed Every 30 (Thirty) Days. On Fridays till 11/11/23 then monthly thereafter., Disp: , Rfl:     empagliflozin (Jardiance) 10 MG tablet tablet, Take 1 tablet by mouth Daily., Disp: 30 tablet, Rfl: 11    finasteride (PROSCAR) 5 MG tablet, Take 1 tablet by mouth Daily., Disp: , Rfl:     isosorbide mononitrate (IMDUR) 30 MG 24 hr tablet, Take 1 tablet by mouth Daily., Disp: 30 tablet, Rfl: 5    Mirabegron ER (MYRBETRIQ) 50 MG tablet sustained-release 24 hour 24 hr tablet, Take 50 mg by mouth Daily., Disp: , Rfl:     nitroglycerin (NITROSTAT) 0.4 MG SL tablet, Place 1 tablet under the tongue Every 5 (Five) Minutes As Needed for Chest Pain. Take no more than 3 doses in 15 minutes., Disp: 25 tablet, Rfl: 2    omeprazole (PriLOSEC) 40 MG capsule, Take 1 capsule by mouth Daily., Disp: , Rfl:     pregabalin (LYRICA) 150 MG capsule, Take 1 capsule by mouth 2 (Two) " Times a Day., Disp: , Rfl:     ranolazine (Ranexa) 1000 MG 12 hr tablet, Take 1 tablet by mouth 2 (Two) Times a Day., Disp: , Rfl:     Sotalol HCl AF 80 MG tablet, Take 1 tablet by mouth 2 (Two) Times a Day., Disp: 180 tablet, Rfl: 3    spironolactone (ALDACTONE) 25 MG tablet, Take 1 tablet by mouth Daily., Disp: 90 tablet, Rfl: 3    torsemide (DEMADEX) 20 MG tablet, Take 1 tablet by mouth As Needed., Disp: , Rfl:     traMADol-acetaminophen (ULTRACET) 37.5-325 MG per tablet, Take 1 tablet by mouth Every 12 (Twelve) Hours As Needed for Moderate Pain (pain)., Disp: , Rfl: 1    zolpidem (AMBIEN) 5 MG tablet, Take 1 tablet by mouth At Night As Needed for Sleep., Disp: , Rfl:     Social History     Socioeconomic History    Marital status:    Tobacco Use    Smoking status: Never    Smokeless tobacco: Never   Vaping Use    Vaping status: Never Used   Substance and Sexual Activity    Alcohol use: Not Currently     Comment: occ    Drug use: Never    Sexual activity: Not Currently     Partners: Female     Birth control/protection: Vasectomy                Objective:     Constitutional:       Appearance: Healthy appearance. Not in distress.   Neck:      Vascular: JVD normal.   Pulmonary:      Effort: Pulmonary effort is normal.      Breath sounds: Normal breath sounds. No wheezing. No rhonchi. No rales.   Cardiovascular:      PMI at left midclavicular line. Normal rate. Regular rhythm. Normal S1. Normal S2.       Murmurs: There is no murmur.      No gallop.  No click. No rub.   Edema:     Peripheral edema present.     Ankle: bilateral 1+ edema of the ankle.  Musculoskeletal: Normal range of motion.      Cervical back: Normal range of motion. Skin:     General: Skin is warm and dry.   Neurological:      Mental Status: Alert and oriented to person, place and time.           Procedures  /69 (BP Location: Right arm, Patient Position: Sitting)   Pulse 75   Wt 90.3 kg (199 lb)   SpO2 98%   BMI 27.77 kg/m²        05/15/24  1002   Weight: 90.3 kg (199 lb)      Lab Review:   I have reviewed      BMP          5/5/2024    05:40 5/6/2024    05:23 5/15/2024    09:46   BMP   BUN 8  7  11    Creatinine 0.75  0.78  0.87    Sodium 145  144  142    Potassium 3.9  3.8  4.4    Chloride 110  111  107    CO2 27.0  27.0  25.0    Calcium 8.8  8.8  8.7       Lab Results   Component Value Date    CHOL 122 11/12/2021    CHLPL 136 (L) 03/13/2018    TRIG 108 11/12/2021    HDL 36 (L) 11/12/2021    LDL 66 11/12/2021      Results for orders placed during the hospital encounter of 10/29/23    Adult Transthoracic Echo Complete W/ Cont if Necessary Per Protocol    Interpretation Summary    Left ventricular systolic function is moderately decreased. Left ventricular ejection fraction appears to be 36 - 40%.    The left ventricular cavity is borderline dilated (LVIDd 5.5 cm).    The following left ventricular wall segments are hypokinetic: mid anterior, apical anterior, apical lateral, basal inferoseptal, mid inferoseptal, mid anteroseptal, basal anterior and basal inferoseptal. The following left ventricular wall segments are akinetic: mid inferolateral, apical inferior, apical septal and apex.    The left atrial cavity is severely dilated.    Estimated right ventricular systolic pressure from tricuspid regurgitation is normal (<35 mmHg).    Normal size in the right ventricle, with mildly reduced systolic function noted.    The right atrial cavity is severely  dilated.    Moderate dilation of the ascending aorta is present (4.7 cm).    Compared to prior exam from 5/27/2023, no significant change.       Assessment and Plan   Diagnoses and all orders for this visit:    1. Chronic combined systolic and diastolic congestive heart failure (Primary)  2. Ischemic cardiomyopathy  3. Presence of biventricular AICD  - LVEF: 36 to 40%  - NYHA Class: II/III  - BB: Sotalol 80 mg twice daily  - ACEi/ARB/ARNi: Losartan stopped due to intolerance  - SGLT2i: Jardiance  10 mg daily  - MRA: Spironolactone 25 mg daily  - Diuretics: As needed torsemide 20 mg daily  - Electrolytes: Stable on lab work today  - ICD/CRT: Status post ICD  - IV Iron: Received IV iron during hospitalization; referred to hematology and has an appointment to establish care in a week.    Overall patient appears to be doing well.  Has some ankle swelling, however symptoms have greatly improved.  He understands the parameters for when he was to take torsemide at home.  Overall I favor leaving his regimen alone given that usually increases of his medicines make him feel worse and that today is the best he has felt in a while.  We will see him back down here in a few months for reevaluation of volume status as well as kidney function.    4. Coronary artery disease of bypass graft of native heart with stable angina pectoris  5. Essential hypertension  -No anginal symptoms as of late  - Switched from Plavix to aspirin and tolerating this well  - Continue Lipitor 40 mg nightly  - Continue Imdur 30 mg daily as well as Ranexa 1000 mg twice daily    6. S/P AV sam ablation  -Recent LV lead revision by electrophysiology with optimal results  - Continue follow-up with electrophysiology  - Continue sotalol per electrophysiology    7. Permanent atrial fibrillation  -Patient rate controlled on exam today  - Patient has been switched to Eliquis given recent GI bleed which is reasonable  - I have sent a message to the patient's primary cardiologist, Dr. Posadas, regarding long-term anticoagulation for this patient.  Given that he has had recent hospital mission for GI bleed I believe further discussion regarding discontinuation of DOAC versus consideration of watchman (depending on size of atrial appendage given that he did have ligation in 2011) should be occur.           I spent 49 minutes caring for Darren on this date of service.   Time spent on the separately reported service of ReDS interpretation/documentation is not  included in the time used to support the E/M service also reported today.    Follow Up   No follow-ups on file.  Patient was given instructions and counseling regarding his condition or for health maintenance advice. Please see specific information pulled into the AVS if appropriate.     Part of this note may be an electronic transcription/translation of spoken language to printed text using the Dragon Dictation System.

## 2024-05-15 NOTE — ADDENDUM NOTE
Encounter addended by: Artur Randle APRN on: 5/15/2024 10:25 AM   Actions taken: Charge Capture section accepted

## 2024-05-15 NOTE — PROGRESS NOTES
[facility] -  CLINIC FOLLOW UP     Patient Care Team:  Rolan Salvador MD as PCP - General (Internal Medicine)  Sarbjit Posadas MD as Cardiologist (Cardiology)  Yolanda Perry APRN as Nurse Practitioner (Family Medicine)  Durga Ochoa MD as Consulting Physician (Urology)  Tyrone Broussard PA as Physician Assistant (Urology)  Devika Oliver MD as Cardiologist (Cardiac Electrophysiology)  Artur Randle APRN as Nurse Practitioner (Cardiology)    Chief Complaint: follow up     Subjective   EP Problems:  1.  Permanent atrial fibrillation  2.  Complete heart block status post AV node ablation  3.  Ventricular tachycardia  -Sotalol   4.  Presence of a BiV ICD  -Elevated LV threshold causing rapid battery depletion with LV lead in the AIV  -3/4/24: Gen change, New LV lead insertion   5. CRISTOBAL ligation 2011     Cardiology Problems:  1.  Chronic systolic heart failure  -5/27/2023: EF 36 to 40%  2.  CAD status post CABG  3.  Ischemic cardiomyopathy  4.  Hypertension  5.  Hyperlipidemia     Medical Problems:  1.  BPH    HPI: Today I had the pleasure of seeing Darren Maria in the cardiology clinic for follow up.  He had known chronic malfunction of his LV pacing lead causing rapid battery depletion as well as NYHA class III symptoms. Given these findings, he was previously scheduled for LV lead revision to try to improve his symptoms, however, he required admisstion in February with chest pain and SOB. He was then able to have his LV lead implanted with gen change. His old LV lead now capped.     When I saw him April 10th, his CBC came back at 7.6 Hgb and Dr. Salvador arranged outpatient blood transfusion. Following that, he maintained Hgb at 8, but then developed black stools and given his PUD, elevated INR, nausea he was scheduled for EGD, prompting admission from May 3-7th. He was still on xarelto though, but had previously had CRISTOBAL ligation, but it was not noted in the cardiology  "consultation during his admission and he was subsequently discharged on Eliquis.     Today he states he feels the best he has felt in a while. Hgb was noted to be 11 today and he walked to clinic today without a wheelchair. He denies any palpitations. He denies any ICD shocks.         Objective     Visit Vitals  /72   Pulse 75   Ht 180.3 cm (70.98\")   Wt 90.3 kg (199 lb)   SpO2 99%   BMI 27.77 kg/m²           Vitals reviewed.   Constitutional:       Appearance: Not in distress.   Eyes:      Extraocular Movements: Extraocular movements intact.      Conjunctiva/sclera: Conjunctivae normal.      Pupils: Pupils are equal, round, and reactive to light.   HENT:      Head: Normocephalic and atraumatic.      Nose: Nose normal.    Mouth/Throat:      Lips: Pink.      Mouth: Mucous membranes are moist.   Neck:      Vascular: No carotid bruit or JVD. JVD normal.   Pulmonary:      Effort: Pulmonary effort is normal.      Breath sounds: Normal breath sounds.   Chest:      Chest wall: Not tender to palpatation.   Cardiovascular:      PMI at left midclavicular line. Normal rate. Regular rhythm. Normal S1. Normal S2.       Murmurs: There is no murmur.      No gallop.  No rub.   Pulses:     Radial: 2+ bilaterally.  Edema:     Peripheral edema present.     Pretibial: bilateral 1+ edema of the pretibial area.     Ankle: bilateral 1+ edema of the ankle.  Musculoskeletal: Normal range of motion.      Extremities: No clubbing present.     Cervical back: Normal range of motion. Skin:     General: Skin is warm and dry.      Coloration: Skin is pale.   Neurological:      General: No focal deficit present.      Mental Status: Alert and oriented to person, place, and time.      Gait: Gait abnormal.   Psychiatric:         Attention and Perception: Attention normal.         Mood and Affect: Affect normal.         Speech: Speech normal.         Behavior: Behavior normal.         Cognition and Memory: Cognition normal.             The " following portions of the patient's history were reviewed and updated as appropriate: allergies, current medications, past medical history, past social history, past and problem list.     Review of Systems   Constitutional: Negative.    HENT: Negative.     Eyes: Negative.    Respiratory:  Positive for shortness of breath.    Cardiovascular:  Positive for leg swelling.   Gastrointestinal: Negative.    Endocrine: Negative.    Genitourinary: Negative.    Musculoskeletal: Negative.    Skin: Negative.    Allergic/Immunologic: Negative.    Neurological:  Positive for light-headedness.   Hematological: Negative.    Psychiatric/Behavioral: Negative.            Echo EF Estimated  Lab Results   Component Value Date    ECHOEFEST 30 02/13/2019         ECG 12 Lead    Date/Time: 5/15/2024 11:29 AM  Performed by: Erin Rocha PA    Authorized by: Erin Rocha PA  Comparison: compared with previous ECG from 5/3/2024  Rate: normal  BPM: 81  QRS axis: indeterminate    Clinical impression: abnormal EKG            Medication Review: yes    Current Outpatient Medications:     apixaban (ELIQUIS) 5 MG tablet tablet, Take 1 tablet by mouth 2 (Two) Times a Day., Disp: 60 tablet, Rfl: 5    aspirin 81 MG EC tablet, Take 1 tablet by mouth Daily., Disp: , Rfl:     atorvastatin (LIPITOR) 40 MG tablet, Take 1 tablet by mouth Every Night., Disp: , Rfl:     cetirizine (zyrTEC) 10 MG tablet, Take 1 tablet by mouth Daily As Needed for Allergies., Disp: , Rfl:     Cyanocobalamin 1000 MCG/ML kit, Inject 1 mL as directed Every 30 (Thirty) Days. On Fridays till 11/11/23 then monthly thereafter., Disp: , Rfl:     empagliflozin (Jardiance) 10 MG tablet tablet, Take 1 tablet by mouth Daily., Disp: 30 tablet, Rfl: 11    finasteride (PROSCAR) 5 MG tablet, Take 1 tablet by mouth Daily., Disp: , Rfl:     isosorbide mononitrate (IMDUR) 30 MG 24 hr tablet, Take 1 tablet by mouth Daily., Disp: 30 tablet, Rfl: 5    Mirabegron ER (MYRBETRIQ) 50 MG tablet  "sustained-release 24 hour 24 hr tablet, Take 50 mg by mouth Daily., Disp: , Rfl:     nitroglycerin (NITROSTAT) 0.4 MG SL tablet, Place 1 tablet under the tongue Every 5 (Five) Minutes As Needed for Chest Pain. Take no more than 3 doses in 15 minutes., Disp: 25 tablet, Rfl: 2    omeprazole (PriLOSEC) 40 MG capsule, Take 1 capsule by mouth Daily., Disp: , Rfl:     pregabalin (LYRICA) 150 MG capsule, Take 1 capsule by mouth 2 (Two) Times a Day., Disp: , Rfl:     ranolazine (Ranexa) 1000 MG 12 hr tablet, Take 1 tablet by mouth 2 (Two) Times a Day., Disp: , Rfl:     Sotalol HCl AF 80 MG tablet, Take 1 tablet by mouth 2 (Two) Times a Day., Disp: 180 tablet, Rfl: 3    spironolactone (ALDACTONE) 25 MG tablet, Take 1 tablet by mouth Daily., Disp: 90 tablet, Rfl: 3    torsemide (DEMADEX) 20 MG tablet, Take 1 tablet by mouth As Needed., Disp: , Rfl:     traMADol-acetaminophen (ULTRACET) 37.5-325 MG per tablet, Take 1 tablet by mouth Every 12 (Twelve) Hours As Needed for Moderate Pain (pain)., Disp: , Rfl: 1    zolpidem (AMBIEN) 5 MG tablet, Take 1 tablet by mouth At Night As Needed for Sleep., Disp: , Rfl:    Allergies   Allergen Reactions    Azithromycin Nausea And Vomiting    Morphine And Related Other (See Comments)     SHAKES AND MAKES HIM \"WIRED\".   TAKES TRAMADOL WITHOUT PROBLEM       I have reviewed       CBC:  Lab Results - Last 18 Months   Lab Units 05/15/24  0946   WBC 10*3/mm3 5.89  6.19   HEMOGLOBIN g/dL 11.2*  11.3*   HEMATOCRIT % 38.8  37.8   PLATELETS 10*3/mm3 220  208   IRON mcg/dL 92      BMP/CMP:  Lab Results - Last 18 Months   Lab Units 05/15/24  0946   SODIUM mmol/L 142  142   POTASSIUM mmol/L 4.4  4.4   CHLORIDE mmol/L 107  107   CO2 mmol/L 25.0  25.0   GLUCOSE mg/dL 162*  162*   BUN mg/dL 11  11   CREATININE mg/dL 0.87  0.87   CALCIUM mg/dL 8.7  8.7     BNP:   Lab Results - Last 18 Months   Lab Units 04/10/24  1223   PROBNP pg/mL 2,777.0*          Results for orders placed during the hospital " encounter of 10/29/23    Adult Transthoracic Echo Complete W/ Cont if Necessary Per Protocol    Interpretation Summary    Left ventricular systolic function is moderately decreased. Left ventricular ejection fraction appears to be 36 - 40%.    The left ventricular cavity is borderline dilated (LVIDd 5.5 cm).    The following left ventricular wall segments are hypokinetic: mid anterior, apical anterior, apical lateral, basal inferoseptal, mid inferoseptal, mid anteroseptal, basal anterior and basal inferoseptal. The following left ventricular wall segments are akinetic: mid inferolateral, apical inferior, apical septal and apex.    The left atrial cavity is severely dilated.    Estimated right ventricular systolic pressure from tricuspid regurgitation is normal (<35 mmHg).    Normal size in the right ventricle, with mildly reduced systolic function noted.    The right atrial cavity is severely  dilated.    Moderate dilation of the ascending aorta is present (4.7 cm).    Compared to prior exam from 5/27/2023, no significant change.     Assessment:   Diagnoses and all orders for this visit:    1. Anemia due to GI blood loss (Primary)  -     ECG 12 Lead; Future    2. Permanent atrial fibrillation    3. Presence of biventricular AICD    4. Ischemic cardiomyopathy    5. Ventricular tachycardia (paroxysmal)    Other orders  -     ECG 12 Lead    CRT-D: S/p gen change in Feb, incision well healed, slightly tender, but not signs of infection. He has not hooked his new remote BSC monitor back up and was recommended to do so today.   -Follow up in 6 months with EP     Anemia: Recurrent and persistent, although Hemoglobin is up to 11 today on CBC. Events noted by Artur Randle today as well with plans to discuss with Dr. Posadas. Given his previous CRISTOBAL clip and persistent anemia requiring transfusions, risks of continued anticoagulation are quite high for recurrent bleeding and may require discontinuation of Eliquis.     Permanent  afib/AVN ablation: CRISTOBAL clip performed in 2011 at the time of CABG by Dr. yT in [place].     HFrEF: EF 36-40%, followed by Heart Failure clinic with appt today.     VT: Continue sotalol for control of arrhythmia.   -Avoid QT prolonging medications  -stable renal function with Ct of 0.8       I spent 30 minutes caring for Darren on this date of service. This time includes time spent by me in the following activities:preparing for the visit, reviewing tests, obtaining and/or reviewing a separately obtained history, performing a medically appropriate examination and/or evaluation , counseling and educating the patient/family/caregiver, ordering medications, tests, or procedures, referring and communicating with other health care professionals , documenting information in the medical record, and independently interpreting results and communicating that information with the patient/family/caregiver        Electronically signed by RICCARDO Arroyo

## 2024-05-15 NOTE — PROGRESS NOTES
Heart Failure Clinic    Date: 05/15/24     Vitals:    05/15/24 1002   BP: 120/69   Pulse: 75   SpO2: 98%        Indication:  Heart Failure    Procedure:  ReDS device sensor unit applied to right side of chest and right side of back.  Appropriate positioning confirmed based off of the unit's calculation.  Chest measurement obtained with the chest size ruler.  Measurement session performed over 45 seconds.      Method of arrival: Ambulatory with cane    Weighing self daily: Yes    Taking medications as prescribed: Yes    Edema: Yes, 1+, and Pitting edema to left lower leg    Shortness of Air: Yes, with moderate exertion    Number of pillows used at night: <2, patient sleeps on 2 pillows    Results: ReDS Value= 29    Interpretation:  25-35% - normal/ideal lung fluid content    Mallory L Haase, RN 05/15/24 10:03 CDT

## 2024-05-16 ENCOUNTER — INFUSION (OUTPATIENT)
Dept: ONCOLOGY | Facility: HOSPITAL | Age: 79
End: 2024-05-16
Payer: MEDICARE

## 2024-05-16 VITALS
HEART RATE: 77 BPM | HEIGHT: 71 IN | OXYGEN SATURATION: 98 % | WEIGHT: 199 LBS | BODY MASS INDEX: 27.86 KG/M2 | SYSTOLIC BLOOD PRESSURE: 134 MMHG | RESPIRATION RATE: 16 BRPM | DIASTOLIC BLOOD PRESSURE: 72 MMHG | TEMPERATURE: 96.3 F

## 2024-05-16 DIAGNOSIS — I50.42 CHRONIC COMBINED SYSTOLIC AND DIASTOLIC CONGESTIVE HEART FAILURE: ICD-10-CM

## 2024-05-16 DIAGNOSIS — D50.9 IRON DEFICIENCY ANEMIA, UNSPECIFIED IRON DEFICIENCY ANEMIA TYPE: Primary | ICD-10-CM

## 2024-05-16 PROCEDURE — 25810000003 SODIUM CHLORIDE 0.9 % SOLUTION

## 2024-05-16 PROCEDURE — 96365 THER/PROPH/DIAG IV INF INIT: CPT

## 2024-05-16 PROCEDURE — 25010000002 FERRIC CARBOXYMALTOSE 750 MG/15ML SOLUTION 15 ML VIAL

## 2024-05-16 RX ORDER — SODIUM CHLORIDE 9 MG/ML
20 INJECTION, SOLUTION INTRAVENOUS ONCE
Status: CANCELLED | OUTPATIENT
Start: 2024-05-16

## 2024-05-16 RX ORDER — DIPHENHYDRAMINE HYDROCHLORIDE 50 MG/ML
50 INJECTION INTRAMUSCULAR; INTRAVENOUS AS NEEDED
OUTPATIENT
Start: 2024-05-16

## 2024-05-16 RX ORDER — SODIUM CHLORIDE 9 MG/ML
20 INJECTION, SOLUTION INTRAVENOUS ONCE
Status: COMPLETED | OUTPATIENT
Start: 2024-05-16 | End: 2024-05-16

## 2024-05-16 RX ORDER — FAMOTIDINE 10 MG/ML
20 INJECTION, SOLUTION INTRAVENOUS AS NEEDED
OUTPATIENT
Start: 2024-05-16

## 2024-05-16 RX ADMIN — FERRIC CARBOXYMALTOSE INJECTION 750 MG: 50 INJECTION, SOLUTION INTRAVENOUS at 10:47

## 2024-05-16 RX ADMIN — SODIUM CHLORIDE 20 ML/HR: 9 INJECTION, SOLUTION INTRAVENOUS at 10:46

## 2024-05-16 NOTE — PROGRESS NOTES
[id number] Patient made question as to if need this infusion today and contacted cardiology office by phone and confirmed to continue with injectapher today. Nurse at office confirmed this by text to Erin GU. Patient informed. Swetha Escalera RN

## 2024-05-20 ENCOUNTER — TELEPHONE (OUTPATIENT)
Dept: CARDIOLOGY | Facility: CLINIC | Age: 79
End: 2024-05-20
Payer: MEDICARE

## 2024-05-20 NOTE — TELEPHONE ENCOUNTER
Called and discussed anticoagulation with the patient this morning.  I discussed the patient's long-term anticoagulation strategy with electrophysiology as well as general cardiology team.  Given that he had a recent significant bleeding event and has a history of left atrial ligation it is reasonable to stop Eliquis at this time but continue baby aspirin lifelong.  I advised this to the patient who verbalized understanding.  He has follow-up in a few days with hematology and will have his anemia assessed at that time.

## 2024-05-23 ENCOUNTER — LAB (OUTPATIENT)
Dept: LAB | Facility: HOSPITAL | Age: 79
End: 2024-05-23
Payer: MEDICARE

## 2024-05-23 ENCOUNTER — CONSULT (OUTPATIENT)
Dept: ONCOLOGY | Facility: CLINIC | Age: 79
End: 2024-05-23
Payer: MEDICARE

## 2024-05-23 VITALS
WEIGHT: 195.1 LBS | TEMPERATURE: 97.6 F | OXYGEN SATURATION: 97 % | HEIGHT: 71 IN | DIASTOLIC BLOOD PRESSURE: 82 MMHG | HEART RATE: 75 BPM | SYSTOLIC BLOOD PRESSURE: 126 MMHG | RESPIRATION RATE: 16 BRPM | BODY MASS INDEX: 27.31 KG/M2

## 2024-05-23 DIAGNOSIS — D50.8 OTHER IRON DEFICIENCY ANEMIA: Primary | ICD-10-CM

## 2024-05-23 DIAGNOSIS — D64.9 ANEMIA, UNSPECIFIED TYPE: ICD-10-CM

## 2024-05-23 LAB
ALBUMIN SERPL-MCNC: 4.2 G/DL (ref 3.5–5.2)
ALBUMIN/GLOB SERPL: 1.6 G/DL
ALP SERPL-CCNC: 71 U/L (ref 39–117)
ALT SERPL W P-5'-P-CCNC: 12 U/L (ref 1–41)
ANION GAP SERPL CALCULATED.3IONS-SCNC: 8 MMOL/L (ref 5–15)
AST SERPL-CCNC: 19 U/L (ref 1–40)
BASOPHILS # BLD AUTO: 0.04 10*3/MM3 (ref 0–0.2)
BASOPHILS NFR BLD AUTO: 0.8 % (ref 0–1.5)
BILIRUB SERPL-MCNC: 0.7 MG/DL (ref 0–1.2)
BUN SERPL-MCNC: 11 MG/DL (ref 8–23)
BUN/CREAT SERPL: 13.3 (ref 7–25)
CALCIUM SPEC-SCNC: 9.2 MG/DL (ref 8.6–10.5)
CHLORIDE SERPL-SCNC: 109 MMOL/L (ref 98–107)
CO2 SERPL-SCNC: 26 MMOL/L (ref 22–29)
CREAT SERPL-MCNC: 0.83 MG/DL (ref 0.76–1.27)
DEPRECATED RDW RBC AUTO: 93 FL (ref 37–54)
EGFRCR SERPLBLD CKD-EPI 2021: 90.1 ML/MIN/1.73
EOSINOPHIL # BLD AUTO: 0.12 10*3/MM3 (ref 0–0.4)
EOSINOPHIL NFR BLD AUTO: 2.5 % (ref 0.3–6.2)
ERYTHROCYTE [DISTWIDTH] IN BLOOD BY AUTOMATED COUNT: 27.4 % (ref 12.3–15.4)
FERRITIN SERPL-MCNC: 615.1 NG/ML (ref 30–400)
FOLATE SERPL-MCNC: 7.39 NG/ML (ref 4.78–24.2)
GLOBULIN UR ELPH-MCNC: 2.6 GM/DL
GLUCOSE SERPL-MCNC: 121 MG/DL (ref 65–99)
HCT VFR BLD AUTO: 40.5 % (ref 37.5–51)
HGB BLD-MCNC: 12.3 G/DL (ref 13–17.7)
HOLD SPECIMEN: NORMAL
IMM GRANULOCYTES # BLD AUTO: 0.03 10*3/MM3 (ref 0–0.05)
IMM GRANULOCYTES NFR BLD AUTO: 0.6 % (ref 0–0.5)
IRON 24H UR-MRATE: 90 MCG/DL (ref 59–158)
IRON SATN MFR SERPL: 25 % (ref 20–50)
LDH SERPL-CCNC: 186 U/L (ref 135–225)
LYMPHOCYTES # BLD AUTO: 0.53 10*3/MM3 (ref 0.7–3.1)
LYMPHOCYTES NFR BLD AUTO: 10.9 % (ref 19.6–45.3)
MCH RBC QN AUTO: 28.2 PG (ref 26.6–33)
MCHC RBC AUTO-ENTMCNC: 30.4 G/DL (ref 31.5–35.7)
MCV RBC AUTO: 92.9 FL (ref 79–97)
MONOCYTES # BLD AUTO: 0.4 10*3/MM3 (ref 0.1–0.9)
MONOCYTES NFR BLD AUTO: 8.2 % (ref 5–12)
NEUTROPHILS NFR BLD AUTO: 3.73 10*3/MM3 (ref 1.7–7)
NEUTROPHILS NFR BLD AUTO: 77 % (ref 42.7–76)
NRBC BLD AUTO-RTO: 0 /100 WBC (ref 0–0.2)
PLATELET # BLD AUTO: 220 10*3/MM3 (ref 140–450)
PMV BLD AUTO: 8.5 FL (ref 6–12)
POTASSIUM SERPL-SCNC: 4.3 MMOL/L (ref 3.5–5.2)
PROT SERPL-MCNC: 6.8 G/DL (ref 6–8.5)
RBC # BLD AUTO: 4.36 10*6/MM3 (ref 4.14–5.8)
RETICS # AUTO: 0.12 10*6/MM3 (ref 0.02–0.13)
RETICS/RBC NFR AUTO: 2.74 % (ref 0.7–1.9)
SODIUM SERPL-SCNC: 143 MMOL/L (ref 136–145)
TIBC SERPL-MCNC: 355 MCG/DL (ref 298–536)
TRANSFERRIN SERPL-MCNC: 238 MG/DL (ref 200–360)
VIT B12 BLD-MCNC: 444 PG/ML (ref 211–946)
WBC NRBC COR # BLD AUTO: 4.85 10*3/MM3 (ref 3.4–10.8)
WHOLE BLOOD HOLD SPECIMEN: NORMAL
WHOLE BLOOD HOLD SPECIMEN: NORMAL

## 2024-05-23 PROCEDURE — 82746 ASSAY OF FOLIC ACID SERUM: CPT

## 2024-05-23 PROCEDURE — 36415 COLL VENOUS BLD VENIPUNCTURE: CPT

## 2024-05-23 PROCEDURE — 84466 ASSAY OF TRANSFERRIN: CPT

## 2024-05-23 PROCEDURE — 85045 AUTOMATED RETICULOCYTE COUNT: CPT

## 2024-05-23 PROCEDURE — 83615 LACTATE (LD) (LDH) ENZYME: CPT

## 2024-05-23 PROCEDURE — 85025 COMPLETE CBC W/AUTO DIFF WBC: CPT

## 2024-05-23 PROCEDURE — 83540 ASSAY OF IRON: CPT

## 2024-05-23 PROCEDURE — 82728 ASSAY OF FERRITIN: CPT

## 2024-05-23 PROCEDURE — 82668 ASSAY OF ERYTHROPOIETIN: CPT

## 2024-05-23 PROCEDURE — 80053 COMPREHEN METABOLIC PANEL: CPT

## 2024-05-23 PROCEDURE — 82607 VITAMIN B-12: CPT

## 2024-05-23 NOTE — PROGRESS NOTES
[facility]ATOLOGY & [facility]Y  [address]  [place] [address] [phone]    Patient Name: Darren Maria  Encounter Date: 05/23/2024   YOB: 1945  Patient Number: [id number]    Initial Note     HISTORY OF PRESENT ILLNESS: Darren Maria is a 79 y.o. male referred by EVELIA Kiser for diagnostic and management recommendations for iron deficiency. History is obtained from patient. History is considered to be accurate.    He has health history significant for Hx of Renal Cell Cancer approx 20 years ago s/p nephrectomy, AV node ablation, CHF, Hyperlipidemia, A Fib, Presence of a pacemaker, 2 CABG     Previous labs reviewed:    5/15/24:  Hgb 11.2, Hct 38.8, Iron 92, Ferritin 369, Sat 28%, TIBC 331    He has received IV iron previously.  Ferrlecit October , 31, Nov 1, 2, 3, 2023  He Had injectafer on May 1 and 16, 2024  Ferrlecit on May 6, 7, 2024    Pt reports he has stopped all anticoagulation.      He had labs drawn and results were reviewed with him in office.     LABS    Lab Results - Last 18 Months   Lab Units 05/23/24  1247 05/15/24  0946 05/06/24  0523 05/05/24  0540 05/04/24  1801 05/04/24  1212 05/04/24  0338 05/03/24  1357   HEMOGLOBIN g/dL 12.3* 11.2*  11.3* 9.3* 9.1* 8.9* 8.7* 8.2* 7.2*   HEMATOCRIT % 40.5 38.8  37.8 31.4* 30.8*  --   --  27.1* 24.6*   MCV fL 92.9 92.4  91.7 87.0 86.5  --   --  85.0 86.6   WBC 10*3/mm3 4.85 5.89  6.19 4.91 4.92  --   --  4.97 6.60   RDW % 27.4* 25.9*  25.7* 23.0* 22.1*  --   --  21.2* 21.7*   MPV fL 8.5 10.0  9.5 9.2 8.9  --   --  8.7 9.1   PLATELETS 10*3/mm3 220 220  208 325 318  --   --  327 384   IMM GRAN % % 0.6* 0.5 0.4 0.8*  --   --  1.4* 1.4*   NEUTROS ABS 10*3/mm3 3.73 4.47 3.44 3.37  --   --  3.42 4.97   LYMPHS ABS 10*3/mm3 0.53* 0.62* 0.69* 0.67*  --   --  0.66* 0.68*   MONOS ABS 10*3/mm3 0.40 0.60 0.59 0.66  --   --  0.63 0.74   EOS ABS 10*3/mm3  0.12 0.14 0.14 0.15  --   --  0.14 0.09   BASOS ABS 10*3/mm3 0.04 0.03 0.03 0.03  --   --  0.05 0.03   IMMATURE GRANS (ABS) 10*3/mm3 0.03 0.03 0.02 0.04  --   --  0.07* 0.09*   NRBC /100 WBC 0.0 0.0 0.0 0.0  --   --  0.0 0.0       Lab Results - Last 18 Months   Lab Units 05/23/24  1247 05/15/24  0946 05/06/24  0523 05/05/24  0540 05/04/24  0338 05/03/24  1357 02/26/24  0427 02/25/24  1322 10/30/23  0317 10/29/23  1329 05/27/23  0127   GLUCOSE mg/dL 121* 162*  162* 102* 94 94 121*   < > 132*   < > 162* 210*   SODIUM mmol/L 143 142  142 144 145 142 144   < > 140   < > 142 140   POTASSIUM mmol/L 4.3 4.4  4.4 3.8 3.9 3.9 4.2   < > 4.7   < > 4.1 4.4   CO2 mmol/L 26.0 25.0  25.0 27.0 27.0 25.0 25.0   < > 22.0   < > 27.0 22.0   CHLORIDE mmol/L 109* 107  107 111* 110* 110* 108*   < > 106   < > 105 107   ANION GAP mmol/L 8.0 10.0  10.0 6.0 8.0 7.0 11.0   < > 12.0   < > 10.0 11.0   CREATININE mg/dL 0.83 0.87  0.87 0.78 0.75* 0.69* 0.80   < > 1.02   < > 0.86 1.38*   BUN mg/dL 11 11  11 7* 8 9 10   < > 45*   < > 12 19   BUN / CREAT RATIO  13.3 12.6  12.6 9.0 10.7 13.0 12.5   < > 44.1*   < > 14.0 13.8   CALCIUM mg/dL 9.2 8.7  8.7 8.8 8.8 8.6 9.0   < > 9.1   < > 9.1 8.9   ALK PHOS U/L 71 65  --   --   --  53  --  46  --  77 57   TOTAL PROTEIN g/dL 6.8 6.3  --   --   --  6.3  --  5.7*  --  6.6 5.8*   ALT (SGPT) U/L 12 10  --   --   --  8  --  9  --  6 16   AST (SGOT) U/L 19 18  --   --   --  16  --  13  --  11 25   BILIRUBIN mg/dL 0.7 0.7  --   --   --  0.4  --  0.8  --  0.9 0.4   ALBUMIN g/dL 4.2 3.9  --   --   --  4.0  --  3.7  --  4.2 3.8   GLOBULIN gm/dL 2.6 2.4  --   --   --  2.3  --  2.0  --  2.4 2.0    < > = values in this interval not displayed.       Lab Results - Last 18 Months   Lab Units 05/23/24  1247   LDH U/L 186       Lab Results - Last 18 Months   Lab Units 05/23/24  1247 05/15/24  0946 04/23/24  1045 11/14/23  0807 10/30/23  1013 10/30/23  0317   IRON mcg/dL 90 92 26* 107  --  22*   TIBC mcg/dL 355 331  "392 375  --  323   IRON SATURATION (TSAT) % 25 28 7* 28  --  7*   FERRITIN ng/mL 615.10* 369.70 33.70 228.30  --   --    FOLATE ng/mL 7.39  --   --   --  12.40  --          PAST MEDICAL HISTORY:  ALLERGIES:  Allergies   Allergen Reactions    Azithromycin Nausea And Vomiting    Morphine And Codeine Other (See Comments)     SHAKES AND MAKES HIM \"WIRED\".   TAKES TRAMADOL WITHOUT PROBLEM     CURRENT MEDICATIONS:  Outpatient Encounter Medications as of 5/23/2024   Medication Sig Dispense Refill    aspirin 81 MG EC tablet Take 1 tablet by mouth Daily.      atorvastatin (LIPITOR) 40 MG tablet Take 1 tablet by mouth Every Night.      cetirizine (zyrTEC) 10 MG tablet Take 1 tablet by mouth Daily As Needed for Allergies.      Cyanocobalamin 1000 MCG/ML kit Inject 1 mL as directed Every 30 (Thirty) Days. On Fridays till 11/11/23 then monthly thereafter.      empagliflozin (Jardiance) 10 MG tablet tablet Take 1 tablet by mouth Daily. 30 tablet 11    finasteride (PROSCAR) 5 MG tablet Take 1 tablet by mouth Daily.      isosorbide mononitrate (IMDUR) 30 MG 24 hr tablet Take 1 tablet by mouth Daily. 30 tablet 5    Mirabegron ER (MYRBETRIQ) 50 MG tablet sustained-release 24 hour 24 hr tablet Take 50 mg by mouth Daily.      nitroglycerin (NITROSTAT) 0.4 MG SL tablet Place 1 tablet under the tongue Every 5 (Five) Minutes As Needed for Chest Pain. Take no more than 3 doses in 15 minutes. 25 tablet 2    omeprazole (PriLOSEC) 40 MG capsule Take 1 capsule by mouth Daily.      pregabalin (LYRICA) 150 MG capsule Take 1 capsule by mouth 2 (Two) Times a Day.      ranolazine (Ranexa) 1000 MG 12 hr tablet Take 1 tablet by mouth 2 (Two) Times a Day.      Sotalol HCl AF 80 MG tablet Take 1 tablet by mouth 2 (Two) Times a Day. 180 tablet 3    spironolactone (ALDACTONE) 25 MG tablet Take 1 tablet by mouth Daily. 90 tablet 3    torsemide (DEMADEX) 20 MG tablet Take 1 tablet by mouth As Needed.      traMADol-acetaminophen (ULTRACET) 37.5-325 MG per " tablet Take 1 tablet by mouth Every 12 (Twelve) Hours As Needed for Moderate Pain (pain).  1    zolpidem (AMBIEN) 5 MG tablet Take 1 tablet by mouth At Night As Needed for Sleep.       No facility-administered encounter medications on file as of 5/23/2024.     ADULT ILLNESSES:  Patient Active Problem List   Diagnosis Code    Coronary artery disease of bypass graft of native heart with stable angina pectoris I25.708    Essential hypertension I10    Presence of biventricular AICD Z95.810    Ischemic cardiomyopathy I25.5    Cancer of kidney C64.9    BPH (benign prostatic hypertrophy) with urinary obstruction N40.1, N13.8    Impotence due to erectile dysfunction N52.9    Benign prostatic hyperplasia with lower urinary tract symptoms N40.1    Cancer of kidney, right C64.1    Erectile dysfunction N52.9    Permanent atrial fibrillation I48.21    History of renal cell cancer Z85.528    Malfunction of penile prosthesis T83.490A    Urine frequency R35.0    S/P AV sam ablation Z98.890    Chronic combined systolic and diastolic congestive heart failure I50.42    Mixed hyperlipidemia E78.2    Hx of adenomatous colonic polyps Z86.010    Shockable cardiac rhythm detected by automated external defibrillator I49.9    CHB (complete heart block) I44.2    Ventricular tachycardia (paroxysmal) I47.29    Acute exacerbation of CHF (congestive heart failure) I50.9    Symptomatic anemia D64.9    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, implants and grafts, subsequent encounter T82.828D    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, implants and grafts, initial encounter T82.828A    Dyspnea R06.00    Weakness R53.1    Iron deficiency anemia D50.9    Gastrointestinal bleeding, upper K92.2    GI bleed K92.2       HEALTH MAINTENANCE ITEMS:  Health Maintenance Due   Topic Date Due    URINE MICROALBUMIN  Never done    Pneumococcal Vaccine 65+ (1 of 2 - PCV) Never done    RSV Vaccine - Adults (1 - 1-dose 60+ series) Never done    ZOSTER  VACCINE (2 of 3) 08/04/2020    DIABETIC EYE EXAM  04/26/2022    COVID-19 Vaccine (5 - 2023-24 season) 09/01/2023    HEMOGLOBIN A1C  05/29/2024       <no information>  Last Completed Colonoscopy            COLORECTAL CANCER SCREENING (COLONOSCOPY - Every 5 Years) Next due on 5/7/2029 05/07/2024  Colonoscopy    05/07/2024  Surgical Procedure: COLONOSCOPY    05/06/2024  Outside Procedure: COLONOSCOPY    05/03/2024  Fecal Occult Blood component of POC Occult Blood Stool    03/21/2022  Surgical Procedure: COLONOSCOPY    Only the first 5 history entries have been loaded, but more history exists.                  Immunization History   Administered Date(s) Administered    COVID-19 (PFIZER) BIVALENT 12+YRS 11/03/2022    COVID-19 (PFIZER) Purple Cap Monovalent 01/21/2021, 02/14/2021, 11/08/2021     Last Completed Mammogram       This patient has no relevant Health Maintenance data.              FAMILY HISTORY:  Family History   Problem Relation Age of Onset    Lung cancer Mother     Heart attack Mother     No Known Problems Father     Other Neg Hx         GI Malignancies    Colon cancer Neg Hx     Colon polyps Neg Hx      SOCIAL HISTORY:  Social History     Socioeconomic History    Marital status:    Tobacco Use    Smoking status: Never    Smokeless tobacco: Never   Vaping Use    Vaping status: Never Used   Substance and Sexual Activity    Alcohol use: Not Currently     Comment: occ    Drug use: Never    Sexual activity: Not Currently     Partners: Female     Birth control/protection: Vasectomy       REVIEW OF SYSTEMS:  Review of Systems   Constitutional:  Positive for appetite change and fatigue. Negative for activity change, fever, unexpected weight gain and unexpected weight loss.   HENT:  Negative for dental problem, facial swelling, swollen glands and trouble swallowing.    Eyes:  Negative for double vision and discharge.   Respiratory:  Positive for shortness of breath. Negative for cough and wheezing.   "  Cardiovascular:  Negative for chest pain, palpitations and leg swelling.        Describes it as \"pressure\" but states it has increase of hemoglboin   Gastrointestinal:  Positive for nausea. Negative for abdominal pain, blood in stool and vomiting.   Endocrine: Negative.    Genitourinary:  Negative for dysuria and hematuria.   Musculoskeletal:  Negative for arthralgias and myalgias.   Skin:  Negative for rash, skin lesions and wound.   Allergic/Immunologic: Negative for immunocompromised state.   Neurological:  Negative for speech difficulty, light-headedness, headache, memory problem and confusion.   Hematological:  Negative for adenopathy.   Psychiatric/Behavioral:  Negative for self-injury, suicidal ideas and depressed mood. The patient is not nervous/anxious.        /82   Pulse 75   Temp 97.6 °F (36.4 °C)   Resp 16   Ht 180.3 cm (71\")   Wt 88.5 kg (195 lb 1.6 oz)   SpO2 97%   BMI 27.21 kg/m²  Body surface area is 2.09 meters squared.    Pain Score    05/23/24 1306   PainSc: 0-No pain        Physical Exam  Constitutional:       Appearance: Normal appearance.   HENT:      Head: Normocephalic and atraumatic.   Cardiovascular:      Rate and Rhythm: Normal rate and regular rhythm.   Pulmonary:      Effort: Pulmonary effort is normal.      Breath sounds: Normal breath sounds.   Abdominal:      General: Bowel sounds are normal.      Palpations: Abdomen is soft.   Musculoskeletal:      Right lower leg: No edema.      Left lower leg: No edema.   Skin:     General: Skin is warm and dry.   Neurological:      Mental Status: He is alert and oriented to person, place, and time.   Psychiatric:         Attention and Perception: Attention normal.         Mood and Affect: Mood normal.         Judgment: Judgment normal.         Darren Maria reports a pain score of 0.  Given his pain assessment as noted, treatment options were discussed and the following options were decided upon as a follow-up plan to address " the patient's pain:  No intervention indicated. .      ASSESSMENT / PLAN:    1. Other iron deficiency anemia    2. Anemia, unspecified type       -Labs today:  Hgb 12.3, Hct 40.5, Iron 90, Ferritin 615, Sat 25%, TIBC 355  He Had injectafer on May 1 and 16, 2024, Ferrlecit on May 6, 7, 2024  -Labs are stable today.        PLAN:   1.   regarding the reason for the referral.   2.   regarding iron deficiency    3.  Labs pending for Erythropoietin    4.  Continue current medications, follow up, treatment plans per PCP and any other provider.   5.  Return to office 6 weeks with CBC, CMP, Iron Profile, Ferritin    6.  Care discussed with patient.  Understanding expressed.  Patient agreeable with plan.        ACP discussion was held with the patient during this visit. Patient does not have an advance directive, information provided. Via AVS    Thank you for the referral.    I spent 35 minutes caring for Darren on this date of service. This time includes time spent by me in the following activities: preparing for the visit, reviewing tests, performing a medically appropriate examination and/or evaluation, ordering medications, tests, or procedures, documenting information in the medical record, and care coordination.   ARIAS Barron, APRN  05/23/2024

## 2024-05-24 LAB — EPO SERPL-ACNC: 28.9 MIU/ML (ref 2.6–18.5)

## 2024-05-28 ENCOUNTER — HOSPITAL ENCOUNTER (OUTPATIENT)
Dept: CARDIOLOGY | Facility: HOSPITAL | Age: 79
Discharge: HOME | End: 2024-05-28
Payer: MEDICARE

## 2024-05-28 VITALS
DIASTOLIC BLOOD PRESSURE: 72 MMHG | BODY MASS INDEX: 27.3 KG/M2 | WEIGHT: 195 LBS | SYSTOLIC BLOOD PRESSURE: 122 MMHG | HEIGHT: 71 IN

## 2024-05-28 DIAGNOSIS — I50.42 CHRONIC COMBINED SYSTOLIC AND DIASTOLIC CONGESTIVE HEART FAILURE: ICD-10-CM

## 2024-05-28 PROCEDURE — 93306 TTE W/DOPPLER COMPLETE: CPT

## 2024-06-02 LAB
BH CV ECHO MEAS - AO MAX PG: 5.1 MMHG
BH CV ECHO MEAS - AO MEAN PG: 2.5 MMHG
BH CV ECHO MEAS - AO ROOT DIAM: 3.9 CM
BH CV ECHO MEAS - AO V2 MAX: 113 CM/SEC
BH CV ECHO MEAS - AO V2 VTI: 20.2 CM
BH CV ECHO MEAS - AVA(I,D): 3.2 CM2
BH CV ECHO MEAS - EDV(CUBED): 238.3 ML
BH CV ECHO MEAS - EDV(MOD-SP2): 100 ML
BH CV ECHO MEAS - EDV(MOD-SP4): 111 ML
BH CV ECHO MEAS - EF(MOD-BP): 43.1 %
BH CV ECHO MEAS - EF(MOD-SP2): 42.7 %
BH CV ECHO MEAS - EF(MOD-SP4): 48.1 %
BH CV ECHO MEAS - ESV(CUBED): 166.4 ML
BH CV ECHO MEAS - ESV(MOD-SP2): 57.3 ML
BH CV ECHO MEAS - ESV(MOD-SP4): 57.6 ML
BH CV ECHO MEAS - FS: 11.3 %
BH CV ECHO MEAS - IVS/LVPW: 0.92 CM
BH CV ECHO MEAS - IVSD: 1.2 CM
BH CV ECHO MEAS - LA DIMENSION: 5.4 CM
BH CV ECHO MEAS - LAT PEAK E' VEL: 8.3 CM/SEC
BH CV ECHO MEAS - LV DIASTOLIC VOL/BSA (35-75): 53.2 CM2
BH CV ECHO MEAS - LV MASS(C)D: 350.2 GRAMS
BH CV ECHO MEAS - LV MAX PG: 1.93 MMHG
BH CV ECHO MEAS - LV MEAN PG: 1 MMHG
BH CV ECHO MEAS - LV SYSTOLIC VOL/BSA (12-30): 27.6 CM2
BH CV ECHO MEAS - LV V1 MAX: 69.4 CM/SEC
BH CV ECHO MEAS - LV V1 VTI: 14.1 CM
BH CV ECHO MEAS - LVIDD: 6.2 CM
BH CV ECHO MEAS - LVIDS: 5.5 CM
BH CV ECHO MEAS - LVOT AREA: 4.5 CM2
BH CV ECHO MEAS - LVOT DIAM: 2.4 CM
BH CV ECHO MEAS - LVPWD: 1.3 CM
BH CV ECHO MEAS - MED PEAK E' VEL: 6.9 CM/SEC
BH CV ECHO MEAS - MR MAX PG: 94.5 MMHG
BH CV ECHO MEAS - MR MAX VEL: 486 CM/SEC
BH CV ECHO MEAS - MR MEAN PG: 65 MMHG
BH CV ECHO MEAS - MR MEAN VEL: 387 CM/SEC
BH CV ECHO MEAS - MR VTI: 175 CM
BH CV ECHO MEAS - MV A MAX VEL: 26.1 CM/SEC
BH CV ECHO MEAS - MV DEC SLOPE: 483 CM/SEC2
BH CV ECHO MEAS - MV DEC TIME: 0.12 SEC
BH CV ECHO MEAS - MV E MAX VEL: 102 CM/SEC
BH CV ECHO MEAS - MV E/A: 3.9
BH CV ECHO MEAS - MV MAX PG: 4.7 MMHG
BH CV ECHO MEAS - MV MEAN PG: 2 MMHG
BH CV ECHO MEAS - MV P1/2T: 69.7 MSEC
BH CV ECHO MEAS - MV V2 VTI: 25 CM
BH CV ECHO MEAS - MVA(P1/2T): 3.2 CM2
BH CV ECHO MEAS - MVA(VTI): 2.6 CM2
BH CV ECHO MEAS - PA V2 MAX: 89.6 CM/SEC
BH CV ECHO MEAS - PI END-D VEL: 147.5 CM/SEC
BH CV ECHO MEAS - RAP SYSTOLE: 3 MMHG
BH CV ECHO MEAS - RF(MV,LVOT)(1DIAM): 0.72 CM
BH CV ECHO MEAS - RVSP: 35.7 MMHG
BH CV ECHO MEAS - SV(LVOT): 63.8 ML
BH CV ECHO MEAS - SV(MOD-SP2): 42.7 ML
BH CV ECHO MEAS - SV(MOD-SP4): 53.4 ML
BH CV ECHO MEAS - SVI(LVOT): 30.6 ML/M2
BH CV ECHO MEAS - SVI(MOD-SP2): 20.5 ML/M2
BH CV ECHO MEAS - SVI(MOD-SP4): 25.6 ML/M2
BH CV ECHO MEAS - TR MAX PG: 32.7 MMHG
BH CV ECHO MEAS - TR MAX VEL: 286 CM/SEC
BH CV ECHO MEASUREMENTS AVERAGE E/E' RATIO: 13.42
LEFT ATRIUM VOLUME INDEX: 78.9 ML/M2

## 2024-06-12 ENCOUNTER — TELEPHONE (OUTPATIENT)
Dept: CARDIOLOGY | Facility: CLINIC | Age: 79
End: 2024-06-12
Payer: MEDICARE

## 2024-06-12 NOTE — TELEPHONE ENCOUNTER
Called and spoke with Mr Maria regarding alert received via [facility] remote monitor. Alert showed single round of ATP delivery for v-rate in VF zone. The episode shows oversensed SHARON/noise resulting in the delivery of ATP. Single 41J shock aborted.     I asked Mr Maria what he was doing during the time of the event, and he stated he was at his pain doctor having an ablation performed on his neck. He states the physician knew that he had an AICD.    I encouraged Mr Maria, should he have more procedures like this one done in the future, to contact the Cardiology office for guidance on how to manage his AICD during such procedures. He verbalized understanding.

## 2024-06-18 ENCOUNTER — OFFICE VISIT (OUTPATIENT)
Dept: CARDIOLOGY | Facility: CLINIC | Age: 79
End: 2024-06-18
Payer: MEDICARE

## 2024-06-18 VITALS
BODY MASS INDEX: 27.35 KG/M2 | OXYGEN SATURATION: 98 % | DIASTOLIC BLOOD PRESSURE: 82 MMHG | HEART RATE: 75 BPM | WEIGHT: 195.4 LBS | HEIGHT: 71 IN | SYSTOLIC BLOOD PRESSURE: 116 MMHG

## 2024-06-18 DIAGNOSIS — Z95.810 PRESENCE OF BIVENTRICULAR AICD: ICD-10-CM

## 2024-06-18 DIAGNOSIS — I48.21 PERMANENT ATRIAL FIBRILLATION: ICD-10-CM

## 2024-06-18 DIAGNOSIS — I47.29 VENTRICULAR TACHYCARDIA (PAROXYSMAL): ICD-10-CM

## 2024-06-18 DIAGNOSIS — I25.708 CORONARY ARTERY DISEASE OF BYPASS GRAFT OF NATIVE HEART WITH STABLE ANGINA PECTORIS: Primary | ICD-10-CM

## 2024-06-18 DIAGNOSIS — I50.42 CHRONIC COMBINED SYSTOLIC AND DIASTOLIC CONGESTIVE HEART FAILURE: ICD-10-CM

## 2024-06-18 DIAGNOSIS — Z98.890 S/P AV NODAL ABLATION: ICD-10-CM

## 2024-06-18 PROCEDURE — 3074F SYST BP LT 130 MM HG: CPT | Performed by: NURSE PRACTITIONER

## 2024-06-18 PROCEDURE — 1159F MED LIST DOCD IN RCRD: CPT | Performed by: NURSE PRACTITIONER

## 2024-06-18 PROCEDURE — 3079F DIAST BP 80-89 MM HG: CPT | Performed by: NURSE PRACTITIONER

## 2024-06-18 PROCEDURE — 93000 ELECTROCARDIOGRAM COMPLETE: CPT | Performed by: NURSE PRACTITIONER

## 2024-06-18 PROCEDURE — 1160F RVW MEDS BY RX/DR IN RCRD: CPT | Performed by: NURSE PRACTITIONER

## 2024-06-18 PROCEDURE — 99214 OFFICE O/P EST MOD 30 MIN: CPT | Performed by: NURSE PRACTITIONER

## 2024-06-18 RX ORDER — LOSARTAN POTASSIUM 25 MG/1
1 TABLET ORAL DAILY
COMMUNITY
Start: 2024-06-05

## 2024-06-18 NOTE — PROGRESS NOTES
Subjective:     Encounter Date:6/18/2024      Patient ID: Darren Maria is a 79 y.o. male.    Chief Complaint: Follow-up heart failure, coronary disease, atrial fibrillation, and ventricular tachycardia.    History of Present Illness    The patient presents to follow-up regarding the above cardiac diagnoses.  He also follows with Dr. Whitney for ventricular tachycardia.  He received an appropriate AICD shock on 5/26/2023 and appropriate ATP treatment in June 2023.  Dr. Oliver had started him on sotalol August 2023 but he had felt poorly with associated nausea since starting it.  Later, EP discontinued Coreg.  He was hospitalized for an exacerbation of heart failure in October 2023.  More recently, he has struggled with orthostatic hypotension and associated symptoms and his guideline directed medical therapy for heart failure has had to be reduced.  It appears he has also been taken off of Flomax due to concerns that this was contributing to his orthostatic hypotension.    He followed up with Dr. Posadas January 2024.  He reiterated that he improved with diuresis in terms of his chest discomfort and shortness of breath back in October 2023.  He was still struggling intermittently with shortness of breath and chest discomfort provoked by fluid retention.  At that visit, Dr. Posadas started low-dose verquvo.  The patient tells me he ultimately did not tolerate this due to weakness and low blood pressure.    He did have his BiV ICD generator changed and a new LV lead placed later in January 2024 per Dr. Oliver. Old LV lead is capped.     He was hospitalized in May with a GI bleed.  Xarelto was changed to Eliquis and Plavix was replaced with aspirin at that point.  Later, EVELIA Bernard, had a discussion with Dr. Posadas and Dr. Oliver about his recent bleeding history and his prior left atrial appendage ligation and the decision was made to discontinue anticoagulation and continue antiplatelet monotherapy in the form  of aspirin 81 mg daily.    Today the patient states he is feeling better than he has in quite some time but still struggles with chronic weakness and fatigue and intermittent weak spells.  He denies any recent chest discomfort-he tells me he has rare pressure.  He continues to have shortness of breath with mild to moderate exertion.  He denies orthopnea, PND, rapid weight gain.  He has not taken torsemide in several weeks.  He states his weight is staying between 191 and 195 pounds.  He states his blood pressure staying between 110s to 130s.  He confirms that he was struggling with significant dizziness and lightheadedness on Entresto, even at half of the lowest dose but is currently tolerating losartan 25 mg daily.          The following portions of the patient's history were reviewed and updated as appropriate: allergies, current medications, past family history, past medical history, past social history, past surgical history, and problem list.    Review of Systems   Constitutional: Positive for malaise/fatigue.   Cardiovascular:  Positive for dyspnea on exertion. Negative for chest pain, claudication, leg swelling, near-syncope, orthopnea, palpitations, paroxysmal nocturnal dyspnea and syncope.   Respiratory:  Positive for shortness of breath. Negative for cough.    Hematologic/Lymphatic: Does not bruise/bleed easily.   Musculoskeletal:  Negative for falls.   Gastrointestinal:  Negative for bloating.   Neurological:  Positive for weakness. Negative for dizziness and light-headedness.           Current Outpatient Medications:     aspirin 81 MG EC tablet, Take 1 tablet by mouth Daily., Disp: , Rfl:     atorvastatin (LIPITOR) 40 MG tablet, Take 1 tablet by mouth Every Night., Disp: , Rfl:     cetirizine (zyrTEC) 10 MG tablet, Take 1 tablet by mouth Daily As Needed for Allergies., Disp: , Rfl:     Cyanocobalamin 1000 MCG/ML kit, Inject 1 mL as directed Every 30 (Thirty) Days. On Fridays till 11/11/23 then monthly  thereafter., Disp: , Rfl:     empagliflozin (Jardiance) 10 MG tablet tablet, Take 1 tablet by mouth Daily., Disp: 30 tablet, Rfl: 11    finasteride (PROSCAR) 5 MG tablet, Take 1 tablet by mouth Daily., Disp: , Rfl:     isosorbide mononitrate (IMDUR) 30 MG 24 hr tablet, Take 1 tablet by mouth Daily., Disp: 30 tablet, Rfl: 5    losartan (COZAAR) 25 MG tablet, Take 1 tablet by mouth Daily., Disp: , Rfl:     Mirabegron ER (MYRBETRIQ) 50 MG tablet sustained-release 24 hour 24 hr tablet, Take 50 mg by mouth Daily., Disp: , Rfl:     nitroglycerin (NITROSTAT) 0.4 MG SL tablet, Place 1 tablet under the tongue Every 5 (Five) Minutes As Needed for Chest Pain. Take no more than 3 doses in 15 minutes., Disp: 25 tablet, Rfl: 2    omeprazole (PriLOSEC) 40 MG capsule, Take 1 capsule by mouth Daily., Disp: , Rfl:     pregabalin (LYRICA) 150 MG capsule, Take 1 capsule by mouth 2 (Two) Times a Day., Disp: , Rfl:     ranolazine (Ranexa) 1000 MG 12 hr tablet, Take 1 tablet by mouth 2 (Two) Times a Day., Disp: , Rfl:     Sotalol HCl AF 80 MG tablet, Take 1 tablet by mouth 2 (Two) Times a Day., Disp: 180 tablet, Rfl: 3    spironolactone (ALDACTONE) 25 MG tablet, Take 1 tablet by mouth Daily., Disp: 90 tablet, Rfl: 3    torsemide (DEMADEX) 20 MG tablet, Take 1 tablet by mouth As Needed., Disp: , Rfl:     traMADol-acetaminophen (ULTRACET) 37.5-325 MG per tablet, Take 1 tablet by mouth Every 12 (Twelve) Hours As Needed for Moderate Pain (pain)., Disp: , Rfl: 1    zolpidem (AMBIEN) 5 MG tablet, Take 1 tablet by mouth At Night As Needed for Sleep., Disp: , Rfl:        Objective:      Vitals:    06/18/24 1004   BP: 116/82   Pulse: 75   SpO2: 98%   Weight - 195 lbs    Vitals and nursing note reviewed.   Constitutional:       General: Not in acute distress.     Appearance: Not in distress. Frail. Chronically ill-appearing.   Neck:      Vascular: No JVD or JVR. JVD normal.   Pulmonary:      Effort: Pulmonary effort is normal.   Cardiovascular:       Normal rate. Regular rhythm.      Murmurs: There is no murmur.   Edema:     Peripheral edema absent.   Skin:     General: Skin is warm and dry.   Neurological:      Mental Status: Alert, oriented to person, place, and time and oriented to person, place and time.         Lab Review:   Lab Results   Component Value Date    GLUCOSE 121 (H) 05/23/2024    BUN 11 05/23/2024    CREATININE 0.83 05/23/2024    EGFR 90.1 05/23/2024    BCR 13.3 05/23/2024    K 4.3 05/23/2024    CO2 26.0 05/23/2024    CALCIUM 9.2 05/23/2024    ALBUMIN 4.2 05/23/2024    BILITOT 0.7 05/23/2024    AST 19 05/23/2024    ALT 12 05/23/2024        Lab Results   Component Value Date    CHOL 122 11/12/2021    CHLPL 136 (L) 03/13/2018    TRIG 108 11/12/2021    HDL 36 (L) 11/12/2021    LDL 66 11/12/2021      Lab Results   Component Value Date    HGBA1C 6.3 09/25/2018        Lab Results   Component Value Date    WBC 4.85 05/23/2024    HGB 12.3 (L) 05/23/2024    HCT 40.5 05/23/2024    MCV 92.9 05/23/2024     05/23/2024                      ECG 12 Lead    Date/Time: 6/18/2024 12:09 PM  Performed by: Yolanda Perry APRN    Authorized by: Yolanda Perry APRN  Comparison: compared with previous ECG from 5/15/2024  Similar to previous ECG  Comparison to previous ECG: V-paced   Rhythm: atrial fibrillation and paced  BPM: 75    Clinical impression: abnormal EKG        5/2024 echo:      Left ventricular systolic function is mildly decreased. Left ventricular ejection fraction appears to be 41 - 45%.    The left ventricular cavity is mild to moderately dilated.    The following left ventricular wall segments are hypokinetic: apical anterior, apical lateral, mid inferolateral, mid inferoseptal and mid anteroseptal. The following left ventricular wall segments are akinetic: apical inferior, apical septal and apex.    The left atrial cavity is severely dilated.    The right atrial cavity is dilated.    Estimated right ventricular systolic pressure from tricuspid  regurgitation is mildly elevated (35-45 mmHg).    Normal size and function of the right ventricle.    Compared to prior exam from 10/31/2023, there is perhaps very subtle improvement in left ventricular systolic function.     Assessment/Plan:     Problem List Items Addressed This Visit          Cardiac and Vasculature    Coronary artery disease of bypass graft of native heart with stable angina pectoris: Stable.     Overview     3v CABG ([facility]) in 1990: LIMA to LAD, SVG to PD, SVG to OM  2006: other grafts patent but found to have  of SVG to OM; underwent PTCA/stenting of native OM with 2.5mm x 23mm LEILANI.  4/2008: only change was severe focal denovo stenosis of previously twice-stented native LCX with [facility] with third 2.5mm x 23mm Cypher LEILANI  ---l. Pulse generator replacement 04/28/08 ([facility]) Medtr[employer] Concerto [id number] (serial # [id number]); pulse generator replacement 11/19  ---m. Recurrent angina 04/08, repeat limited native LCA angiogram 04/08 with focal edge dissection and instent restenosis, status post additional stenting ([facility]) with 3 additional Dana stents  ---n. Recurrent angina 12/08 with repeat catheterization confirming LVEF 40%, patent LIMA to LAD, patent SVG to RCA, patent LCx stent site; medical therapy  ---o. TTE 03/10 with LVEF 30-35%, no significant valvular dysfunction, moderate LAE and LVE; repeat 12/10 uncjhanged except LVEWF improved to 35-40%  ---p. BNP 03/10 259 pg/ml > repeat 12/10 165 pg/ml > repeat 06/12 70 pg/ml > repeat 10/12 200 pg/ml>repeat 11/17 154 pg/ml  ---q. Adenosine radionuclide stress test 03/10 and 12/10 with fixed shannan-apical scar, no ischemia, LVEF 40% rising to 50% with stress; medical therapy  ---r. ACS 03/11 with cardiac catheterization revealing patent LIMA to LAD, patent SVG to PD and PL of RCA, severe ISR of previously multiply (x 4 procedures/6 stents) stented distal  LM/proximal LCX-OM    Redo CABG via lateral thoracotomy at [facility] ([place], 3/18/11) with SVG from descending Ao (entered through 5th intercostal space) to OM.  Op note scanned in under 'media' under date of procedure         Relevant Medications            nitroglycerin (NITROSTAT) 0.4 MG SL tablet    Presence of biventricular AICD    Permanent atrial fibrillation: Stable.     Relevant Medications            nitroglycerin (NITROSTAT) 0.4 MG SL tablet    S/P AV sam ablation    Chronic combined systolic and diastolic congestive heart failure - stable NYHA II-III    Relevant Medications            nitroglycerin (NITROSTAT) 0.4 MG SL tablet    Ventricular tachycardia: stable     Relevant Medications            nitroglycerin (NITROSTAT) 0.4 MG SL tablet         Recommendations/plans:      The patient is stable from a cardiovascular standpoint.  He is currently NYHA II-III but euvolemic on exam.  Due to struggling with orthostatic symptoms, he is no longer on Entresto but is currently tolerating low-dose losartan as well as Aldactone and Jardiance.  He has not been requiring torsemide as of late and will continue this on an as-needed basis only.  His beta-blocker is sotalol given his VT history.  He will continue to follow with electrophysiology for his VT history as well as his BiV AICD and permanent atrial fibrillation which is rate controlled.  He has rare chest pressure-his angina is stable.  He will continue his aspirin, high intensity statin, Imdur and Ranexa.  He has not been requiring sublingual nitroglycerin.  Most recent LVEF is slightly improved compared to previous, at 41 to 45%.  He will keep his follow-up appointment in the CHF clinic in approximately 4 weeks on 7/15.  He will keep his follow-up with the EP in November.  He will follow-up with Dr. Posadas in 6 months, sooner with symptoms or concerns.  As noted above given his recent GI bleed and prior left atrial appendage ligation he is no longer  anticoagulated and will continue antiplatelet monotherapy at this time in the form of aspirin 81 mg daily.      I spent 37 minutes caring for Darren on this date of service. This time includes time spent by me in the following activities: preparing for the visit, reviewing tests, performing a medically appropriate examination and/or evaluation, counseling and educating the patient/family/caregiver, and documenting information in the medical record

## 2024-06-26 ENCOUNTER — LAB (OUTPATIENT)
Dept: LAB | Facility: HOSPITAL | Age: 79
End: 2024-06-26
Payer: MEDICARE

## 2024-06-26 DIAGNOSIS — D64.9 ANEMIA, UNSPECIFIED TYPE: Primary | ICD-10-CM

## 2024-06-26 DIAGNOSIS — D64.9 ANEMIA, UNSPECIFIED TYPE: ICD-10-CM

## 2024-06-26 LAB
ALBUMIN SERPL-MCNC: 4 G/DL (ref 3.5–5.2)
ALBUMIN/GLOB SERPL: 1.9 G/DL
ALP SERPL-CCNC: 67 U/L (ref 39–117)
ALT SERPL W P-5'-P-CCNC: 12 U/L (ref 1–41)
ANION GAP SERPL CALCULATED.3IONS-SCNC: 7 MMOL/L (ref 5–15)
AST SERPL-CCNC: 16 U/L (ref 1–40)
BASOPHILS # BLD AUTO: 0.04 10*3/MM3 (ref 0–0.2)
BASOPHILS NFR BLD AUTO: 0.7 % (ref 0–1.5)
BILIRUB SERPL-MCNC: 0.7 MG/DL (ref 0–1.2)
BUN SERPL-MCNC: 15 MG/DL (ref 8–23)
BUN/CREAT SERPL: 16.3 (ref 7–25)
CALCIUM SPEC-SCNC: 9 MG/DL (ref 8.6–10.5)
CHLORIDE SERPL-SCNC: 109 MMOL/L (ref 98–107)
CO2 SERPL-SCNC: 28 MMOL/L (ref 22–29)
CREAT SERPL-MCNC: 0.92 MG/DL (ref 0.76–1.27)
DEPRECATED RDW RBC AUTO: 75.7 FL (ref 37–54)
EGFRCR SERPLBLD CKD-EPI 2021: 85.7 ML/MIN/1.73
EOSINOPHIL # BLD AUTO: 0.11 10*3/MM3 (ref 0–0.4)
EOSINOPHIL NFR BLD AUTO: 2 % (ref 0.3–6.2)
ERYTHROCYTE [DISTWIDTH] IN BLOOD BY AUTOMATED COUNT: 21.7 % (ref 12.3–15.4)
FERRITIN SERPL-MCNC: 138.1 NG/ML (ref 30–400)
GLOBULIN UR ELPH-MCNC: 2.1 GM/DL
GLUCOSE SERPL-MCNC: 114 MG/DL (ref 65–99)
HCT VFR BLD AUTO: 40 % (ref 37.5–51)
HGB BLD-MCNC: 12.6 G/DL (ref 13–17.7)
IMM GRANULOCYTES # BLD AUTO: 0.03 10*3/MM3 (ref 0–0.05)
IMM GRANULOCYTES NFR BLD AUTO: 0.5 % (ref 0–0.5)
IRON 24H UR-MRATE: 104 MCG/DL (ref 59–158)
IRON SATN MFR SERPL: 30 % (ref 20–50)
LYMPHOCYTES # BLD AUTO: 0.67 10*3/MM3 (ref 0.7–3.1)
LYMPHOCYTES NFR BLD AUTO: 12.3 % (ref 19.6–45.3)
MCH RBC QN AUTO: 29.7 PG (ref 26.6–33)
MCHC RBC AUTO-ENTMCNC: 31.5 G/DL (ref 31.5–35.7)
MCV RBC AUTO: 94.3 FL (ref 79–97)
MONOCYTES # BLD AUTO: 0.47 10*3/MM3 (ref 0.1–0.9)
MONOCYTES NFR BLD AUTO: 8.6 % (ref 5–12)
NEUTROPHILS NFR BLD AUTO: 4.14 10*3/MM3 (ref 1.7–7)
NEUTROPHILS NFR BLD AUTO: 75.9 % (ref 42.7–76)
NRBC BLD AUTO-RTO: 0 /100 WBC (ref 0–0.2)
PLATELET # BLD AUTO: 187 10*3/MM3 (ref 140–450)
PMV BLD AUTO: 8.9 FL (ref 6–12)
POTASSIUM SERPL-SCNC: 4.5 MMOL/L (ref 3.5–5.2)
PROT SERPL-MCNC: 6.1 G/DL (ref 6–8.5)
RBC # BLD AUTO: 4.24 10*6/MM3 (ref 4.14–5.8)
SODIUM SERPL-SCNC: 144 MMOL/L (ref 136–145)
TIBC SERPL-MCNC: 352 MCG/DL (ref 298–536)
TRANSFERRIN SERPL-MCNC: 236 MG/DL (ref 200–360)
WBC NRBC COR # BLD AUTO: 5.46 10*3/MM3 (ref 3.4–10.8)

## 2024-06-26 PROCEDURE — 83540 ASSAY OF IRON: CPT

## 2024-06-26 PROCEDURE — 85025 COMPLETE CBC W/AUTO DIFF WBC: CPT

## 2024-06-26 PROCEDURE — 36415 COLL VENOUS BLD VENIPUNCTURE: CPT

## 2024-06-26 PROCEDURE — 82728 ASSAY OF FERRITIN: CPT

## 2024-06-26 PROCEDURE — 84466 ASSAY OF TRANSFERRIN: CPT

## 2024-06-26 PROCEDURE — 80053 COMPREHEN METABOLIC PANEL: CPT

## 2024-07-03 ENCOUNTER — OFFICE VISIT (OUTPATIENT)
Dept: ONCOLOGY | Facility: CLINIC | Age: 79
End: 2024-07-03
Payer: MEDICARE

## 2024-07-03 VITALS
HEIGHT: 71 IN | HEART RATE: 82 BPM | OXYGEN SATURATION: 98 % | RESPIRATION RATE: 16 BRPM | WEIGHT: 195.9 LBS | DIASTOLIC BLOOD PRESSURE: 82 MMHG | SYSTOLIC BLOOD PRESSURE: 118 MMHG | TEMPERATURE: 97.9 F | BODY MASS INDEX: 27.43 KG/M2

## 2024-07-03 DIAGNOSIS — D50.9 IRON DEFICIENCY ANEMIA, UNSPECIFIED IRON DEFICIENCY ANEMIA TYPE: Primary | ICD-10-CM

## 2024-07-03 NOTE — PROGRESS NOTES
[facility] HEMATOLOGY & ONCOLOGY  [address]  [facility] [address] [phone]    Patient Name: Darren Maria  Encounter Date: 07/03/2024   YOB: 1945  Patient Number: [id number]    PROGRESS NOTE     HISTORY OF PRESENT ILLNESS: Darren Maria is a 79 y.o. male followed by this office for iron deficiency. History is obtained from patient. History is considered to be accurate.    He has health history significant for Hx of Renal Cell Cancer approx 20 years ago s/p nephrectomy, AV node ablation, CHF, Hyperlipidemia, A Fib, Presence of a pacemaker, 2 CABG      He has received IV iron previously.  Ferrlecit October , 31, Nov 1, 2, 3, 2023  He Had injectafer on May 1 and 16, 2024  Ferrlecit on May 6, 7, 2024    Pt reports he has stopped all anticoagulation.      He had labs drawn and results were reviewed with him in office.     INTERVAL HISTORY     Pt presents to clinic today for continued follow up.    He received Injectafer 750 mg on 5/16/24 and tolerated it well   He had labs drawn 6/26/24 and results were reviewed with him in office.     LABS    Lab Results - Last 18 Months   Lab Units 07/15/24  1148 06/26/24  1334 05/23/24  1247 05/15/24  0946 05/06/24  0523 05/05/24  0540 05/04/24  1212 05/04/24  0338   HEMOGLOBIN g/dL 14.4 12.6* 12.3* 11.2*  11.3* 9.3* 9.1*   < > 8.2*   HEMATOCRIT % 43.6 40.0 40.5 38.8  37.8 31.4* 30.8*  --  27.1*   MCV fL 96.0 94.3 92.9 92.4  91.7 87.0 86.5  --  85.0   WBC 10*3/mm3 5.80 5.46 4.85 5.89  6.19 4.91 4.92  --  4.97   RDW % 17.7* 21.7* 27.4* 25.9*  25.7* 23.0* 22.1*  --  21.2*   MPV fL 9.5 8.9 8.5 10.0  9.5 9.2 8.9  --  8.7   PLATELETS 10*3/mm3 189 187 220 220  208 325 318  --  327   IMM GRAN % %  --  0.5 0.6* 0.5 0.4 0.8*  --  1.4*   NEUTROS ABS 10*3/mm3  --  4.14 3.73 4.47 3.44 3.37  --  3.42   LYMPHS ABS 10*3/mm3  --  0.67* 0.53* 0.62* 0.69* 0.67*  --  0.66*   MONOS ABS 10*3/mm3   --  0.47 0.40 0.60 0.59 0.66  --  0.63   EOS ABS 10*3/mm3  --  0.11 0.12 0.14 0.14 0.15  --  0.14   BASOS ABS 10*3/mm3  --  0.04 0.04 0.03 0.03 0.03  --  0.05   IMMATURE GRANS (ABS) 10*3/mm3  --  0.03 0.03 0.03 0.02 0.04  --  0.07*   NRBC /100 WBC  --  0.0 0.0 0.0 0.0 0.0  --  0.0    < > = values in this interval not displayed.       Lab Results - Last 18 Months   Lab Units 07/15/24  1148 06/26/24  1334 05/23/24  1247 05/15/24  0946 05/06/24  0523 05/05/24  0540 05/04/24  0338 05/03/24  1357 02/26/24  0427 02/25/24  1322 10/30/23  0317 10/29/23  1329   GLUCOSE mg/dL 100* 114* 121* 162*  162* 102* 94   < > 121*   < > 132*   < > 162*   SODIUM mmol/L 140 144 143 142  142 144 145   < > 144   < > 140   < > 142   POTASSIUM mmol/L 4.8 4.5 4.3 4.4  4.4 3.8 3.9   < > 4.2   < > 4.7   < > 4.1   CO2 mmol/L 29.0 28.0 26.0 25.0  25.0 27.0 27.0   < > 25.0   < > 22.0   < > 27.0   CHLORIDE mmol/L 106 109* 109* 107  107 111* 110*   < > 108*   < > 106   < > 105   ANION GAP mmol/L 5.0 7.0 8.0 10.0  10.0 6.0 8.0   < > 11.0   < > 12.0   < > 10.0   CREATININE mg/dL 0.92 0.92 0.83 0.87  0.87 0.78 0.75*   < > 0.80   < > 1.02   < > 0.86   BUN mg/dL 17 15 11 11  11 7* 8   < > 10   < > 45*   < > 12   BUN / CREAT RATIO  18.5 16.3 13.3 12.6  12.6 9.0 10.7   < > 12.5   < > 44.1*   < > 14.0   CALCIUM mg/dL 9.5 9.0 9.2 8.7  8.7 8.8 8.8   < > 9.0   < > 9.1   < > 9.1   ALK PHOS U/L  --  67 71 65  --   --   --  53  --  46  --  77   TOTAL PROTEIN g/dL  --  6.1 6.8 6.3  --   --   --  6.3  --  5.7*  --  6.6   ALT (SGPT) U/L  --  12 12 10  --   --   --  8  --  9  --  6   AST (SGOT) U/L  --  16 19 18  --   --   --  16  --  13  --  11   BILIRUBIN mg/dL  --  0.7 0.7 0.7  --   --   --  0.4  --  0.8  --  0.9   ALBUMIN g/dL  --  4.0 4.2 3.9  --   --   --  4.0  --  3.7  --  4.2   GLOBULIN gm/dL  --  2.1 2.6 2.4  --   --   --  2.3  --  2.0  --  2.4    < > = values in this interval not displayed.       Lab Results - Last 18 Months   Lab Units  "05/23/24  1247   LDH U/L 186       Lab Results - Last 18 Months   Lab Units 06/26/24  1334 05/23/24  1247 05/15/24  0946 04/23/24  1045 11/14/23  0807 10/30/23  1013 10/30/23  0317   IRON mcg/dL 104 90 92 26* 107  --  22*   TIBC mcg/dL 352 355 331 392 375  --  323   IRON SATURATION (TSAT) % 30 25 28 7* 28  --  7*   FERRITIN ng/mL 138.10 615.10* 369.70 33.70 228.30  --   --    FOLATE ng/mL  --  7.39  --   --   --  12.40  --          PAST MEDICAL HISTORY:  ALLERGIES:  Allergies   Allergen Reactions    Azithromycin Nausea And Vomiting    Morphine And Codeine Other (See Comments)     SHAKES AND MAKES HIM \"WIRED\".   TAKES TRAMADOL WITHOUT PROBLEM     CURRENT MEDICATIONS:  Outpatient Encounter Medications as of 7/3/2024   Medication Sig Dispense Refill    aspirin 81 MG EC tablet Take 1 tablet by mouth Daily.      atorvastatin (LIPITOR) 40 MG tablet Take 1 tablet by mouth Every Night.      cetirizine (zyrTEC) 10 MG tablet Take 1 tablet by mouth Daily As Needed for Allergies.      Cyanocobalamin 1000 MCG/ML kit Inject 1 mL as directed Every 30 (Thirty) Days. On Fridays till 11/11/23 then monthly thereafter.      empagliflozin (Jardiance) 10 MG tablet tablet Take 1 tablet by mouth Daily. 30 tablet 11    finasteride (PROSCAR) 5 MG tablet Take 1 tablet by mouth Daily.      isosorbide mononitrate (IMDUR) 30 MG 24 hr tablet Take 1 tablet by mouth Daily. 30 tablet 5    Mirabegron ER (MYRBETRIQ) 50 MG tablet sustained-release 24 hour 24 hr tablet Take 50 mg by mouth Daily.      nitroglycerin (NITROSTAT) 0.4 MG SL tablet Place 1 tablet under the tongue Every 5 (Five) Minutes As Needed for Chest Pain. Take no more than 3 doses in 15 minutes. 25 tablet 2    omeprazole (PriLOSEC) 40 MG capsule Take 1 capsule by mouth Daily.      pregabalin (LYRICA) 150 MG capsule Take 1 capsule by mouth 2 (Two) Times a Day.      ranolazine (Ranexa) 1000 MG 12 hr tablet Take 1 tablet by mouth 2 (Two) Times a Day.      Sotalol HCl AF 80 MG tablet TAKE 1 " TABLET BY MOUTH TWICE A  tablet 3    spironolactone (ALDACTONE) 25 MG tablet Take 1 tablet by mouth Daily. 90 tablet 3    torsemide (DEMADEX) 20 MG tablet Take 1 tablet by mouth As Needed.      traMADol-acetaminophen (ULTRACET) 37.5-325 MG per tablet Take 1 tablet by mouth Every 12 (Twelve) Hours As Needed for Moderate Pain (pain).  1    zolpidem (AMBIEN) 5 MG tablet Take 1 tablet by mouth At Night As Needed for Sleep.      [DISCONTINUED] losartan (COZAAR) 25 MG tablet Take 1 tablet by mouth Daily.       No facility-administered encounter medications on file as of 7/3/2024.     ADULT ILLNESSES:  Patient Active Problem List   Diagnosis Code    Coronary artery disease of bypass graft of native heart with stable angina pectoris I25.708    Essential hypertension I10    Presence of biventricular AICD Z95.810    Ischemic cardiomyopathy I25.5    Cancer of kidney C64.9    BPH (benign prostatic hypertrophy) with urinary obstruction N40.1, N13.8    Impotence due to erectile dysfunction N52.9    Benign prostatic hyperplasia with lower urinary tract symptoms N40.1    Cancer of kidney, right C64.1    Erectile dysfunction N52.9    Permanent atrial fibrillation I48.21    History of renal cell cancer Z85.528    Malfunction of penile prosthesis T83.490A    Urine frequency R35.0    S/P AV sam ablation Z98.890    Chronic combined systolic and diastolic congestive heart failure I50.42    Mixed hyperlipidemia E78.2    Hx of adenomatous colonic polyps Z86.010    Shockable cardiac rhythm detected by automated external defibrillator I49.9    CHB (complete heart block) I44.2    Ventricular tachycardia (paroxysmal) I47.29    Acute exacerbation of CHF (congestive heart failure) I50.9    Symptomatic anemia D64.9    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, implants and grafts, subsequent encounter T82.828D    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, implants and grafts, initial encounter T82.828A    Dyspnea R06.00    Weakness  R53.1    Iron deficiency anemia D50.9    Gastrointestinal bleeding, upper K92.2    GI bleed K92.2       HEALTH MAINTENANCE ITEMS:  Health Maintenance Due   Topic Date Due    URINE MICROALBUMIN  Never done    Pneumococcal Vaccine 65+ (1 of 2 - PCV) Never done    RSV Vaccine - Adults (1 - 1-dose 60+ series) Never done    ZOSTER VACCINE (2 of 3) 08/04/2020    DIABETIC EYE EXAM  04/26/2022    COVID-19 Vaccine (5 - 2023-24 season) 09/01/2023    HEMOGLOBIN A1C  05/29/2024       <no information>  Last Completed Colonoscopy            COLORECTAL CANCER SCREENING (COLONOSCOPY - Every 5 Years) Next due on 5/7/2029 05/07/2024  Colonoscopy    05/07/2024  Surgical Procedure: COLONOSCOPY    05/06/2024  Outside Procedure: COLONOSCOPY    05/03/2024  Fecal Occult Blood component of POC Occult Blood Stool    03/21/2022  Surgical Procedure: COLONOSCOPY    Only the first 5 history entries have been loaded, but more history exists.                  Immunization History   Administered Date(s) Administered    COVID-19 (PFIZER) BIVALENT 12+YRS 11/03/2022    COVID-19 (PFIZER) Purple Cap Monovalent 01/21/2021, 02/14/2021, 11/08/2021     Last Completed Mammogram       This patient has no relevant Health Maintenance data.              FAMILY HISTORY:  Family History   Problem Relation Age of Onset    Lung cancer Mother     Heart attack Mother     No Known Problems Father     Other Neg Hx         GI Malignancies    Colon cancer Neg Hx     Colon polyps Neg Hx      SOCIAL HISTORY:  Social History     Socioeconomic History    Marital status:    Tobacco Use    Smoking status: Never    Smokeless tobacco: Never   Vaping Use    Vaping status: Never Used   Substance and Sexual Activity    Alcohol use: Not Currently     Comment: occ    Drug use: Never    Sexual activity: Not Currently     Partners: Female     Birth control/protection: Vasectomy       REVIEW OF SYSTEMS:  Review of Systems   Constitutional:  Positive for appetite change and  "fatigue. Negative for activity change, fever, unexpected weight gain and unexpected weight loss.   HENT:  Negative for dental problem, facial swelling, swollen glands and trouble swallowing.    Eyes:  Negative for double vision and discharge.   Respiratory:  Positive for shortness of breath. Negative for cough and wheezing.    Cardiovascular:  Negative for chest pain, palpitations and leg swelling.        Describes it as \"pressure\" but states it has increase of hemoglboin   Gastrointestinal:  Positive for nausea. Negative for abdominal pain, blood in stool and vomiting.   Endocrine: Negative.    Genitourinary:  Negative for dysuria and hematuria.   Musculoskeletal:  Negative for arthralgias and myalgias.   Skin:  Negative for rash, skin lesions and wound.   Allergic/Immunologic: Negative for immunocompromised state.   Neurological:  Negative for speech difficulty, light-headedness, headache, memory problem and confusion.   Hematological:  Negative for adenopathy.   Psychiatric/Behavioral:  Negative for self-injury, suicidal ideas and depressed mood. The patient is not nervous/anxious.        /82   Pulse 82   Temp 97.9 °F (36.6 °C)   Resp 16   Ht 180.3 cm (71\")   Wt 88.9 kg (195 lb 14.4 oz)   SpO2 98%   BMI 27.32 kg/m²  Body surface area is 2.09 meters squared.    Pain Score    07/03/24 1315   PainSc: 0-No pain        Physical Exam  Constitutional:       Appearance: Normal appearance.   HENT:      Head: Normocephalic and atraumatic.   Cardiovascular:      Rate and Rhythm: Normal rate and regular rhythm.   Pulmonary:      Effort: Pulmonary effort is normal.      Breath sounds: Normal breath sounds.   Abdominal:      General: Bowel sounds are normal.      Palpations: Abdomen is soft.   Musculoskeletal:      Right lower leg: No edema.      Left lower leg: No edema.   Skin:     General: Skin is warm and dry.   Neurological:      Mental Status: He is alert and oriented to person, place, and time. "   Psychiatric:         Attention and Perception: Attention normal.         Mood and Affect: Mood normal.         Judgment: Judgment normal.         Darren Maria reports a pain score of 0.  Given his pain assessment as noted, treatment options were discussed and the following options were decided upon as a follow-up plan to address the patient's pain:  No intervention indicated. .      ASSESSMENT / PLAN:    1. Iron deficiency anemia, unspecified iron deficiency anemia type       Pt received Injectafer.  Labs now stable for observation   Recheck labs in 6 weeks        PLAN:   Stable for observation  Continue current medications, treatment plans and follow up with PCP and any other providers  Return to office 3 months   Pre-office labs for Cbc, CMP, Iron Profile, Ferritin   Care discussed with patient.  Understanding expressed.  Patient agreeable with plan.           EVELIA Cheng  07/03/2024

## 2024-07-15 ENCOUNTER — HOSPITAL ENCOUNTER (OUTPATIENT)
Dept: CARDIOLOGY | Facility: HOSPITAL | Age: 79
Discharge: HOME | End: 2024-07-15
Payer: MEDICARE

## 2024-07-15 VITALS
OXYGEN SATURATION: 96 % | HEART RATE: 80 BPM | SYSTOLIC BLOOD PRESSURE: 97 MMHG | DIASTOLIC BLOOD PRESSURE: 53 MMHG | BODY MASS INDEX: 26.78 KG/M2 | WEIGHT: 192 LBS

## 2024-07-15 DIAGNOSIS — Z95.810 PRESENCE OF BIVENTRICULAR AICD: ICD-10-CM

## 2024-07-15 DIAGNOSIS — I48.21 PERMANENT ATRIAL FIBRILLATION: ICD-10-CM

## 2024-07-15 DIAGNOSIS — I25.708 CORONARY ARTERY DISEASE OF BYPASS GRAFT OF NATIVE HEART WITH STABLE ANGINA PECTORIS: ICD-10-CM

## 2024-07-15 DIAGNOSIS — I10 ESSENTIAL HYPERTENSION: ICD-10-CM

## 2024-07-15 DIAGNOSIS — Z98.890 S/P AV NODAL ABLATION: ICD-10-CM

## 2024-07-15 DIAGNOSIS — I50.42 CHRONIC COMBINED SYSTOLIC AND DIASTOLIC CONGESTIVE HEART FAILURE: Primary | ICD-10-CM

## 2024-07-15 DIAGNOSIS — I25.5 ISCHEMIC CARDIOMYOPATHY: ICD-10-CM

## 2024-07-15 LAB
ABSOLUTE LUNG FLUID CONTENT: 29 % (ref 20–35)
ANION GAP SERPL CALCULATED.3IONS-SCNC: 5 MMOL/L (ref 5–15)
BUN SERPL-MCNC: 17 MG/DL (ref 8–23)
BUN/CREAT SERPL: 18.5 (ref 7–25)
CALCIUM SPEC-SCNC: 9.5 MG/DL (ref 8.6–10.5)
CHLORIDE SERPL-SCNC: 106 MMOL/L (ref 98–107)
CO2 SERPL-SCNC: 29 MMOL/L (ref 22–29)
CREAT SERPL-MCNC: 0.92 MG/DL (ref 0.76–1.27)
DEPRECATED RDW RBC AUTO: 60.3 FL (ref 37–54)
EGFRCR SERPLBLD CKD-EPI 2021: 85.7 ML/MIN/1.73
ERYTHROCYTE [DISTWIDTH] IN BLOOD BY AUTOMATED COUNT: 17.7 % (ref 12.3–15.4)
GLUCOSE SERPL-MCNC: 100 MG/DL (ref 65–99)
HCT VFR BLD AUTO: 43.6 % (ref 37.5–51)
HGB BLD-MCNC: 14.4 G/DL (ref 13–17.7)
MCH RBC QN AUTO: 31.7 PG (ref 26.6–33)
MCHC RBC AUTO-ENTMCNC: 33 G/DL (ref 31.5–35.7)
MCV RBC AUTO: 96 FL (ref 79–97)
PLATELET # BLD AUTO: 189 10*3/MM3 (ref 140–450)
PMV BLD AUTO: 9.5 FL (ref 6–12)
POTASSIUM SERPL-SCNC: 4.8 MMOL/L (ref 3.5–5.2)
RBC # BLD AUTO: 4.54 10*6/MM3 (ref 4.14–5.8)
SODIUM SERPL-SCNC: 140 MMOL/L (ref 136–145)
WBC NRBC COR # BLD AUTO: 5.8 10*3/MM3 (ref 3.4–10.8)

## 2024-07-15 PROCEDURE — 80048 BASIC METABOLIC PNL TOTAL CA: CPT

## 2024-07-15 PROCEDURE — 94726 PLETHYSMOGRAPHY LUNG VOLUMES: CPT

## 2024-07-15 PROCEDURE — 85027 COMPLETE CBC AUTOMATED: CPT

## 2024-07-15 PROCEDURE — 99214 OFFICE O/P EST MOD 30 MIN: CPT

## 2024-07-15 NOTE — PROGRESS NOTES
Heart Failure Clinic    Date:  07/15/24     Vitals:    07/15/24 1204   BP: 97/53   Pulse: 80   SpO2: 96%        Indication:  Heart Failure     Procedure:  ReDS device sensor unit applied to right side of chest and right side of back.  Appropriate positioning confirmed based off of the unit's calculation.  Chest measurement obtained with the chest size ruler.  Measurement session performed over 45 seconds.      Method of arrival:  Ambulatory with cane    Weighing self daily:  Yes    Taking medications as prescribed:  Yes    Edema:  Yes and 1+ pitting edema to lower legs bilaterally    Shortness of Air:  Yes, when climbing steps    Number of pillows used at night:  <2, patient uses 1-2 pillows    Results:  ReDS Value=   29                       Interpretation:  25-35% - normal/ideal lung fluid content    Mallory L Haase, RN 07/15/24 12:05 CDT

## 2024-08-26 ENCOUNTER — TELEPHONE (OUTPATIENT)
Dept: ONCOLOGY | Facility: CLINIC | Age: 79
End: 2024-08-26

## 2024-09-03 ENCOUNTER — HOSPITAL ENCOUNTER (OUTPATIENT)
Dept: CARDIOLOGY | Facility: HOSPITAL | Age: 79
Discharge: HOME | End: 2024-09-03
Admitting: NURSE PRACTITIONER
Payer: MEDICARE

## 2024-09-03 VITALS
BODY MASS INDEX: 27.42 KG/M2 | OXYGEN SATURATION: 98 % | HEART RATE: 78 BPM | DIASTOLIC BLOOD PRESSURE: 60 MMHG | SYSTOLIC BLOOD PRESSURE: 108 MMHG | RESPIRATION RATE: 14 BRPM | WEIGHT: 196.6 LBS

## 2024-09-03 DIAGNOSIS — Z95.810 PRESENCE OF BIVENTRICULAR AICD: ICD-10-CM

## 2024-09-03 DIAGNOSIS — I25.5 ISCHEMIC CARDIOMYOPATHY: ICD-10-CM

## 2024-09-03 DIAGNOSIS — I50.42 CHRONIC COMBINED SYSTOLIC AND DIASTOLIC CONGESTIVE HEART FAILURE: Primary | ICD-10-CM

## 2024-09-03 DIAGNOSIS — I48.21 PERMANENT ATRIAL FIBRILLATION: ICD-10-CM

## 2024-09-03 DIAGNOSIS — Z98.890 S/P AV NODAL ABLATION: ICD-10-CM

## 2024-09-03 DIAGNOSIS — I10 ESSENTIAL HYPERTENSION: ICD-10-CM

## 2024-09-03 DIAGNOSIS — I25.708 CORONARY ARTERY DISEASE OF BYPASS GRAFT OF NATIVE HEART WITH STABLE ANGINA PECTORIS: ICD-10-CM

## 2024-09-03 LAB
ABSOLUTE LUNG FLUID CONTENT: 27 % (ref 20–35)
ANION GAP SERPL CALCULATED.3IONS-SCNC: 9 MMOL/L (ref 5–15)
BUN SERPL-MCNC: 19 MG/DL (ref 8–23)
BUN/CREAT SERPL: 23.5 (ref 7–25)
CALCIUM SPEC-SCNC: 8.9 MG/DL (ref 8.6–10.5)
CHLORIDE SERPL-SCNC: 107 MMOL/L (ref 98–107)
CO2 SERPL-SCNC: 24 MMOL/L (ref 22–29)
CREAT SERPL-MCNC: 0.81 MG/DL (ref 0.76–1.27)
DEPRECATED RDW RBC AUTO: 53.3 FL (ref 37–54)
EGFRCR SERPLBLD CKD-EPI 2021: 90.8 ML/MIN/1.73
ERYTHROCYTE [DISTWIDTH] IN BLOOD BY AUTOMATED COUNT: 14.2 % (ref 12.3–15.4)
GLUCOSE SERPL-MCNC: 134 MG/DL (ref 65–99)
HCT VFR BLD AUTO: 44.7 % (ref 37.5–51)
HGB BLD-MCNC: 14.4 G/DL (ref 13–17.7)
MCH RBC QN AUTO: 32.5 PG (ref 26.6–33)
MCHC RBC AUTO-ENTMCNC: 32.2 G/DL (ref 31.5–35.7)
MCV RBC AUTO: 100.9 FL (ref 79–97)
PLATELET # BLD AUTO: 193 10*3/MM3 (ref 140–450)
PMV BLD AUTO: 9.4 FL (ref 6–12)
POTASSIUM SERPL-SCNC: 4.7 MMOL/L (ref 3.5–5.2)
RBC # BLD AUTO: 4.43 10*6/MM3 (ref 4.14–5.8)
SODIUM SERPL-SCNC: 140 MMOL/L (ref 136–145)
WBC NRBC COR # BLD AUTO: 7.47 10*3/MM3 (ref 3.4–10.8)

## 2024-09-03 PROCEDURE — 80048 BASIC METABOLIC PNL TOTAL CA: CPT | Performed by: NURSE PRACTITIONER

## 2024-09-03 PROCEDURE — 94726 PLETHYSMOGRAPHY LUNG VOLUMES: CPT | Performed by: NURSE PRACTITIONER

## 2024-09-03 PROCEDURE — 85027 COMPLETE CBC AUTOMATED: CPT | Performed by: NURSE PRACTITIONER

## 2024-09-03 NOTE — PROGRESS NOTES
Heart Failure Clinic Progress Note  Reason For Visit:  CHF    Subjective        Darren Maria is a 79 y.o. male with the below pertinent PMH who presents for follow-up of CHF.    Darren Maria was most recently seen on 7/15/2024 by EVELIA Bernard at which time he was doing well.  He denied any new concerns with regard to shortness of breath or dyspnea on exertion.  His weight was stable.  He did have some complaints of lightheadedness and his Losartan was stopped to aid with this due to some low BP's.    He continues to feel well overall.  He denies any significant shortness of breath with exertion and was able to ambulate in today without concerns.  He has had to take a torsemide on 2 occasions since last being seen. He notes that he took a half pill at that time. He denies any chest pain.  He has done better with regard to his lightheadedness after the discontinuation of the Losartan and spreading his medications out some.     Review of Systems   Constitutional:  Negative for fatigue.   Respiratory:  Negative for shortness of breath.    Cardiovascular:  Negative for chest pain and leg swelling.        Pertinent PMH  -Chronic systolic congestive heart failure (EF 36 to 40%)  - Coronary artery disease status post CABG in 1990 (redo CABG in March 2011)  - Ischemic cardiomyopathy  - Hypertension  - Hyperlipidemia  - Permanent atrial fibrillation  - Complete heart block status post AV node ablation  - Status post BiV ICD with lead revision in March 2024  - Left atrial appendage ligation in 2011  - History of renal cell carcinoma status post right nephrectomy in 2000  - Iron deficiency anemia    Pertinent past medical, surgical, family, and social history were reviewed.      Current Outpatient Medications:     aspirin 81 MG EC tablet, Take 1 tablet by mouth Daily., Disp: , Rfl:     atorvastatin (LIPITOR) 40 MG tablet, Take 1 tablet by mouth Every Night., Disp: , Rfl:     cetirizine (zyrTEC) 10 MG  "tablet, Take 1 tablet by mouth Daily As Needed for Allergies., Disp: , Rfl:     Cyanocobalamin 1000 MCG/ML kit, Inject 1 mL as directed Every 30 (Thirty) Days. On Fridays till 11/11/23 then monthly thereafter., Disp: , Rfl:     empagliflozin (Jardiance) 10 MG tablet tablet, Take 1 tablet by mouth Daily., Disp: 30 tablet, Rfl: 11    finasteride (PROSCAR) 5 MG tablet, Take 1 tablet by mouth Daily., Disp: , Rfl:     isosorbide mononitrate (IMDUR) 30 MG 24 hr tablet, Take 1 tablet by mouth Daily., Disp: 30 tablet, Rfl: 5    Mirabegron ER (MYRBETRIQ) 50 MG tablet sustained-release 24 hour 24 hr tablet, Take 50 mg by mouth Daily., Disp: , Rfl:     nitroglycerin (NITROSTAT) 0.4 MG SL tablet, Place 1 tablet under the tongue Every 5 (Five) Minutes As Needed for Chest Pain. Take no more than 3 doses in 15 minutes., Disp: 25 tablet, Rfl: 2    omeprazole (PriLOSEC) 40 MG capsule, Take 1 capsule by mouth Daily., Disp: , Rfl:     pregabalin (LYRICA) 150 MG capsule, Take 1 capsule by mouth 2 (Two) Times a Day., Disp: , Rfl:     ranolazine (Ranexa) 1000 MG 12 hr tablet, Take 1 tablet by mouth 2 (Two) Times a Day., Disp: , Rfl:     Sotalol HCl AF 80 MG tablet, TAKE 1 TABLET BY MOUTH TWICE A DAY, Disp: 180 tablet, Rfl: 3    spironolactone (ALDACTONE) 25 MG tablet, Take 1 tablet by mouth Daily., Disp: 90 tablet, Rfl: 3    torsemide (DEMADEX) 20 MG tablet, Take 1 tablet by mouth As Needed., Disp: , Rfl:     traMADol-acetaminophen (ULTRACET) 37.5-325 MG per tablet, Take 1 tablet by mouth Every 12 (Twelve) Hours As Needed for Moderate Pain (pain)., Disp: , Rfl: 1    zolpidem (AMBIEN) 5 MG tablet, Take 1 tablet by mouth At Night As Needed for Sleep., Disp: , Rfl:      Objective   Vital Signs:  /60 (BP Location: Left arm)   Pulse 78   Resp 14   Wt 89.2 kg (196 lb 9.6 oz)   SpO2 98%   BMI 27.42 kg/m²   Estimated body mass index is 27.42 kg/m² as calculated from the following:    Height as of 7/3/24: 180.3 cm (71\").    Weight as " of this encounter: 89.2 kg (196 lb 9.6 oz).    Constitutional:       Appearance: Healthy appearance.   Neck:      Vascular: JVD normal.   Pulmonary:      Effort: Pulmonary effort is normal.      Breath sounds: Normal breath sounds.   Cardiovascular:      PMI at left midclavicular line. Normal rate. Regular rhythm. Normal S1. Normal S2.       Murmurs: There is no murmur.      No gallop.  No click. No rub.   Pulses:     Intact distal pulses.   Edema:     Peripheral edema absent.   Abdominal:      Palpations: Abdomen is soft.      Tenderness: There is no abdominal tenderness.   Skin:     General: Skin is warm.   Neurological:      Mental Status: Oriented to person, place and time.        Result Review :  The following data was reviewed by: EVELIA Bhardwaj on 09/03/2024:  BMP   BMP          6/26/2024    13:34 7/15/2024    11:48 9/3/2024    10:52   BMP   BUN 15  17  19    Creatinine 0.92  0.92  0.81    Sodium 144  140  140    Potassium 4.5  4.8  4.7    Chloride 109  106  107    CO2 28.0  29.0  24.0    Calcium 9.0  9.5  8.9      CBC   CBC          6/26/2024    13:34 7/15/2024    11:48 9/3/2024    10:52   CBC   WBC 5.46  5.80  7.47    RBC 4.24  4.54  4.43    Hemoglobin 12.6  14.4  14.4    Hematocrit 40.0  43.6  44.7    MCV 94.3  96.0  100.9    MCH 29.7  31.7  32.5    MCHC 31.5  33.0  32.2    RDW 21.7  17.7  14.2    Platelets 187  189  193      ReDS   Lab Results   Component Value Date    ABSOLUTELUNG 27 09/03/2024    ABSOLUTELUNG 29 07/15/2024    ABSOLUTELUNG 29 05/15/2024           Assessment and Plan   Diagnoses and all orders for this visit:    1. Chronic combined systolic and diastolic congestive heart failure (Primary)  2. Presence of biventricular AICD  3. Ischemic cardiomyopathy  Overall remains doing well.  He is weight up from last visit but remains at a baseline from previous visits.  He notes that he will gain weight during the summer and then during the winter will lose the weight.  I did remind him of  PRN use of the Lasix which he has taken twice since last visit.  He otherwise has normal ReDs and he is euvolemic on examination.  - Etiology: Ischemic  - LVEF: 41-45%  - NYHA Class: II  - BB: Sotalol 80mg daily   - ACEi/ARB/ARNi: Discontinued due to intolerance  - SGLT2i: Continue Jardiance 10mg daily  - MRA: Continue spironolactone 25mg daily.  Recommended increase today but patient doesn't wish to change medication today.  Will reassess at further visits.   - Diuretics: Torsemide 20mg as needed.   - Electrolytes: Stable on LAbs today  - ICD/CRT: AICD in place.  - IV Iron: Not indicated based off of today's labs.   - Daily Weight: Up today but at baseline.  Recommended daily weights and to take Torsemide if over 2lbs weigh gain in one night or 4lbs in 1 week.    4. Coronary artery disease of bypass graft of native heart with stable angina pectoris  5. Essential hypertension  - No anginal symptoms recently and he remains stable.  - Continue Aspirin 81mg daily  - Continue Imdur 30mg daily  - Continue Ranexa 1000mg BID.   - Continue Lipitor 40mg nightly    6. S/P AV sam ablation  7. Permanent atrial fibrillation  - Following with EP.   - Continue Aspirin as above.  He is s/p left atrial appendage ligation  - Sotalol as above       Follow Up   Return in about 8 weeks (around 10/29/2024).    Patient was given instructions and counseling regarding his condition or for health maintenance advice. Please see specific information pulled into the AVS if appropriate.       Joey Trent, EVELIA  09/03/24  11:10 CDT

## 2024-09-03 NOTE — PROGRESS NOTES
Heart Failure Clinic    Date:  09/03/24     Vitals:    09/03/24 1058   BP: 108/60   Pulse: 78   Resp: 14   SpO2: 98%        Indication:  Heart Failure     Procedure:  ReDS device sensor unit applied to right side of chest and right side of back.  Appropriate positioning confirmed based off of the unit's calculation.  Chest measurement obtained with the chest size ruler.  Measurement session performed over 45 seconds.      Method of arrival:  Ambulatory with cane    Weighing self daily:  Yes    Taking medications as prescribed:  Yes    Edema:  Yes slight ankles    Shortness of Air:  No    Number of pillows used at night:  <2    Results:  ReDS Value=        27                  Interpretation:  25-35% - normal/ideal lung fluid content    Rosario Jessica RN 09/03/24 11:03 CDT

## 2024-09-13 RX ORDER — RANOLAZINE 1000 MG/1
1 TABLET, EXTENDED RELEASE ORAL 2 TIMES DAILY
Qty: 180 TABLET | Refills: 3 | Status: SHIPPED | OUTPATIENT
Start: 2024-09-13

## 2024-09-25 ENCOUNTER — LAB (OUTPATIENT)
Dept: LAB | Facility: HOSPITAL | Age: 79
End: 2024-09-25
Payer: OTHER MISCELLANEOUS

## 2024-09-25 DIAGNOSIS — D50.9 IRON DEFICIENCY ANEMIA, UNSPECIFIED IRON DEFICIENCY ANEMIA TYPE: ICD-10-CM

## 2024-09-25 LAB
ALBUMIN SERPL-MCNC: 4.1 G/DL (ref 3.5–5.2)
ALBUMIN/GLOB SERPL: 1.7 G/DL
ALP SERPL-CCNC: 67 U/L (ref 39–117)
ALT SERPL W P-5'-P-CCNC: 14 U/L (ref 1–41)
ANION GAP SERPL CALCULATED.3IONS-SCNC: 8 MMOL/L (ref 5–15)
AST SERPL-CCNC: 21 U/L (ref 1–40)
BASOPHILS # BLD AUTO: 0.02 10*3/MM3 (ref 0–0.2)
BASOPHILS NFR BLD AUTO: 0.4 % (ref 0–1.5)
BILIRUB SERPL-MCNC: 1.2 MG/DL (ref 0–1.2)
BUN SERPL-MCNC: 18 MG/DL (ref 8–23)
BUN/CREAT SERPL: 20 (ref 7–25)
CALCIUM SPEC-SCNC: 9.1 MG/DL (ref 8.6–10.5)
CHLORIDE SERPL-SCNC: 106 MMOL/L (ref 98–107)
CO2 SERPL-SCNC: 26 MMOL/L (ref 22–29)
CREAT SERPL-MCNC: 0.9 MG/DL (ref 0.76–1.27)
DEPRECATED RDW RBC AUTO: 52.5 FL (ref 37–54)
EGFRCR SERPLBLD CKD-EPI 2021: 88 ML/MIN/1.73
EOSINOPHIL # BLD AUTO: 0.12 10*3/MM3 (ref 0–0.4)
EOSINOPHIL NFR BLD AUTO: 2.2 % (ref 0.3–6.2)
ERYTHROCYTE [DISTWIDTH] IN BLOOD BY AUTOMATED COUNT: 13.9 % (ref 12.3–15.4)
FERRITIN SERPL-MCNC: 187.3 NG/ML (ref 30–400)
GLOBULIN UR ELPH-MCNC: 2.4 GM/DL
GLUCOSE SERPL-MCNC: 140 MG/DL (ref 65–99)
HCT VFR BLD AUTO: 43 % (ref 37.5–51)
HGB BLD-MCNC: 14.2 G/DL (ref 13–17.7)
IMM GRANULOCYTES # BLD AUTO: 0.03 10*3/MM3 (ref 0–0.05)
IMM GRANULOCYTES NFR BLD AUTO: 0.5 % (ref 0–0.5)
IRON 24H UR-MRATE: 122 MCG/DL (ref 59–158)
IRON SATN MFR SERPL: 37 % (ref 20–50)
LYMPHOCYTES # BLD AUTO: 0.82 10*3/MM3 (ref 0.7–3.1)
LYMPHOCYTES NFR BLD AUTO: 14.9 % (ref 19.6–45.3)
MCH RBC QN AUTO: 33.7 PG (ref 26.6–33)
MCHC RBC AUTO-ENTMCNC: 33 G/DL (ref 31.5–35.7)
MCV RBC AUTO: 102.1 FL (ref 79–97)
MONOCYTES # BLD AUTO: 0.58 10*3/MM3 (ref 0.1–0.9)
MONOCYTES NFR BLD AUTO: 10.5 % (ref 5–12)
NEUTROPHILS NFR BLD AUTO: 3.95 10*3/MM3 (ref 1.7–7)
NEUTROPHILS NFR BLD AUTO: 71.5 % (ref 42.7–76)
NRBC BLD AUTO-RTO: 0 /100 WBC (ref 0–0.2)
PLATELET # BLD AUTO: 168 10*3/MM3 (ref 140–450)
PMV BLD AUTO: 9.4 FL (ref 6–12)
POTASSIUM SERPL-SCNC: 4.8 MMOL/L (ref 3.5–5.2)
PROT SERPL-MCNC: 6.5 G/DL (ref 6–8.5)
RBC # BLD AUTO: 4.21 10*6/MM3 (ref 4.14–5.8)
SODIUM SERPL-SCNC: 140 MMOL/L (ref 136–145)
TIBC SERPL-MCNC: 334 MCG/DL (ref 298–536)
TRANSFERRIN SERPL-MCNC: 224 MG/DL (ref 200–360)
WBC NRBC COR # BLD AUTO: 5.52 10*3/MM3 (ref 3.4–10.8)

## 2024-09-25 PROCEDURE — 36415 COLL VENOUS BLD VENIPUNCTURE: CPT

## 2024-09-25 PROCEDURE — 84466 ASSAY OF TRANSFERRIN: CPT

## 2024-09-25 PROCEDURE — 83540 ASSAY OF IRON: CPT

## 2024-09-25 PROCEDURE — 82728 ASSAY OF FERRITIN: CPT

## 2024-09-25 PROCEDURE — 80053 COMPREHEN METABOLIC PANEL: CPT

## 2024-09-25 PROCEDURE — 85025 COMPLETE CBC W/AUTO DIFF WBC: CPT

## 2024-10-02 ENCOUNTER — OFFICE VISIT (OUTPATIENT)
Dept: ONCOLOGY | Facility: CLINIC | Age: 79
End: 2024-10-02
Payer: MEDICARE

## 2024-10-02 VITALS
DIASTOLIC BLOOD PRESSURE: 72 MMHG | HEIGHT: 71 IN | WEIGHT: 197.5 LBS | SYSTOLIC BLOOD PRESSURE: 122 MMHG | RESPIRATION RATE: 16 BRPM | OXYGEN SATURATION: 99 % | TEMPERATURE: 97.2 F | HEART RATE: 60 BPM | BODY MASS INDEX: 27.65 KG/M2

## 2024-10-02 DIAGNOSIS — D75.89 MACROCYTOSIS: ICD-10-CM

## 2024-10-02 DIAGNOSIS — D50.9 IRON DEFICIENCY ANEMIA, UNSPECIFIED IRON DEFICIENCY ANEMIA TYPE: Primary | ICD-10-CM

## 2024-10-02 NOTE — PROGRESS NOTES
[facility] HEMATOLOGY & ONCOLOGY  [address]  [facility] [address] [phone]    Patient Name: Darren Maria  Encounter Date: 10/02/2024   YOB: 1945  Patient Number: [id number]    PROGRESS NOTE     HISTORY OF PRESENT ILLNESS: Darren Maria is a 79 y.o. male followed by this office for iron deficiency. History is obtained from patient. History is considered to be accurate.    He has health history significant for Hx of Renal Cell Cancer approx 20 years ago s/p nephrectomy, AV node ablation, CHF, Hyperlipidemia, A Fib, Presence of a pacemaker, 2 CABG      He has received IV iron previously.  Ferrlecit October , 31, Nov 1, 2, 3, 2023  He Had injectafer on May 1 and 16, 2024  Ferrlecit on May 6, 7, 2024    Pt reports he has stopped all anticoagulation.       He received Injectafer 750 mg on 5/16/24 and tolerated it well     INTERVAL HISTORY     Pt presents to clinic today for continued follow up.    He has no acute complaint today.  He had labs drawn 9/25/24 and results were reviewed with him in office.     LABS    Lab Results - Last 18 Months   Lab Units 09/25/24  1318 09/03/24  1052 07/15/24  1148 06/26/24  1334 05/23/24  1247 05/15/24  0946 05/06/24  0523 05/05/24  0540   HEMOGLOBIN g/dL 14.2 14.4 14.4 12.6* 12.3* 11.2*  11.3* 9.3* 9.1*   HEMATOCRIT % 43.0 44.7 43.6 40.0 40.5 38.8  37.8 31.4* 30.8*   MCV fL 102.1* 100.9* 96.0 94.3 92.9 92.4  91.7 87.0 86.5   WBC 10*3/mm3 5.52 7.47 5.80 5.46 4.85 5.89  6.19 4.91 4.92   RDW % 13.9 14.2 17.7* 21.7* 27.4* 25.9*  25.7* 23.0* 22.1*   MPV fL 9.4 9.4 9.5 8.9 8.5 10.0  9.5 9.2 8.9   PLATELETS 10*3/mm3 168 193 189 187 220 220  208 325 318   IMM GRAN % % 0.5  --   --  0.5 0.6* 0.5 0.4 0.8*   NEUTROS ABS 10*3/mm3 3.95  --   --  4.14 3.73 4.47 3.44 3.37   LYMPHS ABS 10*3/mm3 0.82  --   --  0.67* 0.53* 0.62* 0.69* 0.67*   MONOS ABS 10*3/mm3 0.58  --   --  0.47 0.40 0.60 0.59  0.66   EOS ABS 10*3/mm3 0.12  --   --  0.11 0.12 0.14 0.14 0.15   BASOS ABS 10*3/mm3 0.02  --   --  0.04 0.04 0.03 0.03 0.03   IMMATURE GRANS (ABS) 10*3/mm3 0.03  --   --  0.03 0.03 0.03 0.02 0.04   NRBC /100 WBC 0.0  --   --  0.0 0.0 0.0 0.0 0.0       Lab Results - Last 18 Months   Lab Units 09/25/24  1318 09/03/24  1052 07/15/24  1148 06/26/24  1334 05/23/24  1247 05/15/24  0946 05/04/24  0338 05/03/24  1357 02/26/24  0427 02/25/24  1322   GLUCOSE mg/dL 140* 134* 100* 114* 121* 162*  162*   < > 121*   < > 132*   SODIUM mmol/L 140 140 140 144 143 142  142   < > 144   < > 140   POTASSIUM mmol/L 4.8 4.7 4.8 4.5 4.3 4.4  4.4   < > 4.2   < > 4.7   CO2 mmol/L 26.0 24.0 29.0 28.0 26.0 25.0  25.0   < > 25.0   < > 22.0   CHLORIDE mmol/L 106 107 106 109* 109* 107  107   < > 108*   < > 106   ANION GAP mmol/L 8.0 9.0 5.0 7.0 8.0 10.0  10.0   < > 11.0   < > 12.0   CREATININE mg/dL 0.90 0.81 0.92 0.92 0.83 0.87  0.87   < > 0.80   < > 1.02   BUN mg/dL 18 19 17 15 11 11  11   < > 10   < > 45*   BUN / CREAT RATIO  20.0 23.5 18.5 16.3 13.3 12.6  12.6   < > 12.5   < > 44.1*   CALCIUM mg/dL 9.1 8.9 9.5 9.0 9.2 8.7  8.7   < > 9.0   < > 9.1   ALK PHOS U/L 67  --   --  67 71 65  --  53  --  46   TOTAL PROTEIN g/dL 6.5  --   --  6.1 6.8 6.3  --  6.3  --  5.7*   ALT (SGPT) U/L 14  --   --  12 12 10  --  8  --  9   AST (SGOT) U/L 21  --   --  16 19 18  --  16  --  13   BILIRUBIN mg/dL 1.2  --   --  0.7 0.7 0.7  --  0.4  --  0.8   ALBUMIN g/dL 4.1  --   --  4.0 4.2 3.9  --  4.0  --  3.7   GLOBULIN gm/dL 2.4  --   --  2.1 2.6 2.4  --  2.3  --  2.0    < > = values in this interval not displayed.       Lab Results - Last 18 Months   Lab Units 05/23/24  1247   LDH U/L 186       Lab Results - Last 18 Months   Lab Units 09/25/24  1318 06/26/24  1334 05/23/24  1247 05/15/24  0946 04/23/24  1045 11/14/23  0807 10/30/23  1013   IRON mcg/dL 122 104 90 92 26* 107  --    TIBC mcg/dL 334 352 355 331 392 375  --    IRON SATURATION (TSAT) % 37  "30 25 28 7* 28  --    FERRITIN ng/mL 187.30 138.10 615.10* 369.70 33.70 228.30  --    FOLATE ng/mL  --   --  7.39  --   --   --  12.40         PAST MEDICAL HISTORY:  ALLERGIES:  Allergies   Allergen Reactions    Azithromycin Nausea And Vomiting    Morphine And Codeine Other (See Comments)     SHAKES AND MAKES HIM \"WIRED\".   TAKES TRAMADOL WITHOUT PROBLEM     CURRENT MEDICATIONS:  Outpatient Encounter Medications as of 10/2/2024   Medication Sig Dispense Refill    aspirin 81 MG EC tablet Take 1 tablet by mouth Daily.      atorvastatin (LIPITOR) 40 MG tablet Take 1 tablet by mouth Every Night.      cetirizine (zyrTEC) 10 MG tablet Take 1 tablet by mouth Daily As Needed for Allergies.      Cyanocobalamin 1000 MCG/ML kit Inject 1 mL as directed Every 30 (Thirty) Days. On Fridays till 11/11/23 then monthly thereafter.      empagliflozin (Jardiance) 10 MG tablet tablet Take 1 tablet by mouth Daily. 30 tablet 11    finasteride (PROSCAR) 5 MG tablet Take 1 tablet by mouth Daily.      isosorbide mononitrate (IMDUR) 30 MG 24 hr tablet Take 1 tablet by mouth Daily. 30 tablet 5    Mirabegron ER (MYRBETRIQ) 50 MG tablet sustained-release 24 hour 24 hr tablet Take 50 mg by mouth Daily.      nitroglycerin (NITROSTAT) 0.4 MG SL tablet Place 1 tablet under the tongue Every 5 (Five) Minutes As Needed for Chest Pain. Take no more than 3 doses in 15 minutes. 25 tablet 2    omeprazole (PriLOSEC) 40 MG capsule Take 1 capsule by mouth Daily.      pregabalin (LYRICA) 150 MG capsule Take 1 capsule by mouth 2 (Two) Times a Day.      ranolazine (RANEXA) 1000 MG 12 hr tablet TAKE 1 TABLET BY MOUTH TWICE A  tablet 3    Sotalol HCl AF 80 MG tablet TAKE 1 TABLET BY MOUTH TWICE A  tablet 3    spironolactone (ALDACTONE) 25 MG tablet Take 1 tablet by mouth Daily. 90 tablet 3    torsemide (DEMADEX) 20 MG tablet Take 1 tablet by mouth As Needed.      traMADol-acetaminophen (ULTRACET) 37.5-325 MG per tablet Take 1 tablet by mouth Every 12 " (Twelve) Hours As Needed for Moderate Pain (pain).  1    zolpidem (AMBIEN) 5 MG tablet Take 1 tablet by mouth At Night As Needed for Sleep.       No facility-administered encounter medications on file as of 10/2/2024.     ADULT ILLNESSES:  Patient Active Problem List   Diagnosis Code    Coronary artery disease of bypass graft of native heart with stable angina pectoris I25.708    Essential hypertension I10    Presence of biventricular AICD Z95.810    Ischemic cardiomyopathy I25.5    Cancer of kidney C64.9    BPH (benign prostatic hypertrophy) with urinary obstruction N40.1, N13.8    Impotence due to erectile dysfunction N52.9    Benign prostatic hyperplasia with lower urinary tract symptoms N40.1    Cancer of kidney, right C64.1    Erectile dysfunction N52.9    Permanent atrial fibrillation I48.21    History of renal cell cancer Z85.528    Malfunction of penile prosthesis T83.490A    Urine frequency R35.0    S/P AV sam ablation Z98.890    Chronic combined systolic and diastolic congestive heart failure I50.42    Mixed hyperlipidemia E78.2    Hx of adenomatous colonic polyps Z86.0101    Shockable cardiac rhythm detected by automated external defibrillator I49.9    CHB (complete heart block) I44.2    Ventricular tachycardia (paroxysmal) I47.29    Acute exacerbation of CHF (congestive heart failure) I50.9    Symptomatic anemia D64.9    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, implants and grafts, subsequent encounter T82.828D    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, implants and grafts, initial encounter T82.828A    Dyspnea R06.00    Weakness R53.1    Iron deficiency anemia D50.9    Gastrointestinal bleeding, upper K92.2    GI bleed K92.2       HEALTH MAINTENANCE ITEMS:  Health Maintenance Due   Topic Date Due    URINE MICROALBUMIN  Never done    Pneumococcal Vaccine 65+ (1 of 2 - PCV) Never done    ZOSTER VACCINE (2 of 3) 08/04/2020    RSV Vaccine - Adults (1 - 1-dose 75+ series) Never done    DIABETIC  EYE EXAM  04/26/2022    HEMOGLOBIN A1C  05/29/2024    INFLUENZA VACCINE  08/01/2024    COVID-19 Vaccine (5 - 2023-24 season) 09/01/2024    ANNUAL WELLNESS VISIT  12/06/2024       <no information>  Last Completed Colonoscopy            COLORECTAL CANCER SCREENING (COLONOSCOPY - Every 5 Years) Next due on 5/7/2029 05/07/2024  Colonoscopy    05/07/2024  Surgical Procedure: COLONOSCOPY    05/06/2024  Outside Procedure: COLONOSCOPY    05/03/2024  Fecal Occult Blood component of POC Occult Blood Stool    03/21/2022  Surgical Procedure: COLONOSCOPY    Only the first 5 history entries have been loaded, but more history exists.                  Immunization History   Administered Date(s) Administered    COVID-19 (PFIZER) BIVALENT 12+YRS 11/03/2022    COVID-19 (PFIZER) Purple Cap Monovalent 01/21/2021, 02/14/2021, 11/08/2021     Last Completed Mammogram       This patient has no relevant Health Maintenance data.              FAMILY HISTORY:  Family History   Problem Relation Age of Onset    Lung cancer Mother     Heart attack Mother     No Known Problems Father     Other Neg Hx         GI Malignancies    Colon cancer Neg Hx     Colon polyps Neg Hx      SOCIAL HISTORY:  Social History     Socioeconomic History    Marital status:    Tobacco Use    Smoking status: Never    Smokeless tobacco: Never   Vaping Use    Vaping status: Never Used   Substance and Sexual Activity    Alcohol use: Not Currently     Comment: occ    Drug use: Never    Sexual activity: Not Currently     Partners: Female     Birth control/protection: Vasectomy       REVIEW OF SYSTEMS:  Review of Systems   Constitutional:  Positive for appetite change and fatigue. Negative for activity change, fever, unexpected weight gain and unexpected weight loss.   HENT:  Negative for dental problem, facial swelling, swollen glands and trouble swallowing.    Eyes:  Negative for double vision and discharge.   Respiratory:  Positive for shortness of breath.  "Negative for cough and wheezing.    Cardiovascular:  Negative for chest pain, palpitations and leg swelling.        Describes it as \"pressure\" but states it has increase of hemoglboin   Gastrointestinal:  Positive for nausea. Negative for abdominal pain, blood in stool and vomiting.   Endocrine: Negative.    Genitourinary:  Negative for dysuria and hematuria.   Musculoskeletal:  Negative for arthralgias and myalgias.   Skin:  Negative for rash, skin lesions and wound.   Allergic/Immunologic: Negative for immunocompromised state.   Neurological:  Negative for speech difficulty, light-headedness, headache, memory problem and confusion.   Hematological:  Negative for adenopathy.   Psychiatric/Behavioral:  Negative for self-injury, suicidal ideas and depressed mood. The patient is not nervous/anxious.        /72   Pulse 60   Temp 97.2 °F (36.2 °C)   Resp 16   Ht 180.3 cm (71\")   Wt 89.6 kg (197 lb 8 oz)   SpO2 99%   BMI 27.55 kg/m²  Body surface area is 2.1 meters squared.    Pain Score    10/02/24 1312   PainSc:   2   PainLoc: Back        Physical Exam  Constitutional:       Appearance: Normal appearance.   HENT:      Head: Normocephalic and atraumatic.   Cardiovascular:      Rate and Rhythm: Normal rate and regular rhythm.   Pulmonary:      Effort: Pulmonary effort is normal.      Breath sounds: Normal breath sounds.   Abdominal:      General: Bowel sounds are normal.      Palpations: Abdomen is soft.   Musculoskeletal:      Right lower leg: No edema.      Left lower leg: No edema.   Skin:     General: Skin is warm and dry.   Neurological:      Mental Status: He is alert and oriented to person, place, and time.   Psychiatric:         Attention and Perception: Attention normal.         Mood and Affect: Mood normal.         Judgment: Judgment normal.         Darren Maria reports a pain score of 2.  Given his pain assessment as noted, treatment options were discussed and the following options were " decided upon as a follow-up plan to address the patient's pain: continuation of current treatment plan for pain.      ASSESSMENT / PLAN:    1. Iron deficiency anemia, unspecified iron deficiency anemia type    2. Macrocytosis         Pt received Injectafer in May 2024.   Iron 112, Ferritin 187, Sat 37%, TIBC 334, Hgb 14.2, Hct 43  -Stable for observation      Will check B12 and Folate at follow up r/t macrocytosis     PLAN:   Stable for observation  Continue current medications, treatment plans and follow up with PCP and any other providers  Return to office 3 months   Pre-office labs for Cbc, CMP, Iron Profile, Ferritin   Care discussed with patient.  Understanding expressed.  Patient agreeable with plan.           Neetu Barron, EVELIA  10/02/2024

## 2024-10-29 ENCOUNTER — HOSPITAL ENCOUNTER (OUTPATIENT)
Dept: CARDIOLOGY | Facility: HOSPITAL | Age: 79
Discharge: HOME | End: 2024-10-29
Admitting: NURSE PRACTITIONER
Payer: MEDICARE

## 2024-10-29 VITALS
SYSTOLIC BLOOD PRESSURE: 109 MMHG | HEART RATE: 76 BPM | OXYGEN SATURATION: 98 % | WEIGHT: 198 LBS | DIASTOLIC BLOOD PRESSURE: 71 MMHG | BODY MASS INDEX: 27.62 KG/M2

## 2024-10-29 DIAGNOSIS — I50.42 CHRONIC COMBINED SYSTOLIC AND DIASTOLIC CONGESTIVE HEART FAILURE: Primary | ICD-10-CM

## 2024-10-29 LAB
ABSOLUTE LUNG FLUID CONTENT: 32 % (ref 20–35)
ANION GAP SERPL CALCULATED.3IONS-SCNC: 7 MMOL/L (ref 5–15)
BUN SERPL-MCNC: 17 MG/DL (ref 8–23)
BUN/CREAT SERPL: 17.2 (ref 7–25)
CALCIUM SPEC-SCNC: 9.4 MG/DL (ref 8.6–10.5)
CHLORIDE SERPL-SCNC: 104 MMOL/L (ref 98–107)
CO2 SERPL-SCNC: 30 MMOL/L (ref 22–29)
CREAT SERPL-MCNC: 0.99 MG/DL (ref 0.76–1.27)
EGFRCR SERPLBLD CKD-EPI 2021: 78.5 ML/MIN/1.73
GLUCOSE SERPL-MCNC: 135 MG/DL (ref 65–99)
POTASSIUM SERPL-SCNC: 4.7 MMOL/L (ref 3.5–5.2)
SODIUM SERPL-SCNC: 141 MMOL/L (ref 136–145)

## 2024-10-29 PROCEDURE — 94726 PLETHYSMOGRAPHY LUNG VOLUMES: CPT | Performed by: NURSE PRACTITIONER

## 2024-10-29 PROCEDURE — 80048 BASIC METABOLIC PNL TOTAL CA: CPT | Performed by: NURSE PRACTITIONER

## 2024-10-29 NOTE — PATIENT INSTRUCTIONS
Medication Management  Continue Jardiance 10mg daily  Continue Spironolactone 25mg daily  Continue Sotalol 80mg daily   Continue Torsemide  20mg as needed    Weight Monitoring  - Please weigh yourself every morning after you use the restroom while wearing the same amount of clothing.  - Please write down your weight readings in a log and bring that log with you to your next heart failure clinic appointment.  - If your weight increases by 2 lbs in 1 day or 5 lbs in 1 week, you should take your toresemine. If your weight does not come down, please call the heart failure clinic.    Blood Pressure Monitoring  - Please check your blood pressure at least once daily ~2-4 hours after you have taken your morning blood pressure medications.  - Please write down your blood pressure readings in a log and bring that log with you to your next heart failure clinic appointment.    Diet  - It is very important that you monitor your fluid and sodium (salt) intake.  - Please try to limit sodium intake to less than 2,000 mg each day.  - Please try to limit fluid intake to ~2 liters (64 ounces) each day.

## 2024-10-29 NOTE — PROGRESS NOTES
Heart Failure Clinic Progress Note  Reason For Visit:  CHF    Subjective        Darren Maria is a 79 y.o. male with the below pertinent PMH who presents for follow-up of CHF.    Darren Maria was most recently seen on 9/3/2024 by me.  At that time he was feeling well without any significant shortness of breath and reported that he was able to ambulate into the office appropriately.  He had to take torsemide on 2 occasions and only had taken a half a pill rather than a full pill.  After discontinuation of his losartan he had reported that his lightheadedness and dizziness have gotten better.  No new changes were made to his GDMT at that time.    He continues to report that he is feeling well.  He has no new questions or concerns today.  He was able to walk into the clinic today without getting short of breath.  He denies chest pain, lightheadedness, dizziness, or peripheral edema.  His weight is up 2 pounds since last being seen but overall stable.  His ReDs reading today is 32%.    Review of Systems   Constitutional:  Negative for fatigue.   Respiratory:  Negative for shortness of breath.    Cardiovascular:  Negative for chest pain and leg swelling.        Pertinent PMH  -Chronic systolic congestive heart failure (EF 36 to 40%)  - Coronary artery disease status post CABG in 1990 (redo CABG in March 2011)  - Ischemic cardiomyopathy  - Hypertension  - Hyperlipidemia  - Permanent atrial fibrillation  - Complete heart block status post AV node ablation  - Status post BiV ICD with lead revision in March 2024  - Left atrial appendage ligation in 2011  - History of renal cell carcinoma status post right nephrectomy in 2000  - Iron deficiency anemia    Pertinent past medical, surgical, family, and social history were reviewed.      Current Outpatient Medications:     aspirin 81 MG EC tablet, Take 1 tablet by mouth Daily., Disp: , Rfl:     atorvastatin (LIPITOR) 40 MG tablet, Take 1 tablet by mouth Every  Night., Disp: , Rfl:     cetirizine (zyrTEC) 10 MG tablet, Take 1 tablet by mouth Daily As Needed for Allergies., Disp: , Rfl:     Cyanocobalamin 1000 MCG/ML kit, Inject 1 mL as directed Every 30 (Thirty) Days. On Fridays till 11/11/23 then monthly thereafter., Disp: , Rfl:     empagliflozin (Jardiance) 10 MG tablet tablet, Take 1 tablet by mouth Daily., Disp: 30 tablet, Rfl: 11    finasteride (PROSCAR) 5 MG tablet, Take 1 tablet by mouth Daily., Disp: , Rfl:     isosorbide mononitrate (IMDUR) 30 MG 24 hr tablet, Take 1 tablet by mouth Daily., Disp: 30 tablet, Rfl: 5    Mirabegron ER (MYRBETRIQ) 50 MG tablet sustained-release 24 hour 24 hr tablet, Take 50 mg by mouth Daily., Disp: , Rfl:     nitroglycerin (NITROSTAT) 0.4 MG SL tablet, Place 1 tablet under the tongue Every 5 (Five) Minutes As Needed for Chest Pain. Take no more than 3 doses in 15 minutes., Disp: 25 tablet, Rfl: 2    omeprazole (PriLOSEC) 40 MG capsule, Take 1 capsule by mouth Daily., Disp: , Rfl:     pregabalin (LYRICA) 150 MG capsule, Take 1 capsule by mouth 2 (Two) Times a Day., Disp: , Rfl:     ranolazine (RANEXA) 1000 MG 12 hr tablet, TAKE 1 TABLET BY MOUTH TWICE A DAY, Disp: 180 tablet, Rfl: 3    Sotalol HCl AF 80 MG tablet, TAKE 1 TABLET BY MOUTH TWICE A DAY, Disp: 180 tablet, Rfl: 3    spironolactone (ALDACTONE) 25 MG tablet, Take 1 tablet by mouth Daily., Disp: 90 tablet, Rfl: 3    torsemide (DEMADEX) 20 MG tablet, Take 1 tablet by mouth As Needed., Disp: , Rfl:     traMADol-acetaminophen (ULTRACET) 37.5-325 MG per tablet, Take 1 tablet by mouth Every 12 (Twelve) Hours As Needed for Moderate Pain (pain)., Disp: , Rfl: 1    zolpidem (AMBIEN) 5 MG tablet, Take 1 tablet by mouth At Night As Needed for Sleep., Disp: , Rfl:      Objective   Vital Signs:  /71 (BP Location: Left arm, Patient Position: Sitting)   Pulse 76   Wt 89.8 kg (198 lb)   SpO2 98%   BMI 27.62 kg/m²   Estimated body mass index is 27.62 kg/m² as calculated from the  "following:    Height as of 10/2/24: 180.3 cm (71\").    Weight as of this encounter: 89.8 kg (198 lb).    Constitutional:       Appearance: Healthy appearance.   Neck:      Vascular: JVD normal.   Pulmonary:      Effort: Pulmonary effort is normal.      Breath sounds: Normal breath sounds.   Cardiovascular:      PMI at left midclavicular line. Normal rate. Regular rhythm. Normal S1. Normal S2.       Murmurs: There is no murmur.      No gallop.  No click. No rub.   Pulses:     Intact distal pulses.   Edema:     Peripheral edema absent.   Abdominal:      Palpations: Abdomen is soft.      Tenderness: There is no abdominal tenderness.   Skin:     General: Skin is warm.   Neurological:      Mental Status: Oriented to person, place and time.        Result Review :  The following data was reviewed by: EVELIA Bhardwaj on 09/03/2024:  BMP   BMP          9/3/2024    10:52 9/25/2024    13:18 10/29/2024    10:46   BMP   BUN 19  18  17    Creatinine 0.81  0.90  0.99    Sodium 140  140  141    Potassium 4.7  4.8  4.7    Chloride 107  106  104    CO2 24.0  26.0  30.0    Calcium 8.9  9.1  9.4      CBC   CBC          7/15/2024    11:48 9/3/2024    10:52 9/25/2024    13:18   CBC   WBC 5.80  7.47  5.52    RBC 4.54  4.43  4.21    Hemoglobin 14.4  14.4  14.2    Hematocrit 43.6  44.7  43.0    MCV 96.0  100.9  102.1    MCH 31.7  32.5  33.7    MCHC 33.0  32.2  33.0    RDW 17.7  14.2  13.9    Platelets 189  193  168      ReDS   Lab Results   Component Value Date    ABSOLUTELUNG 32 10/29/2024    ABSOLUTELUNG 27 09/03/2024    ABSOLUTELUNG 29 07/15/2024           Assessment and Plan   Diagnoses and all orders for this visit:    1. Chronic combined systolic and diastolic congestive heart failure (Primary)  2. Presence of biventricular AICD  3. Ischemic cardiomyopathy  Overall he continues to do well.  His weights are stable, his ReDs reading is normal, and he has no other concerning symptoms for heart failure today.  Appears euvolemic on " exam  - Etiology: Ischemic  - LVEF: 41-45%  - NYHA Class: II  - BB: Sotalol 80mg daily   - ACEi/ARB/ARNi: Discontinued due to intolerance  - SGLT2i: Jardiance 10mg daily  - MRA: spironolactone 25mg daily.  Continue to recommend optimization with patient which is not changed medications again today.  - Diuretics: Torsemide 20mg as needed.  Has not needed any dosing.  - Electrolytes: Stable on Labs today  - ICD/CRT: AICD in place.  - Daily Weight: Stable.  Continue to recommend daily weights    4. Coronary artery disease of bypass graft of native heart with stable angina pectoris  5. Essential hypertension  No anginal symptoms recently  - Continue Aspirin 81mg daily  - Continue Imdur 30mg daily  - Continue Ranexa 1000mg BID.   - Continue Lipitor 40mg nightly    6. S/P AV sam ablation  7. Permanent atrial fibrillation  Following with EP.   - Continue Aspirin as above.  He is s/p left atrial appendage ligation  - Sotalol as above       Follow Up   Return in about 9 weeks (around 12/31/2024).    Patient was given instructions and counseling regarding his condition or for health maintenance advice. Please see specific information pulled into the AVS if appropriate.       Joey Trent, APRN  10/29/24  11:42 CDT

## 2024-10-29 NOTE — PROGRESS NOTES
Heart Failure Clinic    Date:  10/29/24     Vitals:    10/29/24 1102   BP: 109/71   Pulse: 76   SpO2: 98%        Indication:  Heart Failure     Procedure:  ReDS device sensor unit applied to right side of chest and right side of back.  Appropriate positioning confirmed based off of the unit's calculation.  Chest measurement obtained with the chest size ruler.  Measurement session performed over 45 seconds.      Method of arrival:  Ambulatory with cane    Weighing self daily:  Yes    Taking medications as prescribed:  Yes    Edema:  Yes and 1+ pitting edema to left lower leg, and trace edema to right lower leg    Shortness of Air:  Yes when walking long distances and lifting things    Number of pillows used at night:  Patient sleeps on one to two pillows    Results:  ReDS Value=   32                       Interpretation:  25-35% - normal/ideal lung fluid content    Mallory L Haase, RN 10/29/24 11:03 CDT

## 2024-11-12 ENCOUNTER — TELEPHONE (OUTPATIENT)
Dept: UROLOGY | Facility: CLINIC | Age: 79
End: 2024-11-12
Payer: MEDICARE

## 2024-11-12 NOTE — TELEPHONE ENCOUNTER
Pt. Called wanting to see if there were alternatives to Myrbetriq. Myrbetriq working well but is not cost effective for pt. Pt. Would like appt. With Tyrone to talk about medications and pt. Is overdue for f/u anyway. Appt made for Thursday 11/14 am.

## 2024-11-12 NOTE — PROGRESS NOTES
Subjective    Mr. Maria is 79 y.o. male    Chief Complaint: F/U Urinary Incontinence, discuss medication changes    History of Present Illness  Patient here for follow-up and complaints of worsening urgency and urge incontinence.  He was having good response to 50 mg Myrbetriq however recently symptoms have not been as well-controlled and he wants to know if there is something else he could try.  He has had a prior cystoscopy done by Dr. Ochoa which was negative for any obvious abnormalities.  Urine is clear and his postvoid residual bladder scan was 29 mL.    The following portions of the patient's history were reviewed and updated as appropriate: allergies, current medications, past family history, past medical history, past social history, past surgical history and problem list.    Review of Systems   Genitourinary:  Positive for urgency.        Urge incontinence         Current Outpatient Medications:     aspirin 81 MG EC tablet, Take 1 tablet by mouth Daily., Disp: , Rfl:     atorvastatin (LIPITOR) 40 MG tablet, Take 1 tablet by mouth Every Night., Disp: , Rfl:     cetirizine (zyrTEC) 10 MG tablet, Take 1 tablet by mouth Daily As Needed for Allergies., Disp: , Rfl:     Cyanocobalamin 1000 MCG/ML kit, Inject 1 mL as directed Every 30 (Thirty) Days. On Fridays till 11/11/23 then monthly thereafter., Disp: , Rfl:     empagliflozin (Jardiance) 10 MG tablet tablet, Take 1 tablet by mouth Daily., Disp: 30 tablet, Rfl: 11    finasteride (PROSCAR) 5 MG tablet, Take 1 tablet by mouth Daily., Disp: , Rfl:     isosorbide mononitrate (IMDUR) 30 MG 24 hr tablet, Take 1 tablet by mouth Daily., Disp: 30 tablet, Rfl: 5    Mirabegron ER (MYRBETRIQ) 50 MG tablet sustained-release 24 hour 24 hr tablet, Take 50 mg by mouth Daily., Disp: , Rfl:     nitroglycerin (NITROSTAT) 0.4 MG SL tablet, Place 1 tablet under the tongue Every 5 (Five) Minutes As Needed for Chest Pain. Take no more than 3 doses in 15 minutes., Disp: 25 tablet,  Rfl: 2    omeprazole (PriLOSEC) 40 MG capsule, Take 1 capsule by mouth Daily., Disp: , Rfl:     pregabalin (LYRICA) 150 MG capsule, Take 1 capsule by mouth 2 (Two) Times a Day., Disp: , Rfl:     ranolazine (RANEXA) 1000 MG 12 hr tablet, TAKE 1 TABLET BY MOUTH TWICE A DAY, Disp: 180 tablet, Rfl: 3    Sotalol HCl AF 80 MG tablet, TAKE 1 TABLET BY MOUTH TWICE A DAY, Disp: 180 tablet, Rfl: 3    spironolactone (ALDACTONE) 25 MG tablet, Take 1 tablet by mouth Daily., Disp: 90 tablet, Rfl: 3    torsemide (DEMADEX) 20 MG tablet, Take 1 tablet by mouth As Needed., Disp: , Rfl:     traMADol-acetaminophen (ULTRACET) 37.5-325 MG per tablet, Take 1 tablet by mouth Every 12 (Twelve) Hours As Needed for Moderate Pain (pain)., Disp: , Rfl: 1    zolpidem (AMBIEN) 5 MG tablet, Take 1 tablet by mouth At Night As Needed for Sleep., Disp: , Rfl:     Vibegron (Gemtesa) 75 MG tablet, Take 1 tablet by mouth Daily for 42 days., Disp: 42 tablet, Rfl: 0    Past Medical History:   Diagnosis Date    Abdominal pain, epigastric     Abnormal abdominal exam     ABFND, RADIOLOGICAL, ABDOMINAL AREA     Abnormal ECG     Anticoagulated on Coumadin     Atherosclerosis of coronary artery bypass graft     ATHEROSCLEROSIS, CORONARY, ARTERY BYPASS GRFT    Atrial fibrillation     Cancer of right kidney     right sided kidney cancer    Cardiomyopathy     Cardiomyopathy, primary     CHF (congestive heart failure)     Clotting disorder     Colon polyps     Congenital heart disease     Congestive heart failure     Coronary artery disease     History of CAD/A-Fib/Pacemaker/Defibrillator placement: pt takes Coumadin and plavix therapy as well as ASA daily    Diabetes mellitus     Gastric polyp     Gastroesophageal reflux disease without esophagitis     History of colon polyps     Hypercholesterolemia     Hypertension, essential     Melena     Myocardial infarction     NSAID long-term use     Obesity     Pacemaker     Pacemaker Dependant    Platelet inhibition due  to Plavix     Presence of stent in coronary artery     Multiple coronary stenting most recently 4/2008    Single kidney     Only 1 Kidney    Status post surgery     s/p Polypectomy Bleed       Past Surgical History:   Procedure Laterality Date    ABLATION OF DYSRHYTHMIC FOCUS      APPENDECTOMY      CARDIAC ABLATION      CARDIAC CATHETERIZATION      CARDIAC CATHETERIZATION Left 12/15/2021    Procedure: Coronary angiography;  Surgeon: Sarbjit Posadas MD;  Location:  [facility];  Service: Cardiology;  Laterality: Left;    CARDIAC CATHETERIZATION Left 12/15/2021    Procedure: Percutaneous Coronary Intervention;  Surgeon: Sarbjit Posadas MD;  Location:  [facility];  Service: Cardiology;  Laterality: Left;    CARDIAC DEFIBRILLATOR PLACEMENT      CARDIAC PACEMAKER PLACEMENT      Pacemaker Placement    CAROTID STENT  2001    CATARACT EXTRACTION, BILATERAL      CHOLECYSTECTOMY      COLONOSCOPY  08/27/2013    snare hep, trans tics recall 3 yr    COLONOSCOPY  10/02/2006    few scattered diverticula    COLONOSCOPY  07/22/2009    tubular adenoma in the ascending colon    COLONOSCOPY  07/25/2005    several polyps in the ascending colon, one in the transverse colon    COLONOSCOPY N/A 03/21/2022    Procedure: COLONOSCOPY WITH ANESTHESIA;  Surgeon: Phil Goodwin MD;  Location: [facility] ENDOSCOPY;  Service: Gastroenterology;  Laterality: N/A;  pre: hx polyps  post: polyps  Rolan Salvador MD    COLONOSCOPY N/A 05/07/2024    The examined portion of the ileum was normal. - Two diminutive tubular adenoma polyps in the distal transverse colon, removed with a cold snare. Resected and retrieved. - Diverticulosis in the sigmoid colon. - Non-bleeding hemorrhoids. - The examination was otherwise normal    COLONOSCOPY W/ POLYPECTOMY  09/19/2016    Tubular adenoma hepatic flexure, 2 Hyperplastic polyps rectum and at 50 cm, Benign polyp at 70 cm repeat exam in 3-5 years    CORONARY ARTERY BYPASS GRAFT   02/2011    2nd time    CORONARY ARTERY BYPASS GRAFT  1990    CORONARY STENT PLACEMENT      ENDOSCOPY  04/14/2016    nl slant    ENDOSCOPY  11/10/2014    clips at polypectomy bx no residual recall 1 yr    ENDOSCOPY  06/19/2014    snare clip body recall 6 months    ENDOSCOPY  05/19/2014    polyp stomach bx and clip    ENDOSCOPY N/A 05/06/2024    1. Small bowel, biopsies: Small bowel mucosa with no significant pathologic changes. No evidence of celiac disease. 2. Antrum, biopsies: Minimal chronic gastritis. 3. Stomach, biopsies: Minimal chronic gastritis. Features suggestive of fundic gland polyp    HERNIA REPAIR      with mesh    ICD GENERATOR REPLACEMENT N/A 02/28/2024    Procedure: ICD Generator Change - Bi-Ventricular (20556) with new LV lead implant (32420);  Surgeon: Devika Oliver MD;  Location:  [facility];  Service: Cardiovascular;  Laterality: N/A;    INTERVENTIONAL RADIOLOGY PROCEDURE Left 01/17/2024    Procedure: Venogram upper extremity left, 79815, 17272;  Surgeon: Devika Oliver MD;  Location:  [facility];  Service: Cardiovascular;  Laterality: Left;    JOINT REPLACEMENT      OTHER SURGICAL HISTORY      Defibrillator/Pacer maker exchange    OTHER SURGICAL HISTORY      Pacemaker/Defibillation exchange 2012    PENILE PROSTHESIS IMPLANT N/A 07/27/2018    Procedure: PENILE PROSTHESIS PLACEMENT;  Surgeon: Godwin Gupta MD;  Location:  [facility] OR;  Service: Urology    PERIPHERAL ARTERIAL STENT GRAFT         Social History     Socioeconomic History    Marital status:    Tobacco Use    Smoking status: Never    Smokeless tobacco: Never   Vaping Use    Vaping status: Never Used   Substance and Sexual Activity    Alcohol use: Not Currently     Comment: occ    Drug use: Never    Sexual activity: Not Currently     Partners: Female     Birth control/protection: Vasectomy       Family History   Problem Relation Age of Onset    Lung cancer Mother     Heart attack Mother      "No Known Problems Father     Other Neg Hx         GI Malignancies    Colon cancer Neg Hx     Colon polyps Neg Hx        Objective    Temp 97.9 °F (36.6 °C)   Resp 18   Ht 180.3 cm (71\")   Wt 91.2 kg (201 lb)   BMI 28.03 kg/m²     Physical Exam  Vitals reviewed.   Constitutional:       Appearance: Normal appearance.   HENT:      Head: Normocephalic and atraumatic.   Pulmonary:      Effort: Pulmonary effort is normal.   Skin:     Coloration: Skin is not pale.   Neurological:      Mental Status: He is alert.   Psychiatric:         Mood and Affect: Mood normal.         Behavior: Behavior normal.             Results for orders placed or performed in visit on 11/14/24   POC Urinalysis Dipstick, Multipro    Collection Time: 11/14/24  9:43 AM    Specimen: Urine   Result Value Ref Range    Color Dark Yellow Yellow, Straw, Dark Yellow, Indigo    Clarity, UA Clear Clear    Glucose, UA >=1000 mg/dL (3+) (A) Negative mg/dL    Bilirubin Negative Negative    Ketones, UA Negative Negative    Specific Gravity  1.025 1.005 - 1.030    Blood, UA Negative Negative    pH, Urine 5.5 5.0 - 8.0    Protein, POC Negative Negative mg/dL    Urobilinogen, UA 0.2 E.U./dL Normal, 0.2 E.U./dL    Nitrite, UA Negative Negative    Leukocytes Negative Negative     Estimation of residual urine via abdominal ultrasound  Residual Urine: 29 ml  Indication: Urinary Incontinence  Position: Supine  Examination: Incremental scanning of the suprapubic area using 3 MHz transducer using copious amounts of acoustic gel.   Findings: An anechoic area was demonstrated which represented the bladder, with measurement of residual urine as noted. I inspected this myself. In that the residual urine was stable or insignificant, no treatment will be necessary at this time.       Assessment and Plan    Diagnoses and all orders for this visit:    1. Urinary incontinence, nocturnal enuresis (Primary)  -     POC Urinalysis Dipstick, Multipro  -     Vibegron (Gemtesa) 75 MG " tablet; Take 1 tablet by mouth Daily for 42 days.  Dispense: 42 tablet; Refill: 0      Myrbetriq 50 mg was working fairly well however recently that has not been as effective he is having breakthrough symptoms.  His urine is clear his bladder scan was 29 mL.  After discussion I feel he may benefit from switching to Gemtesa 75 mg 1 daily.  I gave him 6 weeks of samples    He will return in 6 weeks to discuss symptoms.

## 2024-11-14 ENCOUNTER — OFFICE VISIT (OUTPATIENT)
Dept: UROLOGY | Facility: CLINIC | Age: 79
End: 2024-11-14
Payer: OTHER MISCELLANEOUS

## 2024-11-14 VITALS — WEIGHT: 201 LBS | BODY MASS INDEX: 28.14 KG/M2 | HEIGHT: 71 IN | RESPIRATION RATE: 18 BRPM | TEMPERATURE: 97.9 F

## 2024-11-14 DIAGNOSIS — N39.44 URINARY INCONTINENCE, NOCTURNAL ENURESIS: Primary | ICD-10-CM

## 2024-11-14 LAB
BILIRUB BLD-MCNC: NEGATIVE MG/DL
CLARITY, POC: CLEAR
COLOR UR: ABNORMAL
GLUCOSE UR STRIP-MCNC: ABNORMAL MG/DL
KETONES UR QL: NEGATIVE
LEUKOCYTE EST, POC: NEGATIVE
NITRITE UR-MCNC: NEGATIVE MG/ML
PH UR: 5.5 [PH] (ref 5–8)
PROT UR STRIP-MCNC: NEGATIVE MG/DL
RBC # UR STRIP: NEGATIVE /UL
SP GR UR: 1.02 (ref 1–1.03)
UROBILINOGEN UR QL: ABNORMAL

## 2024-11-14 RX ORDER — VIBEGRON 75 MG/1
1 TABLET, FILM COATED ORAL DAILY
Qty: 42 TABLET | Refills: 0 | COMMUNITY
Start: 2024-11-14 | End: 2024-12-26

## 2024-11-15 ENCOUNTER — OFFICE VISIT (OUTPATIENT)
Dept: CARDIOLOGY | Facility: CLINIC | Age: 79
End: 2024-11-15
Payer: MEDICARE

## 2024-11-15 VITALS
BODY MASS INDEX: 28 KG/M2 | HEART RATE: 80 BPM | DIASTOLIC BLOOD PRESSURE: 80 MMHG | HEIGHT: 71 IN | WEIGHT: 200 LBS | SYSTOLIC BLOOD PRESSURE: 130 MMHG

## 2024-11-15 DIAGNOSIS — I47.29 VENTRICULAR TACHYCARDIA (PAROXYSMAL): ICD-10-CM

## 2024-11-15 DIAGNOSIS — I44.2 CHB (COMPLETE HEART BLOCK): Primary | ICD-10-CM

## 2024-11-15 DIAGNOSIS — Z95.810 PRESENCE OF BIVENTRICULAR AICD: ICD-10-CM

## 2024-11-15 DIAGNOSIS — I48.21 PERMANENT ATRIAL FIBRILLATION: ICD-10-CM

## 2024-11-25 RX ORDER — ATORVASTATIN CALCIUM 40 MG/1
40 TABLET, FILM COATED ORAL
Qty: 90 TABLET | Refills: 3 | Status: SHIPPED | OUTPATIENT
Start: 2024-11-25

## 2024-12-01 NOTE — PROGRESS NOTES
"Chief Complaint  Congestive Heart Failure    Subjective        History of Present Illness    EP Problems:  1.  Permanent atrial fibrillation  2.  Complete heart block status post AV node ablation  3.  Ventricular tachycardia  4.  Presence of a BiV ICD  -2/2024: LV lead revision     Cardiology Problems:  1.  Chronic systolic heart failure  -5/27/2023: EF 36 to 40%  2.  CAD status post CABG  3.  Ischemic cardiomyopathy  4.  Hypertension  5.  Hyperlipidemia     Medical Problems:  1.  BPH    History of Present Illness  The patient is a 79-year-old man who presents to the clinic for follow-up of left bundle branch block, chronic systolic heart failure, CRT malfunction with replacement of a left-sided pacing lead, complete heart block, and permanent atrial fibrillation. He is accompanied by an adult female.    He reports an improvement in his breathing since the procedure, even managing to walk to the clinic without difficulty. He successfully completed a stress test and has not experienced any pain or swelling. There are no signs of bleeding, redness, or drainage.    He has been attending the heart failure clinic and has not reported any bleeding issues related to his medications. He is currently on sotalol and is tolerating it well.      Objective   Vital Signs:  /80   Pulse 80   Ht 180.3 cm (71\")   Wt 90.7 kg (200 lb)   BMI 27.89 kg/m²   Estimated body mass index is 27.89 kg/m² as calculated from the following:    Height as of this encounter: 180.3 cm (71\").    Weight as of this encounter: 90.7 kg (200 lb).      Physical Exam     Physical Exam  Patient appears well and is in no acute distress.  Bilateral auscultation of lungs is clear.  Heart rhythm is regular.  Legs are warm and well perfused without significant edema.    Result Review :  The following data was reviewed by: Devika Oliver MD on today's date:      Results  Imaging  CT of the chest from 10/29/2023 he is directly visualized independently reviewed " which shows well-positioned left atrial appendage clip.  Remainder of CT scan findings as per primary report.    Testing  Ejection fraction was 41 to 45% in June.  Wall monition abnormalities present.  Bi-atrial enlargement.      EKG today reveals atrial fibrillation with ventricular pacing and a single PVC.      [id number] SCORE   [id number] Score: 6 (11/30/2024  8:52 PM)                 Assessment and Plan   Diagnoses and all orders for this visit:    1. CHB (complete heart block) (Primary)    2. Permanent atrial fibrillation    3. Presence of biventricular AICD    4. Ventricular tachycardia (paroxysmal)        Assessment & Plan  1. Left bundle branch block.  His ejection fraction was recorded as 41 to 45 percent in June 2024, indicating an improvement. Given his current stable condition, there is no immediate need to repeat an echocardiogram. However, it would be prudent to schedule another echo later this year to monitor his heart function. The heart failure clinic is likely to conduct one sooner.    2. Chronic systolic heart failure.  His current medication regimen will be maintained, including sotalol for rhythm management and aspirin due to the well-positioned clip. No bleeding issues with his medications were reported.    3. CRT malfunction with replacement of a left-sided pacing lead.  He reports no pain, swelling, bleeding, redness, or drainage at the site. The clip appears well-positioned on the CT of the chest from last year.    4. Complete heart block.  His heart rhythm is regular, and he is tolerating sotalol well.    5. Permanent atrial fibrillation.  EKG reveals atrial fibrillation with ventricular pacing and a single PVC. He is tolerating sotalol well.    Follow-up  Return in 6 months for follow up.    Plan:  - Repeat echo later this year to reassess LV function  - Continue current regiment including sotalol for VT suppression  - Continue remote device follow up  -Long-term clinic follow-up for  permanent atrial fibrillation, complete heart block, chronic systolic heart failure, presence of a CRT-D, ventricular tachycardia             Follow Up   Return in about 6 months (around 5/15/2025).  Patient was given instructions and counseling regarding his condition or for health maintenance advice. Please see specific information pulled into the AVS if appropriate.     Part of this note may be an electronic transcription/translation of spoken language to printed text using the Dragon Dictation System.    Patient or patient representative verbalized consent for the use of Ambient Listening during the visit with  Devika Oliver MD for chart documentation. 11/30/2024  20:55 CST

## 2024-12-16 RX ORDER — SPIRONOLACTONE 25 MG/1
12.5 TABLET ORAL DAILY
Qty: 45 TABLET | Refills: 7 | Status: SHIPPED | OUTPATIENT
Start: 2024-12-16

## 2024-12-18 NOTE — PROGRESS NOTES
Subjective    Mr. Maria is 79 y.o. male    Chief Complaint: Urinary incontinence    History of Present Illness  Patient has been having worsening urgency and urge incontinence he was having a good response to 50 mg Myrbetriq however symptoms started to get worse over time.  Prior cystoscopy was negative for any obvious abnormalities or obstruction.  I switched him to Gemtesa 75 mg daily also wrote a prescription patient has not yet had the medicine from the pharmacy they state they are waiting for it to be shipped.  I will give him more samples today.  Patient states he has had improvement in symptoms on the Gemtesa.  After discussion he would like to continue on the medication.    The following portions of the patient's history were reviewed and updated as appropriate: allergies, current medications, past family history, past medical history, past social history, past surgical history and problem list.    Review of Systems   Genitourinary:  Positive for urgency. Negative for difficulty urinating and frequency.         Current Outpatient Medications:     aspirin 81 MG EC tablet, Take 1 tablet by mouth Daily., Disp: , Rfl:     atorvastatin (LIPITOR) 40 MG tablet, TAKE 1 TABLET BY MOUTH EVERY DAY AT NIGHT, Disp: 90 tablet, Rfl: 3    cetirizine (zyrTEC) 10 MG tablet, Take 1 tablet by mouth Daily As Needed for Allergies., Disp: , Rfl:     Cyanocobalamin 1000 MCG/ML kit, Inject 1 mL as directed Every 30 (Thirty) Days. On Fridays till 11/11/23 then monthly thereafter., Disp: , Rfl:     empagliflozin (Jardiance) 10 MG tablet tablet, Take 1 tablet by mouth Daily., Disp: 30 tablet, Rfl: 11    finasteride (PROSCAR) 5 MG tablet, Take 1 tablet by mouth Daily., Disp: , Rfl:     isosorbide mononitrate (IMDUR) 30 MG 24 hr tablet, Take 1 tablet by mouth Daily., Disp: 30 tablet, Rfl: 5    nitroglycerin (NITROSTAT) 0.4 MG SL tablet, Place 1 tablet under the tongue Every 5 (Five) Minutes As Needed for Chest Pain. Take no more than  3 doses in 15 minutes., Disp: 25 tablet, Rfl: 2    omeprazole (PriLOSEC) 40 MG capsule, Take 1 capsule by mouth Daily., Disp: , Rfl:     pregabalin (LYRICA) 150 MG capsule, Take 1 capsule by mouth 2 (Two) Times a Day., Disp: , Rfl:     ranolazine (RANEXA) 1000 MG 12 hr tablet, TAKE 1 TABLET BY MOUTH TWICE A DAY, Disp: 180 tablet, Rfl: 3    Sotalol HCl AF 80 MG tablet, TAKE 1 TABLET BY MOUTH TWICE A DAY, Disp: 180 tablet, Rfl: 3    spironolactone (ALDACTONE) 25 MG tablet, TAKE 1/2 TABLET BY MOUTH DAILY, Disp: 45 tablet, Rfl: 7    torsemide (DEMADEX) 20 MG tablet, Take 1 tablet by mouth As Needed., Disp: , Rfl:     traMADol-acetaminophen (ULTRACET) 37.5-325 MG per tablet, Take 1 tablet by mouth Every 12 (Twelve) Hours As Needed for Moderate Pain (pain)., Disp: , Rfl: 1    Vibegron 75 MG tablet, Take 1 tablet by mouth Daily., Disp: 90 tablet, Rfl: 3    zolpidem (AMBIEN) 5 MG tablet, Take 1 tablet by mouth At Night As Needed for Sleep., Disp: , Rfl:     Vibegron (Gemtesa) 75 MG tablet, Take 1 tablet by mouth Daily for 42 days., Disp: 42 tablet, Rfl: 0    Past Medical History:   Diagnosis Date    Abdominal pain, epigastric     Abnormal abdominal exam     ABFND, RADIOLOGICAL, ABDOMINAL AREA     Abnormal ECG     Anticoagulated on Coumadin     Atherosclerosis of coronary artery bypass graft     ATHEROSCLEROSIS, CORONARY, ARTERY BYPASS GRFT    Atrial fibrillation     Cancer of right kidney     right sided kidney cancer    Cardiomyopathy     Cardiomyopathy, primary     CHF (congestive heart failure)     Clotting disorder     Colon polyps     Congenital heart disease     Congestive heart failure     Coronary artery disease     History of CAD/A-Fib/Pacemaker/Defibrillator placement: pt takes Coumadin and plavix therapy as well as ASA daily    Diabetes mellitus     Gastric polyp     Gastroesophageal reflux disease without esophagitis     History of colon polyps     Hypercholesterolemia     Hypertension, essential     Melena      Myocardial infarction     NSAID long-term use     Obesity     Pacemaker     Pacemaker Dependant    Platelet inhibition due to Plavix     Presence of stent in coronary artery     Multiple coronary stenting most recently 4/2008    Single kidney     Only 1 Kidney    Status post surgery     s/p Polypectomy Bleed       Past Surgical History:   Procedure Laterality Date    ABLATION OF DYSRHYTHMIC FOCUS      APPENDECTOMY      CARDIAC ABLATION      CARDIAC CATHETERIZATION      CARDIAC CATHETERIZATION Left 12/15/2021    Procedure: Coronary angiography;  Surgeon: Sarbjit Posadas MD;  Location:  [facility];  Service: Cardiology;  Laterality: Left;    CARDIAC CATHETERIZATION Left 12/15/2021    Procedure: Percutaneous Coronary Intervention;  Surgeon: Sarbjit Posadas MD;  Location:  [facility];  Service: Cardiology;  Laterality: Left;    CARDIAC DEFIBRILLATOR PLACEMENT      CARDIAC PACEMAKER PLACEMENT      Pacemaker Placement    CAROTID STENT  2001    CATARACT EXTRACTION, BILATERAL      CHOLECYSTECTOMY      COLONOSCOPY  08/27/2013    snare hep, trans tics recall 3 yr    COLONOSCOPY  10/02/2006    few scattered diverticula    COLONOSCOPY  07/22/2009    tubular adenoma in the ascending colon    COLONOSCOPY  07/25/2005    several polyps in the ascending colon, one in the transverse colon    COLONOSCOPY N/A 03/21/2022    Procedure: COLONOSCOPY WITH ANESTHESIA;  Surgeon: Phil Goodwin MD;  Location: [facility] ENDOSCOPY;  Service: Gastroenterology;  Laterality: N/A;  pre: hx polyps  post: polyps  Rolan Salvador MD    COLONOSCOPY N/A 05/07/2024    The examined portion of the ileum was normal. - Two diminutive tubular adenoma polyps in the distal transverse colon, removed with a cold snare. Resected and retrieved. - Diverticulosis in the sigmoid colon. - Non-bleeding hemorrhoids. - The examination was otherwise normal    COLONOSCOPY W/ POLYPECTOMY  09/19/2016    Tubular adenoma hepatic  flexure, 2 Hyperplastic polyps rectum and at 50 cm, Benign polyp at 70 cm repeat exam in 3-5 years    CORONARY ARTERY BYPASS GRAFT  02/2011    2nd time    CORONARY ARTERY BYPASS GRAFT  1990    CORONARY STENT PLACEMENT      ENDOSCOPY  04/14/2016    nl slant    ENDOSCOPY  11/10/2014    clips at polypectomy bx no residual recall 1 yr    ENDOSCOPY  06/19/2014    snare clip body recall 6 months    ENDOSCOPY  05/19/2014    polyp stomach bx and clip    ENDOSCOPY N/A 05/06/2024    1. Small bowel, biopsies: Small bowel mucosa with no significant pathologic changes. No evidence of celiac disease. 2. Antrum, biopsies: Minimal chronic gastritis. 3. Stomach, biopsies: Minimal chronic gastritis. Features suggestive of fundic gland polyp    HERNIA REPAIR      with mesh    ICD GENERATOR REPLACEMENT N/A 02/28/2024    Procedure: ICD Generator Change - Bi-Ventricular (33113) with new LV lead implant (71607);  Surgeon: Devika Oliver MD;  Location:  [facility];  Service: Cardiovascular;  Laterality: N/A;    INTERVENTIONAL RADIOLOGY PROCEDURE Left 01/17/2024    Procedure: Venogram upper extremity left, 60087, 13530;  Surgeon: Devika Oliver MD;  Location:  [facility];  Service: Cardiovascular;  Laterality: Left;    JOINT REPLACEMENT      OTHER SURGICAL HISTORY      Defibrillator/Pacer maker exchange    OTHER SURGICAL HISTORY      Pacemaker/Defibillation exchange 2012    PENILE PROSTHESIS IMPLANT N/A 07/27/2018    Procedure: PENILE PROSTHESIS PLACEMENT;  Surgeon: Godwin Gupta MD;  Location:  [facility] OR;  Service: Urology    PERIPHERAL ARTERIAL STENT GRAFT         Social History     Socioeconomic History    Marital status:    Tobacco Use    Smoking status: Never    Smokeless tobacco: Never   Vaping Use    Vaping status: Never Used   Substance and Sexual Activity    Alcohol use: Not Currently     Comment: occ    Drug use: Never    Sexual activity: Not Currently     Partners: Female     Birth  "control/protection: Vasectomy       Family History   Problem Relation Age of Onset    Lung cancer Mother     Heart attack Mother     No Known Problems Father     Other Neg Hx         GI Malignancies    Colon cancer Neg Hx     Colon polyps Neg Hx        Objective    Temp 97.3 °F (36.3 °C)   Ht 180.3 cm (71\")   Wt 88.7 kg (195 lb 9.6 oz)   BMI 27.28 kg/m²     Physical Exam  Vitals reviewed.   Constitutional:       Appearance: Normal appearance.   HENT:      Head: Normocephalic and atraumatic.   Pulmonary:      Effort: Pulmonary effort is normal.   Skin:     Coloration: Skin is not pale.   Neurological:      Mental Status: He is alert.   Psychiatric:         Mood and Affect: Mood normal.         Behavior: Behavior normal.             Results for orders placed or performed in visit on 12/30/24   POC Urinalysis Dipstick, Multipro    Collection Time: 12/30/24 10:20 AM    Specimen: Urine   Result Value Ref Range    Color Yellow Yellow, Straw, Dark Yellow, Indigo    Clarity, UA Clear Clear    Glucose, UA >=1000 mg/dL (3+) (A) Negative mg/dL    Bilirubin Negative Negative    Ketones, UA Negative Negative    Specific Gravity  1.020 1.005 - 1.030    Blood, UA Negative Negative    pH, Urine 5.5 5.0 - 8.0    Protein, POC Trace (A) Negative mg/dL    Urobilinogen, UA Normal Normal, 0.2 E.U./dL    Nitrite, UA Negative Negative    Leukocytes Negative Negative     Bladder Scan interpretation  Estimation of residual urine via abdominal ultrasound  Residual Urine: 38 ml  Indication: Urinary incontinence  Position: Supine  Examination: Incremental scanning of the suprapubic area using 3 MHz transducer using copious amounts of acoustic gel.   Findings: An anechoic area was demonstrated which represented the bladder, with measurement of residual urine as noted. I inspected this myself. In that the residual urine was stable or insignificant, no treatment will be necessary at this time.     Assessment and Plan    Diagnoses and all orders " for this visit:    1. Urinary incontinence, nocturnal enuresis (Primary)  -     POC Urinalysis Dipstick, Multipro  -     Vibegron (Gemtesa) 75 MG tablet; Take 1 tablet by mouth Daily for 42 days.  Dispense: 42 tablet; Refill: 0      Patient overall has had better response to Gemtesa I did send a prescription to his pharmacy but patient states he was told they are not available at this time.  In the meantime I gave him more samples of Gemtesa which have been more effective than Myrbetriq.    6 months.

## 2024-12-27 ENCOUNTER — TELEPHONE (OUTPATIENT)
Dept: UROLOGY | Facility: CLINIC | Age: 79
End: 2024-12-27
Payer: MEDICARE

## 2024-12-27 DIAGNOSIS — N39.44 URINARY INCONTINENCE, NOCTURNAL ENURESIS: Primary | ICD-10-CM

## 2024-12-27 NOTE — TELEPHONE ENCOUNTER
Patient called in stating the Gemtesa was working great for him and wanted a prescription sent in. I let him know I had it sent and to give us a call back if it was too expensive.

## 2024-12-30 ENCOUNTER — OFFICE VISIT (OUTPATIENT)
Dept: UROLOGY | Facility: CLINIC | Age: 79
End: 2024-12-30
Payer: OTHER MISCELLANEOUS

## 2024-12-30 ENCOUNTER — TELEPHONE (OUTPATIENT)
Dept: CARDIOLOGY | Facility: CLINIC | Age: 79
End: 2024-12-30
Payer: MEDICARE

## 2024-12-30 VITALS — TEMPERATURE: 97.3 F | HEIGHT: 71 IN | WEIGHT: 195.6 LBS | BODY MASS INDEX: 27.38 KG/M2

## 2024-12-30 DIAGNOSIS — N39.44 URINARY INCONTINENCE, NOCTURNAL ENURESIS: Primary | ICD-10-CM

## 2024-12-30 LAB
BILIRUB BLD-MCNC: NEGATIVE MG/DL
CLARITY, POC: CLEAR
COLOR UR: YELLOW
GLUCOSE UR STRIP-MCNC: ABNORMAL MG/DL
KETONES UR QL: NEGATIVE
LEUKOCYTE EST, POC: NEGATIVE
NITRITE UR-MCNC: NEGATIVE MG/ML
PH UR: 5.5 [PH] (ref 5–8)
PROT UR STRIP-MCNC: ABNORMAL MG/DL
RBC # UR STRIP: NEGATIVE /UL
SP GR UR: 1.02 (ref 1–1.03)
UROBILINOGEN UR QL: NORMAL

## 2024-12-30 PROCEDURE — 99214 OFFICE O/P EST MOD 30 MIN: CPT | Performed by: PHYSICIAN ASSISTANT

## 2024-12-30 PROCEDURE — 81001 URINALYSIS AUTO W/SCOPE: CPT | Performed by: PHYSICIAN ASSISTANT

## 2024-12-30 PROCEDURE — 51798 US URINE CAPACITY MEASURE: CPT | Performed by: PHYSICIAN ASSISTANT

## 2024-12-30 RX ORDER — VIBEGRON 75 MG/1
1 TABLET, FILM COATED ORAL DAILY
Qty: 42 TABLET | Refills: 0 | COMMUNITY
Start: 2024-12-30 | End: 2025-02-10

## 2024-12-30 NOTE — TELEPHONE ENCOUNTER
----- Message from Devika Oliver sent at 12/27/2024 10:06 AM CST -----  His heart failure numbers are elevated on his ICD.  Please advise him to take his torsemide every day for the next 3 days.    Thanks,  Devika

## 2025-01-08 ENCOUNTER — LAB (OUTPATIENT)
Dept: LAB | Facility: HOSPITAL | Age: 80
End: 2025-01-08
Payer: MEDICARE

## 2025-01-08 DIAGNOSIS — D50.9 IRON DEFICIENCY ANEMIA, UNSPECIFIED IRON DEFICIENCY ANEMIA TYPE: ICD-10-CM

## 2025-01-08 DIAGNOSIS — D75.89 MACROCYTOSIS: ICD-10-CM

## 2025-01-08 LAB
ALBUMIN SERPL-MCNC: 4.4 G/DL (ref 3.5–5.2)
ALBUMIN/GLOB SERPL: 1.6 G/DL
ALP SERPL-CCNC: 87 U/L (ref 39–117)
ALT SERPL W P-5'-P-CCNC: 27 U/L (ref 1–41)
ANION GAP SERPL CALCULATED.3IONS-SCNC: 12 MMOL/L (ref 5–15)
AST SERPL-CCNC: 27 U/L (ref 1–40)
BASOPHILS # BLD AUTO: 0.04 10*3/MM3 (ref 0–0.2)
BASOPHILS NFR BLD AUTO: 0.5 % (ref 0–1.5)
BILIRUB SERPL-MCNC: 0.9 MG/DL (ref 0–1.2)
BUN SERPL-MCNC: 18 MG/DL (ref 8–23)
BUN/CREAT SERPL: 20.7 (ref 7–25)
CALCIUM SPEC-SCNC: 9.6 MG/DL (ref 8.6–10.5)
CHLORIDE SERPL-SCNC: 101 MMOL/L (ref 98–107)
CO2 SERPL-SCNC: 27 MMOL/L (ref 22–29)
CREAT SERPL-MCNC: 0.87 MG/DL (ref 0.76–1.27)
DEPRECATED RDW RBC AUTO: 50.4 FL (ref 37–54)
EGFRCR SERPLBLD CKD-EPI 2021: 88.9 ML/MIN/1.73
EOSINOPHIL # BLD AUTO: 0.16 10*3/MM3 (ref 0–0.4)
EOSINOPHIL NFR BLD AUTO: 2.2 % (ref 0.3–6.2)
ERYTHROCYTE [DISTWIDTH] IN BLOOD BY AUTOMATED COUNT: 13.5 % (ref 12.3–15.4)
FERRITIN SERPL-MCNC: 176 NG/ML (ref 30–400)
FOLATE SERPL-MCNC: 14.3 NG/ML (ref 4.78–24.2)
GLOBULIN UR ELPH-MCNC: 2.8 GM/DL
GLUCOSE SERPL-MCNC: 205 MG/DL (ref 65–99)
HCT VFR BLD AUTO: 48.8 % (ref 37.5–51)
HGB BLD-MCNC: 16.2 G/DL (ref 13–17.7)
IMM GRANULOCYTES # BLD AUTO: 0.05 10*3/MM3 (ref 0–0.05)
IMM GRANULOCYTES NFR BLD AUTO: 0.7 % (ref 0–0.5)
IRON 24H UR-MRATE: 140 MCG/DL (ref 59–158)
IRON SATN MFR SERPL: 38 % (ref 20–50)
LYMPHOCYTES # BLD AUTO: 0.95 10*3/MM3 (ref 0.7–3.1)
LYMPHOCYTES NFR BLD AUTO: 13 % (ref 19.6–45.3)
MCH RBC QN AUTO: 33.5 PG (ref 26.6–33)
MCHC RBC AUTO-ENTMCNC: 33.2 G/DL (ref 31.5–35.7)
MCV RBC AUTO: 101 FL (ref 79–97)
MONOCYTES # BLD AUTO: 0.58 10*3/MM3 (ref 0.1–0.9)
MONOCYTES NFR BLD AUTO: 7.9 % (ref 5–12)
NEUTROPHILS NFR BLD AUTO: 5.53 10*3/MM3 (ref 1.7–7)
NEUTROPHILS NFR BLD AUTO: 75.7 % (ref 42.7–76)
NRBC BLD AUTO-RTO: 0 /100 WBC (ref 0–0.2)
PLATELET # BLD AUTO: 219 10*3/MM3 (ref 140–450)
PMV BLD AUTO: 9.2 FL (ref 6–12)
POTASSIUM SERPL-SCNC: 4.6 MMOL/L (ref 3.5–5.2)
PROT SERPL-MCNC: 7.2 G/DL (ref 6–8.5)
RBC # BLD AUTO: 4.83 10*6/MM3 (ref 4.14–5.8)
SODIUM SERPL-SCNC: 140 MMOL/L (ref 136–145)
TIBC SERPL-MCNC: 370 MCG/DL (ref 298–536)
TRANSFERRIN SERPL-MCNC: 248 MG/DL (ref 200–360)
VIT B12 BLD-MCNC: 655 PG/ML (ref 211–946)
WBC NRBC COR # BLD AUTO: 7.31 10*3/MM3 (ref 3.4–10.8)

## 2025-01-08 PROCEDURE — 84466 ASSAY OF TRANSFERRIN: CPT

## 2025-01-08 PROCEDURE — 82746 ASSAY OF FOLIC ACID SERUM: CPT

## 2025-01-08 PROCEDURE — 82728 ASSAY OF FERRITIN: CPT

## 2025-01-08 PROCEDURE — 82607 VITAMIN B-12: CPT

## 2025-01-08 PROCEDURE — 36415 COLL VENOUS BLD VENIPUNCTURE: CPT

## 2025-01-08 PROCEDURE — 85025 COMPLETE CBC W/AUTO DIFF WBC: CPT

## 2025-01-08 PROCEDURE — 80053 COMPREHEN METABOLIC PANEL: CPT

## 2025-01-08 PROCEDURE — 83540 ASSAY OF IRON: CPT

## 2025-01-13 ENCOUNTER — HOSPITAL ENCOUNTER (OUTPATIENT)
Dept: CARDIOLOGY | Facility: HOSPITAL | Age: 80
Discharge: HOME | End: 2025-01-13
Admitting: NURSE PRACTITIONER
Payer: MEDICARE

## 2025-01-13 ENCOUNTER — TELEPHONE (OUTPATIENT)
Dept: CARDIOLOGY | Facility: HOSPITAL | Age: 80
End: 2025-01-13
Payer: MEDICARE

## 2025-01-13 VITALS
SYSTOLIC BLOOD PRESSURE: 108 MMHG | BODY MASS INDEX: 27.34 KG/M2 | HEART RATE: 97 BPM | OXYGEN SATURATION: 98 % | DIASTOLIC BLOOD PRESSURE: 71 MMHG | WEIGHT: 196 LBS | RESPIRATION RATE: 16 BRPM

## 2025-01-13 DIAGNOSIS — I50.42 CHRONIC COMBINED SYSTOLIC AND DIASTOLIC CONGESTIVE HEART FAILURE: Primary | ICD-10-CM

## 2025-01-13 DIAGNOSIS — I25.708 CORONARY ARTERY DISEASE OF BYPASS GRAFT OF NATIVE HEART WITH STABLE ANGINA PECTORIS: ICD-10-CM

## 2025-01-13 LAB
ABSOLUTE LUNG FLUID CONTENT: 33 % (ref 20–35)
ANION GAP SERPL CALCULATED.3IONS-SCNC: 10 MMOL/L (ref 5–15)
BUN SERPL-MCNC: 19 MG/DL (ref 8–23)
BUN/CREAT SERPL: 19.4 (ref 7–25)
CALCIUM SPEC-SCNC: 9.5 MG/DL (ref 8.6–10.5)
CHLORIDE SERPL-SCNC: 105 MMOL/L (ref 98–107)
CHOLEST SERPL-MCNC: 133 MG/DL (ref 0–200)
CO2 SERPL-SCNC: 26 MMOL/L (ref 22–29)
CREAT SERPL-MCNC: 0.98 MG/DL (ref 0.76–1.27)
EGFRCR SERPLBLD CKD-EPI 2021: 79.4 ML/MIN/1.73
GLUCOSE SERPL-MCNC: 142 MG/DL (ref 65–99)
HDLC SERPL-MCNC: 60 MG/DL (ref 40–60)
LDLC SERPL CALC-MCNC: 59 MG/DL (ref 0–100)
LDLC/HDLC SERPL: 0.98 {RATIO}
POTASSIUM SERPL-SCNC: 4.7 MMOL/L (ref 3.5–5.2)
SODIUM SERPL-SCNC: 141 MMOL/L (ref 136–145)
TRIGL SERPL-MCNC: 70 MG/DL (ref 0–150)
VLDLC SERPL-MCNC: 14 MG/DL (ref 5–40)

## 2025-01-13 PROCEDURE — 80061 LIPID PANEL: CPT | Performed by: NURSE PRACTITIONER

## 2025-01-13 PROCEDURE — 80048 BASIC METABOLIC PNL TOTAL CA: CPT | Performed by: NURSE PRACTITIONER

## 2025-01-13 PROCEDURE — 94726 PLETHYSMOGRAPHY LUNG VOLUMES: CPT | Performed by: NURSE PRACTITIONER

## 2025-01-13 RX ORDER — SUVOREXANT 10 MG/1
1 TABLET, FILM COATED ORAL NIGHTLY PRN
COMMUNITY

## 2025-01-13 NOTE — TELEPHONE ENCOUNTER
----- Message from Joey Trent sent at 1/13/2025 12:16 PM CST -----  Please let him know that his labs overall look good.  We didn't get to discuss the Lipid panel and his LDL looks good.  I don't recommend changes.

## 2025-01-13 NOTE — PROGRESS NOTES
Heart Failure Clinic Progress Note  Reason For Visit:  CHF    Subjective        Darren Maria is a 79 y.o. male with the below pertinent PMH who presents for follow-up of CHF.    Darren Maria was most recently seen on 10/29/2024.  At that time he was feeling well.  He denied any new associated symptoms and he appeared euvolemic on examination.  No medication changes were made at that time.    He reports he is doing well.  He has had to take Lasix on a couple of occasions as he states he got a call from the EP office regarding concerns for fluid overload.  Otherwise he indicates he has not needed any other torsemide.  He denies any symptoms such as shortness of breath, lightheadedness, dizziness, chest pain, or peripheral edema.  His weight is down 2 pounds since last being seen and his ReDs reading is normal at 33%.    Review of Systems   Constitutional:  Negative for fatigue.   Respiratory:  Negative for shortness of breath.    Cardiovascular:  Negative for chest pain and leg swelling.        Pertinent PMH  -Chronic systolic congestive heart failure (EF 36 to 40%)  - Coronary artery disease status post CABG in 1990 (redo CABG in March 2011)  - Ischemic cardiomyopathy  - Hypertension  - Hyperlipidemia  - Permanent atrial fibrillation  - Complete heart block status post AV node ablation  - Status post BiV ICD with lead revision in March 2024  - Left atrial appendage ligation in 2011  - History of renal cell carcinoma status post right nephrectomy in 2000  - Iron deficiency anemia    Pertinent past medical, surgical, family, and social history were reviewed.      Current Outpatient Medications:     aspirin 81 MG EC tablet, Take 1 tablet by mouth Daily., Disp: , Rfl:     atorvastatin (LIPITOR) 40 MG tablet, TAKE 1 TABLET BY MOUTH EVERY DAY AT NIGHT, Disp: 90 tablet, Rfl: 3    cetirizine (zyrTEC) 10 MG tablet, Take 1 tablet by mouth Daily As Needed for Allergies., Disp: , Rfl:     Cyanocobalamin 1000  MCG/ML kit, Inject 1 mL as directed Every 30 (Thirty) Days. On Fridays till 11/11/23 then monthly thereafter., Disp: , Rfl:     empagliflozin (Jardiance) 10 MG tablet tablet, Take 1 tablet by mouth Daily., Disp: 30 tablet, Rfl: 11    finasteride (PROSCAR) 5 MG tablet, Take 1 tablet by mouth Daily., Disp: , Rfl:     isosorbide mononitrate (IMDUR) 30 MG 24 hr tablet, Take 1 tablet by mouth Daily., Disp: 30 tablet, Rfl: 5    omeprazole (PriLOSEC) 40 MG capsule, Take 1 capsule by mouth Daily., Disp: , Rfl:     pregabalin (LYRICA) 150 MG capsule, Take 1 capsule by mouth 2 (Two) Times a Day., Disp: , Rfl:     ranolazine (RANEXA) 1000 MG 12 hr tablet, TAKE 1 TABLET BY MOUTH TWICE A DAY, Disp: 180 tablet, Rfl: 3    Sotalol HCl AF 80 MG tablet, TAKE 1 TABLET BY MOUTH TWICE A DAY, Disp: 180 tablet, Rfl: 3    spironolactone (ALDACTONE) 25 MG tablet, TAKE 1/2 TABLET BY MOUTH DAILY, Disp: 45 tablet, Rfl: 7    Suvorexant (Belsomra) 10 MG tablet, Take 1 tablet by mouth At Night As Needed., Disp: , Rfl:     torsemide (DEMADEX) 20 MG tablet, Take 1 tablet by mouth As Needed., Disp: , Rfl:     traMADol-acetaminophen (ULTRACET) 37.5-325 MG per tablet, Take 1 tablet by mouth Every 12 (Twelve) Hours As Needed for Moderate Pain (pain)., Disp: , Rfl: 1    Vibegron (Gemtesa) 75 MG tablet, Take 1 tablet by mouth Daily for 42 days., Disp: 42 tablet, Rfl: 0    nitroglycerin (NITROSTAT) 0.4 MG SL tablet, Place 1 tablet under the tongue Every 5 (Five) Minutes As Needed for Chest Pain. Take no more than 3 doses in 15 minutes., Disp: 25 tablet, Rfl: 2    Vibegron 75 MG tablet, Take 1 tablet by mouth Daily., Disp: 90 tablet, Rfl: 3    zolpidem (AMBIEN) 5 MG tablet, Take 1 tablet by mouth At Night As Needed for Sleep. (Patient not taking: Reported on 1/13/2025), Disp: , Rfl:      Objective   Vital Signs:  /71 (BP Location: Left arm)   Pulse 97   Resp 16   Wt 88.9 kg (196 lb)   SpO2 98%   BMI 27.34 kg/m²   Estimated body mass index is  "27.34 kg/m² as calculated from the following:    Height as of 12/30/24: 180.3 cm (71\").    Weight as of this encounter: 88.9 kg (196 lb).    Constitutional:       Appearance: Healthy appearance.   Neck:      Vascular: JVD normal.   Pulmonary:      Effort: Pulmonary effort is normal.      Breath sounds: Normal breath sounds.   Cardiovascular:      PMI at left midclavicular line. Normal rate. Regular rhythm. Normal S1. Normal S2.       Murmurs: There is no murmur.      No gallop.  No click. No rub.   Pulses:     Intact distal pulses.   Edema:     Peripheral edema absent.   Abdominal:      Palpations: Abdomen is soft.      Tenderness: There is no abdominal tenderness.   Skin:     General: Skin is warm.   Neurological:      Mental Status: Oriented to person, place and time.        Result Review :  The following data was reviewed by: EVELIA Bhardwaj on 09/03/2024:  BMP   BMP          10/29/2024    10:46 1/8/2025    10:24 1/13/2025    10:30   BMP   BUN 17  18  19    Creatinine 0.99  0.87  0.98    Sodium 141  140  141    Potassium 4.7  4.6  4.7    Chloride 104  101  105    CO2 30.0  27.0  26.0    Calcium 9.4  9.6  9.5      CBC   CBC          9/3/2024    10:52 9/25/2024    13:18 1/8/2025    10:24   CBC   WBC 7.47  5.52  7.31    RBC 4.43  4.21  4.83    Hemoglobin 14.4  14.2  16.2    Hematocrit 44.7  43.0  48.8    .9  102.1  101.0    MCH 32.5  33.7  33.5    MCHC 32.2  33.0  33.2    RDW 14.2  13.9  13.5    Platelets 193  168  219      ReDS   Lab Results   Component Value Date    ABSOLUTELUNG 33 01/13/2025    ABSOLUTELUNG 32 10/29/2024    ABSOLUTELUNG 27 09/03/2024           Assessment and Plan   Diagnoses and all orders for this visit:    1. Chronic combined systolic and diastolic congestive heart failure (Primary)  2. Presence of biventricular AICD  3. Ischemic cardiomyopathy  No new concerns today in the office.  He appears to be euvolemic and is doing well at this time.  From a cardiac standpoint he appears " stable.  - Etiology: Ischemic  - LVEF: 41-45%  - NYHA Class: II  - BB: Sotalol 80mg daily   - ACEi/ARB/ARNi: Discontinued due to intolerance  - SGLT2i: Jardiance 10mg daily  - MRA: Spironolactone 25 mg daily  - Diuretics: Torsemide 20mg as needed.  Has not needed any dosing.  - Electrolytes: Stable on Labs today  - ICD/CRT: CRT-D in place.  - Daily Weight: Stable.  Continue to recommend daily weights    4. Coronary artery disease of bypass graft of native heart with stable angina pectoris  5. Essential hypertension  No anginal symptoms recently  - Will check lipid panel today  - Continue Aspirin 81mg daily  - Continue Imdur 30mg daily  - Continue Ranexa 1000mg BID.   - Continue Lipitor 40mg nightly    6. S/P AV sam ablation  7. Permanent atrial fibrillation  Following with EP.   - Continue Aspirin as above.  He is s/p left atrial appendage ligation  - Sotalol as above       Follow Up   Return in about 3 months (around 4/13/2025) for recheck.    Patient was given instructions and counseling regarding his condition or for health maintenance advice. Please see specific information pulled into the AVS if appropriate.       Joey Trent, APRN  01/13/25  12:04 CST

## 2025-01-13 NOTE — PROGRESS NOTES
Heart Failure Clinic    Date:  01/13/25     Vitals:    01/13/25 1034   BP: 108/71   Pulse: 97   Resp: 16   SpO2: 98%        Indication:  Heart Failure     Procedure:  ReDS device sensor unit applied to right side of chest and right side of back.  Appropriate positioning confirmed based off of the unit's calculation.  Chest measurement obtained with the chest size ruler.  Measurement session performed over 45 seconds.      Method of arrival:  Ambulatory with cane    Weighing self daily:  Yes    Taking medications as prescribed:  Yes    Edema:  No    Shortness of Air:  No    Number of pillows used at night:  2    Results:  ReDS Value=            33              Interpretation:  25-35% - normal/ideal lung fluid content    Rosario Jessica RN 01/13/25 10:40 CST

## 2025-01-15 ENCOUNTER — OFFICE VISIT (OUTPATIENT)
Dept: CARDIOLOGY | Facility: CLINIC | Age: 80
End: 2025-01-15
Payer: MEDICARE

## 2025-01-15 VITALS
WEIGHT: 192 LBS | HEART RATE: 82 BPM | OXYGEN SATURATION: 97 % | SYSTOLIC BLOOD PRESSURE: 128 MMHG | RESPIRATION RATE: 18 BRPM | DIASTOLIC BLOOD PRESSURE: 78 MMHG | BODY MASS INDEX: 26.88 KG/M2 | HEIGHT: 71 IN

## 2025-01-15 DIAGNOSIS — Z95.810 PRESENCE OF BIVENTRICULAR AICD: ICD-10-CM

## 2025-01-15 DIAGNOSIS — I47.29 VENTRICULAR TACHYCARDIA (PAROXYSMAL): ICD-10-CM

## 2025-01-15 DIAGNOSIS — Z98.890 S/P AV NODAL ABLATION: ICD-10-CM

## 2025-01-15 DIAGNOSIS — I48.21 PERMANENT ATRIAL FIBRILLATION: ICD-10-CM

## 2025-01-15 DIAGNOSIS — I50.42 CHRONIC COMBINED SYSTOLIC AND DIASTOLIC CONGESTIVE HEART FAILURE: Primary | ICD-10-CM

## 2025-01-15 DIAGNOSIS — I25.810 CORONARY ARTERY DISEASE INVOLVING CORONARY BYPASS GRAFT OF NATIVE HEART WITHOUT ANGINA PECTORIS: ICD-10-CM

## 2025-01-15 PROBLEM — I50.9 ACUTE EXACERBATION OF CHF (CONGESTIVE HEART FAILURE): Status: RESOLVED | Noted: 2023-10-29 | Resolved: 2025-01-15

## 2025-01-15 PROCEDURE — 1160F RVW MEDS BY RX/DR IN RCRD: CPT | Performed by: NURSE PRACTITIONER

## 2025-01-15 PROCEDURE — 3078F DIAST BP <80 MM HG: CPT | Performed by: NURSE PRACTITIONER

## 2025-01-15 PROCEDURE — 99214 OFFICE O/P EST MOD 30 MIN: CPT | Performed by: NURSE PRACTITIONER

## 2025-01-15 PROCEDURE — 1159F MED LIST DOCD IN RCRD: CPT | Performed by: NURSE PRACTITIONER

## 2025-01-15 PROCEDURE — 3074F SYST BP LT 130 MM HG: CPT | Performed by: NURSE PRACTITIONER

## 2025-01-15 PROCEDURE — 93000 ELECTROCARDIOGRAM COMPLETE: CPT | Performed by: NURSE PRACTITIONER

## 2025-01-15 RX ORDER — SPIRONOLACTONE 25 MG/1
25 TABLET ORAL DAILY
Start: 2025-01-15

## 2025-01-15 NOTE — PROGRESS NOTES
Subjective:     Encounter Date: 1/15/2025      Patient ID: Darren Maria is a 79 y.o. male.    Chief Complaint: Follow-up heart failure, coronary disease, atrial fibrillation, and ventricular tachycardia.    History of Present Illness    The patient presents to follow-up regarding the above cardiac diagnoses.  He also follows with Dr. Whitney for ventricular tachycardia.  He received an appropriate AICD shock on 5/26/2023 and appropriate ATP treatment in June 2023.  Dr. Oliver had started him on sotalol August 2023 but he had felt poorly with associated nausea since starting it.  Later, EP discontinued Coreg.  He was hospitalized for an exacerbation of heart failure in October 2023.  More recently, he has struggled with orthostatic hypotension and associated symptoms and his guideline directed medical therapy for heart failure has had to be reduced.  It appears he has also been taken off of Flomax due to concerns that this was contributing to his orthostatic hypotension.    He followed up with Dr. Posadas January 2024.  He reiterated that he improved with diuresis in terms of his chest discomfort and shortness of breath back in October 2023.  He was still struggling intermittently with shortness of breath and chest discomfort provoked by fluid retention.  At that visit, Dr. Posadas started low-dose verquvo.  The patient tells me he ultimately did not tolerate this due to weakness and low blood pressure.    He did have his BiV ICD generator changed and a new LV lead placed later in January 2024 per Dr. Oliver. Old LV lead is capped.     He was hospitalized in May 2024 with a GI bleed.  Xarelto was changed to Eliquis and Plavix was replaced with aspirin at that point.  Later, EVELIA Bernard, had a discussion with Dr. Posadas and Dr. Oliver about his recent bleeding history and his prior left atrial appendage ligation and the decision was made to discontinue anticoagulation and continue antiplatelet monotherapy in  the form of aspirin 81 mg daily.    I saw the patient in June 2024 and he was continuing to struggle with chronic weakness and fatigue.  He reported rare episodes of chest pressure.  He continued to have shortness of breath with mild to moderate exertion.  He was not having orthopnea, PND or rapid weight gain.  He was not requiring torsemide.  Weight was stable.  He was off of Entresto due to hypotension, symptomatic, but was tolerating losartan at that time.    The patient presents today for routine follow-up.  He continues to follow closely in the CHF clinic and was actually just seen there 2 days ago.  He was felt to be euvolemic and I agree he appears euvolemic today.  He is no longer on losartan due to symptomatic hypotension.  He takes torsemide occasionally.  He states he had COVID several weeks ago and lost weight due to lack of appetite but has gained a little bit of his weight back.  Overall his weight is stable.  Systolic blood pressures range from 110s to 130s he reports.  He is tolerating his Aldactone and Jardiance in terms of heart failure medications.  He is not having any chest pain.  He states his exertional dyspnea has improved.  He denies palpitations, syncope, presyncope, edema, orthopnea, PND.          The following portions of the patient's history were reviewed and updated as appropriate: allergies, current medications, past family history, past medical history, past social history, past surgical history, and problem list.    Review of Systems   Constitutional: Positive for malaise/fatigue.   Cardiovascular:  Positive for dyspnea on exertion. Negative for chest pain, claudication, leg swelling, near-syncope, orthopnea, palpitations, paroxysmal nocturnal dyspnea and syncope.   Respiratory:  Positive for shortness of breath. Negative for cough.    Hematologic/Lymphatic: Does not bruise/bleed easily.   Musculoskeletal:  Negative for falls.   Gastrointestinal:  Negative for bloating.    Neurological:  Negative for dizziness, light-headedness and weakness.           Current Outpatient Medications:     aspirin 81 MG EC tablet, Take 1 tablet by mouth Daily., Disp: , Rfl:     atorvastatin (LIPITOR) 40 MG tablet, TAKE 1 TABLET BY MOUTH EVERY DAY AT NIGHT, Disp: 90 tablet, Rfl: 3    cetirizine (zyrTEC) 10 MG tablet, Take 1 tablet by mouth Daily As Needed for Allergies., Disp: , Rfl:     Cyanocobalamin 1000 MCG/ML kit, Inject 1 mL as directed Every 30 (Thirty) Days. On Fridays till 11/11/23 then monthly thereafter., Disp: , Rfl:     empagliflozin (Jardiance) 10 MG tablet tablet, Take 1 tablet by mouth Daily., Disp: 30 tablet, Rfl: 11    finasteride (PROSCAR) 5 MG tablet, Take 1 tablet by mouth Daily., Disp: , Rfl:     isosorbide mononitrate (IMDUR) 30 MG 24 hr tablet, Take 1 tablet by mouth Daily., Disp: 30 tablet, Rfl: 5    nitroglycerin (NITROSTAT) 0.4 MG SL tablet, Place 1 tablet under the tongue Every 5 (Five) Minutes As Needed for Chest Pain. Take no more than 3 doses in 15 minutes., Disp: 25 tablet, Rfl: 2    omeprazole (PriLOSEC) 40 MG capsule, Take 1 capsule by mouth Daily., Disp: , Rfl:     pregabalin (LYRICA) 150 MG capsule, Take 1 capsule by mouth 2 (Two) Times a Day., Disp: , Rfl:     ranolazine (RANEXA) 1000 MG 12 hr tablet, TAKE 1 TABLET BY MOUTH TWICE A DAY, Disp: 180 tablet, Rfl: 3    Sotalol HCl AF 80 MG tablet, TAKE 1 TABLET BY MOUTH TWICE A DAY, Disp: 180 tablet, Rfl: 3    spironolactone (ALDACTONE) 25 MG tablet, Take 1 tablet by mouth Daily., Disp: , Rfl:     Suvorexant (Belsomra) 10 MG tablet, Take 1 tablet by mouth At Night As Needed., Disp: , Rfl:     torsemide (DEMADEX) 20 MG tablet, Take 1 tablet by mouth As Needed., Disp: , Rfl:     traMADol-acetaminophen (ULTRACET) 37.5-325 MG per tablet, Take 1 tablet by mouth Every 12 (Twelve) Hours As Needed for Moderate Pain (pain)., Disp: , Rfl: 1    Vibegron (Gemtesa) 75 MG tablet, Take 1 tablet by mouth Daily for 42 days., Disp: 42  tablet, Rfl: 0       Objective:      Vitals:    01/15/25 1008   BP: 128/78   Pulse: 82   Resp: 18   SpO2: 97%   Weight - 192 lbs    Vitals and nursing note reviewed.   Constitutional:       General: Not in acute distress.     Appearance: Well-developed and not in distress. Frail. Chronically ill-appearing. Not diaphoretic.   Neck:      Vascular: No JVD or JVR. JVD normal.   Pulmonary:      Effort: Pulmonary effort is normal. No respiratory distress.      Breath sounds: Normal breath sounds.   Cardiovascular:      Normal rate. Regular rhythm.      Murmurs: There is no murmur.   Edema:     Peripheral edema absent.   Abdominal:      Tenderness: There is no abdominal tenderness.   Skin:     General: Skin is warm and dry.   Neurological:      Mental Status: Alert, oriented to person, place, and time and oriented to person, place and time.         Lab Review:   Lab Results   Component Value Date    GLUCOSE 142 (H) 01/13/2025    BUN 19 01/13/2025    CREATININE 0.98 01/13/2025    EGFR 79.4 01/13/2025    BCR 19.4 01/13/2025    K 4.7 01/13/2025    CO2 26.0 01/13/2025    CALCIUM 9.5 01/13/2025    ALBUMIN 4.4 01/08/2025    BILITOT 0.9 01/08/2025    AST 27 01/08/2025    ALT 27 01/08/2025        Lab Results   Component Value Date    CHOL 133 01/13/2025    CHLPL 136 (L) 03/13/2018    TRIG 70 01/13/2025    HDL 60 01/13/2025    LDL 59 01/13/2025      Lab Results   Component Value Date    HGBA1C 6.3 09/25/2018        Lab Results   Component Value Date    WBC 7.31 01/08/2025    HGB 16.2 01/08/2025    HCT 48.8 01/08/2025    .0 (H) 01/08/2025     01/08/2025                      ECG 12 Lead    Date/Time: 1/15/2025 10:42 AM  Performed by: Yolanda Perry APRN    Authorized by: Yolanda Perry APRN  Comparison: compared with previous ECG from 6/18/2024  Similar to previous ECG  Comparison to previous ECG: Afib and V pacing   Rhythm: atrial fibrillation and paced  BPM: 82    Clinical impression: abnormal EKG        5/2024  echo:      Left ventricular systolic function is mildly decreased. Left ventricular ejection fraction appears to be 41 - 45%.    The left ventricular cavity is mild to moderately dilated.    The following left ventricular wall segments are hypokinetic: apical anterior, apical lateral, mid inferolateral, mid inferoseptal and mid anteroseptal. The following left ventricular wall segments are akinetic: apical inferior, apical septal and apex.    The left atrial cavity is severely dilated.    The right atrial cavity is dilated.    Estimated right ventricular systolic pressure from tricuspid regurgitation is mildly elevated (35-45 mmHg).    Normal size and function of the right ventricle.    Compared to prior exam from 10/31/2023, there is perhaps very subtle improvement in left ventricular systolic function.     Assessment/Plan:     Problem List Items Addressed This Visit          Cardiac and Vasculature    Coronary artery disease of bypass graft of native heart without angina pectoris: Stable.     Overview     3v CABG ([facility]) in 1990: LIMA to LAD, SVG to PD, SVG to OM  2006: other grafts patent but found to have  of SVG to OM; underwent PTCA/stenting of native OM with 2.5mm x 23mm LEILANI.  4/2008: only change was severe focal denovo stenosis of previously twice-stented native LCX with repeat stenting of native LCS ([facility]) with third 2.5mm x 23mm Cypher LEILANI  ---l. Pulse generator replacement 04/28/08 (TIMO Mccarty) Medtronic Concerto [id number] (serial # [id number]); pulse generator replacement 11/19  ---m. Recurrent angina 04/08, repeat limited native LCA angiogram 04/08 with focal edge dissection and instent restenosis, status post additional stenting (TIMO Figueroa) with 3 additional [place] stents  ---n. Recurrent angina 12/08 with repeat catheterization confirming LVEF 40%, patent LIMA to LAD, patent SVG to RCA, patent LCx stent site; medical therapy  ---o. TTE 03/10 with LVEF  30-35%, no significant valvular dysfunction, moderate LAE and LVE; repeat 12/10 uncjhanged except LVEWF improved to 35-40%  ---p. BNP 03/10 259 pg/ml > repeat 12/10 165 pg/ml > repeat 06/12 70 pg/ml > repeat 10/12 200 pg/ml>repeat 11/17 154 pg/ml  ---q. Adenosine radionuclide stress test 03/10 and 12/10 with fixed shannan-apical scar, no ischemia, LVEF 40% rising to 50% with stress; medical therapy  ---r. ACS 03/11 with cardiac catheterization revealing patent LIMA to LAD, patent SVG to PD and PL of RCA, severe ISR of previously multiply (x 4 procedures/6 stents) stented distal LM/proximal LCX-OM    Redo CABG via lateral thoracotomy at [facility] ([place], 3/18/11) with SVG from descending Ao (entered through 5th intercostal space) to OM.  Op note scanned in under 'media' under date of procedure         Relevant Medications            nitroglycerin (NITROSTAT) 0.4 MG SL tablet    Presence of biventricular AICD    Permanent atrial fibrillation: Stable.     Relevant Medications            nitroglycerin (NITROSTAT) 0.4 MG SL tablet    S/P AV sam ablation    Chronic combined systolic and diastolic congestive heart failure - stable NYHA II-III    Relevant Medications            nitroglycerin (NITROSTAT) 0.4 MG SL tablet    Ventricular tachycardia: stable     Relevant Medications            nitroglycerin (NITROSTAT) 0.4 MG SL tablet         Recommendations/plans:      The patient is stable from a cardiovascular standpoint.  He is currently NYHA II-III but euvolemic on exam.  Exertional dyspnea has improved since last visit.  Due to struggling with orthostatic symptoms, he is no longer on Entresto or ARB.  He is tolerating Aldactone and Jardiance well.  He will continue torsemide on an as-needed basis only.  His beta-blocker is sotalol given his VT history.  He will continue to follow with electrophysiology for his VT history as well as his BiV AICD and permanent atrial fibrillation which is rate controlled.  He is not having any  angina.  He will continue his aspirin, high intensity statin, Imdur and Ranexa.  His LDL is well-controlled.  His blood pressure is well-controlled.  He has not been requiring sublingual nitroglycerin.  Most recent LVEF is slightly improved compared to previous, at 41 to 45%.  He will continue to follow with CHF clinic  as well.  He will follow-up with Dr. Posadas in 6 months, sooner with symptoms or concerns.  As noted above given his prior GI bleed and prior left atrial appendage ligation he is no longer anticoagulated and will continue antiplatelet monotherapy at this time in the form of aspirin 81 mg daily.      I spent 34 minutes caring for Darren on this date of service. This time includes time spent by me in the following activities: preparing for the visit, reviewing tests, performing a medically appropriate examination and/or evaluation, counseling and educating the patient/family/caregiver, and documenting information in the medical record

## 2025-01-22 ENCOUNTER — TRANSCRIBE ORDERS (OUTPATIENT)
Dept: OCCUPATIONAL THERAPY | Facility: HOSPITAL | Age: 80
End: 2025-01-22
Payer: MEDICARE

## 2025-01-22 DIAGNOSIS — G62.2 POLYNEUROPATHY DUE TO OTHER TOXIC AGENTS: Primary | ICD-10-CM

## 2025-01-28 RX ORDER — EMPAGLIFLOZIN 10 MG/1
10 TABLET, FILM COATED ORAL DAILY
Qty: 90 TABLET | Refills: 3 | Status: SHIPPED | OUTPATIENT
Start: 2025-01-28

## 2025-02-10 ENCOUNTER — APPOINTMENT (OUTPATIENT)
Dept: CT IMAGING | Age: 80
DRG: 069 | End: 2025-02-10
Payer: MEDICARE

## 2025-02-10 ENCOUNTER — HOSPITAL ENCOUNTER (INPATIENT)
Age: 80
LOS: 1 days | Discharge: HOME HEALTH | DRG: 069 | End: 2025-02-13
Attending: EMERGENCY MEDICINE | Admitting: HOSPITALIST
Payer: MEDICARE

## 2025-02-10 ENCOUNTER — APPOINTMENT (OUTPATIENT)
Dept: GENERAL RADIOLOGY | Age: 80
DRG: 069 | End: 2025-02-10
Payer: MEDICARE

## 2025-02-10 DIAGNOSIS — G45.9 TIA (TRANSIENT ISCHEMIC ATTACK): ICD-10-CM

## 2025-02-10 DIAGNOSIS — R29.90 STROKE-LIKE SYMPTOMS: Primary | ICD-10-CM

## 2025-02-10 LAB
ALBUMIN SERPL-MCNC: 3.6 G/DL (ref 3.5–5.2)
ALP SERPL-CCNC: 58 U/L (ref 40–129)
ALT SERPL-CCNC: 24 U/L (ref 5–41)
ANION GAP SERPL CALCULATED.3IONS-SCNC: 12 MMOL/L (ref 8–16)
AST SERPL-CCNC: 23 U/L (ref 5–40)
BASOPHILS # BLD: 0.1 K/UL (ref 0–0.2)
BASOPHILS NFR BLD: 0.8 % (ref 0–1)
BILIRUB SERPL-MCNC: 0.7 MG/DL (ref 0.2–1.2)
BUN SERPL-MCNC: 25 MG/DL (ref 8–23)
CALCIUM SERPL-MCNC: 8.7 MG/DL (ref 8.8–10.2)
CHLORIDE SERPL-SCNC: 96 MMOL/L (ref 98–107)
CHOLEST SERPL-MCNC: 105 MG/DL (ref 0–199)
CO2 SERPL-SCNC: 25 MMOL/L (ref 22–29)
CREAT SERPL-MCNC: 1.1 MG/DL (ref 0.7–1.2)
EKG P AXIS: NORMAL DEGREES
EKG P-R INTERVAL: NORMAL MS
EKG Q-T INTERVAL: 452 MS
EKG QRS DURATION: 154 MS
EKG QTC CALCULATION (BAZETT): 478 MS
EKG T AXIS: 63 DEGREES
EOSINOPHIL # BLD: 0.1 K/UL (ref 0–0.6)
EOSINOPHIL NFR BLD: 1.5 % (ref 0–5)
ERYTHROCYTE [DISTWIDTH] IN BLOOD BY AUTOMATED COUNT: 13.7 % (ref 11.5–14.5)
GLUCOSE BLD-MCNC: 161 MG/DL (ref 70–99)
GLUCOSE SERPL-MCNC: 147 MG/DL (ref 70–99)
HBA1C MFR BLD: 6.5 % (ref 4–5.6)
HCT VFR BLD AUTO: 51.5 % (ref 42–52)
HDLC SERPL-MCNC: 39 MG/DL (ref 40–60)
HGB BLD-MCNC: 17.6 G/DL (ref 14–18)
IMM GRANULOCYTES # BLD: 0.1 K/UL
INR PPP: 0.96 (ref 0.88–1.18)
LDLC SERPL CALC-MCNC: 46 MG/DL
LYMPHOCYTES # BLD: 0.7 K/UL (ref 1.1–4.5)
LYMPHOCYTES NFR BLD: 9.9 % (ref 20–40)
MAGNESIUM SERPL-MCNC: 1.8 MG/DL (ref 1.6–2.4)
MCH RBC QN AUTO: 33.8 PG (ref 27–31)
MCHC RBC AUTO-ENTMCNC: 34.2 G/DL (ref 33–37)
MCV RBC AUTO: 98.8 FL (ref 80–94)
MONOCYTES # BLD: 0.7 K/UL (ref 0–0.9)
MONOCYTES NFR BLD: 9.1 % (ref 0–10)
NEUTROPHILS # BLD: 5.6 K/UL (ref 1.5–7.5)
NEUTS SEG NFR BLD: 78 % (ref 50–65)
PERFORMED ON: ABNORMAL
PHOSPHATE SERPL-MCNC: 3.4 MG/DL (ref 2.5–4.5)
PLATELET # BLD AUTO: 243 K/UL (ref 130–400)
PLATELET SLIDE REVIEW: ADEQUATE
PMV BLD AUTO: 9.7 FL (ref 9.4–12.4)
POTASSIUM SERPL-SCNC: 4.6 MMOL/L (ref 3.5–5)
PROT SERPL-MCNC: 5.6 G/DL (ref 6.4–8.3)
PROTHROMBIN TIME: 12.5 SEC (ref 12–14.6)
RBC # BLD AUTO: 5.21 M/UL (ref 4.7–6.1)
REASON FOR REJECTION: NORMAL
REJECTED TEST: NORMAL
SODIUM SERPL-SCNC: 133 MMOL/L (ref 136–145)
T4 FREE SERPL-MCNC: 1.21 NG/DL (ref 0.93–1.7)
TRIGL SERPL-MCNC: 99 MG/DL (ref 0–149)
TROPONIN, HIGH SENSITIVITY: 34 NG/L (ref 0–22)
TSH SERPL DL<=0.005 MIU/L-ACNC: 2.28 UIU/ML (ref 0.27–4.2)
WBC # BLD AUTO: 7.2 K/UL (ref 4.8–10.8)

## 2025-02-10 PROCEDURE — 2500000003 HC RX 250 WO HCPCS: Performed by: HOSPITALIST

## 2025-02-10 PROCEDURE — 85025 COMPLETE CBC W/AUTO DIFF WBC: CPT

## 2025-02-10 PROCEDURE — 85610 PROTHROMBIN TIME: CPT

## 2025-02-10 PROCEDURE — 84484 ASSAY OF TROPONIN QUANT: CPT

## 2025-02-10 PROCEDURE — 36415 COLL VENOUS BLD VENIPUNCTURE: CPT

## 2025-02-10 PROCEDURE — G0378 HOSPITAL OBSERVATION PER HR: HCPCS

## 2025-02-10 PROCEDURE — 93005 ELECTROCARDIOGRAM TRACING: CPT | Performed by: EMERGENCY MEDICINE

## 2025-02-10 PROCEDURE — 70496 CT ANGIOGRAPHY HEAD: CPT

## 2025-02-10 PROCEDURE — 6360000004 HC RX CONTRAST MEDICATION: Performed by: EMERGENCY MEDICINE

## 2025-02-10 PROCEDURE — 71045 X-RAY EXAM CHEST 1 VIEW: CPT

## 2025-02-10 PROCEDURE — 82962 GLUCOSE BLOOD TEST: CPT

## 2025-02-10 PROCEDURE — 99285 EMERGENCY DEPT VISIT HI MDM: CPT

## 2025-02-10 PROCEDURE — 84443 ASSAY THYROID STIM HORMONE: CPT

## 2025-02-10 PROCEDURE — 6370000000 HC RX 637 (ALT 250 FOR IP): Performed by: HOSPITALIST

## 2025-02-10 PROCEDURE — 83735 ASSAY OF MAGNESIUM: CPT

## 2025-02-10 PROCEDURE — 83036 HEMOGLOBIN GLYCOSYLATED A1C: CPT

## 2025-02-10 PROCEDURE — 80061 LIPID PANEL: CPT

## 2025-02-10 PROCEDURE — 93010 ELECTROCARDIOGRAM REPORT: CPT | Performed by: INTERNAL MEDICINE

## 2025-02-10 PROCEDURE — 84100 ASSAY OF PHOSPHORUS: CPT

## 2025-02-10 PROCEDURE — 80053 COMPREHEN METABOLIC PANEL: CPT

## 2025-02-10 PROCEDURE — 84439 ASSAY OF FREE THYROXINE: CPT

## 2025-02-10 PROCEDURE — 70450 CT HEAD/BRAIN W/O DYE: CPT

## 2025-02-10 RX ORDER — GABAPENTIN 400 MG/1
400 CAPSULE ORAL 4 TIMES DAILY
Status: DISCONTINUED | OUTPATIENT
Start: 2025-02-10 | End: 2025-02-10

## 2025-02-10 RX ORDER — LABETALOL HYDROCHLORIDE 5 MG/ML
10 INJECTION, SOLUTION INTRAVENOUS EVERY 10 MIN PRN
Status: DISCONTINUED | OUTPATIENT
Start: 2025-02-10 | End: 2025-02-13 | Stop reason: HOSPADM

## 2025-02-10 RX ORDER — FINASTERIDE 5 MG/1
5 TABLET, FILM COATED ORAL DAILY
Status: DISCONTINUED | OUTPATIENT
Start: 2025-02-11 | End: 2025-02-13 | Stop reason: HOSPADM

## 2025-02-10 RX ORDER — SUVOREXANT 10 MG/1
10 TABLET, FILM COATED ORAL NIGHTLY PRN
COMMUNITY

## 2025-02-10 RX ORDER — PREGABALIN 150 MG/1
150 CAPSULE ORAL 2 TIMES DAILY
COMMUNITY

## 2025-02-10 RX ORDER — POTASSIUM CHLORIDE 1500 MG/1
40 TABLET, EXTENDED RELEASE ORAL
Status: DISCONTINUED | OUTPATIENT
Start: 2025-02-11 | End: 2025-02-13 | Stop reason: HOSPADM

## 2025-02-10 RX ORDER — ASPIRIN 81 MG/1
81 TABLET ORAL DAILY
COMMUNITY

## 2025-02-10 RX ORDER — ATORVASTATIN CALCIUM 80 MG/1
80 TABLET, FILM COATED ORAL NIGHTLY
Status: DISCONTINUED | OUTPATIENT
Start: 2025-02-10 | End: 2025-02-13 | Stop reason: HOSPADM

## 2025-02-10 RX ORDER — SOTALOL HYDROCHLORIDE 80 MG/1
80 TABLET ORAL 2 TIMES DAILY
COMMUNITY

## 2025-02-10 RX ORDER — ZOLPIDEM TARTRATE 5 MG/1
5 TABLET ORAL NIGHTLY PRN
Status: ON HOLD | COMMUNITY
End: 2025-02-13 | Stop reason: HOSPADM

## 2025-02-10 RX ORDER — CYANOCOBALAMIN 1000 UG/ML
1000 INJECTION, SOLUTION INTRAMUSCULAR; SUBCUTANEOUS
COMMUNITY

## 2025-02-10 RX ORDER — SODIUM CHLORIDE 9 MG/ML
INJECTION, SOLUTION INTRAVENOUS PRN
Status: DISCONTINUED | OUTPATIENT
Start: 2025-02-10 | End: 2025-02-13 | Stop reason: HOSPADM

## 2025-02-10 RX ORDER — SODIUM CHLORIDE 0.9 % (FLUSH) 0.9 %
5-40 SYRINGE (ML) INJECTION EVERY 12 HOURS SCHEDULED
Status: DISCONTINUED | OUTPATIENT
Start: 2025-02-10 | End: 2025-02-13 | Stop reason: HOSPADM

## 2025-02-10 RX ORDER — NITROGLYCERIN 0.4 MG/1
0.4 TABLET SUBLINGUAL EVERY 5 MIN PRN
Status: DISCONTINUED | OUTPATIENT
Start: 2025-02-10 | End: 2025-02-13 | Stop reason: HOSPADM

## 2025-02-10 RX ORDER — PANTOPRAZOLE SODIUM 40 MG/1
40 TABLET, DELAYED RELEASE ORAL
Status: DISCONTINUED | OUTPATIENT
Start: 2025-02-11 | End: 2025-02-13 | Stop reason: HOSPADM

## 2025-02-10 RX ORDER — ONDANSETRON 2 MG/ML
4 INJECTION INTRAMUSCULAR; INTRAVENOUS EVERY 6 HOURS PRN
Status: DISCONTINUED | OUTPATIENT
Start: 2025-02-10 | End: 2025-02-13 | Stop reason: HOSPADM

## 2025-02-10 RX ORDER — IOPAMIDOL 755 MG/ML
120 INJECTION, SOLUTION INTRAVASCULAR
Status: COMPLETED | OUTPATIENT
Start: 2025-02-10 | End: 2025-02-10

## 2025-02-10 RX ORDER — ONDANSETRON 4 MG/1
4 TABLET, ORALLY DISINTEGRATING ORAL EVERY 8 HOURS PRN
Status: DISCONTINUED | OUTPATIENT
Start: 2025-02-10 | End: 2025-02-13 | Stop reason: HOSPADM

## 2025-02-10 RX ORDER — TORSEMIDE 20 MG/1
20 TABLET ORAL DAILY PRN
COMMUNITY

## 2025-02-10 RX ORDER — SODIUM CHLORIDE 0.9 % (FLUSH) 0.9 %
5-40 SYRINGE (ML) INJECTION PRN
Status: DISCONTINUED | OUTPATIENT
Start: 2025-02-10 | End: 2025-02-13 | Stop reason: HOSPADM

## 2025-02-10 RX ORDER — RANOLAZINE 500 MG/1
1000 TABLET, EXTENDED RELEASE ORAL 2 TIMES DAILY
COMMUNITY

## 2025-02-10 RX ORDER — CETIRIZINE HYDROCHLORIDE 10 MG/1
10 TABLET ORAL DAILY PRN
COMMUNITY

## 2025-02-10 RX ORDER — ISOSORBIDE MONONITRATE 30 MG/1
30 TABLET, EXTENDED RELEASE ORAL DAILY
COMMUNITY

## 2025-02-10 RX ORDER — SPIRONOLACTONE 25 MG/1
25 TABLET ORAL DAILY
COMMUNITY

## 2025-02-10 RX ORDER — CLOPIDOGREL BISULFATE 75 MG/1
75 TABLET ORAL DAILY
Status: DISCONTINUED | OUTPATIENT
Start: 2025-02-11 | End: 2025-02-10

## 2025-02-10 RX ORDER — TAMSULOSIN HYDROCHLORIDE 0.4 MG/1
0.4 CAPSULE ORAL DAILY
Status: DISCONTINUED | OUTPATIENT
Start: 2025-02-11 | End: 2025-02-13 | Stop reason: HOSPADM

## 2025-02-10 RX ADMIN — SODIUM CHLORIDE, PRESERVATIVE FREE 10 ML: 5 INJECTION INTRAVENOUS at 21:25

## 2025-02-10 RX ADMIN — ATORVASTATIN CALCIUM 80 MG: 80 TABLET, FILM COATED ORAL at 21:23

## 2025-02-10 RX ADMIN — IOPAMIDOL 120 ML: 755 INJECTION, SOLUTION INTRAVENOUS at 19:21

## 2025-02-10 RX ADMIN — GABAPENTIN 400 MG: 400 CAPSULE ORAL at 21:23

## 2025-02-10 ASSESSMENT — ENCOUNTER SYMPTOMS
VOMITING: 0
WHEEZING: 0
DIARRHEA: 0
COUGH: 0
COLOR CHANGE: 0
SHORTNESS OF BREATH: 0
CONSTIPATION: 0
ABDOMINAL PAIN: 0
SINUS PAIN: 0
NAUSEA: 0
BACK PAIN: 0
SINUS PRESSURE: 0

## 2025-02-11 ENCOUNTER — APPOINTMENT (OUTPATIENT)
Dept: CT IMAGING | Age: 80
DRG: 069 | End: 2025-02-11
Payer: MEDICARE

## 2025-02-11 ENCOUNTER — APPOINTMENT (OUTPATIENT)
Age: 80
DRG: 069 | End: 2025-02-11
Attending: HOSPITALIST
Payer: MEDICARE

## 2025-02-11 PROBLEM — Z87.19 HISTORY OF GI BLEED: Status: ACTIVE | Noted: 2025-02-11

## 2025-02-11 LAB
ANION GAP SERPL CALCULATED.3IONS-SCNC: 15 MMOL/L (ref 8–16)
BASOPHILS # BLD: 0 K/UL (ref 0–0.2)
BASOPHILS NFR BLD: 0.5 % (ref 0–1)
BUN SERPL-MCNC: 23 MG/DL (ref 8–23)
CALCIUM SERPL-MCNC: 9.6 MG/DL (ref 8.8–10.2)
CHLORIDE SERPL-SCNC: 98 MMOL/L (ref 98–107)
CO2 SERPL-SCNC: 24 MMOL/L (ref 22–29)
CREAT SERPL-MCNC: 0.9 MG/DL (ref 0.7–1.2)
CRP SERPL HS-MCNC: <0.3 MG/DL (ref 0–0.5)
ECHO AO ASC DIAM: 4 CM
ECHO AO ASCENDING AORTA INDEX: 1.9 CM/M2
ECHO AO ROOT DIAM: 2.5 CM
ECHO AO ROOT INDEX: 1.18 CM/M2
ECHO AO SINUS VALSALVA DIAM: 3.7 CM
ECHO AO SINUS VALSALVA INDEX: 1.75 CM/M2
ECHO AO ST JNCT DIAM: 3.2 CM
ECHO AV AREA PEAK VELOCITY: 2.1 CM2
ECHO AV AREA VTI: 2 CM2
ECHO AV AREA/BSA PEAK VELOCITY: 1 CM2/M2
ECHO AV AREA/BSA VTI: 0.9 CM2/M2
ECHO AV MEAN GRADIENT: 3 MMHG
ECHO AV MEAN VELOCITY: 0.9 M/S
ECHO AV PEAK GRADIENT: 4 MMHG
ECHO AV PEAK VELOCITY: 1.1 M/S
ECHO AV VELOCITY RATIO: 0.55
ECHO AV VTI: 21.1 CM
ECHO BSA: 2.13 M2
ECHO LA AREA 2C: 49.9 CM2
ECHO LA AREA 4C: 49.3 CM2
ECHO LA DIAMETER INDEX: 2.8 CM/M2
ECHO LA DIAMETER: 5.9 CM
ECHO LA MAJOR AXIS: 9.7 CM
ECHO LA MINOR AXIS: 9.6 CM
ECHO LA TO AORTIC ROOT RATIO: 2.36
ECHO LA VOL BP: 211 ML (ref 18–58)
ECHO LA VOL MOD A2C: 211 ML (ref 18–58)
ECHO LA VOL MOD A4C: 211 ML (ref 18–58)
ECHO LA VOL/BSA BIPLANE: 100 ML/M2 (ref 16–34)
ECHO LA VOLUME INDEX MOD A2C: 100 ML/M2 (ref 16–34)
ECHO LA VOLUME INDEX MOD A4C: 100 ML/M2 (ref 16–34)
ECHO LV E' LATERAL VELOCITY: 3.48 CM/S
ECHO LV E' SEPTAL VELOCITY: 5.22 CM/S
ECHO LV EDV A2C: 170 ML
ECHO LV EDV A4C: 167 ML
ECHO LV EDV INDEX A4C: 79 ML/M2
ECHO LV EDV NDEX A2C: 81 ML/M2
ECHO LV EJECTION FRACTION A2C: 35 %
ECHO LV EJECTION FRACTION A4C: 36 %
ECHO LV EJECTION FRACTION BIPLANE: 35 % (ref 55–100)
ECHO LV ESV A2C: 110 ML
ECHO LV ESV A4C: 107 ML
ECHO LV ESV INDEX A2C: 52 ML/M2
ECHO LV ESV INDEX A4C: 51 ML/M2
ECHO LV FRACTIONAL SHORTENING: 13 % (ref 28–44)
ECHO LV INTERNAL DIMENSION DIASTOLE INDEX: 2.51 CM/M2
ECHO LV INTERNAL DIMENSION DIASTOLIC: 5.3 CM (ref 4.2–5.9)
ECHO LV INTERNAL DIMENSION SYSTOLIC INDEX: 2.18 CM/M2
ECHO LV INTERNAL DIMENSION SYSTOLIC: 4.6 CM
ECHO LV ISOVOLUMETRIC RELAXATION TIME (IVRT): 183 MS
ECHO LV IVSD: 1.3 CM (ref 0.6–1)
ECHO LV MASS 2D: 286.9 G (ref 88–224)
ECHO LV MASS INDEX 2D: 136 G/M2 (ref 49–115)
ECHO LV POSTERIOR WALL DIASTOLIC: 1.3 CM (ref 0.6–1)
ECHO LV RELATIVE WALL THICKNESS RATIO: 0.49
ECHO LVOT AREA: 3.8 CM2
ECHO LVOT AV VTI INDEX: 0.53
ECHO LVOT DIAM: 2.2 CM
ECHO LVOT MEAN GRADIENT: 1 MMHG
ECHO LVOT PEAK GRADIENT: 1 MMHG
ECHO LVOT PEAK VELOCITY: 0.6 M/S
ECHO LVOT STROKE VOLUME INDEX: 20.2 ML/M2
ECHO LVOT SV: 42.6 ML
ECHO LVOT VTI: 11.2 CM
ECHO MV A VELOCITY: 0.25 M/S
ECHO MV ANNULUS DIAMETER: 2.9 CM
ECHO MV AREA VTI: 2.6 CM2
ECHO MV E DECELERATION TIME (DT): 155 MS
ECHO MV E VELOCITY: 0.75 M/S
ECHO MV E/A RATIO: 3
ECHO MV E/E' LATERAL: 21.55
ECHO MV E/E' RATIO (AVERAGED): 17.96
ECHO MV E/E' SEPTAL: 14.37
ECHO MV LVOT VTI INDEX: 1.45
ECHO MV MAX VELOCITY: 0.7 M/S
ECHO MV MEAN GRADIENT: 1 MMHG
ECHO MV MEAN VELOCITY: 0.3 M/S
ECHO MV PEAK GRADIENT: 2 MMHG
ECHO MV VTI: 16.2 CM
ECHO PULMONARY ARTERY END DIASTOLIC PRESSURE: 7 MMHG
ECHO PV REGURGITANT MAX VELOCITY: 1.3 M/S
ECHO RA AREA 4C: 28.3 CM2
ECHO RA END SYSTOLIC VOLUME APICAL 4 CHAMBER INDEX BSA: 47 ML/M2
ECHO RA MAJOR AXIS INDEX: 3.18 CM/M2
ECHO RA MAJOR AXIS: 6.7 CM
ECHO RA MINOR AXIS INDEX: 2.56 CM/M2
ECHO RA MINOR AXIS: 5.4 CM
ECHO RA VOLUME: 99 ML
ECHO RV BASAL DIMENSION: 4.8 CM
ECHO RV INTERNAL DIMENSION: 3.3 CM
ECHO RV LONGITUDINAL DIMENSION: 10.4 CM
ECHO RV MID DIMENSION: 4.6 CM
ECHO RV TAPSE: 0.9 CM (ref 1.7–?)
ECHO TV REGURGITANT MAX VELOCITY: 2.06 M/S
ECHO TV REGURGITANT PEAK GRADIENT: 17 MMHG
EOSINOPHIL # BLD: 0.1 K/UL (ref 0–0.6)
EOSINOPHIL NFR BLD: 1.5 % (ref 0–5)
ERYTHROCYTE [DISTWIDTH] IN BLOOD BY AUTOMATED COUNT: 13.7 % (ref 11.5–14.5)
ERYTHROCYTE [SEDIMENTATION RATE] IN BLOOD BY WESTERGREN METHOD: 4 MM/HR (ref 0–15)
GLUCOSE SERPL-MCNC: 113 MG/DL (ref 70–99)
HCT VFR BLD AUTO: 47.6 % (ref 42–52)
HGB BLD-MCNC: 16.2 G/DL (ref 14–18)
IMM GRANULOCYTES # BLD: 0.1 K/UL
INR PPP: 1.04 (ref 0.88–1.18)
LYMPHOCYTES # BLD: 0.9 K/UL (ref 1.1–4.5)
LYMPHOCYTES NFR BLD: 12.5 % (ref 20–40)
MCH RBC QN AUTO: 33.7 PG (ref 27–31)
MCHC RBC AUTO-ENTMCNC: 34 G/DL (ref 33–37)
MCV RBC AUTO: 99 FL (ref 80–94)
MONOCYTES # BLD: 0.7 K/UL (ref 0–0.9)
MONOCYTES NFR BLD: 9.3 % (ref 0–10)
NEUTROPHILS # BLD: 5.6 K/UL (ref 1.5–7.5)
NEUTS SEG NFR BLD: 75.4 % (ref 50–65)
PLATELET # BLD AUTO: 202 K/UL (ref 130–400)
PMV BLD AUTO: 9.2 FL (ref 9.4–12.4)
POTASSIUM SERPL-SCNC: 4.4 MMOL/L (ref 3.5–5)
PROTHROMBIN TIME: 13.3 SEC (ref 12–14.6)
RBC # BLD AUTO: 4.81 M/UL (ref 4.7–6.1)
SODIUM SERPL-SCNC: 137 MMOL/L (ref 136–145)
WBC # BLD AUTO: 7.4 K/UL (ref 4.8–10.8)

## 2025-02-11 PROCEDURE — 6360000004 HC RX CONTRAST MEDICATION: Performed by: NURSE PRACTITIONER

## 2025-02-11 PROCEDURE — 6370000000 HC RX 637 (ALT 250 FOR IP): Performed by: NURSE PRACTITIONER

## 2025-02-11 PROCEDURE — 85652 RBC SED RATE AUTOMATED: CPT

## 2025-02-11 PROCEDURE — 6370000000 HC RX 637 (ALT 250 FOR IP)

## 2025-02-11 PROCEDURE — 93306 TTE W/DOPPLER COMPLETE: CPT | Performed by: INTERNAL MEDICINE

## 2025-02-11 PROCEDURE — G0378 HOSPITAL OBSERVATION PER HR: HCPCS

## 2025-02-11 PROCEDURE — 99223 1ST HOSP IP/OBS HIGH 75: CPT | Performed by: PSYCHIATRY & NEUROLOGY

## 2025-02-11 PROCEDURE — 36415 COLL VENOUS BLD VENIPUNCTURE: CPT

## 2025-02-11 PROCEDURE — 2500000003 HC RX 250 WO HCPCS: Performed by: HOSPITALIST

## 2025-02-11 PROCEDURE — 6360000004 HC RX CONTRAST MEDICATION: Performed by: HOSPITALIST

## 2025-02-11 PROCEDURE — 70470 CT HEAD/BRAIN W/O & W/DYE: CPT

## 2025-02-11 PROCEDURE — 85610 PROTHROMBIN TIME: CPT

## 2025-02-11 PROCEDURE — C8929 TTE W OR WO FOL WCON,DOPPLER: HCPCS

## 2025-02-11 PROCEDURE — 80048 BASIC METABOLIC PNL TOTAL CA: CPT

## 2025-02-11 PROCEDURE — 86140 C-REACTIVE PROTEIN: CPT

## 2025-02-11 PROCEDURE — 85025 COMPLETE CBC W/AUTO DIFF WBC: CPT

## 2025-02-11 PROCEDURE — 6370000000 HC RX 637 (ALT 250 FOR IP): Performed by: HOSPITALIST

## 2025-02-11 RX ORDER — TRAMADOL HYDROCHLORIDE 50 MG/1
50 TABLET ORAL EVERY 8 HOURS PRN
Status: DISCONTINUED | OUTPATIENT
Start: 2025-02-11 | End: 2025-02-13 | Stop reason: HOSPADM

## 2025-02-11 RX ORDER — ASPIRIN 81 MG/1
81 TABLET ORAL DAILY
Status: DISCONTINUED | OUTPATIENT
Start: 2025-02-11 | End: 2025-02-13 | Stop reason: HOSPADM

## 2025-02-11 RX ORDER — PREGABALIN 150 MG/1
150 CAPSULE ORAL 2 TIMES DAILY
Status: DISCONTINUED | OUTPATIENT
Start: 2025-02-11 | End: 2025-02-13 | Stop reason: HOSPADM

## 2025-02-11 RX ORDER — VITAMIN B COMPLEX
1000 TABLET ORAL DAILY
COMMUNITY

## 2025-02-11 RX ORDER — VITAMIN B COMPLEX
2000 TABLET ORAL DAILY
Status: DISCONTINUED | OUTPATIENT
Start: 2025-02-11 | End: 2025-02-13 | Stop reason: HOSPADM

## 2025-02-11 RX ORDER — RANOLAZINE 500 MG/1
1000 TABLET, EXTENDED RELEASE ORAL 2 TIMES DAILY
Status: DISCONTINUED | OUTPATIENT
Start: 2025-02-11 | End: 2025-02-13 | Stop reason: HOSPADM

## 2025-02-11 RX ORDER — IOPAMIDOL 755 MG/ML
70 INJECTION, SOLUTION INTRAVASCULAR
Status: COMPLETED | OUTPATIENT
Start: 2025-02-11 | End: 2025-02-11

## 2025-02-11 RX ADMIN — ATORVASTATIN CALCIUM 80 MG: 80 TABLET, FILM COATED ORAL at 20:06

## 2025-02-11 RX ADMIN — PREGABALIN 150 MG: 75 CAPSULE ORAL at 20:06

## 2025-02-11 RX ADMIN — SULFUR HEXAFLUORIDE 2 ML: 60.7; .19; .19 INJECTION, POWDER, LYOPHILIZED, FOR SUSPENSION INTRAVENOUS; INTRAVESICAL at 07:32

## 2025-02-11 RX ADMIN — SODIUM CHLORIDE, PRESERVATIVE FREE 10 ML: 5 INJECTION INTRAVENOUS at 20:06

## 2025-02-11 RX ADMIN — EMPAGLIFLOZIN 10 MG: 10 TABLET, FILM COATED ORAL at 12:17

## 2025-02-11 RX ADMIN — IOPAMIDOL 70 ML: 755 INJECTION, SOLUTION INTRAVENOUS at 14:39

## 2025-02-11 RX ADMIN — ASPIRIN 81 MG: 81 TABLET, COATED ORAL at 12:13

## 2025-02-11 RX ADMIN — TAMSULOSIN HYDROCHLORIDE 0.4 MG: 0.4 CAPSULE ORAL at 12:14

## 2025-02-11 RX ADMIN — SODIUM CHLORIDE, PRESERVATIVE FREE 10 ML: 5 INJECTION INTRAVENOUS at 12:18

## 2025-02-11 RX ADMIN — PREGABALIN 150 MG: 75 CAPSULE ORAL at 02:23

## 2025-02-11 RX ADMIN — Medication 2000 UNITS: at 20:06

## 2025-02-11 RX ADMIN — PREGABALIN 150 MG: 75 CAPSULE ORAL at 12:13

## 2025-02-11 RX ADMIN — RANOLAZINE 1000 MG: 500 TABLET, EXTENDED RELEASE ORAL at 12:14

## 2025-02-11 RX ADMIN — FINASTERIDE 5 MG: 5 TABLET, FILM COATED ORAL at 12:14

## 2025-02-11 RX ADMIN — TRAMADOL HYDROCHLORIDE 50 MG: 50 TABLET, COATED ORAL at 20:07

## 2025-02-11 RX ADMIN — RANOLAZINE 1000 MG: 500 TABLET, EXTENDED RELEASE ORAL at 02:23

## 2025-02-11 RX ADMIN — RANOLAZINE 1000 MG: 500 TABLET, EXTENDED RELEASE ORAL at 20:06

## 2025-02-11 RX ADMIN — PANTOPRAZOLE SODIUM 40 MG: 40 TABLET, DELAYED RELEASE ORAL at 12:13

## 2025-02-11 RX ADMIN — TRAMADOL HYDROCHLORIDE 50 MG: 50 TABLET, COATED ORAL at 11:18

## 2025-02-11 ASSESSMENT — PAIN DESCRIPTION - LOCATION
LOCATION: HEAD
LOCATION: HEAD

## 2025-02-11 ASSESSMENT — PAIN DESCRIPTION - DESCRIPTORS
DESCRIPTORS: ACHING
DESCRIPTORS: ACHING

## 2025-02-11 ASSESSMENT — PAIN DESCRIPTION - PAIN TYPE: TYPE: ACUTE PAIN

## 2025-02-11 ASSESSMENT — PAIN DESCRIPTION - FREQUENCY: FREQUENCY: INTERMITTENT

## 2025-02-11 ASSESSMENT — PAIN DESCRIPTION - ORIENTATION
ORIENTATION: ANTERIOR
ORIENTATION: MID

## 2025-02-11 ASSESSMENT — PAIN SCALES - GENERAL
PAINLEVEL_OUTOF10: 7
PAINLEVEL_OUTOF10: 1
PAINLEVEL_OUTOF10: 5

## 2025-02-11 ASSESSMENT — PAIN DESCRIPTION - ONSET: ONSET: GRADUAL

## 2025-02-11 ASSESSMENT — PAIN - FUNCTIONAL ASSESSMENT: PAIN_FUNCTIONAL_ASSESSMENT: ACTIVITIES ARE NOT PREVENTED

## 2025-02-11 NOTE — ED NOTES
The patient has been placed in a gown with Armband in place.  The call light has been placed on the bed.

## 2025-02-11 NOTE — PROGRESS NOTES
[facility]      Progress Note    Patient:  Huey Alejandra  YOB: 1945  Date of Service: 2/11/2025  MRN: [id number]   Acct: [id number]   Primary Care Physician: Dominic Pimentel MD  Advance Directive: Full Code  Admit Date: 2/10/2025       Hospital Day: 0    Portions of this note have been copied forward, however, updated to reflect the most current clinical status of this patient.     CHIEF COMPLAINT altered mental status    SUBJECTIVE: Still having some difficulty with words.  Some subtle weakness more on the left than right      CUMULATIVE HOSPITAL COURSE:   Patient is 79-year-old past medical history of CAD, CHF, CKD, GERD, hypertension, anxiety, a A-fib, type 2 diabetes .  Patient's wife reports that she left her  alone for approximately 45 minutes last date when she left it was perfectly normal when she came back he was confused and having trouble with his words.  Patient reported headache initially but that resolved.  Denies recent viral illness or infection.  No chills fever nausea or vomiting.    ER eval and CTA of the head and neck revealed no stenosis or vascular acute occlusions.  CT of the head without contrast with atrophy and a cystlike change in the right which may be choroid fissure versus old infarct inferior basal ganglia lacunar infarct.  Chemistry sodium 133 potassium 4.6 BUN 25 creatinine 1.1 troponin 34 hepatic panel normal A1c 6.5 TSH 2.280 CBC unremarkable.  Today patient complains of severe headache on 1-10 scale to 6.  He cannot have an MRI so repeat CT of the head has been ordered..  Neurology has also seen patient.      Objective:   VITALS:  /60   Pulse 77   Temp 98.3 °F (36.8 °C) (Oral)   Resp 13   Ht 1.803 m (5' 11\")   Wt 90.7 kg (200 lb)   SpO2 94%   BMI 27.89 kg/m²   24HR INTAKE/OUTPUT:  No intake or output data in the 24 hours ending 02/11/25 1339        Physical Exam  Vitals reviewed.   Constitutional:       Appearance: He is

## 2025-02-11 NOTE — PROGRESS NOTES
STROKE ADMISSION    Date of Note:  2/11/2025  Time of Note:  5:01 PM    Patient Name:  Huey Alejandra  MRN:  [id number]  YOB: 1945  Age:      79 y.o.  Gender:      male    Room:  [address] [place] [address]   Adm. Date: 2/10/2025  Adm. Status:   Allergies: Morphine and Azithromycin  Code Status: Full Code    Adm. Provider: No admitting provider for patient encounter.  Neuro. Provider: Dr. Ray Sargent      Presentation(s)    ED Chief Complaint  Chief Complaint   Patient presents with    Altered Mental Status     HA, dysphagia       ED Impression  1. Stroke-like symptoms    2. TIA (transient ischemic attack)             Admission Impression  Principal Problem:    TIA (transient ischemic attack)  Active Problems:    Mixed hyperlipidemia    Depression    Type 2 diabetes mellitus without complication (HCC)    Neuropathy    Atrial fibrillation (HCC)    CAD (coronary atherosclerotic disease)    GERD (gastroesophageal reflux disease)    History of GI bleed  Resolved Problems:    * No resolved hospital problems. *                    Last Known Well Date & Time    Last Known to be Well (Stroke Diagnosis)  Date Last Known Well: 02/10/25  Time Last Known Well: 1600    Current NIHSS:  NIH Stroke Scale  NIH Stroke Scale Assessed: Yes  Interval: Hand-off/Transfer  Level of Consciousness (1a): Alert  LOC Questions (1b): Answers both correctly  LOC Commands (1c): Performs both tasks correctly  Best Gaze (2): Normal  Visual (3): No visual loss  Facial Palsy (4): Normal symmetrical movement  Motor Arm, Left (5a): Drift, but does not hit bed  Motor Arm, Right (5b): No drift  Motor Leg, Left (6a): Drift, but does not hit bed  Motor Leg, Right (6b): Drift, but does not hit bed  Limb Ataxia (7): Absent  Sensory (8): Normal (patient has neuropathy baseline)  Best Language (9): Mild to moderate aphasia  Dysarthria (10): Normal  Extinction and Inattention (11): No abnormality  Total: 4    Current GCS:  Bhavin Coma Scale  Eye Opening:

## 2025-02-11 NOTE — ED NOTES
Code Stroke Notifications    Time     Event    1910        [facility] notification to ED of Code Stroke patient.  ETA 5 minutes.  1910        Notified, Charge RN, MD,  of Code Stroke and ETA of patient.  1915        CT notified  1914        Code Stroke called by Dr. Albrecht  1928        Dr. Sargent (Neurology) notified of CS patient.

## 2025-02-11 NOTE — PROGRESS NOTES
Pt has [employer] pacer/defib- we are unable to scan any patient with a [employer] devices due to rep not being local and no way/ available to set the pacemaker into MRI mode.

## 2025-02-11 NOTE — CARE COORDINATION
Sw attempted to contact pts spouse to discuss discharge plan/needs. No answer.  Electronically signed by Kathleen Coleman on 2/11/2025 at 10:02 AM

## 2025-02-11 NOTE — H&P
[facility]      Hospitalist - History & Physical      PCP: Dominic Pimentel MD    Date of Admission: 2/10/2025    Date of Service: 2/10/2025    Chief Complaint:  Altered mental status    History Of Present Illness:   The patient is a 79 y.o. male with anxiety, atrial fibrillation, CAD, CHF, CKD, GERD, HTN, hyperlipidemia, neuropathy, T2DM comes to ED for evaluation of altered mental status and aphasia.  Patient's wife is at bedside who states that she left home around 430 and returned around 615 and found patient to be confused and incoherent.  He was trying to speak but was not making much sense.  He was able to tell her that he had a headache.  He was unable to find his phone despite the fact that it was on the table next to him.  Patient is able to hold a conversation at this time however his responses are slow and he does occasionally get words mixed up.  He does report a continuous pressure behind his eyes.  Patient has no focal deficits. Denies fever, chills, nausea, vomiting, abdominal pain, shortness of breath, and chest pain.     In ED: EKG ventricular pacing at 75 bpm; CXR with no acute cardiopulmonary findings; CTA head and neck with no cervical stenosis, no intracranial stenosis or vascular occlusion, atrophy; CT head without contrast with atrophy, cystlike change on the right which may be a choroid fissure cyst versus an old inferior basal ganglia lacunar infarct; WBC 7.2, H&H 17.6/51.5, TSH 2.28, free T41.21, troponin 34, creatinine 1.1, BUN 25, GFR 69.  Patient will be placed in observation to hospitalist with consult to neurology.    Past Medical History:        Diagnosis Date    3-vessel CAD     1990, CABG    Aneurysm of ascending aorta (HCC)     Anticoagulant long-term use     Anticoagulated     Anxiety     Aortic dilatation ([facility])     2010, 5.3 cm ascending aorta USEC scan; stable for 9 years    Asymptomatic hyperuricemia     Atrial fibrillation (HCC)     Prior AV alfred ablation, left  TWICE DAILY AS NEEDED FOR EDEMA 1/8/18   Alondra Ingram APRN   CARTIA  MG extended release capsule Take 1 capsule by mouth daily 1/8/18   Alondra Ingram APRN   carvedilol (COREG) 25 MG tablet Take 1 tablet by mouth 2 times daily 1/8/18   Alondra Ingram APRN   clopidogrel (PLAVIX) 75 MG tablet Take 1 tablet by mouth daily  Patient not taking: Reported on 2/10/2025 1/8/18   Alondra Ingram APRN   losartan (COZAAR) 50 MG tablet Take 1 tablet by mouth daily 1/8/18   Alondra Ingram APRN   nitroGLYCERIN (NITROSTAT) 0.4 MG SL tablet Place 1 tablet under the tongue every 5 minutes as needed for Chest pain up to max of 3 total doses. If no relief after 1 dose, call 911. 1/8/18   Alondra Ingram APRN   omeprazole (PRILOSEC) 40 MG delayed release capsule Take 1 capsule by mouth daily 1/8/18   Alondra Ingram APRN   potassium chloride (KLOR-CON M) 20 MEQ extended release tablet Take 1 tablet by mouth 2 times daily 1/8/18   Alondra Ingram APRN   tamsulosin (FLOMAX) 0.4 MG capsule Take 1 capsule by mouth daily 1/8/18   Alondra Ingram APRN   warfarin (COUMADIN) 7.5 MG tablet TAKE 1 TABLET BY MOUTH FOR 5 DAYS PER WEEK, THEN ON THURSDAY AND SUNDAY TAKE 1/2 TABLET DAILY. GOAL INR 1.7, 1.8, 1.9  Patient not taking: Reported on 2/10/2025 1/8/18   Alondra Ingram APRN   metolazone (ZAROXOLYN) 2.5 MG tablet Take 2.5 mg by mouth every 48 hours as needed    Provider, MD Carolyn   gabapentin (NEURONTIN) 400 MG capsule Take 1 capsule by mouth 4 times daily 8/16/17   Carrillo Faarh MD   nystatin (MYCOSTATIN) 327559 UNIT/GM powder Apply BID times daily. 7/5/17   Carrillo Farah MD       Allergies:    Morphine and Azithromycin    Social History:    The patient currently lives home.  Tobacco:   reports that he has quit smoking. He has never used smokeless tobacco.  Alcohol:   has no history on file for alcohol use.  Illicit Drugs: denies    Family History:      Problem Relation Age of Onset    Cancer Mother

## 2025-02-11 NOTE — ED PROVIDER NOTES
Potentially Unsafe Housing Conditions: none       SCREENINGS   NIH Stroke Scale  Interval: Baseline  Level of Consciousness (1a): Alert  LOC Questions (1b): Answers both correctly  LOC Commands (1c): Performs both tasks correctly  Best Gaze (2): Normal  Visual (3): No visual loss  Facial Palsy (4): Normal symmetrical movement  Motor Arm, Left (5a): No drift  Motor Arm, Right (5b): No drift  Motor Leg, Left (6a): Some effort against gravity  Motor Leg, Right (6b): Some effort against gravity  Limb Ataxia (7): Absent  Sensory (8): (!) Mild to Moderate  Best Language (9): Severe aphasia  Dysarthria (10): Mild to moderate, slurs some words  Extinction and Inattention (11): No abnormality  Total: 8Glasgow Coma Scale  Eye Opening: Spontaneous  Best Verbal Response: Oriented  Best Motor Response: Obeys commands  Bhavin Coma Scale Score: 15        PHYSICAL EXAM    (up to 7 for level 4, 8 or more for level 5)     ED Triage Vitals [02/10/25 1916]   BP Systolic BP Percentile Diastolic BP Percentile Temp Temp src Pulse Resp SpO2   -- -- -- -- -- -- -- --      Height Weight - Scale         1.803 m (5' 11\") 90.7 kg (200 lb)             Physical Exam  Vitals and nursing note reviewed.   Constitutional:       Appearance: Normal appearance. He is well-developed. He is not ill-appearing or diaphoretic.   HENT:      Head: Normocephalic and atraumatic.      Nose: Nose normal.      Mouth/Throat:      Mouth: Mucous membranes are moist.   Eyes:      Extraocular Movements: Extraocular movements intact.      Conjunctiva/sclera: Conjunctivae normal.      Pupils: Pupils are equal, round, and reactive to light.   Neck:      Trachea: No tracheal deviation.   Cardiovascular:      Rate and Rhythm: Normal rate and regular rhythm.      Heart sounds: Normal heart sounds. No murmur heard.  Pulmonary:      Breath sounds: Normal breath sounds. No wheezing or rales.   Abdominal:      Palpations: Abdomen is soft.      Tenderness: There is no    Resp: 16  15   Temp:  98.3 °F (36.8 °C)    TempSrc:  Oral    SpO2: 97%  98%   Weight: 90.7 kg (200 lb)     Height: 1.803 m (5' 11\")         MDM     Amount and/or Complexity of Data Reviewed  Clinical lab tests: ordered and reviewed  Tests in the radiology section of CPT®: ordered and reviewed  Discuss the patient with other providers: yes  Independent visualization of images, tracings, or specimens: yes      VSS, approx 1630 expressive aphasia onset, code stroke initiated, symptoms quite mild and somewhat intermittent will speak quite clearly and then return.  Given pt somewhat atypical and quite mild symptoms and edge of window for TNK he is not candidate.  Ct/cta unremarkable.  No perfusion completed due to CT scanner unable to do perfusion currently. D/w Dr. Sargent as well.  Pt and family agreeable with plan as well.  Plan for MRI and further work up.  D/w Dr. Jacobson for admission.      CONSULTS:  IP CONSULT TO NEUROLOGY  IP CONSULT TO PHARMACY    :  Unless otherwise noted below, none     Procedures    FINAL IMPRESSION      1. Stroke-like symptoms          DISPOSITION/PLAN   DISPOSITION Admitted 02/10/2025 08:24:52 PM               PATIENT REFERRED TO:  No follow-up provider specified.    DISCHARGE MEDICATIONS:  New Prescriptions    No medications on file          (Please note that portions of this note were completed with a voice recognition program.  Efforts were made to edit thedictations but occasionally words are mis-transcribed.)    SB CONLEY MD (electronically signed)Emergency Physician        Sb Conley MD  02/10/25 2037

## 2025-02-11 NOTE — PROGRESS NOTES
4 Eyes Skin Assessment     NAME:  Huey Alejandra  YOB: 1945  MEDICAL RECORD NUMBER:  [id number]    The patient is being assessed for  Admission    I agree that at least one RN has performed a thorough Head to Toe Skin Assessment on the patient. ALL assessment sites listed below have been assessed.      Areas assessed by both nurses:    Head, Face, Ears, Shoulders, Back, Chest, Arms, Elbows, Hands, Sacrum. Buttock, Coccyx, Ischium, and Legs. Feet and Heels        Does the Patient have a Wound? No noted wound(s)       Song Prevention initiated by RN: No  Wound Care Orders initiated by RN: No    Pressure Injury (Stage 3,4, Unstageable, DTI, NWPT, and Complex wounds) if present, place Wound referral order by RN under : No    New Ostomies, if present place, Ostomy referral order under : No     Nurse 1 eSignature: Electronically signed by Gladys Mota RN on 2/11/25 at 4:04 PM CST    **SHARE this note so that the co-signing nurse can place an eSignature**    Nurse 2 eSignature: Electronically signed by Obed Godoy RN on 2/11/25 at 4:12 PM CST

## 2025-02-11 NOTE — ED NOTES
Spoke to MRI regarding pt's pacemaker/defibrillator. Per MRI tech, they do not typically scan pt's with [place] [employer] pacemakers due to reps not being available and inability to change B.S. into MRI mode. Per tech, will note this in pt's chart.

## 2025-02-11 NOTE — CONSULTS
(ZAROXOLYN) 2.5 MG tablet, Take 2.5 mg by mouth every 48 hours as needed (Patient not taking: Reported on 2/10/2025), Disp: , Rfl:     gabapentin (NEURONTIN) 400 MG capsule, Take 1 capsule by mouth 4 times daily (Patient not taking: Reported on 2/10/2025), Disp: 360 capsule, Rfl: 3    nystatin (MYCOSTATIN) 093456 UNIT/GM powder, Apply BID times daily. (Patient not taking: Reported on 2/10/2025), Disp: 1 Bottle, Rfl: 2    ALLERGIES:    Morphine and Azithromycin    PHYSICAL EXAM:    Constitutional -   /75   Pulse 75   Temp 98.3 °F (36.8 °C) (Oral)   Resp 13   Ht 1.803 m (5' 11\")   Wt 90.7 kg (200 lb)   SpO2 96%   BMI 27.89 kg/m²   General appearance: No acute distress   EYES -   Conjunctiva normal  Pupillary exam as below, see CN exam in the neurologic exam  ENT-    No scars, masses, or lesions over external nose or ears  Oropharynx without erythema, palate midline  Cardiovascular -   No clubbing, cyanosis, or edema   Pulmonary-   Good expansion, normal effort without use of accessory muscles  Musculoskeletal -   No significant wasting of muscles noted  Gait as below, see gait exam in the neurologic exam  Muscle strength, tone, stability as below see the motor exam in the neurologic exam.   No bony deformities  Skin -   Warm, dry, and intact to inspection and palpation.    No rash, erythema, or pallor  Psychiatric -   Mood, affect, and behavior appear normal    Memory as below see mental status examination in the neurologic exam      NEUROLOGICAL EXAM    Mental status   [x] Awake, alert, oriented   [x] Affect attention and concentration appear appropriate  [x] Recent and remote memory appears unremarkable  [] Speech normal without dysarthria or aphasia, comprehension and repetition intact.   COMMENTS:Mild dysarthria    Cranial Nerves [x] No VF deficit to confrontation  [x] PERRLA, EOMI, no nystagmus, conjugate eye movements, no ptosis  [x] Face symmetric  [x] Facial sensation intact  [x] Tongue midline no  atrophy or fasciculations present  [x] Palate midline, hearing to finger rub normal  [x] Shoulder shrug and SCM testing normal  COMMENTS:   Motor   [x] 5/5 strength x 4 extremities  [x] Normal bulk and tone  [x] No tremor present  [x] No rigidity or bradykinesia noted  COMMENTS:   Sensory  [] Sensation intact to light touch, pin prick, vibration, and proprioception BLE  [x] Sensation intact to light touch, pin prick, vibration, and proprioception BUE  COMMENTS:4/5 LUE, 4/5 BLE   Coordination [x] FTN normal bilaterally   [] HTS normal bilaterally  [] SAVANNA normal.   COMMENTS:   Reflexes  [x] Symmetric and non-pathological  [x] Toes downgoing bilaterally  [x] No clonus present  COMMENTS:   Gait                  [] Normal steady gait    [] Ataxic    [] Spastic     [] Magnetic     [] Shuffling  [x] Not assessed  COMMENTS:       LABS/IMAGING:    As below and per HPI    Echo (TTE) complete (PRN contrast/bubble/strain/3D)    Result Date: 2/11/2025    Left Ventricle: Moderately reduced left ventricular systolic function with a visually estimated EF of 35%. Left ventricle size is normal. Mildly increased wall thickness. See diagram for wall motion findings. Grade III diastolic dysfunction with increased LAP.   Right Ventricle: Right ventricle is moderate to severely dilated. Lead present in the right ventricle. Severely reduced systolic function. TAPSE is 0.9 cm.   Aortic Valve: Trileaflet valve. Mild sclerosis of the aortic valve cusps. Mild to moderate annular calcification.   Mitral Valve: Mildly thickened leaflets.   Left Atrium: Left atrium is severely dilated.   Interatrial Septum: Agitated saline study was negative with and without provocation.   Right Atrium: Lead present in the right atrium. Right atrium is severely dilated.   Aorta: Normal sized aortic root. Mildly dilated ascending aorta. Ao ascending diameter is 4.0 cm.   Image quality is adequate. Contrast used: Lumason.     XR CHEST PORTABLE    Result Date:

## 2025-02-12 LAB
ANION GAP SERPL CALCULATED.3IONS-SCNC: 13 MMOL/L (ref 8–16)
BASOPHILS # BLD: 0.1 K/UL (ref 0–0.2)
BASOPHILS NFR BLD: 0.6 % (ref 0–1)
BUN SERPL-MCNC: 26 MG/DL (ref 8–23)
CALCIUM SERPL-MCNC: 9.6 MG/DL (ref 8.8–10.2)
CHLORIDE SERPL-SCNC: 100 MMOL/L (ref 98–107)
CO2 SERPL-SCNC: 25 MMOL/L (ref 22–29)
CREAT SERPL-MCNC: 1.1 MG/DL (ref 0.7–1.2)
EOSINOPHIL # BLD: 0.1 K/UL (ref 0–0.6)
EOSINOPHIL NFR BLD: 1.7 % (ref 0–5)
ERYTHROCYTE [DISTWIDTH] IN BLOOD BY AUTOMATED COUNT: 13.3 % (ref 11.5–14.5)
GLUCOSE SERPL-MCNC: 153 MG/DL (ref 70–99)
HCT VFR BLD AUTO: 47.3 % (ref 42–52)
HGB BLD-MCNC: 16.2 G/DL (ref 14–18)
IMM GRANULOCYTES # BLD: 0 K/UL
LYMPHOCYTES # BLD: 0.9 K/UL (ref 1.1–4.5)
LYMPHOCYTES NFR BLD: 11.2 % (ref 20–40)
MCH RBC QN AUTO: 33.8 PG (ref 27–31)
MCHC RBC AUTO-ENTMCNC: 34.2 G/DL (ref 33–37)
MCV RBC AUTO: 98.5 FL (ref 80–94)
MONOCYTES # BLD: 0.9 K/UL (ref 0–0.9)
MONOCYTES NFR BLD: 11.3 % (ref 0–10)
NEUTROPHILS # BLD: 5.8 K/UL (ref 1.5–7.5)
NEUTS SEG NFR BLD: 74.7 % (ref 50–65)
PLATELET # BLD AUTO: 179 K/UL (ref 130–400)
PMV BLD AUTO: 9.3 FL (ref 9.4–12.4)
POTASSIUM SERPL-SCNC: 4.4 MMOL/L (ref 3.5–5)
RBC # BLD AUTO: 4.8 M/UL (ref 4.7–6.1)
SODIUM SERPL-SCNC: 138 MMOL/L (ref 136–145)
WBC # BLD AUTO: 7.8 K/UL (ref 4.8–10.8)

## 2025-02-12 PROCEDURE — 80048 BASIC METABOLIC PNL TOTAL CA: CPT

## 2025-02-12 PROCEDURE — 92610 EVALUATE SWALLOWING FUNCTION: CPT

## 2025-02-12 PROCEDURE — 1200000000 HC SEMI PRIVATE

## 2025-02-12 PROCEDURE — 6370000000 HC RX 637 (ALT 250 FOR IP): Performed by: HOSPITALIST

## 2025-02-12 PROCEDURE — 95819 EEG AWAKE AND ASLEEP: CPT

## 2025-02-12 PROCEDURE — 85025 COMPLETE CBC W/AUTO DIFF WBC: CPT

## 2025-02-12 PROCEDURE — 6370000000 HC RX 637 (ALT 250 FOR IP)

## 2025-02-12 PROCEDURE — 95819 EEG AWAKE AND ASLEEP: CPT | Performed by: PSYCHIATRY & NEUROLOGY

## 2025-02-12 PROCEDURE — 97530 THERAPEUTIC ACTIVITIES: CPT

## 2025-02-12 PROCEDURE — 6370000000 HC RX 637 (ALT 250 FOR IP): Performed by: NURSE PRACTITIONER

## 2025-02-12 PROCEDURE — 99232 SBSQ HOSP IP/OBS MODERATE 35: CPT | Performed by: PSYCHIATRY & NEUROLOGY

## 2025-02-12 PROCEDURE — 94760 N-INVAS EAR/PLS OXIMETRY 1: CPT

## 2025-02-12 PROCEDURE — 97161 PT EVAL LOW COMPLEX 20 MIN: CPT

## 2025-02-12 PROCEDURE — 36415 COLL VENOUS BLD VENIPUNCTURE: CPT

## 2025-02-12 PROCEDURE — 92522 EVALUATE SPEECH PRODUCTION: CPT

## 2025-02-12 PROCEDURE — 2500000003 HC RX 250 WO HCPCS: Performed by: HOSPITALIST

## 2025-02-12 PROCEDURE — 97165 OT EVAL LOW COMPLEX 30 MIN: CPT

## 2025-02-12 RX ADMIN — PREGABALIN 150 MG: 75 CAPSULE ORAL at 21:20

## 2025-02-12 RX ADMIN — PANTOPRAZOLE SODIUM 40 MG: 40 TABLET, DELAYED RELEASE ORAL at 07:59

## 2025-02-12 RX ADMIN — Medication 2000 UNITS: at 07:59

## 2025-02-12 RX ADMIN — FINASTERIDE 5 MG: 5 TABLET, FILM COATED ORAL at 07:59

## 2025-02-12 RX ADMIN — RANOLAZINE 1000 MG: 500 TABLET, EXTENDED RELEASE ORAL at 21:20

## 2025-02-12 RX ADMIN — ASPIRIN 81 MG: 81 TABLET, COATED ORAL at 07:59

## 2025-02-12 RX ADMIN — ATORVASTATIN CALCIUM 80 MG: 80 TABLET, FILM COATED ORAL at 21:20

## 2025-02-12 RX ADMIN — EMPAGLIFLOZIN 10 MG: 10 TABLET, FILM COATED ORAL at 07:59

## 2025-02-12 RX ADMIN — SODIUM CHLORIDE, PRESERVATIVE FREE 10 ML: 5 INJECTION INTRAVENOUS at 21:21

## 2025-02-12 RX ADMIN — TAMSULOSIN HYDROCHLORIDE 0.4 MG: 0.4 CAPSULE ORAL at 07:59

## 2025-02-12 RX ADMIN — PREGABALIN 150 MG: 75 CAPSULE ORAL at 09:39

## 2025-02-12 RX ADMIN — RANOLAZINE 1000 MG: 500 TABLET, EXTENDED RELEASE ORAL at 07:59

## 2025-02-12 RX ADMIN — SODIUM CHLORIDE, PRESERVATIVE FREE 10 ML: 5 INJECTION INTRAVENOUS at 08:01

## 2025-02-12 RX ADMIN — TRAMADOL HYDROCHLORIDE 50 MG: 50 TABLET, COATED ORAL at 21:21

## 2025-02-12 ASSESSMENT — PAIN DESCRIPTION - ONSET: ONSET: ON-GOING

## 2025-02-12 ASSESSMENT — PAIN - FUNCTIONAL ASSESSMENT: PAIN_FUNCTIONAL_ASSESSMENT: ACTIVITIES ARE NOT PREVENTED

## 2025-02-12 ASSESSMENT — PAIN DESCRIPTION - FREQUENCY: FREQUENCY: CONTINUOUS

## 2025-02-12 ASSESSMENT — PAIN SCALES - GENERAL
PAINLEVEL_OUTOF10: 1
PAINLEVEL_OUTOF10: 5

## 2025-02-12 ASSESSMENT — PAIN DESCRIPTION - LOCATION: LOCATION: BACK

## 2025-02-12 ASSESSMENT — PAIN DESCRIPTION - DESCRIPTORS: DESCRIPTORS: DISCOMFORT

## 2025-02-12 ASSESSMENT — PAIN DESCRIPTION - PAIN TYPE: TYPE: CHRONIC PAIN

## 2025-02-12 ASSESSMENT — PAIN DESCRIPTION - ORIENTATION: ORIENTATION: LOWER

## 2025-02-12 NOTE — PROGRESS NOTES
Facility/Department: [address] SERVICES   CLINICAL BEDSIDE SWALLOW EVALUATION and SPEECH LANGUAGE COGNITIVE EVALUATION    NAME: Huey Alejandra  : 1945  MRN: [id number]  ADMISSION DATE: 2/10/2025  Date of Eval: 2025  Evaluating Therapist: Rea Prater MS CCC-SLP    ADMITTING DIAGNOSIS:   has Severe left ventricular systolic dysfunction; Anticoagulated; Prediabetes; Mixed hyperlipidemia; Depression; Vitamin D deficiency; Sick sinus syndrome (HCC); TIA (transient ischemic attack); Type 2 diabetes mellitus without complication (HCC); Neuropathy; Atrial fibrillation (HCC); CAD (coronary atherosclerotic disease); GERD (gastroesophageal reflux disease); and History of GI bleed on their problem list.            Recent Chest Xray/CT of Chest 2/10/25:     FINDINGS:  Pacing device is present on the left.     Cardiac silhouette is normal.     No organized infiltrate/consolidation.     No visible effusion or pneumothorax.     No acute osseous abnormality.     IMPRESSION:     1. No acute findings.  ___________________________________   Electronically signed by: MARGARITA MATTHEWS M.D.  Date:     02/10/2025  Time:    20:54     CT/Head 25:    --------------------------------  IMPRESSION:  No acute intracranial abnormality.     ---------------------------      All CT scans are performed using dose optimization techniques as appropriate to the performed exam and include   at least one of the following: Automated exposure control, adjustment of the mA and/or kV according to size, and the use of iterative reconstruction technique.      ______________________________________   Electronically signed by: AKUA NIETO M.D.  Date:     2025  Time:    15:05              Current Diet level:  Current Diet : Regular  Current Liquid Diet : Thin      Pain:  Pain Assessment  Pain Assessment: 0-10  Pain Level: 1  Patient's Stated Pain Goal: 0 - No pain  Pain Location: Head  Pain Orientation: Anterior  Pain Descriptors:  slowly;Upright as possible for all oral intake;Remain upright for 30-45 minutes after meals;Small bites/sips;Check for pocketing of food on the Left    Treatment/Goals  The patient will tolerate a regular diet with thin liquids with minimal overt s/s of aspiration     The patient will participate in swallowing reassessments to ensure safety with oral intake    Patient/staff/caregivers will demonstrate awareness of swallowing precautions    Patient/staff/caregivers will complete daily oral hygiene to prevent aspiration of oral bacteria    Patient will complete a full speech language cognitive evaluation    Patient will complete pharyngeal exercises breath support, and oral motor exercises with minimal cues      General  Chart Reviewed: Yes  Behavior/Cognition: Alert;Cooperative  Temperature Spikes Noted: No  Respiratory Status: Room air  O2 Device: None (Room air)  Communication Observation: Aphasia  Follows Directions: Simple  Dentition: Adequate  Patient Positioning: Upright in chair  Baseline Vocal Quality: Hoarse (Pt reports hoarse vocal quality for three months)  Volitional Cough: Strong  Prior Dysphagia History: He reports dysphagia onset one month ago. He noticed food would become lodged in the pharynx. He states liquid wash was effective in clearing the food from his pharynx. No hx of MBSS was located in the electronic medical record      The patient states he has been having difficulty getting his words out. He feels like he is rambling at times. He reports some memory deficits for years. He now exhibits anomia and hesitations. He was able to describe the onset of his symptoms. He stated he could not find the buttons on the microwave at home. He also could not locate his cell phone at home even though it was next to him. He states when his wife came home he was not making sense and she called the ambulance.     Oral Motor Deficits  Labial: Left droop  Dentition: Natural (He owns an upper partial, but this

## 2025-02-12 NOTE — PROGRESS NOTES
Evaluation completed. Recommend continue a regular diet with thin liquids. Recommend small sips (no gulping). Meds whole in applesauce or pudding.   Full report to follow.    Electronically Signed By:  Rea Prater M.S., CCC-SLP  2/12/2025,9:08 AM.

## 2025-02-12 NOTE — PROGRESS NOTES
Physical Therapy  PT attempt 2/12 afternoon. Pt politely declined. Stating he would be willing to try at a later time.     Electronically signed by JOSELITO Garrett on 2/12/2025 at 3:35 PM

## 2025-02-12 NOTE — PROGRESS NOTES
[facility] Neurology Progress Note      Patient:   Huey Alejandra  MR#:    [id number]   Room:    [address] [place] [address]   YOB: 1945  Date of Progress Note: 2/12/2025  Time of Note                           12:46 PM  Consulting Physician:  Pratik Sargent DO  Attending Physician:  June Lugo MD      INTERVAL HISTORY:  No acute events, headache improved, speech improving.     REVIEW OF SYSTEMS:  Constitutional: No fevers   Neck:No stiffness  Respiratory: No shortness of breath  Cardiovascular: No chest pain   Gastrointestinal: No abdominal pain    Genitourinary: No Dysuria  Neurological: No confusion     PHYSICAL EXAM:    Constitutional -   BP (!) 171/91   Pulse 75   Temp (!) 96.4 °F (35.8 °C) (Temporal)   Resp 20   Ht 1.803 m (5' 11\")   Wt 90.7 kg (200 lb)   SpO2 97%   BMI 27.89 kg/m²   General appearance: No acute distress   EYES -   Conjunctiva normal  Pupillary exam as below, see CN exam in the neurologic exam  ENT-    No scars, masses, or lesions over external nose or ears  Oropharynx without erythema, palate midline  Cardiovascular -   No clubbing, cyanosis, or edema   Pulmonary-   Good expansion, normal effort without use of accessory muscles  Musculoskeletal -   No significant wasting of muscles noted  Gait as below, see gait exam in the neurologic exam  Muscle strength, tone, stability as below see the motor exam in the neurologic exam.   No bony deformities  Skin -   Warm, dry, and intact to inspection and palpation.    No rash, erythema, or pallor  Psychiatric -   Mood, affect, and behavior appear normal    Memory as below see mental status examination in the neurologic exam        NEUROLOGICAL EXAM     Mental status    [x] Awake, alert, oriented   [x] Affect attention and concentration appear appropriate  [x] Recent and remote memory appears unremarkable  [] Speech normal without dysarthria or aphasia, comprehension and repetition intact.   COMMENTS:Mild dysarthria    Cranial Nerves

## 2025-02-12 NOTE — PROGRESS NOTES
Occupational Therapy  Facility/Department: [facility] SURG SERVICES  Occupational Therapy Initial Assessment    Name: Huey Alejandra  : 1945  MRN: [id number]  Date of Service: 2025    Discharge Recommendations:  Patient would benefit from continued therapy after discharge          Patient Diagnosis(es): The primary encounter diagnosis was Stroke-like symptoms. A diagnosis of TIA (transient ischemic attack) was also pertinent to this visit.  Past Medical History:  has a past medical history of 3-vessel CAD, Aneurysm of ascending aorta (HCC), Anticoagulant long-term use, Anticoagulated, Anxiety, Aortic dilatation (HCC), Asymptomatic hyperuricemia, Atrial fibrillation (HCC), BPH (benign prostatic hyperplasia), CAD (coronary atherosclerotic disease), CHF (congestive heart failure) (HCC), Chronic kidney disease, stage 2, mildly decreased GFR, Chronic pain syndrome, Colon polyps, Depression, Diverticulosis, Erectile dysfunction, GERD (gastroesophageal reflux disease), GERD (gastroesophageal reflux disease), History of renal cell cancer, Hypertension, essential, Insomnia, Long term current use of anticoagulant therapy, Mixed hyperlipidemia, Neuropathy, Obesity, Prediabetes, Recurrent major depressive disorder (HCC), Severe left ventricular systolic dysfunction, Severe left ventricular systolic dysfunction, Sick sinus syndrome (HCC), SSS (sick sinus syndrome) ([facility]), Type 2 diabetes mellitus without complication (HCC), Vitamin D deficiency, and Vitamin D deficiency.  Past Surgical History:  has a past surgical history that includes Cholecystectomy; hernia repair; Knee Arthroplasty; total nephrectomy (Right); Colonoscopy (2013); Total knee arthroplasty; and Coronary artery bypass graft ().    Treatment Diagnosis: TIA      Assessment  Assessment: Evaluation completed and treatment initiated. Pt would benefit from further skilled therapy to upgrade safety and independence for participation in functional

## 2025-02-12 NOTE — CARE COORDINATION
Functional Level Independent in ADLs/IADLs   Current Functional Level Assistance with the following:;Mobility   Can patient return to prior living arrangement Yes   Ability to make needs known: Good   Family able to assist with home care needs: Yes   Would you like for me to discuss the discharge plan with any other family members/significant others, and if so, who? Yes  (spouse)   Financial Resources Medicare;Other (Comment)  ([employer] insurance)   Community Resources None   CM/SW Referral Other (see comment)  (none)   Social/Functional History   Lives With Spouse   Type of Home House   Home Layout Multi-level;Able to Live on Main level with bedroom/bathroom   Home Access Stairs to enter with rails   Entrance Stairs - Number of Steps 4   Entrance Stairs - Rails Right   Bathroom Shower/Tub Walk-in shower   Bathroom Toilet Standard   Bathroom Equipment Grab bars in shower;Built-in shower seat   Bathroom Accessibility Accessible   Home Equipment Cane;Rollator   Receives Help From Family   Prior Level of Assist for ADLs Independent   Prior Level of Assist for Homemaking Independent   Homemaking Responsibilities No   Ambulation Assistance Independent   Prior Level of Assist for Transfers Independent   Active  Yes   Mode of Transportation Car;Truck   Occupation Retired   Type of Occupation [employer]   Discharge Planning   Type of Residence House   Living Arrangements Spouse/Significant Other   Current Services Prior To Admission None   Potential Assistance Needed N/A   DME Ordered? No   Potential Assistance Purchasing Medications No   Type of Home Care Services None   Patient expects to be discharged to: House   Follow Up Appointment: Best Day/Time  Tuesday AM   One/Two Story Residence Two story, live on first floor   # of Interior Steps 13   Interior Rails Right   Lift Chair Available No   History of falls? 0   Services At/After Discharge   Transition of Care Consult (CM Consult) Home Health   Services At/After Discharge  Home Health    Resource Information Provided? No   Mode of Transport at Discharge Self   Confirm Follow Up Transport Family   Electronically signed by Geraldo Bonner RN, BSN on 2/12/2025 at 11:12 AM

## 2025-02-12 NOTE — PROCEDURES
ADULT INPATIENT ELECTROENCEPHALOGRAM REPORT    Patient:   Huey Alejandra  MR#:    [id number]  Room #:    INPATIENT  YOB: 1945  Date of Evaluation:  2/12/2025  Primary Physician:     Dominic Pimentel MD   Referring Physician:   Pratik Sargent DO      CLINICAL INFORMATION:     This patient is a 79 y.o. male with a history of speech difficulty.     MEDICATIONS:     See MAR.    RECORDING CONDITIONS:     This EEG was performed utilizing standard International 10-20 System of electrode placement, with additional channels monitored for eye movement. One channel electrocardiogram was monitored. Data was obtained, stored, and interpreted according to ACNS guidelines (J Clin Neurophysiol 2006;23(2):) utilizing referential montage recording, with reformatting to longitudinal, transverse bipolar, and referential montages as necessary for interpretation, along with the digital/automated EEG analysis. Patient tolerated entire procedure well. Photic stimulation and hyperventilation were utilized as activation procedures unless otherwise specified below.     E.E.G. DESCRIPTION:     The resting predominant posterior background frequency is a 8-9 Hz 30-40 uV rhythm.  No overt focal, lateralizing, or paroxysmal abnormalities were noted through the recording. Drowsiness was demonstrated by slow rolling eye movements followed by a loss of the background waking activities. Onset of stage I sleep was demonstrated by gradual disappearance of background waking rhythms with gradual symmetric mixed frequency 4-7 Hz slowing.  Stage II sleep was characterized by vertex transients, sleep-spindles, and K-complexes, at times with shifting asymmetry demonstrated. Hyperventilation was not performed. Photic stimulation was performed and had little change on the recording. Muscle, motion, electrode, and eye movement artifacts were noted.       EEG INTERPRETATION:    Normal EEG for age in the awake, drowsy, and sleep states.

## 2025-02-12 NOTE — PLAN OF CARE
SUBJECTIVE:    EP:  Normotensive  Intermittent aphasia, staretd ~4 or 4:30  Dr Sargent of Neurology has already been called  CT negative  CTA pending      OBJECTIVE:    /88   Pulse 75   Temp 98.3 °F (36.8 °C) (Oral)   Resp 15   Ht 1.803 m (5' 11\")   Wt 90.7 kg (200 lb)   SpO2 98%   BMI 27.89 kg/m²       ASSESSMENTS & PLANS:    Recurrent TIAs:  \"Place to OBServation\" to medical-neuro lynn under hospitalist team  consult neuro in AM  PT/OT/SpeechTherapy as per protocol  NPO until/unless passes RN swallow screen, then Cardiac Diet with diabetic modifier for now  HbA1c, TSH, Lipid Panel all to be added on  Fall Precuations  Bed Alarm  ASA as per protocol (81ASA daily PO, if unable to swallow then Rectal ASA Supp)  Statin as per protocol (Maximally dosed, Lipitor 80mg PO QHS)  Defer on MRI to Neuro  Labetalol PRN for now, will allow permissive HTN - holding all home antihypertensives  TTE in AM to look for thromboembolic causes  Carotid US to look for stenotic disease NOT indicated as CTA is PENDING to be done    Chronic Medical Problems:  Continue home regimen as indicated  Any puffers will be subbed to nebulizers as able  Any oral antihyperglycemics will be subbed to an insulin regimen with POCT scheduled & PRN as well as provision of an antihypoglycemic orders set for safety    Supportive and Prophylactic Txx:  DVT PPx: is on Warfarin as per home regimen  GI (PUD) PPx: not indicated  PT: as per above      Case d/w EP in detail  Chart reviewed   Orders entered by me with CPOE  Case to be d/w NP swing shift hospitlaist in detail  
  Problem: Skin/Tissue Integrity - Adult  Goal: Skin integrity remains intact  Description: 1.  Monitor for areas of redness and/or skin breakdown  2.  Assess vascular access sites hourly  3.  Every 4-6 hours minimum:  Change oxygen saturation probe site  4.  Every 4-6 hours:  If on nasal continuous positive airway pressure, respiratory therapy assess nares and determine need for appliance change or resting period  2/12/2025 1541 by Nelli Mantilla LPN  Outcome: Progressing  2/12/2025 0621 by Isabella Nicole RN  Outcome: Progressing  Flowsheets (Taken 2/11/2025 2006)  Skin Integrity Remains Intact: Monitor for areas of redness and/or skin breakdown     Problem: Musculoskeletal - Adult  Goal: Return mobility to safest level of function  2/12/2025 1541 by Nelli Mantilla LPN  Outcome: Progressing  Flowsheets (Taken 2/12/2025 0804)  Return Mobility to Safest Level of Function:   Assess patient stability and activity tolerance for standing, transferring and ambulating with or without assistive devices   Assist with transfers and ambulation using safe patient handling equipment as needed   Ensure adequate protection for wounds/incisions during mobilization   Obtain physical therapy/occupational therapy consults as needed   Apply continuous passive motion per provider or physical therapy orders to increase flexion toward goal   Instruct patient/family in ordered activity level  2/12/2025 0621 by Isabella Nicole RN  Outcome: Progressing  Flowsheets (Taken 2/11/2025 2006)  Return Mobility to Safest Level of Function:   Assess patient stability and activity tolerance for standing, transferring and ambulating with or without assistive devices   Assist with transfers and ambulation using safe patient handling equipment as needed  Goal: Return ADL status to a safe level of function  2/12/2025 1541 by Nelli Mantilla LPN  Outcome: Progressing  Flowsheets (Taken 2/12/2025 0804)  Return ADL Status to a Safe

## 2025-02-12 NOTE — CARE COORDINATION
IMM Letter given to patient. Reviewed, discussed, answered questions, and signed by patient. Signed copy placed in patient's chart.     02/12/25 1221   IMM Letter   IMM Letter given to Patient/Family/Significant other/Guardian/POA/by: given to patient/spouse by KARO BONNER RN BSN    IMM Letter date given: 02/12/25   IMM Letter time given: 1215     Electronically signed by Karo Bonner RN, BSN on 2/12/2025 at 12:22 PM

## 2025-02-12 NOTE — PROGRESS NOTES
[facility]      [facility] Note    Patient:  Huey Alejandra  YOB: 1945  Date of Service: 2/12/2025  MRN: [id number]   Acct: [id number]   Primary Care Physician: Dominic Pimentel MD  Advance Directive: Full Code  Admit Date: 2/10/2025       Hospital Day: 0    Portions of this note have been copied forward, however, updated to reflect the most current clinical status of this patient.     CHIEF COMPLAINT altered mental status    SUBJECTIVE: Still having some difficulty with words.  Patient had an episode of weakness getting up to the bathroom      CUMULATIVE HOSPITAL COURSE:   Patient is 79-year-old past medical history of CAD, CHF, CKD, GERD, hypertension, anxiety, a A-fib, type 2 diabetes .  Patient's wife reports that she left her  alone for approximately 45 minutes last date when she left it was perfectly normal when she came back he was confused and having trouble with his words.  Patient reported headache initially but that resolved.  Denies recent viral illness or infection.  No chills fever nausea or vomiting.    ER eval and CTA of the head and neck revealed no stenosis or vascular acute occlusions.  CT of the head without contrast with atrophy and a cystlike change in the right which may be choroid fissure versus old infarct inferior basal ganglia lacunar infarct.  Chemistry sodium 133 potassium 4.6 BUN 25 creatinine 1.1 troponin 34 hepatic panel normal A1c 6.5 TSH 2.280 CBC unremarkable.  Today patient complains of severe headache on 1-10 scale to 6.  He cannot have an MRI so repeat CT of the head has been ordered..  Neurology has also seen patient.    Repeat CT negative.  EEG negative.  Continue with PT OT  Objective:   VITALS:  BP (!) 171/91   Pulse 75   Temp (!) 96.4 °F (35.8 °C) (Temporal)   Resp 20   Ht 1.803 m (5' 11\")   Wt 90.7 kg (200 lb)   SpO2 97%   BMI 27.89 kg/m²   24HR INTAKE/OUTPUT:    Intake/Output Summary (Last 24 hours) at 2/12/2025 9318  Last data filed

## 2025-02-12 NOTE — PROGRESS NOTES
Physical Therapy Initial Assessment  Facility/Department: [facility] SURG SERVICES      Name: Huey Alejandra  : 1945  MRN: [id number]  Date of Service: 2025    Discharge Recommendations:  Continue to assess pending progress, Patient would benefit from continued therapy after discharge          Patient Diagnosis(es): The primary encounter diagnosis was Stroke-like symptoms. A diagnosis of TIA (transient ischemic attack) was also pertinent to this visit.  Past Medical History:  has a past medical history of 3-vessel CAD, Aneurysm of ascending aorta (HCC), Anticoagulant long-term use, Anticoagulated, Anxiety, Aortic dilatation (HCC), Asymptomatic hyperuricemia, Atrial fibrillation (HCC), BPH (benign prostatic hyperplasia), CAD (coronary atherosclerotic disease), CHF (congestive heart failure) ([facility]), Chronic kidney disease, stage 2, mildly decreased GFR, Chronic pain syndrome, Colon polyps, Depression, Diverticulosis, Erectile dysfunction, GERD (gastroesophageal reflux disease), GERD (gastroesophageal reflux disease), History of renal cell cancer, Hypertension, essential, Insomnia, Long term current use of anticoagulant therapy, Mixed hyperlipidemia, Neuropathy, Obesity, Prediabetes, Recurrent major depressive disorder ([facility]), Severe left ventricular systolic dysfunction, Severe left ventricular systolic dysfunction, Sick sinus syndrome (HCC), SSS (sick sinus syndrome) ([facility]), Type 2 diabetes mellitus without complication ([facility]), Vitamin D deficiency, and Vitamin D deficiency.  Past Surgical History:  has a past surgical history that includes Cholecystectomy; hernia repair; Knee Arthroplasty; total nephrectomy (Right); Colonoscopy (2013); Total knee arthroplasty; and Coronary artery bypass graft ().    Assessment  Body Structures, Functions, Activity Limitations Requiring Skilled Therapeutic Intervention: Decreased sensation;Decreased functional mobility ;Decreased ROM;Decreased safe awareness  Assessment:

## 2025-02-13 VITALS
OXYGEN SATURATION: 96 % | RESPIRATION RATE: 20 BRPM | HEIGHT: 71 IN | WEIGHT: 200 LBS | DIASTOLIC BLOOD PRESSURE: 67 MMHG | HEART RATE: 85 BPM | BODY MASS INDEX: 28 KG/M2 | TEMPERATURE: 96.6 F | SYSTOLIC BLOOD PRESSURE: 116 MMHG

## 2025-02-13 LAB
ANION GAP SERPL CALCULATED.3IONS-SCNC: 12 MMOL/L (ref 8–16)
BASOPHILS # BLD: 0.1 K/UL (ref 0–0.2)
BASOPHILS NFR BLD: 0.7 % (ref 0–1)
BUN SERPL-MCNC: 23 MG/DL (ref 8–23)
CALCIUM SERPL-MCNC: 9.7 MG/DL (ref 8.8–10.2)
CHLORIDE SERPL-SCNC: 103 MMOL/L (ref 98–107)
CO2 SERPL-SCNC: 24 MMOL/L (ref 22–29)
CREAT SERPL-MCNC: 1 MG/DL (ref 0.7–1.2)
EOSINOPHIL # BLD: 0.1 K/UL (ref 0–0.6)
EOSINOPHIL NFR BLD: 1.8 % (ref 0–5)
ERYTHROCYTE [DISTWIDTH] IN BLOOD BY AUTOMATED COUNT: 13.2 % (ref 11.5–14.5)
GLUCOSE SERPL-MCNC: 140 MG/DL (ref 70–99)
HCT VFR BLD AUTO: 47.2 % (ref 42–52)
HGB BLD-MCNC: 16 G/DL (ref 14–18)
IMM GRANULOCYTES # BLD: 0.1 K/UL
LYMPHOCYTES # BLD: 0.9 K/UL (ref 1.1–4.5)
LYMPHOCYTES NFR BLD: 12.1 % (ref 20–40)
MCH RBC QN AUTO: 33.3 PG (ref 27–31)
MCHC RBC AUTO-ENTMCNC: 33.9 G/DL (ref 33–37)
MCV RBC AUTO: 98.3 FL (ref 80–94)
MONOCYTES # BLD: 0.8 K/UL (ref 0–0.9)
MONOCYTES NFR BLD: 11.2 % (ref 0–10)
NEUTROPHILS # BLD: 5.2 K/UL (ref 1.5–7.5)
NEUTS SEG NFR BLD: 73.5 % (ref 50–65)
PLATELET # BLD AUTO: 183 K/UL (ref 130–400)
PMV BLD AUTO: 9 FL (ref 9.4–12.4)
POTASSIUM SERPL-SCNC: 4.3 MMOL/L (ref 3.5–5)
RBC # BLD AUTO: 4.8 M/UL (ref 4.7–6.1)
SODIUM SERPL-SCNC: 139 MMOL/L (ref 136–145)
WBC # BLD AUTO: 7.1 K/UL (ref 4.8–10.8)

## 2025-02-13 PROCEDURE — 36415 COLL VENOUS BLD VENIPUNCTURE: CPT

## 2025-02-13 PROCEDURE — 97530 THERAPEUTIC ACTIVITIES: CPT

## 2025-02-13 PROCEDURE — 97116 GAIT TRAINING THERAPY: CPT

## 2025-02-13 PROCEDURE — 99232 SBSQ HOSP IP/OBS MODERATE 35: CPT | Performed by: PSYCHIATRY & NEUROLOGY

## 2025-02-13 PROCEDURE — 6370000000 HC RX 637 (ALT 250 FOR IP)

## 2025-02-13 PROCEDURE — 6370000000 HC RX 637 (ALT 250 FOR IP): Performed by: HOSPITALIST

## 2025-02-13 PROCEDURE — 2500000003 HC RX 250 WO HCPCS: Performed by: HOSPITALIST

## 2025-02-13 PROCEDURE — 85025 COMPLETE CBC W/AUTO DIFF WBC: CPT

## 2025-02-13 PROCEDURE — 80048 BASIC METABOLIC PNL TOTAL CA: CPT

## 2025-02-13 RX ORDER — ATORVASTATIN CALCIUM 80 MG/1
80 TABLET, FILM COATED ORAL NIGHTLY
Qty: 30 TABLET | Refills: 3 | Status: SHIPPED | OUTPATIENT
Start: 2025-02-13

## 2025-02-13 RX ADMIN — PREGABALIN 150 MG: 75 CAPSULE ORAL at 10:39

## 2025-02-13 RX ADMIN — TAMSULOSIN HYDROCHLORIDE 0.4 MG: 0.4 CAPSULE ORAL at 07:42

## 2025-02-13 RX ADMIN — Medication 2000 UNITS: at 07:43

## 2025-02-13 RX ADMIN — PANTOPRAZOLE SODIUM 40 MG: 40 TABLET, DELAYED RELEASE ORAL at 05:53

## 2025-02-13 RX ADMIN — RANOLAZINE 1000 MG: 500 TABLET, EXTENDED RELEASE ORAL at 07:43

## 2025-02-13 RX ADMIN — EMPAGLIFLOZIN 10 MG: 10 TABLET, FILM COATED ORAL at 07:43

## 2025-02-13 RX ADMIN — ASPIRIN 81 MG: 81 TABLET, COATED ORAL at 07:43

## 2025-02-13 RX ADMIN — SODIUM CHLORIDE, PRESERVATIVE FREE 10 ML: 5 INJECTION INTRAVENOUS at 07:43

## 2025-02-13 RX ADMIN — FINASTERIDE 5 MG: 5 TABLET, FILM COATED ORAL at 07:42

## 2025-02-13 NOTE — DISCHARGE INSTRUCTIONS
You have not taken diuretics and betapace while admitted.  Your b/p does not need to be low.  Do not resume these meds until you see your PCP .    Weigh daily  If your weight changes 3 lbs in 24 hours or if you notice swelling resume the aldactone first  Home health with be doing PT/OT and speech with you   FU with neurology

## 2025-02-13 NOTE — CARE COORDINATION
Patient has [facility] from [facility]. I called and gave information that patient will need PT/OT/SLP when he goes home. She states understanding.  [facility]  Ph # [phone]  Fax # [phone]  Electronically signed by Geraldo Bonner RN, BSN on 2/13/2025 at 10:36 AM

## 2025-02-13 NOTE — CARE COORDINATION
Sarkis [place] #[id number] (CSN:[id number]) (:1945 79 y.o. M) (Adm: 02/10/25)  [id number]  PCP    JANEEN CAR  Demographics  CommentAddress   [address] [facility] [address]    Home Phone   [phone]    Work Phone       Mobile Phone   [phone]             Social Security Number       Insurance Information   MEDICARE    Marital Status       Episcopal   Roman [employer]               Insurance Payors as of 2025    MEDICARE    Plan: MEDICARE PART A AND B Member: [id number] Effective from: 2017   Subscriber: SARKIS B[place]MINGH[place] Subscriber ID: [id number] Guarantor: SARKIS B[place]     CIGNA    Plan: CIGNA MEDICARE SUPP Group: PLAN F Member: [id number]   Effective from: 2018 Subscriber: SARKIS BIRMINGH[place] Subscriber ID: [id number]   Guarantor: SARKIS B[place]     Sessions DEPARTMENT [employer] OWCP    Plan: Sessions DEPARTMENT [employer] OWCP Group: [id number] Member: DRWDPROD   Effective from: 2/10/2025 Subscriber: SARKIS BIRMING[place] Subscriber ID: [id number]   Guarantor: SARKIS B[place]     Documents Filed to Patient    Power of  Living Will Clinical Unknown Study Attachment Consent Form ABN Waiver After Visit Summary Lab Result Scan Code Status MyChart Status Advance Care Planning    Not on File  Not on File  Not on File  Not on File  Filed  Not on File  Filed  Filed  FULL [Updated on 02/10/25 2033]  Pending Jump to the Activity      Auth/Cert Information    Open auth/cert linked to hospital account [id number]      Emergency Contacts    Name Relation Home Work Mobile   SUMANTH AYALA Spouse [phone]     Gideon [place] Child [phone]     JETHRO BIRMINGHAM Grandchild [phone]       Other Contacts    None on File    Admission Information    Current Information    Attending Provider Admitting Provider Admission Type Admission Status   June Lugo MD Omorodion, Nancy, MD Emergency Confirmed Admission          Admission Date/Time Discharge Date [facility]    Relationship Account Type Home Phone   SARKIS BIRMING[place] E - [address]* [address] / [place] [address] Self P/F [phone]   Employer   Work Phone   RETIRED                  Insurance & Policy Medeiros Information         Primary Insurance:  Insurance/Subscriber ID:  [id number]  Subscriber Name:  SARKIS BIRMINGHAM              Relationship to Patient: Self     Secondary Insurance:  Insurance/Subscriber ID: [id number]  Subscriber Name: SARKIS BIRMINGHAM  Relationship to Patient: Self     Signed ABN: N       Payor Name:  MEDICARE   Plan:  MEDICARE PART A AND B   Group:         Payor Name: CIGNA  Plan:  CIGNA MEDICARE SUPP  Group: PLAN F   Worker's Comp Date of Injury:

## 2025-02-13 NOTE — DISCHARGE INSTR - DIET

## 2025-02-13 NOTE — PROGRESS NOTES
Physical Therapy  Name: Huey Alejandra  MRN:  [id number]  Date of service:  2/13/2025 02/13/25 0838   Subjective   Subjective I alway have some trouble when I first get up in the morning.   Pain Assessment   Pain Assessment None - Denies Pain   Oxygen Therapy   O2 Device None (Room air)   Transfers   Sit to Stand Contact guard assistance;Stand by assistance   Stand to Sit Contact guard assistance;Stand by assistance   Ambulation   Surface Level tile   Device No Device   Assistance Contact guard assistance;Stand by assistance   Distance 250 feet   Other Activities   Comment took orthostatic BP before ambulation   Short Term Goals   Time Frame for Short Term Goals 14 days   Short Term Goal 1 TF Supervision   Short Term Goal 2 ' using AD as inidcated Supervision   Conditions Requiring Skilled Therapeutic Intervention   Body Structures, Functions, Activity Limitations Requiring Skilled Therapeutic Intervention Decreased sensation;Decreased functional mobility ;Decreased ROM;Decreased safe awareness   Assessment Patient had slight drop in BP upon standing from recliner.  Handheld assist for 50 feet and then stand to check BP.  BP increased slightly and patient reported no symptoms, continued with increased ambulation and patient did well.  Encouraged ankle pumps and shoulder rolls before standing to increase blood flow.   Discharge Recommendations Continue to assess pending progress;Patient would benefit from continued therapy after discharge   Activity Tolerance   Activity Tolerance Patient tolerated treatment well   Physical Therapy Plan   General Plan 5-7 times per week   Therapy Duration 2 Weeks   Current Treatment Recommendations Strengthening;Balance training;Functional mobility training;Transfer training;Gait training;Safety education & training;Therapeutic activities   PT Plan of Care   Thursday X   Safety Devices   Type of Devices Left in chair;Call light within reach         Electronically signed

## 2025-02-13 NOTE — DISCHARGE SUMMARY
Discharge Summary    NAME: Huey Alejandra  :  1945  MRN:  [id number]    Admit date:  2/10/2025  Discharge date:  2025    Admitting Physician:  June Lugo MD    Advance Directive: Full Code    Consults: neurology    Primary Care Physician:  Dominic Pimentel MD    Discharge Diagnoses:  Principal Problem:    TIA (transient ischemic attack)  Active Problems:    Mixed hyperlipidemia    Depression    Type 2 diabetes mellitus without complication (HCC)    Neuropathy    Atrial fibrillation (HCC)    CAD (coronary atherosclerotic disease)    GERD (gastroesophageal reflux disease)    History of GI bleed  Resolved Problems:    * No resolved hospital problems. *      Significant Diagnostic Studies:   CT HEAD W WO CONTRAST    Result Date: 2025  EXAM:  CT HEAD WITH AND WITHOUT CONTRAST.  HISTORY:  Worsening headache, dysphagia.  COMPARISON:  1 day prior.  TECHNIQUE:  Multiple axial images of the brain were obtained from the skull base through the vertex prior to and following intravenous administration of iodinated contrast, low osmolar. Multiplanar reformats were provided.  FINDINGS:  There is no intracranial hemorrhage or extraaxial collection.  Probable dilated perivascular space in the right basal ganglia which appears stable.  The gray-white differentiation is maintained without evidence for acute large vascular territory infarction.  Stable mild volume loss.  The cortical sulci and basal cisterns are well visualized.  No areas of abnormal enhancement are identified.  There is no hydrocephalus, mass effect, or midline shift.  The paranasal sinuses and mastoid air cells are clear.  The calvarium is intact. Since the prior study, there has been no significant interval change.  --------------------------------    No acute intracranial abnormality.  ---------------------------  All CT scans are performed using dose optimization techniques as appropriate to the performed exam and include at least one of the  tablet  Commonly known as: PROSCAR  Take 1 tablet by mouth daily     isosorbide mononitrate 30 MG extended release tablet  Commonly known as: IMDUR     nitroGLYCERIN 0.4 MG SL tablet  Commonly known as: NITROSTAT  Place 1 tablet under the tongue every 5 minutes as needed for Chest pain up to max of 3 total doses. If no relief after 1 dose, call 911.     omeprazole 40 MG delayed release capsule  Commonly known as: PriLOSEC  Take 1 capsule by mouth daily     pregabalin 150 MG capsule  Commonly known as: LYRICA     ranolazine 500 MG extended release tablet  Commonly known as: RANEXA     sotalol 80 MG tablet  Commonly known as: BETAPACE     spironolactone 25 MG tablet  Commonly known as: ALDACTONE     torsemide 20 MG tablet  Commonly known as: DEMADEX     Ultracet 37.5-325 MG per tablet  Generic drug: traMADol-acetaminophen     vibegron 75 MG Tabs tablet  Commonly known as: GEMTESA     Vitamin D 25 MCG (1000 UT) Tabs tablet  Commonly known as: CHOLECALCIFEROL            STOP taking these medications      bumetanide 1 MG tablet  Commonly known as: BUMEX     Cartia  MG extended release capsule  Generic drug: dilTIAZem     carvedilol 25 MG tablet  Commonly known as: COREG     clopidogrel 75 MG tablet  Commonly known as: PLAVIX     gabapentin 400 MG capsule  Commonly known as: Neurontin     losartan 50 MG tablet  Commonly known as: COZAAR     metOLazone 2.5 MG tablet  Commonly known as: ZAROXOLYN     nystatin 094590 UNIT/GM powder  Commonly known as: MYCOSTATIN     potassium chloride 20 MEQ extended release tablet  Commonly known as: KLOR-CON M     sildenafil 100 MG tablet  Commonly known as: Viagra     tamsulosin 0.4 MG capsule  Commonly known as: FLOMAX     warfarin 7.5 MG tablet  Commonly known as: COUMADIN     zolpidem 5 MG tablet  Commonly known as: AMBIEN               Where to Get Your Medications        These medications were sent to [facility]/pharmacy #[id number] - [place], [place] - [address] - P [phone] - E

## 2025-02-13 NOTE — PROGRESS NOTES
are clear.  The calvarium is intact. Since the prior study, there has been no significant interval change.  --------------------------------    No acute intracranial abnormality.  ---------------------------  All CT scans are performed using dose optimization techniques as appropriate to the performed exam and include at least one of the following: Automated exposure control, adjustment of the mA and/or kV according to size, and the use of iterative reconstruction technique.  ______________________________________ Electronically signed by: AKUA NIETO M.D. Date:     02/11/2025 Time:    15:05     Echo (TTE) complete (PRN contrast/bubble/strain/3D)    Result Date: 2/11/2025    Left Ventricle: Moderately reduced left ventricular systolic function with a visually estimated EF of 35%. Left ventricle size is normal. Mildly increased wall thickness. See diagram for wall motion findings. Grade III diastolic dysfunction with increased LAP.   Right Ventricle: Right ventricle is moderate to severely dilated. Lead present in the right ventricle. Severely reduced systolic function. TAPSE is 0.9 cm.   Aortic Valve: Trileaflet valve. Mild sclerosis of the aortic valve cusps. Mild to moderate annular calcification.   Mitral Valve: Mildly thickened leaflets.   Left Atrium: Left atrium is severely dilated.   Interatrial Septum: Agitated saline study was negative with and without provocation.   Right Atrium: Lead present in the right atrium. Right atrium is severely dilated.   Aorta: Normal sized aortic root. Mildly dilated ascending aorta. Ao ascending diameter is 4.0 cm.   Image quality is adequate. Contrast used: Lumason.     XR CHEST PORTABLE    Result Date: 2/10/2025  EXAM:  FRONTAL VIEW OF THE CHEST.  HISTORY:  Stroke  COMPARISON:  None.  FINDINGS: Pacing device is present on the left.  Cardiac silhouette is normal.  No organized infiltrate/consolidation.  No visible effusion or pneumothorax.  No acute osseous abnormality.        1. No acute findings.      ______________________________________ Electronically signed by: MARGARITA MATTHEWS M.D. Date:     02/10/2025 Time:    20:54     CTA HEAD NECK W CONTRAST    Result Date: 2/10/2025  EXAM:  CTA HEAD AND NECK WITH CONTRAST T.  CT HEAD WITHOUT CONTRAST.  TECHNIQUE:  CT Angiography of the head and neck performed with IV contrast. 2-D and 3-D reconstructions were made. A noncontrast CT head was also performed.  HISTORY:  Stroke symptoms.  COMPARISON:  Noncontrast CT head same date.  FINDINGS:  CTA neck:  Common carotid arteries are patent.  No internal carotid artery stenosis.  Dominant left vertebral artery. No vertebral artery stenosis or occlusion.  Cervical disc disease.  Multilevel facet arthropathy.  CTA head:  Patent vertebral basilar system.  Posterior cerebral arteries are patent.  Internal carotid artery.  There is a hypoplastic A1 segment on the right.  CTA head:  Atrophy.  Old lacunar infarct versus choroid fissure cyst on the right.  No findings to indicate acute infarct.  No aneurysm or high flow vascular lesion identified.       No cervical stenosis  No intracranial stenosis or vascular occlusion.  Atrophy.  All CT scans are performed using dose optimization techniques as appropriate to the performed exam and include at least one of the following: Automated exposure control, adjustment of the mA and/or kV according to size, and the use of iterative reconstruction technique.  ______________________________________ Electronically signed by: MARGARITA MATTHEWS M.D. Date:     02/10/2025 Time:    20:21     CT HEAD WO CONTRAST    Result Date: 2/10/2025  EXAM:  CT HEAD WITHOUT CONTRAST  TECHNIQUE:  Noncontrast CT of the head with multiple reformats.  HISTORY:  Stroke symptoms.  COMPARISON:  None.  FINDINGS:  Ventricular size is normal. Atrophy.  Old appearing basil ganglia lacunar infarct versus choroid fissure cyst on the right along the inferior margin of the basal ganglia Gray-white matter

## 2025-02-18 ENCOUNTER — TRANSCRIBE ORDERS (OUTPATIENT)
Dept: PHYSICAL THERAPY | Facility: HOSPITAL | Age: 80
End: 2025-02-18
Payer: MEDICARE

## 2025-02-18 DIAGNOSIS — R47.01 APHASIA: Primary | ICD-10-CM

## 2025-02-24 NOTE — PROGRESS NOTES
Physician Progress Note      PATIENT:               SARKIS BIRMINGHAM  [facility] #:                  [id number]  :                       1945  ADMIT DATE:       2/10/2025 7:19 PM  DISCH DATE:        2025 12:47 PM  RESPONDING  PROVIDER #:        KATHE MENDEZ          QUERY TEXT:    Pt admitted with TIA. Pt noted to have history of afib and old inferior basal   ganglia lacunar infarct per CT. If possible, please document in progress notes   and discharge summary if you are evaluating and /or treating any of the   following:  The medical record reflects the following:  Risk Factors: Afib, intermittent aphasia  Clinical Indicators: HP- Recurrent TIAs. CT head without contrast with   atrophy, cystlike change on the right which may be a choroid fissure cyst   versus an old inferior basal ganglia lacunar infarct; WBC 7.2, H&H 17.6/51.5,   TSH 2.28, free T41.21, troponin 34, creatinine 1.1, BUN 25, GFR 69.  Treatment: ASA, Lipitor, Labetalol, CT MRI, carotid US, CHAVO, warfarin per home   regimen  Options provided:  -- Cerebral embolism due to atrial fibrillation  -- TIA ruled out, AMS due to sequela of inferior basal ganglia lacunar infarct  -- TIA related to other, please specify  -- Other - I will add my own diagnosis  -- Disagree - Not applicable / Not valid  -- Disagree - Clinically unable to determine / Unknown  -- Refer to Clinical Documentation Reviewer    PROVIDER RESPONSE TEXT:    Provider disagreed with this query.  possible TIA, MRI could not be completed    Query created by: Vernell Minor on 2025 7:03 AM      Electronically signed by:  KATHE MENDEZ 2025 5:37 PM

## 2025-02-25 ENCOUNTER — HOSPITAL ENCOUNTER (OUTPATIENT)
Facility: HOSPITAL | Age: 80
Setting detail: THERAPIES SERIES
Discharge: HOME | End: 2025-02-25
Payer: MEDICARE

## 2025-02-25 DIAGNOSIS — R47.89 WORD FINDING DIFFICULTY: ICD-10-CM

## 2025-02-25 DIAGNOSIS — R41.89 OTHER SYMPTOMS AND SIGNS INVOLVING COGNITIVE FUNCTIONS AND AWARENESS: Primary | ICD-10-CM

## 2025-02-25 PROCEDURE — 96125 COGNITIVE TEST BY HC PRO: CPT

## 2025-02-25 NOTE — PROGRESS NOTES
Speech/Language Pathology Initial Evaluation and Plan of Care    Patient: Darren Maria               : 1945  Visit Date: 2025  Referring practitioner: Rolan Salvador MD  Date of Initial Visit: 2025      Visit Diagnoses:    ICD-10-CM ICD-9-CM   1. Other symptoms and signs involving cognitive functions and awareness  R41.89 799.59   2. Word finding difficulty  R47.89 V40.1     Past Medical History:   Diagnosis Date    Abdominal pain, epigastric     Abnormal abdominal exam     ABFND, RADIOLOGICAL, ABDOMINAL AREA     Abnormal ECG     Anticoagulated on Coumadin     Atherosclerosis of coronary artery bypass graft     ATHEROSCLEROSIS, CORONARY, ARTERY BYPASS GRFT    Atrial fibrillation     Cancer of right kidney     right sided kidney cancer    Cardiomyopathy     Cardiomyopathy, primary     CHF (congestive heart failure)     Clotting disorder     Colon polyps     Congenital heart disease     Congestive heart failure     Coronary artery disease     History of CAD/A-Fib/Pacemaker/Defibrillator placement: pt takes Coumadin and plavix therapy as well as ASA daily    Diabetes mellitus     Gastric polyp     Gastroesophageal reflux disease without esophagitis     History of colon polyps     Hypercholesterolemia     Hypertension, essential     Melena     Myocardial infarction     NSAID long-term use     Obesity     Pacemaker     Pacemaker Dependant    Platelet inhibition due to Plavix     Presence of stent in coronary artery     Multiple coronary stenting most recently 2008    Single kidney     Only 1 Kidney    Status post surgery     s/p Polypectomy Bleed     Past Surgical History:   Procedure Laterality Date    ABLATION OF DYSRHYTHMIC FOCUS      APPENDECTOMY      CARDIAC ABLATION      CARDIAC CATHETERIZATION      CARDIAC CATHETERIZATION Left 12/15/2021    Procedure: Coronary angiography;  Surgeon: Sarbjit Posadas MD;  Location:  [facility];  Service: Cardiology;   Laterality: Left;    CARDIAC CATHETERIZATION Left 12/15/2021    Procedure: Percutaneous Coronary Intervention;  Surgeon: Sarbjit Posadas MD;  Location:  [facility];  Service: Cardiology;  Laterality: Left;    CARDIAC DEFIBRILLATOR PLACEMENT      CARDIAC PACEMAKER PLACEMENT      Pacemaker Placement    CAROTID STENT  2001    CATARACT EXTRACTION, BILATERAL      CHOLECYSTECTOMY      COLONOSCOPY  08/27/2013    snare hep, trans tics recall 3 yr    COLONOSCOPY  10/02/2006    few scattered diverticula    COLONOSCOPY  07/22/2009    tubular adenoma in the ascending colon    COLONOSCOPY  07/25/2005    several polyps in the ascending colon, one in the transverse colon    COLONOSCOPY N/A 03/21/2022    Procedure: COLONOSCOPY WITH ANESTHESIA;  Surgeon: Phil Goodwin MD;  Location:  [facility];  Service: Gastroenterology;  Laterality: N/A;  pre: hx polyps  post: polyps  Rolan Salvador MD    COLONOSCOPY N/A 05/07/2024    The examined portion of the ileum was normal. - Two diminutive tubular adenoma polyps in the distal transverse colon, removed with a cold snare. Resected and retrieved. - Diverticulosis in the sigmoid colon. - Non-bleeding hemorrhoids. - The examination was otherwise normal    COLONOSCOPY W/ POLYPECTOMY  09/19/2016    Tubular adenoma hepatic flexure, 2 Hyperplastic polyps rectum and at 50 cm, Benign polyp at 70 cm repeat exam in 3-5 years    CORONARY ARTERY BYPASS GRAFT  02/2011    2nd time    CORONARY ARTERY BYPASS GRAFT  1990    CORONARY STENT PLACEMENT      ENDOSCOPY  04/14/2016    nl slant    ENDOSCOPY  11/10/2014    clips at polypectomy bx no residual recall 1 yr    ENDOSCOPY  06/19/2014    snare clip body recall 6 months    ENDOSCOPY  05/19/2014    polyp stomach bx and clip    ENDOSCOPY N/A 05/06/2024    1. Small bowel, biopsies: Small bowel mucosa with no significant pathologic changes. No evidence of celiac disease. 2. Antrum, biopsies: Minimal chronic gastritis. 3. Stomach,  biopsies: Minimal chronic gastritis. Features suggestive of fundic gland polyp    HERNIA REPAIR      with mesh    ICD GENERATOR REPLACEMENT N/A 02/28/2024    Procedure: ICD Generator Change - Bi-Ventricular (82564) with new LV lead implant (78227);  Surgeon: Devika Oliver MD;  Location:  [facility];  Service: Cardiovascular;  Laterality: N/A;    INTERVENTIONAL RADIOLOGY PROCEDURE Left 01/17/2024    Procedure: Venogram upper extremity left, 83984, 65538;  Surgeon: Devika Oliver MD;  Location:  [facility];  Service: Cardiovascular;  Laterality: Left;    JOINT REPLACEMENT      OTHER SURGICAL HISTORY      Defibrillator/Pacer maker exchange    OTHER SURGICAL HISTORY      Pacemaker/Defibillation exchange 2012    PENILE PROSTHESIS IMPLANT N/A 07/27/2018    Procedure: PENILE PROSTHESIS PLACEMENT;  Surgeon: Godwin Gupta MD;  Location:  [facility] OR;  Service: Urology    PERIPHERAL ARTERIAL STENT GRAFT         SUBJECTIVE     Patient presents with the following symptoms: word finding difficulty, learning new information, and remembering names  Date of Onset: mild stroke 2/10/25  History of present problems: no previous issues with memory prior to stroke, but wife stated that she has seen a memory decline over the last 5 years normal for aging.  The functional impact on the patient: patient is frustrated that he is unable to find the words he wants to say during a conversation and peoples' names that he knows but can't remember.   Prior level of function/education: retired and has a 2 year college degree  Pertinent Medical History Related to this Problem: stroke 2 weeks ago      OBJECTIVE     RBANS: The Repeatable Battery for the Assessment of Neuropsychological Status (RBANS) assesses patient function in the areas of Immediate and Delayed memory, visuospatial/constructional skills, language and attention. It is used to detect and track neurocognitive deficits.   Index score Percentile  Qualitative Description   Immediate Memory 90 25 Average   Visuospatial 84 14 Low Average   Language 88 21 Low Average   Attention 75 5 Borderline   Delayed Memory 86 18 Low Average   Total Scale 80 9 Low Average   Comments: only had difficulty with attention to details and some recall and word finding difficulties       IMPRESSION/RECOMMENDATIONS  Factors Affecting Performance: n/a  Learning Motivation: strong  Education/Learning Comments:   Patient demonstrated understanding of evaluation results and plan of care.   Clinical Impression   Impact on Function: Patient is unable to have a functional conversation due to his word finding deficits that becomes frustrating to the patient.   Prognosis Comment: good based on willingness to target tasks to improve word finding.      Therapy Education/Self Care    Details: Memory assessment and strategies   Given home strategies   Progress: New   Education provided to:  Patient   Level of understanding Verbalized           Total Time of Visit:            75   mins    ASSESSMENT/PLAN     Goals                                         STG Comments Status   Patient to name 30 items with 80% accuracy across 3 consecutive sessions.        Patient to recall information from a short paragraph with 80% accuracy across 3 consecutive sessions.        Patient to target divergent naming task to improve word finding skills with 80% accuracy across 3 consecutive sessions.             LTG by:      In 3 months, patient to improve cognitive function as measured by testing or clinical observation.        Patient to follow HEP.                         ASSESSMENT:   Patient is indicated for skilled care by a Speech/Language Pathologist.     Summary of Assessment: Patient scored within the average range on the memory assessment, but the word finding and forgetting names bothers him and he would like to work on these tasks in therapy for a month.     PLAN:  Details of Plan of Care: initiate  therapy    Frequency: 1 time per week  Duration: 12 visits  Estimated Length of Session: 45 minutes      SPEECH/LANGUAGE PATHOLOGIST SIGNATURE: Bethanie BAL CCC-SLP  [place] License #: [id number]  Electronically Signed on 2/25/2025      Initial Certification  Certification Period: 2/25/2025 through 5/25/2025  I certify that the therapy services are furnished while this patient is under my care.  The services outlined above are required by this patient, and will be reviewed every 90 days.     PHYSICIAN:     Rolan Salvador MD______________________________________DATE: _________     Please sign and return via fax to [phone].   Thank you so much for letting us work with Darren. I appreciate your letting us work with your patients. If you have any questions or concerns, please don't hesitate to contact me.    [facility]  Outpatient Therapy Services  [address]  [place] [address]  [phone]

## 2025-03-17 ENCOUNTER — OFFICE VISIT (OUTPATIENT)
Dept: NEUROLOGY | Age: 80
End: 2025-03-17
Payer: MEDICARE

## 2025-03-17 VITALS
BODY MASS INDEX: 28 KG/M2 | OXYGEN SATURATION: 97 % | SYSTOLIC BLOOD PRESSURE: 110 MMHG | HEART RATE: 74 BPM | HEIGHT: 71 IN | DIASTOLIC BLOOD PRESSURE: 68 MMHG | WEIGHT: 200 LBS

## 2025-03-17 DIAGNOSIS — G45.9 TIA (TRANSIENT ISCHEMIC ATTACK): Primary | ICD-10-CM

## 2025-03-17 PROCEDURE — G8419 CALC BMI OUT NRM PARAM NOF/U: HCPCS | Performed by: PSYCHIATRY & NEUROLOGY

## 2025-03-17 PROCEDURE — 1123F ACP DISCUSS/DSCN MKR DOCD: CPT | Performed by: PSYCHIATRY & NEUROLOGY

## 2025-03-17 PROCEDURE — 1036F TOBACCO NON-USER: CPT | Performed by: PSYCHIATRY & NEUROLOGY

## 2025-03-17 PROCEDURE — G8427 DOCREV CUR MEDS BY ELIG CLIN: HCPCS | Performed by: PSYCHIATRY & NEUROLOGY

## 2025-03-17 PROCEDURE — 99214 OFFICE O/P EST MOD 30 MIN: CPT | Performed by: PSYCHIATRY & NEUROLOGY

## 2025-03-17 PROCEDURE — 1159F MED LIST DOCD IN RCRD: CPT | Performed by: PSYCHIATRY & NEUROLOGY

## 2025-03-17 PROCEDURE — 1160F RVW MEDS BY RX/DR IN RCRD: CPT | Performed by: PSYCHIATRY & NEUROLOGY

## 2025-03-17 RX ORDER — ACETAMINOPHEN 325 MG/1
650 TABLET ORAL EVERY 6 HOURS PRN
COMMUNITY

## 2025-03-17 RX ORDER — CLOPIDOGREL BISULFATE 75 MG/1
75 TABLET ORAL DAILY
COMMUNITY

## 2025-03-17 NOTE — PROGRESS NOTES
intracranial hemorrhage.  No midline shift or mass effect.  No ectopia.  Paranasal sinuses and mastoid air cells are  clear. Orbital contents are normal.  The calvarium is intact.        1. Atrophy 2. Cyst like change on the right which may be a choroid fissure cyst versus an old inferior basil ganglia lacunar infarct.  Findings called to emergency department 7:44 p.m. 02/10/2025.  Verbal report to Dr. Roman  All CT scans are performed using dose optimization techniques as appropriate to the performed exam and includes at least one of the following:  Automated exposure control, adjustment of the mA and/or kV according to size, and the use of iterative reconstruction technique.  All CT scans are performed using dose optimization techniques as appropriate to the performed exam and include at least one of the following: Automated exposure control, adjustment of the mA and/or kV according to size, and the use of iterative reconstruction technique.  ______________________________________ Electronically signed by: MARGARITA MATTHEWS M.D. Date:     02/10/2025 Time:    19:42          ASSESSMENT:  79 y.o. admitted with speech difficulty, headache, confusion, possible TIA.  History of a fib noted, not anticoagulated given prior GIB history.  CT's negative.  CTA head and neck negative. ESR, CRP normal. EEG normal. ECHO with reduced EF.  Still with some residual speech difficulty, no new clear stroke like symptoms.      PLAN:   Unable to MRI.    On ASA, plavix, statin, risk factor maximization thorough PCP.     Continue PT, OT, ST    Follow up with cardiology as directed for CHF, a fib, not anticoagulated due to prior GIB. Unable to have a watchman placed, prior left atrial ablation and ligation procedure performed.        Pratik Sargent DO  Board Certified Neurology

## 2025-03-19 ENCOUNTER — HOSPITAL ENCOUNTER (OUTPATIENT)
Facility: HOSPITAL | Age: 80
Setting detail: THERAPIES SERIES
Discharge: HOME | End: 2025-03-19
Payer: MEDICARE

## 2025-03-19 DIAGNOSIS — R47.89 WORD FINDING DIFFICULTY: ICD-10-CM

## 2025-03-19 DIAGNOSIS — R41.89 OTHER SYMPTOMS AND SIGNS INVOLVING COGNITIVE FUNCTIONS AND AWARENESS: Primary | ICD-10-CM

## 2025-03-19 PROCEDURE — 92507 TX SP LANG VOICE COMM INDIV: CPT

## 2025-03-19 NOTE — PROGRESS NOTES
Speech Language Pathology Treatment Note  [address] [place] [address]    Patient: Darren Maria                                                                                     Visit Date: 3/19/2025  :     1945    Referring practitioner:    Rolan Salvador MD  Date of Initial Visit:          Type: THERAPY  Episode: Memory/Word Finding      Visit Diagnoses:    ICD-10-CM ICD-9-CM   1. Other symptoms and signs involving cognitive functions and awareness  R41.89 799.59   2. Word finding difficulty  R47.89 V40.1     SUBJECTIVE     Darren was alert and ready to participate.  He stated that he is here for continued therapy from his family members and friends that see how he struggles with word finding daily.    Pain: Pain rating not applicable to this diagnosis.    OBJECTIVE   GOALS  Goals                                         STG Comments Status   Patient to name 30 items with 80% accuracy across 3 consecutive sessions.     He was able to name all 30 items without difficulty today. New   Patient to recall information from a short paragraph with 80% accuracy across 3 consecutive sessions.     After immediate reading of and for short information he was able to recall with 90% accuracy.  Then after a 10-minute delay he was able to recall 80% accuracy. New   Patient to target divergent naming task to improve word finding skills with 80% accuracy across 3 consecutive sessions.     Patient was able to name 20 out of 24 items that you could order at a restaurant.  Then after naming he was able to recall 20 out of 24 items as well. New           LTG by:        In 3 months, patient to improve cognitive function as measured by testing or clinical observation.     Introduction to goals Ongoing   Patient to follow HEP.     Patient understood strategies to use at home with describing items that he is trying to say. Ongoing       Therapy  Education/Self Care    Details: Word finding strategies   Given home strategies   Progress: Progressed   Education provided to:  Patient   Level of understanding Verbalized           CPT Code:   50930 Speech/Language/Cognitive Treatment  Total Time of Visit:             45   mins         ASSESSMENT/PLAN     ASSESSMENT: Patient was introduced to goals of targeting word finding recalling information and participating in divergent naming tasks.  He did well with initial goals today, and will continue to target goals next week.    PLAN: Continue therapy  Frequency: 1x/ Week  Duration: 12 weeks    Progress Note Due Date:03/25/2024  Recertification Due Date:02/25/2025 through 05/25/2025    SIGNATURE: Bethanie BAL CCC-SLP, [place] License #: [id number]  Electronically Signed on 3/19/2025          [place] [address]  [phone]

## 2025-03-24 ENCOUNTER — OFFICE VISIT (OUTPATIENT)
Dept: OTOLARYNGOLOGY | Facility: CLINIC | Age: 80
End: 2025-03-24
Payer: MEDICARE

## 2025-03-24 VITALS
WEIGHT: 194.9 LBS | BODY MASS INDEX: 27.29 KG/M2 | HEART RATE: 77 BPM | DIASTOLIC BLOOD PRESSURE: 82 MMHG | TEMPERATURE: 97.5 F | SYSTOLIC BLOOD PRESSURE: 135 MMHG | HEIGHT: 71 IN

## 2025-03-24 DIAGNOSIS — I69.328 SPEECH OR LANGUAGE DEFICIT, POST-STROKE: ICD-10-CM

## 2025-03-24 DIAGNOSIS — Z86.73 HISTORY OF RECENT STROKE: ICD-10-CM

## 2025-03-24 DIAGNOSIS — R42 DIZZINESS: Chronic | ICD-10-CM

## 2025-03-24 DIAGNOSIS — H91.8X3 ASYMMETRICAL HEARING LOSS: Primary | Chronic | ICD-10-CM

## 2025-03-24 PROCEDURE — 3079F DIAST BP 80-89 MM HG: CPT | Performed by: NURSE PRACTITIONER

## 2025-03-24 PROCEDURE — 99213 OFFICE O/P EST LOW 20 MIN: CPT | Performed by: NURSE PRACTITIONER

## 2025-03-24 PROCEDURE — 3075F SYST BP GE 130 - 139MM HG: CPT | Performed by: NURSE PRACTITIONER

## 2025-03-24 NOTE — PROGRESS NOTES
EVELIA Nielsen  [facility] EAR NOSE & THROAT  [address], [address]  [place] [address]  Fax [phone]  Phone [phone]      Visit Type: NEW PATIENT   Chief Complaint   Patient presents with    Ear Problem     New referral for ear problems; struggles with tinnitus with decrease hearing in the left ear.  Has hearing aids, but still has ringing in the Left ear mostly.            HISTORY OBTAINED FROM: patient  HPI  He complains of hearing loss. The symptoms are localized to the left side. The patient has had moderate to severe symptoms. The symptoms have been relatively constant for the last 3 month. He has been gradually losing hearing the last several years but he lost hearing in the left ear in the last 3 months. He has dizziness related to heart condition.The symptoms are aggravated by  a recent stroke. Patient had a stroke There have been no factors that have improved the symptoms. Patient wears hearing aids. He has a defribrillator. He cannot have MRI. He is in speech therapy since stroke.     Past Medical History:   Diagnosis Date    Abdominal pain, epigastric     Abnormal abdominal exam     ABFND, RADIOLOGICAL, ABDOMINAL AREA     Abnormal ECG     Anticoagulated on Coumadin     Atherosclerosis of coronary artery bypass graft     ATHEROSCLEROSIS, CORONARY, ARTERY BYPASS GRFT    Atrial fibrillation     Cancer of right kidney     right sided kidney cancer    Cardiomyopathy     Cardiomyopathy, primary     CHF (congestive heart failure)     Clotting disorder     Colon polyps     Congenital heart disease     Congestive heart failure     Coronary artery disease     History of CAD/A-Fib/Pacemaker/Defibrillator placement: pt takes Coumadin and plavix therapy as well as ASA daily    Diabetes mellitus     Gastric polyp     Gastroesophageal reflux disease without esophagitis     History of colon polyps     Hypercholesterolemia     Hypertension,  essential     Melena     Myocardial infarction     NSAID long-term use     Obesity     Pacemaker     Pacemaker Dependant    Platelet inhibition due to Plavix     Presence of stent in coronary artery     Multiple coronary stenting most recently 4/2008    Single kidney     Only 1 Kidney    Status post surgery     s/p Polypectomy Bleed       Past Surgical History:   Procedure Laterality Date    ABLATION OF DYSRHYTHMIC FOCUS      APPENDECTOMY      CARDIAC ABLATION      CARDIAC CATHETERIZATION      CARDIAC CATHETERIZATION Left 12/15/2021    Procedure: Coronary angiography;  Surgeon: Sarbjit Posadas MD;  Location:  [facility];  Service: Cardiology;  Laterality: Left;    CARDIAC CATHETERIZATION Left 12/15/2021    Procedure: Percutaneous Coronary Intervention;  Surgeon: Sarbjit Posadas MD;  Location:  [facility];  Service: Cardiology;  Laterality: Left;    CARDIAC DEFIBRILLATOR PLACEMENT      CARDIAC PACEMAKER PLACEMENT      Pacemaker Placement    CAROTID STENT  2001    CATARACT EXTRACTION, BILATERAL      CHOLECYSTECTOMY      COLONOSCOPY  08/27/2013    snare hep, trans tics recall 3 yr    COLONOSCOPY  10/02/2006    few scattered diverticula    COLONOSCOPY  07/22/2009    tubular adenoma in the ascending colon    COLONOSCOPY  07/25/2005    several polyps in the ascending colon, one in the transverse colon    COLONOSCOPY N/A 03/21/2022    Procedure: COLONOSCOPY WITH ANESTHESIA;  Surgeon: Phil Goodwin MD;  Location: [facility] ENDOSCOPY;  Service: Gastroenterology;  Laterality: N/A;  pre: hx polyps  post: polyps  Rolan Salvador MD    COLONOSCOPY N/A 05/07/2024    The examined portion of the ileum was normal. - Two diminutive tubular adenoma polyps in the distal transverse colon, removed with a cold snare. Resected and retrieved. - Diverticulosis in the sigmoid colon. - Non-bleeding hemorrhoids. - The examination was otherwise normal    COLONOSCOPY W/ POLYPECTOMY  09/19/2016     Tubular adenoma hepatic flexure, 2 Hyperplastic polyps rectum and at 50 cm, Benign polyp at 70 cm repeat exam in 3-5 years    CORONARY ARTERY BYPASS GRAFT  02/2011    2nd time    CORONARY ARTERY BYPASS GRAFT  1990    CORONARY STENT PLACEMENT      ENDOSCOPY  04/14/2016    nl slant    ENDOSCOPY  11/10/2014    clips at polypectomy bx no residual recall 1 yr    ENDOSCOPY  06/19/2014    snare clip body recall 6 months    ENDOSCOPY  05/19/2014    polyp stomach bx and clip    ENDOSCOPY N/A 05/06/2024    1. Small bowel, biopsies: Small bowel mucosa with no significant pathologic changes. No evidence of celiac disease. 2. Antrum, biopsies: Minimal chronic gastritis. 3. Stomach, biopsies: Minimal chronic gastritis. Features suggestive of fundic gland polyp    HERNIA REPAIR      with mesh    ICD GENERATOR REPLACEMENT N/A 02/28/2024    Procedure: ICD Generator Change - Bi-Ventricular (02921) with new LV lead implant (73728);  Surgeon: Devika Oliver MD;  Location:  [facility];  Service: Cardiovascular;  Laterality: N/A;    INTERVENTIONAL RADIOLOGY PROCEDURE Left 01/17/2024    Procedure: Venogram upper extremity left, 29900, 84828;  Surgeon: Devika Oliver MD;  Location:  [facility];  Service: Cardiovascular;  Laterality: Left;    JOINT REPLACEMENT      OTHER SURGICAL HISTORY      Defibrillator/Pacer maker exchange    OTHER SURGICAL HISTORY      Pacemaker/Defibillation exchange 2012    PENILE PROSTHESIS IMPLANT N/A 07/27/2018    Procedure: PENILE PROSTHESIS PLACEMENT;  Surgeon: Godwin Gupta MD;  Location:  [facility] OR;  Service: Urology    PERIPHERAL ARTERIAL STENT GRAFT         Family History: His family history includes Heart attack in his mother; Lung cancer in his mother; No Known Problems in his father.     Social History: He  reports that he has never smoked. He has never used smokeless tobacco. He reports that he does not currently use alcohol. He reports that he does not use  drugs.    Home Medications:  Cyanocobalamin, Sotalol HCl AF, Suvorexant, aspirin, atorvastatin, cetirizine, empagliflozin, finasteride, isosorbide mononitrate, nitroglycerin, omeprazole, pregabalin, ranolazine, spironolactone, torsemide, and traMADol-acetaminophen    Allergies:  He is allergic to morphine and codeine.       Vital Signs:   Temp:  [97.5 °F (36.4 °C)] 97.5 °F (36.4 °C)  Heart Rate:  [77] 77  BP: (135)/(82) 135/82  ENT Physical Exam  Constitutional  Appearance: patient appears well-developed,  Communication/Voice: communication appropriate for developmental age;  Constitutional comments: Speech difficulties, difficulty with getting thoughts and words out  Head and Face  Appearance: head appears normal, face appears normal and face appears atraumatic;  Ear  Hearing: intact;  Auricles: right auricle normal; left auricle normal;  External Mastoids: right external mastoid normal; left external mastoid normal;  Ear Canals: right ear canal normal; left ear canal normal;  Tympanic Membranes: right tympanic membrane normal; left tympanic membrane normal;  Nose  External Nose: nares patent bilaterally; external nose normal;  Internal Nose: nasal mucosa normal; septum normal; bilateral inferior turbinates normal;  Oral Cavity/Oropharynx  Lips: normal;  Teeth: normal;  Gums: gingiva normal;  Tongue: normal;  Oral mucosa: normal;  Hard palate: normal;  Soft palate: normal;  Tonsils: normal;  Base of Tongue: normal;  Posterior pharyngeal wall: normal;  Neck  Neck: neck normal;  Respiratory  Inspection: breathing unlabored; normal breathing rate;  Cardiovascular  Inspection: extremities are warm and well perfused;  Auscultation: regular rate and rhythm;  Neurovestibular  Mental Status: alert and oriented;  Psychiatric: mood normal; affect is appropriate;           Result Review       RESULTS REVIEW    I have reviewed the patients old records in the chart.    CT Head Without Contrast (02/10/2025 20:38 EST)   CT  Angiogram Head w AI Analysis of LVO (02/10/2025 20:49 EST)   CT Head With & Without Contrast (02/11/2025 15:38 EST)            Assessment & Plan  Asymmetrical hearing loss    History of recent stroke    Speech or language deficit, post-stroke    Dizziness         Conservative management. Discussed case with Dr. Jimenez since patient cannot have MRI. Will proceed with CT temporal bones with thin cuts.   Return in about 3 months (around 6/24/2025).        Electronically signed by EVELIA Nielsen 03/24/25 11:28 AM CDT.

## 2025-03-25 ENCOUNTER — HOSPITAL ENCOUNTER (OUTPATIENT)
Facility: HOSPITAL | Age: 80
Setting detail: THERAPIES SERIES
Discharge: HOME | End: 2025-03-25
Payer: MEDICARE

## 2025-03-25 DIAGNOSIS — R47.89 WORD FINDING DIFFICULTY: ICD-10-CM

## 2025-03-25 DIAGNOSIS — R41.89 OTHER SYMPTOMS AND SIGNS INVOLVING COGNITIVE FUNCTIONS AND AWARENESS: Primary | ICD-10-CM

## 2025-03-25 PROCEDURE — 92507 TX SP LANG VOICE COMM INDIV: CPT

## 2025-03-25 NOTE — PROGRESS NOTES
Speech Language Pathology Treatment Note and 30 Day Progress Note  [address] [place] [address]    Patient: Darren Maria                                                                                     Visit Date: 3/25/2025  :     1945    Referring practitioner:    Rolan Salvador MD  Date of Initial Visit:          Type: THERAPY  Episode: Memory/Word Finding      Visit Diagnoses:    ICD-10-CM ICD-9-CM   1. Other symptoms and signs involving cognitive functions and awareness  R41.89 799.59   2. Word finding difficulty  R47.89 V40.1     SUBJECTIVE     Darren was alert and ready to participate.  He stated that he is about the same today with how his word finding is going.    Pain: Pain rating not applicable to this diagnosis.    OBJECTIVE   GOALS  Goals                                         STG Comments Status   Patient to name 30 items with 80% accuracy across 3 consecutive sessions.     He was able to complete association task with 80% accuracy today. Going   Patient to recall information from a short paragraph with 80% accuracy across 3 consecutive sessions.     After immediate reading of and for short information he was able to recall with 85% accuracy.  Then after a 10-minute delay he was able to recall 95% accuracy. Ongoing   Patient to target divergent naming task to improve word finding skills with 80% accuracy across 3 consecutive sessions.     Previous: patient was able to name 20 out of 24 items that you could order at a restaurant.  Then after naming he was able to recall 20 out of 24 items as well. Ongoing           LTG by:        In 3 months, patient to improve cognitive function as measured by testing or clinical observation.     Showing improvement with word finding and cognition skills at this time. Ongoing   Patient to follow HEP.     Patient understood strategies to use at home with describing items that  he is trying to say. Ongoing       Therapy Education/Self Care    Details: Word finding strategies   Given home strategies   Progress: Progressed   Education provided to:  Patient   Level of understanding Verbalized           CPT Code:   47087 Speech/Language/Cognitive Treatment  Total Time of Visit:             45   mins         ASSESSMENT/PLAN     ASSESSMENT: Patient was introduced to goals of targeting word finding recalling information and participating in Association of words tasks.  He showed improvement with recall of information after a short delay.    PLAN: Continue therapy  Frequency: 1x/ Week  Duration: 12 weeks    Progress Note Due Date:04/24/2024  Recertification Due Date:02/25/2025 through 05/25/2025    SIGNATURE: Bethanie BAL CCC-SLP, [place] License #: [id number]  Electronically Signed on 3/25/2025          [facility] [place] [address]  [phone]

## 2025-03-26 ENCOUNTER — LAB (OUTPATIENT)
Dept: LAB | Facility: HOSPITAL | Age: 80
End: 2025-03-26
Payer: MEDICARE

## 2025-03-26 DIAGNOSIS — D75.89 MACROCYTOSIS: ICD-10-CM

## 2025-03-26 DIAGNOSIS — D50.9 IRON DEFICIENCY ANEMIA, UNSPECIFIED IRON DEFICIENCY ANEMIA TYPE: ICD-10-CM

## 2025-03-26 LAB
ALBUMIN SERPL-MCNC: 4.1 G/DL (ref 3.5–5.2)
ALBUMIN/GLOB SERPL: 1.6 G/DL
ALP SERPL-CCNC: 66 U/L (ref 39–117)
ALT SERPL W P-5'-P-CCNC: 21 U/L (ref 1–41)
ANION GAP SERPL CALCULATED.3IONS-SCNC: 9 MMOL/L (ref 5–15)
AST SERPL-CCNC: 23 U/L (ref 1–40)
BASOPHILS # BLD AUTO: 0.04 10*3/MM3 (ref 0–0.2)
BASOPHILS NFR BLD AUTO: 0.6 % (ref 0–1.5)
BILIRUB SERPL-MCNC: 1.1 MG/DL (ref 0–1.2)
BUN SERPL-MCNC: 18 MG/DL (ref 8–23)
BUN/CREAT SERPL: 17.5 (ref 7–25)
CALCIUM SPEC-SCNC: 9 MG/DL (ref 8.6–10.5)
CHLORIDE SERPL-SCNC: 105 MMOL/L (ref 98–107)
CO2 SERPL-SCNC: 26 MMOL/L (ref 22–29)
CREAT SERPL-MCNC: 1.03 MG/DL (ref 0.76–1.27)
DEPRECATED RDW RBC AUTO: 51.8 FL (ref 37–54)
EGFRCR SERPLBLD CKD-EPI 2021: 74.8 ML/MIN/1.73
EOSINOPHIL # BLD AUTO: 0.14 10*3/MM3 (ref 0–0.4)
EOSINOPHIL NFR BLD AUTO: 2 % (ref 0.3–6.2)
ERYTHROCYTE [DISTWIDTH] IN BLOOD BY AUTOMATED COUNT: 13.6 % (ref 12.3–15.4)
FERRITIN SERPL-MCNC: 143.5 NG/ML (ref 30–400)
FOLATE SERPL-MCNC: 14 NG/ML (ref 4.78–24.2)
GLOBULIN UR ELPH-MCNC: 2.5 GM/DL
GLUCOSE SERPL-MCNC: 144 MG/DL (ref 65–99)
HCT VFR BLD AUTO: 45.8 % (ref 37.5–51)
HGB BLD-MCNC: 15.1 G/DL (ref 13–17.7)
IMM GRANULOCYTES # BLD AUTO: 0.04 10*3/MM3 (ref 0–0.05)
IMM GRANULOCYTES NFR BLD AUTO: 0.6 % (ref 0–0.5)
IRON 24H UR-MRATE: 138 MCG/DL (ref 59–158)
IRON SATN MFR SERPL: 39 % (ref 20–50)
LYMPHOCYTES # BLD AUTO: 0.87 10*3/MM3 (ref 0.7–3.1)
LYMPHOCYTES NFR BLD AUTO: 12.7 % (ref 19.6–45.3)
MCH RBC QN AUTO: 33.9 PG (ref 26.6–33)
MCHC RBC AUTO-ENTMCNC: 33 G/DL (ref 31.5–35.7)
MCV RBC AUTO: 102.9 FL (ref 79–97)
MONOCYTES # BLD AUTO: 0.59 10*3/MM3 (ref 0.1–0.9)
MONOCYTES NFR BLD AUTO: 8.6 % (ref 5–12)
NEUTROPHILS NFR BLD AUTO: 5.17 10*3/MM3 (ref 1.7–7)
NEUTROPHILS NFR BLD AUTO: 75.5 % (ref 42.7–76)
NRBC BLD AUTO-RTO: 0 /100 WBC (ref 0–0.2)
PLATELET # BLD AUTO: 206 10*3/MM3 (ref 140–450)
PMV BLD AUTO: 9 FL (ref 6–12)
POTASSIUM SERPL-SCNC: 4.2 MMOL/L (ref 3.5–5.2)
PROT SERPL-MCNC: 6.6 G/DL (ref 6–8.5)
RBC # BLD AUTO: 4.45 10*6/MM3 (ref 4.14–5.8)
SODIUM SERPL-SCNC: 140 MMOL/L (ref 136–145)
TIBC SERPL-MCNC: 355 MCG/DL (ref 298–536)
TRANSFERRIN SERPL-MCNC: 238 MG/DL (ref 200–360)
VIT B12 BLD-MCNC: 463 PG/ML (ref 211–946)
WBC NRBC COR # BLD AUTO: 6.85 10*3/MM3 (ref 3.4–10.8)

## 2025-03-26 PROCEDURE — 85025 COMPLETE CBC W/AUTO DIFF WBC: CPT

## 2025-03-26 PROCEDURE — 82607 VITAMIN B-12: CPT

## 2025-03-26 PROCEDURE — 36415 COLL VENOUS BLD VENIPUNCTURE: CPT

## 2025-03-26 PROCEDURE — 82728 ASSAY OF FERRITIN: CPT

## 2025-03-26 PROCEDURE — 83540 ASSAY OF IRON: CPT

## 2025-03-26 PROCEDURE — 84466 ASSAY OF TRANSFERRIN: CPT

## 2025-03-26 PROCEDURE — 80053 COMPREHEN METABOLIC PANEL: CPT

## 2025-03-26 PROCEDURE — 82746 ASSAY OF FOLIC ACID SERUM: CPT

## 2025-04-08 ENCOUNTER — HOSPITAL ENCOUNTER (OUTPATIENT)
Facility: HOSPITAL | Age: 80
Setting detail: THERAPIES SERIES
Discharge: HOME | End: 2025-04-08
Payer: MEDICARE

## 2025-04-08 DIAGNOSIS — R41.89 OTHER SYMPTOMS AND SIGNS INVOLVING COGNITIVE FUNCTIONS AND AWARENESS: Primary | ICD-10-CM

## 2025-04-08 DIAGNOSIS — R47.89 WORD FINDING DIFFICULTY: ICD-10-CM

## 2025-04-08 PROCEDURE — 92507 TX SP LANG VOICE COMM INDIV: CPT

## 2025-04-08 NOTE — PROGRESS NOTES
Speech Language Pathology Treatment Note  [address] [place] [address]    Patient: Darren Maria                                                                                     Visit Date: 2025  :     1945    Referring practitioner:    Rolan Salvador MD  Date of Initial Visit:          Type: THERAPY  Episode: Memory/Word Finding      Visit Diagnoses:    ICD-10-CM ICD-9-CM   1. Other symptoms and signs involving cognitive functions and awareness  R41.89 799.59   2. Word finding difficulty  R47.89 V40.1     SUBJECTIVE     Darren was alert and ready to participate.  He stated that he is about the same today with how his word finding is going.    Pain: Pain rating not applicable to this diagnosis.    OBJECTIVE   GOALS  Goals                                         STG Comments Status   Patient to name 30 items with 80% accuracy across 3 consecutive sessions.     Did not directly target today. Going   Patient to recall information from a short paragraph with 80% accuracy across 3 consecutive sessions.     Targeted goal by using auditory directions given on iPad and then having to place those target pictures in the right areas with minimal delay.  Patient was able to complete goal with 88% accuracy today.   Ongoing   Patient to target divergent naming task to improve word finding skills with 80% accuracy across 3 consecutive sessions.     Targeted goal while playing secret tiles using word descriptions of pictures and having the opposite person guess what they were describing.  Patient was able to complete this goal with 80% accuracy today.  Targeted this goal to help patient use word descriptions when he is unable to find the target word in everyday life. Ongoing           LTG by:        In 3 months, patient to improve cognitive function as measured by testing or clinical observation.     Showing improvement with word  finding and cognition skills at this time. Ongoing   Patient to follow HEP.     Patient understood strategies to use at home with describing items that he is trying to say. Ongoing       Therapy Education/Self Care    Details: Word finding strategies   Given home strategies   Progress: Progressed   Education provided to:  Patient   Level of understanding Verbalized           CPT Code:   42905 Speech/Language/Cognitive Treatment  Total Time of Visit:             45   mins         ASSESSMENT/PLAN     ASSESSMENT: Patient was actively engaged in following directions activity on iPad and word description activity using secret tile game.  He had understanding of word finding strategies to use outside of therapy sessions.    PLAN: Continue therapy  Frequency: 1x/ Week  Duration: 12 weeks    Progress Note Due Date:04/24/2024  Recertification Due Date:02/25/2025 through 05/25/2025    SIGNATURE: Bethanie BAL CCC-SLP, [place] License #: [id number]  Electronically Signed on 4/8/2025          [place] [address]  [phone]

## 2025-04-14 ENCOUNTER — HOSPITAL ENCOUNTER (OUTPATIENT)
Dept: CARDIOLOGY | Facility: HOSPITAL | Age: 80
Discharge: HOME | End: 2025-04-14
Admitting: NURSE PRACTITIONER
Payer: MEDICARE

## 2025-04-14 VITALS
SYSTOLIC BLOOD PRESSURE: 133 MMHG | DIASTOLIC BLOOD PRESSURE: 79 MMHG | OXYGEN SATURATION: 99 % | HEART RATE: 81 BPM | BODY MASS INDEX: 27.04 KG/M2 | WEIGHT: 193.8 LBS

## 2025-04-14 DIAGNOSIS — Z98.890 S/P AV NODAL ABLATION: ICD-10-CM

## 2025-04-14 DIAGNOSIS — I10 ESSENTIAL HYPERTENSION: ICD-10-CM

## 2025-04-14 DIAGNOSIS — I50.42 CHRONIC COMBINED SYSTOLIC AND DIASTOLIC CONGESTIVE HEART FAILURE: Primary | ICD-10-CM

## 2025-04-14 DIAGNOSIS — I48.21 PERMANENT ATRIAL FIBRILLATION: ICD-10-CM

## 2025-04-14 DIAGNOSIS — Z95.810 PRESENCE OF BIVENTRICULAR AICD: ICD-10-CM

## 2025-04-14 DIAGNOSIS — I25.5 ISCHEMIC CARDIOMYOPATHY: ICD-10-CM

## 2025-04-14 DIAGNOSIS — I25.810 CORONARY ARTERY DISEASE INVOLVING CORONARY BYPASS GRAFT OF NATIVE HEART WITHOUT ANGINA PECTORIS: ICD-10-CM

## 2025-04-14 LAB
ABSOLUTE LUNG FLUID CONTENT: 30 % (ref 20–35)
ANION GAP SERPL CALCULATED.3IONS-SCNC: 12 MMOL/L (ref 5–15)
BUN SERPL-MCNC: 19 MG/DL (ref 8–23)
BUN/CREAT SERPL: 22.4 (ref 7–25)
CALCIUM SPEC-SCNC: 9.3 MG/DL (ref 8.6–10.5)
CHLORIDE SERPL-SCNC: 104 MMOL/L (ref 98–107)
CO2 SERPL-SCNC: 24 MMOL/L (ref 22–29)
CREAT SERPL-MCNC: 0.85 MG/DL (ref 0.76–1.27)
EGFRCR SERPLBLD CKD-EPI 2021: 89.5 ML/MIN/1.73
GLUCOSE SERPL-MCNC: 146 MG/DL (ref 65–99)
POTASSIUM SERPL-SCNC: 4.5 MMOL/L (ref 3.5–5.2)
SODIUM SERPL-SCNC: 140 MMOL/L (ref 136–145)

## 2025-04-14 PROCEDURE — 80048 BASIC METABOLIC PNL TOTAL CA: CPT | Performed by: NURSE PRACTITIONER

## 2025-04-14 PROCEDURE — G2211 COMPLEX E/M VISIT ADD ON: HCPCS | Performed by: NURSE PRACTITIONER

## 2025-04-14 PROCEDURE — 99214 OFFICE O/P EST MOD 30 MIN: CPT | Performed by: NURSE PRACTITIONER

## 2025-04-14 PROCEDURE — 94726 PLETHYSMOGRAPHY LUNG VOLUMES: CPT | Performed by: NURSE PRACTITIONER

## 2025-04-14 NOTE — PROGRESS NOTES
Heart Failure Clinic    Date:  04/14/25     Vitals:    04/14/25 1005   BP: 133/79   Pulse: 81   SpO2: 99%        Indication:  Heart Failure    Procedure:  ReDS device sensor unit applied to right side of chest and right side of back.  Appropriate positioning confirmed based off of the unit's calculation.  Chest measurement obtained with the chest size ruler.  Measurement session performed over 45 seconds.      Results:  ReDS Value=30    Interpretation:  25-35% - normal/ideal lung fluid content    Rosario Jessica RN 04/14/25 10:18 CDT

## 2025-04-14 NOTE — PROGRESS NOTES
Heart Failure Clinic Progress Note  Reason For Visit:  CHF    Subjective        Darren Maria is a 79 y.o. male with the below pertinent PMH who presents for follow-up of CHF.    Darren Maria was most recently seen on 1/13/2025.  At that time he was reporting that he was doing well.  He had to take torsemide on a couple of occasions but otherwise he was doing well.  He was down by 2 pounds and his ReDs reading was normal at 33%.  No new changes were made at that point.    He indicates that he feels decently well today.  He did get admitted for possible TIA versus stroke on 2/10/2025.  He is recovering from this well.  He otherwise denies any heart failure symptoms such as lightheadedness, dizziness, chest pain, peripheral edema, or shortness of breath.   His weight today is down by 3 pounds and his ReDs reading has normal at 30%.    Review of Systems   Constitutional:  Negative for fatigue.   Respiratory:  Negative for shortness of breath.    Cardiovascular:  Negative for chest pain and leg swelling.        Pertinent PMH  - Chronic systolic congestive heart failure (EF 36 to 40%)  - Coronary artery disease status post CABG in 1990 (redo CABG in March 2011)  - Ischemic cardiomyopathy  - Hypertension  - Hyperlipidemia  - Permanent atrial fibrillation  - Complete heart block status post AV node ablation  - Status post BiV ICD with lead revision in March 2024  - Left atrial appendage ligation in 2011  - History of renal cell carcinoma status post right nephrectomy in 2000  - Iron deficiency anemia    Pertinent past medical, surgical, family, and social history were reviewed.      Current Outpatient Medications:     aspirin 81 MG EC tablet, Take 1 tablet by mouth Daily., Disp: , Rfl:     atorvastatin (LIPITOR) 40 MG tablet, TAKE 1 TABLET BY MOUTH EVERY DAY AT NIGHT, Disp: 90 tablet, Rfl: 3    cetirizine (zyrTEC) 10 MG tablet, Take 1 tablet by mouth Daily As Needed for Allergies., Disp: , Rfl:      "finasteride (PROSCAR) 5 MG tablet, Take 1 tablet by mouth Daily., Disp: , Rfl:     isosorbide mononitrate (IMDUR) 30 MG 24 hr tablet, Take 1 tablet by mouth Daily., Disp: 30 tablet, Rfl: 5    Jardiance 10 MG tablet tablet, TAKE 1 TABLET BY MOUTH EVERY DAY, Disp: 90 tablet, Rfl: 3    nitroglycerin (NITROSTAT) 0.4 MG SL tablet, Place 1 tablet under the tongue Every 5 (Five) Minutes As Needed for Chest Pain. Take no more than 3 doses in 15 minutes., Disp: 25 tablet, Rfl: 2    omeprazole (PriLOSEC) 40 MG capsule, Take 1 capsule by mouth Daily., Disp: , Rfl:     pregabalin (LYRICA) 150 MG capsule, Take 1 capsule by mouth 2 (Two) Times a Day., Disp: , Rfl:     ranolazine (RANEXA) 1000 MG 12 hr tablet, TAKE 1 TABLET BY MOUTH TWICE A DAY, Disp: 180 tablet, Rfl: 3    Sotalol HCl AF 80 MG tablet, TAKE 1 TABLET BY MOUTH TWICE A DAY, Disp: 180 tablet, Rfl: 3    spironolactone (ALDACTONE) 25 MG tablet, Take 1 tablet by mouth Daily., Disp: , Rfl:     Suvorexant (Belsomra) 10 MG tablet, Take 1 tablet by mouth At Night As Needed., Disp: , Rfl:     torsemide (DEMADEX) 20 MG tablet, Take 1 tablet by mouth As Needed., Disp: , Rfl:     traMADol-acetaminophen (ULTRACET) 37.5-325 MG per tablet, Take 1 tablet by mouth Every 12 (Twelve) Hours As Needed for Moderate Pain (pain)., Disp: , Rfl: 1    Cyanocobalamin 1000 MCG/ML kit, Inject 1 mL as directed Every 30 (Thirty) Days. On Fridays till 11/11/23 then monthly thereafter., Disp: , Rfl:      Objective   Vital Signs:  /79 (BP Location: Left arm, Patient Position: Sitting)   Pulse 81   Wt 87.9 kg (193 lb 12.8 oz)   SpO2 99%   BMI 27.04 kg/m²   Estimated body mass index is 27.04 kg/m² as calculated from the following:    Height as of 3/24/25: 180.3 cm (70.98\").    Weight as of this encounter: 87.9 kg (193 lb 12.8 oz).    Constitutional:       Appearance: Healthy appearance.   Neck:      Vascular: JVD normal.   Pulmonary:      Effort: Pulmonary effort is normal.      Breath sounds: " Normal breath sounds.   Cardiovascular:      PMI at left midclavicular line. Normal rate. Regular rhythm. Normal S1. Normal S2.       Murmurs: There is no murmur.      No gallop.  No click. No rub.   Pulses:     Intact distal pulses.   Edema:     Peripheral edema absent.   Abdominal:      Palpations: Abdomen is soft.      Tenderness: There is no abdominal tenderness.   Skin:     General: Skin is warm.   Neurological:      Mental Status: Oriented to person, place and time.        Result Review :  The following data was reviewed by: EVELIA Bhardwaj on 09/03/2024:  BMP   BMP          1/13/2025    10:30 3/26/2025    10:35 4/14/2025    10:03   BMP   BUN 19  18  19    Creatinine 0.98  1.03  0.85    Sodium 141  140  140    Potassium 4.7  4.2  4.5    Chloride 105  105  104    CO2 26.0  26.0  24.0    Calcium 9.5  9.0  9.3      CBC   CBC          2/12/2025    05:32 2/13/2025    04:29 3/26/2025    10:35   CBC   WBC 7.8     7.1     6.85    RBC 4.8     4.8     4.45    Hemoglobin 16.2     16     15.1    Hematocrit 47.3     47.2     45.8    MCV 98.5     98.3     102.9    MCH 33.8     33.3     33.9    MCHC 34.2     33.9     33.0    RDW 13.3     13.2     13.6    Platelets 179     183     206       Details          This result is from an external source.             ReDS   Lab Results   Component Value Date    ABSOLUTELUNG 30 04/14/2025    ABSOLUTELUNG 33 01/13/2025    ABSOLUTELUNG 32 10/29/2024           Assessment and Plan   Diagnoses and all orders for this visit:    1. Chronic combined systolic and diastolic congestive heart failure (Primary)  2. Presence of biventricular AICD  3. Ischemic cardiomyopathy  He is doing well and has no new concerns today.  He is euvolemic on examination.  He has not needed torsemide and only maybe 1 or 2 occasions since last being seen.  - Etiology: Ischemic  - LVEF: 41-45%  - NYHA Class: II  - BB: Sotalol 80mg daily   - ACEi/ARB/ARNi: Discontinued due to intolerance  - SGLT2i: Jardiance 10mg  daily  - MRA: Spironolactone 25 mg daily  - Diuretics: Torsemide 20mg as needed.   - Electrolytes: Stable on Labs today  - ICD/CRT: CRT-D in place.  - Daily Weight: Stable.  Continue to recommend daily weights    4. Coronary artery disease of bypass graft of native heart with stable angina pectoris  5. Essential hypertension  Continues to remain angina free.  This appears to be stable.  - Will check lipid panel today  - Continue Aspirin 81mg daily  - Continue Imdur 30mg daily  - Continue Ranexa 1000mg BID.   - Continue Lipitor 40mg nightly    6. S/P AV sam ablation  7. Permanent atrial fibrillation  Following with EP.   - Continue Aspirin as above.  He is s/p left atrial appendage ligation  - Sotalol as above       Follow Up   Return in about 3 months (around 7/14/2025) for recheck.    Patient was given instructions and counseling regarding his condition or for health maintenance advice. Please see specific information pulled into the AVS if appropriate.       Joey Trent, EVELIA  04/14/25  12:54 CDT

## 2025-04-21 ENCOUNTER — RESULTS FOLLOW-UP (OUTPATIENT)
Dept: OTOLARYNGOLOGY | Facility: CLINIC | Age: 80
End: 2025-04-21
Payer: MEDICARE

## 2025-04-21 ENCOUNTER — HOSPITAL ENCOUNTER (OUTPATIENT)
Dept: CT IMAGING | Facility: HOSPITAL | Age: 80
Discharge: HOME | End: 2025-04-21
Admitting: NURSE PRACTITIONER
Payer: MEDICARE

## 2025-04-21 DIAGNOSIS — R42 DIZZINESS: Chronic | ICD-10-CM

## 2025-04-21 DIAGNOSIS — Z86.73 HISTORY OF RECENT STROKE: ICD-10-CM

## 2025-04-21 DIAGNOSIS — H91.8X3 ASYMMETRICAL HEARING LOSS: Chronic | ICD-10-CM

## 2025-04-21 LAB — CREAT BLDA-MCNC: 1.1 MG/DL (ref 0.6–1.3)

## 2025-04-21 PROCEDURE — 82565 ASSAY OF CREATININE: CPT

## 2025-04-21 PROCEDURE — 25510000001 IOPAMIDOL 61 % SOLUTION: Performed by: NURSE PRACTITIONER

## 2025-04-21 PROCEDURE — 70482 CT ORBIT/EAR/FOSSA W/O&W/DYE: CPT

## 2025-04-21 RX ORDER — IOPAMIDOL 612 MG/ML
100 INJECTION, SOLUTION INTRAVASCULAR
Status: COMPLETED | OUTPATIENT
Start: 2025-04-21 | End: 2025-04-21

## 2025-04-21 RX ADMIN — IOPAMIDOL 100 ML: 612 INJECTION, SOLUTION INTRAVENOUS at 10:58

## 2025-04-21 NOTE — TELEPHONE ENCOUNTER
Patient notified.  Report sent to pcp      ----- Message from Yolanda Ward sent at 4/21/2025  1:05 PM CDT -----  Ct ok. Needs to follow up with pcp re eval of carotids  ----- Message -----  From: Interface, Rad Results [place]  Sent: 4/21/2025   1:02 PM CDT  To: EVELIA Nielsen

## 2025-04-22 ENCOUNTER — OFFICE VISIT (OUTPATIENT)
Dept: ONCOLOGY | Facility: CLINIC | Age: 80
End: 2025-04-22
Payer: MEDICARE

## 2025-04-22 VITALS
WEIGHT: 196.4 LBS | OXYGEN SATURATION: 99 % | HEIGHT: 71 IN | SYSTOLIC BLOOD PRESSURE: 122 MMHG | HEART RATE: 75 BPM | BODY MASS INDEX: 27.5 KG/M2 | TEMPERATURE: 97.8 F | DIASTOLIC BLOOD PRESSURE: 82 MMHG | RESPIRATION RATE: 12 BRPM

## 2025-04-22 DIAGNOSIS — D50.9 IRON DEFICIENCY ANEMIA, UNSPECIFIED IRON DEFICIENCY ANEMIA TYPE: Primary | ICD-10-CM

## 2025-04-22 DIAGNOSIS — D75.89 MACROCYTOSIS: ICD-10-CM

## 2025-04-22 PROCEDURE — 99213 OFFICE O/P EST LOW 20 MIN: CPT | Performed by: NURSE PRACTITIONER

## 2025-04-22 PROCEDURE — 3074F SYST BP LT 130 MM HG: CPT | Performed by: NURSE PRACTITIONER

## 2025-04-22 PROCEDURE — 3079F DIAST BP 80-89 MM HG: CPT | Performed by: NURSE PRACTITIONER

## 2025-04-22 PROCEDURE — 1125F AMNT PAIN NOTED PAIN PRSNT: CPT | Performed by: NURSE PRACTITIONER

## 2025-04-22 RX ORDER — CLOPIDOGREL BISULFATE 75 MG/1
1 TABLET ORAL DAILY
COMMUNITY
Start: 2025-01-23

## 2025-04-22 RX ORDER — MULTIVIT-MIN/IRON/FOLIC ACID/K 18-600-40
CAPSULE ORAL
COMMUNITY

## 2025-04-22 RX ORDER — ACETAMINOPHEN 325 MG/1
2 TABLET ORAL EVERY 6 HOURS PRN
COMMUNITY

## 2025-04-22 RX ORDER — VIBEGRON 75 MG/1
1 TABLET, FILM COATED ORAL DAILY
COMMUNITY

## 2025-04-22 RX ORDER — FLUTICASONE PROPIONATE 50 MCG
SPRAY, SUSPENSION (ML) NASAL
COMMUNITY
Start: 2025-02-07

## 2025-04-22 NOTE — PROGRESS NOTES
[facility] GROUP HEMATOLOGY & ONCOLOGY  [address]  [facility] [address] [phone]    Patient Name: Darren Maria  Encounter Date: 04/22/2025   YOB: 1945  Patient Number: [id number]    PROGRESS NOTE     HISTORY OF PRESENT ILLNESS: Darren Maria is a 80 y.o. male followed by this office for iron deficiency. History is obtained from patient. History is considered to be accurate.    He has health history significant for Hx of Renal Cell Cancer approx 20 years ago s/p nephrectomy, AV node ablation, CHF, Hyperlipidemia, A Fib, Presence of a pacemaker, 2 CABG      He has received IV iron previously.  Ferrlecit October , 31, Nov 1, 2, 3, 2023  He Had injectafer on May 1 and 16, 2024  Ferrlecit on May 6, 7, 2024    Pt reports he has stopped all anticoagulation.       He received Injectafer 750 mg on 5/16/24 and tolerated it well     INTERVAL HISTORY      History of Present Illness  The patient presents for continued follow up.      A transient ischemic attack (TIA) occurred on 02/11/2025, necessitating hospitalization until 02/13/2025. The etiology of the TIA remains undetermined due to the inability to perform an MRI, as his pacemaker is not MRI-compatible.    Speech difficulties are currently experienced, which are attributed to the TIA. Speech therapy is being undertaken at [facility].    A recent CT scan revealed potential carotid artery issues, prompting a referral to a vascular specialist. Progressive hearing loss in the left ear is reported, initially suspected to be due to a blockage or tumor. Subsequent evaluations have ruled out these possibilities.  He had labs drawn today and results were reviewed in the office.     LABS    Lab Results - Last 18 Months   Lab Units 03/26/25  1035 02/13/25  0429 02/12/25  0532 02/10/25  2350 02/10/25  1919 01/08/25  1024 09/25/24  1318 07/15/24  1148 06/26/24  1334  05/23/24  1247 05/15/24  0946   HEMOGLOBIN g/dL 15.1 16 16.2 16.2 17.6 16.2 14.2   < > 12.6* 12.3* 11.2*  11.3*   HEMATOCRIT % 45.8 47.2 47.3 47.6 51.5 48.8 43.0   < > 40.0 40.5 38.8  37.8   MCV fL 102.9* 98.3* 98.5* 99* 98.8* 101.0* 102.1*   < > 94.3 92.9 92.4  91.7   WBC 10*3/mm3 6.85 7.1 7.8 7.4 7.2 7.31 5.52   < > 5.46 4.85 5.89  6.19   RDW % 13.6 13.2 13.3 13.7 13.7 13.5 13.9   < > 21.7* 27.4* 25.9*  25.7*   MPV fL 9.0 9* 9.3* 9.2* 9.7 9.2 9.4   < > 8.9 8.5 10.0  9.5   PLATELETS 10*3/mm3 206 183 179 202 243 219 168   < > 187 220 220  208   IMM GRAN % % 0.6*  --   --   --   --  0.7* 0.5  --  0.5 0.6* 0.5   NEUTROS ABS 10*3/mm3 5.17 5.2 5.8 5.6 5.6 5.53 3.95  --  4.14 3.73 4.47   LYMPHS ABS 10*3/mm3 0.87 0.9* 0.9* 0.9* 0.7* 0.95 0.82  --  0.67* 0.53* 0.62*   MONOS ABS 10*3/mm3 0.59 0.8 0.9 0.7 0.7 0.58 0.58  --  0.47 0.40 0.60   EOS ABS 10*3/mm3 0.14 0.1 0.1 0.1 0.1 0.16 0.12  --  0.11 0.12 0.14   BASOS ABS 10*3/mm3 0.04 0.1 0.1 0 0.1 0.04 0.02  --  0.04 0.04 0.03   IMMATURE GRANS (ABS) 10*3/mm3 0.04 0.1 0 0.1 0.1 0.05 0.03  --  0.03 0.03 0.03   NRBC /100 WBC 0.0  --   --   --   --  0.0 0.0  --  0.0 0.0 0.0    < > = values in this interval not displayed.       Lab Results - Last 18 Months   Lab Units 04/21/25  1037 04/14/25  1003 03/26/25  1035 01/13/25  1030 01/08/25  1024 10/29/24  1046 09/25/24  1318 07/15/24  1148 06/26/24  1334 05/23/24  1247 05/15/24  0946   GLUCOSE mg/dL  --  146* 144* 142* 205* 135* 140*   < > 114* 121* 162*  162*   SODIUM mmol/L  --  140 140 141 140 141 140   < > 144 143 142  142   POTASSIUM mmol/L  --  4.5 4.2 4.7 4.6 4.7 4.8   < > 4.5 4.3 4.4  4.4   CO2 mmol/L  --  24.0 26.0 26.0 27.0 30.0* 26.0   < > 28.0 26.0 25.0  25.0   CHLORIDE mmol/L  --  104 105 105 101 104 106   < > 109* 109* 107  107   ANION GAP mmol/L  --  12.0 9.0 10.0 12.0 7.0 8.0   < > 7.0 8.0 10.0  10.0   CREATININE mg/dL 1.10 0.85 1.03 0.98 0.87 0.99 0.90   < > 0.92 0.83 0.87  0.87   BUN mg/dL  --  19 18 19  "18 17 18   < > 15 11 11  11   BUN / CREAT RATIO   --  22.4 17.5 19.4 20.7 17.2 20.0   < > 16.3 13.3 12.6  12.6   CALCIUM mg/dL  --  9.3 9.0 9.5 9.6 9.4 9.1   < > 9.0 9.2 8.7  8.7   ALK PHOS U/L  --   --  66  --  87  --  67  --  67 71 65   TOTAL PROTEIN g/dL  --   --  6.6  --  7.2  --  6.5  --  6.1 6.8 6.3   ALT (SGPT) U/L  --   --  21  --  27  --  14  --  12 12 10   AST (SGOT) U/L  --   --  23  --  27  --  21  --  16 19 18   BILIRUBIN mg/dL  --   --  1.1  --  0.9  --  1.2  --  0.7 0.7 0.7   ALBUMIN g/dL  --   --  4.1  --  4.4  --  4.1  --  4.0 4.2 3.9   GLOBULIN gm/dL  --   --  2.5  --  2.8  --  2.4  --  2.1 2.6 2.4    < > = values in this interval not displayed.       Lab Results - Last 18 Months   Lab Units 05/23/24  1247   LDH U/L 186       Lab Results - Last 18 Months   Lab Units 03/26/25  1035 02/10/25  1948 01/08/25  1024 09/25/24  1318 06/26/24  1334 05/23/24  1247 05/15/24  0946   IRON mcg/dL 138  --  140 122 104 90 92   TIBC mcg/dL 355  --  370 334 352 355 331   IRON SATURATION (TSAT) % 39  --  38 37 30 25 28   FERRITIN ng/mL 143.50  --  176.00 187.30 138.10 615.10* 369.70   TSH uIU/mL  --  2.28  --   --   --   --   --    FOLATE ng/mL 14.00  --  14.30  --   --  7.39  --          PAST MEDICAL HISTORY:  ALLERGIES:  Allergies   Allergen Reactions    Morphine And Codeine Other (See Comments)     SHAKES AND MAKES HIM \"WIRED\".   TAKES TRAMADOL WITHOUT PROBLEM     CURRENT MEDICATIONS:  Outpatient Encounter Medications as of 4/22/2025   Medication Sig Dispense Refill    acetaminophen (TYLENOL) 325 MG tablet Take 2 tablets by mouth Every 6 (Six) Hours As Needed.      aspirin 81 MG EC tablet Take 1 tablet by mouth Daily.      atorvastatin (LIPITOR) 40 MG tablet TAKE 1 TABLET BY MOUTH EVERY DAY AT NIGHT 90 tablet 3    cetirizine (zyrTEC) 10 MG tablet Take 1 tablet by mouth Daily As Needed for Allergies.      clopidogrel (PLAVIX) 75 MG tablet Take 1 tablet by mouth Daily.      Cyanocobalamin 1000 MCG/ML kit Inject 1 " mL as directed Every 30 (Thirty) Days. On Fridays till 11/11/23 then monthly thereafter.      finasteride (PROSCAR) 5 MG tablet Take 1 tablet by mouth Daily.      fluticasone (FLONASE) 50 MCG/ACT nasal spray INSTILL 1 SPRAY IN THE NOSTRILS EVERY DAY      Gemtesa 75 MG tablet Take 1 tablet by mouth Daily.      isosorbide mononitrate (IMDUR) 30 MG 24 hr tablet Take 1 tablet by mouth Daily. 30 tablet 5    Jardiance 10 MG tablet tablet TAKE 1 TABLET BY MOUTH EVERY DAY 90 tablet 3    nitroglycerin (NITROSTAT) 0.4 MG SL tablet Place 1 tablet under the tongue Every 5 (Five) Minutes As Needed for Chest Pain. Take no more than 3 doses in 15 minutes. 25 tablet 2    pregabalin (LYRICA) 150 MG capsule Take 1 capsule by mouth 2 (Two) Times a Day.      ranolazine (RANEXA) 1000 MG 12 hr tablet TAKE 1 TABLET BY MOUTH TWICE A  tablet 3    Sotalol HCl AF 80 MG tablet TAKE 1 TABLET BY MOUTH TWICE A  tablet 3    spironolactone (ALDACTONE) 25 MG tablet Take 1 tablet by mouth Daily.      Suvorexant (Belsomra) 10 MG tablet Take 1 tablet by mouth At Night As Needed.      torsemide (DEMADEX) 20 MG tablet Take 1 tablet by mouth As Needed.      traMADol-acetaminophen (ULTRACET) 37.5-325 MG per tablet Take 1 tablet by mouth Every 12 (Twelve) Hours As Needed for Moderate Pain (pain).  1    Vitamin D, Cholecalciferol, 25 MCG (1000 UT) tablet take 1 tablet by mouth every day for 30 days      [DISCONTINUED] omeprazole (PriLOSEC) 40 MG capsule Take 1 capsule by mouth Daily.       No facility-administered encounter medications on file as of 4/22/2025.     ADULT ILLNESSES:  Patient Active Problem List   Diagnosis Code    Coronary artery disease involving coronary bypass graft of native heart without angina pectoris I25.810    Essential hypertension I10    Presence of biventricular AICD Z95.810    Ischemic cardiomyopathy I25.5    Cancer of kidney C64.9    BPH (benign prostatic hypertrophy) with urinary obstruction N40.1, N13.8     Impotence due to erectile dysfunction N52.9    Benign prostatic hyperplasia with lower urinary tract symptoms N40.1    Cancer of kidney, right C64.1    Erectile dysfunction N52.9    Permanent atrial fibrillation I48.21    History of renal cell cancer Z85.528    Malfunction of penile prosthesis T83.490A    Urine frequency R35.0    S/P AV sam ablation Z98.890    Chronic combined systolic and diastolic congestive heart failure I50.42    Mixed hyperlipidemia E78.2    Hx of adenomatous colonic polyps Z86.0101    Shockable cardiac rhythm detected by automated external defibrillator I49.9    CHB (complete heart block) I44.2    Ventricular tachycardia (paroxysmal) I47.20    Symptomatic anemia D64.9    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, implants and grafts, subsequent encounter T82.828D    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, implants and grafts, initial encounter T82.828A    Dyspnea R06.00    Weakness R53.1    Iron deficiency anemia D50.9    Gastrointestinal bleeding, upper K92.2    GI bleed K92.2    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    Dizziness R42    History of recent stroke Z86.73    Speech or language deficit, post-stroke I69.328       HEALTH MAINTENANCE ITEMS:  Health Maintenance Due   Topic Date Due    DIABETIC FOOT EXAM  Never done    URINE MICROALBUMIN-CREATININE RATIO (uACR)  Never done    Pneumococcal Vaccine 50+ (1 of 2 - PCV) Never done    ZOSTER VACCINE (2 of 3) 08/04/2020    RSV Vaccine - Adults (1 - 1-dose 75+ series) Never done    DIABETIC EYE EXAM  04/26/2022    COVID-19 Vaccine (5 - 2024-25 season) 09/01/2024    ANNUAL WELLNESS VISIT  12/06/2024       <no information>  Last Completed Colonoscopy            Upcoming       COLORECTAL CANCER SCREENING (COLONOSCOPY - Every 5 Years) Next due on 5/7/2029 05/07/2024  Surgical Procedure: COLONOSCOPY    05/07/2024  Colonoscopy    05/06/2024  Outside Procedure: COLONOSCOPY    05/03/2024  Fecal Occult Blood component of POC Occult Blood  "Stool    03/21/2022  Surgical Procedure: COLONOSCOPY     Only the first 5 history entries have been loaded, but more history exists.                        Immunization History   Administered Date(s) Administered    COVID-19 (PFIZER) BIVALENT 12+YRS 11/03/2022    COVID-19 (PFIZER) Purple Cap Monovalent 01/21/2021, 02/14/2021, 11/08/2021     Last Completed Mammogram    This patient has no relevant Health Maintenance data.           FAMILY HISTORY:  Family History   Problem Relation Age of Onset    Lung cancer Mother     Heart attack Mother     No Known Problems Father     Other Neg Hx         GI Malignancies    Colon cancer Neg Hx     Colon polyps Neg Hx      SOCIAL HISTORY:  Social History     Socioeconomic History    Marital status:    Tobacco Use    Smoking status: Never    Smokeless tobacco: Never   Vaping Use    Vaping status: Never Used   Substance and Sexual Activity    Alcohol use: Not Currently     Comment: occ    Drug use: Never    Sexual activity: Not Currently     Partners: Female     Birth control/protection: Vasectomy       REVIEW OF SYSTEMS:  Review of Systems   Constitutional:  Positive for appetite change and fatigue. Negative for activity change, fever, unexpected weight gain and unexpected weight loss.   HENT:  Negative for dental problem, facial swelling, swollen glands and trouble swallowing.    Eyes:  Negative for double vision and discharge.   Respiratory:  Positive for shortness of breath. Negative for cough and wheezing.    Cardiovascular:  Negative for chest pain, palpitations and leg swelling.        Describes it as \"pressure\" but states it has increase of hemoglboin   Gastrointestinal:  Positive for nausea. Negative for abdominal pain, blood in stool and vomiting.   Endocrine: Negative.    Genitourinary:  Negative for dysuria and hematuria.   Musculoskeletal:  Negative for arthralgias and myalgias.   Skin:  Negative for rash, skin lesions and wound.   Allergic/Immunologic: Negative " "for immunocompromised state.   Neurological:  Negative for speech difficulty, light-headedness, headache, memory problem and confusion.   Hematological:  Negative for adenopathy.   Psychiatric/Behavioral:  Negative for self-injury, suicidal ideas and depressed mood. The patient is not nervous/anxious.        /82   Pulse 75   Temp 97.8 °F (36.6 °C) (Temporal)   Resp 12   Ht 180.3 cm (71\")   Wt 89.1 kg (196 lb 6.4 oz)   SpO2 99%   BMI 27.39 kg/m²  Body surface area is 2.09 meters squared.    Pain Score    04/22/25 1112   PainSc: 8    PainLoc: Comment: neuropathy        Physical Exam  Constitutional:       Appearance: Normal appearance.   HENT:      Head: Normocephalic and atraumatic.   Cardiovascular:      Rate and Rhythm: Normal rate and regular rhythm.   Pulmonary:      Effort: Pulmonary effort is normal.      Breath sounds: Normal breath sounds.   Abdominal:      General: Bowel sounds are normal.      Palpations: Abdomen is soft.   Musculoskeletal:      Right lower leg: No edema.      Left lower leg: No edema.   Skin:     General: Skin is warm and dry.   Neurological:      Mental Status: He is alert and oriented to person, place, and time.      Comments: Aphasia    Psychiatric:         Attention and Perception: Attention normal.         Mood and Affect: Mood normal.         Judgment: Judgment normal.         Darren Maria reports a pain score of 8.  Given his pain assessment as noted, treatment options were discussed and the following options were decided upon as a follow-up plan to address the patient's pain: continuation of current treatment plan for pain.      ASSESSMENT / PLAN:    1. Iron deficiency anemia, unspecified iron deficiency anemia type    2. Macrocytosis      Labs today stable.   Hgb 15.1, Hct 45.8, Iron 138, Ferritin 143, Sat 39%, TIBC 355, B12 463, Folate 14.    -There is no B12 or Folate Deficiency.  No listed medication that is known to cause megoblastic changes.   -Stable for " observation    Refer patient back PCP to follow and RTC PRN          PLAN:   Stable for observation  Continue current medications, treatment plans and follow up with PCP and any other providers  Care discussed with patient.  Understanding expressed.  Patient agreeable with plan.           Neetu Barron, APRN  04/22/2025

## 2025-04-28 ENCOUNTER — HOSPITAL ENCOUNTER (OUTPATIENT)
Facility: HOSPITAL | Age: 80
Setting detail: THERAPIES SERIES
Discharge: HOME | End: 2025-04-28
Payer: MEDICARE

## 2025-04-28 DIAGNOSIS — R47.89 WORD FINDING DIFFICULTY: ICD-10-CM

## 2025-04-28 DIAGNOSIS — R41.89 OTHER SYMPTOMS AND SIGNS INVOLVING COGNITIVE FUNCTIONS AND AWARENESS: Primary | ICD-10-CM

## 2025-04-28 PROCEDURE — 92507 TX SP LANG VOICE COMM INDIV: CPT

## 2025-04-28 NOTE — PROGRESS NOTES
Speech Language Pathology Treatment Note and 30 Day Progress Note  [address] [place] [address]    Patient: Darren Maria                                                                                     Visit Date: 2025  :     1945    Referring practitioner:    Rolan Salvador MD  Date of Initial Visit:          Type: THERAPY  Episode: Memory/Word Finding      Visit Diagnoses:    ICD-10-CM ICD-9-CM   1. Other symptoms and signs involving cognitive functions and awareness  R41.89 799.59   2. Word finding difficulty  R47.89 V40.1     SUBJECTIVE     Darren was alert and ready to participate.  He stated that he is about the same today with how his word finding is going and would like to come for one more session.    Pain: Pain rating not applicable to this diagnosis.    OBJECTIVE   GOALS  Goals                                         STG Comments Status   Patient to name 30 items with 80% accuracy across 3 consecutive sessions.     Able to complete without difficulty during divergent naming task. (1/3 criteria)  Going   Patient to recall information from a short paragraph with 80% accuracy across 3 consecutive sessions.     Targeted goal by using advanced cognition activity where he had to recall the map key to complete directions task on the other page with 82% accuracy.    Ongoing   Patient to target divergent naming task to improve word finding skills with 80% accuracy across 3 consecutive sessions.     Targeted goal while playing Tapple with min difficulty. He was able to describe the object and then state it after description. (1/3 criteria)  Ongoing           LTG by:        In 3 months, patient to improve cognitive function as measured by testing or clinical observation.     Showing improvement with word finding and cognition skills at this time. He continues to feel frustrated when he is unable to think of words,  but his wife is helpful at home with word finding and modeling the word he is looking to say.    Ongoing   Patient to follow HEP.     Patient understood strategies to use at home with describing items that he is trying to say. Ongoing       Therapy Education/Self Care    Details: Word finding strategies   Given home strategies   Progress: Progressed   Education provided to:  Patient   Level of understanding Verbalized           CPT Code:   97192 Speech/Language/Cognitive Treatment  Total Time of Visit:             45   mins         ASSESSMENT/PLAN     ASSESSMENT: Patient completed divergent naming task with min verbal and visual prompts playing Tapple. Then targeted recall and following directions using advanced cognitive task with min verbal and visual cues. Seeing improvement in word finding tasks.     PLAN: Continue therapy  Frequency: 1x/ Week  Duration: 12 weeks    Progress Note Due Date:05/27/2024  Recertification Due Date:02/25/2025 through 05/25/2025    SIGNATURE: Bethanie BAL CCC-SLP, [place] License #: [id number]  Electronically Signed on 4/28/2025          Roxanne Schultz  [place] [address]  [phone]

## 2025-04-29 ENCOUNTER — TELEPHONE (OUTPATIENT)
Dept: NEUROLOGY | Age: 80
End: 2025-04-29

## 2025-04-29 NOTE — TELEPHONE ENCOUNTER
Huey called this afternoon requesting a follow up appt as advised by his PCP. Several tests have been carried out. [facility] is unable to accommodate within the requested time frame.    Thank you.

## 2025-04-30 ENCOUNTER — TRANSCRIBE ORDERS (OUTPATIENT)
Dept: ADMINISTRATIVE | Facility: HOSPITAL | Age: 80
End: 2025-04-30
Payer: MEDICARE

## 2025-05-01 NOTE — TELEPHONE ENCOUNTER
Called patient back had to leave a detailed vm asking him to please return my call that I would get him scheduled with Dr Ray Sargent, just needing him to return my call @ [phone] please

## 2025-05-02 ENCOUNTER — TRANSCRIBE ORDERS (OUTPATIENT)
Dept: ADMINISTRATIVE | Facility: HOSPITAL | Age: 80
End: 2025-05-02
Payer: MEDICARE

## 2025-05-02 DIAGNOSIS — G45.9 TRANSIENT CEREBRAL ISCHEMIA, UNSPECIFIED TYPE: Primary | ICD-10-CM

## 2025-05-12 ENCOUNTER — HOSPITAL ENCOUNTER (OUTPATIENT)
Facility: HOSPITAL | Age: 80
Setting detail: THERAPIES SERIES
Discharge: HOME | End: 2025-05-12
Payer: MEDICARE

## 2025-05-12 DIAGNOSIS — R47.89 WORD FINDING DIFFICULTY: ICD-10-CM

## 2025-05-12 DIAGNOSIS — R41.89 OTHER SYMPTOMS AND SIGNS INVOLVING COGNITIVE FUNCTIONS AND AWARENESS: Primary | ICD-10-CM

## 2025-05-12 PROCEDURE — 92507 TX SP LANG VOICE COMM INDIV: CPT

## 2025-05-12 NOTE — PROGRESS NOTES
Speech Language Pathology Treatment Note and Discharge Note  [address] [place] [address]    Patient: Darren Maria                                                                                     Visit Date: 2025  :     1945    Referring practitioner:    Rolan Salvador MD  Date of Initial Visit:          Type: THERAPY  Episode: Memory/Word Finding      Visit Diagnoses:    ICD-10-CM ICD-9-CM   1. Other symptoms and signs involving cognitive functions and awareness  R41.89 799.59   2. Word finding difficulty  R47.89 V40.1     SUBJECTIVE     Darren was alert and ready to participate.  He stated that he is about the same today with word finding, but understands it can get better with time.     Pain: Pain rating not applicable to this diagnosis.    OBJECTIVE   GOALS  Goals                                         STG Comments Status   Patient to name 30 items with 80% accuracy across 3 consecutive sessions.     Able to complete without difficulty during divergent naming task. Met   Patient to recall information from a short paragraph with 80% accuracy across 3 consecutive sessions.     Targeted goal by using advanced cognition activity where he had to recall the map key to complete directions task on the other page with 89% accuracy.    Met   Patient to target divergent naming task to improve word finding skills with 80% accuracy across 3 consecutive sessions.     Targeted goal while playing Name 5 with min difficulty. He was able to describe the object and then state it after description.  Met           LTG by:        In 3 months, patient to improve cognitive function as measured by testing or clinical observation.     Showing improvement with word finding and cognition skills at this time. He continues to feel frustrated when he is unable to think of words, but his wife is helpful at home with word finding and modeling  the word he is looking to say.    Met   Patient to follow HEP.     Patient understood strategies to use at home with describing items that he is trying to say. Met       Therapy Education/Self Care    Details: Word finding strategies   Given home strategies   Progress: Progressed   Education provided to:  Patient   Level of understanding Verbalized           CPT Code:   20562 Speech/Language/Cognitive Treatment  Total Time of Visit:             45   mins         ASSESSMENT/PLAN     ASSESSMENT: Patient completed divergent naming task with min verbal and visual prompts playing Tapple. Then targeted recall and following directions using advanced cognitive task with min verbal and visual cues. Seeing improvement in word finding tasks.     DISCHARGE SUMMARY   Discharge date 5/12/2025   Dates of this episode 02/025/2025 through 5/12/2025    Number of visits on this episode 7   Reason for discharge all goals met   Outcomes achieved Refer to the goals table for specifics on goals   Discharge plan Continue with current home exercise program as instructed   Summary of care Patient able to use word finding strategies appropriately.    Discharge instruction Practice word finding daily         Progress Note Due Date:05/27/2024  Recertification Due Date:02/25/2025 through 05/25/2025    SIGNATURE: Bethanie BAL CCC-SLP, [place] License #: [id number]  Electronically Signed on 5/12/2025          [place] [address]  [phone]

## 2025-05-16 ENCOUNTER — TELEPHONE (OUTPATIENT)
Dept: CARDIOLOGY | Facility: CLINIC | Age: 80
End: 2025-05-16
Payer: MEDICARE

## 2025-05-16 NOTE — TELEPHONE ENCOUNTER
Called pt for symptom check after receiving alert transmission via [employer] home monitor. Alert shows elevated [employer] at 20. It had previously improved to 10, then increased again over the past 4 days.    Mr Maria reports swelling in bilateral lower extremities and an overnight weight gain of approximately 6 pounds yesterday. Weight is remaining elevated today. Denies SOA, orthopnea or PND.    He verbalized compliance of prescribed home medications. I advised he take 1 Torsemide tab daily today 5/16, tomorrow 5/17, and Sunday 5/18. He voiced understanding.     I advised he call my direct office number with any questions or concerns.

## 2025-06-03 ENCOUNTER — HOSPITAL ENCOUNTER (OUTPATIENT)
Dept: CARDIOLOGY | Facility: HOSPITAL | Age: 80
Discharge: HOME | End: 2025-06-03
Admitting: NURSE PRACTITIONER
Payer: MEDICARE

## 2025-06-03 VITALS
BODY MASS INDEX: 27.39 KG/M2 | DIASTOLIC BLOOD PRESSURE: 67 MMHG | OXYGEN SATURATION: 97 % | WEIGHT: 196.4 LBS | SYSTOLIC BLOOD PRESSURE: 123 MMHG | HEART RATE: 77 BPM

## 2025-06-03 DIAGNOSIS — I50.42 CHRONIC COMBINED SYSTOLIC AND DIASTOLIC CONGESTIVE HEART FAILURE: Primary | ICD-10-CM

## 2025-06-03 DIAGNOSIS — Z98.890 S/P AV NODAL ABLATION: ICD-10-CM

## 2025-06-03 DIAGNOSIS — I25.810 CORONARY ARTERY DISEASE INVOLVING CORONARY BYPASS GRAFT OF NATIVE HEART WITHOUT ANGINA PECTORIS: ICD-10-CM

## 2025-06-03 DIAGNOSIS — I48.21 PERMANENT ATRIAL FIBRILLATION: ICD-10-CM

## 2025-06-03 DIAGNOSIS — I10 ESSENTIAL HYPERTENSION: ICD-10-CM

## 2025-06-03 DIAGNOSIS — I25.5 ISCHEMIC CARDIOMYOPATHY: ICD-10-CM

## 2025-06-03 DIAGNOSIS — Z95.810 PRESENCE OF BIVENTRICULAR AICD: ICD-10-CM

## 2025-06-03 LAB
ANION GAP SERPL CALCULATED.3IONS-SCNC: 14 MMOL/L (ref 5–15)
BUN SERPL-MCNC: 15.1 MG/DL (ref 8–23)
BUN/CREAT SERPL: 15.6 (ref 7–25)
CALCIUM SPEC-SCNC: 9.1 MG/DL (ref 8.6–10.5)
CHLORIDE SERPL-SCNC: 106 MMOL/L (ref 98–107)
CO2 SERPL-SCNC: 22 MMOL/L (ref 22–29)
CREAT SERPL-MCNC: 0.97 MG/DL (ref 0.76–1.27)
DEPRECATED RDW RBC AUTO: 49.5 FL (ref 37–54)
EGFRCR SERPLBLD CKD-EPI 2021: 79.9 ML/MIN/1.73
ERYTHROCYTE [DISTWIDTH] IN BLOOD BY AUTOMATED COUNT: 13.6 % (ref 12.3–15.4)
GLUCOSE SERPL-MCNC: 228 MG/DL (ref 65–99)
HCT VFR BLD AUTO: 44.9 % (ref 37.5–51)
HGB BLD-MCNC: 15.1 G/DL (ref 13–17.7)
MCH RBC QN AUTO: 33.3 PG (ref 26.6–33)
MCHC RBC AUTO-ENTMCNC: 33.6 G/DL (ref 31.5–35.7)
MCV RBC AUTO: 99.1 FL (ref 79–97)
PLATELET # BLD AUTO: 189 10*3/MM3 (ref 140–450)
PMV BLD AUTO: 9 FL (ref 6–12)
POTASSIUM SERPL-SCNC: 4 MMOL/L (ref 3.5–5.2)
RBC # BLD AUTO: 4.53 10*6/MM3 (ref 4.14–5.8)
SODIUM SERPL-SCNC: 142 MMOL/L (ref 136–145)
WBC NRBC COR # BLD AUTO: 6.75 10*3/MM3 (ref 3.4–10.8)

## 2025-06-03 PROCEDURE — 85027 COMPLETE CBC AUTOMATED: CPT | Performed by: NURSE PRACTITIONER

## 2025-06-03 PROCEDURE — 94726 PLETHYSMOGRAPHY LUNG VOLUMES: CPT | Performed by: NURSE PRACTITIONER

## 2025-06-03 PROCEDURE — 99214 OFFICE O/P EST MOD 30 MIN: CPT | Performed by: NURSE PRACTITIONER

## 2025-06-03 PROCEDURE — 80048 BASIC METABOLIC PNL TOTAL CA: CPT | Performed by: NURSE PRACTITIONER

## 2025-06-03 NOTE — PROGRESS NOTES
[facility]    Date:  06/03/25     Vitals:    06/03/25 1502   BP: 123/67   Pulse: 77   SpO2: 97%        Indication:  Heart Failure    Procedure:  ReDS device sensor unit applied to right side of chest and right side of back.  Appropriate positioning confirmed based off of the unit's calculation.  Chest measurement obtained with the chest size ruler.  Measurement session performed over 45 seconds.      Results:  ReDS Value=30    Interpretation:  25-35% - normal/ideal lung fluid content    Rosario Jessica RN 06/03/25 15:23 CDT

## 2025-06-03 NOTE — PROGRESS NOTES
Heart Failure Clinic Progress Note  Reason For Visit:  CHF    Subjective        Darren Maria is a 80 y.o. male with the below pertinent PMH who presents for follow-up of CHF.    Darren Maria was most recently seen on 4/14/2025.  At that time he was feeling decently well and had to be admitted recently for a TIA versus stroke although he was recovering from that as well.  His weight was down by 3 pounds and his ReDs reading was normal.  He had no concerning symptoms for decompensated heart failure at that time.  He had not needed torsemide and no new changes were made to his therapy at that time.    He is doing fairly well today.  He notes that he felt like he was volume overloaded with shortness of breath and lower extremity edema this morning and took torsemide.  He does note dietary indiscretion with eating Chinese food last night.  Otherwise he denies any other persistent symptoms such as lightheadedness, dizziness, or chest pain.  His weight today is stable and up 3 pounds and his ReDs reading is    Review of Systems   Constitutional:  Negative for fatigue.   Respiratory:  Negative for shortness of breath.    Cardiovascular:  Negative for chest pain and leg swelling.        Pertinent PMH  - Chronic systolic congestive heart failure (EF 36 to 40%)  - Coronary artery disease status post CABG in 1990 (redo CABG in March 2011)  - Ischemic cardiomyopathy  - Hypertension  - Hyperlipidemia  - Permanent atrial fibrillation  - Complete heart block status post AV node ablation  - Status post BiV ICD with lead revision in March 2024  - Left atrial appendage ligation in 2011  - History of renal cell carcinoma status post right nephrectomy in 2000  - Iron deficiency anemia    Pertinent past medical, surgical, family, and social history were reviewed.      Current Outpatient Medications:     acetaminophen (TYLENOL) 325 MG tablet, Take 2 tablets by mouth Every 6 (Six) Hours As Needed., Disp: , Rfl:     aspirin  81 MG EC tablet, Take 1 tablet by mouth Daily., Disp: , Rfl:     atorvastatin (LIPITOR) 40 MG tablet, TAKE 1 TABLET BY MOUTH EVERY DAY AT NIGHT, Disp: 90 tablet, Rfl: 3    cetirizine (zyrTEC) 10 MG tablet, Take 1 tablet by mouth Daily As Needed for Allergies., Disp: , Rfl:     clopidogrel (PLAVIX) 75 MG tablet, Take 1 tablet by mouth Daily., Disp: , Rfl:     Cyanocobalamin 1000 MCG/ML kit, Inject 1 mL as directed Every 30 (Thirty) Days. On Fridays till 11/11/23 then monthly thereafter., Disp: , Rfl:     finasteride (PROSCAR) 5 MG tablet, Take 1 tablet by mouth Daily., Disp: , Rfl:     fluticasone (FLONASE) 50 MCG/ACT nasal spray, INSTILL 1 SPRAY IN THE NOSTRILS EVERY DAY, Disp: , Rfl:     Gemtesa 75 MG tablet, Take 1 tablet by mouth Daily., Disp: , Rfl:     isosorbide mononitrate (IMDUR) 30 MG 24 hr tablet, Take 1 tablet by mouth Daily., Disp: 30 tablet, Rfl: 5    Jardiance 10 MG tablet tablet, TAKE 1 TABLET BY MOUTH EVERY DAY, Disp: 90 tablet, Rfl: 3    nitroglycerin (NITROSTAT) 0.4 MG SL tablet, Place 1 tablet under the tongue Every 5 (Five) Minutes As Needed for Chest Pain. Take no more than 3 doses in 15 minutes., Disp: 25 tablet, Rfl: 2    pregabalin (LYRICA) 150 MG capsule, Take 1 capsule by mouth 2 (Two) Times a Day., Disp: , Rfl:     ranolazine (RANEXA) 1000 MG 12 hr tablet, TAKE 1 TABLET BY MOUTH TWICE A DAY, Disp: 180 tablet, Rfl: 3    Sotalol HCl AF 80 MG tablet, TAKE 1 TABLET BY MOUTH TWICE A DAY, Disp: 180 tablet, Rfl: 3    spironolactone (ALDACTONE) 25 MG tablet, Take 1 tablet by mouth Daily., Disp: , Rfl:     Suvorexant (Belsomra) 10 MG tablet, Take 1 tablet by mouth At Night As Needed., Disp: , Rfl:     torsemide (DEMADEX) 20 MG tablet, Take 1 tablet by mouth As Needed., Disp: , Rfl:     traMADol-acetaminophen (ULTRACET) 37.5-325 MG per tablet, Take 1 tablet by mouth Every 12 (Twelve) Hours As Needed for Moderate Pain (pain)., Disp: , Rfl: 1    Vitamin D, Cholecalciferol, 25 MCG (1000 UT) tablet,  "take 1 tablet by mouth every day for 30 days, Disp: , Rfl:      Objective   Vital Signs:  There were no vitals taken for this visit.  Estimated body mass index is 27.39 kg/m² as calculated from the following:    Height as of 4/22/25: 180.3 cm (71\").    Weight as of 4/22/25: 89.1 kg (196 lb 6.4 oz).    Constitutional:       Appearance: Healthy appearance.   Neck:      Vascular: JVD normal.   Pulmonary:      Effort: Pulmonary effort is normal.      Breath sounds: Normal breath sounds.   Cardiovascular:      PMI at left midclavicular line. Normal rate. Regular rhythm. Normal S1. Normal S2.       Murmurs: There is no murmur.      No gallop.  No click. No rub.   Pulses:     Intact distal pulses.   Edema:     Peripheral edema absent.   Abdominal:      Palpations: Abdomen is soft.      Tenderness: There is no abdominal tenderness.   Skin:     General: Skin is warm.   Neurological:      Mental Status: Oriented to person, place and time.      Result Review :  The following data was reviewed by: EVELIA Bhardwaj on 09/03/2024:  BMP   BMP          3/26/2025    10:35 4/14/2025    10:03 4/21/2025    10:37   BMP   BUN 18  19     Creatinine 1.03  0.85  1.10    Sodium 140  140     Potassium 4.2  4.5     Chloride 105  104     CO2 26.0  24.0     Calcium 9.0  9.3       CBC   CBC          2/12/2025    05:32 2/13/2025    04:29 3/26/2025    10:35   CBC   WBC 7.8     7.1     6.85    RBC 4.8     4.8     4.45    Hemoglobin 16.2     16     15.1    Hematocrit 47.3     47.2     45.8    MCV 98.5     98.3     102.9    MCH 33.8     33.3     33.9    MCHC 34.2     33.9     33.0    RDW 13.3     13.2     13.6    Platelets 179     183     206       Details          This result is from an external source.             ReDS   Lab Results   Component Value Date    ABSOLUTELUNG 30 04/14/2025    ABSOLUTELUNG 33 01/13/2025    ABSOLUTELUNG 32 10/29/2024           Assessment and Plan   Diagnoses and all orders for this visit:    1. Chronic combined " systolic and diastolic congestive heart failure (Primary)  2. Presence of biventricular AICD  3. Ischemic cardiomyopathy  Overall he does not appear to be decompensated but does have a little bit of volume overload.  He notes that it is improving with his morning dose of torsemide.  - Etiology: Ischemic  - LVEF: 41-45%  - NYHA Class: II  - BB: Sotalol 80mg daily   - ACEi/ARB/ARNi: Discontinued due to intolerance  - SGLT2i: Jardiance 10mg daily  - MRA: Spironolactone 25 mg daily  - Diuretics: Torsemide 20mg as needed.  Advised to take another dose tomorrow and then increase to watch his weight and symptoms.  - Electrolytes: Stable on Labs today  - ICD/CRT: CRT-D in place.  - Daily Weight: Stable.  Continue to recommend daily weights    4. Coronary artery disease of bypass graft of native heart with stable angina pectoris  5. Essential hypertension  Anginal free today and overall stable.  - Will check lipid panel today  - Continue Aspirin 81mg daily  - Continue Imdur 30mg daily  - Continue Ranexa 1000mg BID.   - Continue Lipitor 40mg nightly    6. S/P AV sam ablation  7. Permanent atrial fibrillation  Following with EP.        Follow Up   No follow-ups on file.    Patient was given instructions and counseling regarding his condition or for health maintenance advice. Please see specific information pulled into the AVS if appropriate.       Joey Trent, EVELIA  06/03/25  15:00 CDT

## 2025-06-05 ENCOUNTER — HOSPITAL ENCOUNTER (OUTPATIENT)
Dept: CT IMAGING | Facility: HOSPITAL | Age: 80
Discharge: HOME | End: 2025-06-05
Payer: MEDICARE

## 2025-06-05 DIAGNOSIS — G45.9 TRANSIENT CEREBRAL ISCHEMIA, UNSPECIFIED TYPE: ICD-10-CM

## 2025-06-05 LAB — CREAT BLDA-MCNC: 1.1 MG/DL (ref 0.6–1.3)

## 2025-06-05 PROCEDURE — 25510000001 IOPAMIDOL PER 1 ML

## 2025-06-05 PROCEDURE — 82565 ASSAY OF CREATININE: CPT

## 2025-06-05 PROCEDURE — 70498 CT ANGIOGRAPHY NECK: CPT

## 2025-06-05 PROCEDURE — 70496 CT ANGIOGRAPHY HEAD: CPT

## 2025-06-05 RX ORDER — IOPAMIDOL 755 MG/ML
100 INJECTION, SOLUTION INTRAVASCULAR
Status: COMPLETED | OUTPATIENT
Start: 2025-06-05 | End: 2025-06-05

## 2025-06-05 RX ADMIN — IOPAMIDOL 100 ML: 755 INJECTION, SOLUTION INTRAVENOUS at 11:22

## 2025-06-09 NOTE — PROGRESS NOTES
Subjective    Mr. Maria is 80 y.o. male    Chief Complaint: Urinary Incontinence    History of Present Illness  Patient presents for 6-month follow-up primarily has chief complaint of worsening urgency and urge incontinence however his symptoms have improved on Gemtesa.  He had been on Myrbetriq but symptoms got worse and he was on the max dose.  He is very pleased with the response to the Gemtesa he can live with his symptoms as they are.    The following portions of the patient's history were reviewed and updated as appropriate: allergies, current medications, past family history, past medical history, past social history, past surgical history and problem list.    Review of Systems   Genitourinary:  Positive for urgency.         Current Outpatient Medications:     acetaminophen (TYLENOL) 325 MG tablet, Take 2 tablets by mouth Every 6 (Six) Hours As Needed., Disp: , Rfl:     aspirin 81 MG EC tablet, Take 1 tablet by mouth Daily., Disp: , Rfl:     atorvastatin (LIPITOR) 40 MG tablet, TAKE 1 TABLET BY MOUTH EVERY DAY AT NIGHT, Disp: 90 tablet, Rfl: 3    cetirizine (zyrTEC) 10 MG tablet, Take 1 tablet by mouth Daily As Needed for Allergies., Disp: , Rfl:     clopidogrel (PLAVIX) 75 MG tablet, Take 1 tablet by mouth Daily., Disp: , Rfl:     Cyanocobalamin 1000 MCG/ML kit, Inject 1 mL as directed Every 30 (Thirty) Days. On Fridays till 11/11/23 then monthly thereafter., Disp: , Rfl:     finasteride (PROSCAR) 5 MG tablet, Take 1 tablet by mouth Daily., Disp: , Rfl:     fluticasone (FLONASE) 50 MCG/ACT nasal spray, INSTILL 1 SPRAY IN THE NOSTRILS EVERY DAY, Disp: , Rfl:     Gemtesa 75 MG tablet, Take 1 tablet by mouth Daily., Disp: , Rfl:     isosorbide mononitrate (IMDUR) 30 MG 24 hr tablet, Take 1 tablet by mouth Daily., Disp: 30 tablet, Rfl: 5    Jardiance 10 MG tablet tablet, TAKE 1 TABLET BY MOUTH EVERY DAY, Disp: 90 tablet, Rfl: 3    nitroglycerin (NITROSTAT) 0.4 MG SL tablet, Place 1 tablet under the tongue  Every 5 (Five) Minutes As Needed for Chest Pain. Take no more than 3 doses in 15 minutes., Disp: 25 tablet, Rfl: 2    pregabalin (LYRICA) 150 MG capsule, Take 1 capsule by mouth 2 (Two) Times a Day., Disp: , Rfl:     ranolazine (RANEXA) 1000 MG 12 hr tablet, TAKE 1 TABLET BY MOUTH TWICE A DAY, Disp: 180 tablet, Rfl: 3    Sotalol HCl AF 80 MG tablet, TAKE 1 TABLET BY MOUTH TWICE A DAY, Disp: 180 tablet, Rfl: 3    spironolactone (ALDACTONE) 25 MG tablet, Take 1 tablet by mouth Daily., Disp: , Rfl:     Suvorexant (Belsomra) 10 MG tablet, Take 1 tablet by mouth At Night As Needed., Disp: , Rfl:     torsemide (DEMADEX) 20 MG tablet, Take 1 tablet by mouth As Needed., Disp: , Rfl:     traMADol-acetaminophen (ULTRACET) 37.5-325 MG per tablet, Take 1 tablet by mouth Every 12 (Twelve) Hours As Needed for Moderate Pain (pain)., Disp: , Rfl: 1    Vitamin D, Cholecalciferol, 25 MCG (1000 UT) tablet, take 1 tablet by mouth every day for 30 days, Disp: , Rfl:     Vibegron (Gemtesa) 75 MG tablet, Take 1 tablet by mouth Daily for 180 days., Disp: 30 tablet, Rfl: 5    Past Medical History:   Diagnosis Date    Abdominal pain, epigastric     Abnormal abdominal exam     ABFND, RADIOLOGICAL, ABDOMINAL AREA     Abnormal ECG     Anticoagulated on Coumadin     Atherosclerosis of coronary artery bypass graft     ATHEROSCLEROSIS, CORONARY, ARTERY BYPASS GRFT    Atrial fibrillation     Cancer of right kidney     right sided kidney cancer    Cardiomyopathy     Cardiomyopathy, primary     CHF (congestive heart failure)     Clotting disorder     Colon polyps     Congenital heart disease     Congestive heart failure     Coronary artery disease     History of CAD/A-Fib/Pacemaker/Defibrillator placement: pt takes Coumadin and plavix therapy as well as ASA daily    Diabetes mellitus     Gastric polyp     Gastroesophageal reflux disease without esophagitis     History of colon polyps     Hypercholesterolemia     Hypertension, essential     Melena      Myocardial infarction     NSAID long-term use     Obesity     Pacemaker     Pacemaker Dependant    Platelet inhibition due to Plavix     Presence of stent in coronary artery     Multiple coronary stenting most recently 4/2008    Single kidney     Only 1 Kidney    Status post surgery     s/p Polypectomy Bleed       Past Surgical History:   Procedure Laterality Date    ABLATION OF DYSRHYTHMIC FOCUS      APPENDECTOMY      CARDIAC ABLATION      CARDIAC CATHETERIZATION      CARDIAC CATHETERIZATION Left 12/15/2021    Procedure: Coronary angiography;  Surgeon: Sarbjit Posadas MD;  Location:  [facility] CATH IN[facility]ASIVE [facility];  Service: Cardiology;  Laterality: Left;    CARDIAC CATHETERIZATION Left 12/15/2021    Procedure: Percutaneous Coronary Intervention;  Surgeon: Sarbjit Posadas MD;  Location:  [facility]ATH [facility]VASIVE [facility];  Service: Cardiology;  Laterality: Left;    CARDIAC DEFIBRILLATOR PLACEMENT      CARDIAC PACEMAKER PLACEMENT      Pacemaker Placement    CAROTID STENT  2001    CATARACT EXTRACTION, BILATERAL      CHOLECYSTECTOMY      COLONOSCOPY  08/27/2013    snare hep, trans tics recall 3 yr    COLONOSCOPY  10/02/2006    few scattered diverticula    COLONOSCOPY  07/22/2009    tubular adenoma in the ascending colon    COLONOSCOPY  07/25/2005    several polyps in the ascending colon, one in the transverse colon    COLONOSCOPY N/A 03/21/2022    Procedure: COLONOSCOPY WITH ANESTHESIA;  Surgeon: Phil Goodwin MD;  Location: [facility] ENDOSCOPY;  Service: Gastroenterology;  Laterality: N/A;  pre: hx polyps  post: polyps  Rolan Salvador MD    COLONOSCOPY N/A 05/07/2024    The examined portion of the ileum was normal. - Two diminutive tubular adenoma polyps in the distal transverse colon, removed with a cold snare. Resected and retrieved. - Diverticulosis in the sigmoid colon. - Non-bleeding hemorrhoids. - The examination was otherwise normal    COLONOSCOPY W/ POLYPECTOMY  09/19/2016    Tubular adenoma hepatic  flexure, 2 Hyperplastic polyps rectum and at 50 cm, Benign polyp at 70 cm repeat exam in 3-5 years    CORONARY ARTERY BYPASS GRAFT  02/2011    2nd time    CORONARY ARTERY BYPASS GRAFT  1990    CORONARY STENT PLACEMENT      ENDOSCOPY  04/14/2016    nl slant    ENDOSCOPY  11/10/2014    clips at polypectomy bx no residual recall 1 yr    ENDOSCOPY  06/19/2014    snare clip body recall 6 months    ENDOSCOPY  05/19/2014    polyp stomach bx and clip    ENDOSCOPY N/A 05/06/2024    1. Small bowel, biopsies: Small bowel mucosa with no significant pathologic changes. No evidence of celiac disease. 2. Antrum, biopsies: Minimal chronic gastritis. 3. Stomach, biopsies: Minimal chronic gastritis. Features suggestive of fundic gland polyp    HERNIA REPAIR      with mesh    ICD GENERATOR REPLACEMENT N/A 02/28/2024    Procedure: ICD Generator Change - Bi-Ventricular (95182) with new LV lead implant (07713);  Surgeon: Devika Oliver MD;  Location:  [facility]ASIVE [facility];  Service: Cardiovascular;  Laterality: N/A;    INTERVENTIONAL RADIOLOGY PROCEDURE Left 01/17/2024    Procedure: Venogram upper extremity left, 74885, 01026;  Surgeon: Devika Oliver MD;  Location:  [facility];  Service: Cardiovascular;  Laterality: Left;    JOINT REPLACEMENT      LUMBAR SYMPATHETIC NERVE BLOCK N/A 06/23/2025    Patient doesn't know exact name of procedure    OTHER SURGICAL HISTORY      Defibrillator/Pacer maker exchange    OTHER SURGICAL HISTORY      Pacemaker/Defibillation exchange 2012    PENILE PROSTHESIS IMPLANT N/A 07/27/2018    Procedure: PENILE PROSTHESIS PLACEMENT;  Surgeon: Godwin Gupta MD;  Location:  [facility] OR;  Service: Urology    PERIPHERAL ARTERIAL STENT GRAFT         Social History     Socioeconomic History    Marital status:    Tobacco Use    Smoking status: Never    Smokeless tobacco: Never   Vaping Use    Vaping status: Never Used   Substance and Sexual Activity    Alcohol use: Not Currently      "Comment: occ    Drug use: Never    Sexual activity: Not Currently     Partners: Female     Birth control/protection: Vasectomy       Family History   Problem Relation Age of Onset    Lung cancer Mother     Heart attack Mother     No Known Problems Father     Other Neg Hx         GI Malignancies    Colon cancer Neg Hx     Colon polyps Neg Hx        Objective    Temp 97.4 °F (36.3 °C)   Ht 180.3 cm (70.98\")   Wt 91.6 kg (202 lb)   BMI 28.19 kg/m²     Physical Exam  Constitutional:       Appearance: Normal appearance.   HENT:      Head: Normocephalic and atraumatic.   Pulmonary:      Effort: Pulmonary effort is normal.   Neurological:      Mental Status: He is alert.   Psychiatric:         Mood and Affect: Mood normal.         Behavior: Behavior normal.             Results for orders placed or performed in visit on 06/30/25   POC Urinalysis Dipstick, Multipro    Collection Time: 06/30/25 10:31 AM    Specimen: Urine   Result Value Ref Range    Color Yellow Yellow, Straw, Dark Yellow, Indigo    Clarity, UA Clear Clear    Glucose, UA >=1000 mg/dL (3+) (A) Negative mg/dL    Bilirubin Negative Negative    Ketones, UA Negative Negative    Specific Gravity  1.020 1.005 - 1.030    Blood, UA Negative Negative    pH, Urine 5.5 5.0 - 8.0    Protein, POC Negative Negative mg/dL    Urobilinogen, UA Normal Normal, 0.2 E.U./dL    Nitrite, UA Negative Negative    Leukocytes Negative Negative         Assessment and Plan    Diagnoses and all orders for this visit:    1. Urinary incontinence, nocturnal enuresis (Primary)  -     POC Urinalysis Dipstick, Multipro  -     Vibegron (Gemtesa) 75 MG tablet; Take 1 tablet by mouth Daily for 180 days.  Dispense: 30 tablet; Refill: 5      Patient has had excellent response to the Gemtesa has very few breakthrough symptoms.  I refilled the prescription today.  Follow-up in 6 months.          "

## 2025-06-17 LAB
MC_CV_MDC_IDC_RATE_1: 130
MC_CV_MDC_IDC_RATE_1: 200
MC_CV_MDC_IDC_SHOCK_MEASURED_IMPEDANCE: 50
MC_CV_MDC_IDC_THERAPIES: NORMAL
MC_CV_MDC_IDC_ZONE_ID: 1
MC_CV_MDC_IDC_ZONE_ID: 2
MC_CV_MDC_IDC_ZONE_ID: 3
MDC_IDC_MSMT_BATTERY_STATUS: NORMAL
MDC_IDC_MSMT_LEADCHNL_LV_IMPEDANCE_VALUE: 722
MDC_IDC_MSMT_LEADCHNL_LV_PACING_THRESHOLD_AMPLITUDE: 0.9
MDC_IDC_MSMT_LEADCHNL_LV_PACING_THRESHOLD_PULSEWIDTH: 0.4
MDC_IDC_MSMT_LEADCHNL_RA_IMPEDANCE_VALUE: 375
MDC_IDC_MSMT_LEADCHNL_RA_SENSING_INTR_AMPL: 3
MDC_IDC_MSMT_LEADCHNL_RV_IMPEDANCE_VALUE: 534
MDC_IDC_MSMT_LEADCHNL_RV_PACING_THRESHOLD_AMPLITUDE: 1
MDC_IDC_MSMT_LEADCHNL_RV_PACING_THRESHOLD_PULSEWIDTH: 0.4
MDC_IDC_PG_IMPLANT_DTM: NORMAL
MDC_IDC_PG_MFG: NORMAL
MDC_IDC_PG_MODEL: NORMAL
MDC_IDC_PG_SERIAL: NORMAL
MDC_IDC_PG_TYPE: NORMAL
MDC_IDC_SESS_DTM: NORMAL
MDC_IDC_SET_BRADY_LOWRATE: 75
MDC_IDC_SET_BRADY_MAX_SENSOR_RATE: 120
MDC_IDC_SET_BRADY_MODE: NORMAL
MDC_IDC_SET_BRADY_PAV_DELAY: 180
MDC_IDC_SET_CRT_PACED_CHAMBERS: NORMAL
MDC_IDC_SET_LEADCHNL_LV_PACING_AMPLITUDE: 2.5
MDC_IDC_SET_LEADCHNL_LV_PACING_PULSEWIDTH: 0.4
MDC_IDC_SET_LEADCHNL_LV_SENSING_SENSITIVITY: 1
MDC_IDC_SET_LEADCHNL_RA_SENSING_SENSITIVITY: 0.25
MDC_IDC_SET_LEADCHNL_RV_PACING_AMPLITUDE: 2.5
MDC_IDC_SET_LEADCHNL_RV_PACING_PULSEWIDTH: 0.4
MDC_IDC_SET_LEADCHNL_RV_SENSING_SENSITIVITY: 0.6
MDC_IDC_SET_ZONE_STATUS: NORMAL
MDC_IDC_SET_ZONE_TYPE: NORMAL
MDC_IDC_STAT_BRADY_RV_PERCENT_PACED: 96
MDC_IDC_STAT_CRT_LV_PERCENT_PACED: 96
MDC_IDC_STAT_TACHYTHERAPY_ATP_DELIVERED_RECENT: 0
MDC_IDC_STAT_TACHYTHERAPY_SHOCKS_ABORTED_RECENT: 0
MDC_IDC_STAT_TACHYTHERAPY_SHOCKS_DELIVERED_RECENT: 0

## 2025-06-18 ENCOUNTER — CLINICAL SUPPORT NO REQUIREMENTS (OUTPATIENT)
Dept: CARDIOLOGY | Facility: CLINIC | Age: 80
End: 2025-06-18
Payer: MEDICARE

## 2025-06-18 ENCOUNTER — OFFICE VISIT (OUTPATIENT)
Dept: CARDIOLOGY | Facility: CLINIC | Age: 80
End: 2025-06-18
Payer: MEDICARE

## 2025-06-18 VITALS
BODY MASS INDEX: 28 KG/M2 | WEIGHT: 200 LBS | SYSTOLIC BLOOD PRESSURE: 102 MMHG | OXYGEN SATURATION: 98 % | DIASTOLIC BLOOD PRESSURE: 66 MMHG | HEART RATE: 80 BPM | HEIGHT: 71 IN

## 2025-06-18 DIAGNOSIS — I50.42 CHRONIC COMBINED SYSTOLIC AND DIASTOLIC CONGESTIVE HEART FAILURE: ICD-10-CM

## 2025-06-18 DIAGNOSIS — Z98.890 S/P AV NODAL ABLATION: ICD-10-CM

## 2025-06-18 DIAGNOSIS — Z95.810 PRESENCE OF BIVENTRICULAR AICD: ICD-10-CM

## 2025-06-18 DIAGNOSIS — I48.21 PERMANENT ATRIAL FIBRILLATION: Primary | ICD-10-CM

## 2025-06-18 PROCEDURE — 3074F SYST BP LT 130 MM HG: CPT

## 2025-06-18 PROCEDURE — 99214 OFFICE O/P EST MOD 30 MIN: CPT

## 2025-06-18 PROCEDURE — 3078F DIAST BP <80 MM HG: CPT

## 2025-06-18 PROCEDURE — 93000 ELECTROCARDIOGRAM COMPLETE: CPT

## 2025-06-18 NOTE — PROGRESS NOTES
[facility] Electrophysiology   Reason For Visit:  CHB (complete heart block)    Subjective        EP Problems:  1.  Permanent atrial fibrillation  2.  Complete heart block status post AV node ablation  3.  Ventricular tachycardia  4.  Presence of a BiV ICD  -2/2024: LV lead revision     Cardiology Problems:  1.  Chronic systolic heart failure  -5/27/2023: EF 36 to 40%  2.  CAD status post CABG  3.  Ischemic cardiomyopathy  4.  Hypertension  5.  Hyperlipidemia     Medical Problems:  1.  BPH      Darren Maria is a 80 y.o. male with above pertinent PMH who presents for follow-up of coronary atrial fibrillation, complete heart block, and BiV defibrillator.    Patient last seen in the EP clinic in November 2024.  He was doing reasonably well at that time with no significant complaints.  No adjustments were made.    From an electrophysiology standpoint the patient is doing well.  He denies any palpitations or fluttering.  He is tolerating his medication regimen well.  Denies any significant dizziness or lightheadedness.  Denies any bleeding concerns with his aspirin and Plavix.  He is up roughly 4 pounds on his home scale today, he does report going to eat fried fish last night.      ROS: Pertinent findings as noted above        Pertinent past medical, surgical, family, and social history were reviewed.      Current Outpatient Medications:     acetaminophen (TYLENOL) 325 MG tablet, Take 2 tablets by mouth Every 6 (Six) Hours As Needed., Disp: , Rfl:     aspirin 81 MG EC tablet, Take 1 tablet by mouth Daily., Disp: , Rfl:     atorvastatin (LIPITOR) 40 MG tablet, TAKE 1 TABLET BY MOUTH EVERY DAY AT NIGHT, Disp: 90 tablet, Rfl: 3    cetirizine (zyrTEC) 10 MG tablet, Take 1 tablet by mouth Daily As Needed for Allergies., Disp: , Rfl:     clopidogrel (PLAVIX) 75 MG tablet, Take 1 tablet by mouth Daily., Disp: , Rfl:     Cyanocobalamin 1000 MCG/ML kit, Inject 1 mL as directed Every 30 (Thirty) Days. On Fridays till  "11/11/23 then monthly thereafter., Disp: , Rfl:     finasteride (PROSCAR) 5 MG tablet, Take 1 tablet by mouth Daily., Disp: , Rfl:     fluticasone (FLONASE) 50 MCG/ACT nasal spray, INSTILL 1 SPRAY IN THE NOSTRILS EVERY DAY, Disp: , Rfl:     Gemtesa 75 MG tablet, Take 1 tablet by mouth Daily., Disp: , Rfl:     isosorbide mononitrate (IMDUR) 30 MG 24 hr tablet, Take 1 tablet by mouth Daily., Disp: 30 tablet, Rfl: 5    Jardiance 10 MG tablet tablet, TAKE 1 TABLET BY MOUTH EVERY DAY, Disp: 90 tablet, Rfl: 3    nitroglycerin (NITROSTAT) 0.4 MG SL tablet, Place 1 tablet under the tongue Every 5 (Five) Minutes As Needed for Chest Pain. Take no more than 3 doses in 15 minutes., Disp: 25 tablet, Rfl: 2    pregabalin (LYRICA) 150 MG capsule, Take 1 capsule by mouth 2 (Two) Times a Day., Disp: , Rfl:     ranolazine (RANEXA) 1000 MG 12 hr tablet, TAKE 1 TABLET BY MOUTH TWICE A DAY, Disp: 180 tablet, Rfl: 3    Sotalol HCl AF 80 MG tablet, TAKE 1 TABLET BY MOUTH TWICE A DAY, Disp: 180 tablet, Rfl: 3    spironolactone (ALDACTONE) 25 MG tablet, Take 1 tablet by mouth Daily., Disp: , Rfl:     Suvorexant (Belsomra) 10 MG tablet, Take 1 tablet by mouth At Night As Needed., Disp: , Rfl:     torsemide (DEMADEX) 20 MG tablet, Take 1 tablet by mouth As Needed., Disp: , Rfl:     traMADol-acetaminophen (ULTRACET) 37.5-325 MG per tablet, Take 1 tablet by mouth Every 12 (Twelve) Hours As Needed for Moderate Pain (pain)., Disp: , Rfl: 1    Vitamin D, Cholecalciferol, 25 MCG (1000 UT) tablet, take 1 tablet by mouth every day for 30 days, Disp: , Rfl:      Objective   Vital Signs:  /66   Pulse 80   Ht 180.3 cm (70.98\")   Wt 90.7 kg (200 lb)   SpO2 98%   BMI 27.91 kg/m²   Estimated body mass index is 27.91 kg/m² as calculated from the following:    Height as of this encounter: 180.3 cm (70.98\").    Weight as of this encounter: 90.7 kg (200 lb).      Constitutional:       Appearance: Healthy appearance. Not in distress.   Pulmonary:     "  Effort: Pulmonary effort is normal.      Breath sounds: Normal breath sounds and air entry.   Cardiovascular:      PMI at left midclavicular line. Normal rate. Regular rhythm. Normal S1. Normal S2.       Murmurs: There is no murmur.      No gallop.  No click. No rub.   Pulses:     Intact distal pulses.   Edema:     Peripheral edema present.     Pretibial: 1+ edema of the left pretibial area.     Ankle: 1+ edema of the left ankle.  Neurological:      Mental Status: Alert and oriented to person, place and time.        Result Review :           ECG 12 Lead    Date/Time: 6/18/2025 3:12 PM  Performed by: Artur Randle APRN    Authorized by: Artur Randle APRN  Comparison: compared with previous ECG from 1/15/2025  Similar to previous ECG  Comparison to previous ECG: Ventricular paced rhythm  Rhythm: paced  Rate: normal  QRS axis: normal  Pacing: ventricular pacing  Clinical impression: abnormal EKG            Assessment and Plan   Diagnoses and all orders for this visit:    1. Permanent atrial fibrillation (Primary)  2. S/P AV sam ablation  3. Presence of biventricular AICD  -EKG shows sinus rhythm, QTc prolonged but appropriate in the setting of biventricular pacing  - Continue sotalol 80 mg twice daily  - Not on anticoagulation in the setting of left atrial appendage clip and bleeding risk.  Continue at least 1 antiplatelet therapy indefinitely  - Follow-up in the EP clinic in 1 year      4. Chronic combined systolic and diastolic congestive heart failure  -Weight is up today with the swelling in his lower extremity.  Likely due to dietary indiscretion  - Recommend continuing current heart failure regimen as prescribed  - Recommend low-sodium diet  - Keep next follow-up with the heart failure clinic                   I spent 2 minutes on the separately reported service of EKG interpretation. This time is not included in the time used to support the E/M service also reported today.      Follow Up   No  follow-ups on file.  Patient was given instructions and counseling regarding his condition or for health maintenance advice. Please see specific information pulled into the AVS if appropriate.       Part of this note may be an electronic transcription/translation of spoken language to printed text using the Dragon Dictation System.

## 2025-06-24 ENCOUNTER — OFFICE VISIT (OUTPATIENT)
Dept: OTOLARYNGOLOGY | Facility: CLINIC | Age: 80
End: 2025-06-24
Payer: MEDICARE

## 2025-06-24 VITALS — BODY MASS INDEX: 27.16 KG/M2 | HEIGHT: 71 IN | WEIGHT: 194 LBS

## 2025-06-24 DIAGNOSIS — R42 DIZZINESS: Chronic | ICD-10-CM

## 2025-06-24 DIAGNOSIS — Z86.73 HISTORY OF RECENT STROKE: Chronic | ICD-10-CM

## 2025-06-24 DIAGNOSIS — H91.8X3 ASYMMETRICAL HEARING LOSS: Primary | Chronic | ICD-10-CM

## 2025-06-24 PROCEDURE — 1160F RVW MEDS BY RX/DR IN RCRD: CPT | Performed by: NURSE PRACTITIONER

## 2025-06-24 PROCEDURE — 1159F MED LIST DOCD IN RCRD: CPT | Performed by: NURSE PRACTITIONER

## 2025-06-24 PROCEDURE — 99214 OFFICE O/P EST MOD 30 MIN: CPT | Performed by: NURSE PRACTITIONER

## 2025-06-24 NOTE — PROGRESS NOTES
EVELIA Nielsen  [facility]  [address], [address]  [place] [address]  Fax [phone]  Phone [phone]      Visit Type: FOLLOW UP   Chief Complaint   Patient presents with    Ear Problem     Follow up           HISTORY OBTAINED FROM: patient  HPI  He presents for a follow up evaluation. He has had continued problems with dizziness and hearing loss. The symptoms are localized to both ears. The symptoms severity was described as : moderate The symptoms have been : present for the last several months The symptoms are aggravated by  no identifiable factors. There have been no factors that have improved the symptoms. Patient has a history of stroke, cardiac disease and he wears hearing aids. He has had a CT scan which was normal.     Past Medical History:   Diagnosis Date    Abdominal pain, epigastric     Abnormal abdominal exam     ABFND, RADIOLOGICAL, ABDOMINAL AREA     Abnormal ECG     Anticoagulated on Coumadin     Atherosclerosis of coronary artery bypass graft     ATHEROSCLEROSIS, CORONARY, ARTERY BYPASS GRFT    Atrial fibrillation     Cancer of right kidney     right sided kidney cancer    Cardiomyopathy     Cardiomyopathy, primary     CHF (congestive heart failure)     Clotting disorder     Colon polyps     Congenital heart disease     Congestive heart failure     Coronary artery disease     History of CAD/A-Fib/Pacemaker/Defibrillator placement: pt takes Coumadin and plavix therapy as well as ASA daily    Diabetes mellitus     Gastric polyp     Gastroesophageal reflux disease without esophagitis     History of colon polyps     Hypercholesterolemia     Hypertension, essential     Melena     Myocardial infarction     NSAID long-term use     Obesity     Pacemaker     Pacemaker Dependant    Platelet inhibition due to Plavix     Presence of stent in coronary artery     Multiple coronary stenting most recently 4/2008    Single  kidney     Only 1 Kidney    Status post surgery     s/p Polypectomy Bleed       Past Surgical History:   Procedure Laterality Date    ABLATION OF DYSRHYTHMIC FOCUS      APPENDECTOMY      CARDIAC ABLATION      CARDIAC CATHETERIZATION      CARDIAC CATHETERIZATION Left 12/15/2021    Procedure: Coronary angiography;  Surgeon: Sarbjit Posadas MD;  Location:  [facility] CATH INVASIVE LOC[facility];  Service: Cardiology;  Laterality: Left;    CARDIAC CATHETERIZATION Left 12/15/2021    Procedure: Percutaneous Coronary Intervention;  Surgeon: Sarbjit Posadas MD;  Location:  [facility] CATH INVASIVE [facility];  Service: Cardiology;  Laterality: Left;    CARDIAC DEFIBRILLATOR PLACEMENT      CARDIAC PACEMAKER PLACEMENT      Pacemaker Placement    CAROTID STENT  2001    CATARACT EXTRACTION, BILATERAL      CHOLECYSTECTOMY      COLONOSCOPY  08/27/2013    snare hep, trans tics recall 3 yr    COLONOSCOPY  10/02/2006    few scattered diverticula    COLONOSCOPY  07/22/2009    tubular adenoma in the ascending colon    COLONOSCOPY  07/25/2005    several polyps in the ascending colon, one in the transverse colon    COLONOSCOPY N/A 03/21/2022    Procedure: COLONOSCOPY WITH ANESTHESIA;  Surgeon: Phil Goodwin MD;  Location: [facility] ENDOSCOPY;  Service: Gastroenterology;  Laterality: N/A;  pre: hx polyps  post: polyps  Rolan Salvador MD    COLONOSCOPY N/A 05/07/2024    The examined portion of the ileum was normal. - Two diminutive tubular adenoma polyps in the distal transverse colon, removed with a cold snare. Resected and retrieved. - Diverticulosis in the sigmoid colon. - Non-bleeding hemorrhoids. - The examination was otherwise normal    COLONOSCOPY W/ POLYPECTOMY  09/19/2016    Tubular adenoma hepatic flexure, 2 Hyperplastic polyps rectum and at 50 cm, Benign polyp at 70 cm repeat exam in 3-5 years    CORONARY ARTERY BYPASS GRAFT  02/2011    2nd time    CORONARY ARTERY BYPASS GRAFT  1990    CORONARY STENT PLACEMENT      ENDOSCOPY   04/14/2016    nl slant    ENDOSCOPY  11/10/2014    clips at polypectomy bx no residual recall 1 yr    ENDOSCOPY  06/19/2014    snare clip body recall 6 months    ENDOSCOPY  05/19/2014    polyp stomach bx and clip    ENDOSCOPY N/A 05/06/2024    1. Small bowel, biopsies: Small bowel mucosa with no significant pathologic changes. No evidence of celiac disease. 2. Antrum, biopsies: Minimal chronic gastritis. 3. Stomach, biopsies: Minimal chronic gastritis. Features suggestive of fundic gland polyp    HERNIA REPAIR      with mesh    ICD GENERATOR REPLACEMENT N/A 02/28/2024    Procedure: ICD Generator Change - Bi-Ventricular (91976) with new LV lead implant (94995);  Surgeon: Devika Oliver MD;  Location:  [facility] CATH [facility]AS[facility];  Service: Cardiovascular;  Laterality: N/A;    INTERVENTIONAL RADIOLOGY PROCEDURE Left 01/17/2024    Procedure: Venogram upper extremity left, 50085, 31984;  Surgeon: Devika Oliver MD;  Location:  [facility]IVE [facility];  Service: Cardiovascular;  Laterality: Left;    JOINT REPLACEMENT      LUMBAR SYMPATHETIC NERVE BLOCK N/A 06/23/2025    Patient doesn't know exact name of procedure    OTHER SURGICAL HISTORY      Defibrillator/Pacer maker exchange    OTHER SURGICAL HISTORY      Pacemaker/Defibillation exchange 2012    PENILE PROSTHESIS IMPLANT N/A 07/27/2018    Procedure: PENILE PROSTHESIS PLACEMENT;  Surgeon: Godwin Gupta MD;  Location:  [facility] OR;  Service: Urology    PERIPHERAL ARTERIAL STENT GRAFT         Family History: His family history includes Heart attack in his mother; Lung cancer in his mother; No Known Problems in his father.     Social History: He  reports that he has never smoked. He has never used smokeless tobacco. He reports that he does not currently use alcohol. He reports that he does not use drugs.    Home Medications:  Cyanocobalamin, Sotalol HCl AF, Suvorexant, Vibegron, acetaminophen, aspirin, atorvastatin, cetirizine, cholecalciferol, clopidogrel,  empagliflozin, finasteride, fluticasone, isosorbide mononitrate, nitroglycerin, pregabalin, ranolazine, spironolactone, torsemide, and traMADol-acetaminophen    Allergies:  He is allergic to morphine and codeine.       Vital Signs:      ENT Physical Exam         Result Review       RESULTS REVIEW    I have reviewed the patients old records in the chart.    CT Temporal Bones With & Without Contrast (04/21/2025 10:50)     CT Angiogram Head (06/05/2025 11:21)     CT Angiogram Neck (06/05/2025 11:21)     REFERRAL/PRE-AUTH MRN - SCAN - AUDIOLOGY REPORT (03/11/2025)            Assessment & Plan  Asymmetrical hearing loss    History of recent stroke    Dizziness         Conservative management. Will utilize hearing aids.   Return if symptoms worsen or fail to improve.        Electronically signed by EVELIA Nielsen 06/24/25 12:37 PM CDT.

## 2025-06-30 ENCOUNTER — OFFICE VISIT (OUTPATIENT)
Dept: UROLOGY | Facility: CLINIC | Age: 80
End: 2025-06-30
Payer: OTHER MISCELLANEOUS

## 2025-06-30 VITALS — HEIGHT: 71 IN | WEIGHT: 202 LBS | BODY MASS INDEX: 28.28 KG/M2 | TEMPERATURE: 97.4 F

## 2025-06-30 DIAGNOSIS — N39.44 URINARY INCONTINENCE, NOCTURNAL ENURESIS: Primary | ICD-10-CM

## 2025-06-30 LAB
BILIRUB BLD-MCNC: NEGATIVE MG/DL
CLARITY, POC: CLEAR
COLOR UR: YELLOW
GLUCOSE UR STRIP-MCNC: ABNORMAL MG/DL
KETONES UR QL: NEGATIVE
LEUKOCYTE EST, POC: NEGATIVE
NITRITE UR-MCNC: NEGATIVE MG/ML
PH UR: 5.5 [PH] (ref 5–8)
PROT UR STRIP-MCNC: NEGATIVE MG/DL
RBC # UR STRIP: NEGATIVE /UL
SP GR UR: 1.02 (ref 1–1.03)
UROBILINOGEN UR QL: NORMAL

## 2025-06-30 RX ORDER — VIBEGRON 75 MG/1
1 TABLET, FILM COATED ORAL DAILY
Qty: 30 TABLET | Refills: 5 | Status: SHIPPED | OUTPATIENT
Start: 2025-06-30 | End: 2025-12-27

## 2025-07-06 LAB
MC_CV_MDC_IDC_RATE_1: 130
MC_CV_MDC_IDC_RATE_1: 130
MC_CV_MDC_IDC_RATE_1: 200
MC_CV_MDC_IDC_RATE_1: 200
MC_CV_MDC_IDC_SHOCK_MEASURED_IMPEDANCE: 48
MC_CV_MDC_IDC_SHOCK_MEASURED_IMPEDANCE: 50
MC_CV_MDC_IDC_THERAPIES: NORMAL
MC_CV_MDC_IDC_ZONE_ID: 1
MC_CV_MDC_IDC_ZONE_ID: 1
MC_CV_MDC_IDC_ZONE_ID: 2
MC_CV_MDC_IDC_ZONE_ID: 2
MC_CV_MDC_IDC_ZONE_ID: 3
MDC_IDC_MSMT_BATTERY_REMAINING_LONGEVITY: 84 MO
MDC_IDC_MSMT_BATTERY_REMAINING_PERCENTAGE: 94 %
MDC_IDC_MSMT_BATTERY_STATUS: NORMAL
MDC_IDC_MSMT_BATTERY_STATUS: NORMAL
MDC_IDC_MSMT_CAP_CHARGE_TIME: 10.6
MDC_IDC_MSMT_LEADCHNL_LV_DTM: NORMAL
MDC_IDC_MSMT_LEADCHNL_LV_IMPEDANCE_VALUE: 680
MDC_IDC_MSMT_LEADCHNL_LV_IMPEDANCE_VALUE: 722
MDC_IDC_MSMT_LEADCHNL_LV_PACING_THRESHOLD_AMPLITUDE: 0.9
MDC_IDC_MSMT_LEADCHNL_LV_PACING_THRESHOLD_AMPLITUDE: 1
MDC_IDC_MSMT_LEADCHNL_LV_PACING_THRESHOLD_POLARITY: NORMAL
MDC_IDC_MSMT_LEADCHNL_LV_PACING_THRESHOLD_PULSEWIDTH: 0.4
MDC_IDC_MSMT_LEADCHNL_LV_PACING_THRESHOLD_PULSEWIDTH: 0.4
MDC_IDC_MSMT_LEADCHNL_LV_SENSING_INTR_AMPL: 22.2
MDC_IDC_MSMT_LEADCHNL_RA_DTM: NORMAL
MDC_IDC_MSMT_LEADCHNL_RA_IMPEDANCE_VALUE: 351
MDC_IDC_MSMT_LEADCHNL_RA_IMPEDANCE_VALUE: 375
MDC_IDC_MSMT_LEADCHNL_RA_PACING_THRESHOLD_POLARITY: NORMAL
MDC_IDC_MSMT_LEADCHNL_RA_SENSING_INTR_AMPL: 0.4
MDC_IDC_MSMT_LEADCHNL_RA_SENSING_INTR_AMPL: 3
MDC_IDC_MSMT_LEADCHNL_RV_DTM: NORMAL
MDC_IDC_MSMT_LEADCHNL_RV_IMPEDANCE_VALUE: 506
MDC_IDC_MSMT_LEADCHNL_RV_IMPEDANCE_VALUE: 534
MDC_IDC_MSMT_LEADCHNL_RV_PACING_THRESHOLD_AMPLITUDE: 1
MDC_IDC_MSMT_LEADCHNL_RV_PACING_THRESHOLD_AMPLITUDE: 1
MDC_IDC_MSMT_LEADCHNL_RV_PACING_THRESHOLD_POLARITY: NORMAL
MDC_IDC_MSMT_LEADCHNL_RV_PACING_THRESHOLD_PULSEWIDTH: 0.4
MDC_IDC_MSMT_LEADCHNL_RV_PACING_THRESHOLD_PULSEWIDTH: 0.4
MDC_IDC_MSMT_LEADCHNL_RV_SENSING_INTR_AMPL: 12.8
MDC_IDC_PG_IMPLANT_DTM: NORMAL
MDC_IDC_PG_IMPLANT_DTM: NORMAL
MDC_IDC_PG_MFG: NORMAL
MDC_IDC_PG_MFG: NORMAL
MDC_IDC_PG_MODEL: NORMAL
MDC_IDC_PG_MODEL: NORMAL
MDC_IDC_PG_SERIAL: NORMAL
MDC_IDC_PG_SERIAL: NORMAL
MDC_IDC_PG_TYPE: NORMAL
MDC_IDC_PG_TYPE: NORMAL
MDC_IDC_SESS_DTM: NORMAL
MDC_IDC_SESS_DTM: NORMAL
MDC_IDC_SESS_TYPE: NORMAL
MDC_IDC_SET_BRADY_AT_MODE_SWITCH_RATE: 170
MDC_IDC_SET_BRADY_LOWRATE: 75
MDC_IDC_SET_BRADY_LOWRATE: 75
MDC_IDC_SET_BRADY_MAX_SENSOR_RATE: 120
MDC_IDC_SET_BRADY_MAX_SENSOR_RATE: 120
MDC_IDC_SET_BRADY_MODE: NORMAL
MDC_IDC_SET_BRADY_MODE: NORMAL
MDC_IDC_SET_BRADY_PAV_DELAY: 180
MDC_IDC_SET_CRT_LVRV_DELAY: 20
MDC_IDC_SET_CRT_PACED_CHAMBERS: NORMAL
MDC_IDC_SET_CRT_PACED_CHAMBERS: NORMAL
MDC_IDC_SET_LEADCHNL_LV_PACING_AMPLITUDE: 2
MDC_IDC_SET_LEADCHNL_LV_PACING_AMPLITUDE: 2.5
MDC_IDC_SET_LEADCHNL_LV_PACING_PULSEWIDTH: 0.4
MDC_IDC_SET_LEADCHNL_LV_PACING_PULSEWIDTH: 0.4
MDC_IDC_SET_LEADCHNL_LV_SENSING_SENSITIVITY: 1
MDC_IDC_SET_LEADCHNL_RA_PACING_POLARITY: NORMAL
MDC_IDC_SET_LEADCHNL_RA_SENSING_POLARITY: NORMAL
MDC_IDC_SET_LEADCHNL_RA_SENSING_SENSITIVITY: 0.25
MDC_IDC_SET_LEADCHNL_RA_SENSING_SENSITIVITY: 0.25
MDC_IDC_SET_LEADCHNL_RV_PACING_AMPLITUDE: 2
MDC_IDC_SET_LEADCHNL_RV_PACING_AMPLITUDE: 2.5
MDC_IDC_SET_LEADCHNL_RV_PACING_POLARITY: NORMAL
MDC_IDC_SET_LEADCHNL_RV_PACING_PULSEWIDTH: 0.4
MDC_IDC_SET_LEADCHNL_RV_PACING_PULSEWIDTH: 0.4
MDC_IDC_SET_LEADCHNL_RV_SENSING_POLARITY: NORMAL
MDC_IDC_SET_LEADCHNL_RV_SENSING_SENSITIVITY: 0.6
MDC_IDC_SET_LEADCHNL_RV_SENSING_SENSITIVITY: 0.6
MDC_IDC_SET_ZONE_STATUS: NORMAL
MDC_IDC_SET_ZONE_TYPE: NORMAL
MDC_IDC_STAT_BRADY_RA_PERCENT_PACED: 0
MDC_IDC_STAT_BRADY_RV_PERCENT_PACED: 92
MDC_IDC_STAT_BRADY_RV_PERCENT_PACED: 96
MDC_IDC_STAT_CRT_LV_PERCENT_PACED: 92
MDC_IDC_STAT_CRT_LV_PERCENT_PACED: 96
MDC_IDC_STAT_TACHYTHERAPY_ATP_DELIVERED_RECENT: 0
MDC_IDC_STAT_TACHYTHERAPY_ATP_DELIVERED_RECENT: 0
MDC_IDC_STAT_TACHYTHERAPY_SHOCKS_ABORTED_RECENT: 0
MDC_IDC_STAT_TACHYTHERAPY_SHOCKS_ABORTED_RECENT: 0
MDC_IDC_STAT_TACHYTHERAPY_SHOCKS_DELIVERED_RECENT: 0
MDC_IDC_STAT_TACHYTHERAPY_SHOCKS_DELIVERED_RECENT: 0

## 2025-07-12 LAB
MC_CV_MDC_IDC_RATE_1: 130
MC_CV_MDC_IDC_RATE_1: 200
MC_CV_MDC_IDC_SHOCK_MEASURED_IMPEDANCE: 48
MC_CV_MDC_IDC_THERAPIES: NORMAL
MC_CV_MDC_IDC_THERAPIES: NORMAL
MC_CV_MDC_IDC_ZONE_ID: 1
MC_CV_MDC_IDC_ZONE_ID: 2
MDC_IDC_MSMT_BATTERY_REMAINING_LONGEVITY: 84 MO
MDC_IDC_MSMT_BATTERY_REMAINING_PERCENTAGE: 98 %
MDC_IDC_MSMT_BATTERY_STATUS: NORMAL
MDC_IDC_MSMT_CAP_CHARGE_TIME: 10.6
MDC_IDC_MSMT_LEADCHNL_LV_DTM: NORMAL
MDC_IDC_MSMT_LEADCHNL_LV_IMPEDANCE_VALUE: 695
MDC_IDC_MSMT_LEADCHNL_LV_PACING_THRESHOLD_AMPLITUDE: 0.9
MDC_IDC_MSMT_LEADCHNL_LV_PACING_THRESHOLD_POLARITY: NORMAL
MDC_IDC_MSMT_LEADCHNL_LV_PACING_THRESHOLD_PULSEWIDTH: 0.4
MDC_IDC_MSMT_LEADCHNL_LV_SENSING_INTR_AMPL: 25
MDC_IDC_MSMT_LEADCHNL_RA_DTM: NORMAL
MDC_IDC_MSMT_LEADCHNL_RA_IMPEDANCE_VALUE: 371
MDC_IDC_MSMT_LEADCHNL_RA_PACING_THRESHOLD_POLARITY: NORMAL
MDC_IDC_MSMT_LEADCHNL_RA_SENSING_INTR_AMPL: 2.8
MDC_IDC_MSMT_LEADCHNL_RV_DTM: NORMAL
MDC_IDC_MSMT_LEADCHNL_RV_IMPEDANCE_VALUE: 522
MDC_IDC_MSMT_LEADCHNL_RV_PACING_THRESHOLD_AMPLITUDE: 1
MDC_IDC_MSMT_LEADCHNL_RV_PACING_THRESHOLD_POLARITY: NORMAL
MDC_IDC_MSMT_LEADCHNL_RV_PACING_THRESHOLD_PULSEWIDTH: 0.4
MDC_IDC_MSMT_LEADCHNL_RV_SENSING_INTR_AMPL: 9.7
MDC_IDC_PG_IMPLANT_DTM: NORMAL
MDC_IDC_PG_MFG: NORMAL
MDC_IDC_PG_MODEL: NORMAL
MDC_IDC_PG_SERIAL: NORMAL
MDC_IDC_PG_TYPE: NORMAL
MDC_IDC_SESS_DTM: NORMAL
MDC_IDC_SESS_TYPE: NORMAL
MDC_IDC_SET_BRADY_AT_MODE_SWITCH_RATE: 170
MDC_IDC_SET_BRADY_LOWRATE: 75
MDC_IDC_SET_BRADY_MAX_SENSOR_RATE: 120
MDC_IDC_SET_BRADY_MODE: NORMAL
MDC_IDC_SET_CRT_LVRV_DELAY: 20
MDC_IDC_SET_CRT_PACED_CHAMBERS: NORMAL
MDC_IDC_SET_LEADCHNL_LV_PACING_AMPLITUDE: 2.1
MDC_IDC_SET_LEADCHNL_LV_PACING_PULSEWIDTH: 0.4
MDC_IDC_SET_LEADCHNL_RA_PACING_POLARITY: NORMAL
MDC_IDC_SET_LEADCHNL_RA_SENSING_POLARITY: NORMAL
MDC_IDC_SET_LEADCHNL_RA_SENSING_SENSITIVITY: 0.25
MDC_IDC_SET_LEADCHNL_RV_PACING_AMPLITUDE: 2
MDC_IDC_SET_LEADCHNL_RV_PACING_POLARITY: NORMAL
MDC_IDC_SET_LEADCHNL_RV_PACING_PULSEWIDTH: 0.4
MDC_IDC_SET_LEADCHNL_RV_SENSING_POLARITY: NORMAL
MDC_IDC_SET_LEADCHNL_RV_SENSING_SENSITIVITY: 0.6
MDC_IDC_SET_ZONE_STATUS: NORMAL
MDC_IDC_SET_ZONE_STATUS: NORMAL
MDC_IDC_SET_ZONE_TYPE: NORMAL
MDC_IDC_SET_ZONE_TYPE: NORMAL
MDC_IDC_STAT_BRADY_RA_PERCENT_PACED: 0
MDC_IDC_STAT_BRADY_RV_PERCENT_PACED: 91
MDC_IDC_STAT_CRT_LV_PERCENT_PACED: 91
MDC_IDC_STAT_TACHYTHERAPY_ATP_DELIVERED_RECENT: 0
MDC_IDC_STAT_TACHYTHERAPY_SHOCKS_ABORTED_RECENT: 0
MDC_IDC_STAT_TACHYTHERAPY_SHOCKS_DELIVERED_RECENT: 0

## 2025-07-24 ENCOUNTER — TELEPHONE (OUTPATIENT)
Dept: CARDIOLOGY | Facility: CLINIC | Age: 80
End: 2025-07-24
Payer: MEDICARE

## 2025-07-24 LAB
MC_CV_MDC_IDC_RATE_1: 130
MC_CV_MDC_IDC_RATE_1: 200
MC_CV_MDC_IDC_SHOCK_MEASURED_IMPEDANCE: 45
MC_CV_MDC_IDC_THERAPIES: NORMAL
MC_CV_MDC_IDC_THERAPIES: NORMAL
MC_CV_MDC_IDC_ZONE_ID: 1
MC_CV_MDC_IDC_ZONE_ID: 2
MDC_IDC_MSMT_BATTERY_REMAINING_LONGEVITY: 90 MO
MDC_IDC_MSMT_BATTERY_REMAINING_PERCENTAGE: 100 %
MDC_IDC_MSMT_BATTERY_STATUS: NORMAL
MDC_IDC_MSMT_CAP_CHARGE_TIME: 10.6
MDC_IDC_MSMT_LEADCHNL_LV_DTM: NORMAL
MDC_IDC_MSMT_LEADCHNL_LV_IMPEDANCE_VALUE: 689
MDC_IDC_MSMT_LEADCHNL_LV_PACING_THRESHOLD_AMPLITUDE: 0.9
MDC_IDC_MSMT_LEADCHNL_LV_PACING_THRESHOLD_POLARITY: NORMAL
MDC_IDC_MSMT_LEADCHNL_LV_PACING_THRESHOLD_PULSEWIDTH: 0.4
MDC_IDC_MSMT_LEADCHNL_LV_SENSING_INTR_AMPL: 25
MDC_IDC_MSMT_LEADCHNL_RA_DTM: NORMAL
MDC_IDC_MSMT_LEADCHNL_RA_IMPEDANCE_VALUE: 367
MDC_IDC_MSMT_LEADCHNL_RA_PACING_THRESHOLD_POLARITY: NORMAL
MDC_IDC_MSMT_LEADCHNL_RA_SENSING_INTR_AMPL: 2.5
MDC_IDC_MSMT_LEADCHNL_RV_DTM: NORMAL
MDC_IDC_MSMT_LEADCHNL_RV_IMPEDANCE_VALUE: 519
MDC_IDC_MSMT_LEADCHNL_RV_PACING_THRESHOLD_AMPLITUDE: 1
MDC_IDC_MSMT_LEADCHNL_RV_PACING_THRESHOLD_POLARITY: NORMAL
MDC_IDC_MSMT_LEADCHNL_RV_PACING_THRESHOLD_PULSEWIDTH: 0.4
MDC_IDC_MSMT_LEADCHNL_RV_SENSING_INTR_AMPL: 9.8
MDC_IDC_PG_IMPLANT_DTM: NORMAL
MDC_IDC_PG_MFG: NORMAL
MDC_IDC_PG_MODEL: NORMAL
MDC_IDC_PG_SERIAL: NORMAL
MDC_IDC_PG_TYPE: NORMAL
MDC_IDC_SESS_DTM: NORMAL
MDC_IDC_SESS_TYPE: NORMAL
MDC_IDC_SET_BRADY_AT_MODE_SWITCH_RATE: 170
MDC_IDC_SET_BRADY_LOWRATE: 75
MDC_IDC_SET_BRADY_MAX_SENSOR_RATE: 120
MDC_IDC_SET_BRADY_MODE: NORMAL
MDC_IDC_SET_CRT_LVRV_DELAY: 20
MDC_IDC_SET_CRT_PACED_CHAMBERS: NORMAL
MDC_IDC_SET_LEADCHNL_LV_PACING_AMPLITUDE: 2.1
MDC_IDC_SET_LEADCHNL_LV_PACING_PULSEWIDTH: 0.4
MDC_IDC_SET_LEADCHNL_RA_PACING_POLARITY: NORMAL
MDC_IDC_SET_LEADCHNL_RA_SENSING_POLARITY: NORMAL
MDC_IDC_SET_LEADCHNL_RA_SENSING_SENSITIVITY: 0.25
MDC_IDC_SET_LEADCHNL_RV_PACING_AMPLITUDE: 2
MDC_IDC_SET_LEADCHNL_RV_PACING_POLARITY: NORMAL
MDC_IDC_SET_LEADCHNL_RV_PACING_PULSEWIDTH: 0.4
MDC_IDC_SET_LEADCHNL_RV_SENSING_POLARITY: NORMAL
MDC_IDC_SET_LEADCHNL_RV_SENSING_SENSITIVITY: 0.6
MDC_IDC_SET_ZONE_STATUS: NORMAL
MDC_IDC_SET_ZONE_STATUS: NORMAL
MDC_IDC_SET_ZONE_TYPE: NORMAL
MDC_IDC_SET_ZONE_TYPE: NORMAL
MDC_IDC_STAT_BRADY_RA_PERCENT_PACED: 0
MDC_IDC_STAT_BRADY_RV_PERCENT_PACED: 92
MDC_IDC_STAT_CRT_LV_PERCENT_PACED: 92
MDC_IDC_STAT_TACHYTHERAPY_ATP_DELIVERED_RECENT: 0
MDC_IDC_STAT_TACHYTHERAPY_SHOCKS_ABORTED_RECENT: 0
MDC_IDC_STAT_TACHYTHERAPY_SHOCKS_DELIVERED_RECENT: 0

## 2025-07-24 NOTE — TELEPHONE ENCOUNTER
Called pt for symptom check after receiving alert transmission via Embed[employer] Chat home monitor. Alert shows elevated HeartLogic at 25.     Mr Maria stated he was already planning on taking one Torsemide today for his lower extremity swelling. He reports just getting back from an [employer] cruise this morning at 0500am. Because of that he was unable to weigh himself for the past several days, but he was aware of feeling like he was holding onto fluid. Denies SOA, orthopnea, or PND.  I asked if he would take one Torsemide daily today, tomorrow, and Saturday. He stated he does not do very well when he takes it daily for the three days. I then asked if he would be comfortable taking it today and Saturday, and he stated that sounded like a more manageable plan. He will take another Torsemide on Monday 7/28 if he is still having CHF symptoms at that time.     He confirmed that he would be attending his [facility] appt scheduled for 8/7/25.    I advised he call my direct office number if he has any questions or concerns. He verbalized understanding.

## 2025-07-30 NOTE — TELEPHONE ENCOUNTER
Called Mr Maria to check on him and see how he was feeling currently after taking his Torsemide over the weekend. There was no answer, but I was able to leave a voicemail for call back.

## 2025-08-07 ENCOUNTER — HOSPITAL ENCOUNTER (OUTPATIENT)
Dept: CARDIOLOGY | Facility: HOSPITAL | Age: 80
Discharge: HOME | End: 2025-08-07
Admitting: NURSE PRACTITIONER
Payer: MEDICARE

## 2025-08-07 VITALS
DIASTOLIC BLOOD PRESSURE: 68 MMHG | SYSTOLIC BLOOD PRESSURE: 122 MMHG | WEIGHT: 199 LBS | HEART RATE: 81 BPM | BODY MASS INDEX: 27.77 KG/M2 | OXYGEN SATURATION: 99 %

## 2025-08-07 DIAGNOSIS — I25.5 ISCHEMIC CARDIOMYOPATHY: ICD-10-CM

## 2025-08-07 DIAGNOSIS — I48.21 PERMANENT ATRIAL FIBRILLATION: ICD-10-CM

## 2025-08-07 DIAGNOSIS — I50.42 CHRONIC COMBINED SYSTOLIC AND DIASTOLIC CONGESTIVE HEART FAILURE: Primary | ICD-10-CM

## 2025-08-07 DIAGNOSIS — Z95.810 PRESENCE OF BIVENTRICULAR AICD: ICD-10-CM

## 2025-08-07 DIAGNOSIS — I25.810 CORONARY ARTERY DISEASE INVOLVING CORONARY BYPASS GRAFT OF NATIVE HEART WITHOUT ANGINA PECTORIS: ICD-10-CM

## 2025-08-07 LAB
ABSOLUTE LUNG FLUID CONTENT: 31 % (ref 20–35)
ANION GAP SERPL CALCULATED.3IONS-SCNC: 14 MMOL/L (ref 5–15)
BUN SERPL-MCNC: 17.1 MG/DL (ref 8–23)
BUN/CREAT SERPL: 15.5 (ref 7–25)
CALCIUM SPEC-SCNC: 9.1 MG/DL (ref 8.6–10.5)
CHLORIDE SERPL-SCNC: 104 MMOL/L (ref 98–107)
CO2 SERPL-SCNC: 22 MMOL/L (ref 22–29)
CREAT SERPL-MCNC: 1.1 MG/DL (ref 0.76–1.27)
EGFRCR SERPLBLD CKD-EPI 2021: 68.7 ML/MIN/1.73
GLUCOSE SERPL-MCNC: 172 MG/DL (ref 65–99)
POTASSIUM SERPL-SCNC: 3.8 MMOL/L (ref 3.5–5.2)
SODIUM SERPL-SCNC: 140 MMOL/L (ref 136–145)

## 2025-08-07 PROCEDURE — 80048 BASIC METABOLIC PNL TOTAL CA: CPT | Performed by: NURSE PRACTITIONER

## 2025-08-07 PROCEDURE — 94726 PLETHYSMOGRAPHY LUNG VOLUMES: CPT | Performed by: NURSE PRACTITIONER

## 2025-08-07 RX ORDER — SPIRONOLACTONE 25 MG/1
50 TABLET ORAL DAILY
Start: 2025-08-07

## 2025-08-28 ENCOUNTER — HOSPITAL ENCOUNTER (OUTPATIENT)
Dept: CARDIOLOGY | Facility: HOSPITAL | Age: 80
Discharge: HOME | End: 2025-08-28
Admitting: NURSE PRACTITIONER
Payer: MEDICARE

## 2025-08-28 VITALS
DIASTOLIC BLOOD PRESSURE: 72 MMHG | OXYGEN SATURATION: 98 % | HEART RATE: 77 BPM | SYSTOLIC BLOOD PRESSURE: 123 MMHG | BODY MASS INDEX: 27.68 KG/M2 | WEIGHT: 198.4 LBS

## 2025-08-28 DIAGNOSIS — I25.5 ISCHEMIC CARDIOMYOPATHY: ICD-10-CM

## 2025-08-28 DIAGNOSIS — I25.810 CORONARY ARTERY DISEASE INVOLVING CORONARY BYPASS GRAFT OF NATIVE HEART WITHOUT ANGINA PECTORIS: ICD-10-CM

## 2025-08-28 DIAGNOSIS — I50.42 CHRONIC COMBINED SYSTOLIC AND DIASTOLIC CONGESTIVE HEART FAILURE: Primary | ICD-10-CM

## 2025-08-28 LAB
ABSOLUTE LUNG FLUID CONTENT: 30 % (ref 20–35)
ANION GAP SERPL CALCULATED.3IONS-SCNC: 11 MMOL/L (ref 5–15)
BUN SERPL-MCNC: 12.6 MG/DL (ref 8–23)
BUN/CREAT SERPL: 14.5 (ref 7–25)
CALCIUM SPEC-SCNC: 9.1 MG/DL (ref 8.6–10.5)
CHLORIDE SERPL-SCNC: 109 MMOL/L (ref 98–107)
CO2 SERPL-SCNC: 22 MMOL/L (ref 22–29)
CREAT SERPL-MCNC: 0.87 MG/DL (ref 0.76–1.27)
EGFRCR SERPLBLD CKD-EPI 2021: 88.3 ML/MIN/1.73
GLUCOSE SERPL-MCNC: 150 MG/DL (ref 65–99)
POTASSIUM SERPL-SCNC: 4.6 MMOL/L (ref 3.5–5.2)
SODIUM SERPL-SCNC: 142 MMOL/L (ref 136–145)

## 2025-08-28 PROCEDURE — 80048 BASIC METABOLIC PNL TOTAL CA: CPT | Performed by: NURSE PRACTITIONER

## 2025-08-28 PROCEDURE — 94726 PLETHYSMOGRAPHY LUNG VOLUMES: CPT | Performed by: NURSE PRACTITIONER

## (undated) DEVICE — THE SINGLE USE ETRAP – POLYP TRAP IS USED FOR SUCTION RETRIEVAL OF ENDOSCOPICALLY REMOVED POLYPS.: Brand: ETRAP

## (undated) DEVICE — GW STARTER FXD CORE J .035 3X260CM 3MM

## (undated) DEVICE — SKIN AFFIX SURG ADHESIVE 72/CS 0.55ML: Brand: MEDLINE

## (undated) DEVICE — CUFF,BP,DISP,1 TUBE,ADULT,HP: Brand: MEDLINE

## (undated) DEVICE — Device: Brand: MEDEX

## (undated) DEVICE — Device

## (undated) DEVICE — BANDAGE,GAUZE,BULKEE II,4.5"X4.1YD,STRL: Brand: MEDLINE

## (undated) DEVICE — THE CHANNEL CLEANING BRUSH IS A NYLON FLEXI BRUSH ATTACHED TO A FLEXIBLE PLASTIC SHEATH DESIGNED TO SAFELY REMOVE DEBRIS FROM FLEXIBLE ENDOSCOPES.

## (undated) DEVICE — PINNACLE INTRODUCER SHEATH: Brand: PINNACLE

## (undated) DEVICE — ANTIBACTERIAL UNDYED BRAIDED (POLYGLACTIN 910), SYNTHETIC ABSORBABLE SUTURE: Brand: COATED VICRYL

## (undated) DEVICE — ANTIBACTERIAL VIOLET BRAIDED (POLYGLACTIN 910), SYNTHETIC ABSORBABLE SUTURE: Brand: COATED VICRYL

## (undated) DEVICE — MODEL BT2000 P/N 700287-012KIT CONTENTS: MANIFOLD WITH SALINE AND CONTRAST PORTS, SALINE TUBING WITH SPIKE AND HAND SYRINGE, TRANSDUCER: Brand: BT2000 AUTOMATED MANIFOLD KIT

## (undated) DEVICE — PAD, DEFIB, ADULT, RADIOTRANS, PHILIPS: Brand: MEDLINE

## (undated) DEVICE — SOL IRR NACL 0.9PCT BT 1000ML

## (undated) DEVICE — CATH F6INF TL IM 100CM: Brand: INFINITI

## (undated) DEVICE — SYR LL TP 10ML STRL

## (undated) DEVICE — KT ACCSR PENILE AMS700 W/IMP/CONN

## (undated) DEVICE — CATH F6 ST+ MP A1 100CM: Brand: SUPER TORQUE

## (undated) DEVICE — GW ZIPWIRE STD ANGL .035IN 150CM

## (undated) DEVICE — SOLIDIFIER LIQUI LOC PLUS 2000CC

## (undated) DEVICE — GLV SURG BIOGEL M LTX PF 7 1/2

## (undated) DEVICE — MASK,OXYGEN,MED CONC,ADLT,7' TUB, UC: Brand: PENDING

## (undated) DEVICE — MYNXGRIP 6F/7F: Brand: MYNXGRIP

## (undated) DEVICE — A2000 MULTI-USE SYRINGE KIT, P/N 701277-003KIT CONTENTS: 100ML CONTRAST RESERVOIR AND TUBING WITH CONTRAST SPIKE AND CLAMP: Brand: A2000 MULTI-USE SYRINGE KIT

## (undated) DEVICE — SENSR O2 OXIMAX FNGR A/ 18IN NONSTR

## (undated) DEVICE — Device: Brand: DEFENDO AIR/WATER/SUCTION AND BIOPSY VALVE

## (undated) DEVICE — SUT GUT CHRM 3/0 SH 27IN G122H

## (undated) DEVICE — SYR LUERLOK 50ML

## (undated) DEVICE — YANKAUER,BULB TIP WITH VENT: Brand: ARGYLE

## (undated) DEVICE — ELECTRD PAD DEFIB RADOLCNT M/FUNC PHYSIOCONTRL CONN/LD/IN A/

## (undated) DEVICE — INF TL MULIPACK FR6: Brand: INFINITI

## (undated) DEVICE — DRAPE,UTILITY,TAPE,15X26,STERILE: Brand: MEDLINE

## (undated) DEVICE — CLTH CLENS READYCLEANSE PERI CARE PK/5

## (undated) DEVICE — 3M™ STERI-DRAPE™ U-DRAPE 1015: Brand: STERI-DRAPE™

## (undated) DEVICE — CATH F6INF TL LCB 100CM: Brand: INFINITI

## (undated) DEVICE — PK PM 30

## (undated) DEVICE — SNAR POLYP CAPTIVATOR RND STFF 2.4 240CM 10MM 1P/U

## (undated) DEVICE — MODEL AT P65, P/N 701554-001KIT CONTENTS: HAND CONTROLLER, 3-WAY HIGH-PRESSURE STOPCOCK WITH ROTATING END AND PREMIUM HIGH-PRESSURE TUBING: Brand: ANGIOTOUCH® KIT

## (undated) DEVICE — PK TURNOVER RM ADV

## (undated) DEVICE — TOTAL TRAY, 16FR 10ML SIL FOLEY, URN: Brand: MEDLINE

## (undated) DEVICE — PK CATH CARD 30 CA/4

## (undated) DEVICE — CANN NASL ETCO2 LO/FLO A/

## (undated) DEVICE — FRCP BX RADJAW4 NDL 2.8 240 STD OG

## (undated) DEVICE — SOL NS 500ML

## (undated) DEVICE — TRY PREP SCRB VAG PVP

## (undated) DEVICE — APPL CHLORAPREP HI/LITE 26ML ORNG

## (undated) DEVICE — RETR DEEP SCROTAL SKW

## (undated) DEVICE — TBG SMPL FLTR LINE NASL 02/C02 A/ BX/100

## (undated) DEVICE — SINGLE-USE POLYPECTOMY SNARE: Brand: CAPTIVATOR II

## (undated) DEVICE — CATH MONTR SWANGANZ WP 2L 7F110CM

## (undated) DEVICE — STRIP CLS WND CURAD MEDI/STRIP HYPOALLERG 0.5X4IN STRL PK/6

## (undated) DEVICE — CONMED SCOPE SAVER BITE BLOCK, 20X27 MM: Brand: SCOPE SAVER

## (undated) DEVICE — PREP RAZOR: Brand: DEROYAL

## (undated) DEVICE — DRSNG WND GZ PAD BORDERED LF 4X5IN STRL

## (undated) DEVICE — SHEATH INTRO PINN CORNRY .038 5F10CM

## (undated) DEVICE — PAD MINOR UNIVERSAL: Brand: MEDLINE INDUSTRIES, INC.